# Patient Record
Sex: FEMALE | Race: WHITE | Employment: OTHER | URBAN - METROPOLITAN AREA
[De-identification: names, ages, dates, MRNs, and addresses within clinical notes are randomized per-mention and may not be internally consistent; named-entity substitution may affect disease eponyms.]

---

## 2017-01-03 RX ORDER — LISINOPRIL 40 MG/1
40 TABLET ORAL NIGHTLY
Qty: 90 TABLET | Refills: 0 | Status: SHIPPED | OUTPATIENT
Start: 2017-01-03 | End: 2017-04-24 | Stop reason: SDUPTHER

## 2017-01-03 RX ORDER — PROPRANOLOL HYDROCHLORIDE 40 MG/1
40 TABLET ORAL DAILY
Qty: 90 TABLET | Refills: 0 | Status: SHIPPED | OUTPATIENT
Start: 2017-01-03 | End: 2017-02-13 | Stop reason: ALTCHOICE

## 2017-01-03 RX ORDER — CLONIDINE HYDROCHLORIDE 0.1 MG/1
0.1 TABLET ORAL 2 TIMES DAILY
Qty: 180 TABLET | Refills: 0 | Status: SHIPPED | OUTPATIENT
Start: 2017-01-03 | End: 2017-02-13 | Stop reason: ALTCHOICE

## 2017-01-06 ENCOUNTER — TELEPHONE (OUTPATIENT)
Dept: ORTHOPEDIC SURGERY | Age: 67
End: 2017-01-06

## 2017-01-24 ENCOUNTER — TELEPHONE (OUTPATIENT)
Dept: ORTHOPEDIC SURGERY | Age: 67
End: 2017-01-24

## 2017-02-06 ENCOUNTER — TELEPHONE (OUTPATIENT)
Dept: ORTHOPEDIC SURGERY | Age: 67
End: 2017-02-06

## 2017-02-07 ENCOUNTER — OFFICE VISIT (OUTPATIENT)
Dept: ORTHOPEDIC SURGERY | Age: 67
End: 2017-02-07

## 2017-02-07 ENCOUNTER — TELEPHONE (OUTPATIENT)
Dept: ORTHOPEDIC SURGERY | Age: 67
End: 2017-02-07

## 2017-02-07 VITALS
BODY MASS INDEX: 29.43 KG/M2 | HEIGHT: 60 IN | SYSTOLIC BLOOD PRESSURE: 132 MMHG | DIASTOLIC BLOOD PRESSURE: 88 MMHG | HEART RATE: 82 BPM | WEIGHT: 149.91 LBS

## 2017-02-07 DIAGNOSIS — M46.44 THORACIC DISCITIS: Primary | ICD-10-CM

## 2017-02-07 PROCEDURE — 99213 OFFICE O/P EST LOW 20 MIN: CPT | Performed by: ORTHOPAEDIC SURGERY

## 2017-02-10 ENCOUNTER — TELEPHONE (OUTPATIENT)
Dept: ORTHOPEDIC SURGERY | Age: 67
End: 2017-02-10

## 2017-02-13 ENCOUNTER — OFFICE VISIT (OUTPATIENT)
Dept: CARDIOLOGY CLINIC | Age: 67
End: 2017-02-13

## 2017-02-13 VITALS
DIASTOLIC BLOOD PRESSURE: 54 MMHG | WEIGHT: 145 LBS | HEART RATE: 78 BPM | OXYGEN SATURATION: 96 % | BODY MASS INDEX: 28.47 KG/M2 | SYSTOLIC BLOOD PRESSURE: 100 MMHG | HEIGHT: 60 IN

## 2017-02-13 DIAGNOSIS — J44.1 ACUTE EXACERBATION OF CHRONIC OBSTRUCTIVE PULMONARY DISEASE (COPD) (HCC): ICD-10-CM

## 2017-02-13 DIAGNOSIS — Z72.0 TOBACCO ABUSE: Primary | Chronic | ICD-10-CM

## 2017-02-13 DIAGNOSIS — I10 ESSENTIAL HYPERTENSION: ICD-10-CM

## 2017-02-13 DIAGNOSIS — R07.2 PRECORDIAL PAIN: ICD-10-CM

## 2017-02-13 PROCEDURE — 99214 OFFICE O/P EST MOD 30 MIN: CPT | Performed by: INTERNAL MEDICINE

## 2017-04-10 ENCOUNTER — TELEPHONE (OUTPATIENT)
Dept: INFECTIOUS DISEASES | Age: 67
End: 2017-04-10

## 2017-04-13 ENCOUNTER — TELEPHONE (OUTPATIENT)
Dept: PULMONOLOGY | Age: 67
End: 2017-04-13

## 2017-04-24 ENCOUNTER — OFFICE VISIT (OUTPATIENT)
Dept: CARDIOLOGY CLINIC | Age: 67
End: 2017-04-24

## 2017-04-24 VITALS
HEART RATE: 104 BPM | BODY MASS INDEX: 28.27 KG/M2 | DIASTOLIC BLOOD PRESSURE: 80 MMHG | SYSTOLIC BLOOD PRESSURE: 134 MMHG | OXYGEN SATURATION: 92 % | HEIGHT: 60 IN | WEIGHT: 144 LBS

## 2017-04-24 DIAGNOSIS — I25.118 CORONARY ARTERY DISEASE OF NATIVE ARTERY OF NATIVE HEART WITH STABLE ANGINA PECTORIS (HCC): ICD-10-CM

## 2017-04-24 DIAGNOSIS — I20.8 ANGINA AT REST (HCC): Primary | ICD-10-CM

## 2017-04-24 DIAGNOSIS — D64.9 ANEMIA, UNSPECIFIED TYPE: ICD-10-CM

## 2017-04-24 DIAGNOSIS — I50.32 CHRONIC DIASTOLIC CONGESTIVE HEART FAILURE (HCC): ICD-10-CM

## 2017-04-24 DIAGNOSIS — I10 ESSENTIAL HYPERTENSION: Chronic | ICD-10-CM

## 2017-04-24 PROCEDURE — 99214 OFFICE O/P EST MOD 30 MIN: CPT | Performed by: INTERNAL MEDICINE

## 2017-04-24 RX ORDER — LISINOPRIL 40 MG/1
40 TABLET ORAL NIGHTLY
Qty: 90 TABLET | Refills: 1 | Status: SHIPPED | OUTPATIENT
Start: 2017-04-24 | End: 2017-11-30 | Stop reason: SDUPTHER

## 2017-04-24 RX ORDER — ATORVASTATIN CALCIUM 40 MG/1
40 TABLET, FILM COATED ORAL NIGHTLY
Qty: 90 TABLET | Refills: 1 | Status: SHIPPED | OUTPATIENT
Start: 2017-04-24 | End: 2017-06-29 | Stop reason: ALTCHOICE

## 2017-04-24 RX ORDER — AMLODIPINE BESYLATE 5 MG/1
5 TABLET ORAL DAILY
Qty: 90 TABLET | Refills: 1 | Status: SHIPPED | OUTPATIENT
Start: 2017-04-24 | End: 2017-09-25 | Stop reason: ALTCHOICE

## 2017-07-03 ENCOUNTER — TELEPHONE (OUTPATIENT)
Dept: INFECTIOUS DISEASES | Age: 67
End: 2017-07-03

## 2017-07-25 ENCOUNTER — TELEPHONE (OUTPATIENT)
Dept: INFECTIOUS DISEASES | Age: 67
End: 2017-07-25

## 2017-07-26 PROBLEM — M54.6 PAIN IN THORACIC SPINE: Status: ACTIVE | Noted: 2017-07-26

## 2017-07-26 PROBLEM — M47.814 SPONDYLOSIS OF THORACIC REGION WITHOUT MYELOPATHY OR RADICULOPATHY: Status: ACTIVE | Noted: 2017-07-26

## 2017-08-03 PROBLEM — D50.8 IRON DEFICIENCY ANEMIA SECONDARY TO INADEQUATE DIETARY IRON INTAKE: Status: ACTIVE | Noted: 2017-08-03

## 2017-08-03 PROBLEM — K90.9 MALABSORPTION OF IRON: Status: ACTIVE | Noted: 2017-08-03

## 2017-11-30 RX ORDER — LISINOPRIL 40 MG/1
40 TABLET ORAL NIGHTLY
Qty: 90 TABLET | Refills: 1 | Status: SHIPPED | OUTPATIENT
Start: 2017-11-30 | End: 2018-12-03

## 2017-11-30 NOTE — TELEPHONE ENCOUNTER
[facility] requesting a refill for pt's lisinopril (PRINIVIL;ZESTRIL) 40 MG tablet. Last ov 4/24/17.

## 2018-02-05 ENCOUNTER — TELEPHONE (OUTPATIENT)
Dept: CASE MANAGEMENT | Age: 68
End: 2018-02-05

## 2018-02-07 PROBLEM — G45.9 TIA (TRANSIENT ISCHEMIC ATTACK): Status: RESOLVED | Noted: 2018-02-07 | Resolved: 2018-02-07

## 2018-02-07 PROBLEM — G45.9 TIA (TRANSIENT ISCHEMIC ATTACK): Status: ACTIVE | Noted: 2018-02-07

## 2018-02-07 PROBLEM — H83.09 VIRAL LABYRINTHITIS: Status: ACTIVE | Noted: 2018-02-07

## 2018-02-12 ENCOUNTER — TELEPHONE (OUTPATIENT)
Dept: CARDIOLOGY | Age: 68
End: 2018-02-12

## 2018-02-12 DIAGNOSIS — R55 SYNCOPE AND COLLAPSE: Primary | ICD-10-CM

## 2018-02-12 NOTE — TELEPHONE ENCOUNTER
Patient needs a 30 day monitor sent to her for syncope. Also a follow-up visit with Dr. Ousmane De Oliveira in about 6weeks per Dr. Dover First. Please call the patient to arrange. Event monitor was ordered.

## 2018-02-13 NOTE — TELEPHONE ENCOUNTER
Spoke with RN Jeannie Timmons, please arrange monitor for pt and call her,  can schedule ov at same time when medical staff contacts pt about monitor. Thank you.

## 2018-03-14 ENCOUNTER — TELEPHONE (OUTPATIENT)
Dept: CARDIOLOGY CLINIC | Age: 68
End: 2018-03-14

## 2018-04-12 ENCOUNTER — TELEPHONE (OUTPATIENT)
Dept: CARDIOLOGY CLINIC | Age: 68
End: 2018-04-12

## 2018-04-24 ENCOUNTER — HOSPITAL ENCOUNTER (OUTPATIENT)
Dept: OTHER | Age: 68
Discharge: HOME | End: 2018-04-24
Attending: FAMILY MEDICINE | Admitting: FAMILY MEDICINE

## 2018-04-24 DIAGNOSIS — J44.9 CHRONIC OBSTRUCTIVE PULMONARY DISEASE, UNSPECIFIED COPD TYPE (HCC): ICD-10-CM

## 2018-08-12 ENCOUNTER — APPOINTMENT (OUTPATIENT)
Dept: GENERAL RADIOLOGY | Age: 68
End: 2018-08-12
Payer: MEDICARE

## 2018-08-12 ENCOUNTER — HOSPITAL ENCOUNTER (EMERGENCY)
Age: 68
Discharge: HOME | End: 2018-08-12
Attending: EMERGENCY MEDICINE
Payer: MEDICARE

## 2018-08-12 VITALS
TEMPERATURE: 97.5 F | HEART RATE: 87 BPM | SYSTOLIC BLOOD PRESSURE: 220 MMHG | DIASTOLIC BLOOD PRESSURE: 96 MMHG | WEIGHT: 165 LBS | BODY MASS INDEX: 31.15 KG/M2 | OXYGEN SATURATION: 100 % | RESPIRATION RATE: 20 BRPM | HEIGHT: 61 IN

## 2018-08-12 DIAGNOSIS — J18.9 PNEUMONITIS: Primary | ICD-10-CM

## 2018-08-12 LAB
A/G RATIO: 1.4 (ref 1.1–2.2)
ALBUMIN SERPL-MCNC: 4.2 G/DL (ref 3.4–5)
ALP BLD-CCNC: 79 U/L (ref 40–129)
ALT SERPL-CCNC: 7 U/L (ref 10–40)
ANION GAP SERPL CALCULATED.3IONS-SCNC: 12 MMOL/L (ref 3–16)
AST SERPL-CCNC: 11 U/L (ref 15–37)
BANDED NEUTROPHILS RELATIVE PERCENT: 1 % (ref 0–7)
BASOPHILS ABSOLUTE: 0 K/UL (ref 0–0.2)
BASOPHILS RELATIVE PERCENT: 0 %
BILIRUB SERPL-MCNC: <0.2 MG/DL (ref 0–1)
BUN BLDV-MCNC: 19 MG/DL (ref 7–20)
CALCIUM SERPL-MCNC: 9.4 MG/DL (ref 8.3–10.6)
CHLORIDE BLD-SCNC: 96 MMOL/L (ref 99–110)
CO2: 26 MMOL/L (ref 21–32)
CREAT SERPL-MCNC: 0.7 MG/DL (ref 0.6–1.2)
EOSINOPHILS ABSOLUTE: 0.3 K/UL (ref 0–0.6)
EOSINOPHILS RELATIVE PERCENT: 4 %
GFR AFRICAN AMERICAN: >60
GFR NON-AFRICAN AMERICAN: >60
GLOBULIN: 3.1 G/DL
GLUCOSE BLD-MCNC: 102 MG/DL (ref 70–99)
HCT VFR BLD CALC: 33.8 % (ref 36–48)
HEMOGLOBIN: 11.5 G/DL (ref 12–16)
LYMPHOCYTES ABSOLUTE: 1.3 K/UL (ref 1–5.1)
LYMPHOCYTES RELATIVE PERCENT: 19 %
MCH RBC QN AUTO: 30.4 PG (ref 26–34)
MCHC RBC AUTO-ENTMCNC: 34.1 G/DL (ref 31–36)
MCV RBC AUTO: 89.2 FL (ref 80–100)
MONOCYTES ABSOLUTE: 0.3 K/UL (ref 0–1.3)
MONOCYTES RELATIVE PERCENT: 5 %
NEUTROPHILS ABSOLUTE: 4.8 K/UL (ref 1.7–7.7)
NEUTROPHILS RELATIVE PERCENT: 71 %
PDW BLD-RTO: 14.4 % (ref 12.4–15.4)
PLATELET # BLD: 196 K/UL (ref 135–450)
PLATELET SLIDE REVIEW: ADEQUATE
PMV BLD AUTO: 6.8 FL (ref 5–10.5)
POTASSIUM SERPL-SCNC: 4.5 MMOL/L (ref 3.5–5.1)
RBC # BLD: 3.79 M/UL (ref 4–5.2)
SLIDE REVIEW: ABNORMAL
SODIUM BLD-SCNC: 134 MMOL/L (ref 136–145)
TOTAL PROTEIN: 7.3 G/DL (ref 6.4–8.2)
TROPONIN: <0.01 NG/ML
WBC # BLD: 6.6 K/UL (ref 4–11)

## 2018-08-12 PROCEDURE — 85025 COMPLETE CBC W/AUTO DIFF WBC: CPT

## 2018-08-12 PROCEDURE — 99284 EMERGENCY DEPT VISIT MOD MDM: CPT

## 2018-08-12 PROCEDURE — 6370000000 HC RX 637 (ALT 250 FOR IP): Performed by: PHYSICIAN ASSISTANT

## 2018-08-12 PROCEDURE — 93010 ELECTROCARDIOGRAM REPORT: CPT | Performed by: INTERNAL MEDICINE

## 2018-08-12 PROCEDURE — 93005 ELECTROCARDIOGRAM TRACING: CPT | Performed by: PHYSICIAN ASSISTANT

## 2018-08-12 PROCEDURE — 6360000002 HC RX W HCPCS: Performed by: PHYSICIAN ASSISTANT

## 2018-08-12 PROCEDURE — 96374 THER/PROPH/DIAG INJ IV PUSH: CPT

## 2018-08-12 PROCEDURE — 84484 ASSAY OF TROPONIN QUANT: CPT

## 2018-08-12 PROCEDURE — 80053 COMPREHEN METABOLIC PANEL: CPT

## 2018-08-12 PROCEDURE — 71046 X-RAY EXAM CHEST 2 VIEWS: CPT

## 2018-08-12 RX ORDER — IPRATROPIUM BROMIDE AND ALBUTEROL SULFATE 2.5; .5 MG/3ML; MG/3ML
1 SOLUTION RESPIRATORY (INHALATION) ONCE
Status: COMPLETED | OUTPATIENT
Start: 2018-08-12 | End: 2018-08-12

## 2018-08-12 RX ORDER — METHYLPREDNISOLONE SODIUM SUCCINATE 125 MG/2ML
125 INJECTION, POWDER, LYOPHILIZED, FOR SOLUTION INTRAMUSCULAR; INTRAVENOUS ONCE
Status: COMPLETED | OUTPATIENT
Start: 2018-08-12 | End: 2018-08-12

## 2018-08-12 RX ORDER — PREDNISONE 10 MG/1
60 TABLET ORAL DAILY
Qty: 30 TABLET | Refills: 0 | Status: SHIPPED | OUTPATIENT
Start: 2018-08-12 | End: 2018-08-17

## 2018-08-12 RX ORDER — DOXYCYCLINE 100 MG/1
100 TABLET ORAL 2 TIMES DAILY
Qty: 20 TABLET | Refills: 0 | Status: SHIPPED | OUTPATIENT
Start: 2018-08-12 | End: 2018-08-22

## 2018-08-12 RX ORDER — METHYLPREDNISOLONE SODIUM SUCCINATE 125 MG/2ML
125 INJECTION, POWDER, LYOPHILIZED, FOR SOLUTION INTRAMUSCULAR; INTRAVENOUS ONCE
Status: DISCONTINUED | OUTPATIENT
Start: 2018-08-12 | End: 2018-08-12

## 2018-08-12 RX ORDER — BENZONATATE 100 MG/1
100 CAPSULE ORAL ONCE
Status: COMPLETED | OUTPATIENT
Start: 2018-08-12 | End: 2018-08-12

## 2018-08-12 RX ORDER — HYDROCODONE BITARTRATE AND ACETAMINOPHEN 5; 325 MG/1; MG/1
1 TABLET ORAL EVERY 6 HOURS PRN
Qty: 10 TABLET | Refills: 0 | Status: SHIPPED | OUTPATIENT
Start: 2018-08-12 | End: 2018-08-15

## 2018-08-12 RX ADMIN — BENZONATATE 100 MG: 100 CAPSULE ORAL at 14:01

## 2018-08-12 RX ADMIN — IPRATROPIUM BROMIDE AND ALBUTEROL SULFATE 1 AMPULE: .5; 3 SOLUTION RESPIRATORY (INHALATION) at 12:50

## 2018-08-12 RX ADMIN — METHYLPREDNISOLONE SODIUM SUCCINATE 125 MG: 125 INJECTION, POWDER, FOR SOLUTION INTRAMUSCULAR; INTRAVENOUS at 12:56

## 2018-08-12 ASSESSMENT — PAIN DESCRIPTION - LOCATION: LOCATION: BACK

## 2018-08-12 ASSESSMENT — PAIN DESCRIPTION - PAIN TYPE: TYPE: ACUTE PAIN

## 2018-08-12 ASSESSMENT — ENCOUNTER SYMPTOMS
DIARRHEA: 1
BACK PAIN: 1
EYES NEGATIVE: 1
COUGH: 1

## 2018-08-12 ASSESSMENT — PAIN SCALES - GENERAL: PAINLEVEL_OUTOF10: 8

## 2018-08-12 NOTE — ED PROVIDER NOTES
Bipolar disorder ([facility]     Bronchitis chronic     CHF (congestive heart failure) ([facility]) 9/30/2016    Chronic back pain     Chronic neck pain     Clostridium difficile infection 3/29/12, 5/22/12    positive stool DNA probe    Coronary artery disease involving native coronary artery of native heart with angina pectoris ([facility] 3/8/2016    Depression     Emphysema of lung ([facility]     Fibromyalgia     hyperlipidemia 8/11/2011    Hypertension     Kidney stone     Liver disease     Lung disease     MDD (major depressive disorder) 4/4/2012    Mood disorder ([facility] 3/13/2013    Movement disorder     Neck pain, chronic 3/13/2013    Pneumonia     Polypharmacy 2/16/2013         SURGICAL HISTORY       Past Surgical History:   Procedure Laterality Date    COLONOSCOPY  1/19/12    DIAGNOSTIC CARDIAC CATH LAB PROCEDURE  Feb, 2007    Normal cor angio Feb, 2007    HYSTERECTOMY, TOTAL ABDOMINAL      KIDNEY STONE SURGERY           CURRENT MEDICATIONS       Previous Medications    ALBUTEROL SULFATE HFA (PROVENTIL HFA) 108 (90 BASE) MCG/ACT INHALER    Inhale 2 puffs into the lungs every 4 hours as needed for Wheezing or Shortness of Breath (Space out to every 6 hours as symptoms improve) Space out to every 6 hours as symptoms improve. BLOOD PRESSURE MONITORING (BLOOD PRESSURE MONITOR AUTOMAT) SOCORRO    1 each by Does not apply route daily. DIVALPROEX (DEPAKOTE) 250 MG DR TABLET    Take 250 mg by mouth every morning     FLUTICASONE (FLONASE) 50 MCG/ACT NASAL SPRAY    1 spray by Nasal route daily    GABAPENTIN (NEURONTIN) 800 MG TABLET    Take 1 tablet by mouth 3 times daily for 17 days.     LISINOPRIL (PRINIVIL;ZESTRIL) 40 MG TABLET    Take 1 tablet by mouth nightly    ONDANSETRON (ZOFRAN ODT) 4 MG DISINTEGRATING TABLET    Take 1 tablet by mouth every 8 hours as needed for Nausea or Vomiting    QUETIAPINE (SEROQUEL) 100 MG TABLET    Take 1 tablet by mouth nightly    THERAPEUTIC MULTIVITAMIN-MINERALS (THERAGRAN-M) heard.  Pulmonary/Chest: Effort normal. No respiratory distress. She has wheezes. She has no rales. She exhibits no tenderness. Musculoskeletal: Normal range of motion. She exhibits no deformity. Neurological: She is alert and oriented to person, place, and time. Skin: Skin is warm and dry. She is not diaphoretic. Psychiatric: She has a normal mood and affect. Her behavior is normal.   Nursing note and vitals reviewed. DIAGNOSTIC RESULTS   LABS:    Labs Reviewed   CBC WITH AUTO DIFFERENTIAL - Abnormal; Notable for the following:        Result Value    RBC 3.79 (*)     Hemoglobin 11.5 (*)     Hematocrit 33.8 (*)     All other components within normal limits    Narrative:     Performed at:  [facility],  [place], [facility]   Phone [phone]   COMPREHENSIVE METABOLIC PANEL - Abnormal; Notable for the following:     Sodium 134 (*)     Chloride 96 (*)     Glucose 102 (*)     ALT 7 (*)     AST 11 (*)     All other components within normal limits    Narrative:     Performed at:  [facility],  [place], [facility]   Phone [phone]   TROPONIN    Narrative:     Performed at:  [facility],  [place] [facility]   Phone [phone]       All other labs were within normal range or not returned as of this dictation. EKG: All EKG's are interpreted by the Emergency Department Physician who either signs or Co-signs this chart in the absence of a cardiologist.  Please see their note for interpretation of EKG. RADIOLOGY:   Non-plain film images such as CT, Ultrasound and MRI are read by the radiologist. Plain radiographic images are visualized and preliminarily interpreted by the  ED Provider with the below findings:        Interpretation per the Radiologist below, if available at the time of this note:    XR CHEST STANDARD (2 VW)   Final Result   1. immediately. The patient tolerated their visit well. They were seen and evaluated by the attending physician who agreed with the assessment and plan. The patient and / or the family were informed of the results of any tests, a time was given to answer questions, a plan was proposed and they agreed with plan. FINAL IMPRESSION      1. Pneumonitis          DISPOSITION/PLAN   DISPOSITION        PATIENT REFERRED TO:  Hesham Bills MD  [address] [place] [address] [address]. [address] [facility]  [phone]      As needed, If symptoms worsen      DISCHARGE MEDICATIONS:  New Prescriptions    DOXYCYCLINE MONOHYDRATE (ADOXA) 100 MG TABLET    Take 1 tablet by mouth 2 times daily for 10 days May substitute another form of Doxycycline if insurance requires. HYDROCODONE-ACETAMINOPHEN (NORCO) 5-325 MG PER TABLET    Take 1 tablet by mouth every 6 hours as needed for Pain for up to 3 days. Tremayne Fofana PREDNISONE (DELTASONE) 10 MG TABLET    Take 6 tablets by mouth daily for 5 doses       DISCONTINUED MEDICATIONS:  Discontinued Medications    DONEPEZIL (ARICEPT) 10 MG TABLET    Take 5 mg by mouth nightly     LACTOBACILLUS (CULTURELLE) CAPSULE    Take 1 capsule by mouth 2 times daily              (Please note that portions of this note were completed with a voice recognition program.  Efforts were made to edit the dictations but occasionally words are mis-transcribed.)    Jamil Cervantes PA-C (electronically signed)         Jamil Cervantes PA-C  08/12/18 1431      EKG-interpreted by me, Gonzalo Cochran D.O. shows sinus rhythm rate of 91. She does have a likely incomplete right bundle branch block. There is no ST elevation or depression. No ectopy. Morphology similar to February 24 of 2018.        Morelia Hoskins DO  08/12/18 5135

## 2018-08-14 LAB
EKG ATRIAL RATE: 91 BPM
EKG DIAGNOSIS: NORMAL
EKG P AXIS: -18 DEGREES
EKG P-R INTERVAL: 134 MS
EKG Q-T INTERVAL: 356 MS
EKG QRS DURATION: 88 MS
EKG QTC CALCULATION (BAZETT): 437 MS
EKG R AXIS: -3 DEGREES
EKG T AXIS: 19 DEGREES
EKG VENTRICULAR RATE: 91 BPM

## 2018-08-15 ENCOUNTER — APPOINTMENT (OUTPATIENT)
Dept: GENERAL RADIOLOGY | Age: 68
End: 2018-08-15
Payer: MEDICARE

## 2018-08-15 ENCOUNTER — HOSPITAL ENCOUNTER (EMERGENCY)
Age: 68
Discharge: HOME | End: 2018-08-15
Attending: EMERGENCY MEDICINE
Payer: MEDICARE

## 2018-08-15 ENCOUNTER — APPOINTMENT (OUTPATIENT)
Dept: CT IMAGING | Age: 68
End: 2018-08-15
Payer: MEDICARE

## 2018-08-15 VITALS
RESPIRATION RATE: 17 BRPM | SYSTOLIC BLOOD PRESSURE: 178 MMHG | HEART RATE: 82 BPM | OXYGEN SATURATION: 96 % | HEIGHT: 61 IN | TEMPERATURE: 97.8 F | BODY MASS INDEX: 28.32 KG/M2 | WEIGHT: 150 LBS | DIASTOLIC BLOOD PRESSURE: 94 MMHG

## 2018-08-15 DIAGNOSIS — Z76.0 ENCOUNTER FOR MEDICATION REFILL: ICD-10-CM

## 2018-08-15 DIAGNOSIS — R05.9 COUGH: ICD-10-CM

## 2018-08-15 DIAGNOSIS — R07.81 RIB PAIN: ICD-10-CM

## 2018-08-15 DIAGNOSIS — Z87.01 HISTORY OF PNEUMONIA: ICD-10-CM

## 2018-08-15 DIAGNOSIS — R91.1 PULMONARY NODULE: Primary | ICD-10-CM

## 2018-08-15 DIAGNOSIS — M47.9 OSTEOARTHRITIS OF SPINE, UNSPECIFIED SPINAL OSTEOARTHRITIS COMPLICATION STATUS, UNSPECIFIED SPINAL REGION: ICD-10-CM

## 2018-08-15 LAB
A/G RATIO: 1.5 (ref 1.1–2.2)
ALBUMIN SERPL-MCNC: 4.2 G/DL (ref 3.4–5)
ALP BLD-CCNC: 70 U/L (ref 40–129)
ALT SERPL-CCNC: 8 U/L (ref 10–40)
ANION GAP SERPL CALCULATED.3IONS-SCNC: 9 MMOL/L (ref 3–16)
AST SERPL-CCNC: 10 U/L (ref 15–37)
BANDED NEUTROPHILS RELATIVE PERCENT: 4 % (ref 0–7)
BASOPHILS ABSOLUTE: 0 K/UL (ref 0–0.2)
BASOPHILS RELATIVE PERCENT: 0 %
BILIRUB SERPL-MCNC: <0.2 MG/DL (ref 0–1)
BUN BLDV-MCNC: 24 MG/DL (ref 7–20)
CALCIUM SERPL-MCNC: 9.2 MG/DL (ref 8.3–10.6)
CHLORIDE BLD-SCNC: 94 MMOL/L (ref 99–110)
CO2: 27 MMOL/L (ref 21–32)
CREAT SERPL-MCNC: 0.7 MG/DL (ref 0.6–1.2)
EOSINOPHILS ABSOLUTE: 0 K/UL (ref 0–0.6)
EOSINOPHILS RELATIVE PERCENT: 0 %
GFR AFRICAN AMERICAN: >60
GFR NON-AFRICAN AMERICAN: >60
GLOBULIN: 2.8 G/DL
GLUCOSE BLD-MCNC: 117 MG/DL (ref 70–99)
HCT VFR BLD CALC: 36.6 % (ref 36–48)
HEMOGLOBIN: 12.2 G/DL (ref 12–16)
INR BLD: 0.96 (ref 0.86–1.14)
LYMPHOCYTES ABSOLUTE: 1.5 K/UL (ref 1–5.1)
LYMPHOCYTES RELATIVE PERCENT: 14 %
MCH RBC QN AUTO: 30.7 PG (ref 26–34)
MCHC RBC AUTO-ENTMCNC: 33.3 G/DL (ref 31–36)
MCV RBC AUTO: 92 FL (ref 80–100)
MONOCYTES ABSOLUTE: 0.1 K/UL (ref 0–1.3)
MONOCYTES RELATIVE PERCENT: 1 %
NEUTROPHILS ABSOLUTE: 9.1 K/UL (ref 1.7–7.7)
NEUTROPHILS RELATIVE PERCENT: 81 %
PDW BLD-RTO: 14.7 % (ref 12.4–15.4)
PLATELET # BLD: 189 K/UL (ref 135–450)
PMV BLD AUTO: 7 FL (ref 5–10.5)
POTASSIUM SERPL-SCNC: 4.6 MMOL/L (ref 3.5–5.1)
PROTHROMBIN TIME: 10.9 SEC (ref 9.8–13)
RBC # BLD: 3.98 M/UL (ref 4–5.2)
SODIUM BLD-SCNC: 130 MMOL/L (ref 136–145)
TOTAL PROTEIN: 7 G/DL (ref 6.4–8.2)
TROPONIN: <0.01 NG/ML
WBC # BLD: 10.7 K/UL (ref 4–11)

## 2018-08-15 PROCEDURE — 71046 X-RAY EXAM CHEST 2 VIEWS: CPT

## 2018-08-15 PROCEDURE — 2580000003 HC RX 258: Performed by: NURSE PRACTITIONER

## 2018-08-15 PROCEDURE — 99285 EMERGENCY DEPT VISIT HI MDM: CPT

## 2018-08-15 PROCEDURE — 71260 CT THORAX DX C+: CPT

## 2018-08-15 PROCEDURE — 85610 PROTHROMBIN TIME: CPT

## 2018-08-15 PROCEDURE — 96361 HYDRATE IV INFUSION ADD-ON: CPT

## 2018-08-15 PROCEDURE — 96360 HYDRATION IV INFUSION INIT: CPT

## 2018-08-15 PROCEDURE — 85025 COMPLETE CBC W/AUTO DIFF WBC: CPT

## 2018-08-15 PROCEDURE — 80053 COMPREHEN METABOLIC PANEL: CPT

## 2018-08-15 PROCEDURE — 36415 COLL VENOUS BLD VENIPUNCTURE: CPT

## 2018-08-15 PROCEDURE — 93010 ELECTROCARDIOGRAM REPORT: CPT | Performed by: INTERNAL MEDICINE

## 2018-08-15 PROCEDURE — 93005 ELECTROCARDIOGRAM TRACING: CPT | Performed by: EMERGENCY MEDICINE

## 2018-08-15 PROCEDURE — 84484 ASSAY OF TROPONIN QUANT: CPT

## 2018-08-15 PROCEDURE — 6370000000 HC RX 637 (ALT 250 FOR IP): Performed by: NURSE PRACTITIONER

## 2018-08-15 PROCEDURE — 6360000004 HC RX CONTRAST MEDICATION: Performed by: EMERGENCY MEDICINE

## 2018-08-15 RX ORDER — 0.9 % SODIUM CHLORIDE 0.9 %
1000 INTRAVENOUS SOLUTION INTRAVENOUS ONCE
Status: COMPLETED | OUTPATIENT
Start: 2018-08-15 | End: 2018-08-15

## 2018-08-15 RX ORDER — HYDROCODONE BITARTRATE AND ACETAMINOPHEN 5; 325 MG/1; MG/1
1 TABLET ORAL EVERY 6 HOURS PRN
Qty: 4 TABLET | Refills: 0 | Status: SHIPPED | OUTPATIENT
Start: 2018-08-15 | End: 2018-08-18

## 2018-08-15 RX ORDER — HYDROCODONE BITARTRATE AND ACETAMINOPHEN 5; 325 MG/1; MG/1
1 TABLET ORAL ONCE
Status: COMPLETED | OUTPATIENT
Start: 2018-08-15 | End: 2018-08-15

## 2018-08-15 RX ORDER — BENZONATATE 100 MG/1
100 CAPSULE ORAL 3 TIMES DAILY PRN
Qty: 30 CAPSULE | Refills: 0 | Status: SHIPPED | OUTPATIENT
Start: 2018-08-15 | End: 2018-08-22

## 2018-08-15 RX ADMIN — IOPAMIDOL 75 ML: 755 INJECTION, SOLUTION INTRAVENOUS at 18:17

## 2018-08-15 RX ADMIN — HYDROCODONE BITARTRATE AND ACETAMINOPHEN 1 TABLET: 5; 325 TABLET ORAL at 17:12

## 2018-08-15 RX ADMIN — SODIUM CHLORIDE 1000 ML: 9 INJECTION, SOLUTION INTRAVENOUS at 18:36

## 2018-08-15 ASSESSMENT — PAIN DESCRIPTION - LOCATION: LOCATION: RIB CAGE

## 2018-08-15 ASSESSMENT — PAIN SCALES - WONG BAKER: WONGBAKER_NUMERICALRESPONSE: 8

## 2018-08-15 ASSESSMENT — PAIN DESCRIPTION - ORIENTATION: ORIENTATION: RIGHT;LEFT

## 2018-08-15 ASSESSMENT — PAIN SCALES - GENERAL: PAINLEVEL_OUTOF10: 8

## 2018-08-15 ASSESSMENT — ENCOUNTER SYMPTOMS
ABDOMINAL PAIN: 0
SHORTNESS OF BREATH: 0

## 2018-08-15 ASSESSMENT — PAIN DESCRIPTION - PAIN TYPE: TYPE: ACUTE PAIN

## 2018-08-16 ENCOUNTER — TELEPHONE (OUTPATIENT)
Dept: CASE MANAGEMENT | Age: 68
End: 2018-08-16

## 2018-08-16 LAB
EKG ATRIAL RATE: 90 BPM
EKG DIAGNOSIS: NORMAL
EKG P AXIS: 65 DEGREES
EKG P-R INTERVAL: 164 MS
EKG Q-T INTERVAL: 342 MS
EKG QRS DURATION: 96 MS
EKG QTC CALCULATION (BAZETT): 418 MS
EKG R AXIS: 8 DEGREES
EKG T AXIS: 49 DEGREES
EKG VENTRICULAR RATE: 90 BPM

## 2018-08-16 NOTE — ED PROVIDER NOTES
Emergency Department Provider Note     Location: [place] [address] ED  8/15/2018    I independently performed a history and physical on Hayley Moore. All diagnostic, treatment, and disposition decisions were made by myself in conjunction with the mid-level provider. Briefly, this is a 78 y.o. female here for SOB, recently told she had PNA/pneumonitis, still w/ some cough, but on abx, here 3 days ago, on 8-12-18, c/o gen. Weakness, L ant rib/chest pain. Reported to me, her main concern today was that she was out of her norco. Has her other meds. ED Triage Vitals   BP Temp Temp Source Pulse Resp SpO2 Height Weight   08/15/18 1550 08/15/18 1546 08/15/18 1546 08/15/18 1546 08/15/18 1546 08/15/18 1546 08/15/18 1546 08/15/18 1546   (!) 167/78 97.8 °F (36.6 °C) Temporal 103 12 96 % 5' 1\" (1.549 m) 150 lb (68 kg)        Exam showed no acute distress, A&Ox4, heart RRR, borderline tachy, no M/G/R, lungs w/ coarse breath sounds, a few scattered rhonchi, mild bibasilar crackles, no wheezing, resp. Nonlabored, no abd tenderness, no peritoneal signs/no rebound/no guarding, a little TTP of L ant/lat chest wall. EKG interpreted by me shows:  normal sinus rhythm with a rate of 90  Axis is   Normal  QTc is  normal  Intervals and Durations are unremarkable. Likely L atrial enlargement, RV conduction delay      ST Segments: normal  No significant change from prior EKG dated 8-12-18  HEART SCORE:    History: +0 for low suspicion  EKG: +0 for normal EKG   Age: +4 for age >65 years  Risk factors (includes HLD, HTN, DM, tobacco use, obesity, and +FHx): +2 for known CAD or 3+ risk factors  Initial troponin: +0 for negative troponin    Heart score: 4.   This falls under the following category: Score of 4-6, which indicates low/moderate risk for major adverse cardiac event and supports observation with repeated troponins and/or non-invasive testing    For further details of [place] [address] emergency department encounter, please see Wei Mae NP's documentation. D/t SOB, tachy, age, and hx, could not r/o PE, obtained CT PE study, negative for PE, findings consistent w/ symptoms, told she needed to f/u w/ pulm regarding nodule and pneumonitis/PNA, and will continue current treatment regimen, has more abx to continue at home, given short course of norco and explained this would not likely be done at next visit, as she needed to f/u as outpt to get chronic meds, labs unremarkable, rib pain has been for days, neg trop, no ischemic change on EKG, pt admitted to me that she was fine with going home to continue her current treatment, but needed some norco for her chronic pain, had run out at home, Strict return precautions given, will return if any worsening symptoms or new concerns, patient verbalized understanding of plan, felt comfortable going home. Orders Placed This Encounter   Procedures    XR CHEST STANDARD (2 VW)    CT Chest Pulmonary Embolism W Contrast    CBC auto differential    Comprehensive Metabolic Panel    Troponin    Protime-INR    EKG 12 Lead     Orders Placed This Encounter   Medications    iopamidol (ISOVUE-370) 76 % injection 75 mL    HYDROcodone-acetaminophen (NORCO) 5-325 MG per tablet 1 tablet    0.9 % sodium chloride bolus    benzonatate (TESSALON PERLES) 100 MG capsule     Sig: Take 1 capsule by mouth 3 times daily as needed for Cough     Dispense:  30 capsule     Refill:  0    HYDROcodone-acetaminophen (NORCO) 5-325 MG per tablet     Sig: Take 1 tablet by mouth every 6 hours as needed for Pain for up to 3 days. Intended supply: 3 days. Take lowest dose possible to manage pain. Dispense:  4 tablet     Refill:  0       Clinical Impression:  1. Pulmonary nodule    2. History of pneumonia    3. Rib pain    4. Cough    5. Osteoarthritis of spine, unspecified spinal osteoarthritis complication status, unspecified spinal region    6.  Encounter for medication refill      Disposition:  Patient discharged home in stable condition. This chart was generated in part by using Dragon Dictation system and may contain errors related to that system including errors in grammar, punctuation, and spelling, as well as words and phrases that may be inappropriate. If there are any questions or concerns please feel free to contact the dictating provider for clarification.            Uvaldo Toscano, DO  08/28/18 7040

## 2018-08-16 NOTE — ED NOTES
Patient resting on stretcher, no needs at this time. Nad. VSS. Pt awaiting further dispo. Bed in low position, call bell in reach. Will continue to monitor.       Marce Laboy RN  08/15/18 2037

## 2018-08-16 NOTE — TELEPHONE ENCOUNTER
Referral from [facility] due to pulmonary nodule found on CTA when pt presented to ED for  Shortness of breath. Spoke with pt and she would like report sent to her PCP, Dr Marisela Stuart. She states she will be calling him for a hospital f/u visit . Report sent via EPIC to Dr Marisela Stuart. Pt informed about [facility] nodule program but prefers to see PCP first for his opinion. \"Dr Marisela Stuart, attached is the CTA imaging done on your pt when she presented to the [facility] ED for SOB. Pt is aware of pulmonary nodule finding and states she is planning on contacting your office for a hospital  f/u visit and to discuss the nodule. If you should need to utilize the services of [facility] Nod[facility] program please contact me at [phone]. Thanks Vangie Chavarria, MSN, BSN, RN, Lung Navigator.  \"

## 2018-08-24 ENCOUNTER — TELEPHONE (OUTPATIENT)
Dept: PULMONOLOGY | Age: 68
End: 2018-08-24

## 2018-08-24 NOTE — TELEPHONE ENCOUNTER
----- Message from Gayle Colin MD sent at 8/24/2018  3:47 PM EDT -----      ----- Message -----  From: Sumanth Dale DO  Sent: 8/15/2018   9:22 PM  To: Gayle Colin MD

## 2018-10-03 ENCOUNTER — HOSPITAL ENCOUNTER (OUTPATIENT)
Age: 68
Discharge: HOME | End: 2018-10-03
Payer: MEDICARE

## 2018-10-03 ENCOUNTER — HOSPITAL ENCOUNTER (OUTPATIENT)
Dept: GENERAL RADIOLOGY | Age: 68
Discharge: HOME | End: 2018-10-03
Payer: MEDICARE

## 2018-10-03 DIAGNOSIS — M54.9 DORSALGIA: ICD-10-CM

## 2018-10-03 LAB
BASOPHILS ABSOLUTE: 0 K/UL (ref 0–0.2)
BASOPHILS RELATIVE PERCENT: 0.8 %
EOSINOPHILS ABSOLUTE: 0.3 K/UL (ref 0–0.6)
EOSINOPHILS RELATIVE PERCENT: 5.4 %
HCT VFR BLD CALC: 35.1 % (ref 36–48)
HEMOGLOBIN: 11.9 G/DL (ref 12–16)
LYMPHOCYTES ABSOLUTE: 1.4 K/UL (ref 1–5.1)
LYMPHOCYTES RELATIVE PERCENT: 28.4 %
MCH RBC QN AUTO: 30 PG (ref 26–34)
MCHC RBC AUTO-ENTMCNC: 33.9 G/DL (ref 31–36)
MCV RBC AUTO: 88.5 FL (ref 80–100)
MONOCYTES ABSOLUTE: 0.5 K/UL (ref 0–1.3)
MONOCYTES RELATIVE PERCENT: 11.2 %
NEUTROPHILS ABSOLUTE: 2.6 K/UL (ref 1.7–7.7)
NEUTROPHILS RELATIVE PERCENT: 54.2 %
PDW BLD-RTO: 14.4 % (ref 12.4–15.4)
PLATELET # BLD: 156 K/UL (ref 135–450)
PMV BLD AUTO: 7.9 FL (ref 5–10.5)
RBC # BLD: 3.96 M/UL (ref 4–5.2)
SEDIMENTATION RATE, ERYTHROCYTE: 20 MM/HR (ref 0–30)
WBC # BLD: 4.9 K/UL (ref 4–11)

## 2018-10-03 PROCEDURE — 36415 COLL VENOUS BLD VENIPUNCTURE: CPT

## 2018-10-03 PROCEDURE — 72100 X-RAY EXAM L-S SPINE 2/3 VWS: CPT

## 2018-10-03 PROCEDURE — 85025 COMPLETE CBC W/AUTO DIFF WBC: CPT

## 2018-10-03 PROCEDURE — 85652 RBC SED RATE AUTOMATED: CPT

## 2018-10-18 ENCOUNTER — APPOINTMENT (OUTPATIENT)
Dept: GENERAL RADIOLOGY | Age: 68
End: 2018-10-18
Payer: MEDICARE

## 2018-10-18 ENCOUNTER — HOSPITAL ENCOUNTER (EMERGENCY)
Age: 68
Discharge: HOME | End: 2018-10-18
Payer: MEDICARE

## 2018-10-18 VITALS
OXYGEN SATURATION: 97 % | RESPIRATION RATE: 18 BRPM | HEIGHT: 62 IN | SYSTOLIC BLOOD PRESSURE: 139 MMHG | HEART RATE: 86 BPM | WEIGHT: 177 LBS | DIASTOLIC BLOOD PRESSURE: 58 MMHG | TEMPERATURE: 98 F | BODY MASS INDEX: 32.57 KG/M2

## 2018-10-18 DIAGNOSIS — S82.65XA CLOSED NONDISPLACED FRACTURE OF LATERAL MALLEOLUS OF LEFT FIBULA, INITIAL ENCOUNTER: Primary | ICD-10-CM

## 2018-10-18 PROCEDURE — 73610 X-RAY EXAM OF ANKLE: CPT

## 2018-10-18 PROCEDURE — G8978 MOBILITY CURRENT STATUS: HCPCS

## 2018-10-18 PROCEDURE — 97116 GAIT TRAINING THERAPY: CPT

## 2018-10-18 PROCEDURE — G8980 MOBILITY D/C STATUS: HCPCS

## 2018-10-18 PROCEDURE — G8979 MOBILITY GOAL STATUS: HCPCS

## 2018-10-18 PROCEDURE — 99283 EMERGENCY DEPT VISIT LOW MDM: CPT

## 2018-10-18 PROCEDURE — 4500000023 HC ED LEVEL 3 PROCEDURE

## 2018-10-18 PROCEDURE — 6360000002 HC RX W HCPCS: Performed by: PHYSICIAN ASSISTANT

## 2018-10-18 PROCEDURE — 97161 PT EVAL LOW COMPLEX 20 MIN: CPT

## 2018-10-18 PROCEDURE — 6370000000 HC RX 637 (ALT 250 FOR IP): Performed by: PHYSICIAN ASSISTANT

## 2018-10-18 RX ORDER — HYDROCODONE BITARTRATE AND ACETAMINOPHEN 5; 325 MG/1; MG/1
1 TABLET ORAL ONCE
Status: COMPLETED | OUTPATIENT
Start: 2018-10-18 | End: 2018-10-18

## 2018-10-18 RX ORDER — ONDANSETRON 4 MG/1
4-8 TABLET, ORALLY DISINTEGRATING ORAL EVERY 12 HOURS PRN
Qty: 12 TABLET | Refills: 0 | Status: SHIPPED | OUTPATIENT
Start: 2018-10-18 | End: 2019-01-08 | Stop reason: ALTCHOICE

## 2018-10-18 RX ORDER — ONDANSETRON 4 MG/1
8 TABLET, ORALLY DISINTEGRATING ORAL ONCE
Status: COMPLETED | OUTPATIENT
Start: 2018-10-18 | End: 2018-10-18

## 2018-10-18 RX ORDER — OXYCODONE HYDROCHLORIDE AND ACETAMINOPHEN 5; 325 MG/1; MG/1
1 TABLET ORAL EVERY 6 HOURS PRN
Qty: 12 TABLET | Refills: 0 | Status: SHIPPED | OUTPATIENT
Start: 2018-10-18 | End: 2018-10-21

## 2018-10-18 RX ORDER — OXYCODONE HYDROCHLORIDE 5 MG/1
5 TABLET ORAL ONCE
Status: COMPLETED | OUTPATIENT
Start: 2018-10-18 | End: 2018-10-18

## 2018-10-18 RX ORDER — IPRATROPIUM BROMIDE AND ALBUTEROL SULFATE 2.5; .5 MG/3ML; MG/3ML
1 SOLUTION RESPIRATORY (INHALATION) ONCE
Status: COMPLETED | OUTPATIENT
Start: 2018-10-18 | End: 2018-10-18

## 2018-10-18 RX ORDER — ALBUTEROL SULFATE 2.5 MG/3ML
5 SOLUTION RESPIRATORY (INHALATION) ONCE
Status: COMPLETED | OUTPATIENT
Start: 2018-10-18 | End: 2018-10-18

## 2018-10-18 RX ADMIN — HYDROCODONE BITARTRATE AND ACETAMINOPHEN 1 TABLET: 5; 325 TABLET ORAL at 11:43

## 2018-10-18 RX ADMIN — OXYCODONE HYDROCHLORIDE 5 MG: 5 TABLET ORAL at 12:51

## 2018-10-18 RX ADMIN — IPRATROPIUM BROMIDE AND ALBUTEROL SULFATE 1 AMPULE: .5; 3 SOLUTION RESPIRATORY (INHALATION) at 11:43

## 2018-10-18 RX ADMIN — ONDANSETRON 8 MG: 4 TABLET, ORALLY DISINTEGRATING ORAL at 14:03

## 2018-10-18 RX ADMIN — ALBUTEROL SULFATE 5 MG: 2.5 SOLUTION RESPIRATORY (INHALATION) at 11:45

## 2018-10-18 ASSESSMENT — PAIN DESCRIPTION - PAIN TYPE
TYPE: ACUTE PAIN
TYPE: ACUTE PAIN

## 2018-10-18 ASSESSMENT — PAIN DESCRIPTION - LOCATION
LOCATION: ANKLE

## 2018-10-18 ASSESSMENT — PAIN DESCRIPTION - ORIENTATION
ORIENTATION: LEFT

## 2018-10-18 ASSESSMENT — PAIN SCALES - GENERAL
PAINLEVEL_OUTOF10: 10
PAINLEVEL_OUTOF10: 9
PAINLEVEL_OUTOF10: 7

## 2018-10-18 NOTE — ED NOTES
Discharge instructions reviewed with Ms. Fitzpatrick. She verbalized understanding. Copy of discharge instructions and prescriptions given. She was advised not to drive, drink ETOH, operate machinery, or supervise small children after receiving pain medications here in the ED or while taking pain medications at home. She verbalized understanding. Ms. Jenny Mcfadden was discharged to home in good condition per personal vehicle, friend/family driving. She exited the ED without difficulty.       Yenni Mcneill RN  10/18/18 2752

## 2018-10-18 NOTE — ED PROVIDER NOTES
difficile infection 3/29/12, 5/22/12    positive stool DNA probe    Coronary artery disease involving native coronary artery of native heart with angina pectoris ([facility] 3/8/2016    Depression     Emphyse[facility] lung ([facility]     Fibromyalgia     hyperlipidemia 8/11/2011    Hypertension     Kidney stone     Liver disease     Lung disease     MDD (major depressive disorder) 4/4/2012    Mood disorder ([facility] 3/13/2013    Movement disorder     Neck pain, chronic 3/13/2013    Pneumonia     Polypharmacy 2/16/2013         SURGICAL HISTORY     Past Surgical History:   Procedure Laterality Date    COLONOSCOPY  1/19/12    DIAGNOSTIC CARDIAC CATH LAB PROCEDURE  Feb, 2007    Normal cor angio Feb, 2007    HYSTERECTOMY, TOTAL ABDOMINAL      KIDNEY STONE SURGERY           CURRENTMEDICATIONS       Previous Medications    ALBUTEROL SULFATE HFA (PROVENTIL HFA) 108 (90 BASE) MCG/ACT INHALER    Inhale 2 puffs into the lungs every 4 hours as needed for Wheezing or Shortness of Breath (Space out to every 6 hours as symptoms improve) Space out to every 6 hours as symptoms improve. BLOOD PRESSURE MONITORING (BLOOD PRESSURE MONITOR AUTOMAT) SOCORRO    1 each by Does not apply route daily. DIVALPROEX (DEPAKOTE) 250 MG DR TABLET    Take 250 mg by mouth every morning     FLUTICASONE (FLONASE) 50 MCG/ACT NASAL SPRAY    1 spray by Nasal route daily    GABAPENTIN (NEURONTIN) 800 MG TABLET    Take 1 tablet by mouth 3 times daily for 17 days. LISINOPRIL (PRINIVIL;ZESTRIL) 40 MG TABLET    Take 1 tablet by mouth nightly    QUETIAPINE (SEROQUEL) 100 MG TABLET    Take 1 tablet by mouth nightly    THERAPEUTIC MULTIVITAMIN-MINERALS (THERAGRAN-M) TABLET    Take 1 tablet by mouth daily. ALLERGIES     Dicyclomine; Imitrex [sumatriptan]; Tape [adhesive tape];  Tizanidine; and Motrin [ibuprofen micronized]    FAMILYHISTORY       Family History   Problem Relation Age of Onset    Emphysema Mother     Heart Disease Mother     Arthritis Mother     Depression Mother     High Blood Pressure Mother     Heart Failure Father     Heart Disease Father     Arthritis Father     Diabetes Father     High Blood Pressure Father     Stroke Father     Substance Abuse Father     High Blood Pressure Brother     Heart Disease Sister     Kidney Disease Daughter           SOCIAL HISTORY       Social History     Social History    Marital status:      Spouse name: N/A    Number of children: N/A    Years of education: N/A     Social History Main Topics    Smoking status: Current Every Day Smoker     Packs/day: 2.00     Years: 47.00     Types: Cigarettes    Smokeless tobacco: Never Used    Alcohol use No    Drug use: No    Sexual activity: Yes     Partners: Male     Other Topics Concern    None     Social History Narrative    None       SCREENINGS    Radha Coma Scale  Eye Opening: Spontaneous  Best Verbal Response: Oriented  Best Motor Response: Obeys commands  Cheney Coma Scale Score: 15        PHYSICAL EXAM    (up to 7 for level 4, 8 or more for level 5)     ED Triage Vitals [10/18/18 1111]   BP Temp Temp Source Pulse Resp SpO2 Height Weight   (!) 139/58 98 °F (36.7 °C) Oral 86 18 97 % 5' 2\" (1.575 m) 177 lb (80.3 kg)       Physical Exam   Constitutional: She is oriented to person, place, and time. She appears well-developed and well-nourished. HENT:   Head: Normocephalic and atraumatic. Right Ear: External ear normal.   Left Ear: External ear normal.   Nose: Nose normal.   Eyes: Conjunctivae are normal. Right eye exhibits no discharge. Left eye exhibits no discharge. No scleral icterus. Neck: Normal range of motion. Neck supple. Cardiovascular: Normal rate, regular rhythm, normal heart sounds and intact distal pulses. Exam reveals no gallop and no friction rub. No murmur heard. Pulmonary/Chest: Effort normal. No respiratory distress. She has no decreased breath sounds. She has wheezes (moderate diffuse). She has no rhonchi. She has no rales. Musculoskeletal:        Left ankle: She exhibits decreased range of motion, swelling (Lateral aspect) and ecchymosis (Over the lateral malleolus). She exhibits no deformity, no laceration and normal pulse. Tenderness. Lateral malleolus and AITFL tenderness found. No medial malleolus, no CF ligament, no posterior TFL, no head of 5th metatarsal and no proximal fibula tenderness found. Achilles tendon normal.        Left foot: Normal.   Neurological: She is alert and oriented to person, place, and time. Skin: Skin is warm and dry. She is not diaphoretic. No pallor. Psychiatric: She has a normal mood and affect. Her behavior is normal. Thought content normal.   Nursing note and vitals reviewed. DIAGNOSTIC RESULTS   LABS:    Labs Reviewed - No data to display    All other labs were within normal range or not returned as of this dictation. EKG: All EKG's are interpreted by the Emergency Department Physician who either signs orCo-signs this chart in the absence of a cardiologist.  Please see their note for interpretation of EKG. RADIOLOGY:   Non-plain film images such as CT, Ultrasound and MRI are read by the radiologist. Plain radiographic images are visualized andpreliminarily interpreted by the  ED Provider with the below findings:        Interpretation perthe Radiologist below, if available at the time of this note:    XR ANKLE LEFT (MIN 3 VIEWS)   Preliminary Result   Acute fracture of the lateral malleolus. Questionable widening of the joint space along the medial aspect of the ankle   mortise versus positioning. No results found.       PROCEDURES   Unless otherwise noted below, none     Procedures    CRITICAL CARE TIME   N/A    CONSULTS:  None      EMERGENCY DEPARTMENT COURSE and DIFFERENTIALDIAGNOSIS/MDM:   Vitals:    Vitals:    10/18/18 1111   BP: (!) 139/58   Pulse: 86   Resp: 18   Temp: 98 °F (36.7 °C)   TempSrc: Oral   SpO2: 97%   Weight: 177 lb (80.3 kg) proposed and they agreed with plan. FINAL IMPRESSION      1. Closed nondisplaced fracture of lateral malleolus of left fibula, initial encounter          DISPOSITION/PLAN   DISPOSITION Discharge - Pending Orders Complete 10/18/2018 01:24:27 PM      PATIENT [facility]EDTO:  [place] [facility]  [address] [facility]  [phone]  Schedule an appointment as soon as possible for a visit       Marilyn Torres  [address] [place] [address]  [phone]  Go to   If symptoms worsen      DISCHARGE MEDICATIONS:  New Prescriptions    ONDANSETRON (ZOFRAN ODT) 4 MG DISINTEGRATING TABLET    Take 1-2 tablets by mouth every 12 hours as needed for Nausea May Sub regular tablet (non-ODT) if insurance does not cover ODT. OXYCODONE-ACETAMINOPHEN (PERCOCET) 5-325 MG PER TABLET    Take 1 tablet by mouth every 6 hours as needed for Pain for up to 3 days. Intended supply: 3 days. Take lowest dose possible to manage pain. DISCONTINUED MEDICATIONS:  Discontinued Medications    No medications on file         Attestation: The Prescription Monitoring Report for this patient was reviewed today. (Sly Barajas PA-C)  Documentation: Possible medication side effects, risk of tolerance/dependence & alternative treatments discussed.  (Sly Barajas PA-C)    (Please note that portions ofthis note were completed with a voice recognition program.  Efforts were made to edit the dictations but occasionally words are mis-transcribed.)    Dinh Bansal PA-C (electronically signed)           Sly Barajas PA-C  10/18/18 0899

## 2018-10-25 ENCOUNTER — OFFICE VISIT (OUTPATIENT)
Dept: ORTHOPEDIC SURGERY | Age: 68
End: 2018-10-25
Payer: MEDICARE

## 2018-10-25 VITALS — WEIGHT: 177.03 LBS | BODY MASS INDEX: 32.58 KG/M2 | HEIGHT: 62 IN

## 2018-10-25 DIAGNOSIS — M25.572 ACUTE LEFT ANKLE PAIN: Primary | ICD-10-CM

## 2018-10-25 DIAGNOSIS — S82.832A CLOSED FRACTURE OF DISTAL END OF LEFT FIBULA, UNSPECIFIED FRACTURE MORPHOLOGY, INITIAL ENCOUNTER: ICD-10-CM

## 2018-10-25 PROCEDURE — G8484 FLU IMMUNIZE NO ADMIN: HCPCS | Performed by: ORTHOPAEDIC SURGERY

## 2018-10-25 PROCEDURE — 1090F PRES/ABSN URINE INCON ASSESS: CPT | Performed by: ORTHOPAEDIC SURGERY

## 2018-10-25 PROCEDURE — L4361 PNEUMA/VAC WALK BOOT PRE OTS: HCPCS | Performed by: ORTHOPAEDIC SURGERY

## 2018-10-25 PROCEDURE — G8427 DOCREV CUR MEDS BY ELIG CLIN: HCPCS | Performed by: ORTHOPAEDIC SURGERY

## 2018-10-25 PROCEDURE — 4004F PT TOBACCO SCREEN RCVD TLK: CPT | Performed by: ORTHOPAEDIC SURGERY

## 2018-10-25 PROCEDURE — 3017F COLORECTAL CA SCREEN DOC REV: CPT | Performed by: ORTHOPAEDIC SURGERY

## 2018-10-25 PROCEDURE — 4040F PNEUMOC VAC/ADMIN/RCVD: CPT | Performed by: ORTHOPAEDIC SURGERY

## 2018-10-25 PROCEDURE — 1123F ACP DISCUSS/DSCN MKR DOCD: CPT | Performed by: ORTHOPAEDIC SURGERY

## 2018-10-25 PROCEDURE — 99203 OFFICE O/P NEW LOW 30 MIN: CPT | Performed by: ORTHOPAEDIC SURGERY

## 2018-10-25 PROCEDURE — G8417 CALC BMI ABV UP PARAM F/U: HCPCS | Performed by: ORTHOPAEDIC SURGERY

## 2018-10-25 PROCEDURE — 1101F PT FALLS ASSESS-DOCD LE1/YR: CPT | Performed by: ORTHOPAEDIC SURGERY

## 2018-10-25 PROCEDURE — G8400 PT W/DXA NO RESULTS DOC: HCPCS | Performed by: ORTHOPAEDIC SURGERY

## 2018-10-25 PROCEDURE — G8598 ASA/ANTIPLAT THER USED: HCPCS | Performed by: ORTHOPAEDIC SURGERY

## 2018-10-25 RX ORDER — HYDROCODONE BITARTRATE AND ACETAMINOPHEN 5; 325 MG/1; MG/1
1 TABLET ORAL EVERY 8 HOURS PRN
Qty: 21 TABLET | Refills: 0 | Status: CANCELLED | OUTPATIENT
Start: 2018-10-25 | End: 2018-11-01

## 2018-10-25 RX ORDER — OXYCODONE HYDROCHLORIDE AND ACETAMINOPHEN 5; 325 MG/1; MG/1
1 TABLET ORAL EVERY 6 HOURS PRN
Qty: 28 TABLET | Refills: 0 | Status: SHIPPED | OUTPATIENT
Start: 2018-10-25 | End: 2018-11-05 | Stop reason: SDUPTHER

## 2018-10-25 NOTE — PROGRESS NOTES
Chief Complaint:  Ankle Pain (Left ankle inj fell DOI 10/17/2018)      History of Present Illness: Bettie Alston is a 78 y.o. female here regarding left ankle injury, slipped October 18 twisting the left ankle, had immediate pain with ambulation and was evaluated in the emergency room where x-rays confirmed a minimally displaced distal fibula fracture. She was placed in a posterior mold splint, patient admits to bearing weight in the splint since the injury. She does notice increased pain with ambulation, pain improves with elevation ice and pain medication. She lives with her daughter who helps care for her. Her daughter is with her at today's appointment. She currently has 9 out of 10 sharp lateral sided pain.      Pain Assessment:  Pain Assessment  Location of Pain: Ankle  Location Modifiers: Left  Severity of Pain: 9  Duration of Pain: Persistent  Frequency of Pain: Constant  Aggravating Factors: Stairs, Walking, Standing, Squatting  Limiting Behavior: Yes  Relieving Factors: Rest  Result of Injury: Yes  Work-Related Injury: No  Are there other pain locations you wish to document?: No    Medical History:  Past Medical History:   Diagnosis Date    Abnormal ECG 12/14/2012    Anemia     Arthritis     Back pain, chronic 3/13/2013    Bipolar 1 disorder??? 4/4/2012    Bipolar disorder (HCC)     Bronchitis chronic     CHF (congestive heart failure) ([facility]) 9/30/2016    Chronic back pain     Chronic neck pain     Clostridium difficile infection 3/29/12, 5/22/12    positive stool DNA probe    Coronary artery disease involving native coronary artery of native heart with angina pectoris ([facility] [address] 3/8/2016    Depression     Emphysema of lung (HCC)     Fibromyalgia     hyperlipidemia 8/11/2011    Hypertension     Kidney stone     Liver disease     Lung disease     MDD (major depressive disorder) 4/4/2012    Mood disorder ([address] 3/13/2013    Movement disorder     Neck pain, chronic 3/13/2013    Pneumonia     Polypharmacy 2/16/2013     Past Surgical History:   Procedure Laterality Date    COLONOSCOPY  1/19/12   [facility] DIAGNOSTIC CARDIAC CATH LAB PROCEDURE  Feb, 2007    Normal cor angio Feb, 2007    HYSTERECTOMY, TOTAL ABDOMINAL      KIDNEY STONE SURGERY       Social History     Social History    Marital status:      Spouse name: N/A    Number of children: N/A    Years of education: N/A     Social History Main Topics    Smoking status: Current Every Day Smoker     Packs/day: 2.00     Years: 47.00     Types: Cigarettes    Smokeless tobacco: Never Used    Alcohol use No    Drug use: No    Sexual activity: Yes     Partners: Male     Other Topics Concern    None     Social History Narrative    None     Allergies   Allergen Reactions    Dicyclomine      listed in pxysis    Imitrex [Sumatriptan]      In pxysis listing    Tape [Adhesive Tape]     Tizanidine      Listed in pxysis    Motrin [Ibuprofen Micronized] Nausea And Vomiting       Review of Systems:  Constitutional: negative  Respiratory: negative  Cardiovascular: negative  Musculoskeletal:negative except for Ankle Pain (Left ankle inj fell DOI 10/17/2018)    Relevant review of systems reviewed and available in the patient's chart in media tab    Vital Signs:  Vitals:    10/25/18 1055   Weight: 177 lb 0.5 oz (80.3 kg)   Height: 5' 2.01\" (1.575 m)           General Exam:   Constitutional: Patient is adequately groomed with no evidence of malnutrition  Mental Status: The patient is oriented to time, place and person. The patient's mood and affect are appropriate. Vascular: Examination reveals no swelling or calf tenderness. Peripheral pulses are palpable and 2+. Ankle Examination  Inspection:   No gross deformities noted. mild swelling noted. No erythema or ecchymosis. Skin is intact. Intact sensation to light touch throughout the foot and good pedal pulses.     Palpation: negative Tenderness to palpation along the medial malleolus and

## 2018-11-05 DIAGNOSIS — S82.832A CLOSED FRACTURE OF DISTAL END OF LEFT FIBULA, UNSPECIFIED FRACTURE MORPHOLOGY, INITIAL ENCOUNTER: ICD-10-CM

## 2018-11-06 DIAGNOSIS — S82.832D OTHER CLOSED FRACTURE OF DISTAL END OF LEFT FIBULA WITH ROUTINE HEALING, SUBSEQUENT ENCOUNTER: Primary | ICD-10-CM

## 2018-11-06 RX ORDER — OXYCODONE HYDROCHLORIDE AND ACETAMINOPHEN 5; 325 MG/1; MG/1
1 TABLET ORAL EVERY 6 HOURS PRN
Qty: 28 TABLET | Refills: 0 | OUTPATIENT
Start: 2018-11-06 | End: 2018-11-13

## 2018-11-06 RX ORDER — OXYCODONE HYDROCHLORIDE AND ACETAMINOPHEN 5; 325 MG/1; MG/1
1 TABLET ORAL EVERY 6 HOURS PRN
Qty: 28 TABLET | Refills: 0 | Status: SHIPPED | OUTPATIENT
Start: 2018-11-06 | End: 2018-11-13

## 2018-11-29 ENCOUNTER — OFFICE VISIT (OUTPATIENT)
Dept: ORTHOPEDIC SURGERY | Age: 68
End: 2018-11-29
Payer: MEDICARE

## 2018-11-29 VITALS — HEIGHT: 62 IN | BODY MASS INDEX: 32.58 KG/M2 | WEIGHT: 177.03 LBS

## 2018-11-29 DIAGNOSIS — M25.572 LEFT ANKLE PAIN, UNSPECIFIED CHRONICITY: Primary | ICD-10-CM

## 2018-11-29 DIAGNOSIS — S82.832D CLOSED FRACTURE OF DISTAL END OF LEFT FIBULA WITH ROUTINE HEALING, UNSPECIFIED FRACTURE MORPHOLOGY, SUBSEQUENT ENCOUNTER: ICD-10-CM

## 2018-11-29 PROCEDURE — 4040F PNEUMOC VAC/ADMIN/RCVD: CPT | Performed by: ORTHOPAEDIC SURGERY

## 2018-11-29 PROCEDURE — G8417 CALC BMI ABV UP PARAM F/U: HCPCS | Performed by: ORTHOPAEDIC SURGERY

## 2018-11-29 PROCEDURE — 4004F PT TOBACCO SCREEN RCVD TLK: CPT | Performed by: ORTHOPAEDIC SURGERY

## 2018-11-29 PROCEDURE — G8598 ASA/ANTIPLAT THER USED: HCPCS | Performed by: ORTHOPAEDIC SURGERY

## 2018-11-29 PROCEDURE — G8484 FLU IMMUNIZE NO ADMIN: HCPCS | Performed by: ORTHOPAEDIC SURGERY

## 2018-11-29 PROCEDURE — 1101F PT FALLS ASSESS-DOCD LE1/YR: CPT | Performed by: ORTHOPAEDIC SURGERY

## 2018-11-29 PROCEDURE — 3017F COLORECTAL CA SCREEN DOC REV: CPT | Performed by: ORTHOPAEDIC SURGERY

## 2018-11-29 PROCEDURE — 1123F ACP DISCUSS/DSCN MKR DOCD: CPT | Performed by: ORTHOPAEDIC SURGERY

## 2018-11-29 PROCEDURE — 99213 OFFICE O/P EST LOW 20 MIN: CPT | Performed by: ORTHOPAEDIC SURGERY

## 2018-11-29 PROCEDURE — 1090F PRES/ABSN URINE INCON ASSESS: CPT | Performed by: ORTHOPAEDIC SURGERY

## 2018-11-29 PROCEDURE — G8400 PT W/DXA NO RESULTS DOC: HCPCS | Performed by: ORTHOPAEDIC SURGERY

## 2018-11-29 PROCEDURE — G8427 DOCREV CUR MEDS BY ELIG CLIN: HCPCS | Performed by: ORTHOPAEDIC SURGERY

## 2018-11-29 NOTE — PROGRESS NOTES
dry and intact, continues to have mild effusions lateral aspect of the ankle but this is improving. She continues to have mild tenderness to palpation along the distal fibula, dorsiflexion to neutral, 20° of plantar flexion, 4 out of 5 strength with dorsiflexion, plantarflexion, inversion and eversion. Distal neurovascular exam is intact. X-ray: 3 views of the left ankle are obtained and reviewed today today show minimally displaced distal fibula fracture which is stable, callus formation is evident. Mortise is anatomic. Assessment :  Left distal fibula fracture with routine healing    Impression:  Encounter Diagnoses   Name Primary?  Left ankle pain, unspecified chronicity Yes    Closed fracture of distal end of left fibula with routine healing, unspecified fracture morphology, subsequent encounter        Office Procedures:  Orders Placed This Encounter   Procedures    XR ANKLE LEFT (MIN 3 VIEWS)     No orders of the defined types were placed in this encounter. Treatment Plan: We provided home exercises for range of motion and strength, she will also get started with physical therapy. She can begin to wean out of the boot as pain allows. Continue to be weightbearing as tolerated in the boot, she no longer needs to sleep in the boot. She is more than 6 weeks out from her injury, per our narcotic policy I will no longer prescribe further narcotics. I recommended ice, anti-inflammatories and Tylenol as needed for discomfort. Patient agrees with this plan, all of their questions were answered best of our ability and to their satisfaction.         Hoda Aguila

## 2018-12-03 ENCOUNTER — APPOINTMENT (OUTPATIENT)
Dept: CT IMAGING | Age: 68
End: 2018-12-03
Payer: MEDICARE

## 2018-12-03 ENCOUNTER — HOSPITAL ENCOUNTER (EMERGENCY)
Age: 68
Discharge: HOME | End: 2018-12-04
Attending: EMERGENCY MEDICINE
Payer: MEDICARE

## 2018-12-03 DIAGNOSIS — T50.901A ACCIDENTAL DRUG OVERDOSE, INITIAL ENCOUNTER: Primary | ICD-10-CM

## 2018-12-03 DIAGNOSIS — S09.90XA CLOSED HEAD INJURY, INITIAL ENCOUNTER: ICD-10-CM

## 2018-12-03 DIAGNOSIS — S00.83XA CONTUSION OF FOREHEAD, INITIAL ENCOUNTER: ICD-10-CM

## 2018-12-03 LAB
A/G RATIO: 1.3 (ref 1.1–2.2)
ACETAMINOPHEN LEVEL: <5 UG/ML (ref 10–30)
ALBUMIN SERPL-MCNC: 4 G/DL (ref 3.4–5)
ALP BLD-CCNC: 77 U/L (ref 40–129)
ALT SERPL-CCNC: 9 U/L (ref 10–40)
AMMONIA: 33 UMOL/L (ref 11–51)
ANION GAP SERPL CALCULATED.3IONS-SCNC: 13 MMOL/L (ref 3–16)
AST SERPL-CCNC: 16 U/L (ref 15–37)
BASOPHILS ABSOLUTE: 0.1 K/UL (ref 0–0.2)
BASOPHILS RELATIVE PERCENT: 1.3 %
BILIRUB SERPL-MCNC: <0.2 MG/DL (ref 0–1)
BILIRUBIN URINE: NEGATIVE
BLOOD, URINE: NEGATIVE
BUN BLDV-MCNC: 18 MG/DL (ref 7–20)
CALCIUM SERPL-MCNC: 9.1 MG/DL (ref 8.3–10.6)
CHLORIDE BLD-SCNC: 91 MMOL/L (ref 99–110)
CLARITY: CLEAR
CO2: 26 MMOL/L (ref 21–32)
COLOR: YELLOW
CREAT SERPL-MCNC: 0.7 MG/DL (ref 0.6–1.2)
EOSINOPHILS ABSOLUTE: 0.2 K/UL (ref 0–0.6)
EOSINOPHILS RELATIVE PERCENT: 2.3 %
ETHANOL: NORMAL MG/DL (ref 0–0.08)
GFR AFRICAN AMERICAN: >60
GFR NON-AFRICAN AMERICAN: >60
GLOBULIN: 3.1 G/DL
GLUCOSE BLD-MCNC: 97 MG/DL (ref 70–99)
GLUCOSE URINE: NEGATIVE MG/DL
HCT VFR BLD CALC: 35.6 % (ref 36–48)
HEMOGLOBIN: 12 G/DL (ref 12–16)
KETONES, URINE: ABNORMAL MG/DL
LEUKOCYTE ESTERASE, URINE: NEGATIVE
LYMPHOCYTES ABSOLUTE: 1.5 K/UL (ref 1–5.1)
LYMPHOCYTES RELATIVE PERCENT: 16.6 %
MCH RBC QN AUTO: 29.7 PG (ref 26–34)
MCHC RBC AUTO-ENTMCNC: 33.6 G/DL (ref 31–36)
MCV RBC AUTO: 88.4 FL (ref 80–100)
MICROSCOPIC EXAMINATION: ABNORMAL
MONOCYTES ABSOLUTE: 0.7 K/UL (ref 0–1.3)
MONOCYTES RELATIVE PERCENT: 8.2 %
NEUTROPHILS ABSOLUTE: 6.6 K/UL (ref 1.7–7.7)
NEUTROPHILS RELATIVE PERCENT: 71.6 %
NITRITE, URINE: NEGATIVE
PDW BLD-RTO: 15.9 % (ref 12.4–15.4)
PH UA: 6.5
PLATELET # BLD: 163 K/UL (ref 135–450)
PMV BLD AUTO: 7.1 FL (ref 5–10.5)
POTASSIUM SERPL-SCNC: 4 MMOL/L (ref 3.5–5.1)
PROTEIN UA: NEGATIVE MG/DL
RBC # BLD: 4.02 M/UL (ref 4–5.2)
SALICYLATE, SERUM: <0.3 MG/DL (ref 15–30)
SODIUM BLD-SCNC: 130 MMOL/L (ref 136–145)
SPECIFIC GRAVITY UA: 1.02
TOTAL PROTEIN: 7.1 G/DL (ref 6.4–8.2)
URINE TYPE: ABNORMAL
UROBILINOGEN, URINE: 0.2 E.U./DL
VALPROIC ACID LEVEL: 86.5 UG/ML (ref 50–100)
WBC # BLD: 9.2 K/UL (ref 4–11)

## 2018-12-03 PROCEDURE — 70450 CT HEAD/BRAIN W/O DYE: CPT

## 2018-12-03 PROCEDURE — 93005 ELECTROCARDIOGRAM TRACING: CPT | Performed by: EMERGENCY MEDICINE

## 2018-12-03 PROCEDURE — G0480 DRUG TEST DEF 1-7 CLASSES: HCPCS

## 2018-12-03 PROCEDURE — 81003 URINALYSIS AUTO W/O SCOPE: CPT

## 2018-12-03 PROCEDURE — 99284 EMERGENCY DEPT VISIT MOD MDM: CPT

## 2018-12-03 PROCEDURE — 85025 COMPLETE CBC W/AUTO DIFF WBC: CPT

## 2018-12-03 PROCEDURE — 80053 COMPREHEN METABOLIC PANEL: CPT

## 2018-12-03 PROCEDURE — 82140 ASSAY OF AMMONIA: CPT

## 2018-12-03 PROCEDURE — 80164 ASSAY DIPROPYLACETIC ACD TOT: CPT

## 2018-12-03 RX ORDER — LORAZEPAM 0.5 MG/1
0.5 TABLET ORAL EVERY 6 HOURS PRN
COMMUNITY
End: 2019-01-03

## 2018-12-03 ASSESSMENT — PAIN SCALES - GENERAL: PAINLEVEL_OUTOF10: 7

## 2018-12-03 ASSESSMENT — PAIN DESCRIPTION - ORIENTATION: ORIENTATION: LEFT

## 2018-12-03 ASSESSMENT — PAIN DESCRIPTION - LOCATION: LOCATION: LEG

## 2018-12-04 VITALS
WEIGHT: 176 LBS | HEIGHT: 64 IN | HEART RATE: 81 BPM | SYSTOLIC BLOOD PRESSURE: 93 MMHG | DIASTOLIC BLOOD PRESSURE: 46 MMHG | RESPIRATION RATE: 16 BRPM | OXYGEN SATURATION: 92 % | TEMPERATURE: 97.8 F | BODY MASS INDEX: 30.05 KG/M2

## 2018-12-04 PROCEDURE — 93010 ELECTROCARDIOGRAM REPORT: CPT | Performed by: INTERNAL MEDICINE

## 2018-12-04 NOTE — ED PROVIDER NOTES
mg/dL    Total Protein 7.1 6.4 - 8.2 g/dL    Alb 4.0 3.4 - 5.0 g/dL    Albumin/Globulin Ratio 1.3 1.1 - 2.2    Total Bilirubin <0.2 0.0 - 1.0 mg/dL    Alkaline Phosphatase 77 40 - 129 U/L    ALT 9 (L) 10 - 40 U/L    AST 16 15 - 37 U/L    Globulin 3.1 g/dL   Urinalysis   Result Value Ref Range    Color, UA Yellow Straw/Yellow    Clarity, UA Clear Clear    Glucose, Ur Negative Negative mg/dL    Bilirubin Urine Negative Negative    Ketones, Urine TRACE (A) Negative mg/dL    Specific Gravity, UA 1.020 1.005 - 1.030    Blood, Urine Negative Negative    pH, UA 6.5 5.0 - 8.0    Protein, UA Negative Negative mg/dL    Urobilinogen, Urine 0.2 <2.0 E.U./dL    Nitrite, Urine Negative Negative    Leukocyte Esterase, Urine Negative Negative    Microscopic Examination Not Indicated     Urine Type Catheter    Acetaminophen level   Result Value Ref Range    Acetaminophen Level <5 (L) 10 - 30 ug/mL   Salicylate   Result Value Ref Range    Salicylate, Serum <4.5 (L) 15.0 - 30.0 mg/dL   Valproic acid level, total   Result Value Ref Range    Valproic Acid Lvl 86.5 50.0 - 100.0 ug/mL   Ammonia   Result Value Ref Range    Ammonia 33 11 - 51 umol/L   Ethanol   Result Value Ref Range    Ethanol Lvl None Detected mg/dL   EKG 12 Lead   Result Value Ref Range    Ventricular Rate 89 BPM    Atrial Rate 89 BPM    P-R Interval 146 ms    QRS Duration 98 ms    Q-T Interval 366 ms    QTc Calculation (Bazett) 445 ms    P Axis -11 degrees    R Axis 3 degrees    T Axis 16 degrees    Diagnosis       Normal sinus rhythmRSR' or QR pattern in V1 suggests right ventricular conduction delayPossible Lateral infarct , age undeterminedCannot rule out Inferior infarct , age undeterminedAbnormal ECGNo previous ECGs available         PROCEDURES      MEDICAL DECISION MAKING  On arrival to the emergency department, patient afebrile with otherwise normal vital signs. No neurological deficits on exam.  Patient tolerating overweight. Overdose was accidental in nature. No concern for suicidal or homicidal ideation at this time. CBC, CMP obtained and showing no concerning acute abnormalities. Urinalysis negative for signs of infection. Salicylate and acetaminophen level negative. EtOH level negative. Valproic acid level 86.5 and ammonia level 33. Discussed case with poison control recommended 6 hour observation from initial ingestion. Initial ingestion was approximate 4 or 5 pm.  She was monitored here in the emergency department for several hours until passed a 6 hour observation. Patient has been sleepy but easily arousable. Patient maintain O2 saturations in the low 90s. Patient with deep occasionally into the upper 80s the lowest of which was 89% depending on positioning. Otherwise, patient well-appearing in nature. Toi Hodge and observation. Patient remained with O2 saturations in the low to mid 90s. Patient amenable for discharge at this time. Patient follow-up with PCP for reevaluation further management of current symptoms      Final diagnoses:   Accidental drug overdose, initial encounter   Closed head injury, initial encounter   Contusion of forehead, initial encounter         Patient was given scripts for the following medications. I counseled patient how to take these medications. Current Discharge Medication List          Disposition  Pt is in stable condition upon Discharge to home. This chart was generated using the P[employer] dictation system. I created this record but it may contain dictation errors.            Pasty Barthel, MD  12/04/18 4163

## 2018-12-05 LAB
EKG ATRIAL RATE: 89 BPM
EKG DIAGNOSIS: NORMAL
EKG P AXIS: -11 DEGREES
EKG P-R INTERVAL: 146 MS
EKG Q-T INTERVAL: 366 MS
EKG QRS DURATION: 98 MS
EKG QTC CALCULATION (BAZETT): 445 MS
EKG R AXIS: 3 DEGREES
EKG T AXIS: 16 DEGREES
EKG VENTRICULAR RATE: 89 BPM

## 2018-12-20 RX ORDER — OXYCODONE HYDROCHLORIDE AND ACETAMINOPHEN 5; 325 MG/1; MG/1
1 TABLET ORAL EVERY 6 HOURS PRN
Qty: 28 TABLET | Refills: 0 | Status: SHIPPED | OUTPATIENT
Start: 2018-12-20 | End: 2018-12-27

## 2019-01-03 ENCOUNTER — HOSPITAL ENCOUNTER (EMERGENCY)
Age: 69
Discharge: HOME | End: 2019-01-04
Attending: EMERGENCY MEDICINE
Payer: MEDICARE

## 2019-01-03 VITALS
HEART RATE: 89 BPM | TEMPERATURE: 98.2 F | DIASTOLIC BLOOD PRESSURE: 79 MMHG | WEIGHT: 170 LBS | RESPIRATION RATE: 15 BRPM | SYSTOLIC BLOOD PRESSURE: 146 MMHG | BODY MASS INDEX: 31.28 KG/M2 | OXYGEN SATURATION: 96 % | HEIGHT: 62 IN

## 2019-01-03 DIAGNOSIS — F32.A DEPRESSION, UNSPECIFIED DEPRESSION TYPE: Primary | ICD-10-CM

## 2019-01-03 LAB
A/G RATIO: 1.3 (ref 1.1–2.2)
ACETAMINOPHEN LEVEL: <5 UG/ML (ref 10–30)
ALBUMIN SERPL-MCNC: 4.1 G/DL (ref 3.4–5)
ALP BLD-CCNC: 70 U/L (ref 40–129)
ALT SERPL-CCNC: 9 U/L (ref 10–40)
AMPHETAMINE SCREEN, URINE: NORMAL
ANION GAP SERPL CALCULATED.3IONS-SCNC: 11 MMOL/L (ref 3–16)
AST SERPL-CCNC: 15 U/L (ref 15–37)
BACTERIA: ABNORMAL /HPF
BARBITURATE SCREEN URINE: NORMAL
BASOPHILS ABSOLUTE: 0 K/UL (ref 0–0.2)
BASOPHILS RELATIVE PERCENT: 0.9 %
BENZODIAZEPINE SCREEN, URINE: NORMAL
BILIRUB SERPL-MCNC: 0.3 MG/DL (ref 0–1)
BILIRUBIN URINE: NEGATIVE
BLOOD, URINE: NEGATIVE
BUN BLDV-MCNC: 10 MG/DL (ref 7–20)
CALCIUM SERPL-MCNC: 9.6 MG/DL (ref 8.3–10.6)
CANNABINOID SCREEN URINE: NORMAL
CHLORIDE BLD-SCNC: 95 MMOL/L (ref 99–110)
CLARITY: CLEAR
CO2: 27 MMOL/L (ref 21–32)
COCAINE METABOLITE SCREEN URINE: NORMAL
COLOR: ABNORMAL
CREAT SERPL-MCNC: 0.6 MG/DL (ref 0.6–1.2)
EOSINOPHILS ABSOLUTE: 0.1 K/UL (ref 0–0.6)
EOSINOPHILS RELATIVE PERCENT: 2.7 %
EPITHELIAL CELLS, UA: ABNORMAL /HPF
ETHANOL: NORMAL MG/DL (ref 0–0.08)
GFR AFRICAN AMERICAN: >60
GFR NON-AFRICAN AMERICAN: >60
GLOBULIN: 3.1 G/DL
GLUCOSE BLD-MCNC: 95 MG/DL (ref 70–99)
GLUCOSE URINE: NEGATIVE MG/DL
HCT VFR BLD CALC: 36.8 % (ref 36–48)
HEMOGLOBIN: 12.1 G/DL (ref 12–16)
KETONES, URINE: NEGATIVE MG/DL
LEUKOCYTE ESTERASE, URINE: ABNORMAL
LYMPHOCYTES ABSOLUTE: 1.3 K/UL (ref 1–5.1)
LYMPHOCYTES RELATIVE PERCENT: 25.6 %
Lab: NORMAL
MCH RBC QN AUTO: 29.9 PG (ref 26–34)
MCHC RBC AUTO-ENTMCNC: 33 G/DL (ref 31–36)
MCV RBC AUTO: 90.8 FL (ref 80–100)
METHADONE SCREEN, URINE: NORMAL
MICROSCOPIC EXAMINATION: YES
MONOCYTES ABSOLUTE: 0.5 K/UL (ref 0–1.3)
MONOCYTES RELATIVE PERCENT: 11.3 %
NEUTROPHILS ABSOLUTE: 2.9 K/UL (ref 1.7–7.7)
NEUTROPHILS RELATIVE PERCENT: 59.5 %
NITRITE, URINE: NEGATIVE
OPIATE SCREEN URINE: NORMAL
OXYCODONE URINE: NORMAL
PDW BLD-RTO: 15.8 % (ref 12.4–15.4)
PH UA: 7.5
PH UA: 7.5
PHENCYCLIDINE SCREEN URINE: NORMAL
PLATELET # BLD: 125 K/UL (ref 135–450)
PMV BLD AUTO: 7.4 FL (ref 5–10.5)
POTASSIUM SERPL-SCNC: 3.9 MMOL/L (ref 3.5–5.1)
PROPOXYPHENE SCREEN: NORMAL
PROTEIN UA: NEGATIVE MG/DL
RBC # BLD: 4.05 M/UL (ref 4–5.2)
RBC UA: ABNORMAL /HPF (ref 0–2)
SALICYLATE, SERUM: 0.5 MG/DL (ref 15–30)
SODIUM BLD-SCNC: 133 MMOL/L (ref 136–145)
SPECIFIC GRAVITY UA: 1.01
TOTAL PROTEIN: 7.2 G/DL (ref 6.4–8.2)
URINE REFLEX TO CULTURE: YES
URINE TYPE: ABNORMAL
UROBILINOGEN, URINE: 0.2 E.U./DL
WBC # BLD: 4.9 K/UL (ref 4–11)
WBC UA: ABNORMAL /HPF (ref 0–5)
YEAST: PRESENT /HPF

## 2019-01-03 PROCEDURE — 80307 DRUG TEST PRSMV CHEM ANLYZR: CPT

## 2019-01-03 PROCEDURE — 81001 URINALYSIS AUTO W/SCOPE: CPT

## 2019-01-03 PROCEDURE — G0480 DRUG TEST DEF 1-7 CLASSES: HCPCS

## 2019-01-03 PROCEDURE — 80053 COMPREHEN METABOLIC PANEL: CPT

## 2019-01-03 PROCEDURE — 87086 URINE CULTURE/COLONY COUNT: CPT

## 2019-01-03 PROCEDURE — 99285 EMERGENCY DEPT VISIT HI MDM: CPT

## 2019-01-03 PROCEDURE — 85025 COMPLETE CBC W/AUTO DIFF WBC: CPT

## 2019-01-03 ASSESSMENT — PAIN SCALES - GENERAL: PAINLEVEL_OUTOF10: 6

## 2019-01-03 ASSESSMENT — PAIN DESCRIPTION - LOCATION: LOCATION: BACK

## 2019-01-03 ASSESSMENT — PATIENT HEALTH QUESTIONNAIRE - PHQ9: SUM OF ALL RESPONSES TO PHQ QUESTIONS 1-9: 10

## 2019-01-03 ASSESSMENT — PAIN DESCRIPTION - PAIN TYPE: TYPE: CHRONIC PAIN

## 2019-01-04 ASSESSMENT — PAIN SCALES - WONG BAKER: WONGBAKER_NUMERICALRESPONSE: 0

## 2019-01-04 ASSESSMENT — PAIN SCALES - GENERAL: PAINLEVEL_OUTOF10: 0

## 2019-01-05 LAB — URINE CULTURE, ROUTINE: NORMAL

## 2019-01-08 ENCOUNTER — HOSPITAL ENCOUNTER (INPATIENT)
Age: 69
LOS: 2 days | Discharge: HOME | DRG: 885 | End: 2019-01-10
Attending: EMERGENCY MEDICINE | Admitting: PSYCHIATRY & NEUROLOGY
Payer: MEDICARE

## 2019-01-08 DIAGNOSIS — R44.3 HALLUCINATIONS: Primary | ICD-10-CM

## 2019-01-08 LAB
A/G RATIO: 1.3 (ref 1.1–2.2)
ACETAMINOPHEN LEVEL: <5 UG/ML (ref 10–30)
ALBUMIN SERPL-MCNC: 4.3 G/DL (ref 3.4–5)
ALP BLD-CCNC: 76 U/L (ref 40–129)
ALT SERPL-CCNC: 9 U/L (ref 10–40)
AMPHETAMINE SCREEN, URINE: ABNORMAL
ANION GAP SERPL CALCULATED.3IONS-SCNC: 10 MMOL/L (ref 3–16)
AST SERPL-CCNC: 13 U/L (ref 15–37)
BARBITURATE SCREEN URINE: ABNORMAL
BASOPHILS ABSOLUTE: 0 K/UL (ref 0–0.2)
BASOPHILS RELATIVE PERCENT: 0.8 %
BENZODIAZEPINE SCREEN, URINE: ABNORMAL
BILIRUB SERPL-MCNC: <0.2 MG/DL (ref 0–1)
BILIRUBIN URINE: NEGATIVE
BLOOD, URINE: NEGATIVE
BUN BLDV-MCNC: 14 MG/DL (ref 7–20)
CALCIUM SERPL-MCNC: 10 MG/DL (ref 8.3–10.6)
CANNABINOID SCREEN URINE: ABNORMAL
CHLORIDE BLD-SCNC: 94 MMOL/L (ref 99–110)
CLARITY: CLEAR
CO2: 29 MMOL/L (ref 21–32)
COCAINE METABOLITE SCREEN URINE: ABNORMAL
COLOR: NORMAL
CREAT SERPL-MCNC: 0.6 MG/DL (ref 0.6–1.2)
EOSINOPHILS ABSOLUTE: 0.2 K/UL (ref 0–0.6)
EOSINOPHILS RELATIVE PERCENT: 3.9 %
ETHANOL: NORMAL MG/DL (ref 0–0.08)
GFR AFRICAN AMERICAN: >60
GFR NON-AFRICAN AMERICAN: >60
GLOBULIN: 3.2 G/DL
GLUCOSE BLD-MCNC: 99 MG/DL (ref 70–99)
GLUCOSE URINE: NEGATIVE MG/DL
HCT VFR BLD CALC: 39.4 % (ref 36–48)
HEMOGLOBIN: 12.9 G/DL (ref 12–16)
KETONES, URINE: NEGATIVE MG/DL
LEUKOCYTE ESTERASE, URINE: NEGATIVE
LYMPHOCYTES ABSOLUTE: 1.2 K/UL (ref 1–5.1)
LYMPHOCYTES RELATIVE PERCENT: 22.5 %
Lab: ABNORMAL
MCH RBC QN AUTO: 29.6 PG (ref 26–34)
MCHC RBC AUTO-ENTMCNC: 32.7 G/DL (ref 31–36)
MCV RBC AUTO: 90.6 FL (ref 80–100)
METHADONE SCREEN, URINE: ABNORMAL
MICROSCOPIC EXAMINATION: NORMAL
MONOCYTES ABSOLUTE: 0.5 K/UL (ref 0–1.3)
MONOCYTES RELATIVE PERCENT: 9.5 %
NEUTROPHILS ABSOLUTE: 3.3 K/UL (ref 1.7–7.7)
NEUTROPHILS RELATIVE PERCENT: 63.3 %
NITRITE, URINE: NEGATIVE
OPIATE SCREEN URINE: ABNORMAL
OXYCODONE URINE: POSITIVE
PDW BLD-RTO: 15.6 % (ref 12.4–15.4)
PH UA: 7.5
PH UA: 7.5
PHENCYCLIDINE SCREEN URINE: ABNORMAL
PLATELET # BLD: 163 K/UL (ref 135–450)
PMV BLD AUTO: 7.8 FL (ref 5–10.5)
POTASSIUM SERPL-SCNC: 4.3 MMOL/L (ref 3.5–5.1)
PROPOXYPHENE SCREEN: ABNORMAL
PROTEIN UA: NEGATIVE MG/DL
RBC # BLD: 4.35 M/UL (ref 4–5.2)
SALICYLATE, SERUM: <0.3 MG/DL (ref 15–30)
SODIUM BLD-SCNC: 133 MMOL/L (ref 136–145)
SPECIFIC GRAVITY UA: 1.01
TOTAL PROTEIN: 7.5 G/DL (ref 6.4–8.2)
URINE REFLEX TO CULTURE: NORMAL
URINE TYPE: NORMAL
UROBILINOGEN, URINE: 0.2 E.U./DL
VALPROIC ACID LEVEL: 50.3 UG/ML (ref 50–100)
WBC # BLD: 5.2 K/UL (ref 4–11)

## 2019-01-08 PROCEDURE — 80053 COMPREHEN METABOLIC PANEL: CPT

## 2019-01-08 PROCEDURE — G0480 DRUG TEST DEF 1-7 CLASSES: HCPCS

## 2019-01-08 PROCEDURE — 80307 DRUG TEST PRSMV CHEM ANLYZR: CPT

## 2019-01-08 PROCEDURE — 1240000000 HC EMOTIONAL WELLNESS R&B

## 2019-01-08 PROCEDURE — 85025 COMPLETE CBC W/AUTO DIFF WBC: CPT

## 2019-01-08 PROCEDURE — 81003 URINALYSIS AUTO W/O SCOPE: CPT

## 2019-01-08 PROCEDURE — 6370000000 HC RX 637 (ALT 250 FOR IP): Performed by: PSYCHIATRY & NEUROLOGY

## 2019-01-08 PROCEDURE — 99285 EMERGENCY DEPT VISIT HI MDM: CPT

## 2019-01-08 PROCEDURE — 80164 ASSAY DIPROPYLACETIC ACD TOT: CPT

## 2019-01-08 RX ORDER — MAGNESIUM HYDROXIDE/ALUMINUM HYDROXICE/SIMETHICONE 120; 1200; 1200 MG/30ML; MG/30ML; MG/30ML
30 SUSPENSION ORAL EVERY 6 HOURS PRN
Status: DISCONTINUED | OUTPATIENT
Start: 2019-01-08 | End: 2019-01-10 | Stop reason: HOSPADM

## 2019-01-08 RX ORDER — FLUTICASONE PROPIONATE 50 MCG
1 SPRAY, SUSPENSION (ML) NASAL DAILY
Status: DISCONTINUED | OUTPATIENT
Start: 2019-01-08 | End: 2019-01-10 | Stop reason: HOSPADM

## 2019-01-08 RX ORDER — TRAZODONE HYDROCHLORIDE 50 MG/1
50 TABLET ORAL NIGHTLY PRN
Status: DISCONTINUED | OUTPATIENT
Start: 2019-01-08 | End: 2019-01-10 | Stop reason: HOSPADM

## 2019-01-08 RX ORDER — HALOPERIDOL 5 MG/ML
5 INJECTION INTRAMUSCULAR EVERY 6 HOURS PRN
Status: DISCONTINUED | OUTPATIENT
Start: 2019-01-08 | End: 2019-01-10 | Stop reason: HOSPADM

## 2019-01-08 RX ORDER — DULOXETIN HYDROCHLORIDE 60 MG/1
60 CAPSULE, DELAYED RELEASE ORAL DAILY
COMMUNITY

## 2019-01-08 RX ORDER — DIVALPROEX SODIUM 250 MG/1
250 TABLET, DELAYED RELEASE ORAL EVERY MORNING
Status: DISCONTINUED | OUTPATIENT
Start: 2019-01-09 | End: 2019-01-10 | Stop reason: HOSPADM

## 2019-01-08 RX ORDER — QUETIAPINE FUMARATE 300 MG/1
300 TABLET, FILM COATED ORAL 2 TIMES DAILY
Status: ON HOLD | COMMUNITY
End: 2019-01-10

## 2019-01-08 RX ORDER — BISACODYL 10 MG
10 SUPPOSITORY, RECTAL RECTAL DAILY PRN
Status: DISCONTINUED | OUTPATIENT
Start: 2019-01-08 | End: 2019-01-10 | Stop reason: HOSPADM

## 2019-01-08 RX ORDER — ACETAMINOPHEN 325 MG/1
650 TABLET ORAL EVERY 4 HOURS PRN
Status: DISCONTINUED | OUTPATIENT
Start: 2019-01-08 | End: 2019-01-10 | Stop reason: HOSPADM

## 2019-01-08 RX ORDER — DULOXETIN HYDROCHLORIDE 30 MG/1
60 CAPSULE, DELAYED RELEASE ORAL 2 TIMES DAILY
Status: DISCONTINUED | OUTPATIENT
Start: 2019-01-08 | End: 2019-01-10 | Stop reason: HOSPADM

## 2019-01-08 RX ORDER — HYDROXYZINE PAMOATE 50 MG/1
50 CAPSULE ORAL 3 TIMES DAILY PRN
Status: DISCONTINUED | OUTPATIENT
Start: 2019-01-08 | End: 2019-01-10 | Stop reason: HOSPADM

## 2019-01-08 RX ORDER — IBUPROFEN 400 MG/1
400 TABLET ORAL EVERY 6 HOURS PRN
Status: DISCONTINUED | OUTPATIENT
Start: 2019-01-08 | End: 2019-01-08

## 2019-01-08 RX ORDER — NICOTINE 21 MG/24HR
1 PATCH, TRANSDERMAL 24 HOURS TRANSDERMAL DAILY
Status: DISCONTINUED | OUTPATIENT
Start: 2019-01-08 | End: 2019-01-10 | Stop reason: HOSPADM

## 2019-01-08 RX ADMIN — DULOXETINE HYDROCHLORIDE 60 MG: 30 CAPSULE, DELAYED RELEASE ORAL at 20:47

## 2019-01-08 RX ADMIN — ALUMINUM HYDROXIDE, MAGNESIUM HYDROXIDE, AND SIMETHICONE 30 ML: 200; 200; 20 SUSPENSION ORAL at 20:47

## 2019-01-08 RX ADMIN — ACETAMINOPHEN 650 MG: 325 TABLET ORAL at 20:47

## 2019-01-08 RX ADMIN — QUETIAPINE FUMARATE 300 MG: 100 TABLET ORAL at 20:47

## 2019-01-08 RX ADMIN — TRAZODONE HYDROCHLORIDE 50 MG: 50 TABLET ORAL at 20:47

## 2019-01-08 ASSESSMENT — PAIN DESCRIPTION - LOCATION
LOCATION: COCCYX
LOCATION: ANKLE

## 2019-01-08 ASSESSMENT — SLEEP AND FATIGUE QUESTIONNAIRES
DIFFICULTY STAYING ASLEEP: YES
SLEEP PATTERN: DIFFICULTY FALLING ASLEEP;RESTLESSNESS
DO YOU HAVE DIFFICULTY SLEEPING: YES
DO YOU USE A SLEEP AID: YES
RESTFUL SLEEP: YES
DIFFICULTY ARISING: YES
DIFFICULTY FALLING ASLEEP: YES
AVERAGE NUMBER OF SLEEP HOURS: 4.5

## 2019-01-08 ASSESSMENT — ENCOUNTER SYMPTOMS
VOMITING: 0
CHEST TIGHTNESS: 0
DIARRHEA: 0
ABDOMINAL PAIN: 0
SHORTNESS OF BREATH: 0
NAUSEA: 0

## 2019-01-08 ASSESSMENT — PAIN DESCRIPTION - PAIN TYPE
TYPE: ACUTE PAIN
TYPE: CHRONIC PAIN

## 2019-01-08 ASSESSMENT — PAIN DESCRIPTION - ORIENTATION: ORIENTATION: LEFT

## 2019-01-08 ASSESSMENT — LIFESTYLE VARIABLES: HISTORY_ALCOHOL_USE: NO

## 2019-01-08 ASSESSMENT — PAIN SCALES - GENERAL
PAINLEVEL_OUTOF10: 9
PAINLEVEL_OUTOF10: 9

## 2019-01-08 ASSESSMENT — PATIENT HEALTH QUESTIONNAIRE - PHQ9: SUM OF ALL RESPONSES TO PHQ QUESTIONS 1-9: 16

## 2019-01-09 ENCOUNTER — APPOINTMENT (OUTPATIENT)
Dept: GENERAL RADIOLOGY | Age: 69
DRG: 885 | End: 2019-01-09
Payer: MEDICARE

## 2019-01-09 PROBLEM — F11.10 OPIATE ABUSE, EPISODIC (HCC): Status: ACTIVE | Noted: 2019-01-09

## 2019-01-09 PROBLEM — F17.200 TOBACCO DEPENDENCE: Status: ACTIVE | Noted: 2019-01-09

## 2019-01-09 PROBLEM — F12.10 CANNABIS ABUSE: Status: ACTIVE | Noted: 2019-01-09

## 2019-01-09 PROBLEM — F39 MOOD DISORDER (HCC): Status: ACTIVE | Noted: 2019-01-09

## 2019-01-09 PROCEDURE — 6370000000 HC RX 637 (ALT 250 FOR IP): Performed by: PSYCHIATRY & NEUROLOGY

## 2019-01-09 PROCEDURE — 97535 SELF CARE MNGMENT TRAINING: CPT

## 2019-01-09 PROCEDURE — 1240000000 HC EMOTIONAL WELLNESS R&B

## 2019-01-09 PROCEDURE — 97165 OT EVAL LOW COMPLEX 30 MIN: CPT

## 2019-01-09 PROCEDURE — 90792 PSYCH DIAG EVAL W/MED SRVCS: CPT | Performed by: NURSE PRACTITIONER

## 2019-01-09 PROCEDURE — 99222 1ST HOSP IP/OBS MODERATE 55: CPT | Performed by: PHYSICIAN ASSISTANT

## 2019-01-09 PROCEDURE — 72220 X-RAY EXAM SACRUM TAILBONE: CPT

## 2019-01-09 RX ORDER — ALBUTEROL SULFATE 2.5 MG/3ML
2.5 SOLUTION RESPIRATORY (INHALATION) EVERY 4 HOURS PRN
Status: DISCONTINUED | OUTPATIENT
Start: 2019-01-09 | End: 2019-01-10 | Stop reason: HOSPADM

## 2019-01-09 RX ADMIN — QUETIAPINE FUMARATE 300 MG: 100 TABLET ORAL at 08:46

## 2019-01-09 RX ADMIN — ACETAMINOPHEN 650 MG: 325 TABLET ORAL at 13:15

## 2019-01-09 RX ADMIN — DULOXETINE HYDROCHLORIDE 60 MG: 30 CAPSULE, DELAYED RELEASE ORAL at 08:46

## 2019-01-09 RX ADMIN — ACETAMINOPHEN 650 MG: 325 TABLET ORAL at 18:00

## 2019-01-09 RX ADMIN — FLUTICASONE PROPIONATE 1 SPRAY: 50 SPRAY, METERED NASAL at 08:47

## 2019-01-09 RX ADMIN — DIVALPROEX SODIUM 250 MG: 250 TABLET, DELAYED RELEASE ORAL at 08:46

## 2019-01-09 RX ADMIN — QUETIAPINE FUMARATE 300 MG: 100 TABLET ORAL at 20:59

## 2019-01-09 RX ADMIN — DULOXETINE HYDROCHLORIDE 60 MG: 30 CAPSULE, DELAYED RELEASE ORAL at 20:59

## 2019-01-09 ASSESSMENT — SLEEP AND FATIGUE QUESTIONNAIRES
RESTFUL SLEEP: NO
DO YOU USE A SLEEP AID: NO
AVERAGE NUMBER OF SLEEP HOURS: 7
DIFFICULTY FALLING ASLEEP: YES
SLEEP PATTERN: DIFFICULTY FALLING ASLEEP;DISTURBED/INTERRUPTED SLEEP;EARLY AWAKENING;RESTLESSNESS;NIGHTMARES/TERRORS
DO YOU HAVE DIFFICULTY SLEEPING: YES
DIFFICULTY STAYING ASLEEP: YES
DIFFICULTY ARISING: NO

## 2019-01-09 ASSESSMENT — PATIENT HEALTH QUESTIONNAIRE - PHQ9: SUM OF ALL RESPONSES TO PHQ QUESTIONS 1-9: 15

## 2019-01-09 ASSESSMENT — PAIN SCALES - GENERAL
PAINLEVEL_OUTOF10: 5
PAINLEVEL_OUTOF10: 8

## 2019-01-09 ASSESSMENT — LIFESTYLE VARIABLES: HISTORY_ALCOHOL_USE: NO

## 2019-01-10 VITALS
TEMPERATURE: 97.6 F | WEIGHT: 155 LBS | RESPIRATION RATE: 18 BRPM | HEART RATE: 95 BPM | BODY MASS INDEX: 27.46 KG/M2 | SYSTOLIC BLOOD PRESSURE: 178 MMHG | DIASTOLIC BLOOD PRESSURE: 82 MMHG | HEIGHT: 63 IN | OXYGEN SATURATION: 95 %

## 2019-01-10 PROCEDURE — 94664 DEMO&/EVAL PT USE INHALER: CPT

## 2019-01-10 PROCEDURE — 6370000000 HC RX 637 (ALT 250 FOR IP): Performed by: PSYCHIATRY & NEUROLOGY

## 2019-01-10 PROCEDURE — 94640 AIRWAY INHALATION TREATMENT: CPT

## 2019-01-10 PROCEDURE — 94150 VITAL CAPACITY TEST: CPT

## 2019-01-10 PROCEDURE — 94760 N-INVAS EAR/PLS OXIMETRY 1: CPT

## 2019-01-10 PROCEDURE — 5130000000 HC BRIDGE APPOINTMENT

## 2019-01-10 PROCEDURE — 99239 HOSP IP/OBS DSCHRG MGMT >30: CPT | Performed by: NURSE PRACTITIONER

## 2019-01-10 RX ORDER — ALBUTEROL SULFATE 90 UG/1
2 AEROSOL, METERED RESPIRATORY (INHALATION) EVERY 4 HOURS PRN
Status: DISCONTINUED | OUTPATIENT
Start: 2019-01-10 | End: 2019-01-10 | Stop reason: HOSPADM

## 2019-01-10 RX ORDER — ALBUTEROL SULFATE 90 UG/1
2 AEROSOL, METERED RESPIRATORY (INHALATION) 2 TIMES DAILY
Status: DISCONTINUED | OUTPATIENT
Start: 2019-01-10 | End: 2019-01-10 | Stop reason: HOSPADM

## 2019-01-10 RX ORDER — QUETIAPINE FUMARATE 300 MG/1
300 TABLET, FILM COATED ORAL NIGHTLY
COMMUNITY
Start: 2019-01-10 | End: 2019-03-04 | Stop reason: ALTCHOICE

## 2019-01-10 RX ORDER — QUETIAPINE FUMARATE 300 MG/1
300 TABLET, FILM COATED ORAL 2 TIMES DAILY
COMMUNITY
Start: 2019-01-10 | End: 2019-01-10

## 2019-01-10 RX ADMIN — ACETAMINOPHEN 650 MG: 325 TABLET ORAL at 16:34

## 2019-01-10 RX ADMIN — QUETIAPINE FUMARATE 300 MG: 100 TABLET ORAL at 08:34

## 2019-01-10 RX ADMIN — ACETAMINOPHEN 650 MG: 325 TABLET ORAL at 09:52

## 2019-01-10 RX ADMIN — DIVALPROEX SODIUM 250 MG: 250 TABLET, DELAYED RELEASE ORAL at 08:34

## 2019-01-10 RX ADMIN — Medication 2 PUFF: at 08:05

## 2019-01-10 RX ADMIN — DULOXETINE HYDROCHLORIDE 60 MG: 30 CAPSULE, DELAYED RELEASE ORAL at 08:33

## 2019-01-10 ASSESSMENT — PAIN SCALES - GENERAL
PAINLEVEL_OUTOF10: 3
PAINLEVEL_OUTOF10: 8

## 2019-03-04 ENCOUNTER — HOSPITAL ENCOUNTER (EMERGENCY)
Age: 69
Discharge: HOME | End: 2019-03-04
Attending: EMERGENCY MEDICINE
Payer: MEDICARE

## 2019-03-04 VITALS
RESPIRATION RATE: 24 BRPM | BODY MASS INDEX: 30.11 KG/M2 | WEIGHT: 170 LBS | TEMPERATURE: 98 F | DIASTOLIC BLOOD PRESSURE: 92 MMHG | OXYGEN SATURATION: 98 % | HEART RATE: 91 BPM | SYSTOLIC BLOOD PRESSURE: 193 MMHG

## 2019-03-04 DIAGNOSIS — R51.9 ACUTE NONINTRACTABLE HEADACHE, UNSPECIFIED HEADACHE TYPE: Primary | ICD-10-CM

## 2019-03-04 LAB
A/G RATIO: 1.3 (ref 1.1–2.2)
ALBUMIN SERPL-MCNC: 4 G/DL (ref 3.4–5)
ALP BLD-CCNC: 64 U/L (ref 40–129)
ALT SERPL-CCNC: 7 U/L (ref 10–40)
ANION GAP SERPL CALCULATED.3IONS-SCNC: 11 MMOL/L (ref 3–16)
ANISOCYTOSIS: ABNORMAL
AST SERPL-CCNC: 13 U/L (ref 15–37)
BASOPHILS ABSOLUTE: 0 K/UL (ref 0–0.2)
BASOPHILS RELATIVE PERCENT: 0 %
BILIRUB SERPL-MCNC: <0.2 MG/DL (ref 0–1)
BILIRUBIN URINE: NEGATIVE
BLOOD, URINE: NEGATIVE
BUN BLDV-MCNC: 14 MG/DL (ref 7–20)
CALCIUM SERPL-MCNC: 9.1 MG/DL (ref 8.3–10.6)
CHLORIDE BLD-SCNC: 94 MMOL/L (ref 99–110)
CLARITY: CLEAR
CO2: 26 MMOL/L (ref 21–32)
COLOR: YELLOW
CREAT SERPL-MCNC: 0.8 MG/DL (ref 0.6–1.2)
EOSINOPHILS ABSOLUTE: 0.1 K/UL (ref 0–0.6)
EOSINOPHILS RELATIVE PERCENT: 2 %
EPITHELIAL CELLS, UA: ABNORMAL /HPF
GFR AFRICAN AMERICAN: >60
GFR NON-AFRICAN AMERICAN: >60
GLOBULIN: 3 G/DL
GLUCOSE BLD-MCNC: 95 MG/DL (ref 70–99)
GLUCOSE URINE: NEGATIVE MG/DL
HCT VFR BLD CALC: 37.7 % (ref 36–48)
HEMOGLOBIN: 12.5 G/DL (ref 12–16)
KETONES, URINE: NEGATIVE MG/DL
LEUKOCYTE ESTERASE, URINE: ABNORMAL
LIPASE: 34 U/L (ref 13–60)
LYMPHOCYTES ABSOLUTE: 1.7 K/UL (ref 1–5.1)
LYMPHOCYTES RELATIVE PERCENT: 28 %
MCH RBC QN AUTO: 29.7 PG (ref 26–34)
MCHC RBC AUTO-ENTMCNC: 33.3 G/DL (ref 31–36)
MCV RBC AUTO: 89.5 FL (ref 80–100)
MICROCYTES: ABNORMAL
MICROSCOPIC EXAMINATION: YES
MONOCYTES ABSOLUTE: 0.4 K/UL (ref 0–1.3)
MONOCYTES RELATIVE PERCENT: 7 %
NEUTROPHILS ABSOLUTE: 3.8 K/UL (ref 1.7–7.7)
NEUTROPHILS RELATIVE PERCENT: 63 %
NITRITE, URINE: NEGATIVE
PDW BLD-RTO: 14.5 % (ref 12.4–15.4)
PH UA: 7 (ref 5–8)
PLATELET # BLD: 152 K/UL (ref 135–450)
PMV BLD AUTO: 8.1 FL (ref 5–10.5)
POTASSIUM SERPL-SCNC: 4.5 MMOL/L (ref 3.5–5.1)
PROTEIN UA: NEGATIVE MG/DL
RBC # BLD: 4.21 M/UL (ref 4–5.2)
RBC UA: ABNORMAL /HPF (ref 0–2)
REASON FOR REJECTION: NORMAL
REJECTED TEST: NORMAL
SLIDE REVIEW: ABNORMAL
SODIUM BLD-SCNC: 131 MMOL/L (ref 136–145)
SPECIFIC GRAVITY UA: <=1.005 (ref 1–1.03)
TOTAL PROTEIN: 7 G/DL (ref 6.4–8.2)
URINE TYPE: ABNORMAL
UROBILINOGEN, URINE: 0.2 E.U./DL
WBC # BLD: 6.1 K/UL (ref 4–11)
WBC UA: ABNORMAL /HPF (ref 0–5)

## 2019-03-04 PROCEDURE — 81001 URINALYSIS AUTO W/SCOPE: CPT

## 2019-03-04 PROCEDURE — 80053 COMPREHEN METABOLIC PANEL: CPT

## 2019-03-04 PROCEDURE — 83690 ASSAY OF LIPASE: CPT

## 2019-03-04 PROCEDURE — 99284 EMERGENCY DEPT VISIT MOD MDM: CPT

## 2019-03-04 PROCEDURE — 96372 THER/PROPH/DIAG INJ SC/IM: CPT

## 2019-03-04 PROCEDURE — 85025 COMPLETE CBC W/AUTO DIFF WBC: CPT

## 2019-03-04 PROCEDURE — 36415 COLL VENOUS BLD VENIPUNCTURE: CPT

## 2019-03-04 PROCEDURE — 6360000002 HC RX W HCPCS: Performed by: EMERGENCY MEDICINE

## 2019-03-04 RX ORDER — RISPERIDONE 2 MG/1
2 TABLET, FILM COATED ORAL 2 TIMES DAILY
COMMUNITY
End: 2019-07-09

## 2019-03-04 RX ORDER — KETOROLAC TROMETHAMINE 10 MG/1
10 TABLET, FILM COATED ORAL 3 TIMES DAILY
Qty: 12 TABLET | Refills: 0 | Status: ON HOLD | OUTPATIENT
Start: 2019-03-04 | End: 2021-08-13

## 2019-03-04 RX ORDER — AMANTADINE HYDROCHLORIDE 100 MG/1
100 TABLET ORAL 2 TIMES DAILY
COMMUNITY
End: 2019-07-09

## 2019-03-04 RX ORDER — KETOROLAC TROMETHAMINE 30 MG/ML
15 INJECTION, SOLUTION INTRAMUSCULAR; INTRAVENOUS ONCE
Status: DISCONTINUED | OUTPATIENT
Start: 2019-03-04 | End: 2019-03-04 | Stop reason: SDUPTHER

## 2019-03-04 RX ORDER — ARIPIPRAZOLE 10 MG/1
10 TABLET ORAL DAILY
Status: ON HOLD | COMMUNITY
End: 2019-07-11 | Stop reason: ALTCHOICE

## 2019-03-04 RX ORDER — TRAZODONE HYDROCHLORIDE 100 MG/1
200 TABLET ORAL NIGHTLY
COMMUNITY
End: 2019-07-09

## 2019-03-04 RX ORDER — DIVALPROEX SODIUM 500 MG/1
500 TABLET, DELAYED RELEASE ORAL NIGHTLY
COMMUNITY

## 2019-03-04 RX ORDER — KETOROLAC TROMETHAMINE 30 MG/ML
15 INJECTION, SOLUTION INTRAMUSCULAR; INTRAVENOUS ONCE
Status: COMPLETED | OUTPATIENT
Start: 2019-03-04 | End: 2019-03-04

## 2019-03-04 RX ORDER — OLMESARTAN MEDOXOMIL 40 MG/1
40 TABLET ORAL DAILY
Status: ON HOLD | COMMUNITY
End: 2021-08-13

## 2019-03-04 RX ADMIN — KETOROLAC TROMETHAMINE 15 MG: 60 INJECTION, SOLUTION INTRAMUSCULAR at 16:03

## 2019-03-04 ASSESSMENT — PAIN SCALES - GENERAL
PAINLEVEL_OUTOF10: 9
PAINLEVEL_OUTOF10: 9

## 2019-06-02 ENCOUNTER — APPOINTMENT (OUTPATIENT)
Dept: GENERAL RADIOLOGY | Age: 69
End: 2019-06-02
Payer: MEDICARE

## 2019-06-02 ENCOUNTER — HOSPITAL ENCOUNTER (EMERGENCY)
Age: 69
Discharge: HOME | End: 2019-06-02
Attending: EMERGENCY MEDICINE
Payer: MEDICARE

## 2019-06-02 ENCOUNTER — APPOINTMENT (OUTPATIENT)
Dept: CT IMAGING | Age: 69
End: 2019-06-02
Payer: MEDICARE

## 2019-06-02 VITALS
OXYGEN SATURATION: 100 % | DIASTOLIC BLOOD PRESSURE: 76 MMHG | HEART RATE: 90 BPM | HEIGHT: 60 IN | BODY MASS INDEX: 29.45 KG/M2 | TEMPERATURE: 97 F | SYSTOLIC BLOOD PRESSURE: 150 MMHG | RESPIRATION RATE: 16 BRPM | WEIGHT: 150 LBS

## 2019-06-02 DIAGNOSIS — E87.6 HYPOKALEMIA: ICD-10-CM

## 2019-06-02 DIAGNOSIS — R55 SYNCOPE AND COLLAPSE: Primary | ICD-10-CM

## 2019-06-02 DIAGNOSIS — S40.011A CONTUSION OF RIGHT SHOULDER, INITIAL ENCOUNTER: ICD-10-CM

## 2019-06-02 LAB
A/G RATIO: 1.4 (ref 1.1–2.2)
ALBUMIN SERPL-MCNC: 4.4 G/DL (ref 3.4–5)
ALP BLD-CCNC: 97 U/L (ref 40–129)
ALT SERPL-CCNC: 8 U/L (ref 10–40)
ANION GAP SERPL CALCULATED.3IONS-SCNC: 14 MMOL/L (ref 3–16)
AST SERPL-CCNC: 12 U/L (ref 15–37)
BACTERIA: ABNORMAL /HPF
BASOPHILS ABSOLUTE: 0 K/UL (ref 0–0.2)
BASOPHILS RELATIVE PERCENT: 0.7 %
BILIRUB SERPL-MCNC: 0.3 MG/DL (ref 0–1)
BILIRUBIN URINE: NEGATIVE
BLOOD, URINE: NEGATIVE
BUN BLDV-MCNC: 18 MG/DL (ref 7–20)
CALCIUM SERPL-MCNC: 9.4 MG/DL (ref 8.3–10.6)
CHLORIDE BLD-SCNC: 94 MMOL/L (ref 99–110)
CLARITY: CLEAR
CO2: 27 MMOL/L (ref 21–32)
COLOR: YELLOW
CREAT SERPL-MCNC: 0.8 MG/DL (ref 0.6–1.2)
EKG ATRIAL RATE: 91 BPM
EKG DIAGNOSIS: NORMAL
EKG P AXIS: -20 DEGREES
EKG P-R INTERVAL: 144 MS
EKG Q-T INTERVAL: 376 MS
EKG QRS DURATION: 96 MS
EKG QTC CALCULATION (BAZETT): 462 MS
EKG R AXIS: -9 DEGREES
EKG T AXIS: 4 DEGREES
EKG VENTRICULAR RATE: 91 BPM
EOSINOPHILS ABSOLUTE: 0.1 K/UL (ref 0–0.6)
EOSINOPHILS RELATIVE PERCENT: 2.3 %
EPITHELIAL CELLS, UA: ABNORMAL /HPF
GFR AFRICAN AMERICAN: >60
GFR NON-AFRICAN AMERICAN: >60
GLOBULIN: 3.1 G/DL
GLUCOSE BLD-MCNC: 103 MG/DL (ref 70–99)
GLUCOSE URINE: NEGATIVE MG/DL
HCT VFR BLD CALC: 37.8 % (ref 36–48)
HEMOGLOBIN: 12.7 G/DL (ref 12–16)
KETONES, URINE: NEGATIVE MG/DL
LEUKOCYTE ESTERASE, URINE: ABNORMAL
LYMPHOCYTES ABSOLUTE: 1.4 K/UL (ref 1–5.1)
LYMPHOCYTES RELATIVE PERCENT: 24.3 %
MCH RBC QN AUTO: 30.2 PG (ref 26–34)
MCHC RBC AUTO-ENTMCNC: 33.6 G/DL (ref 31–36)
MCV RBC AUTO: 89.9 FL (ref 80–100)
MICROSCOPIC EXAMINATION: YES
MONOCYTES ABSOLUTE: 0.5 K/UL (ref 0–1.3)
MONOCYTES RELATIVE PERCENT: 9 %
NEUTROPHILS ABSOLUTE: 3.7 K/UL (ref 1.7–7.7)
NEUTROPHILS RELATIVE PERCENT: 63.7 %
NITRITE, URINE: NEGATIVE
PDW BLD-RTO: 14.8 % (ref 12.4–15.4)
PH UA: 6.5 (ref 5–8)
PLATELET # BLD: 181 K/UL (ref 135–450)
PMV BLD AUTO: 7.7 FL (ref 5–10.5)
POTASSIUM SERPL-SCNC: 3.2 MMOL/L (ref 3.5–5.1)
PROTEIN UA: NEGATIVE MG/DL
RBC # BLD: 4.21 M/UL (ref 4–5.2)
RBC UA: ABNORMAL /HPF (ref 0–2)
SODIUM BLD-SCNC: 135 MMOL/L (ref 136–145)
SPECIFIC GRAVITY UA: 1.01 (ref 1–1.03)
TOTAL PROTEIN: 7.5 G/DL (ref 6.4–8.2)
TROPONIN: <0.01 NG/ML
URINE REFLEX TO CULTURE: YES
URINE TYPE: ABNORMAL
UROBILINOGEN, URINE: 0.2 E.U./DL
WBC # BLD: 5.8 K/UL (ref 4–11)
WBC UA: ABNORMAL /HPF (ref 0–5)

## 2019-06-02 PROCEDURE — 70450 CT HEAD/BRAIN W/O DYE: CPT

## 2019-06-02 PROCEDURE — 87086 URINE CULTURE/COLONY COUNT: CPT

## 2019-06-02 PROCEDURE — 85025 COMPLETE CBC W/AUTO DIFF WBC: CPT

## 2019-06-02 PROCEDURE — 81001 URINALYSIS AUTO W/SCOPE: CPT

## 2019-06-02 PROCEDURE — 84484 ASSAY OF TROPONIN QUANT: CPT

## 2019-06-02 PROCEDURE — 93010 ELECTROCARDIOGRAM REPORT: CPT | Performed by: INTERNAL MEDICINE

## 2019-06-02 PROCEDURE — 6370000000 HC RX 637 (ALT 250 FOR IP): Performed by: EMERGENCY MEDICINE

## 2019-06-02 PROCEDURE — 99284 EMERGENCY DEPT VISIT MOD MDM: CPT

## 2019-06-02 PROCEDURE — 93005 ELECTROCARDIOGRAM TRACING: CPT | Performed by: EMERGENCY MEDICINE

## 2019-06-02 PROCEDURE — 71045 X-RAY EXAM CHEST 1 VIEW: CPT

## 2019-06-02 PROCEDURE — 80053 COMPREHEN METABOLIC PANEL: CPT

## 2019-06-02 PROCEDURE — 73030 X-RAY EXAM OF SHOULDER: CPT

## 2019-06-02 RX ORDER — ASPIRIN 81 MG/1
324 TABLET, CHEWABLE ORAL ONCE
Status: COMPLETED | OUTPATIENT
Start: 2019-06-02 | End: 2019-06-02

## 2019-06-02 RX ORDER — LIDOCAINE 4 G/G
1 PATCH TOPICAL ONCE
Status: DISCONTINUED | OUTPATIENT
Start: 2019-06-02 | End: 2019-06-02 | Stop reason: HOSPADM

## 2019-06-02 RX ORDER — HYDROCODONE BITARTRATE AND ACETAMINOPHEN 5; 325 MG/1; MG/1
1 TABLET ORAL EVERY 6 HOURS PRN
Qty: 12 TABLET | Refills: 0 | Status: SHIPPED | OUTPATIENT
Start: 2019-06-02 | End: 2019-06-05

## 2019-06-02 RX ORDER — HYDROCODONE BITARTRATE AND ACETAMINOPHEN 5; 325 MG/1; MG/1
1 TABLET ORAL ONCE
Status: COMPLETED | OUTPATIENT
Start: 2019-06-02 | End: 2019-06-02

## 2019-06-02 RX ORDER — GABAPENTIN 600 MG/1
600 TABLET ORAL 3 TIMES DAILY
Status: ON HOLD | COMMUNITY
End: 2020-11-03 | Stop reason: SDUPTHER

## 2019-06-02 RX ORDER — LIDOCAINE 50 MG/G
1 PATCH TOPICAL DAILY
Qty: 30 PATCH | Refills: 0 | Status: SHIPPED | OUTPATIENT
Start: 2019-06-02 | End: 2019-07-02

## 2019-06-02 RX ORDER — POTASSIUM CHLORIDE 20 MEQ/1
40 TABLET, EXTENDED RELEASE ORAL ONCE
Status: COMPLETED | OUTPATIENT
Start: 2019-06-02 | End: 2019-06-02

## 2019-06-02 RX ADMIN — HYDROCODONE BITARTRATE AND ACETAMINOPHEN 1 TABLET: 5; 325 TABLET ORAL at 13:11

## 2019-06-02 RX ADMIN — ASPIRIN 81 MG 324 MG: 81 TABLET ORAL at 11:37

## 2019-06-02 RX ADMIN — POTASSIUM CHLORIDE 40 MEQ: 1500 TABLET, EXTENDED RELEASE ORAL at 13:11

## 2019-06-02 ASSESSMENT — PAIN DESCRIPTION - LOCATION
LOCATION: CHEST
LOCATION: SHOULDER
LOCATION: BACK

## 2019-06-02 ASSESSMENT — PAIN SCALES - GENERAL
PAINLEVEL_OUTOF10: 5
PAINLEVEL_OUTOF10: 5
PAINLEVEL_OUTOF10: 8
PAINLEVEL_OUTOF10: 5

## 2019-06-02 ASSESSMENT — PAIN DESCRIPTION - PAIN TYPE
TYPE: ACUTE PAIN

## 2019-06-02 ASSESSMENT — HEART SCORE: ECG: 0

## 2019-06-02 NOTE — ED NOTES
Followed up with Gaurang Boyd on 6/2/2019 at 3:07 PM. Patient left the ED with a disposition of AMA on . Sara January Patient cited AMA. as reason. Advised patient to follow up with a primary care physician or return to the Emergency Department if symptoms worsen.    Kai Wang RN  06/02/19 6308

## 2019-06-02 NOTE — ED NOTES
Discharge instructions reviewed with Ms. Fitzpatrick. She verbalized understanding. Copy of discharge instructions and prescriptions given. Ms. Tio Rodas was discharged to home in good condition per personal vehicle, w/  driving. She exited the ED without difficulty.         Jhoana Ladd RN  06/02/19 6456

## 2019-06-02 NOTE — ED NOTES
1530 - Perfect serve sent to [place] [id number]  06/02/19 1336    1531 - Dr. Payam George called back     [place]  06/02/19 7264

## 2019-06-02 NOTE — ED PROVIDER NOTES
[address] ED  EMERGENCY DEPARTMENT ENCOUNTER        Pt Name: Hector Garg  MRN: [id number]  [place] 1950  Date of evaluation: 6/2/2019  Provider: BALJIT Bailey CNP  PCP: No primary care provider on file. This patient was seen and evaluated by the attending physician Marla Cornejo, [address]       Chief Complaint   Patient presents with    Shoulder Injury     right shoulder pt passed out and fell hitting shoulder a couple days ago     Loss of Consciousness     pt states she got out of bed too quick and went down        HISTORY OF PRESENT ILLNESS   (Location/Symptom, Timing/Onset, Context/Setting, Quality, Duration, Modifying Factors, Severity)  Note limiting factors. Hector Garg is a 80 y.o. female who presents for evaluation of right shoulder pain. Patient states that she fell 2 days ago and injured her right shoulder. She states that she \"passed out\" from getting up too quickly. She describes her shoulder pain as constant, dull, and radiating to her right neck. States that she has passed out 4 times in the past month, has seen her PMD, and cardiology for syncope. She states that she is also been having chest pain ×2 days that is nonradiating. She describes her chest pain as constant and all and it comes and goes. Denying chest pain currently. Patient denies fever, chills, nausea, and vomiting. States that she has not taken anything for her shoulder or chest pain. Nursing Notes were all reviewed and agreed with or any disagreements were addressed  in the HPI. REVIEW OF SYSTEMS    (2-9 systems for level 4, 10 or more for level 5)     Review of Systems    Positives and Pertinent negatives as per HPI. Except as noted abovein the ROS, all other systems were reviewed and negative.        PAST MEDICAL HISTORY     Past Medical History:   Diagnosis Date    Abnormal ECG 12/14/2012    Anemia     Arthritis     Back pain, chronic mg by mouth 2 times daily    TRAZODONE (DESYREL) 100 MG TABLET    Take 200 mg by mouth nightly         ALLERGIES     Dicyclomine; Imitrex [sumatriptan]; Tape [adhesive tape];  Tizanidine; and Motrin [ibuprofen micronized]    FAMILYHISTORY       Family History   Problem Relation Age of Onset    Emphysema Mother     Heart Disease Mother     Arthritis Mother     Depression Mother     High Blood Pressure Mother     Heart Failure Father     Heart Disease Father     Arthritis Father     Diabetes Father     High Blood Pressure Father     Stroke Father     Substance Abuse Father     High Blood Pressure Brother     Heart Disease Sister     Kidney Disease Daughter           SOCIAL HISTORY       Social History     Socioeconomic History    Marital status:      Spouse name: None    Number of children: None    Years of education: None    Highest education level: None   Occupational History    None   Social Needs    Financial resource strain: None    Food insecurity:     Worry: None     Inability: None    Transportation needs:     Medical: None     Non-medical: None   Tobacco Use    Smoking status: Current Every Day Smoker     Packs/day: 2.00     Years: 47.00     Pack years: 94.00     Types: Cigarettes    Smokeless tobacco: Never Used   Substance and Sexual Activity    Alcohol use: No    Drug use: Not Currently    Sexual activity: Yes     Partners: Male   Lifestyle    Physical activity:     Days per week: None     Minutes per session: None    Stress: None   Relationships    Social connections:     Talks on phone: None     Gets together: None     Attends Confucianism service: None     Active member of club or organization: None     Attends meetings of clubs or organizations: None     Relationship status: None    Intimate partner violence:     Fear of current or ex partner: None     Emotionally abused: None     Physically abused: None     Forced sexual activity: None   Other Topics Concern    None Bacteria, UA 1+ (*)     All other components within normal limits    Narrative:     Performed at:  [facility],  [place], [facility]   Phone [phone]   URINE CULTURE   CBC WITH AUTO DIFFERENTIAL    Narrative:     Performed at:  [facility] [address],  [place], [facility]   Phone [phone]   TROPONIN    Narrative:     Performed at:  [facility]) [facility] [address],  [place], [facility]   Phone [phone]   VALPROIC ACID LEVEL, TOTAL       All other labs were within normal range or not returned as of this dictation. EKG: All EKG's are interpreted by the Emergency Department Physician who either signs orCo-signs this chart in the absence of a cardiologist.  Please see their note for interpretation of EKG. RADIOLOGY:   Non-plain film images such as CT, Ultrasound and MRI are read by the radiologist. Plain radiographic images are visualized andpreliminarily interpreted by the  ED Provider with the below findings:        Interpretation pert Radiologist below, if available at the time of this note:    XR CHEST 1 VW   Final Result   No acute disease. XR SHOULDER RIGHT (MIN 2 VIEWS)   Final Result   No significant finding in the right shoulder. CT Head WO Contrast   Final Result   No acute intracranial abnormality. Xr Shoulder Right (min 2 Views)    Result Date: 6/2/2019  EXAMINATION: 3 XRAY VIEWS OF THE RIGHT SHOULDER 6/2/2019 11:41 am COMPARISON: None. HISTORY: ORDERING SYSTEM PROVIDED HISTORY: fall TECHNOLOGIST PROVIDED HISTORY: Reason for exam:->fall Ordering Physician Provided Reason for Exam: right shoulder pain Acuity: Unknown Type of Exam: Initial FINDINGS: Skeletal structures are intact. No fracture or dislocation. No significant soft tissue abnormalities. No significant finding in the right shoulder.      Ct Head Wo Contrast    Result Date: 6/2/2019  EXAMINATION: CT OF THE HEAD WITHOUT CONTRAST  6/2/2019 11:40 am TECHNIQUE: CT of the head was performed without the administration of intravenous contrast. Dose modulation, iterative reconstruction, and/or weight based adjustment of the mA/kV was utilized to reduce the radiation dose to as low as reasonably achievable. COMPARISON: 12/03/2018 HISTORY: ORDERING SYSTEM PROVIDED HISTORY: fall TECHNOLOGIST PROVIDED HISTORY: Has a \"code stroke\" or \"stroke alert\" been called? ->No Ordering Physician Provided Reason for Exam: fall Acuity: Acute Type of Exam: Initial Headache. FINDINGS: BRAIN/VENTRICLES: There is no acute intracranial hemorrhage, mass effect or midline shift. No abnormal extra-axial fluid collection. The gray-white differentiation is maintained without evidence of an acute infarct. There is prominence of the ventricles and sulci due to global parenchymal volume loss. There are nonspecific areas of hypoattenuation within the periventricular and subcortical white matter, which likely represent chronic microvascular ischemic change. ORBITS: The visualized portion of the orbits demonstrate no acute abnormality. SINUSES: The visualized paranasal sinuses and mastoid air cells demonstrate no acute abnormality. SOFT TISSUES/SKULL: No acute abnormality of the visualized skull or soft tissues. No acute intracranial abnormality. Xr Chest 1 Vw    Result Date: 6/2/2019  EXAMINATION: ONE XRAY VIEW OF THE CHEST 6/2/2019 11:41 am COMPARISON: 08/15/2018 HISTORY: ORDERING SYSTEM PROVIDED HISTORY: cp TECHNOLOGIST PROVIDED HISTORY: Reason for exam:->cp Ordering Physician Provided Reason for Exam: loss of consciousness after getting out of bed too quickly Acuity: Acute Type of Exam: Initial FINDINGS: No acute bony abnormality. The heart size and mediastinal contours are within normal limits. The lungs are clear. No acute disease.           PROCEDURES   Unless otherwise noted below, none Procedures    CRITICAL CARE TIME   N/A    CONSULTS:  IP CONSULT TO HOSPITALIST      EMERGENCY DEPARTMENT COURSE and DIFFERENTIALDIAGNOSIS/MDM:   Vitals:    Vitals:    06/02/19 1200 06/02/19 1304 06/02/19 1309 06/02/19 1510   BP: 128/76  (!) 159/88 (!) 150/76   Pulse: 87 86 90    Resp: 16 21 23 16   Temp:       SpO2: 98% 96% 93% 100%   Weight:       Height:           Patient was given thefollowing medications:  Medications   lidocaine 4 % external patch 1 patch (1 patch Transdermal Patch Applied 6/2/19 1413)   aspirin chewable tablet 324 mg (324 mg Oral Given 6/2/19 1137)   HYDROcodone-acetaminophen (NORCO) 5-325 MG per tablet 1 tablet (1 tablet Oral Given 6/2/19 1311)   potassium chloride (KLOR-CON M) extended release tablet 40 mEq (40 mEq Oral Given 6/2/19 1311)       Patient is nontoxic and in no apparent distress. This provider offered patient pain medicine at this time and patient declined due to driving, states she does not want Tylenol or Motrin. Lab work, EKG, chest x-ray, right shoulder x-ray, and head CT ordered. EKG interpreted by Dr. Gypsy Salinas and reviewed by myself, normal sinus rhythm. Troponin negative, CBC unremarkable, CMP potassium of 3.2, patient received Klor-Con orally. Chest x-ray, right shoulder x-ray, and head CT unremarkable. Patient given aspirin for her chest pain, Norco for her shoulder pain, and a Lidoderm patch for her shoulder. Patient originally stated that she did not want to be admitted however, she feels that admission is warranted for her multiple syncopal episodes. 1333 - spoke to hospitalist, Dr. Eleuterio Garcia about patient. She states that he does not feel patient warrants admission at this time she has had multiple workups for her syncope and he feels there is nothing else to offer her. He recommends having patient follow up with cardiology and her PCP.    1342 - Dr. Alvin Alston at bedside to discuss plan of care with patient.   He states that he will call the hospitalists back
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
## 2019-06-04 LAB — URINE CULTURE, ROUTINE: NORMAL

## 2019-07-09 ENCOUNTER — INITIAL CONSULT (OUTPATIENT)
Dept: SURGERY | Age: 69
End: 2019-07-09
Payer: MEDICARE

## 2019-07-09 ENCOUNTER — PREP FOR PROCEDURE (OUTPATIENT)
Dept: SURGERY | Age: 69
End: 2019-07-09

## 2019-07-09 ENCOUNTER — HOSPITAL ENCOUNTER (OUTPATIENT)
Age: 69
Discharge: HOME | End: 2019-07-09
Payer: MEDICARE

## 2019-07-09 VITALS
HEIGHT: 60 IN | BODY MASS INDEX: 34.36 KG/M2 | SYSTOLIC BLOOD PRESSURE: 132 MMHG | DIASTOLIC BLOOD PRESSURE: 84 MMHG | WEIGHT: 175 LBS

## 2019-07-09 DIAGNOSIS — K43.9 VENTRAL HERNIA WITHOUT OBSTRUCTION OR GANGRENE: Primary | ICD-10-CM

## 2019-07-09 LAB
A/G RATIO: 1.5 (ref 1.1–2.2)
ALBUMIN SERPL-MCNC: 4.1 G/DL (ref 3.4–5)
ALP BLD-CCNC: 86 U/L (ref 40–129)
ALT SERPL-CCNC: 9 U/L (ref 10–40)
AMMONIA: 32 UMOL/L (ref 11–51)
ANION GAP SERPL CALCULATED.3IONS-SCNC: 12 MMOL/L (ref 3–16)
AST SERPL-CCNC: 12 U/L (ref 15–37)
BILIRUB SERPL-MCNC: <0.2 MG/DL (ref 0–1)
BUN BLDV-MCNC: 16 MG/DL (ref 7–20)
CALCIUM SERPL-MCNC: 9.5 MG/DL (ref 8.3–10.6)
CHLORIDE BLD-SCNC: 98 MMOL/L (ref 99–110)
CO2: 26 MMOL/L (ref 21–32)
CREAT SERPL-MCNC: 0.7 MG/DL (ref 0.6–1.2)
GFR AFRICAN AMERICAN: >60
GFR NON-AFRICAN AMERICAN: >60
GLOBULIN: 2.7 G/DL
GLUCOSE BLD-MCNC: 138 MG/DL (ref 70–99)
POTASSIUM SERPL-SCNC: 3.9 MMOL/L (ref 3.5–5.1)
SODIUM BLD-SCNC: 136 MMOL/L (ref 136–145)
TOTAL PROTEIN: 6.8 G/DL (ref 6.4–8.2)
VALPROIC ACID LEVEL: 72.3 UG/ML (ref 50–100)

## 2019-07-09 PROCEDURE — G8427 DOCREV CUR MEDS BY ELIG CLIN: HCPCS | Performed by: SURGERY

## 2019-07-09 PROCEDURE — 1090F PRES/ABSN URINE INCON ASSESS: CPT | Performed by: SURGERY

## 2019-07-09 PROCEDURE — 36415 COLL VENOUS BLD VENIPUNCTURE: CPT

## 2019-07-09 PROCEDURE — 99203 OFFICE O/P NEW LOW 30 MIN: CPT | Performed by: SURGERY

## 2019-07-09 PROCEDURE — 80053 COMPREHEN METABOLIC PANEL: CPT

## 2019-07-09 PROCEDURE — 80164 ASSAY DIPROPYLACETIC ACD TOT: CPT

## 2019-07-09 PROCEDURE — 3017F COLORECTAL CA SCREEN DOC REV: CPT | Performed by: SURGERY

## 2019-07-09 PROCEDURE — 1123F ACP DISCUSS/DSCN MKR DOCD: CPT | Performed by: SURGERY

## 2019-07-09 PROCEDURE — 4004F PT TOBACCO SCREEN RCVD TLK: CPT | Performed by: SURGERY

## 2019-07-09 PROCEDURE — G8598 ASA/ANTIPLAT THER USED: HCPCS | Performed by: SURGERY

## 2019-07-09 PROCEDURE — G8400 PT W/DXA NO RESULTS DOC: HCPCS | Performed by: SURGERY

## 2019-07-09 PROCEDURE — 4040F PNEUMOC VAC/ADMIN/RCVD: CPT | Performed by: SURGERY

## 2019-07-09 PROCEDURE — G8417 CALC BMI ABV UP PARAM F/U: HCPCS | Performed by: SURGERY

## 2019-07-09 PROCEDURE — 82140 ASSAY OF AMMONIA: CPT

## 2019-07-09 RX ORDER — SODIUM CHLORIDE 0.9 % (FLUSH) 0.9 %
10 SYRINGE (ML) INJECTION EVERY 12 HOURS SCHEDULED
Status: CANCELLED | OUTPATIENT
Start: 2019-07-09

## 2019-07-09 RX ORDER — HEPARIN SODIUM 5000 [USP'U]/ML
5000 INJECTION, SOLUTION INTRAVENOUS; SUBCUTANEOUS ONCE
Status: CANCELLED | OUTPATIENT
Start: 2019-07-09

## 2019-07-09 RX ORDER — QUETIAPINE FUMARATE 300 MG/1
400 TABLET, FILM COATED ORAL NIGHTLY
Status: ON HOLD | COMMUNITY
End: 2020-11-03 | Stop reason: HOSPADM

## 2019-07-09 RX ORDER — SODIUM CHLORIDE 0.9 % (FLUSH) 0.9 %
10 SYRINGE (ML) INJECTION PRN
Status: CANCELLED | OUTPATIENT
Start: 2019-07-09

## 2019-07-10 ENCOUNTER — TELEPHONE (OUTPATIENT)
Dept: SURGERY | Age: 69
End: 2019-07-10

## 2019-07-11 ENCOUNTER — HOSPITAL ENCOUNTER (OUTPATIENT)
Age: 69
Setting detail: OUTPATIENT SURGERY
Discharge: HOME | End: 2019-07-11
Attending: SURGERY | Admitting: SURGERY
Payer: MEDICARE

## 2019-07-11 ENCOUNTER — ANESTHESIA EVENT (OUTPATIENT)
Dept: OPERATING ROOM | Age: 69
End: 2019-07-11
Payer: MEDICARE

## 2019-07-11 ENCOUNTER — ANESTHESIA (OUTPATIENT)
Dept: OPERATING ROOM | Age: 69
End: 2019-07-11
Payer: MEDICARE

## 2019-07-11 VITALS
TEMPERATURE: 98.6 F | OXYGEN SATURATION: 90 % | SYSTOLIC BLOOD PRESSURE: 194 MMHG | DIASTOLIC BLOOD PRESSURE: 80 MMHG | RESPIRATION RATE: 6 BRPM

## 2019-07-11 VITALS
TEMPERATURE: 97.3 F | RESPIRATION RATE: 23 BRPM | WEIGHT: 175 LBS | HEART RATE: 87 BPM | SYSTOLIC BLOOD PRESSURE: 104 MMHG | OXYGEN SATURATION: 94 % | HEIGHT: 60 IN | BODY MASS INDEX: 34.36 KG/M2 | DIASTOLIC BLOOD PRESSURE: 48 MMHG

## 2019-07-11 DIAGNOSIS — K43.9 VENTRAL HERNIA WITHOUT OBSTRUCTION OR GANGRENE: Primary | ICD-10-CM

## 2019-07-11 PROCEDURE — 7100000000 HC PACU RECOVERY - FIRST 15 MIN: Performed by: SURGERY

## 2019-07-11 PROCEDURE — 7100000010 HC PHASE II RECOVERY - FIRST 15 MIN: Performed by: SURGERY

## 2019-07-11 PROCEDURE — 2500000003 HC RX 250 WO HCPCS: Performed by: SURGERY

## 2019-07-11 PROCEDURE — 2580000003 HC RX 258: Performed by: ANESTHESIOLOGY

## 2019-07-11 PROCEDURE — 3600000009 HC SURGERY ROBOT BASE: Performed by: SURGERY

## 2019-07-11 PROCEDURE — 7100000001 HC PACU RECOVERY - ADDTL 15 MIN: Performed by: SURGERY

## 2019-07-11 PROCEDURE — 6370000000 HC RX 637 (ALT 250 FOR IP): Performed by: NURSE ANESTHETIST, CERTIFIED REGISTERED

## 2019-07-11 PROCEDURE — 6360000002 HC RX W HCPCS: Performed by: SURGERY

## 2019-07-11 PROCEDURE — S2900 ROBOTIC SURGICAL SYSTEM: HCPCS | Performed by: SURGERY

## 2019-07-11 PROCEDURE — 6360000002 HC RX W HCPCS: Performed by: ANESTHESIOLOGY

## 2019-07-11 PROCEDURE — C1781 MESH (IMPLANTABLE): HCPCS | Performed by: SURGERY

## 2019-07-11 PROCEDURE — 2500000003 HC RX 250 WO HCPCS: Performed by: NURSE ANESTHETIST, CERTIFIED REGISTERED

## 2019-07-11 PROCEDURE — 7100000011 HC PHASE II RECOVERY - ADDTL 15 MIN: Performed by: SURGERY

## 2019-07-11 PROCEDURE — 3700000000 HC ANESTHESIA ATTENDED CARE: Performed by: SURGERY

## 2019-07-11 PROCEDURE — 3700000001 HC ADD 15 MINUTES (ANESTHESIA): Performed by: SURGERY

## 2019-07-11 PROCEDURE — 49652 PR LAP, VENTRAL HERNIA REPAIR,REDUCIBLE: CPT | Performed by: SURGERY

## 2019-07-11 PROCEDURE — 6370000000 HC RX 637 (ALT 250 FOR IP): Performed by: ANESTHESIOLOGY

## 2019-07-11 PROCEDURE — 2580000003 HC RX 258: Performed by: SURGERY

## 2019-07-11 PROCEDURE — 2500000003 HC RX 250 WO HCPCS: Performed by: ANESTHESIOLOGY

## 2019-07-11 PROCEDURE — 2709999900 HC NON-CHARGEABLE SUPPLY: Performed by: SURGERY

## 2019-07-11 PROCEDURE — 3600000019 HC SURGERY ROBOT ADDTL 15MIN: Performed by: SURGERY

## 2019-07-11 PROCEDURE — 6360000002 HC RX W HCPCS: Performed by: NURSE ANESTHETIST, CERTIFIED REGISTERED

## 2019-07-11 DEVICE — MESH HERN W3XL6IN INGUINAL POLYPR MFIL RECTANG: Type: IMPLANTABLE DEVICE | Site: ABDOMEN | Status: FUNCTIONAL

## 2019-07-11 RX ORDER — HEPARIN SODIUM 5000 [USP'U]/ML
5000 INJECTION, SOLUTION INTRAVENOUS; SUBCUTANEOUS ONCE
Status: COMPLETED | OUTPATIENT
Start: 2019-07-11 | End: 2019-07-11

## 2019-07-11 RX ORDER — BUPIVACAINE HYDROCHLORIDE 5 MG/ML
INJECTION, SOLUTION EPIDURAL; INTRACAUDAL PRN
Status: DISCONTINUED | OUTPATIENT
Start: 2019-07-11 | End: 2019-07-11 | Stop reason: ALTCHOICE

## 2019-07-11 RX ORDER — HYDROMORPHONE HCL 110MG/55ML
0.5 PATIENT CONTROLLED ANALGESIA SYRINGE INTRAVENOUS EVERY 5 MIN PRN
Status: DISCONTINUED | OUTPATIENT
Start: 2019-07-11 | End: 2019-07-11 | Stop reason: HOSPADM

## 2019-07-11 RX ORDER — DEXAMETHASONE SODIUM PHOSPHATE 4 MG/ML
INJECTION, SOLUTION INTRA-ARTICULAR; INTRALESIONAL; INTRAMUSCULAR; INTRAVENOUS; SOFT TISSUE PRN
Status: DISCONTINUED | OUTPATIENT
Start: 2019-07-11 | End: 2019-07-11 | Stop reason: SDUPTHER

## 2019-07-11 RX ORDER — LIDOCAINE HYDROCHLORIDE 10 MG/ML
2 INJECTION, SOLUTION EPIDURAL; INFILTRATION; INTRACAUDAL; PERINEURAL
Status: COMPLETED | OUTPATIENT
Start: 2019-07-11 | End: 2019-07-11

## 2019-07-11 RX ORDER — OXYCODONE HYDROCHLORIDE AND ACETAMINOPHEN 5; 325 MG/1; MG/1
2 TABLET ORAL PRN
Status: COMPLETED | OUTPATIENT
Start: 2019-07-11 | End: 2019-07-11

## 2019-07-11 RX ORDER — LIDOCAINE HYDROCHLORIDE 20 MG/ML
INJECTION, SOLUTION INFILTRATION; PERINEURAL PRN
Status: DISCONTINUED | OUTPATIENT
Start: 2019-07-11 | End: 2019-07-11 | Stop reason: SDUPTHER

## 2019-07-11 RX ORDER — PROPOFOL 10 MG/ML
INJECTION, EMULSION INTRAVENOUS PRN
Status: DISCONTINUED | OUTPATIENT
Start: 2019-07-11 | End: 2019-07-11 | Stop reason: SDUPTHER

## 2019-07-11 RX ORDER — HYDROMORPHONE HCL 110MG/55ML
0.25 PATIENT CONTROLLED ANALGESIA SYRINGE INTRAVENOUS EVERY 5 MIN PRN
Status: DISCONTINUED | OUTPATIENT
Start: 2019-07-11 | End: 2019-07-11 | Stop reason: HOSPADM

## 2019-07-11 RX ORDER — ONDANSETRON 2 MG/ML
INJECTION INTRAMUSCULAR; INTRAVENOUS PRN
Status: DISCONTINUED | OUTPATIENT
Start: 2019-07-11 | End: 2019-07-11 | Stop reason: SDUPTHER

## 2019-07-11 RX ORDER — SODIUM CHLORIDE 0.9 % (FLUSH) 0.9 %
10 SYRINGE (ML) INJECTION PRN
Status: DISCONTINUED | OUTPATIENT
Start: 2019-07-11 | End: 2019-07-11 | Stop reason: HOSPADM

## 2019-07-11 RX ORDER — MEPERIDINE HYDROCHLORIDE 50 MG/ML
12.5 INJECTION INTRAMUSCULAR; INTRAVENOUS; SUBCUTANEOUS EVERY 5 MIN PRN
Status: DISCONTINUED | OUTPATIENT
Start: 2019-07-11 | End: 2019-07-11 | Stop reason: HOSPADM

## 2019-07-11 RX ORDER — ONDANSETRON 2 MG/ML
4 INJECTION INTRAMUSCULAR; INTRAVENOUS EVERY 10 MIN PRN
Status: DISCONTINUED | OUTPATIENT
Start: 2019-07-11 | End: 2019-07-11 | Stop reason: HOSPADM

## 2019-07-11 RX ORDER — OXYCODONE HYDROCHLORIDE AND ACETAMINOPHEN 5; 325 MG/1; MG/1
1 TABLET ORAL EVERY 6 HOURS PRN
Qty: 28 TABLET | Refills: 0 | Status: SHIPPED | OUTPATIENT
Start: 2019-07-11 | End: 2019-07-18

## 2019-07-11 RX ORDER — FENTANYL CITRATE 50 UG/ML
INJECTION, SOLUTION INTRAMUSCULAR; INTRAVENOUS PRN
Status: DISCONTINUED | OUTPATIENT
Start: 2019-07-11 | End: 2019-07-11 | Stop reason: SDUPTHER

## 2019-07-11 RX ORDER — OXYCODONE HYDROCHLORIDE AND ACETAMINOPHEN 5; 325 MG/1; MG/1
1 TABLET ORAL PRN
Status: COMPLETED | OUTPATIENT
Start: 2019-07-11 | End: 2019-07-11

## 2019-07-11 RX ORDER — SODIUM CHLORIDE, SODIUM LACTATE, POTASSIUM CHLORIDE, CALCIUM CHLORIDE 600; 310; 30; 20 MG/100ML; MG/100ML; MG/100ML; MG/100ML
INJECTION, SOLUTION INTRAVENOUS CONTINUOUS
Status: DISCONTINUED | OUTPATIENT
Start: 2019-07-11 | End: 2019-07-11 | Stop reason: HOSPADM

## 2019-07-11 RX ORDER — HYDRALAZINE HYDROCHLORIDE 20 MG/ML
5 INJECTION INTRAMUSCULAR; INTRAVENOUS EVERY 10 MIN PRN
Status: DISCONTINUED | OUTPATIENT
Start: 2019-07-11 | End: 2019-07-11 | Stop reason: HOSPADM

## 2019-07-11 RX ORDER — LIDOCAINE HYDROCHLORIDE 40 MG/ML
SOLUTION TOPICAL PRN
Status: DISCONTINUED | OUTPATIENT
Start: 2019-07-11 | End: 2019-07-11 | Stop reason: SDUPTHER

## 2019-07-11 RX ORDER — ROCURONIUM BROMIDE 10 MG/ML
INJECTION, SOLUTION INTRAVENOUS PRN
Status: DISCONTINUED | OUTPATIENT
Start: 2019-07-11 | End: 2019-07-11 | Stop reason: SDUPTHER

## 2019-07-11 RX ORDER — LABETALOL HYDROCHLORIDE 5 MG/ML
5 INJECTION, SOLUTION INTRAVENOUS EVERY 10 MIN PRN
Status: DISCONTINUED | OUTPATIENT
Start: 2019-07-11 | End: 2019-07-11 | Stop reason: HOSPADM

## 2019-07-11 RX ORDER — MIDAZOLAM HYDROCHLORIDE 1 MG/ML
INJECTION INTRAMUSCULAR; INTRAVENOUS PRN
Status: DISCONTINUED | OUTPATIENT
Start: 2019-07-11 | End: 2019-07-11 | Stop reason: SDUPTHER

## 2019-07-11 RX ORDER — EPHEDRINE SULFATE 50 MG/ML
INJECTION INTRAVENOUS PRN
Status: DISCONTINUED | OUTPATIENT
Start: 2019-07-11 | End: 2019-07-11 | Stop reason: SDUPTHER

## 2019-07-11 RX ORDER — SODIUM CHLORIDE 0.9 % (FLUSH) 0.9 %
10 SYRINGE (ML) INJECTION EVERY 12 HOURS SCHEDULED
Status: DISCONTINUED | OUTPATIENT
Start: 2019-07-11 | End: 2019-07-11 | Stop reason: HOSPADM

## 2019-07-11 RX ADMIN — HYDROMORPHONE HYDROCHLORIDE 0.5 MG: 2 INJECTION, SOLUTION INTRAMUSCULAR; INTRAVENOUS; SUBCUTANEOUS at 13:50

## 2019-07-11 RX ADMIN — DEXAMETHASONE SODIUM PHOSPHATE 10 MG: 4 INJECTION, SOLUTION INTRAMUSCULAR; INTRAVENOUS at 12:38

## 2019-07-11 RX ADMIN — PROPOFOL 200 MG: 10 INJECTION, EMULSION INTRAVENOUS at 12:15

## 2019-07-11 RX ADMIN — LIDOCAINE HYDROCHLORIDE 2 ML: 10 INJECTION, SOLUTION EPIDURAL; INFILTRATION; INTRACAUDAL; PERINEURAL at 10:26

## 2019-07-11 RX ADMIN — OXYCODONE HYDROCHLORIDE AND ACETAMINOPHEN 2 TABLET: 5; 325 TABLET ORAL at 14:38

## 2019-07-11 RX ADMIN — FENTANYL CITRATE 50 MCG: 50 INJECTION, SOLUTION INTRAMUSCULAR; INTRAVENOUS at 12:15

## 2019-07-11 RX ADMIN — LIDOCAINE HYDROCHLORIDE 4 ML: 40 SPRAY LARYNGEAL; TRANSTRACHEAL at 12:16

## 2019-07-11 RX ADMIN — VANCOMYCIN HYDROCHLORIDE 1000 MG: 1 INJECTION, POWDER, LYOPHILIZED, FOR SOLUTION INTRAVENOUS at 12:02

## 2019-07-11 RX ADMIN — LIDOCAINE HYDROCHLORIDE 60 MG: 20 INJECTION, SOLUTION INFILTRATION; PERINEURAL at 12:15

## 2019-07-11 RX ADMIN — FENTANYL CITRATE 50 MCG: 50 INJECTION, SOLUTION INTRAMUSCULAR; INTRAVENOUS at 12:09

## 2019-07-11 RX ADMIN — HEPARIN SODIUM 5000 UNITS: 5000 INJECTION, SOLUTION INTRAVENOUS; SUBCUTANEOUS at 10:26

## 2019-07-11 RX ADMIN — MIDAZOLAM 1 MG: 1 INJECTION INTRAMUSCULAR; INTRAVENOUS at 12:09

## 2019-07-11 RX ADMIN — ROCURONIUM BROMIDE 50 MG: 10 INJECTION, SOLUTION INTRAVENOUS at 12:15

## 2019-07-11 RX ADMIN — SODIUM CHLORIDE, POTASSIUM CHLORIDE, SODIUM LACTATE AND CALCIUM CHLORIDE: 600; 310; 30; 20 INJECTION, SOLUTION INTRAVENOUS at 12:09

## 2019-07-11 RX ADMIN — EPHEDRINE SULFATE 5 MG: 50 INJECTION INTRAVENOUS at 12:32

## 2019-07-11 RX ADMIN — SUGAMMADEX 200 MG: 100 INJECTION, SOLUTION INTRAVENOUS at 13:09

## 2019-07-11 RX ADMIN — MIDAZOLAM 1 MG: 1 INJECTION INTRAMUSCULAR; INTRAVENOUS at 12:13

## 2019-07-11 RX ADMIN — ONDANSETRON 4 MG: 2 INJECTION, SOLUTION INTRAMUSCULAR; INTRAVENOUS at 12:38

## 2019-07-11 RX ADMIN — SODIUM CHLORIDE, POTASSIUM CHLORIDE, SODIUM LACTATE AND CALCIUM CHLORIDE: 600; 310; 30; 20 INJECTION, SOLUTION INTRAVENOUS at 10:25

## 2019-07-11 RX ADMIN — EPHEDRINE SULFATE 10 MG: 50 INJECTION INTRAVENOUS at 12:28

## 2019-07-11 RX ADMIN — EPHEDRINE SULFATE 10 MG: 50 INJECTION INTRAVENOUS at 12:24

## 2019-07-11 ASSESSMENT — PULMONARY FUNCTION TESTS
PIF_VALUE: 3
PIF_VALUE: 26
PIF_VALUE: 1
PIF_VALUE: 26
PIF_VALUE: 25
PIF_VALUE: 25
PIF_VALUE: 26
PIF_VALUE: 24
PIF_VALUE: 26
PIF_VALUE: 3
PIF_VALUE: 3
PIF_VALUE: 26
PIF_VALUE: 27
PIF_VALUE: 19
PIF_VALUE: 20
PIF_VALUE: 25
PIF_VALUE: 25
PIF_VALUE: 19
PIF_VALUE: 26
PIF_VALUE: 8
PIF_VALUE: 27
PIF_VALUE: 20
PIF_VALUE: 26
PIF_VALUE: 24
PIF_VALUE: 30
PIF_VALUE: 24
PIF_VALUE: 26
PIF_VALUE: 26
PIF_VALUE: 15
PIF_VALUE: 25
PIF_VALUE: 26
PIF_VALUE: 19
PIF_VALUE: 20
PIF_VALUE: 25
PIF_VALUE: 26
PIF_VALUE: 3
PIF_VALUE: 26
PIF_VALUE: 26
PIF_VALUE: 25
PIF_VALUE: 25
PIF_VALUE: 11
PIF_VALUE: 1
PIF_VALUE: 26
PIF_VALUE: 17
PIF_VALUE: 26
PIF_VALUE: 21
PIF_VALUE: 26
PIF_VALUE: 24
PIF_VALUE: 24
PIF_VALUE: 15
PIF_VALUE: 24
PIF_VALUE: 20
PIF_VALUE: 26
PIF_VALUE: 2
PIF_VALUE: 2
PIF_VALUE: 20
PIF_VALUE: 20
PIF_VALUE: 1
PIF_VALUE: 17
PIF_VALUE: 26
PIF_VALUE: 21
PIF_VALUE: 26
PIF_VALUE: 8

## 2019-07-11 ASSESSMENT — PAIN SCALES - GENERAL
PAINLEVEL_OUTOF10: 7
PAINLEVEL_OUTOF10: 7

## 2019-07-11 ASSESSMENT — PAIN DESCRIPTION - PAIN TYPE: TYPE: SURGICAL PAIN

## 2019-07-11 ASSESSMENT — ENCOUNTER SYMPTOMS: SHORTNESS OF BREATH: 1

## 2019-07-11 ASSESSMENT — PAIN - FUNCTIONAL ASSESSMENT: PAIN_FUNCTIONAL_ASSESSMENT: 0-10

## 2019-07-11 ASSESSMENT — PAIN DESCRIPTION - FREQUENCY: FREQUENCY: INTERMITTENT

## 2019-07-11 ASSESSMENT — PAIN DESCRIPTION - DESCRIPTORS: DESCRIPTORS: DISCOMFORT

## 2019-07-11 ASSESSMENT — PAIN DESCRIPTION - LOCATION: LOCATION: ABDOMEN

## 2019-07-11 NOTE — ANESTHESIA PRE PROCEDURE
[address]    Anesthesia Evaluation  Patient summary reviewed no history of anesthetic complications:   Airway: Mallampati: II  TM distance: >3 FB   Neck ROM: full  Mouth opening: > = 3 FB Dental:    (+) edentulous      Pulmonary:   (+) pneumonia:  COPD:  shortness of breath:                             Cardiovascular:    (+) hypertension:, angina:, CAD:, CHF:,                   Neuro/Psych:   (+) neuromuscular disease:, headaches:, psychiatric history (bipolar, dementia):            GI/Hepatic/Renal:   (+) liver disease:,      (-) GERD and no renal disease       Endo/Other: Negative Endo/Other ROS       (-) diabetes mellitus               Abdominal:           Vascular: negative vascular ROS. Anesthesia Plan      general     ASA 3       Induction: intravenous. MIPS: Postoperative opioids intended and Prophylactic antiemetics administered. Anesthetic plan and risks discussed with patient. Plan discussed with CRNA. All questions answered and agrees with plan.         Anabell Cornell MD   7/11/2019

## 2019-07-25 ENCOUNTER — OFFICE VISIT (OUTPATIENT)
Dept: SURGERY | Age: 69
End: 2019-07-25

## 2019-07-25 VITALS
SYSTOLIC BLOOD PRESSURE: 122 MMHG | WEIGHT: 171.2 LBS | HEIGHT: 60 IN | BODY MASS INDEX: 33.61 KG/M2 | DIASTOLIC BLOOD PRESSURE: 76 MMHG

## 2019-07-25 DIAGNOSIS — Z09 POSTOP CHECK: Primary | ICD-10-CM

## 2019-07-25 PROCEDURE — 99024 POSTOP FOLLOW-UP VISIT: CPT | Performed by: SURGERY

## 2019-07-25 NOTE — PROGRESS NOTES
[facility]      S:   Patient presents s/p robotic epigastric hernia repair with mesh. She reports doing well except for some mild pain in the left lower quadrant. O:   Comfortable         Incision sites healing well. A:   S/P robotic hernia repair    P:   Follow up as needed.

## 2019-10-17 ENCOUNTER — APPOINTMENT (OUTPATIENT)
Dept: CT IMAGING | Age: 69
DRG: 312 | End: 2019-10-17
Payer: MEDICARE

## 2019-10-17 ENCOUNTER — HOSPITAL ENCOUNTER (INPATIENT)
Age: 69
LOS: 2 days | Discharge: HOME | DRG: 312 | End: 2019-10-21
Attending: EMERGENCY MEDICINE | Admitting: INTERNAL MEDICINE
Payer: MEDICARE

## 2019-10-17 ENCOUNTER — APPOINTMENT (OUTPATIENT)
Dept: GENERAL RADIOLOGY | Age: 69
DRG: 312 | End: 2019-10-17
Payer: MEDICARE

## 2019-10-17 DIAGNOSIS — R51.9 ACUTE NONINTRACTABLE HEADACHE, UNSPECIFIED HEADACHE TYPE: ICD-10-CM

## 2019-10-17 DIAGNOSIS — K29.00 ACUTE GASTRITIS WITHOUT HEMORRHAGE, UNSPECIFIED GASTRITIS TYPE: ICD-10-CM

## 2019-10-17 DIAGNOSIS — R53.1 GENERALIZED WEAKNESS: ICD-10-CM

## 2019-10-17 DIAGNOSIS — R31.9 HEMATURIA, UNSPECIFIED TYPE: ICD-10-CM

## 2019-10-17 DIAGNOSIS — R29.6 FREQUENT FALLS: ICD-10-CM

## 2019-10-17 DIAGNOSIS — R55 SYNCOPE AND COLLAPSE: Primary | ICD-10-CM

## 2019-10-17 DIAGNOSIS — R06.02 SHORTNESS OF BREATH: ICD-10-CM

## 2019-10-17 PROBLEM — R40.20 LOSS OF CONSCIOUSNESS (HCC): Status: ACTIVE | Noted: 2019-10-17

## 2019-10-17 LAB
A/G RATIO: 1.2 (ref 1.1–2.2)
ACETAMINOPHEN LEVEL: 10 UG/ML (ref 10–30)
ALBUMIN SERPL-MCNC: 4 G/DL (ref 3.4–5)
ALP BLD-CCNC: 70 U/L (ref 40–129)
ALT SERPL-CCNC: 12 U/L (ref 10–40)
AMPHETAMINE SCREEN, URINE: ABNORMAL
ANION GAP SERPL CALCULATED.3IONS-SCNC: 12 MMOL/L (ref 3–16)
ANISOCYTOSIS: ABNORMAL
AST SERPL-CCNC: 33 U/L (ref 15–37)
BACTERIA: ABNORMAL /HPF
BANDED NEUTROPHILS RELATIVE PERCENT: 4 % (ref 0–7)
BARBITURATE SCREEN URINE: ABNORMAL
BASOPHILS ABSOLUTE: 0.1 K/UL (ref 0–0.2)
BASOPHILS RELATIVE PERCENT: 1 %
BENZODIAZEPINE SCREEN, URINE: ABNORMAL
BILIRUB SERPL-MCNC: <0.2 MG/DL (ref 0–1)
BILIRUBIN URINE: NEGATIVE
BLOOD, URINE: ABNORMAL
BUN BLDV-MCNC: 24 MG/DL (ref 7–20)
CALCIUM SERPL-MCNC: 9.5 MG/DL (ref 8.3–10.6)
CANNABINOID SCREEN URINE: ABNORMAL
CHLORIDE BLD-SCNC: 92 MMOL/L (ref 99–110)
CLARITY: CLEAR
CO2: 26 MMOL/L (ref 21–32)
COCAINE METABOLITE SCREEN URINE: ABNORMAL
COLOR: YELLOW
CREAT SERPL-MCNC: 0.9 MG/DL (ref 0.6–1.2)
EOSINOPHILS ABSOLUTE: 0.1 K/UL (ref 0–0.6)
EOSINOPHILS RELATIVE PERCENT: 2 %
EPITHELIAL CELLS, UA: ABNORMAL /HPF
ETHANOL: NORMAL MG/DL (ref 0–0.08)
GFR AFRICAN AMERICAN: >60
GFR NON-AFRICAN AMERICAN: >60
GLOBULIN: 3.3 G/DL
GLUCOSE BLD-MCNC: 107 MG/DL (ref 70–99)
GLUCOSE URINE: NEGATIVE MG/DL
HCT VFR BLD CALC: 34.4 % (ref 36–48)
HEMOGLOBIN: 11.8 G/DL (ref 12–16)
KETONES, URINE: NEGATIVE MG/DL
LACTIC ACID: 0.7 MMOL/L (ref 0.4–2)
LEUKOCYTE ESTERASE, URINE: ABNORMAL
LIPASE: 18 U/L (ref 13–60)
LYMPHOCYTES ABSOLUTE: 2 K/UL (ref 1–5.1)
LYMPHOCYTES RELATIVE PERCENT: 29 %
Lab: ABNORMAL
MCH RBC QN AUTO: 30.8 PG (ref 26–34)
MCHC RBC AUTO-ENTMCNC: 34.2 G/DL (ref 31–36)
MCV RBC AUTO: 90.1 FL (ref 80–100)
METHADONE SCREEN, URINE: ABNORMAL
MICROSCOPIC EXAMINATION: YES
MONOCYTES ABSOLUTE: 0.6 K/UL (ref 0–1.3)
MONOCYTES RELATIVE PERCENT: 9 %
NEUTROPHILS ABSOLUTE: 4.1 K/UL (ref 1.7–7.7)
NEUTROPHILS RELATIVE PERCENT: 55 %
NITRITE, URINE: NEGATIVE
OPIATE SCREEN URINE: ABNORMAL
OXYCODONE URINE: POSITIVE
PDW BLD-RTO: 15.2 % (ref 12.4–15.4)
PH UA: 6
PH UA: 6 (ref 5–8)
PHENCYCLIDINE SCREEN URINE: ABNORMAL
PLATELET # BLD: 154 K/UL (ref 135–450)
PLATELET SLIDE REVIEW: ADEQUATE
PMV BLD AUTO: 7 FL (ref 5–10.5)
POIKILOCYTES: ABNORMAL
POLYCHROMASIA: ABNORMAL
POTASSIUM SERPL-SCNC: 4.9 MMOL/L (ref 3.5–5.1)
PRO-BNP: 248 PG/ML (ref 0–124)
PROPOXYPHENE SCREEN: ABNORMAL
PROTEIN UA: NEGATIVE MG/DL
RBC # BLD: 3.81 M/UL (ref 4–5.2)
RBC UA: ABNORMAL /HPF (ref 0–2)
SALICYLATE, SERUM: 1.3 MG/DL (ref 15–30)
SLIDE REVIEW: ABNORMAL
SODIUM BLD-SCNC: 130 MMOL/L (ref 136–145)
SPECIFIC GRAVITY UA: <=1.005 (ref 1–1.03)
TOTAL PROTEIN: 7.3 G/DL (ref 6.4–8.2)
TROPONIN: <0.01 NG/ML
URINE TYPE: ABNORMAL
UROBILINOGEN, URINE: 0.2 E.U./DL
VALPROIC ACID LEVEL: 53.1 UG/ML (ref 50–100)
WBC # BLD: 7 K/UL (ref 4–11)
WBC UA: ABNORMAL /HPF (ref 0–5)

## 2019-10-17 PROCEDURE — 71046 X-RAY EXAM CHEST 2 VIEWS: CPT

## 2019-10-17 PROCEDURE — 6370000000 HC RX 637 (ALT 250 FOR IP): Performed by: NURSE PRACTITIONER

## 2019-10-17 PROCEDURE — 6370000000 HC RX 637 (ALT 250 FOR IP): Performed by: EMERGENCY MEDICINE

## 2019-10-17 PROCEDURE — G0480 DRUG TEST DEF 1-7 CLASSES: HCPCS

## 2019-10-17 PROCEDURE — 80053 COMPREHEN METABOLIC PANEL: CPT

## 2019-10-17 PROCEDURE — 2580000003 HC RX 258: Performed by: NURSE PRACTITIONER

## 2019-10-17 PROCEDURE — 84484 ASSAY OF TROPONIN QUANT: CPT

## 2019-10-17 PROCEDURE — 80307 DRUG TEST PRSMV CHEM ANLYZR: CPT

## 2019-10-17 PROCEDURE — 81001 URINALYSIS AUTO W/SCOPE: CPT

## 2019-10-17 PROCEDURE — 93005 ELECTROCARDIOGRAM TRACING: CPT | Performed by: EMERGENCY MEDICINE

## 2019-10-17 PROCEDURE — 83690 ASSAY OF LIPASE: CPT

## 2019-10-17 PROCEDURE — 71260 CT THORAX DX C+: CPT

## 2019-10-17 PROCEDURE — 83880 ASSAY OF NATRIURETIC PEPTIDE: CPT

## 2019-10-17 PROCEDURE — 94640 AIRWAY INHALATION TREATMENT: CPT

## 2019-10-17 PROCEDURE — 99285 EMERGENCY DEPT VISIT HI MDM: CPT

## 2019-10-17 PROCEDURE — 6360000002 HC RX W HCPCS: Performed by: EMERGENCY MEDICINE

## 2019-10-17 PROCEDURE — 72125 CT NECK SPINE W/O DYE: CPT

## 2019-10-17 PROCEDURE — 80164 ASSAY DIPROPYLACETIC ACD TOT: CPT

## 2019-10-17 PROCEDURE — 70450 CT HEAD/BRAIN W/O DYE: CPT

## 2019-10-17 PROCEDURE — 36415 COLL VENOUS BLD VENIPUNCTURE: CPT

## 2019-10-17 PROCEDURE — 83605 ASSAY OF LACTIC ACID: CPT

## 2019-10-17 PROCEDURE — G0378 HOSPITAL OBSERVATION PER HR: HCPCS

## 2019-10-17 PROCEDURE — 74177 CT ABD & PELVIS W/CONTRAST: CPT

## 2019-10-17 PROCEDURE — 6360000004 HC RX CONTRAST MEDICATION: Performed by: EMERGENCY MEDICINE

## 2019-10-17 PROCEDURE — 85025 COMPLETE CBC W/AUTO DIFF WBC: CPT

## 2019-10-17 PROCEDURE — 96374 THER/PROPH/DIAG INJ IV PUSH: CPT

## 2019-10-17 RX ORDER — SODIUM CHLORIDE 0.9 % (FLUSH) 0.9 %
10 SYRINGE (ML) INJECTION EVERY 12 HOURS SCHEDULED
Status: DISCONTINUED | OUTPATIENT
Start: 2019-10-17 | End: 2019-10-21 | Stop reason: HOSPADM

## 2019-10-17 RX ORDER — DIVALPROEX SODIUM 250 MG/1
500 TABLET, DELAYED RELEASE ORAL NIGHTLY
Status: DISCONTINUED | OUTPATIENT
Start: 2019-10-17 | End: 2019-10-21 | Stop reason: HOSPADM

## 2019-10-17 RX ORDER — DEXAMETHASONE SODIUM PHOSPHATE 4 MG/ML
6 INJECTION, SOLUTION INTRA-ARTICULAR; INTRALESIONAL; INTRAMUSCULAR; INTRAVENOUS; SOFT TISSUE ONCE
Status: COMPLETED | OUTPATIENT
Start: 2019-10-17 | End: 2019-10-17

## 2019-10-17 RX ORDER — SODIUM CHLORIDE 0.9 % (FLUSH) 0.9 %
10 SYRINGE (ML) INJECTION PRN
Status: DISCONTINUED | OUTPATIENT
Start: 2019-10-17 | End: 2019-10-21 | Stop reason: HOSPADM

## 2019-10-17 RX ORDER — ONDANSETRON 2 MG/ML
4 INJECTION INTRAMUSCULAR; INTRAVENOUS EVERY 6 HOURS PRN
Status: DISCONTINUED | OUTPATIENT
Start: 2019-10-17 | End: 2019-10-21 | Stop reason: HOSPADM

## 2019-10-17 RX ORDER — SODIUM CHLORIDE 9 MG/ML
INJECTION, SOLUTION INTRAVENOUS ONCE
Status: COMPLETED | OUTPATIENT
Start: 2019-10-17 | End: 2019-10-18

## 2019-10-17 RX ORDER — DIVALPROEX SODIUM 250 MG/1
250 TABLET, DELAYED RELEASE ORAL EVERY MORNING
Status: DISCONTINUED | OUTPATIENT
Start: 2019-10-18 | End: 2019-10-21 | Stop reason: HOSPADM

## 2019-10-17 RX ORDER — QUETIAPINE FUMARATE 100 MG/1
300 TABLET, FILM COATED ORAL NIGHTLY
Status: DISCONTINUED | OUTPATIENT
Start: 2019-10-17 | End: 2019-10-21 | Stop reason: HOSPADM

## 2019-10-17 RX ORDER — OXYCODONE HYDROCHLORIDE AND ACETAMINOPHEN 5; 325 MG/1; MG/1
1 TABLET ORAL ONCE
Status: COMPLETED | OUTPATIENT
Start: 2019-10-17 | End: 2019-10-17

## 2019-10-17 RX ORDER — IPRATROPIUM BROMIDE AND ALBUTEROL SULFATE 2.5; .5 MG/3ML; MG/3ML
3 SOLUTION RESPIRATORY (INHALATION) ONCE
Status: COMPLETED | OUTPATIENT
Start: 2019-10-17 | End: 2019-10-17

## 2019-10-17 RX ORDER — SODIUM CHLORIDE 9 MG/ML
INJECTION, SOLUTION INTRAVENOUS CONTINUOUS
Status: DISCONTINUED | OUTPATIENT
Start: 2019-10-17 | End: 2019-10-17

## 2019-10-17 RX ORDER — LOSARTAN POTASSIUM 100 MG/1
100 TABLET ORAL DAILY
Status: DISCONTINUED | OUTPATIENT
Start: 2019-10-18 | End: 2019-10-21 | Stop reason: HOSPADM

## 2019-10-17 RX ORDER — GABAPENTIN 300 MG/1
600 CAPSULE ORAL 3 TIMES DAILY
Status: DISCONTINUED | OUTPATIENT
Start: 2019-10-17 | End: 2019-10-21 | Stop reason: HOSPADM

## 2019-10-17 RX ORDER — DULOXETIN HYDROCHLORIDE 60 MG/1
60 CAPSULE, DELAYED RELEASE ORAL DAILY
Status: DISCONTINUED | OUTPATIENT
Start: 2019-10-18 | End: 2019-10-21 | Stop reason: HOSPADM

## 2019-10-17 RX ADMIN — IOPAMIDOL 75 ML: 755 INJECTION, SOLUTION INTRAVENOUS at 19:10

## 2019-10-17 RX ADMIN — IPRATROPIUM BROMIDE AND ALBUTEROL SULFATE 3 AMPULE: .5; 3 SOLUTION RESPIRATORY (INHALATION) at 18:06

## 2019-10-17 RX ADMIN — GABAPENTIN 600 MG: 300 CAPSULE ORAL at 23:25

## 2019-10-17 RX ADMIN — DIVALPROEX SODIUM 500 MG: 250 TABLET, DELAYED RELEASE ORAL at 23:25

## 2019-10-17 RX ADMIN — DEXAMETHASONE SODIUM PHOSPHATE 6 MG: 4 INJECTION, SOLUTION INTRAMUSCULAR; INTRAVENOUS at 18:24

## 2019-10-17 RX ADMIN — SODIUM CHLORIDE: 9 INJECTION, SOLUTION INTRAVENOUS at 23:26

## 2019-10-17 RX ADMIN — Medication 10 ML: at 23:37

## 2019-10-17 RX ADMIN — OXYCODONE HYDROCHLORIDE AND ACETAMINOPHEN 1 TABLET: 5; 325 TABLET ORAL at 18:40

## 2019-10-17 RX ADMIN — QUETIAPINE FUMARATE 300 MG: 100 TABLET ORAL at 23:25

## 2019-10-17 ASSESSMENT — ENCOUNTER SYMPTOMS
BACK PAIN: 1
CHEST TIGHTNESS: 1
STRIDOR: 0
COUGH: 1
VOMITING: 0
DIARRHEA: 0
COLOR CHANGE: 0
NAUSEA: 1
WHEEZING: 1
SHORTNESS OF BREATH: 1
ABDOMINAL PAIN: 1

## 2019-10-17 ASSESSMENT — PAIN SCALES - GENERAL
PAINLEVEL_OUTOF10: 9
PAINLEVEL_OUTOF10: 0
PAINLEVEL_OUTOF10: 3

## 2019-10-18 PROBLEM — E44.0 MODERATE MALNUTRITION (HCC): Chronic | Status: ACTIVE | Noted: 2019-10-18

## 2019-10-18 PROBLEM — E66.9 OBESITY: Status: ACTIVE | Noted: 2019-10-18

## 2019-10-18 LAB
ANION GAP SERPL CALCULATED.3IONS-SCNC: 14 MMOL/L (ref 3–16)
BASOPHILS ABSOLUTE: 0 K/UL (ref 0–0.2)
BASOPHILS RELATIVE PERCENT: 0.3 %
BUN BLDV-MCNC: 27 MG/DL (ref 7–20)
CALCIUM SERPL-MCNC: 9.5 MG/DL (ref 8.3–10.6)
CHLORIDE BLD-SCNC: 94 MMOL/L (ref 99–110)
CO2: 25 MMOL/L (ref 21–32)
CREAT SERPL-MCNC: 0.6 MG/DL (ref 0.6–1.2)
EKG ATRIAL RATE: 87 BPM
EKG DIAGNOSIS: NORMAL
EKG P AXIS: -19 DEGREES
EKG P-R INTERVAL: 146 MS
EKG Q-T INTERVAL: 368 MS
EKG QRS DURATION: 96 MS
EKG QTC CALCULATION (BAZETT): 442 MS
EKG R AXIS: -12 DEGREES
EKG T AXIS: 9 DEGREES
EKG VENTRICULAR RATE: 87 BPM
EOSINOPHILS ABSOLUTE: 0 K/UL (ref 0–0.6)
EOSINOPHILS RELATIVE PERCENT: 0.2 %
GFR AFRICAN AMERICAN: >60
GFR NON-AFRICAN AMERICAN: >60
GLUCOSE BLD-MCNC: 110 MG/DL (ref 70–99)
HCT VFR BLD CALC: 35.8 % (ref 36–48)
HEMOGLOBIN: 11.6 G/DL (ref 12–16)
LV EF: 58 %
LVEF MODALITY: NORMAL
LYMPHOCYTES ABSOLUTE: 0.9 K/UL (ref 1–5.1)
LYMPHOCYTES RELATIVE PERCENT: 12.2 %
MCH RBC QN AUTO: 30 PG (ref 26–34)
MCHC RBC AUTO-ENTMCNC: 32.5 G/DL (ref 31–36)
MCV RBC AUTO: 92.3 FL (ref 80–100)
MONOCYTES ABSOLUTE: 0.5 K/UL (ref 0–1.3)
MONOCYTES RELATIVE PERCENT: 6.5 %
NEUTROPHILS ABSOLUTE: 6.2 K/UL (ref 1.7–7.7)
NEUTROPHILS RELATIVE PERCENT: 80.8 %
PDW BLD-RTO: 15.2 % (ref 12.4–15.4)
PLATELET # BLD: 152 K/UL (ref 135–450)
PMV BLD AUTO: 6.8 FL (ref 5–10.5)
POTASSIUM REFLEX MAGNESIUM: 4.6 MMOL/L (ref 3.5–5.1)
RBC # BLD: 3.88 M/UL (ref 4–5.2)
SODIUM BLD-SCNC: 133 MMOL/L (ref 136–145)
WBC # BLD: 7.6 K/UL (ref 4–11)

## 2019-10-18 PROCEDURE — 96372 THER/PROPH/DIAG INJ SC/IM: CPT

## 2019-10-18 PROCEDURE — G0378 HOSPITAL OBSERVATION PER HR: HCPCS

## 2019-10-18 PROCEDURE — 6360000002 HC RX W HCPCS: Performed by: NURSE PRACTITIONER

## 2019-10-18 PROCEDURE — 85025 COMPLETE CBC W/AUTO DIFF WBC: CPT

## 2019-10-18 PROCEDURE — 6370000000 HC RX 637 (ALT 250 FOR IP): Performed by: NURSE PRACTITIONER

## 2019-10-18 PROCEDURE — 2580000003 HC RX 258: Performed by: NURSE PRACTITIONER

## 2019-10-18 PROCEDURE — 80048 BASIC METABOLIC PNL TOTAL CA: CPT

## 2019-10-18 PROCEDURE — 93010 ELECTROCARDIOGRAM REPORT: CPT | Performed by: INTERNAL MEDICINE

## 2019-10-18 PROCEDURE — 93306 TTE W/DOPPLER COMPLETE: CPT

## 2019-10-18 PROCEDURE — 36415 COLL VENOUS BLD VENIPUNCTURE: CPT

## 2019-10-18 RX ADMIN — GABAPENTIN 600 MG: 300 CAPSULE ORAL at 09:30

## 2019-10-18 RX ADMIN — DULOXETINE HYDROCHLORIDE 60 MG: 60 CAPSULE, DELAYED RELEASE ORAL at 09:30

## 2019-10-18 RX ADMIN — GABAPENTIN 600 MG: 300 CAPSULE ORAL at 15:49

## 2019-10-18 RX ADMIN — Medication 10 ML: at 09:30

## 2019-10-18 RX ADMIN — DIVALPROEX SODIUM 500 MG: 250 TABLET, DELAYED RELEASE ORAL at 21:31

## 2019-10-18 RX ADMIN — GABAPENTIN 600 MG: 300 CAPSULE ORAL at 21:31

## 2019-10-18 RX ADMIN — LOSARTAN POTASSIUM 100 MG: 100 TABLET, FILM COATED ORAL at 09:30

## 2019-10-18 RX ADMIN — DIVALPROEX SODIUM 250 MG: 250 TABLET, DELAYED RELEASE ORAL at 09:30

## 2019-10-18 RX ADMIN — ENOXAPARIN SODIUM 40 MG: 40 INJECTION SUBCUTANEOUS at 09:30

## 2019-10-18 RX ADMIN — QUETIAPINE FUMARATE 300 MG: 100 TABLET ORAL at 21:30

## 2019-10-18 ASSESSMENT — PAIN SCALES - GENERAL
PAINLEVEL_OUTOF10: 0

## 2019-10-19 LAB
ALBUMIN SERPL-MCNC: 3.8 G/DL (ref 3.4–5)
ANION GAP SERPL CALCULATED.3IONS-SCNC: 11 MMOL/L (ref 3–16)
BUN BLDV-MCNC: 19 MG/DL (ref 7–20)
CALCIUM SERPL-MCNC: 9.5 MG/DL (ref 8.3–10.6)
CHLORIDE BLD-SCNC: 97 MMOL/L (ref 99–110)
CO2: 27 MMOL/L (ref 21–32)
CREAT SERPL-MCNC: 0.6 MG/DL (ref 0.6–1.2)
GFR AFRICAN AMERICAN: >60
GFR NON-AFRICAN AMERICAN: >60
GLUCOSE BLD-MCNC: 102 MG/DL (ref 70–99)
HCT VFR BLD CALC: 32.6 % (ref 36–48)
HEMOGLOBIN: 11 G/DL (ref 12–16)
MCH RBC QN AUTO: 30.8 PG (ref 26–34)
MCHC RBC AUTO-ENTMCNC: 33.8 G/DL (ref 31–36)
MCV RBC AUTO: 91.1 FL (ref 80–100)
PDW BLD-RTO: 15.1 % (ref 12.4–15.4)
PHOSPHORUS: 2.2 MG/DL (ref 2.5–4.9)
PLATELET # BLD: 130 K/UL (ref 135–450)
PMV BLD AUTO: 7 FL (ref 5–10.5)
POTASSIUM SERPL-SCNC: 4.1 MMOL/L (ref 3.5–5.1)
RBC # BLD: 3.58 M/UL (ref 4–5.2)
SODIUM BLD-SCNC: 135 MMOL/L (ref 136–145)
WBC # BLD: 6 K/UL (ref 4–11)

## 2019-10-19 PROCEDURE — 87070 CULTURE OTHR SPECIMN AEROBIC: CPT

## 2019-10-19 PROCEDURE — 87186 SC STD MICRODIL/AGAR DIL: CPT

## 2019-10-19 PROCEDURE — 6370000000 HC RX 637 (ALT 250 FOR IP): Performed by: NURSE PRACTITIONER

## 2019-10-19 PROCEDURE — 80069 RENAL FUNCTION PANEL: CPT

## 2019-10-19 PROCEDURE — 87205 SMEAR GRAM STAIN: CPT

## 2019-10-19 PROCEDURE — 87077 CULTURE AEROBIC IDENTIFY: CPT

## 2019-10-19 PROCEDURE — 96372 THER/PROPH/DIAG INJ SC/IM: CPT

## 2019-10-19 PROCEDURE — 1200000000 HC SEMI PRIVATE

## 2019-10-19 PROCEDURE — 99222 1ST HOSP IP/OBS MODERATE 55: CPT | Performed by: INTERNAL MEDICINE

## 2019-10-19 PROCEDURE — 6360000002 HC RX W HCPCS: Performed by: NURSE PRACTITIONER

## 2019-10-19 PROCEDURE — 85027 COMPLETE CBC AUTOMATED: CPT

## 2019-10-19 PROCEDURE — 36415 COLL VENOUS BLD VENIPUNCTURE: CPT

## 2019-10-19 RX ADMIN — QUETIAPINE FUMARATE 300 MG: 100 TABLET ORAL at 21:15

## 2019-10-19 RX ADMIN — GABAPENTIN 600 MG: 300 CAPSULE ORAL at 09:52

## 2019-10-19 RX ADMIN — DIVALPROEX SODIUM 250 MG: 250 TABLET, DELAYED RELEASE ORAL at 09:53

## 2019-10-19 RX ADMIN — DULOXETINE HYDROCHLORIDE 60 MG: 60 CAPSULE, DELAYED RELEASE ORAL at 09:53

## 2019-10-19 RX ADMIN — GABAPENTIN 600 MG: 300 CAPSULE ORAL at 21:15

## 2019-10-19 RX ADMIN — ENOXAPARIN SODIUM 40 MG: 40 INJECTION SUBCUTANEOUS at 09:53

## 2019-10-19 RX ADMIN — DIVALPROEX SODIUM 500 MG: 250 TABLET, DELAYED RELEASE ORAL at 21:15

## 2019-10-19 RX ADMIN — GABAPENTIN 600 MG: 300 CAPSULE ORAL at 16:40

## 2019-10-19 RX ADMIN — LOSARTAN POTASSIUM 100 MG: 100 TABLET, FILM COATED ORAL at 09:53

## 2019-10-20 PROCEDURE — 6360000002 HC RX W HCPCS: Performed by: NURSE PRACTITIONER

## 2019-10-20 PROCEDURE — 99232 SBSQ HOSP IP/OBS MODERATE 35: CPT | Performed by: INTERNAL MEDICINE

## 2019-10-20 PROCEDURE — 1200000000 HC SEMI PRIVATE

## 2019-10-20 PROCEDURE — 2580000003 HC RX 258: Performed by: NURSE PRACTITIONER

## 2019-10-20 PROCEDURE — 6370000000 HC RX 637 (ALT 250 FOR IP): Performed by: NURSE PRACTITIONER

## 2019-10-20 RX ADMIN — DULOXETINE HYDROCHLORIDE 60 MG: 60 CAPSULE, DELAYED RELEASE ORAL at 09:02

## 2019-10-20 RX ADMIN — GABAPENTIN 600 MG: 300 CAPSULE ORAL at 22:38

## 2019-10-20 RX ADMIN — QUETIAPINE FUMARATE 300 MG: 100 TABLET ORAL at 22:38

## 2019-10-20 RX ADMIN — ENOXAPARIN SODIUM 40 MG: 40 INJECTION SUBCUTANEOUS at 09:02

## 2019-10-20 RX ADMIN — Medication 10 ML: at 22:39

## 2019-10-20 RX ADMIN — GABAPENTIN 600 MG: 300 CAPSULE ORAL at 15:06

## 2019-10-20 RX ADMIN — GABAPENTIN 600 MG: 300 CAPSULE ORAL at 09:02

## 2019-10-20 RX ADMIN — LOSARTAN POTASSIUM 100 MG: 100 TABLET, FILM COATED ORAL at 09:02

## 2019-10-20 RX ADMIN — DIVALPROEX SODIUM 500 MG: 250 TABLET, DELAYED RELEASE ORAL at 22:38

## 2019-10-20 RX ADMIN — DIVALPROEX SODIUM 250 MG: 250 TABLET, DELAYED RELEASE ORAL at 09:02

## 2019-10-20 ASSESSMENT — PAIN DESCRIPTION - PROGRESSION
CLINICAL_PROGRESSION: RESOLVED

## 2019-10-20 ASSESSMENT — PAIN SCALES - GENERAL
PAINLEVEL_OUTOF10: 0
PAINLEVEL_OUTOF10: 8
PAINLEVEL_OUTOF10: 0
PAINLEVEL_OUTOF10: 6
PAINLEVEL_OUTOF10: 0
PAINLEVEL_OUTOF10: 7
PAINLEVEL_OUTOF10: 0
PAINLEVEL_OUTOF10: 0

## 2019-10-20 ASSESSMENT — PAIN DESCRIPTION - LOCATION
LOCATION: HEAD
LOCATION: HEAD
LOCATION: CHEST
LOCATION: HEAD

## 2019-10-20 ASSESSMENT — PAIN DESCRIPTION - DESCRIPTORS: DESCRIPTORS: PRESSURE

## 2019-10-20 ASSESSMENT — PAIN DESCRIPTION - PAIN TYPE
TYPE: ACUTE PAIN

## 2019-10-21 VITALS
SYSTOLIC BLOOD PRESSURE: 167 MMHG | DIASTOLIC BLOOD PRESSURE: 83 MMHG | BODY MASS INDEX: 30.66 KG/M2 | HEART RATE: 89 BPM | RESPIRATION RATE: 19 BRPM | HEIGHT: 61 IN | OXYGEN SATURATION: 95 % | TEMPERATURE: 97.7 F | WEIGHT: 162.4 LBS

## 2019-10-21 PROBLEM — J13 STREPTOCOCCUS PNEUMONIAE PNEUMONIA (HCC): Status: ACTIVE | Noted: 2019-10-21

## 2019-10-21 LAB
BASOPHILS ABSOLUTE: 0 K/UL (ref 0–0.2)
BASOPHILS RELATIVE PERCENT: 0.6 %
EOSINOPHILS ABSOLUTE: 0.3 K/UL (ref 0–0.6)
EOSINOPHILS RELATIVE PERCENT: 5.3 %
HCT VFR BLD CALC: 33.4 % (ref 36–48)
HEMOGLOBIN: 11.5 G/DL (ref 12–16)
LYMPHOCYTES ABSOLUTE: 1.3 K/UL (ref 1–5.1)
LYMPHOCYTES RELATIVE PERCENT: 21.4 %
MCH RBC QN AUTO: 30.6 PG (ref 26–34)
MCHC RBC AUTO-ENTMCNC: 34.3 G/DL (ref 31–36)
MCV RBC AUTO: 89.2 FL (ref 80–100)
MONOCYTES ABSOLUTE: 0.6 K/UL (ref 0–1.3)
MONOCYTES RELATIVE PERCENT: 10.2 %
NEUTROPHILS ABSOLUTE: 3.8 K/UL (ref 1.7–7.7)
NEUTROPHILS RELATIVE PERCENT: 62.5 %
PDW BLD-RTO: 14.9 % (ref 12.4–15.4)
PLATELET # BLD: 133 K/UL (ref 135–450)
PMV BLD AUTO: 6.8 FL (ref 5–10.5)
PROCALCITONIN: 0.04 NG/ML (ref 0–0.15)
RBC # BLD: 3.75 M/UL (ref 4–5.2)
WBC # BLD: 6.1 K/UL (ref 4–11)

## 2019-10-21 PROCEDURE — 93308 TTE F-UP OR LMTD: CPT

## 2019-10-21 PROCEDURE — 99233 SBSQ HOSP IP/OBS HIGH 50: CPT | Performed by: INTERNAL MEDICINE

## 2019-10-21 PROCEDURE — 6370000000 HC RX 637 (ALT 250 FOR IP): Performed by: NURSE PRACTITIONER

## 2019-10-21 PROCEDURE — 84145 PROCALCITONIN (PCT): CPT

## 2019-10-21 PROCEDURE — 99221 1ST HOSP IP/OBS SF/LOW 40: CPT | Performed by: INTERNAL MEDICINE

## 2019-10-21 PROCEDURE — 85025 COMPLETE CBC W/AUTO DIFF WBC: CPT

## 2019-10-21 PROCEDURE — 6360000002 HC RX W HCPCS: Performed by: NURSE PRACTITIONER

## 2019-10-21 PROCEDURE — 2580000003 HC RX 258: Performed by: NURSE PRACTITIONER

## 2019-10-21 PROCEDURE — 36415 COLL VENOUS BLD VENIPUNCTURE: CPT

## 2019-10-21 PROCEDURE — 0296T PR EXT ECG > 48HR TO 21 DAY RCRD W/CONECT INTL RCRD: CPT | Performed by: INTERNAL MEDICINE

## 2019-10-21 RX ORDER — LEVOFLOXACIN 500 MG/1
500 TABLET, FILM COATED ORAL DAILY
Status: DISCONTINUED | OUTPATIENT
Start: 2019-10-21 | End: 2019-10-21 | Stop reason: HOSPADM

## 2019-10-21 RX ORDER — ACETAMINOPHEN 325 MG/1
650 TABLET ORAL EVERY 4 HOURS PRN
Status: DISCONTINUED | OUTPATIENT
Start: 2019-10-21 | End: 2019-10-21 | Stop reason: HOSPADM

## 2019-10-21 RX ORDER — LEVOFLOXACIN 500 MG/1
500 TABLET, FILM COATED ORAL DAILY
Qty: 6 TABLET | Refills: 0 | Status: SHIPPED | OUTPATIENT
Start: 2019-10-22 | End: 2019-10-28

## 2019-10-21 RX ADMIN — ACETAMINOPHEN 650 MG: 325 TABLET ORAL at 11:30

## 2019-10-21 RX ADMIN — ENOXAPARIN SODIUM 40 MG: 40 INJECTION SUBCUTANEOUS at 08:31

## 2019-10-21 RX ADMIN — Medication 10 ML: at 08:32

## 2019-10-21 RX ADMIN — LEVOFLOXACIN 500 MG: 500 TABLET, FILM COATED ORAL at 11:30

## 2019-10-21 RX ADMIN — GABAPENTIN 600 MG: 300 CAPSULE ORAL at 14:00

## 2019-10-21 RX ADMIN — GABAPENTIN 600 MG: 300 CAPSULE ORAL at 08:31

## 2019-10-21 RX ADMIN — LOSARTAN POTASSIUM 100 MG: 100 TABLET, FILM COATED ORAL at 08:31

## 2019-10-21 RX ADMIN — DULOXETINE HYDROCHLORIDE 60 MG: 60 CAPSULE, DELAYED RELEASE ORAL at 08:31

## 2019-10-21 RX ADMIN — DIVALPROEX SODIUM 250 MG: 250 TABLET, DELAYED RELEASE ORAL at 08:31

## 2019-10-21 ASSESSMENT — PAIN DESCRIPTION - PROGRESSION
CLINICAL_PROGRESSION: RESOLVED

## 2019-10-21 ASSESSMENT — PAIN SCALES - GENERAL
PAINLEVEL_OUTOF10: 0
PAINLEVEL_OUTOF10: 8

## 2019-10-23 ENCOUNTER — TELEPHONE (OUTPATIENT)
Dept: CARDIOLOGY CLINIC | Age: 69
End: 2019-10-23

## 2019-10-23 DIAGNOSIS — R07.9 CHEST PAIN, UNSPECIFIED TYPE: Primary | ICD-10-CM

## 2019-10-23 LAB
CULTURE, RESPIRATORY: ABNORMAL
CULTURE, RESPIRATORY: ABNORMAL
GRAM STAIN RESULT: ABNORMAL
ORGANISM: ABNORMAL

## 2019-11-11 PROCEDURE — 0298T PR EXT ECG > 48HR TO 21 DAY REVIEW AND INTERPRETATN: CPT | Performed by: INTERNAL MEDICINE

## 2019-11-12 DIAGNOSIS — R55 SYNCOPE AND COLLAPSE: ICD-10-CM

## 2019-11-14 ENCOUNTER — HOSPITAL ENCOUNTER (OUTPATIENT)
Dept: ULTRASOUND IMAGING | Age: 69
Discharge: HOME | End: 2019-11-14
Payer: MEDICARE

## 2019-11-14 ENCOUNTER — TELEPHONE (OUTPATIENT)
Dept: CARDIOLOGY CLINIC | Age: 69
End: 2019-11-14

## 2019-11-14 ENCOUNTER — HOSPITAL ENCOUNTER (OUTPATIENT)
Dept: CT IMAGING | Age: 69
Discharge: HOME | End: 2019-11-14
Payer: MEDICARE

## 2019-11-14 DIAGNOSIS — Z13.6 SCREENING FOR AAA (ABDOMINAL AORTIC ANEURYSM): ICD-10-CM

## 2019-11-14 DIAGNOSIS — F17.210 NICOTINE DEPENDENCE, CIGARETTES, UNCOMPLICATED: ICD-10-CM

## 2019-11-14 PROCEDURE — 76706 US ABDL AORTA SCREEN AAA: CPT

## 2019-11-14 PROCEDURE — G0297 LDCT FOR LUNG CA SCREEN: HCPCS

## 2020-01-11 ENCOUNTER — APPOINTMENT (OUTPATIENT)
Dept: GENERAL RADIOLOGY | Age: 70
End: 2020-01-11
Payer: COMMERCIAL

## 2020-01-11 ENCOUNTER — HOSPITAL ENCOUNTER (EMERGENCY)
Age: 70
Discharge: HOME | End: 2020-01-11
Payer: COMMERCIAL

## 2020-01-11 VITALS
BODY MASS INDEX: 29.07 KG/M2 | RESPIRATION RATE: 18 BRPM | DIASTOLIC BLOOD PRESSURE: 90 MMHG | SYSTOLIC BLOOD PRESSURE: 179 MMHG | TEMPERATURE: 97.7 F | HEIGHT: 61 IN | OXYGEN SATURATION: 98 % | WEIGHT: 154 LBS | HEART RATE: 81 BPM

## 2020-01-11 PROCEDURE — 73562 X-RAY EXAM OF KNEE 3: CPT

## 2020-01-11 PROCEDURE — 99283 EMERGENCY DEPT VISIT LOW MDM: CPT

## 2020-01-11 ASSESSMENT — PAIN DESCRIPTION - ORIENTATION: ORIENTATION: RIGHT

## 2020-01-11 ASSESSMENT — PAIN SCALES - GENERAL: PAINLEVEL_OUTOF10: 9

## 2020-01-11 ASSESSMENT — PAIN DESCRIPTION - PAIN TYPE: TYPE: ACUTE PAIN

## 2020-01-12 NOTE — ED PROVIDER NOTES
[address] ED  EMERGENCY DEPARTMENT ENCOUNTER        Pt Name: Ghulam Reaves  MRN: [id number]  [place] 1950  Date of evaluation: 1/11/2020  Provider: CEE Hector  PCP: Kathleen Choe MD    This patient was not seen and evaluated by the attending physician No att. providers found. CHIEF COMPLAINT       Chief Complaint   Patient presents with    Knee Pain     patient states that she is having right knee pain from a fall that happen back in [place]. patinet states that she was seen at [facility] for the fall. HISTORY OF PRESENT ILLNESS   (Location/Symptom, Timing/Onset, Context/Setting, Quality, Duration, Modifying Factors, Severity)  Note limiting factors. Ghulam Reaves is a 77 y.o. female who presents via private vehicle with her  for evaluation of right knee pain. Onset was several months ago. Duration has been since the onset. Context includes she notes she fell, tripped, several months ago, landing on the front of her knee. She notes since then she has had pain to the front and medial aspect of the knee. She denies feeling of instability or like the knee is going to give out on her. She also notes that sometimes the pain radiates to the back of the knee. She has not taken any medicine for symptoms. She denies feeling any swelling or redness or warmth. She denies past medical history of knee issues. Weightbearing seems to make it worse. Resting seems to make it better,. Nursing Notes were all reviewed and agreed with or any disagreements were addressed  in the HPI. REVIEW OF SYSTEMS    (2-9 systems for level 4, 10 or more for level 5)     Review of Systems    Positives and Pertinent negatives as per HPI. Except as noted abovein the ROS, all other systems were reviewed and negative.        PAST MEDICAL HISTORY     Past Medical History:   Diagnosis Date    Abnormal ECG 12/14/2012    Anemia     Arthritis     Back pain, chronic 3/13/2013    Bipolar disorder ([facility]     Bronchitis chronic     CHF (congestive heart failure) ([facility] 9/30/2016    Chronic back pain     Chronic neck pain     Clostridium difficile infection 3/29/12, 5/22/12    positive stool DNA probe    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[facility] 01/10/2019    Coronary artery disease involving native coronary artery of native heart with angina pectoris ([facility] 3/8/2016    Depression     Emphysema of lung (HCC)     Fibromyalgia     hyperlipidemia 8/11/2011    Hypertension     Kidney stone     Liver disease     Lung disease     MDD (major depressive disorder) 4/4/2012    Mood disorder ([facility] 3/13/2013    Movement disorder     Neck pain, chronic 3/13/2013    Opiate misuse     Personality disorder ([facility]     Pneumonia     Polypharmacy 2/16/2013         SURGICAL HISTORY     Past Surgical History:   Procedure Laterality Date    COLONOSCOPY  1/19/12    DIAGNOSTIC CARDIAC CATH LAB PROCEDURE  Feb, 2007    Normal cor angio Feb, 2007    HERNIA REPAIR  07/11/2019    robotic ventral hernia repair with mesh    HERNIA REPAIR N/A 7/11/2019    ROBOTIC VENTRAL HERNIA REPAIR WITH MESH performed by Taylor Chew MD at [address] [place] [address], [address] [place] [address]       Discharge Medication List as of 1/11/2020  7:07 PM      CONTINUE these medications which have NOT CHANGED    Details   QUEtiapine (SEROQUEL) 300 MG tablet Take 300 mg by mouth nightlyHistorical Med      gabapentin (NEURONTIN) 600 MG tablet Take 600 mg by mouth 3 times daily. Historical Med      olmesartan (BENICAR) 40 MG tablet Take 40 mg by mouth dailyHistorical Med      !! divalproex (DEPAKOTE) 500 MG DR tablet Take 500 mg by mouth nightlyHistorical Med      ketorolac (TORADOL) 10 MG tablet Take 1 tablet by mouth 3 times daily, Disp-12 tablet, R-0Print      DULoxetine (CYMBALTA) 60 MG extended release capsule Take 60 mg by mouth daily Historical Med      fluticasone (FLONASE) 50 organizations: None     Relationship status: None    Intimate partner violence:     Fear of current or ex partner: None     Emotionally abused: None     Physically abused: None     Forced sexual activity: None   Other Topics Concern    None   Social History Narrative    None       SCREENINGS             PHYSICAL EXAM    (up to 7 for level 4, 8 or more for level 5)     ED Triage Vitals [01/11/20 1744]   BP Temp Temp Source Pulse Resp SpO2 Height Weight   (!) 197/97 97.7 °F (36.5 °C) Oral 96 18 97 % 5' 1\" (1.549 m) 154 lb (69.9 kg)       Physical Exam  Vitals signs and nursing note reviewed. Constitutional:       General: She is not in acute distress. Appearance: Normal appearance. She is well-developed and normal weight. She is not ill-appearing, toxic-appearing or diaphoretic. HENT:      Head: Normocephalic and atraumatic. Right Ear: External ear normal.      Left Ear: External ear normal.      Nose: Nose normal.      Mouth/Throat:      Mouth: Mucous membranes are moist.      Pharynx: Oropharynx is clear. Eyes:      General:         Right eye: No discharge. Left eye: No discharge. Extraocular Movements: Extraocular movements intact. Pupils: Pupils are equal, round, and reactive to light. Neck:      Musculoskeletal: Normal range of motion and neck supple. Cardiovascular:      Rate and Rhythm: Normal rate and regular rhythm. Pulses: Normal pulses. Heart sounds: Normal heart sounds. No murmur. No friction rub. No gallop. Pulmonary:      Effort: Pulmonary effort is normal. No respiratory distress. Breath sounds: Normal breath sounds. No wheezing or rales. Musculoskeletal:      Right knee: She exhibits bony tenderness. She exhibits normal range of motion, no swelling, no effusion, no ecchymosis, no deformity, no laceration, no erythema, normal alignment, no LCL laxity, normal patellar mobility, normal meniscus and no MCL laxity. Tenderness found.  Medial joint

## 2020-02-20 ENCOUNTER — HOSPITAL ENCOUNTER (OUTPATIENT)
Age: 70
Discharge: HOME | End: 2020-02-20
Payer: COMMERCIAL

## 2020-02-20 ENCOUNTER — HOSPITAL ENCOUNTER (OUTPATIENT)
Dept: GENERAL RADIOLOGY | Age: 70
Discharge: HOME | End: 2020-02-20
Payer: COMMERCIAL

## 2020-02-20 PROCEDURE — 71046 X-RAY EXAM CHEST 2 VIEWS: CPT

## 2020-03-09 ENCOUNTER — APPOINTMENT (OUTPATIENT)
Dept: GENERAL RADIOLOGY | Age: 70
End: 2020-03-09
Payer: COMMERCIAL

## 2020-03-09 ENCOUNTER — HOSPITAL ENCOUNTER (OUTPATIENT)
Age: 70
Setting detail: OBSERVATION
Discharge: HOME | End: 2020-03-11
Attending: EMERGENCY MEDICINE | Admitting: INTERNAL MEDICINE
Payer: COMMERCIAL

## 2020-03-09 LAB
A/G RATIO: 1.5 (ref 1.1–2.2)
ACETAMINOPHEN LEVEL: <5 UG/ML (ref 10–30)
ALBUMIN SERPL-MCNC: 4.3 G/DL (ref 3.4–5)
ALP BLD-CCNC: 78 U/L (ref 40–129)
ALT SERPL-CCNC: 6 U/L (ref 10–40)
AMPHETAMINE SCREEN, URINE: ABNORMAL
ANION GAP SERPL CALCULATED.3IONS-SCNC: 15 MMOL/L (ref 3–16)
AST SERPL-CCNC: 11 U/L (ref 15–37)
BACTERIA: ABNORMAL /HPF
BARBITURATE SCREEN URINE: POSITIVE
BASOPHILS ABSOLUTE: 0.1 K/UL (ref 0–0.2)
BASOPHILS RELATIVE PERCENT: 1 %
BENZODIAZEPINE SCREEN, URINE: ABNORMAL
BILIRUB SERPL-MCNC: <0.2 MG/DL (ref 0–1)
BILIRUBIN URINE: NEGATIVE
BLOOD, URINE: NEGATIVE
BUN BLDV-MCNC: 15 MG/DL (ref 7–20)
CALCIUM SERPL-MCNC: 9 MG/DL (ref 8.3–10.6)
CANNABINOID SCREEN URINE: ABNORMAL
CHLORIDE BLD-SCNC: 95 MMOL/L (ref 99–110)
CLARITY: CLEAR
CO2: 25 MMOL/L (ref 21–32)
COCAINE METABOLITE SCREEN URINE: ABNORMAL
COLOR: YELLOW
CREAT SERPL-MCNC: 0.6 MG/DL (ref 0.6–1.2)
EOSINOPHILS ABSOLUTE: 0.2 K/UL (ref 0–0.6)
EOSINOPHILS RELATIVE PERCENT: 2.6 %
EPITHELIAL CELLS, UA: ABNORMAL /HPF (ref 0–5)
ETHANOL: NORMAL MG/DL (ref 0–0.08)
GFR AFRICAN AMERICAN: >60
GFR NON-AFRICAN AMERICAN: >60
GLOBULIN: 2.8 G/DL
GLUCOSE BLD-MCNC: 97 MG/DL (ref 70–99)
GLUCOSE URINE: NEGATIVE MG/DL
HCT VFR BLD CALC: 35.4 % (ref 36–48)
HEMOGLOBIN: 11.7 G/DL (ref 12–16)
KETONES, URINE: NEGATIVE MG/DL
LEUKOCYTE ESTERASE, URINE: ABNORMAL
LYMPHOCYTES ABSOLUTE: 1.6 K/UL (ref 1–5.1)
LYMPHOCYTES RELATIVE PERCENT: 23.2 %
Lab: ABNORMAL
MCH RBC QN AUTO: 29.6 PG (ref 26–34)
MCHC RBC AUTO-ENTMCNC: 33 G/DL (ref 31–36)
MCV RBC AUTO: 89.9 FL (ref 80–100)
METHADONE SCREEN, URINE: ABNORMAL
MICROSCOPIC EXAMINATION: YES
MONOCYTES ABSOLUTE: 0.6 K/UL (ref 0–1.3)
MONOCYTES RELATIVE PERCENT: 8.4 %
NEUTROPHILS ABSOLUTE: 4.5 K/UL (ref 1.7–7.7)
NEUTROPHILS RELATIVE PERCENT: 64.8 %
NITRITE, URINE: NEGATIVE
OPIATE SCREEN URINE: ABNORMAL
OXYCODONE URINE: ABNORMAL
PDW BLD-RTO: 16.1 % (ref 12.4–15.4)
PH UA: 6.5
PH UA: 6.5 (ref 5–8)
PHENCYCLIDINE SCREEN URINE: ABNORMAL
PLATELET # BLD: 154 K/UL (ref 135–450)
PMV BLD AUTO: 7 FL (ref 5–10.5)
POTASSIUM REFLEX MAGNESIUM: 4.4 MMOL/L (ref 3.5–5.1)
PROPOXYPHENE SCREEN: ABNORMAL
PROTEIN UA: NEGATIVE MG/DL
RBC # BLD: 3.94 M/UL (ref 4–5.2)
RBC UA: ABNORMAL /HPF (ref 0–4)
SALICYLATE, SERUM: <0.3 MG/DL (ref 15–30)
SODIUM BLD-SCNC: 135 MMOL/L (ref 136–145)
SPECIFIC GRAVITY UA: 1.02 (ref 1–1.03)
TOTAL PROTEIN: 7.1 G/DL (ref 6.4–8.2)
TROPONIN: <0.01 NG/ML
URINE REFLEX TO CULTURE: YES
URINE TYPE: ABNORMAL
UROBILINOGEN, URINE: 0.2 E.U./DL
WBC # BLD: 6.9 K/UL (ref 4–11)
WBC UA: ABNORMAL /HPF (ref 0–5)

## 2020-03-09 PROCEDURE — 80053 COMPREHEN METABOLIC PANEL: CPT

## 2020-03-09 PROCEDURE — 80307 DRUG TEST PRSMV CHEM ANLYZR: CPT

## 2020-03-09 PROCEDURE — 6370000000 HC RX 637 (ALT 250 FOR IP): Performed by: NURSE PRACTITIONER

## 2020-03-09 PROCEDURE — 99285 EMERGENCY DEPT VISIT HI MDM: CPT

## 2020-03-09 PROCEDURE — 81001 URINALYSIS AUTO W/SCOPE: CPT

## 2020-03-09 PROCEDURE — G0480 DRUG TEST DEF 1-7 CLASSES: HCPCS

## 2020-03-09 PROCEDURE — 87086 URINE CULTURE/COLONY COUNT: CPT

## 2020-03-09 PROCEDURE — 96365 THER/PROPH/DIAG IV INF INIT: CPT

## 2020-03-09 PROCEDURE — 6360000002 HC RX W HCPCS: Performed by: NURSE PRACTITIONER

## 2020-03-09 PROCEDURE — 6370000000 HC RX 637 (ALT 250 FOR IP): Performed by: EMERGENCY MEDICINE

## 2020-03-09 PROCEDURE — 84484 ASSAY OF TROPONIN QUANT: CPT

## 2020-03-09 PROCEDURE — 85025 COMPLETE CBC W/AUTO DIFF WBC: CPT

## 2020-03-09 PROCEDURE — 93005 ELECTROCARDIOGRAM TRACING: CPT | Performed by: NURSE PRACTITIONER

## 2020-03-09 PROCEDURE — 71046 X-RAY EXAM CHEST 2 VIEWS: CPT

## 2020-03-09 PROCEDURE — 2580000003 HC RX 258: Performed by: NURSE PRACTITIONER

## 2020-03-09 RX ORDER — PREDNISONE 20 MG/1
60 TABLET ORAL ONCE
Status: COMPLETED | OUTPATIENT
Start: 2020-03-09 | End: 2020-03-09

## 2020-03-09 RX ORDER — IPRATROPIUM BROMIDE AND ALBUTEROL SULFATE 2.5; .5 MG/3ML; MG/3ML
1 SOLUTION RESPIRATORY (INHALATION) ONCE
Status: COMPLETED | OUTPATIENT
Start: 2020-03-09 | End: 2020-03-09

## 2020-03-09 RX ORDER — ACETAMINOPHEN 500 MG
1000 TABLET ORAL ONCE
Status: COMPLETED | OUTPATIENT
Start: 2020-03-09 | End: 2020-03-09

## 2020-03-09 RX ORDER — LORAZEPAM 0.5 MG/1
0.5 TABLET ORAL DAILY PRN
Status: ON HOLD | COMMUNITY
End: 2020-09-29 | Stop reason: HOSPADM

## 2020-03-09 RX ADMIN — CEFTRIAXONE 1 G: 1 INJECTION, POWDER, FOR SOLUTION INTRAMUSCULAR; INTRAVENOUS at 20:01

## 2020-03-09 RX ADMIN — ACETAMINOPHEN 1000 MG: 500 TABLET ORAL at 19:34

## 2020-03-09 RX ADMIN — IPRATROPIUM BROMIDE AND ALBUTEROL SULFATE 1 AMPULE: .5; 3 SOLUTION RESPIRATORY (INHALATION) at 23:12

## 2020-03-09 RX ADMIN — PREDNISONE 60 MG: 20 TABLET ORAL at 23:12

## 2020-03-09 ASSESSMENT — PAIN DESCRIPTION - LOCATION: LOCATION: CHEST

## 2020-03-09 ASSESSMENT — PAIN SCALES - GENERAL: PAINLEVEL_OUTOF10: 8

## 2020-03-09 ASSESSMENT — PAIN SCALES - WONG BAKER: WONGBAKER_NUMERICALRESPONSE: 0

## 2020-03-09 ASSESSMENT — PAIN DESCRIPTION - DESCRIPTORS: DESCRIPTORS: HEAVINESS

## 2020-03-10 PROBLEM — R09.02 HYPOXIA: Status: ACTIVE | Noted: 2020-03-10

## 2020-03-10 LAB
BASE EXCESS ARTERIAL: 1.1 MMOL/L (ref -3–3)
CARBOXYHEMOGLOBIN ARTERIAL: 4.2 % (ref 0–1.5)
EKG ATRIAL RATE: 98 BPM
EKG DIAGNOSIS: NORMAL
EKG P AXIS: -1 DEGREES
EKG P-R INTERVAL: 146 MS
EKG Q-T INTERVAL: 348 MS
EKG QRS DURATION: 92 MS
EKG QTC CALCULATION (BAZETT): 444 MS
EKG R AXIS: 7 DEGREES
EKG T AXIS: 34 DEGREES
EKG VENTRICULAR RATE: 98 BPM
HCO3 ARTERIAL: 27.4 MMOL/L (ref 21–29)
HEMOGLOBIN, ART, EXTENDED: 11.8 G/DL (ref 12–16)
METHEMOGLOBIN ARTERIAL: 0.3 %
O2 CONTENT ARTERIAL: 14 ML/DL
O2 SAT, ARTERIAL: 89.4 %
O2 THERAPY: ABNORMAL
PCO2 ARTERIAL: 50.7 MMHG (ref 35–45)
PH ARTERIAL: 7.35 (ref 7.35–7.45)
PO2 ARTERIAL: 59.8 MMHG (ref 75–108)
TCO2 ARTERIAL: 28.9 MMOL/L
URINE CULTURE, ROUTINE: NORMAL

## 2020-03-10 PROCEDURE — 94761 N-INVAS EAR/PLS OXIMETRY MLT: CPT

## 2020-03-10 PROCEDURE — 94640 AIRWAY INHALATION TREATMENT: CPT

## 2020-03-10 PROCEDURE — G0378 HOSPITAL OBSERVATION PER HR: HCPCS

## 2020-03-10 PROCEDURE — 82803 BLOOD GASES ANY COMBINATION: CPT

## 2020-03-10 PROCEDURE — 6360000002 HC RX W HCPCS: Performed by: INTERNAL MEDICINE

## 2020-03-10 PROCEDURE — 2700000000 HC OXYGEN THERAPY PER DAY

## 2020-03-10 PROCEDURE — 6370000000 HC RX 637 (ALT 250 FOR IP): Performed by: INTERNAL MEDICINE

## 2020-03-10 PROCEDURE — 96372 THER/PROPH/DIAG INJ SC/IM: CPT

## 2020-03-10 PROCEDURE — 2580000003 HC RX 258: Performed by: INTERNAL MEDICINE

## 2020-03-10 PROCEDURE — 99223 1ST HOSP IP/OBS HIGH 75: CPT | Performed by: INTERNAL MEDICINE

## 2020-03-10 PROCEDURE — 93010 ELECTROCARDIOGRAM REPORT: CPT | Performed by: INTERNAL MEDICINE

## 2020-03-10 RX ORDER — ACETAMINOPHEN 325 MG/1
650 TABLET ORAL EVERY 6 HOURS PRN
Status: DISCONTINUED | OUTPATIENT
Start: 2020-03-10 | End: 2020-03-11 | Stop reason: HOSPADM

## 2020-03-10 RX ORDER — ONDANSETRON 2 MG/ML
4 INJECTION INTRAMUSCULAR; INTRAVENOUS EVERY 6 HOURS PRN
Status: DISCONTINUED | OUTPATIENT
Start: 2020-03-10 | End: 2020-03-11 | Stop reason: HOSPADM

## 2020-03-10 RX ORDER — DIVALPROEX SODIUM 250 MG/1
250 TABLET, DELAYED RELEASE ORAL EVERY MORNING
Status: DISCONTINUED | OUTPATIENT
Start: 2020-03-10 | End: 2020-03-11 | Stop reason: HOSPADM

## 2020-03-10 RX ORDER — IPRATROPIUM BROMIDE AND ALBUTEROL SULFATE 2.5; .5 MG/3ML; MG/3ML
1 SOLUTION RESPIRATORY (INHALATION)
Status: DISCONTINUED | OUTPATIENT
Start: 2020-03-10 | End: 2020-03-10

## 2020-03-10 RX ORDER — ACETAMINOPHEN 650 MG/1
650 SUPPOSITORY RECTAL EVERY 6 HOURS PRN
Status: DISCONTINUED | OUTPATIENT
Start: 2020-03-10 | End: 2020-03-11 | Stop reason: HOSPADM

## 2020-03-10 RX ORDER — LOSARTAN POTASSIUM 100 MG/1
100 TABLET ORAL DAILY
Status: DISCONTINUED | OUTPATIENT
Start: 2020-03-10 | End: 2020-03-11 | Stop reason: HOSPADM

## 2020-03-10 RX ORDER — IPRATROPIUM BROMIDE AND ALBUTEROL SULFATE 2.5; .5 MG/3ML; MG/3ML
1 SOLUTION RESPIRATORY (INHALATION) EVERY 4 HOURS PRN
Status: DISCONTINUED | OUTPATIENT
Start: 2020-03-10 | End: 2020-03-11 | Stop reason: HOSPADM

## 2020-03-10 RX ORDER — LORAZEPAM 0.5 MG/1
0.5 TABLET ORAL DAILY PRN
Status: DISCONTINUED | OUTPATIENT
Start: 2020-03-10 | End: 2020-03-11 | Stop reason: HOSPADM

## 2020-03-10 RX ORDER — GABAPENTIN 300 MG/1
600 CAPSULE ORAL 3 TIMES DAILY
Status: DISCONTINUED | OUTPATIENT
Start: 2020-03-10 | End: 2020-03-11 | Stop reason: HOSPADM

## 2020-03-10 RX ORDER — SODIUM CHLORIDE 0.9 % (FLUSH) 0.9 %
10 SYRINGE (ML) INJECTION EVERY 12 HOURS SCHEDULED
Status: DISCONTINUED | OUTPATIENT
Start: 2020-03-10 | End: 2020-03-11 | Stop reason: HOSPADM

## 2020-03-10 RX ORDER — SODIUM CHLORIDE 0.9 % (FLUSH) 0.9 %
10 SYRINGE (ML) INJECTION PRN
Status: DISCONTINUED | OUTPATIENT
Start: 2020-03-10 | End: 2020-03-11 | Stop reason: HOSPADM

## 2020-03-10 RX ORDER — IPRATROPIUM BROMIDE AND ALBUTEROL SULFATE 2.5; .5 MG/3ML; MG/3ML
1 SOLUTION RESPIRATORY (INHALATION) 2 TIMES DAILY
Status: DISCONTINUED | OUTPATIENT
Start: 2020-03-10 | End: 2020-03-11 | Stop reason: HOSPADM

## 2020-03-10 RX ORDER — DIVALPROEX SODIUM 500 MG/1
500 TABLET, DELAYED RELEASE ORAL NIGHTLY
Status: DISCONTINUED | OUTPATIENT
Start: 2020-03-10 | End: 2020-03-11 | Stop reason: HOSPADM

## 2020-03-10 RX ORDER — DULOXETIN HYDROCHLORIDE 60 MG/1
60 CAPSULE, DELAYED RELEASE ORAL DAILY
Status: DISCONTINUED | OUTPATIENT
Start: 2020-03-10 | End: 2020-03-11 | Stop reason: HOSPADM

## 2020-03-10 RX ADMIN — ENOXAPARIN SODIUM 40 MG: 40 INJECTION SUBCUTANEOUS at 09:37

## 2020-03-10 RX ADMIN — QUETIAPINE FUMARATE 300 MG: 200 TABLET ORAL at 20:25

## 2020-03-10 RX ADMIN — DIVALPROEX SODIUM 250 MG: 250 TABLET, DELAYED RELEASE ORAL at 09:36

## 2020-03-10 RX ADMIN — DULOXETINE HYDROCHLORIDE 60 MG: 60 CAPSULE, DELAYED RELEASE ORAL at 09:36

## 2020-03-10 RX ADMIN — GABAPENTIN 600 MG: 300 CAPSULE ORAL at 14:58

## 2020-03-10 RX ADMIN — GABAPENTIN 600 MG: 300 CAPSULE ORAL at 02:13

## 2020-03-10 RX ADMIN — IPRATROPIUM BROMIDE AND ALBUTEROL SULFATE 1 AMPULE: .5; 3 SOLUTION RESPIRATORY (INHALATION) at 07:08

## 2020-03-10 RX ADMIN — Medication 10 ML: at 09:37

## 2020-03-10 RX ADMIN — LOSARTAN POTASSIUM 100 MG: 100 TABLET, FILM COATED ORAL at 09:36

## 2020-03-10 RX ADMIN — IPRATROPIUM BROMIDE AND ALBUTEROL SULFATE 1 AMPULE: .5; 3 SOLUTION RESPIRATORY (INHALATION) at 19:33

## 2020-03-10 RX ADMIN — Medication 10 ML: at 20:25

## 2020-03-10 RX ADMIN — GABAPENTIN 600 MG: 300 CAPSULE ORAL at 20:25

## 2020-03-10 RX ADMIN — GABAPENTIN 600 MG: 300 CAPSULE ORAL at 09:36

## 2020-03-10 RX ADMIN — DIVALPROEX SODIUM 500 MG: 500 TABLET, DELAYED RELEASE ORAL at 20:25

## 2020-03-10 ASSESSMENT — PAIN SCALES - WONG BAKER: WONGBAKER_NUMERICALRESPONSE: 0

## 2020-03-10 ASSESSMENT — PAIN SCALES - GENERAL: PAINLEVEL_OUTOF10: 0

## 2020-03-10 NOTE — ED NOTES
Pt asleep at this time. Respirations easy, unlabored. [facility] staff at bedside. Will continue to monitor.       Jyoti Hartmann RN  03/09/20 2039

## 2020-03-10 NOTE — PROGRESS NOTES
Morning assessment complete and medications given. Patient is alert and oriented x 4. Patient states burning with urinating and urinary frequency. Patient denies any additional needs at this time. Call light within reach. Will continue to monitor.  Comfort Mei

## 2020-03-10 NOTE — PLAN OF CARE
Problem: Health Behavior:  Goal: Compliance with treatment plan for underlying cause of condition will improve  Description: Compliance with treatment plan for underlying cause of condition will improve  Outcome: Ongoing  Goal: Identification of resources available to assist in meeting health care needs will improve  Description: Identification of resources available to assist in meeting health care needs will improve  Outcome: Ongoing     Problem: Fluid Volume:  Goal: Maintenance of adequate hydration will improve  Description: Maintenance of adequate hydration will improve  Outcome: Ongoing     Problem: Urinary Elimination:  Goal: Skin integrity will improve  Description: Skin integrity will improve  Outcome: Ongoing  Goal: Ability to recognize the need to void and respond appropriately will improve  Description: Ability to recognize the need to void and respond appropriately will improve  Outcome: Ongoing  Goal: Will remain free from infection  Description: Will remain free from infection  Outcome: Ongoing  Goal: Incidence of incontinence will decrease  Description: Incidence of incontinence will decrease  Outcome: Ongoing     Problem: Falls - Risk of:  Goal: Will remain free from falls  Description: Will remain free from falls  Outcome: Ongoing  Goal: Absence of physical injury  Description: Absence of physical injury  Outcome: Ongoing

## 2020-03-10 NOTE — H&P
Admission H & P    Liliane Gutierrez;  MRN: [id number] ; Admit Date: 3/9/2020  5:42 PM   Current Date and Time: 3/10/2020 2:00 PM    PCP  --  Raissa Garrett MD         Chief Complaint   Patient presents with    Hallucinations     states the shadows are close to her. having multiple personalities    Chest Pain     heaviness in chest         HPI:  Patient is a 77 y.o.  female  With known h.o  HTN, chronic back pain, bipolar disorder presents to Er for ongoing visual hallucinations and chest pain since yesterday. Reports symptoms started with suprapubic pain and later had epigastric pain with associated sob and anxiety. No left sided chest pain . denies any hearing voices.  Workup in Er showed no acute EKG changes, normal troponin but had UTI and mild hypoxia and was admitted      Allergies   Allergen Reactions    Dicyclomine      listed in pxysis    Sumatriptan      In pxysis listing  Other reaction(s): throat swelling    Tape Kathy Linker Tape]      Tears off patient's skin very easily     Tizanidine      Listed in pxysis    Motrin [Ibuprofen Micronized] Nausea And Vomiting       Past Medical History:   Diagnosis Date    Abnormal ECG 12/14/2012    Anemia     Arthritis     Back pain, chronic 3/13/2013    Bipolar disorder (HCC)     Bronchitis chronic     CHF (congestive heart failure) ([address] 9/30/2016    Chronic back pain     Chronic neck pain     Clostridium difficile infection 3/29/12, 5/22/12    positive stool DNA probe    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    Coronary artery disease involving native coronary artery of native heart with angina pectoris ([address] 3/8/2016    Depression     Emphysema of lung (HCC)     Fibromyalgia     hyperlipidemia 8/11/2011    Hypertension     Kidney stone     Liver disease     Lung disease     MDD (major depressive disorder) 4/4/2012    Mood disorder ([address] 3/13/2013    Movement disorder     Neck pain, chronic 3/13/2013    Opiate misuse     03/09/2020    LYMPHSABS 1.6 03/09/2020    MONOSABS 0.6 03/09/2020    EOSABS 0.2 03/09/2020    BASOSABS 0.1 03/09/2020     BMP:   Lab Results   Component Value Date     03/09/2020    K 4.4 03/09/2020    CO2 25 03/09/2020    BUN 15 03/09/2020    CREATININE 0.6 03/09/2020    CALCIUM 9.0 03/09/2020     Coagulation:   Lab Results   Component Value Date    INR 0.96 08/15/2018    APTT 53.5 02/06/2018     Cardiac markers:   Lab Results   Component Value Date    CKTOTAL 140 03/17/2017    TROPONINI <0.01 03/09/2020     ABGs: No results found for: POCPH, POCPCO2, POCPO2, POCHCO3, POCTCO2, POCFIO2    Lab Results   Component Value Date    ALT 6 (L) 03/09/2020    AST 11 (L) 03/09/2020    ALKPHOS 78 03/09/2020    BILITOT <0.2 03/09/2020       Lab Results   Component Value Date    INR 0.96 08/15/2018    INR 0.93 02/09/2018    INR 0.94 02/06/2018    PROTIME 10.9 08/15/2018    PROTIME 10.5 02/09/2018    PROTIME 10.6 02/06/2018         EKG: NSR    Radiology:    Chest xray No acute cardiopulmonary process. ASSESMENT & PLAN:     Bipolar disorder/multipersonality disorder - reports she does see shadows sometimes  Doubt presentation was consistent with any confusion or hallucinations  Continue depakote, cymbalta, seroquel , neurontin       Chest pain and dyspnea - likely panic attack, none today. EKG neg.  Recent cardiac workup neg  No complaints today   Mild hypoxia since adm, wean off oxygen    Copd - no sings of exacerbation - no need for steroids at this time    UTI- await urine cx   continue rocephin    HTN - stable, resume home meds      Dc planning in am once urine reported       Diet:regular   GI/DVT PX: Milo Snowball  CODE STATUS: full    Stephanie Siddiqui MD 3/10/2020 2:00 PM

## 2020-03-10 NOTE — PROGRESS NOTES
Morning assessment complete and medications given. Patient denies any additional needs at this time. Call light within reach. Will continue to monitor.  Comfort Mei

## 2020-03-10 NOTE — ED NOTES
Spoke with Holli Casey (), pts casework on the phone. States if pt is discharged to call pts  Mason Bella, at .       Adwoa Chung RN  03/09/20 9670 The patient is a 14y Male complaining of abdominal pain.

## 2020-03-10 NOTE — ED PROVIDER NOTES
troponin is negative however she has a heart score 5 and feel she should be admitted for further evaluation and possibly rule out. She would benefit from psychiatric consult as well for her new worsening visual hallucinations. This time she does not appear to be an emergent threat to herself as she denies SI or HI for me. Negative salicylate and acetaminophen levels, no significant derangements noted on labs. Although initial history and physical exam information was obtained by AZAR/NPP (who also dictated a record of this visit), I independently examined and evaluated this patient and made all diagnostic, treatment, and disposition decisions. This chart was generated in part by using Dragon Dictation system and may contain errors related to that system including errors in grammar, punctuation, and spelling, as well as words and phrases that may be inappropriate. If there are any questions or concerns please feel free to contact the dictating provider for clarification.      MD Robin Mathew MD  03/09/20 3626       Indira Hamm MD  03/10/20 0630

## 2020-03-10 NOTE — PROGRESS NOTES
RESPIRATORY THERAPY ASSESSMENT    Name:  Laura Patricia Record Number:  [id number]  Age: 77 y.o. Gender: female  : 1950  Today's Date:  3/10/2020  Room:  0215/0215-02    Assessment     Is the patient being admitted for a COPD or Asthma exacerbation? No   (If yes the patient will be seen every 4 hours for the first 24 hours and then reassessed)    Patient Admission Diagnosis      Allergies  Allergies   Allergen Reactions    Dicyclomine      listed in pxysis    Sumatriptan      In pxysis listing  Other reaction(s): throat swelling    Tape Alan Malcolm Tape]      Tears off patient's skin very easily     Tizanidine      Listed in pxysis    Motrin [Ibuprofen Micronized] Nausea And Vomiting       Minimum Predicted Vital Capacity:               Actual Vital Capacity:                    Pulmonary History:CHF/Pulmonary Edema, PNA, Emphysema, Bronchitis. Home Oxygen Therapy:  room air  Home Respiratory Therapy:None   Current Respiratory Therapy:  Duoneb Q4W/A. Respiratory Severity Index(RSI)   Patients with orders for inhalation medications, oxygen, or any therapeutic treatment modality will be placed on Respiratory Protocol. They will be assessed with the first treatment and at least every 72 hours thereafter. The following severity scale will be used to determine frequency of treatment intervention.     Smoking History: Pulmonary Disease or Smoking History, Greater than 15 pack year = 2    Social History  Social History     Tobacco Use    Smoking status: Current Every Day Smoker     Packs/day: 2.00     Years: 47.00     Pack years: 94.00     Types: Cigarettes    Smokeless tobacco: Never Used   Substance Use Topics    Alcohol use: No    Drug use: Not Currently       Recent Surgical History: None = 0  Past Surgical History  Past Surgical History:   Procedure Laterality Date    COLONOSCOPY  12   [facility] C[facility]AC CATH LAB PROCEDURE  2007    Normal cor angio 2007    Therapy will be held for heart rate (HR) greater than 140 beats per minute, pending direction from physician. 3. Bronchodilators will be administered via Metered Dose Inhaler (MDI) with spacer when the following criteria are met:  a. Alert and cooperative     b. HR < 140 bpm  c. RR < 30 bpm                d. Can demonstrate a 2-3 second inspiratory hold  4. Bronchodilators will be administered via Hand Held Nebulizer JERARDO [facility]) to patients when ANY of the following criteria are met  a. Incognizant or uncooperative          b. Patients treated with HHN at Home        c. Unable to demonstrate proper use of MDI with spacer     d. RR > 30 bpm   5. Bronchodilators will be delivered via Metered Dose Inhaler (MDI), HHN, Aerogen to intubated patients on mechanical ventilation. 6. Inhalation medication orders will be delivered and/or substituted as outlined below. Aerosolized Medications Ordering and Administration Guidelines:    1. All Medications will be ordered by a physician, and their frequency and/or modality will be adjusted as defined by the patients Respiratory Severity Index (RSI) score. 2. If the patient does not have documented COPD, consider discontinuing anticholinergics when RSI is less than 9.  3. If the bronchospasm worsens (increased RSI), then the bronchodilator frequency can be increased to a maximum of every 4 hours. If greater than every 4 hours is required, the physician will be contacted. 4. If the bronchospasm improves, the frequency of the bronchodilator can be decreased, based on the patient's RSI, but not less than home treatment regimen frequency. 5. Bronchodilator(s) will be discontinued if patient has a RSI less than 9 and has received no scheduled or as needed treatment for 72  Hrs. Patients Ordered on a Mucolytic Agent:    1. Must always be administered with a bronchodilator.     2. Discontinue if patient experiences worsened bronchospasm, or secretions have lessened to the point that

## 2020-03-10 NOTE — ED PROVIDER NOTES
CHIEF COMPLAINT  Hallucinations (states the shadows are close to her. having multiple personalities) and Chest Pain (heaviness in chest)      HISTORY OF PRESENT ILLNESS  Liliane Gutierrez is a 77 y.o. female who presents to the ED complaining of hallucinations and chest heaviness. Patient is nontoxic in appearance and in no acute distress. Patient presents to the emergency department via private vehicle with outpatient behavioral health personnel at the bedside. Patient reports that today she began with a heavy discomfort in her chest.  Reports 8 out of 10. States that 1 month ago she completed course of antibiotic for pneumonia. States that she has not really felt well since then. Patient states that today she also began noticing shadows in her home room. Patient reports that she does live at home with her  as well as grandchildren. Patient states that she generally sees shadows; however, today they are much larger and much \"stronger\". Patient denies headache or blurry or double vision. No associated difficulty breathing. States she does feel short of breath. Denies abdominal pain. No nausea or vomiting or bowel complaints. Denies fever or chills. Patient denies suicidal or homicidal ideations to me; however, outpatient behavioral health member at the bedside states that earlier patient did report that she did have feelings of harming herself, she did not describe a plan. No other complaints, modifying factors or associated symptoms. Nursing notes reviewed.    Past Medical History:   Diagnosis Date    Abnormal ECG 12/14/2012    Anemia     Arthritis     Back pain, chronic 3/13/2013    Bipolar disorder (HCC)     Bronchitis chronic     CHF (congestive heart failure) (HCC) 9/30/2016    Chronic back pain     Chronic neck pain     Clostridium difficile infection 3/29/12, 5/22/12    positive stool DNA probe    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[facility] [address] 01/10/2019    Coronary artery Smokeless tobacco: Never Used   Substance and Sexual Activity    Alcohol use: No    Drug use: Not Currently    Sexual activity: Yes     Partners: Male   Lifestyle    Physical activity     Days per week: Not on file     Minutes per session: Not on file    Stress: Not on file   Relationships    Social connections     Talks on phone: Not on file     Gets together: Not on file     Attends Yazdanism service: Not on file     Active member of club or organization: Not on file     Attends meetings of clubs or organizations: Not on file     Relationship status: Not on file    Intimate partner violence     Fear of current or ex partner: Not on file     Emotionally abused: Not on file     Physically abused: Not on file     Forced sexual activity: Not on file   Other Topics Concern    Not on file   Social History Narrative    Not on file     Current Facility-Administered Medications   Medication Dose Route Frequency Provider Last Rate Last Dose    cefTRIAXone (ROCEPHIN) 1 g IVPB in 50 mL D5W minibag  1 g Intravenous Once BALJIT Newby  mL/hr at 03/09/20 2001 1 g at 03/09/20 2001     Current Outpatient Medications   Medication Sig Dispense Refill    LORazepam (ATIVAN) 0.5 MG tablet Take 0.5 mg by mouth daily as needed for Anxiety. x3 doses      diclofenac sodium 1 % GEL Apply 4 g topically 4 times daily 4 Tube 1    QUEtiapine (SEROQUEL) 300 MG tablet Take 300 mg by mouth nightly      gabapentin (NEURONTIN) 600 MG tablet Take 600 mg by mouth 3 times daily.       olmesartan (BENICAR) 40 MG tablet Take 40 mg by mouth daily      divalproex (DEPAKOTE) 500 MG DR tablet Take 500 mg by mouth nightly      DULoxetine (CYMBALTA) 60 MG extended release capsule Take 60 mg by mouth daily       fluticasone (FLONASE) 50 MCG/ACT nasal spray 1 spray by Nasal route daily      divalproex (DEPAKOTE) 250 MG DR tablet Take 250 mg by mouth every morning       Blood Pressure Monitoring (BLOOD PRESSURE MONITOR hypoxia. Patient verbalizes understanding, all questions were answered and she is agreeable with the plan of care. HEART SCORE:    History: +0 for low suspicion  EKG: +1 for nonspecific changes   Age: +4 for age >65 years  Risk factors (includes HLD, HTN, DM, tobacco use, obesity, and +FHx): +2 for known CAD or 3+ risk factors  Initial troponin: +0 for negative troponin    Heart score: 5. This falls under the following category: Score of 4-6, which indicates low/moderate risk for major adverse cardiac event and supports observation with repeated troponins and/or non-invasive testing    I spoke with Dr. Mary Quevedo. We thoroughly discussed the history, physical exam, laboratory and imaging studies, as well as, emergency department course. Based upon that discussion, we've decided to admit Michela Roa to the hospital for further evaulation/treatment.      MDM  Results for orders placed or performed during the hospital encounter of 03/09/20   CBC Auto Differential   Result Value Ref Range    WBC 6.9 4.0 - 11.0 K/uL    RBC 3.94 (L) 4.00 - 5.20 M/uL    Hemoglobin 11.7 (L) 12.0 - 16.0 g/dL    Hematocrit 35.4 (L) 36.0 - 48.0 %    MCV 89.9 80.0 - 100.0 fL    MCH 29.6 26.0 - 34.0 pg    MCHC 33.0 31.0 - 36.0 g/dL    RDW 16.1 (H) 12.4 - 15.4 %    Platelets 165 194 - 008 K/uL    MPV 7.0 5.0 - 10.5 fL    Neutrophils % 64.8 %    Lymphocytes % 23.2 %    Monocytes % 8.4 %    Eosinophils % 2.6 %    Basophils % 1.0 %    Neutrophils Absolute 4.5 1.7 - 7.7 K/uL    Lymphocytes Absolute 1.6 1.0 - 5.1 K/uL    Monocytes Absolute 0.6 0.0 - 1.3 K/uL    Eosinophils Absolute 0.2 0.0 - 0.6 K/uL    Basophils Absolute 0.1 0.0 - 0.2 K/uL   Comprehensive Metabolic Panel w/ Reflex to MG   Result Value Ref Range    Sodium 135 (L) 136 - 145 mmol/L    Potassium reflex Magnesium 4.4 3.5 - 5.1 mmol/L    Chloride 95 (L) 99 - 110 mmol/L    CO2 25 21 - 32 mmol/L    Anion Gap 15 3 - 16    Glucose 97 70 - 99 mg/dL    BUN 15 7 - 20 mg/dL    CREATININE 0.6

## 2020-03-10 NOTE — PLAN OF CARE
Problem: Health Behavior:  Goal: Compliance with treatment plan for underlying cause of condition will improve  Description: Compliance with treatment plan for underlying cause of condition will improve  3/10/2020 1216 by Alessandro Marsh RN  Outcome: Ongoing  3/10/2020 0158 by Griselda Salguero RN  Outcome: Ongoing     Problem: Fluid Volume:  Goal: Maintenance of adequate hydration will improve  Description: Maintenance of adequate hydration will improve  3/10/2020 1216 by Alessandro Marsh RN  Outcome: Ongoing  3/10/2020 0158 by Griselda Salguero RN  Outcome: Ongoing     Problem: Urinary Elimination:  Goal: Skin integrity will improve  Description: Skin integrity will improve  3/10/2020 1216 by Alessandro Marsh RN  Outcome: Ongoing  3/10/2020 0158 by Griselda Salguero RN  Outcome: Ongoing     Problem: Urinary Elimination:  Goal: Ability to recognize the need to void and respond appropriately will improve  Description: Ability to recognize the need to void and respond appropriately will improve  3/10/2020 0158 by Griselda Salguero RN  Outcome: Ongoing     Problem: Urinary Elimination:  Goal: Will remain free from infection  Description: Will remain free from infection  3/10/2020 0158 by Griselda Salguero RN  Outcome: Ongoing

## 2020-03-10 NOTE — PROGRESS NOTES
4 Eyes Skin Assessment     The patient is being assess for   Admission    I agree that 2 RN's have performed a thorough Head to Toe Skin Assessment on the patient. ALL assessment sites listed below have been assessed. Areas assessed by both nurses:   [x]   Head, Face, and Ears   [x]   Shoulders, Back, and Chest, Abdomen  [x]   Arms, Elbows, and Hands   [x]   Coccyx, Sacrum, and Ischium  [x]   Legs, Feet, and Heels  A couple bruises and abrasions noted upon admission. No other skin issues. **SHARE this note so that the co-signing nurse is able to place an eSignature**    Co-signer eSignature: Electronically signed by Rufina Robbins RN on 3/10/20 at 2:35 AM EDT    Does the Patient have Skin Breakdown?   No          Emmanuel Prevention initiated:  No   Wound Care Orders initiated:  No      [facility] nurse consulted for Pressure Injury (Stage 3,4, Unstageable, DTI, NWPT, Complex wounds)and New or Established Ostomies:  No      Primary Nurse eSignature: Electronically signed by Cuong Alvarado RN on 3/10/20 at 2:33 AM EDT

## 2020-03-10 NOTE — CARE COORDINATION
Case Management Assessment  Initial Evaluation    Date/Time of Evaluation: 3/10/2020 4:01 PM  Assessment Completed by: Christine Feldman    Patient Name: Bunny Adorno  YOB: 1950  Diagnosis: Hypoxia [R09.02]  Date / Time: 3/9/2020  5:42 PM  Admission status/Date:3/10/2020 obs  Chart Reviewed: Yes      Patient Interviewed: Yes   Family Interviewed:  No      Hospitalization in the last 30 days:  No    Contacts  :     Relationship to Patient:   Phone Number:    Alternate Contact:     Relationship to Patient:     Phone Number:    Met with:    Current PCP  Trupti Flor required for SNF : Y, N        3 night stay required - Y, N    ADLS  Support Systems: Children, Spouse/Significant Other  Transportation: family    Meal Preparation: spouse    Housing  Home Environment: lives home with spouse, dtr and grandkids  Steps: 4  Plans to Return to Present Housing: Yes  Other Identified Issues:     Jefferson Gilmore Cole  Currently active with [address] : No  Type of Home Care Services: None  Passport/Waiver : No  :                      Phone Number:    Passport/Waiver Services:           [phone] Provider: none  Equipment: Walker_x__Cane_x__RTS___ BSC___Shower Chair___  02__ at ____Liter(s)---Uses________HHN_x__ CPAP___ BiPap___ Hospital Bed___W/C____Other________      Has Home O2 in place on admit:  No  Informed of need to bring portable home O2 tank on day of discharge for nursing to connect prior to leaving:   Not Indicated  Verbalized agreement/Understanding:   Not Indicated    Community Service Affiliation  Dialysis:  No    · Name:  · Location  · Dialysis Schedule:  · Phone:   · Fax:     Outpatient PT/OT: No    Cancer Center: No     CHF Clinic: No     Pulmonary Rehab: No  Pain Clinic: No  Community Mental Health: Yes Mary Gillis [phone]    Wound Clinic: No     Other:       DISCHARGE PLAN: Chart reviewed and role of dcp

## 2020-03-11 VITALS
WEIGHT: 165.4 LBS | OXYGEN SATURATION: 97 % | RESPIRATION RATE: 19 BRPM | DIASTOLIC BLOOD PRESSURE: 99 MMHG | TEMPERATURE: 96.7 F | BODY MASS INDEX: 31.23 KG/M2 | HEIGHT: 61 IN | HEART RATE: 104 BPM | SYSTOLIC BLOOD PRESSURE: 180 MMHG

## 2020-03-11 PROBLEM — K90.9 MALABSORPTION OF IRON: Status: RESOLVED | Noted: 2017-08-03 | Resolved: 2020-03-11

## 2020-03-11 PROBLEM — E66.9 OBESITY: Status: RESOLVED | Noted: 2019-10-18 | Resolved: 2020-03-11

## 2020-03-11 PROBLEM — R40.20 LOSS OF CONSCIOUSNESS (HCC): Status: RESOLVED | Noted: 2019-10-17 | Resolved: 2020-03-11

## 2020-03-11 PROCEDURE — 96366 THER/PROPH/DIAG IV INF ADDON: CPT

## 2020-03-11 PROCEDURE — 6360000002 HC RX W HCPCS: Performed by: INTERNAL MEDICINE

## 2020-03-11 PROCEDURE — 2580000003 HC RX 258: Performed by: INTERNAL MEDICINE

## 2020-03-11 PROCEDURE — 6370000000 HC RX 637 (ALT 250 FOR IP): Performed by: INTERNAL MEDICINE

## 2020-03-11 PROCEDURE — 99217 PR OBSERVATION CARE DISCHARGE MANAGEMENT: CPT | Performed by: INTERNAL MEDICINE

## 2020-03-11 PROCEDURE — G0378 HOSPITAL OBSERVATION PER HR: HCPCS

## 2020-03-11 PROCEDURE — 94761 N-INVAS EAR/PLS OXIMETRY MLT: CPT

## 2020-03-11 PROCEDURE — 94640 AIRWAY INHALATION TREATMENT: CPT

## 2020-03-11 PROCEDURE — 96372 THER/PROPH/DIAG INJ SC/IM: CPT

## 2020-03-11 RX ORDER — PREDNISONE 20 MG/1
40 TABLET ORAL DAILY
Status: DISCONTINUED | OUTPATIENT
Start: 2020-03-11 | End: 2020-03-11 | Stop reason: HOSPADM

## 2020-03-11 RX ORDER — AMOXICILLIN 500 MG/1
500 CAPSULE ORAL 3 TIMES DAILY
Qty: 15 CAPSULE | Refills: 0 | Status: SHIPPED | OUTPATIENT
Start: 2020-03-11 | End: 2020-03-16

## 2020-03-11 RX ADMIN — Medication 10 ML: at 08:23

## 2020-03-11 RX ADMIN — LOSARTAN POTASSIUM 100 MG: 100 TABLET, FILM COATED ORAL at 08:22

## 2020-03-11 RX ADMIN — PREDNISONE 40 MG: 20 TABLET ORAL at 09:59

## 2020-03-11 RX ADMIN — DIVALPROEX SODIUM 250 MG: 250 TABLET, DELAYED RELEASE ORAL at 08:22

## 2020-03-11 RX ADMIN — DULOXETINE HYDROCHLORIDE 60 MG: 60 CAPSULE, DELAYED RELEASE ORAL at 08:22

## 2020-03-11 RX ADMIN — ENOXAPARIN SODIUM 40 MG: 40 INJECTION SUBCUTANEOUS at 08:23

## 2020-03-11 RX ADMIN — GABAPENTIN 600 MG: 300 CAPSULE ORAL at 13:46

## 2020-03-11 RX ADMIN — CEFTRIAXONE 1 G: 1 INJECTION, POWDER, FOR SOLUTION INTRAMUSCULAR; INTRAVENOUS at 11:35

## 2020-03-11 RX ADMIN — IPRATROPIUM BROMIDE AND ALBUTEROL SULFATE 1 AMPULE: .5; 3 SOLUTION RESPIRATORY (INHALATION) at 07:53

## 2020-03-11 RX ADMIN — GABAPENTIN 600 MG: 300 CAPSULE ORAL at 08:22

## 2020-03-11 ASSESSMENT — PAIN SCALES - GENERAL: PAINLEVEL_OUTOF10: 0

## 2020-03-11 NOTE — CARE COORDINATION
DISCHARGE ORDER  Date/Time 3/11/2020 3:15 PM  Completed by: Luis Eduardo Rodriguez, Case Management    Patient Name: Jared Greer    : 1950  Admitting Diagnosis: Hypoxia [R09.02]      Admit order Date and Status: 3/10/20 Observation  (verify MD's last order for status of admission)      Noted discharge order. Confirmed discharge plan : Yes  with whom patient  If pt confirmed DC plan does family need to be contacted by CM No   Discharge Plan: Order for dc noted. Spoke with pt who cont plan for home at dc. Discussed HHC and pt cont to decline. Chart reviewed and no other dc needs identified. Reviewed chart. Role of discharge planner explained and patient verbalized understanding. Discharge order is noted. Has Home O2 in place on admit:  No  Informed of need to bring portable home O2 tank on day of discharge for nursing to connect prior to leaving:   Not Indicated  Verbalized agreement/Understanding:   Not Indicated  Pt is being d/c'd to home today. Pt's O2 sats are 92% on RA. Discharge timeout done with  Nsg, CM and pt. All discharge needs and concerns addressed.

## 2020-03-11 NOTE — DISCHARGE SUMMARY
sounds  Cardiovascular:  RRR S1S2 heard, no murmurs or gallops  Abdomen:  Soft, undistended, non tender, no organomegaly, BS present  Extremities: No edema or cyanosis. Distal pulses well felt  Neurological : no focal deficits    CBC:   Recent Labs     03/09/20 1830   WBC 6.9   HGB 11.7*   HCT 35.4*   MCV 89.9        BMP:   Recent Labs     03/09/20 1830   *   K 4.4   CL 95*   CO2 25   BUN 15   CREATININE 0.6     LIVER PROFILE:   Recent Labs     03/09/20 1830   AST 11*   ALT 6*   BILITOT <0.2   ALKPHOS 78     UA:  Recent Labs     03/09/20  1800   COLORU Yellow   PHUR 6.5  6.5   WBCUA *   RBCUA None seen   BACTERIA Rare*   CLARITYU Clear   SPECGRAV 1.025   LEUKOCYTESUR TRACE*   UROBILINOGEN 0.2   BILIRUBINUR Negative   BLOODU Negative   GLUCOSEU Negative       CARDIAC ENZYMES  Recent Labs     03/09/20 1830   TROPONINI <0.01       ABG    Lab Results   Component Value Date    UXV1EIJ 27.4 03/10/2020    BEART 1.1 03/10/2020    N5UEXWIZ 89.4 03/10/2020    PHART 7.350 03/10/2020    OIF8HOO 50.7 03/10/2020    PO2ART 59.8 03/10/2020    JAE8ADN 28.9 03/10/2020         CULTURES  Urine- NGTD    RADIOLOGY  XR CHEST STANDARD (2 VW)   Final Result   No acute cardiopulmonary process. Discharge Medications     Medication List      START taking these medications    amoxicillin 500 MG capsule  Commonly known as:  AMOXIL  Take 1 capsule by mouth 3 times daily for 5 days        CONTINUE taking these medications    Blood Pressure Monitor Automat Sultana  1 each by Does not apply route daily.      diclofenac sodium 1 % Gel  Commonly known as:  VOLTAREN  Apply 4 g topically 4 times daily     * divalproex 250 MG DR tablet  Commonly known as:  DEPAKOTE     * divalproex 500 MG DR tablet  Commonly known as:  DEPAKOTE     DULoxetine 60 MG extended release capsule  Commonly known as:  CYMBALTA     fluticasone 50 MCG/ACT nasal spray  Commonly known as:  FLONASE     gabapentin 600 MG tablet  Commonly known as:

## 2020-03-11 NOTE — DISCHARGE INSTR - COC
Continuity of Care Form    Patient Name: Sherri Salcido   :  1950  MRN:  [id number]    Admit date:  3/9/2020  Discharge date:  ***    Code Status Order: Full Code   Advance Directives:   Advance Care Flowsheet Documentation     Date/Time Healthcare Directive Type of Healthcare Directive Copy in [address] Agent's Name Healthcare Agent's Phone Number    03/10/20 0157  No, patient does not have an advance directive for healthcare treatment -- -- -- -- --          Admitting Physician:  Reagan Simmonds, MD  PCP: Dennis Acuna MD    Discharging Nurse: [facility] Unit/Room#: 0215/0215-02  Discharging Unit Phone Number: ***    Emergency Contact:   Extended Emergency Contact Information  Primary Emergency Contact: Brent Fitzpatrick  Address: [address] [place] [address], [facility] [address] Phone: [phone]  Mobile Phone: [phone]  Relation: Spouse  Secondary Emergency Contact: Margarita Fitzpatrick  Address: [address] [place] [address], [facility] [address] Phone: [phone]  Relation: Child    Past Surgical History:  Past Surgical History:   Procedure Laterality Date    COLONOSCOPY  12   [facility] DIAGNOSTIC C[facility]AC CATH LAB PROCEDURE  2007    Normal cor angio 2007    HERNIA REPAIR  2019    robotic ventral hernia repair with mesh    HERNIA REPAIR N/A 2019    ROBOTIC VENTRAL HERNIA [facility] performed by Mirella Thornton MD at [facility]         Immunization History:   Immunization History   Administered Date(s) Administered    Influenza Vaccine, unspecified formulation 2018    Influenza Virus Vaccine 10/01/2012, 2013, 2015    Influenza Whole 10/28/2010    Influenza, MDCK Quadv, IM, PF (Flucelvax 4 yrs and older) 10/11/2017    Influenza, Quadv, IM, PF (6 mo and

## 2020-03-14 ENCOUNTER — HOSPITAL ENCOUNTER (EMERGENCY)
Age: 70
Discharge: HOME | End: 2020-03-14
Attending: EMERGENCY MEDICINE
Payer: COMMERCIAL

## 2020-03-14 ENCOUNTER — APPOINTMENT (OUTPATIENT)
Dept: CT IMAGING | Age: 70
End: 2020-03-14
Payer: COMMERCIAL

## 2020-03-14 ENCOUNTER — APPOINTMENT (OUTPATIENT)
Dept: GENERAL RADIOLOGY | Age: 70
End: 2020-03-14
Payer: COMMERCIAL

## 2020-03-14 VITALS
RESPIRATION RATE: 15 BRPM | TEMPERATURE: 97.7 F | OXYGEN SATURATION: 95 % | DIASTOLIC BLOOD PRESSURE: 54 MMHG | HEIGHT: 61 IN | HEART RATE: 87 BPM | WEIGHT: 165 LBS | SYSTOLIC BLOOD PRESSURE: 131 MMHG | BODY MASS INDEX: 31.15 KG/M2

## 2020-03-14 LAB
A/G RATIO: 1.4 (ref 1.1–2.2)
ALBUMIN SERPL-MCNC: 4.2 G/DL (ref 3.4–5)
ALP BLD-CCNC: 74 U/L (ref 40–129)
ALT SERPL-CCNC: 8 U/L (ref 10–40)
ANION GAP SERPL CALCULATED.3IONS-SCNC: 11 MMOL/L (ref 3–16)
AST SERPL-CCNC: 11 U/L (ref 15–37)
BASOPHILS ABSOLUTE: 0.1 K/UL (ref 0–0.2)
BASOPHILS RELATIVE PERCENT: 1.4 %
BILIRUB SERPL-MCNC: <0.2 MG/DL (ref 0–1)
BILIRUBIN URINE: NEGATIVE
BLOOD, URINE: NEGATIVE
BUN BLDV-MCNC: 23 MG/DL (ref 7–20)
CALCIUM SERPL-MCNC: 9.4 MG/DL (ref 8.3–10.6)
CHLORIDE BLD-SCNC: 90 MMOL/L (ref 99–110)
CLARITY: CLEAR
CO2: 28 MMOL/L (ref 21–32)
COLOR: YELLOW
CREAT SERPL-MCNC: 0.8 MG/DL (ref 0.6–1.2)
EOSINOPHILS ABSOLUTE: 0.3 K/UL (ref 0–0.6)
EOSINOPHILS RELATIVE PERCENT: 5.4 %
GFR AFRICAN AMERICAN: >60
GFR NON-AFRICAN AMERICAN: >60
GLOBULIN: 3 G/DL
GLUCOSE BLD-MCNC: 107 MG/DL (ref 70–99)
GLUCOSE URINE: NEGATIVE MG/DL
HCT VFR BLD CALC: 35.5 % (ref 36–48)
HEMOGLOBIN: 11.9 G/DL (ref 12–16)
KETONES, URINE: NEGATIVE MG/DL
LEUKOCYTE ESTERASE, URINE: NEGATIVE
LYMPHOCYTES ABSOLUTE: 1.5 K/UL (ref 1–5.1)
LYMPHOCYTES RELATIVE PERCENT: 27.9 %
MCH RBC QN AUTO: 29.8 PG (ref 26–34)
MCHC RBC AUTO-ENTMCNC: 33.4 G/DL (ref 31–36)
MCV RBC AUTO: 89.3 FL (ref 80–100)
MICROSCOPIC EXAMINATION: NORMAL
MONOCYTES ABSOLUTE: 0.4 K/UL (ref 0–1.3)
MONOCYTES RELATIVE PERCENT: 7.3 %
NEUTROPHILS ABSOLUTE: 3.1 K/UL (ref 1.7–7.7)
NEUTROPHILS RELATIVE PERCENT: 58 %
NITRITE, URINE: NEGATIVE
PDW BLD-RTO: 15.6 % (ref 12.4–15.4)
PH UA: 6 (ref 5–8)
PLATELET # BLD: 116 K/UL (ref 135–450)
PMV BLD AUTO: 7.6 FL (ref 5–10.5)
POTASSIUM SERPL-SCNC: 3.9 MMOL/L (ref 3.5–5.1)
PRO-BNP: 147 PG/ML (ref 0–124)
PROTEIN UA: NEGATIVE MG/DL
RAPID INFLUENZA  B AGN: NEGATIVE
RAPID INFLUENZA A AGN: NEGATIVE
RBC # BLD: 3.97 M/UL (ref 4–5.2)
SODIUM BLD-SCNC: 129 MMOL/L (ref 136–145)
SPECIFIC GRAVITY UA: 1.01 (ref 1–1.03)
TOTAL PROTEIN: 7.2 G/DL (ref 6.4–8.2)
TROPONIN: <0.01 NG/ML
URINE REFLEX TO CULTURE: NORMAL
URINE TYPE: NORMAL
UROBILINOGEN, URINE: 0.2 E.U./DL
WBC # BLD: 5.3 K/UL (ref 4–11)

## 2020-03-14 PROCEDURE — 72125 CT NECK SPINE W/O DYE: CPT

## 2020-03-14 PROCEDURE — 6360000002 HC RX W HCPCS: Performed by: NURSE PRACTITIONER

## 2020-03-14 PROCEDURE — 81003 URINALYSIS AUTO W/O SCOPE: CPT

## 2020-03-14 PROCEDURE — 80053 COMPREHEN METABOLIC PANEL: CPT

## 2020-03-14 PROCEDURE — 87804 INFLUENZA ASSAY W/OPTIC: CPT

## 2020-03-14 PROCEDURE — 94640 AIRWAY INHALATION TREATMENT: CPT

## 2020-03-14 PROCEDURE — 83880 ASSAY OF NATRIURETIC PEPTIDE: CPT

## 2020-03-14 PROCEDURE — 36415 COLL VENOUS BLD VENIPUNCTURE: CPT

## 2020-03-14 PROCEDURE — 96374 THER/PROPH/DIAG INJ IV PUSH: CPT

## 2020-03-14 PROCEDURE — 6370000000 HC RX 637 (ALT 250 FOR IP): Performed by: NURSE PRACTITIONER

## 2020-03-14 PROCEDURE — 84484 ASSAY OF TROPONIN QUANT: CPT

## 2020-03-14 PROCEDURE — 70450 CT HEAD/BRAIN W/O DYE: CPT

## 2020-03-14 PROCEDURE — 85025 COMPLETE CBC W/AUTO DIFF WBC: CPT

## 2020-03-14 PROCEDURE — 99285 EMERGENCY DEPT VISIT HI MDM: CPT

## 2020-03-14 PROCEDURE — 71045 X-RAY EXAM CHEST 1 VIEW: CPT

## 2020-03-14 RX ORDER — METHYLPREDNISOLONE SODIUM SUCCINATE 125 MG/2ML
125 INJECTION, POWDER, LYOPHILIZED, FOR SOLUTION INTRAMUSCULAR; INTRAVENOUS ONCE
Status: COMPLETED | OUTPATIENT
Start: 2020-03-14 | End: 2020-03-14

## 2020-03-14 RX ORDER — PREDNISONE 20 MG/1
TABLET ORAL
Qty: 18 TABLET | Refills: 0 | Status: SHIPPED | OUTPATIENT
Start: 2020-03-14 | End: 2020-03-24

## 2020-03-14 RX ORDER — ALBUTEROL SULFATE 90 UG/1
2 AEROSOL, METERED RESPIRATORY (INHALATION) 4 TIMES DAILY PRN
Qty: 1 INHALER | Refills: 0 | Status: ON HOLD | OUTPATIENT
Start: 2020-03-14 | End: 2021-08-16 | Stop reason: HOSPADM

## 2020-03-14 RX ORDER — NICOTINE 21 MG/24HR
1 PATCH, TRANSDERMAL 24 HOURS TRANSDERMAL DAILY
Qty: 42 PATCH | Refills: 0 | Status: SHIPPED | OUTPATIENT
Start: 2020-03-14 | End: 2022-09-19

## 2020-03-14 RX ORDER — IPRATROPIUM BROMIDE AND ALBUTEROL SULFATE 2.5; .5 MG/3ML; MG/3ML
1 SOLUTION RESPIRATORY (INHALATION) ONCE
Status: COMPLETED | OUTPATIENT
Start: 2020-03-14 | End: 2020-03-14

## 2020-03-14 RX ADMIN — IPRATROPIUM BROMIDE AND ALBUTEROL SULFATE 1 AMPULE: .5; 3 SOLUTION RESPIRATORY (INHALATION) at 17:49

## 2020-03-14 RX ADMIN — METHYLPREDNISOLONE SODIUM SUCCINATE 125 MG: 125 INJECTION, POWDER, FOR SOLUTION INTRAMUSCULAR; INTRAVENOUS at 17:46

## 2020-03-14 ASSESSMENT — PAIN DESCRIPTION - LOCATION: LOCATION: BACK;HEAD;CHEST

## 2020-03-14 ASSESSMENT — PAIN SCALES - GENERAL: PAINLEVEL_OUTOF10: 9

## 2020-03-14 NOTE — ED PROVIDER NOTES
EMERGENCY DEPARTMENT ENCOUNTER      This patient was seen and evaluated by the attending physician. Pt Name: Milady Sanches  MRN: [id number]  Giftytrongfmasha 1950  Date of evaluation: 3/14/2020  Provider: BALJIT StreetC  PCP: Hank Morales MD  ED Attending: Dr. Poli Gregory    History provided by the patient. CHIEF COMPLAINT:     Chief Complaint   Patient presents with   Singh Williamson     says she has a history of just falling; suppose to follow up with neurology for that; fell in the kitchen today; was admitted to [place] 3/9-3/11 for bladder infection and pneumonia; was too weak to get up when she fell; says she is coughing alot and hurts to breathe; also state she feels like she cannot urinate; EMS said inital sats were 85% on room air; EMS gave 1 duoneb and half albuterol treatment; pt also complainied of nausea and EMS gave 4mg of Zofran IVP       HISTORY OF PRESENT ILLNESS:      Milady Sanches is a 77 y.o. female who presents [facility]  ED with complaints of following. Patient states that she supposed to follow-up with neurology for that, today she states that she fell in the kitchen, similar to previous falls. Patient states that she has been coughing a lot and that it is sore when she breathes. Patient oxygen saturations were 85% on room air upon arrival, EMS gave 1 DuoNeb and half an albuterol treatment, patient also had some nausea so EMS gave her 4 Zofran. Patient is here for further evaluation    Location: Head  Quality: Ache   Severity:9 out of 10  Duration:happened just prior to arrival  Modifying factors:none noted    Nursing Notes were reviewed     REVIEW OF SYSTEMS:     Review of Systems  All systems, atotal of 10, are reviewed and negative except for those that were just noted in history present illness.         PAST MEDICAL HISTORY:     Past Medical History:   Diagnosis Date    Abnormal ECG 12/14/2012    Anemia     Arthritis     Back pain, chronic 3/13/2013    Bipolar disorder (HCC)     Bronchitis chronic     CHF [facility]congestive heart failure) ([facility]) 9/30/2016    Chronic back pain     Chronic neck pain     Clostridium difficile infection 3/29/12, 5/22/12    positive stool DNA probe    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[facility] [address] 01/10/2019    Coronary artery disease involving native coronary artery of native heart with angina pectoris ([facility] [address] 3/8/2016    Depression     Emphysema of lung (HCC)     Fibromyalgia     hyperlipidemia 8/11/2011    Hypertension     Kidney stone     Liver disease     Lung disease     MDD (major depressive disorder) 4/4/2012    Mood disorder ([address] 3/13/2013    Movement disorder     Neck pain, chronic 3/13/2013    Opiate misuse     Personality disorder ([facility] [address]     Pneumonia     Polypharmacy 2/16/2013         SURGICAL HISTORY:      Past Surgical History:   Procedure Laterality Date    COLONOSCOPY  1/19/12    DIAGNOSTIC CARDIAC CATH LAB PROCEDURE  Feb, 2007    Normal cor angio Feb, 2007    HERNIA REPAIR  07/11/2019    robotic ventral hernia repair with mesh    HERNIA REPAIR N/A 7/11/2019    ROBOTIC VENTRAL HERNIA REPAIR WITH MESH performed by Jose Arzola MD at [facility], TOTAL ABDOMINAL      KIDNEY STONE SURGERY           CURRENT MEDICATIONS:       Previous Medications    AMOXICILLIN (AMOXIL) 500 MG CAPSULE    Take 1 capsule by mouth 3 times daily for 5 days    BLOOD PRESSURE MONITORING (BLOOD PRESSURE MONITOR AUTOMAT) SOCORRO    1 each by Does not apply route daily.     DICLOFENAC SODIUM 1 % GEL    Apply 4 g topically 4 times daily    DIVALPROEX (DEPAKOTE) 250 MG DR TABLET    Take 250 mg by mouth every morning     DIVALPROEX (DEPAKOTE) 500 MG DR TABLET    Take 500 mg by mouth nightly    DULOXETINE (CYMBALTA) 60 MG EXTENDED RELEASE CAPSULE    Take 60 mg by mouth daily     FLUTICASONE (FLONASE) 50 MCG/ACT NASAL SPRAY    1 spray by Nasal route daily    GABAPENTIN (NEURONTIN) 600 MG TABLET    Take 600 mg by mouth 3 times daily. KETOROLAC (TORADOL) 10 MG TABLET    Take 1 tablet by mouth 3 times daily    LORAZEPAM (ATIVAN) 0.5 MG TABLET    Take 0.5 mg by mouth daily as needed for Anxiety. x3 doses    OLMESARTAN (BENICAR) 40 MG TABLET    Take 40 mg by mouth daily    QUETIAPINE (SEROQUEL) 300 MG TABLET    Take 300 mg by mouth nightly         ALLERGIES:    Dicyclomine; Sumatriptan; Tape [adhesive tape];  Tizanidine; and Motrin [ibuprofen micronized]    FAMILY HISTORY:       Family History   Problem Relation Age of Onset    Emphysema Mother     Heart Disease Mother     Arthritis Mother     Depression Mother     High Blood Pressure Mother     Heart Failure Father     Heart Disease Father     Arthritis Father     Diabetes Father     High Blood Pressure Father     Stroke Father     Substance Abuse Father     High Blood Pressure Brother     Heart Disease Sister     Kidney Disease Daughter           SOCIAL HISTORY:       Social History     Socioeconomic History    Marital status:      Spouse name: None    Number of children: None    Years of education: None    Highest education level: None   Occupational History    None   Social Needs    Financial resource strain: None    Food insecurity     Worry: None     Inability: None    Transportation needs     Medical: None     Non-medical: None   Tobacco Use    Smoking status: Current Every Day Smoker     Packs/day: 2.00     Years: 47.00     Pack years: 94.00     Types: Cigarettes    Smokeless tobacco: Never Used   Substance and Sexual Activity    Alcohol use: No    Drug use: Not Currently    Sexual activity: Yes     Partners: Male   Lifestyle    Physical activity     Days per week: None     Minutes per session: None    Stress: None   Relationships    Social connections     Talks on phone: None     Gets together: None     Attends [employer] service: None     Active member of club or organization: None     Attends meetings of clubs or organizations: None Relationship status: None    Intimate partner violence     Fear of current or ex partner: None     Emotionally abused: None     Physically abused: None     Forced sexual activity: None   Other Topics Concern    None   Social History Narrative    None       SCREENINGS:            PHYSICAL EXAM:       ED Triage Vitals [03/14/20 1432]   BP Temp Temp Source Pulse Resp SpO2 Height Weight   114/65 97.7 °F (36.5 °C) Oral 82 18 96 % 5' 1\" (1.549 m) 165 lb (74.8 kg)       Physical Exam    CONSTITUTIONAL: Awake and alert. Cooperative. Well-developed. Well-nourished. Non-toxic. Vitals:    03/14/20 1432 03/14/20 1517 03/14/20 1617   BP: 114/65 109/74 135/64   Pulse: 82 80 79   Resp: 18 16 15   Temp: 97.7 °F (36.5 °C)     TempSrc: Oral     SpO2: 96% 98% 99%   Weight: 165 lb (74.8 kg)     Height: 5' 1\" (1.549 m)       HENT: Normocephalic. Atraumatic. External ears normal, without discharge. TMs clear bilaterally. No nasal discharge. Oropharynx clear, no erythema. Mucous membranes moist.  EYES: Conjunctiva non-injected, no lid abnormalities noted. No scleral icterus. PERRL. EOM's grossly intact. Anterior chambers clear. NECK: Supple. Normal ROM. No meningismus. No thyroid tenderness or swelling noted. CARDIOVASCULAR: RRR. No Murmer. PULMONARY/CHEST WALL: Effort normal. No tachypnea.scattered wheezing noted bilaterally. ABDOMEN: Normal BS. Soft. Nondistended. No tenderness to palpate. No guarding. No hernias noted. No splenomegaly. Back: Spine is midline. No ecchymosis. No crepitus on palpation. No obvious subluxation of vertebral column. No saddle anesthesia or evidence of cauda equina. /ANORECTAL: Not assessed  MUSKULOSKELETAL: Normal ROM. No acute deformities. No edema. No tenderness to palpate. SKIN: Warm and dry. NEUROLOGICAL:  GCS 15. CN II-XII grossly intact. Strength is 5/5 in allextremities and sensation is intact. PSYCHIATRIC: Normal affect, normal insight and judgement.  Alert andoriented x 10.5 fL    Neutrophils % 58.0 %    Lymphocytes % 27.9 %    Monocytes % 7.3 %    Eosinophils % 5.4 %    Basophils % 1.4 %    Neutrophils Absolute 3.1 1.7 - 7.7 K/uL    Lymphocytes Absolute 1.5 1.0 - 5.1 K/uL    Monocytes Absolute 0.4 0.0 - 1.3 K/uL    Eosinophils Absolute 0.3 0.0 - 0.6 K/uL    Basophils Absolute 0.1 0.0 - 0.2 K/uL         RADIOLOGY:  All x-ray studies are viewed/reviewedby me. Formal interpretations per the radiologist are as follows:      CT HEAD WO CONTRAST   Final Result   No acute intracranial abnormality. CT Cervical Spine WO Contrast   Final Result   No acute abnormality of the cervical spine. XR CHEST PORTABLE   Final Result   No active cardiopulmonary disease                 EKG:  See EKG interpretation by an attending [id number]:   N/A    CRITICAL CARE TIME:   N/A    CONSULTS:  IP CONSULT TO HOSPITALIST      EMERGENCYDEPARTMENT COURSE and DIFFERENTIAL DIAGNOSIS/MDM:   Vitals:    Vitals:    03/14/20 1432 03/14/20 1517 03/14/20 1617   BP: 114/65 109/74 135/64   Pulse: 82 80 79   Resp: 18 16 15   Temp: 97.7 °F (36.5 °C)     TempSrc: Oral     SpO2: 96% 98% 99%   Weight: 165 lb (74.8 kg)     Height: 5' 1\" (1.549 m)         Patient was given the following medications:  Medications - No data to display      Patient was evaluated by both myself and Dr. Jose Antonio Montana. Patient presented to the emergency room today   With complaints of shortness of breath, a fall and hitting her head. Patient work-up today showed no significant electrolyte abnormality, mild hyponatremia. Patient had a negative troponin, urinalysis is unremarkable. No leukocytosis. Patient initially was ambulated with pulse oximetry, she did desaturate. Patient was given steroids, breathing treatment and ambulation with pulse oximetry was redone and patient did do well. She is requesting to be discharged home, she does not want to be admitted.   Patient was evaluated by the attending physician, he does

## 2020-03-14 NOTE — ED NOTES
Ambulated pt 120 ft around zone 2. Pt oxygen started out at 94, ended with 83. Pulse was 87 throughout the walk. NP notified.       Sharmaine Shape  03/14/20 [id number]

## 2020-03-25 PROBLEM — N17.9 ACUTE RENAL FAILURE (HCC): Status: RESOLVED | Noted: 2020-03-25 | Resolved: 2020-03-24

## 2020-05-20 ENCOUNTER — HOSPITAL ENCOUNTER (EMERGENCY)
Age: 70
Discharge: HOME | End: 2020-05-21
Attending: EMERGENCY MEDICINE
Payer: COMMERCIAL

## 2020-05-20 PROCEDURE — 2580000003 HC RX 258: Performed by: EMERGENCY MEDICINE

## 2020-05-20 PROCEDURE — 6360000002 HC RX W HCPCS: Performed by: EMERGENCY MEDICINE

## 2020-05-20 PROCEDURE — 99284 EMERGENCY DEPT VISIT MOD MDM: CPT

## 2020-05-20 PROCEDURE — 6360000002 HC RX W HCPCS

## 2020-05-20 PROCEDURE — 96374 THER/PROPH/DIAG INJ IV PUSH: CPT

## 2020-05-20 RX ORDER — NALOXONE HYDROCHLORIDE 1 MG/ML
2 INJECTION INTRAMUSCULAR; INTRAVENOUS; SUBCUTANEOUS ONCE
Status: COMPLETED | OUTPATIENT
Start: 2020-05-20 | End: 2020-05-20

## 2020-05-20 RX ORDER — NALOXONE HYDROCHLORIDE 1 MG/ML
1 INJECTION INTRAMUSCULAR; INTRAVENOUS; SUBCUTANEOUS ONCE
Status: COMPLETED | OUTPATIENT
Start: 2020-05-20 | End: 2020-05-20

## 2020-05-20 RX ORDER — 0.9 % SODIUM CHLORIDE 0.9 %
500 INTRAVENOUS SOLUTION INTRAVENOUS ONCE
Status: COMPLETED | OUTPATIENT
Start: 2020-05-20 | End: 2020-05-20

## 2020-05-20 RX ORDER — NALOXONE HYDROCHLORIDE 1 MG/ML
INJECTION INTRAMUSCULAR; INTRAVENOUS; SUBCUTANEOUS
Status: COMPLETED
Start: 2020-05-20 | End: 2020-05-20

## 2020-05-20 RX ADMIN — SODIUM CHLORIDE 500 ML: 9 INJECTION, SOLUTION INTRAVENOUS at 22:12

## 2020-05-20 RX ADMIN — NALOXONE HYDROCHLORIDE 2 MG: 1 INJECTION INTRAMUSCULAR; INTRAVENOUS; SUBCUTANEOUS at 21:55

## 2020-05-20 RX ADMIN — NALOXONE HYDROCHLORIDE 2 MG: 1 INJECTION PARENTERAL at 21:55

## 2020-05-20 RX ADMIN — NALOXONE HYDROCHLORIDE 1 MG: 1 INJECTION PARENTERAL at 22:12

## 2020-05-21 VITALS
OXYGEN SATURATION: 95 % | SYSTOLIC BLOOD PRESSURE: 107 MMHG | HEART RATE: 80 BPM | TEMPERATURE: 97.6 F | RESPIRATION RATE: 14 BRPM | DIASTOLIC BLOOD PRESSURE: 58 MMHG

## 2020-05-21 NOTE — ED PROVIDER NOTES
Social History Narrative    Not on file     No current facility-administered medications for this encounter. Current Outpatient Medications   Medication Sig Dispense Refill    nicotine (NICODERM CQ) 14 MG/24HR Place 1 patch onto the skin daily 42 patch 0    albuterol sulfate HFA (VENTOLIN HFA) 108 (90 Base) MCG/ACT inhaler Inhale 2 puffs into the lungs 4 times daily as needed for Wheezing 1 Inhaler 0    LORazepam (ATIVAN) 0.5 MG tablet Take 0.5 mg by mouth daily as needed for Anxiety. x3 doses      diclofenac sodium 1 % GEL Apply 4 g topically 4 times daily 4 Tube 1    QUEtiapine (SEROQUEL) 300 MG tablet Take 300 mg by mouth nightly      gabapentin (NEURONTIN) 600 MG tablet Take 600 mg by mouth 3 times daily.  olmesartan (BENICAR) 40 MG tablet Take 40 mg by mouth daily      divalproex (DEPAKOTE) 500 MG DR tablet Take 500 mg by mouth nightly      ketorolac (TORADOL) 10 MG tablet Take 1 tablet by mouth 3 times daily 12 tablet 0    DULoxetine (CYMBALTA) 60 MG extended release capsule Take 60 mg by mouth daily       fluticasone (FLONASE) 50 MCG/ACT nasal spray 1 spray by Nasal route daily      divalproex (DEPAKOTE) 250 MG DR tablet Take 250 mg by mouth every morning       Blood Pressure Monitoring (BLOOD PRESSURE MONITOR AUTOMAT) SOCORRO 1 each by Does not apply route daily. 1 Device 0     Allergies   Allergen Reactions    Dicyclomine      listed in pxysis    Sumatriptan      In pxysis listing  Other reaction(s): throat swelling    Tape Dima Ricks Tape]      Tears off patient's skin very easily     Tizanidine      Listed in pxysis    Motrin [Ibuprofen Micronized] Nausea And Vomiting       REVIEW OF SYSTEMS  10 systems reviewed, pertinent positives per HPI otherwise noted to be negative. PHYSICAL EXAM  BP (!) 91/49   Pulse 76   Temp 97.6 °F (36.4 °C) (Oral)   Resp 16   SpO2 100%   GENERAL APPEARANCE: Awake and alert. Cooperative.  Drowsy, responsive to pain and verbal but after repeated verbal prompts. HEAD: Normocephalic. Atraumatic. EYES: PERRL. EOM's grossly intact. ENT: Mucous membranes are moist.   NECK: Supple. HEART: RRR. CHEST/LUNGS: Chest atraumatic, nontender, respirations unlabored. CTAB. Good air exchange. BACK: No midline spinal tenderness or step-off. ABDOMEN: Soft. Non-distended. Non-tender. No guarding or rebound. Normal bowel sounds. EXTREMITIES: No peripheral edema. Pain with flexion and ext of R knee, has knee brace on. . All extremities neurovascularly intact. RECTAL/: Deferred  SKIN: Warm and dry. No acute rashes. NEUROLOGICAL: Alert and orientedx3. CN 2-12 intact, No gross facial drooping. Strength 5/5, sensation intact. Normal coordination. ED COURSE/MDM  Patient seen and evaluated. Pt deneis SI or HI, she is A+Ox3 here, much more alert after narcan and since she has been here sleeping. She has steady gait and can stand on her own.  contacted to come  pt I do think she can be dc home. Plan of care discussed with patient. Patient in agreement with plan. CLINICAL IMPRESSION  1. Opiate overdose, accidental or unintentional, initial encounter ([facility] [address]        Blood pressure (!) 91/49, pulse 76, temperature 97.6 °F (36.4 °C), temperature source Oral, resp. rate 16, SpO2 100 %, not currently breastfeeding. DISPOSITION  Darrin Fitzpatrick was discharged to home in stable condition. This chart was generated in part by using Dragon Dictation system and may contain errors related to that system including errors in grammar, punctuation, and spelling, as well as words and phrases that may be inappropriate. When dictating, effort is made to correct spelling/grammar errors. If there are any questions or concerns please feel free to contact the dictating provider for clarification.      Duarte Angeles DO  ATTENDING, [facility], DO  05/21/20 8210

## 2020-05-21 NOTE — ED NOTES
Fall risk screening completed.  Fall risk bracelet applied to patient.  Non-skid socks provided and placed on patient. Nancy Coffman fall risk is indicated using  dome light .  Based on score, a bed alarm was indicated and applied.  The call light is within the patient's reach, and instructions for use were provided.  The bed is in the lowest position with wheels locked.  The patient has been advised to notify staff, using the call light, if there is a need to get up or use restroom.  The patient verbalized understanding of safety precautions and how to contact staff for assistance.          Margarita Hahn, ROMAIN  05/20/20 3134

## 2020-06-01 ENCOUNTER — OFFICE VISIT (OUTPATIENT)
Dept: ORTHOPEDIC SURGERY | Age: 70
End: 2020-06-01
Payer: COMMERCIAL

## 2020-06-01 VITALS — BODY MASS INDEX: 28.32 KG/M2 | WEIGHT: 150 LBS | HEIGHT: 61 IN

## 2020-06-01 PROCEDURE — 99213 OFFICE O/P EST LOW 20 MIN: CPT | Performed by: PHYSICIAN ASSISTANT

## 2020-06-01 NOTE — PROGRESS NOTES
Altered mental state 09/20/2015     Priority: High    Wernicke encephalopathy syndrome 08/27/2015     Priority: High    Suicidal ideation 11/17/2013     Priority: High    Depressive disorder, not elsewhere classified      Priority: Medium    Bipolar disorder ([address] 08/27/2015     Priority: Medium    COPD (chronic obstructive pulmonary disease) ([address] 11/17/2013     Priority: Medium    Essential hypertension      Priority: Low    Hypoxia 03/10/2020    Acute cystitis with hematuria     Streptococcus pneumoniae pneumonia ([address] 10/21/2019    Moderate malnutrition ([address] 10/18/2019    Ventral hernia without obstruction or gangrene     Hallucinations     Mood disorder ([address] 01/09/2019    Tobacco dependence 01/09/2019    Cannabis abuse 01/09/2019    Opiate abuse, episodic ([address] 01/09/2019    Coccygeal pain, acute     H/O orthostatic hypotension     Viral labyrinthitis 02/07/2018    Peripheral vertigo, bilateral     Dizziness     Dementia due to Alzheimer's disease ([address]     Iron deficiency anemia secondary to inadequate dietary iron intake 08/03/2017    Pain in thoracic spine 07/26/2017    Spondylosis of thoracic region without myelopathy or radiculopathy 07/26/2017    SBO (small bowel obstruction) ([address]     Malignant hypertension     Headache 09/30/2016    Shortness of breath 09/30/2016    Generalized weakness 09/30/2016    CHF (congestive heart failure) ([address] 09/30/2016    Anemia 07/24/2016    ARF (acute renal failure) ([address] 07/24/2016    Angina at rest [facility]) 03/08/2016    CAD in native artery 03/08/2016    Dyslipidemia 03/08/2016    Discitis of thoracic region     Midline low back pain without sciatica     Tobacco abuse 01/30/2016    Renal artery stenosis ([address] 50/34/1472    Metabolic encephalopathy     Syncope and collapse     Prolonged QT interval 09/20/2015    Ataxia 08/27/2015    Acute exacerbation of chronic obstructive pulmonary disease (COPD) ([address] 08/27/2015    Discitis

## 2020-06-05 ENCOUNTER — HOSPITAL ENCOUNTER (OUTPATIENT)
Dept: MRI IMAGING | Age: 70
Discharge: HOME | End: 2020-06-05
Payer: COMMERCIAL

## 2020-06-05 PROCEDURE — 73721 MRI JNT OF LWR EXTRE W/O DYE: CPT

## 2020-06-16 ENCOUNTER — OFFICE VISIT (OUTPATIENT)
Dept: ORTHOPEDIC SURGERY | Age: 70
End: 2020-06-16
Payer: COMMERCIAL

## 2020-06-16 VITALS — WEIGHT: 150 LBS | BODY MASS INDEX: 28.32 KG/M2 | HEIGHT: 61 IN

## 2020-06-16 PROBLEM — M17.11 PRIMARY OSTEOARTHRITIS OF RIGHT KNEE: Status: ACTIVE | Noted: 2020-06-16

## 2020-06-16 PROBLEM — S83.271A COMPLEX TEAR OF LATERAL MENISCUS OF RIGHT KNEE AS CURRENT INJURY: Status: ACTIVE | Noted: 2020-06-16

## 2020-06-16 PROBLEM — S83.231A COMPLEX TEAR OF MEDIAL MENISCUS OF RIGHT KNEE AS CURRENT INJURY: Status: ACTIVE | Noted: 2020-06-16

## 2020-06-16 PROCEDURE — 99213 OFFICE O/P EST LOW 20 MIN: CPT | Performed by: PHYSICIAN ASSISTANT

## 2020-06-16 RX ORDER — BUPIVACAINE HYDROCHLORIDE 5 MG/ML
30 INJECTION, SOLUTION PERINEURAL ONCE
Status: DISCONTINUED | OUTPATIENT
Start: 2020-06-16 | End: 2020-06-16

## 2020-06-16 RX ORDER — BETAMETHASONE SODIUM PHOSPHATE AND BETAMETHASONE ACETATE 3; 3 MG/ML; MG/ML
12 INJECTION, SUSPENSION INTRA-ARTICULAR; INTRALESIONAL; INTRAMUSCULAR; SOFT TISSUE ONCE
Status: COMPLETED | OUTPATIENT
Start: 2020-06-16 | End: 2020-06-16

## 2020-06-16 RX ORDER — BUPIVACAINE HYDROCHLORIDE 5 MG/ML
3 INJECTION, SOLUTION PERINEURAL ONCE
Status: COMPLETED | OUTPATIENT
Start: 2020-06-16 | End: 2020-06-16

## 2020-06-16 RX ADMIN — BETAMETHASONE SODIUM PHOSPHATE AND BETAMETHASONE ACETATE 12 MG: 3; 3 INJECTION, SUSPENSION INTRA-ARTICULAR; INTRALESIONAL; INTRAMUSCULAR; SOFT TISSUE at 10:33

## 2020-06-16 RX ADMIN — BUPIVACAINE HYDROCHLORIDE 15 MG: 5 INJECTION, SOLUTION PERINEURAL at 10:35

## 2020-06-16 NOTE — PROGRESS NOTES
is oriented to time, place and person. The patient's mood and affect are appropriate. Lymphatic: The lymphatic examination bilaterally reveals all areas to be without enlargement or induration. Vascular: Examination reveals no swelling or calf tenderness. Peripheral pulses are palpable and 2+. Neurological: The patient has good coordination. There is no weakness or sensory deficit. Body mass index is 28.34 kg/m². Right knee Examination:    Inspection:  No swelling, ecchymosis or deformity    Palpation:  Tenderness to palpation directly over the medial joint line    Range of Motion:  Well-preserved range of motion, 0-120°. Strength:  4+/5 quad and hamstring strength    Special Tests:  Positive Deanne's examination. Stable to varus and valgus stress testing. Negative Lachman's exam    Skin: There are no rashes, ulcerations or lesions. Gait: Mild antalgic gait    Reflex normal deep tendon reflexes    Additional Comments:       Additional Examinations:         Contralateral Exam: Examination of the left knee reveals intact skin. There is no focal tenderness. The patient demonstrates full painless range of motion with regard to flexion and extension. Strength is 5/5 throughout all planes. Ligamentous stability is grossly intact. Examination of the right and left hip reveals intact skin. The patient demonstrates full painless range of motion with regards to flexion, abduction, internal and external rotation. There is no tenderness about the greater trochanter. There is a negative straight leg raise against resistance. Strength is 5/5 throughout all planes.     Radiology:       EXAMINATION:   MRI OF THE RIGHT KNEE WITHOUT CONTRAST, 6/5/2020 5:26 pm       TECHNIQUE:   Multiplanar multisequence MRI of the right knee was performed without the   administration of intravenous contrast.       COMPARISON:   R knee radiograph June 1, 2020       HISTORY:   ORDERING SYSTEM PROVIDED HISTORY: Right knee pain, unspecified chronicity   TECHNOLOGIST PROVIDED HISTORY:   Reason for Exam: right knee pain   Acuity: Acute   Type of Exam: Initial       FINDINGS:   MENISCI: Mildly complex tear of the posterior horn body junction medial   meniscus with extension to the inferior articular surface.  Equivocal   horizontal tear of the body of the lateral meniscus with extension of   abnormal signal to the free edge.       CRUCIATE LIGAMENTS: The anterior and posterior cruciate ligaments are intact.       EXTENSOR MECHANISM: The extensor mechanism is intact with tendinosis of the   intact patellar tendon and distal quadriceps tendon.  The medial and lateral   patellar retinaculum are intact.       LATERAL COLLATERAL LIGAMENT COMPLEX: The popliteus tendon, biceps femoris   tendon, fibular collateral ligament and iliotibial band are intact.       MEDIAL COLLATERAL LIGAMENT COMPLEX: The superficial and deep components of   the medial collateral ligament are intact.       KNEE JOINT: Large knee effusion and synovitis.  Multifocal fissuring of the   patellofemoral compartment cartilage without large full-thickness defect   identified.  Full-thickness cartilage loss along the weight-bearing surface   of the medial femoral condyle and medial tibial plateau cartilage is grossly   preserved within the lateral compartment.       BONE MARROW: No osseous contusion, fracture, or suspicious marrow occupying   lesion identified.  Degenerative marrow signal changes of the medial   compartment of the knee.           Impression   1. Complex tearing of the posterior horn body junction of the medial meniscus. 2. Equivocal horizontal tear of the body of the lateral meniscus. 3. Tendinosis of the intact patellar tendon. 4. Large knee effusion and synovitis. 5. Grade 3 and 4 chondromalacia of the medial compartment of the knee. Impression:  Encounter Diagnoses   Name Primary?     Primary osteoarthritis of right knee Yes    Complex

## 2020-09-28 ENCOUNTER — APPOINTMENT (OUTPATIENT)
Dept: GENERAL RADIOLOGY | Age: 70
End: 2020-09-28
Payer: COMMERCIAL

## 2020-09-28 ENCOUNTER — HOSPITAL ENCOUNTER (OUTPATIENT)
Age: 70
Setting detail: OBSERVATION
Discharge: HOME | End: 2020-09-29
Attending: FAMILY MEDICINE | Admitting: FAMILY MEDICINE
Payer: COMMERCIAL

## 2020-09-28 LAB
A/G RATIO: 1.7 (ref 1.1–2.2)
ALBUMIN SERPL-MCNC: 4.3 G/DL (ref 3.4–5)
ALP BLD-CCNC: 70 U/L (ref 40–129)
ALT SERPL-CCNC: 7 U/L (ref 10–40)
ANION GAP SERPL CALCULATED.3IONS-SCNC: 10 MMOL/L (ref 3–16)
APTT: 56.8 SEC (ref 24.2–36.2)
AST SERPL-CCNC: 10 U/L (ref 15–37)
BASOPHILS ABSOLUTE: 0 K/UL (ref 0–0.2)
BASOPHILS RELATIVE PERCENT: 1.3 %
BILIRUB SERPL-MCNC: <0.2 MG/DL (ref 0–1)
BUN BLDV-MCNC: 9 MG/DL (ref 7–20)
CALCIUM SERPL-MCNC: 9 MG/DL (ref 8.3–10.6)
CHLORIDE BLD-SCNC: 95 MMOL/L (ref 99–110)
CO2: 30 MMOL/L (ref 21–32)
CREAT SERPL-MCNC: <0.5 MG/DL (ref 0.6–1.2)
EKG ATRIAL RATE: 87 BPM
EKG DIAGNOSIS: NORMAL
EKG P AXIS: -16 DEGREES
EKG P-R INTERVAL: 142 MS
EKG Q-T INTERVAL: 372 MS
EKG QRS DURATION: 88 MS
EKG QTC CALCULATION (BAZETT): 447 MS
EKG R AXIS: -3 DEGREES
EKG T AXIS: 8 DEGREES
EKG VENTRICULAR RATE: 87 BPM
EOSINOPHILS ABSOLUTE: 0.1 K/UL (ref 0–0.6)
EOSINOPHILS RELATIVE PERCENT: 2.3 %
GFR AFRICAN AMERICAN: >60
GFR NON-AFRICAN AMERICAN: >60
GLOBULIN: 2.6 G/DL
GLUCOSE BLD-MCNC: 105 MG/DL (ref 70–99)
HCT VFR BLD CALC: 38 % (ref 36–48)
HEMOGLOBIN: 12.7 G/DL (ref 12–16)
INR BLD: 0.93 (ref 0.86–1.14)
LYMPHOCYTES ABSOLUTE: 1.2 K/UL (ref 1–5.1)
LYMPHOCYTES RELATIVE PERCENT: 30.7 %
MCH RBC QN AUTO: 30.4 PG (ref 26–34)
MCHC RBC AUTO-ENTMCNC: 33.4 G/DL (ref 31–36)
MCV RBC AUTO: 91.1 FL (ref 80–100)
MONOCYTES ABSOLUTE: 0.4 K/UL (ref 0–1.3)
MONOCYTES RELATIVE PERCENT: 10.6 %
NEUTROPHILS ABSOLUTE: 2.1 K/UL (ref 1.7–7.7)
NEUTROPHILS RELATIVE PERCENT: 55.1 %
PDW BLD-RTO: 14.8 % (ref 12.4–15.4)
PLATELET # BLD: 145 K/UL (ref 135–450)
PMV BLD AUTO: 6.9 FL (ref 5–10.5)
POTASSIUM SERPL-SCNC: 4.3 MMOL/L (ref 3.5–5.1)
PROTHROMBIN TIME: 10.8 SEC (ref 10–13.2)
RBC # BLD: 4.17 M/UL (ref 4–5.2)
SODIUM BLD-SCNC: 135 MMOL/L (ref 136–145)
TOTAL PROTEIN: 6.9 G/DL (ref 6.4–8.2)
TROPONIN: <0.01 NG/ML
WBC # BLD: 3.8 K/UL (ref 4–11)

## 2020-09-28 PROCEDURE — 2580000003 HC RX 258

## 2020-09-28 PROCEDURE — 85610 PROTHROMBIN TIME: CPT

## 2020-09-28 PROCEDURE — 6370000000 HC RX 637 (ALT 250 FOR IP): Performed by: EMERGENCY MEDICINE

## 2020-09-28 PROCEDURE — 85025 COMPLETE CBC W/AUTO DIFF WBC: CPT

## 2020-09-28 PROCEDURE — G0378 HOSPITAL OBSERVATION PER HR: HCPCS

## 2020-09-28 PROCEDURE — 85730 THROMBOPLASTIN TIME PARTIAL: CPT

## 2020-09-28 PROCEDURE — 80053 COMPREHEN METABOLIC PANEL: CPT

## 2020-09-28 PROCEDURE — 94761 N-INVAS EAR/PLS OXIMETRY MLT: CPT

## 2020-09-28 PROCEDURE — 84484 ASSAY OF TROPONIN QUANT: CPT

## 2020-09-28 PROCEDURE — 2700000000 HC OXYGEN THERAPY PER DAY

## 2020-09-28 PROCEDURE — 71045 X-RAY EXAM CHEST 1 VIEW: CPT

## 2020-09-28 PROCEDURE — 99285 EMERGENCY DEPT VISIT HI MDM: CPT

## 2020-09-28 PROCEDURE — 36415 COLL VENOUS BLD VENIPUNCTURE: CPT

## 2020-09-28 PROCEDURE — 6370000000 HC RX 637 (ALT 250 FOR IP)

## 2020-09-28 RX ORDER — ASPIRIN 81 MG/1
324 TABLET, CHEWABLE ORAL ONCE
Status: DISCONTINUED | OUTPATIENT
Start: 2020-09-28 | End: 2020-09-28

## 2020-09-28 RX ORDER — ACETAMINOPHEN 325 MG/1
650 TABLET ORAL EVERY 6 HOURS PRN
Status: CANCELLED | OUTPATIENT
Start: 2020-09-28

## 2020-09-28 RX ORDER — ACETAMINOPHEN 325 MG/1
650 TABLET ORAL ONCE
Status: COMPLETED | OUTPATIENT
Start: 2020-09-28 | End: 2020-09-28

## 2020-09-28 RX ORDER — ACETAMINOPHEN 325 MG/1
650 TABLET ORAL EVERY 6 HOURS PRN
Status: DISCONTINUED | OUTPATIENT
Start: 2020-09-28 | End: 2020-09-29 | Stop reason: HOSPADM

## 2020-09-28 RX ORDER — QUETIAPINE FUMARATE 100 MG/1
300 TABLET, FILM COATED ORAL NIGHTLY
Status: DISCONTINUED | OUTPATIENT
Start: 2020-09-28 | End: 2020-09-29 | Stop reason: HOSPADM

## 2020-09-28 RX ORDER — ACETAMINOPHEN 650 MG/1
650 SUPPOSITORY RECTAL EVERY 6 HOURS PRN
Status: DISCONTINUED | OUTPATIENT
Start: 2020-09-28 | End: 2020-09-29 | Stop reason: HOSPADM

## 2020-09-28 RX ORDER — ASPIRIN 81 MG/1
81 TABLET, CHEWABLE ORAL DAILY
Status: DISCONTINUED | OUTPATIENT
Start: 2020-09-29 | End: 2020-09-29 | Stop reason: HOSPADM

## 2020-09-28 RX ORDER — ACETAMINOPHEN 650 MG/1
650 SUPPOSITORY RECTAL EVERY 6 HOURS PRN
Status: CANCELLED | OUTPATIENT
Start: 2020-09-28

## 2020-09-28 RX ORDER — PROMETHAZINE HYDROCHLORIDE 25 MG/1
12.5 TABLET ORAL EVERY 6 HOURS PRN
Status: CANCELLED | OUTPATIENT
Start: 2020-09-28

## 2020-09-28 RX ORDER — PRIMIDONE 50 MG/1
50 TABLET ORAL EVERY MORNING
Status: DISCONTINUED | OUTPATIENT
Start: 2020-09-29 | End: 2020-09-29 | Stop reason: HOSPADM

## 2020-09-28 RX ORDER — SODIUM CHLORIDE 0.9 % (FLUSH) 0.9 %
10 SYRINGE (ML) INJECTION PRN
Status: CANCELLED | OUTPATIENT
Start: 2020-09-28

## 2020-09-28 RX ORDER — LOSARTAN POTASSIUM 25 MG/1
50 TABLET ORAL DAILY
Status: DISCONTINUED | OUTPATIENT
Start: 2020-09-28 | End: 2020-09-29 | Stop reason: HOSPADM

## 2020-09-28 RX ORDER — NITROGLYCERIN 0.4 MG/1
0.4 TABLET SUBLINGUAL EVERY 5 MIN PRN
Status: DISCONTINUED | OUTPATIENT
Start: 2020-09-28 | End: 2020-09-29 | Stop reason: HOSPADM

## 2020-09-28 RX ORDER — GABAPENTIN 100 MG/1
100 CAPSULE ORAL 3 TIMES DAILY
Status: DISCONTINUED | OUTPATIENT
Start: 2020-09-28 | End: 2020-09-29 | Stop reason: HOSPADM

## 2020-09-28 RX ORDER — ATORVASTATIN CALCIUM 40 MG/1
40 TABLET, FILM COATED ORAL NIGHTLY
Status: DISCONTINUED | OUTPATIENT
Start: 2020-09-28 | End: 2020-09-29 | Stop reason: HOSPADM

## 2020-09-28 RX ORDER — ONDANSETRON 2 MG/ML
4 INJECTION INTRAMUSCULAR; INTRAVENOUS EVERY 6 HOURS PRN
Status: CANCELLED | OUTPATIENT
Start: 2020-09-28

## 2020-09-28 RX ORDER — PROMETHAZINE HYDROCHLORIDE 25 MG/1
12.5 TABLET ORAL EVERY 6 HOURS PRN
Status: DISCONTINUED | OUTPATIENT
Start: 2020-09-28 | End: 2020-09-29 | Stop reason: HOSPADM

## 2020-09-28 RX ORDER — POLYETHYLENE GLYCOL 3350 17 G/17G
17 POWDER, FOR SOLUTION ORAL DAILY PRN
Status: DISCONTINUED | OUTPATIENT
Start: 2020-09-28 | End: 2020-09-29 | Stop reason: HOSPADM

## 2020-09-28 RX ORDER — SODIUM CHLORIDE 0.9 % (FLUSH) 0.9 %
10 SYRINGE (ML) INJECTION EVERY 12 HOURS SCHEDULED
Status: DISCONTINUED | OUTPATIENT
Start: 2020-09-28 | End: 2020-09-29 | Stop reason: HOSPADM

## 2020-09-28 RX ORDER — NITROGLYCERIN 0.4 MG/1
0.4 TABLET SUBLINGUAL EVERY 5 MIN PRN
Status: DISCONTINUED | OUTPATIENT
Start: 2020-09-28 | End: 2020-09-28

## 2020-09-28 RX ORDER — BUSPIRONE HYDROCHLORIDE 5 MG/1
10 TABLET ORAL 2 TIMES DAILY
Status: DISCONTINUED | OUTPATIENT
Start: 2020-09-28 | End: 2020-09-29 | Stop reason: HOSPADM

## 2020-09-28 RX ORDER — POLYETHYLENE GLYCOL 3350 17 G/17G
17 POWDER, FOR SOLUTION ORAL DAILY PRN
Status: CANCELLED | OUTPATIENT
Start: 2020-09-28

## 2020-09-28 RX ORDER — CARVEDILOL 3.12 MG/1
3.12 TABLET ORAL 2 TIMES DAILY WITH MEALS
Status: CANCELLED | OUTPATIENT
Start: 2020-09-28

## 2020-09-28 RX ORDER — SODIUM CHLORIDE 0.9 % (FLUSH) 0.9 %
10 SYRINGE (ML) INJECTION PRN
Status: DISCONTINUED | OUTPATIENT
Start: 2020-09-28 | End: 2020-09-29 | Stop reason: HOSPADM

## 2020-09-28 RX ORDER — ASPIRIN 81 MG/1
81 TABLET, CHEWABLE ORAL DAILY
Status: CANCELLED | OUTPATIENT
Start: 2020-09-28

## 2020-09-28 RX ORDER — ONDANSETRON 2 MG/ML
4 INJECTION INTRAMUSCULAR; INTRAVENOUS EVERY 6 HOURS PRN
Status: DISCONTINUED | OUTPATIENT
Start: 2020-09-28 | End: 2020-09-29 | Stop reason: HOSPADM

## 2020-09-28 RX ORDER — SODIUM CHLORIDE 0.9 % (FLUSH) 0.9 %
10 SYRINGE (ML) INJECTION EVERY 12 HOURS SCHEDULED
Status: CANCELLED | OUTPATIENT
Start: 2020-09-28

## 2020-09-28 RX ORDER — PANTOPRAZOLE SODIUM 40 MG/1
40 TABLET, DELAYED RELEASE ORAL
Status: DISCONTINUED | OUTPATIENT
Start: 2020-09-29 | End: 2020-09-29 | Stop reason: HOSPADM

## 2020-09-28 RX ORDER — DULOXETIN HYDROCHLORIDE 60 MG/1
60 CAPSULE, DELAYED RELEASE ORAL DAILY
Status: DISCONTINUED | OUTPATIENT
Start: 2020-09-28 | End: 2020-09-29 | Stop reason: HOSPADM

## 2020-09-28 RX ORDER — CARVEDILOL 3.12 MG/1
3.12 TABLET ORAL 2 TIMES DAILY WITH MEALS
Status: DISCONTINUED | OUTPATIENT
Start: 2020-09-28 | End: 2020-09-29 | Stop reason: HOSPADM

## 2020-09-28 RX ORDER — DIVALPROEX SODIUM 250 MG/1
250 TABLET, DELAYED RELEASE ORAL EVERY 12 HOURS SCHEDULED
Status: DISCONTINUED | OUTPATIENT
Start: 2020-09-28 | End: 2020-09-29 | Stop reason: HOSPADM

## 2020-09-28 RX ORDER — IPRATROPIUM BROMIDE AND ALBUTEROL SULFATE 2.5; .5 MG/3ML; MG/3ML
1 SOLUTION RESPIRATORY (INHALATION)
Status: DISCONTINUED | OUTPATIENT
Start: 2020-09-29 | End: 2020-09-29

## 2020-09-28 RX ADMIN — ACETAMINOPHEN 650 MG: 325 TABLET ORAL at 17:45

## 2020-09-28 RX ADMIN — Medication 10 ML: at 21:58

## 2020-09-28 RX ADMIN — ATORVASTATIN CALCIUM 40 MG: 40 TABLET, FILM COATED ORAL at 21:56

## 2020-09-28 RX ADMIN — DIVALPROEX SODIUM 250 MG: 250 TABLET, DELAYED RELEASE ORAL at 21:56

## 2020-09-28 RX ADMIN — CARVEDILOL 3.12 MG: 3.12 TABLET, FILM COATED ORAL at 21:57

## 2020-09-28 RX ADMIN — GABAPENTIN 100 MG: 100 CAPSULE ORAL at 21:57

## 2020-09-28 RX ADMIN — QUETIAPINE FUMARATE 300 MG: 100 TABLET ORAL at 21:56

## 2020-09-28 RX ADMIN — BUSPIRONE HYDROCHLORIDE 10 MG: 5 TABLET ORAL at 21:56

## 2020-09-28 ASSESSMENT — ENCOUNTER SYMPTOMS
SHORTNESS OF BREATH: 0
SORE THROAT: 0
BACK PAIN: 0
EYE PAIN: 0
VOMITING: 0
NAUSEA: 0
ABDOMINAL PAIN: 0

## 2020-09-28 ASSESSMENT — PAIN SCALES - GENERAL
PAINLEVEL_OUTOF10: 8
PAINLEVEL_OUTOF10: 7
PAINLEVEL_OUTOF10: 0

## 2020-09-28 ASSESSMENT — PAIN DESCRIPTION - LOCATION: LOCATION: HEAD

## 2020-09-28 ASSESSMENT — HEART SCORE: ECG: 0

## 2020-09-28 ASSESSMENT — PAIN DESCRIPTION - PAIN TYPE: TYPE: ACUTE PAIN

## 2020-09-28 NOTE — ED NOTES
Pt states pain has completely resolved after 1 SL nitro. Chest pain prior to nitro was 7/10.       Joyce Lombardi RN  09/28/20 0636

## 2020-09-28 NOTE — ED NOTES
Patient identified as a positive fall risk on the ED triage fall screening. Patient placed in fall precautions which includes:  yellow fall risk bracelet on wrist, non-skid footwear on feet, \"Be Safe\" sign placed on patient's door, and bed alarm placed under patient/alarm turned on. Patient instructed on importance of not getting out of bed or ambulating without assistance for safety.         Abilio Cervantes RN  09/28/20 5650

## 2020-09-28 NOTE — ED PROVIDER NOTES
noted above in the ROS, all other systems were reviewed and negative. 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    Coronary artery disease involving native coronary artery of native heart with angina pectoris ([address] 3/8/2016    Depression     Emphysema of lung ([address]     Fibromyalgia     hyperlipidemia 8/11/2011    Hypertension     Kidney stone     Liver disease     Lung disease     MDD (major depressive disorder) 4/4/2012    Mood disorder ([facility] 3/13/2013    Movement disorder     Neck pain, chronic 3/13/2013    Opiate misuse     Personality disorder ([facility]     Pneumonia     Polypharmacy 2/16/2013         SURGICAL HISTORY     Past Surgical History:   Procedure Laterality Date    COLONOSCOPY  1/19/12    DIAGNOSTIC CARDIAC CATH LAB PROCEDURE  Feb, 2007    Normal cor angio Feb, 2007    HERNIA REPAIR  07/11/2019    robotic ventral hernia repair with mesh    HERNIA REPAIR N/A 7/11/2019    ROBOTIC VENTRAL HERNIA REPAIR WITH MESH performed by Shashi Cortez MD at [address] [place] [address]       Previous Medications    ALBUTEROL SULFATE HFA (VENTOLIN HFA) 108 (90 BASE) MCG/ACT INHALER    Inhale 2 puffs into the lungs 4 times daily as needed for Wheezing    BLOOD PRESSURE MONITORING (BLOOD PRESSURE MONITOR AUTOMAT) SOCORRO    1 each by Does not apply route daily.     DICLOFENAC SODIUM 1 % GEL    Apply 4 g topically 4 times daily    DIVALPROEX (DEPAKOTE) 250 MG DR TABLET    Take 250 mg by mouth every morning     DIVALPROEX (DEPAKOTE) 500 MG DR TABLET Take 500 mg by mouth nightly    DULOXETINE (CYMBALTA) 60 MG EXTENDED RELEASE CAPSULE    Take 60 mg by mouth daily     FLUTICASONE (FLONASE) 50 MCG/ACT NASAL SPRAY    1 spray by Nasal route daily    GABAPENTIN (NEURONTIN) 600 MG TABLET    Take 600 mg by mouth 3 times daily. KETOROLAC (TORADOL) 10 MG TABLET    Take 1 tablet by mouth 3 times daily    LORAZEPAM (ATIVAN) 0.5 MG TABLET    Take 0.5 mg by mouth daily as needed for Anxiety. x3 doses    NICOTINE (NICODERM CQ) 14 MG/24HR    Place 1 patch onto the skin daily    OLMESARTAN (BENICAR) 40 MG TABLET    Take 40 mg by mouth daily    QUETIAPINE (SEROQUEL) 300 MG TABLET    Take 300 mg by mouth nightly         ALLERGIES     Dicyclomine; Sumatriptan; Tape [adhesive tape];  Tizanidine; and Motrin [ibuprofen micronized]    FAMILYHISTORY       Family History   Problem Relation Age of Onset    Emphysema Mother     Heart Disease Mother     Arthritis Mother     Depression Mother     High Blood Pressure Mother     Heart Failure Father     Heart Disease Father     Arthritis Father     Diabetes Father     High Blood Pressure Father     Stroke Father     Substance Abuse Father     High Blood Pressure Brother     Heart Disease Sister     Kidney Disease Daughter           SOCIAL HISTORY       Social History     Tobacco Use    Smoking status: Current Every Day Smoker     Packs/day: 2.00     Years: 47.00     Pack years: 94.00     Types: Cigarettes    Smokeless tobacco: Never Used   Substance Use Topics    Alcohol use: No    Drug use: Not Currently       SCREENINGS      Heart Score for chest pain patients  History: Highly Suspicious  ECG: Normal  Patient Age: > 65 years  *Risk factors for Atherosclerotic disease: Cigarette smoking, Hypertension, Obesity  Risk Factors: > 3 Risk factors or history of atherosclerotic disease*  Troponin: < 1X normal limit  Heart Score Total: 6      PHYSICAL EXAM    (up to 7 for level 4, 8 or more for level 5)     ED Triage Vitals [09/28/20 1516]   BP Temp Temp Source Pulse Resp SpO2 Height Weight   (!) 164/99 98.4 °F (36.9 °C) Oral 87 18 94 % 5' 1\" (1.549 m) 160 lb (72.6 kg)       Physical Exam  Vitals signs reviewed. Constitutional:       Appearance: She is not diaphoretic. HENT:      Nose: No congestion or rhinorrhea. Eyes:      General: No scleral icterus. Conjunctiva/sclera: Conjunctivae normal.   Neck:      Musculoskeletal: Normal range of motion and neck supple. Cardiovascular:      Rate and Rhythm: Normal rate and regular rhythm. Pulses: Normal pulses. Heart sounds: Normal heart sounds. No murmur. No friction rub. No gallop. Pulmonary:      Effort: Pulmonary effort is normal. No respiratory distress. Breath sounds: Normal breath sounds. No stridor. No wheezing, rhonchi or rales. Abdominal:      General: There is no distension. Palpations: Abdomen is soft. Tenderness: There is no abdominal tenderness. There is no right CVA tenderness, left CVA tenderness, guarding or rebound. Musculoskeletal: Normal range of motion. General: No swelling or tenderness. Skin:     General: Skin is warm and dry. Capillary Refill: Capillary refill takes less than 2 seconds. Neurological:      General: No focal deficit present. Mental Status: She is alert and oriented to person, place, and time. Cranial Nerves: No cranial nerve deficit. Sensory: No sensory deficit. Motor: No weakness.    Psychiatric:         Mood and Affect: Mood normal.         Behavior: Behavior normal.         DIAGNOSTIC RESULTS   LABS:    Labs Reviewed   CBC WITH AUTO DIFFERENTIAL - Abnormal; Notable for the following components:       Result Value    WBC 3.8 (*)     All other components within normal limits    Narrative:     Performed at:  [facility]) - [facility]  [address],  [address], [address] [place] [address]   Phone [phone]   COMPREHENSIVE METABOLIC PANEL - Abnormal; Notable for the PLEURA/LUNGS: There are no focal consolidations or pleural effusions. There is no appreciable pneumothorax. BONES/SOFT TISSUE: No acute abnormality. No radiographic evidence of acute pulmonary disease. PROCEDURES   Unless otherwise noted below, none     Procedures    CRITICAL CARE TIME   N/A    CONSULTS:  IP CONSULT TO HOSPITALIST      EMERGENCY DEPARTMENT COURSE and DIFFERENTIAL DIAGNOSIS/MDM:   Vitals:    Vitals:    09/28/20 1516 09/28/20 1638 09/28/20 1648 09/28/20 1746   BP: (!) 164/99 (!) 178/83 (!) 143/69 (!) 142/101   Pulse: 87 78 83 77   Resp: 18 18 22 20   Temp: 98.4 °F (36.9 °C)      TempSrc: Oral      SpO2: 94% 95% 94% 97%   Weight: 160 lb (72.6 kg)      Height: 5' 1\" (1.549 m)          Patient was given the following medications:  Medications   nitroGLYCERIN (NITROSTAT) SL tablet 0.4 mg (has no administration in time range)   acetaminophen (TYLENOL) tablet 650 mg (650 mg Oral Given 9/28/20 1745)           73-year old female presents for chest pain. ECG nonischemic, negative troponin. Low concern for PE, aortic dissection, pneumothorax. HEART score 6, appropriate for admission for further cardiac risk stratification. Discussed with Dr. Sheryle Roulette, graciously agreed to admit the patient. FINAL IMPRESSION      1. Chest pain, unspecified type    2. Chronic obstructive pulmonary disease, unspecified COPD type ([facility]    3. Essential hypertension          DISPOSITION/PLAN   DISPOSITION Admitted 09/28/2020 05:47:13 PM      PATIENT REFERREDTO:  No follow-up provider specified.     DISCHARGE MEDICATIONS:  New Prescriptions    No medications on file       DISCONTINUED MEDICATIONS:  Discontinued Medications    No medications on file              (Please note that portions of this note were completed with a voice recognition program.  Efforts were made to edit the dictations but occasionally words are mis-transcribed.)    CEE Augustine (electronically signed)         Mariam Matute

## 2020-09-28 NOTE — H&P
Inpatient Family Medicine Service History & Physical        PCP: Ketty Dumont MD    Date of Admission: 9/28/2020    Date of Service: Pt seen/examined on 09/28/2020 and Admitted to observation with expected LOS greater than two midnights due to medical therapy. Placed in Observation. Chief Complaint:  Chest Pain    History Of Present Illness:    77 y.o. female with a history of Wernicke-encephalopathy, seizures, CAD, HTN, CHF, COPD, who presented to [facility] with severe left sided chest pain radiating to her bilateral neck that started yesterday. Her  took her blood pressure and states it was off the chart and gave her some BP medicine which helped alleviate the pain somewhat. She woke up this morning with the same pain and decided to come to the hospital.  She states EMS gave her nitro which resolved the pain but gave her a headache. Patient states she is able to vacuum and do chores around the house without chest pain. No fevers, chills, nausea, vomiting, diarrhea, cough. ED Course:  EKG without acute ischemic changes but had nonspecific T wave abnormality in inferior leads, CXR negative and troponin negative. Chest pain was relieved by nitro.       Past Medical History:          Diagnosis Date    Abnormal ECG 12/14/2012    Anemia     Arthritis     Back pain, chronic 3/13/2013    Bipolar disorder (HCC)     Bronchitis chronic     CHF (congestive heart failure) (HCC) 9/30/2016    Chronic back pain     Chronic neck pain     Clostridium difficile infection 3/29/12, 5/22/12    positive stool DNA probe    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    Coronary artery disease involving native coronary artery of native heart with angina pectoris ([address] 3/8/2016    Depression     Emphysema of lung ([address]     Fibromyalgia     hyperlipidemia 8/11/2011    Hypertension     Kidney stone     Liver disease     Lung disease     MDD (major depressive disorder) 4/4/2012    Mood disorder ([facility] 3/13/2013    Movement disorder     Neck pain, chronic 3/13/2013    Opiate misuse     Personality disorder ([facility]     Pneumonia     Polypharmacy 2/16/2013       Past Surgical History:          Procedure Laterality Date    COLONOSCOPY  1/19/12   [address] [place] [address] DIAGNOSTIC CARDIAC CATH LAB PROCEDURE  Feb, 2007    Normal cor angio Feb, 2007    HERNIA REPAIR  07/11/2019    robotic ventral hernia repair with mesh    HERNIA REPAIR N/A 7/11/2019    ROBOTIC VENTRAL HERNIA REPAIR WITH MESH performed by Steve Alaniz MD at [address] SURGERY         Medications Prior to Admission:      Prior to Admission medications    Medication Sig Start Date End Date Taking? Authorizing Provider   nicotine (NICODERM CQ) 14 MG/24HR Place 1 patch onto the skin daily 3/14/20 4/25/20  BALJIT Madsen CNP   albuterol sulfate HFA (VENTOLIN HFA) 108 (90 Base) MCG/ACT inhaler Inhale 2 puffs into the lungs 4 times daily as needed for Wheezing 3/14/20   BALJIT Madsen CNP   LORazepam (ATIVAN) 0.5 MG tablet Take 0.5 mg by mouth daily as needed for Anxiety. x3 doses    Historical Provider, MD   diclofenac sodium 1 % GEL Apply 4 g topically 4 times daily 1/11/20   CEE York   QUEtiapine (SEROQUEL) 300 MG tablet Take 300 mg by mouth nightly    Historical Provider, MD   gabapentin (NEURONTIN) 600 MG tablet Take 600 mg by mouth 3 times daily.     Historical Provider, MD   olmesartan (BENICAR) 40 MG tablet Take 40 mg by mouth daily    Historical Provider, MD   divalproex (DEPAKOTE) 500 MG DR tablet Take 500 mg by mouth nightly    Historical Provider, MD   ketorolac (TORADOL) 10 MG tablet Take 1 tablet by mouth 3 times daily 3/4/19   Minesh Lubin MD   DULoxetine (CYMBALTA) 60 MG extended release capsule Take 60 mg by mouth daily     Historical Provider, MD   fluticasone (FLONASE) 50 MCG/ACT nasal spray 1 spray by Nasal route daily    Historical Provider, MD divalproex (DEPAKOTE) 250 MG DR tablet Take 250 mg by mouth every morning     Historical Provider, MD   Blood Pressure Monitoring (BLOOD PRESSURE MONITOR AUTOMAT) SOCORRO 1 each by Does not apply route daily. 1/14/15   Yaw Gil MD       Allergies:  Dicyclomine; Sumatriptan; Tape Baldo Naranjo tape]; Tizanidine; and Motrin [ibuprofen micronized]    Social History:      TOBACCO:   reports that she has been smoking cigarettes. She has a 94.00 pack-year smoking history. She has never used smokeless tobacco.  ETOH:   reports no history of alcohol use. Family History:      Reviewed in detail and negative for DM, CAD, Cancer, CVA. Positive as follows:        Problem Relation Age of Onset    Emphysema Mother     Heart Disease Mother     Arthritis Mother     Depression Mother     High Blood Pressure Mother     Heart Failure Father     Heart Disease Father     Arthritis Father     Diabetes Father     High Blood Pressure Father     Stroke Father     Substance Abuse Father     High Blood Pressure Brother     Heart Disease Sister     Kidney Disease Daughter        REVIEW OF SYSTEMS:   Pertinent positives as noted in the HPI. All other systems reviewed and negative. PHYSICAL EXAM PERFORMED:    BP (!) 142/101   Pulse 77   Temp 98.4 °F (36.9 °C) (Oral)   Resp 20   Ht 5' 1\" (1.549 m)   Wt 160 lb (72.6 kg)   SpO2 97%   BMI 30.23 kg/m²     General appearance:  No apparent distress, appears stated age and cooperative. HEENT:  Normal cephalic, atraumatic without obvious deformity. Pupils equal, round, and reactive to light. Extra ocular muscles intact. Conjunctivae/corneas clear. Neck: Supple, with full range of motion. No jugular venous distention. Trachea midline. Respiratory:  Normal respiratory effort. Mild expiratory wheezing. Cardiovascular:  Regular rate and rhythm with normal S1/S2 without murmurs, rubs or gallops.   Abdomen: Soft, non-tender, non-distended with normal bowel sounds. Musculoskeletal:  No clubbing, cyanosis or edema bilaterally. Full range of motion without deformity. Skin: Skin color, texture, turgor normal.  No rashes or lesions. Neurologic:  Neurovascularly intact without any focal sensory/motor deficits. Psychiatric:  Alert and oriented, thought content appropriate, normal insight  Capillary Refill: Brisk,< 3 seconds   Peripheral Pulses: +2 palpable, equal bilaterally       Labs:     Recent Labs     09/28/20  1538   WBC 3.8*   HGB 12.7   HCT 38.0        Recent Labs     09/28/20  1538   *   K 4.3   CL 95*   CO2 30   BUN 9   CREATININE <0.5*   CALCIUM 9.0     Recent Labs     09/28/20  1538   AST 10*   ALT 7*   BILITOT <0.2   ALKPHOS 70     Recent Labs     09/28/20  1538   INR 0.93     Recent Labs     09/28/20  1538   TROPONINI <0.01       Urinalysis:      Lab Results   Component Value Date    NITRU Negative 03/14/2020    WBCUA  03/09/2020    BACTERIA Rare 03/09/2020    RBCUA None seen 03/09/2020    BLOODU Negative 03/14/2020    SPECGRAV 1.015 03/14/2020    GLUCOSEU Negative 03/14/2020    [address] NEGATIVE 06/03/2012       Radiology:     CXR: I have reviewed the CXR with the following interpretation:  Impression    No radiographic evidence of acute pulmonary disease. EKG:  I have reviewed the EKG with the following interpretation:Normal sinus rhythmAbnormal P wavesRSR' or QR pattern in V1 suggests right ventricular conduction delayPossible Lateral infarct (cited on or before 09-FEB-2018)Abnormal ECGWhen compared with ECG of 09-MAR-2020 17:51,Nonspecific T wave abnormality now evident in Inferior leadsConfirmed by Kalina Cerrato MD, Oziel Morocho ([id number]) on 9/28/2020 5:18:56 PM     XR CHEST PORTABLE   Final Result   No radiographic evidence of acute pulmonary disease.              ASSESSMENT:    Active Hospital Problems    Diagnosis Date Noted    Chest pain [R07.9]          PLAN:  Chest Pain:  Due to typical and atypical cardiac features will place NPO at midnight and get NM Lexiscan Stress Test.  -Cardiology consulted appreciate recs  -Nitro PRN  -EKG PRN  -Protonix    COPD:  -Duonebs QID PRN    CHF:  CXR negative, no signs of peripheral edema, last echo normal 10/17/2019.  -Monitor    DVT Prophylaxis: Lovenox  Diet: No diet orders on file  Code Status: Prior    PT/OT Eval Status: Not consulted    Dispo - Pending workup    This patient was seen and evaluated with the resident team and with the attending, Dr. Mary Alice Lo.     (Please note that portions of this note were completed with a voice recognition program.  Efforts were made to edit the dictations but occasionally words are mis-transcribed.)       Benny Lindsey,

## 2020-09-28 NOTE — ED PROVIDER NOTES
I independently performed a history and physical on [employer]. All diagnostic, treatment, and disposition decisions were made by myself in conjunction with the advanced practice provider.     -ОЛЬГА LANDRUM is a 77 y.o. female presents to ED for chest pain. Patient reports she's been feeling fatigued x 2 weeks. Wwas watching TV several days ago when she started having substernal chest pain and since then has been intermittent. Reports ambulation makes it worsre and resting makes it better. Reports shortness of breath and nausea. Denies fever, cough. -nitroglycerin was given in ED and states that helped with cp. Similar episode x 1 year ago. -PE: well appearing, nontoxic, not in acute distress. RRR, CTAB/l, no w/r/r, abdomen soft, ND, NTTP, + BS x 4, no rigidity, rebound, guarding  -lab workup significant for: wnl  -HEART: 7  -The Ekg interpreted by me shows  normal sinus rhythm with a rate of 87  Axis is   Normal  QTc is  447  Intervals and Durations are unremarkable. ST Segments: nonspecific changes  No significant change from prior EKG dated 3/9/20   -Plan for admission for further workup and treatment to r/o acs discussed with patient and family. Patient and family in agreement with plan and have no further questions/concerns    For further details of [facility] encounter, please see CEE Salinas's documentation.         Essie Fernández MD  09/29/20 [id number]

## 2020-09-29 ENCOUNTER — APPOINTMENT (OUTPATIENT)
Dept: NUCLEAR MEDICINE | Age: 70
End: 2020-09-29
Payer: COMMERCIAL

## 2020-09-29 VITALS
HEART RATE: 68 BPM | TEMPERATURE: 97.6 F | DIASTOLIC BLOOD PRESSURE: 72 MMHG | WEIGHT: 169.3 LBS | BODY MASS INDEX: 31.96 KG/M2 | RESPIRATION RATE: 16 BRPM | HEIGHT: 61 IN | OXYGEN SATURATION: 95 % | SYSTOLIC BLOOD PRESSURE: 115 MMHG

## 2020-09-29 LAB
ANION GAP SERPL CALCULATED.3IONS-SCNC: 9 MMOL/L (ref 3–16)
BUN BLDV-MCNC: 13 MG/DL (ref 7–20)
CALCIUM SERPL-MCNC: 8.8 MG/DL (ref 8.3–10.6)
CHLORIDE BLD-SCNC: 97 MMOL/L (ref 99–110)
CO2: 31 MMOL/L (ref 21–32)
CREAT SERPL-MCNC: 0.6 MG/DL (ref 0.6–1.2)
EKG ATRIAL RATE: 73 BPM
EKG DIAGNOSIS: NORMAL
EKG P AXIS: 49 DEGREES
EKG P-R INTERVAL: 166 MS
EKG Q-T INTERVAL: 416 MS
EKG QRS DURATION: 100 MS
EKG QTC CALCULATION (BAZETT): 458 MS
EKG R AXIS: -3 DEGREES
EKG T AXIS: 23 DEGREES
EKG VENTRICULAR RATE: 73 BPM
GFR AFRICAN AMERICAN: >60
GFR NON-AFRICAN AMERICAN: >60
GLUCOSE BLD-MCNC: 95 MG/DL (ref 70–99)
HCT VFR BLD CALC: 36.4 % (ref 36–48)
HEMOGLOBIN: 12.2 G/DL (ref 12–16)
LV EF: 75 %
LVEF MODALITY: NORMAL
MCH RBC QN AUTO: 30.6 PG (ref 26–34)
MCHC RBC AUTO-ENTMCNC: 33.5 G/DL (ref 31–36)
MCV RBC AUTO: 91.3 FL (ref 80–100)
PDW BLD-RTO: 14.9 % (ref 12.4–15.4)
PLATELET # BLD: 145 K/UL (ref 135–450)
PMV BLD AUTO: 7 FL (ref 5–10.5)
POTASSIUM REFLEX MAGNESIUM: 4.2 MMOL/L (ref 3.5–5.1)
RBC # BLD: 3.99 M/UL (ref 4–5.2)
SODIUM BLD-SCNC: 137 MMOL/L (ref 136–145)
WBC # BLD: 4.2 K/UL (ref 4–11)

## 2020-09-29 PROCEDURE — 78452 HT MUSCLE IMAGE SPECT MULT: CPT

## 2020-09-29 PROCEDURE — 80048 BASIC METABOLIC PNL TOTAL CA: CPT

## 2020-09-29 PROCEDURE — G0378 HOSPITAL OBSERVATION PER HR: HCPCS

## 2020-09-29 PROCEDURE — 6370000000 HC RX 637 (ALT 250 FOR IP)

## 2020-09-29 PROCEDURE — 6370000000 HC RX 637 (ALT 250 FOR IP): Performed by: FAMILY MEDICINE

## 2020-09-29 PROCEDURE — 93017 CV STRESS TEST TRACING ONLY: CPT

## 2020-09-29 PROCEDURE — 94640 AIRWAY INHALATION TREATMENT: CPT

## 2020-09-29 PROCEDURE — 6360000002 HC RX W HCPCS

## 2020-09-29 PROCEDURE — 99214 OFFICE O/P EST MOD 30 MIN: CPT | Performed by: INTERNAL MEDICINE

## 2020-09-29 PROCEDURE — 93005 ELECTROCARDIOGRAM TRACING: CPT

## 2020-09-29 PROCEDURE — 85027 COMPLETE CBC AUTOMATED: CPT

## 2020-09-29 PROCEDURE — 36415 COLL VENOUS BLD VENIPUNCTURE: CPT

## 2020-09-29 PROCEDURE — 3430000000 HC RX DIAGNOSTIC RADIOPHARMACEUTICAL: Performed by: FAMILY MEDICINE

## 2020-09-29 PROCEDURE — A9502 TC99M TETROFOSMIN: HCPCS | Performed by: FAMILY MEDICINE

## 2020-09-29 PROCEDURE — 2580000003 HC RX 258

## 2020-09-29 RX ORDER — PANTOPRAZOLE SODIUM 40 MG/1
40 TABLET, DELAYED RELEASE ORAL
Qty: 30 TABLET | Refills: 0 | Status: SHIPPED | OUTPATIENT
Start: 2020-09-30

## 2020-09-29 RX ORDER — CARVEDILOL 3.12 MG/1
3.12 TABLET ORAL 2 TIMES DAILY WITH MEALS
Qty: 60 TABLET | Refills: 0 | Status: ON HOLD | OUTPATIENT
Start: 2020-09-30 | End: 2021-08-16 | Stop reason: SDUPTHER

## 2020-09-29 RX ORDER — ASPIRIN 81 MG/1
81 TABLET, CHEWABLE ORAL DAILY
Qty: 30 TABLET | Refills: 0 | Status: SHIPPED | OUTPATIENT
Start: 2020-09-30 | End: 2022-09-19

## 2020-09-29 RX ORDER — ATORVASTATIN CALCIUM 40 MG/1
40 TABLET, FILM COATED ORAL NIGHTLY
Qty: 30 TABLET | Refills: 0 | Status: ON HOLD | OUTPATIENT
Start: 2020-09-29 | End: 2021-08-13

## 2020-09-29 RX ORDER — IPRATROPIUM BROMIDE AND ALBUTEROL SULFATE 2.5; .5 MG/3ML; MG/3ML
1 SOLUTION RESPIRATORY (INHALATION)
Status: DISCONTINUED | OUTPATIENT
Start: 2020-09-29 | End: 2020-09-29 | Stop reason: HOSPADM

## 2020-09-29 RX ADMIN — GABAPENTIN 100 MG: 100 CAPSULE ORAL at 14:41

## 2020-09-29 RX ADMIN — LOSARTAN POTASSIUM 50 MG: 25 TABLET, FILM COATED ORAL at 07:57

## 2020-09-29 RX ADMIN — TETROFOSMIN 33 MILLICURIE: 1.38 INJECTION, POWDER, LYOPHILIZED, FOR SOLUTION INTRAVENOUS at 13:21

## 2020-09-29 RX ADMIN — ASPIRIN 81 MG: 81 TABLET, CHEWABLE ORAL at 07:56

## 2020-09-29 RX ADMIN — CARVEDILOL 3.12 MG: 3.12 TABLET, FILM COATED ORAL at 16:29

## 2020-09-29 RX ADMIN — ACETAMINOPHEN 650 MG: 325 TABLET ORAL at 10:44

## 2020-09-29 RX ADMIN — Medication 10 ML: at 09:11

## 2020-09-29 RX ADMIN — TETROFOSMIN 10.7 MILLICURIE: 1.38 INJECTION, POWDER, LYOPHILIZED, FOR SOLUTION INTRAVENOUS at 12:04

## 2020-09-29 RX ADMIN — BUSPIRONE HYDROCHLORIDE 10 MG: 5 TABLET ORAL at 07:56

## 2020-09-29 RX ADMIN — IPRATROPIUM BROMIDE AND ALBUTEROL SULFATE 1 AMPULE: .5; 3 SOLUTION RESPIRATORY (INHALATION) at 15:20

## 2020-09-29 RX ADMIN — GABAPENTIN 100 MG: 100 CAPSULE ORAL at 07:57

## 2020-09-29 RX ADMIN — PANTOPRAZOLE SODIUM 40 MG: 40 TABLET, DELAYED RELEASE ORAL at 05:46

## 2020-09-29 RX ADMIN — DIVALPROEX SODIUM 250 MG: 250 TABLET, DELAYED RELEASE ORAL at 07:56

## 2020-09-29 RX ADMIN — IPRATROPIUM BROMIDE AND ALBUTEROL SULFATE 1 AMPULE: .5; 3 SOLUTION RESPIRATORY (INHALATION) at 08:34

## 2020-09-29 RX ADMIN — REGADENOSON 0.4 MG: 0.08 INJECTION, SOLUTION INTRAVENOUS at 13:21

## 2020-09-29 RX ADMIN — DULOXETINE HYDROCHLORIDE 60 MG: 60 CAPSULE, DELAYED RELEASE ORAL at 07:57

## 2020-09-29 RX ADMIN — PRIMIDONE 50 MG: 50 TABLET ORAL at 07:56

## 2020-09-29 ASSESSMENT — PAIN SCALES - GENERAL
PAINLEVEL_OUTOF10: 6
PAINLEVEL_OUTOF10: 8
PAINLEVEL_OUTOF10: 0

## 2020-09-29 ASSESSMENT — PAIN DESCRIPTION - LOCATION: LOCATION: HEAD

## 2020-09-29 ASSESSMENT — ENCOUNTER SYMPTOMS
VOMITING: 0
BLOOD IN STOOL: 0
ABDOMINAL PAIN: 0
NAUSEA: 0
COUGH: 1
SORE THROAT: 0
RHINORRHEA: 0
EYE PAIN: 0
CONSTIPATION: 0
PHOTOPHOBIA: 0
SHORTNESS OF BREATH: 1
DIARRHEA: 0

## 2020-09-29 ASSESSMENT — HEART SCORE: ECG: 1

## 2020-09-29 ASSESSMENT — PAIN DESCRIPTION - PAIN TYPE: TYPE: ACUTE PAIN

## 2020-09-29 NOTE — PLAN OF CARE
Problem: Falls - Risk of:  Goal: Will remain free from falls  Description: Will remain free from falls  Outcome: Ongoing   Fall precautions in place, bed alarm on, nonskid foot wear applied, bed in lowest position, and call light within reach. Will continue to monitor. Problem: PAIN  Goal: Patient's pain/discomfort is manageable  Outcome: Ongoing   Pt has no CP now, pt aware of letting RN aware if CP comes back. Call light in reach.

## 2020-09-29 NOTE — CONSULTS
[address] [facility]  [phone]      Attending Physician: Geovanna Yoder DO  Reason for Consultation/Chief Complaint:     Subjective   History of Present Illness: Matthieu Govea is a 77 y.o. female with a history of diastolic dysfunction, hypertension, hyperlipidemia, COPD, and mood disorder who presented with chest pain. The patient reports that three days ago she developed dull left-sided chest pain while doing crafts in her home. The pain later became sharp in character and would radiate to her jaw and bilateral arms. She is chronically short of breath due to her COPD and says that her shortness of breath became worse when the chest pain started. She also describes tingling in her bilateral hands and says that she has been feeling very tired. She is rather sedentary, but her chest pain does not seem to get worse or be triggered by exertion. She further notes that her blood pressure has recently been running high, although she cannot provide specific numbers, and says that her pain would last for approximately 15-30 minutes and then resolve after taking an extra half pill of her blood pressure medication and two baby aspirin. She continued to have multiple episodes of chest pain over the last few days and says that her pain was becoming more severe, so she decided to go to the ED. On arrival to the ED, the patient's blood pressure was in the 160-170s/90s. ECG showed normal sinus rhythm with RSR' pattern in V1 and non-specific T wave changes in the inferior leads. Troponins were negative x3. She is currently without chest pain and blood pressure is now controlled. Of further note, she had a coronary angiogram in 2014 that demonstrated normal coronary arteries. Her last TTE from 10/2019 showed an LVEF of 55-60% with normal wall motion, grade I diastolic dysfunction, normal RV size and systolic function.   Chordal AMITA was present, but there was no septal hypertrophy or inducible LVOT gradient. She has a long history of tobacco use and continues to smoke 1.5 ppd. Past Medical History:   has a past medical history of Abnormal ECG, Anemia, Arthritis, Back pain, chronic, Bipolar disorder (HCC), Bronchitis chronic, CHF (congestive heart failure) (HCC), Chronic back pain, Chronic neck pain, Clostridium difficile infection, 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[address] Coronary artery disease involving native coronary artery of native heart with angina pectoris ([address] Depression, Emphysema of lung ([address] Fibromyalgia, hyperlipidemia, Hypertension, Kidney stone, Liver disease, Lung disease, MDD (major depressive disorder), Mood disorder ([address] Movement disorder, Neck pain, chronic, Opiate misuse, Personality disorder ([address] Pneumonia, and Polypharmacy. Surgical History:   has a past surgical history that includes Hysterectomy, total abdominal; Kidney stone surgery; Diagnostic Cardiac Cath Lab Procedure (Feb, 2007); Colonoscopy (1/19/12); hernia repair (07/11/2019); and hernia repair (N/A, 7/11/2019). Social History:   reports that she has been smoking cigarettes. She has a 94.00 pack-year smoking history. She has never used smokeless tobacco. She reports previous drug use. She reports that she does not drink alcohol. Family History:  family history includes Arthritis in her father and mother; Depression in her mother; Diabetes in her father; Emphysema in her mother; Heart Disease in her father, mother, and sister; Heart Failure in her father; High Blood Pressure in her brother, father, and mother; Kidney Disease in her daughter; Stroke in her father; Substance Abuse in her father. Home Medications:  Were reviewed and are listed in nursing record and/or below  Prior to Admission medications    Medication Sig Start Date End Date Taking?  Authorizing Provider   albuterol sulfate HFA (VENTOLIN HFA) 108 (90 Base) MCG/ACT inhaler Inhale 2 puffs into the lungs 4 times daily as needed for Wheezing 3/14/20  Yes BALJIT Bowling CNP   LORazepam (ATIVAN) 0.5 MG tablet Take 0.5 mg by mouth daily as needed for Anxiety. x3 doses   Yes Historical Provider, MD   diclofenac sodium 1 % GEL Apply 4 g topically 4 times daily 1/11/20  Yes CEE Howell   QUEtiapine (SEROQUEL) 300 MG tablet Take 300 mg by mouth nightly   Yes Historical Provider, MD   gabapentin (NEURONTIN) 600 MG tablet Take 600 mg by mouth 3 times daily. Yes Historical Provider, MD   olmesartan (BENICAR) 40 MG tablet Take 40 mg by mouth daily   Yes Historical Provider, MD   divalproex (DEPAKOTE) 500 MG DR tablet Take 500 mg by mouth nightly   Yes Historical Provider, MD   ketorolac (TORADOL) 10 MG tablet Take 1 tablet by mouth 3 times daily  Patient taking differently: Take 10 mg by mouth 3 times daily as needed  3/4/19  Yes Sherryle Bollard, MD   DULoxetine (CYMBALTA) 60 MG extended release capsule Take 60 mg by mouth daily    Yes Historical Provider, MD   fluticasone (FLONASE) 50 MCG/ACT nasal spray 1 spray by Nasal route daily as needed    Yes Historical Provider, MD   divalproex (DEPAKOTE) 250 MG DR tablet Take 250 mg by mouth every morning    Yes Historical Provider, MD   nicotine (NICODERM CQ) 14 MG/24HR Place 1 patch onto the skin daily 3/14/20 4/25/20  BALJIT Bowling CNP   Blood Pressure Monitoring (BLOOD PRESSURE MONITOR AUTOMAT) SOCORRO 1 each by Does not apply route daily.  1/14/15   Nancy Daugherty MD        CURRENT Medications:  ipratropium-albuterol (DUONEB) nebulizer solution 1 ampule, Q4H WA  sodium chloride flush 0.9 % injection 10 mL, 2 times per day  sodium chloride flush 0.9 % injection 10 mL, PRN  acetaminophen (TYLENOL) tablet 650 mg, Q6H PRN    Or  acetaminophen (TYLENOL) suppository 650 mg, Q6H PRN  polyethylene glycol (GLYCOLAX) packet 17 g, Daily PRN  promethazine (PHENERGAN) tablet 12.5 mg, Q6H PRN    Or  ondansetron (ZOFRAN) injection 4 mg, Q6H PRN  atorvastatin (LIPITOR) tablet 40 mg, Nightly  aspirin chewable tablet 81 mg, Daily  enoxaparin (LOVENOX) injection 40 mg, Daily  carvedilol (COREG) tablet 3.125 mg, BID WC  nitroGLYCERIN (NITROSTAT) SL tablet 0.4 mg, Q5 Min PRN  divalproex (DEPAKOTE) DR tablet 250 mg, 2 times per day  DULoxetine (CYMBALTA) extended release capsule 60 mg, Daily  gabapentin (NEURONTIN) capsule 100 mg, TID  busPIRone (BUSPAR) tablet 10 mg, BID  QUEtiapine (SEROQUEL) tablet 300 mg, Nightly  losartan (COZAAR) tablet 50 mg, Daily  primidone (MYSOLINE) tablet 50 mg, QAM  pantoprazole (PROTONIX) tablet 40 mg, QAM AC  regadenoson (LEXISCAN) injection 0.4 mg, ONCE PRN        Allergies:  Dicyclomine; Sumatriptan; Tape [adhesive tape]; Tizanidine; and Motrin [ibuprofen micronized]     Review of Systems:   A 14 point review of symptoms completed. Pertinent positives identified in the HPI, all other review of symptoms negative as below. Review of Systems   Constitutional: Positive for fatigue. Negative for chills and fever. HENT: Negative for congestion, rhinorrhea and sore throat. Eyes: Negative for photophobia, pain and visual disturbance. Respiratory: Positive for cough and shortness of breath. Cardiovascular: Positive for chest pain and leg swelling. Negative for palpitations. Gastrointestinal: Negative for abdominal pain, blood in stool, constipation, diarrhea, nausea and vomiting. Endocrine: Negative for cold intolerance and heat intolerance. Genitourinary: Negative for difficulty urinating, dysuria and hematuria. Musculoskeletal: Positive for arthralgias and myalgias. Negative for joint swelling. Skin: Negative for rash and wound. Allergic/Immunologic: Negative for environmental allergies and food allergies. Neurological: Positive for syncope, weakness, light-headedness, numbness and headaches. Hematological: Negative for adenopathy. Does not bruise/bleed easily. Psychiatric/Behavioral: Negative for dysphoric mood. The patient is not nervous/anxious. Objective   PHYSICAL EXAM:    Vitals:    09/29/20 0501   BP: 114/71   Pulse: 67   Resp: 18   Temp: 98 °F (36.7 °C)   SpO2: 95%    Weight: 169 lb 4.8 oz (76.8 kg)       General: Adult female lying in bed in no acute distress. HEENT: Normocephalic, atraumatic, non-icteric, hearing intact, nares normal, mucous membranes moist.  Neck: Supple, trachea midline. No adenopathy. No thyromegaly. No JVD. Heart: Regular rate and rhythm. Normal S1 and S2. No murmurs, gallops or rubs. Lungs: Normal respiratory effort. Breath sounds are diminished and bilateral wheezing is present. No rales or rhonchi. Abdomen: Soft, non-tender. Normoactive bowel sounds. No masses or organomegaly. Skin: No rashes, wounds, or lesions. Pulses: 2+ and symmetric. Extremities: No clubbing, cyanosis, or edema. Musculoskeletal: 5/5 strength in upper and lower extremities. Psych: Normal mood and affect. Neuro: Alert and oriented to person, place, and time. No focal deficits noted.       Labs   CBC:   Lab Results   Component Value Date    WBC 4.2 09/29/2020    RBC 3.99 09/29/2020    RBC 3.96 08/09/2017    HGB 12.2 09/29/2020    HCT 36.4 09/29/2020    MCV 91.3 09/29/2020    RDW 14.9 09/29/2020     09/29/2020     CMP:  Lab Results   Component Value Date     09/28/2020    K 4.3 09/28/2020    K 4.4 03/09/2020    CL 95 09/28/2020    CO2 30 09/28/2020    BUN 9 09/28/2020    CREATININE <0.5 09/28/2020    GFRAA >60 09/28/2020    GFRAA >60 05/14/2013    AGRATIO 1.7 09/28/2020    LABGLOM >60 09/28/2020    GLUCOSE 105 09/28/2020    GLUCOSE 92 08/02/2017    PROT 6.9 09/28/2020    PROT 7.6 08/02/2017    CALCIUM 9.0 09/28/2020    BILITOT <0.2 09/28/2020    ALKPHOS 70 09/28/2020    AST 10 09/28/2020    ALT 7 09/28/2020     PT/INR:  No results found for: PTINR  HgBA1c:  Lab Results   Component Value Date    LABA1C 5.5 10/20/2013     Lab Results   Component Value Date    CKTOTAL 140 03/17/2017    TROPONINI <0.01 09/28/2020         Cardiac Data Last EKG: Normal sinus rhythm with RSR' pattern in V1, non-specific T wave changes in the inferior leads    Echo:  TTE 10/17/2019:  Conclusions   Summary   Left ventricular systolic function is normal with ejection fraction   estimated at 55-60 %. No regional wall motion abnormalities are noted. Normal left ventricular wall thickness. Chordal AMITA with LVOT color flow turbulence is noted. Consider valsalva   study. Grade I diastolic dysfunction with normal filling pressure. IVC is normal in size (< 2.1 cm) and collapses > 50% with respiration   consistent with normal RA pressure (3 mmHg). Previous echo done 2/2018 - EF 55%    Limited TTE 10/21/2019:   Summary   Limited exam with Valsalva maneuver. Left ventricular systolic function is normal with ejection fraction   estimated at 55%. No regional wall motion abnormalities. No evidence for a dynamic outflow tract obstruction. Stress Test:  Pharmacologic Nuclear SPECT Stress 11/5/14:  FINDINGS: In the inferior wall there is a small zone of decreased    perfusion on stress images that normalizes at rest. A is not    appreciably changed from prior exams. The remainder of the left    ventricle demonstrates normal perfusion. Wall motion is normal.    The left ventricle is relatively small with an end diastolic    volume of 46 mL and an end systolic volume 13 mL. Because of the    small volume of the left ventricle the ejection fraction is    artifactually elevated at 71%. Cath:  [facility] 11/6/14:  Normal coronary arteries  Normal LVEDP  LVEF 60%      Assessment and Plan      1. Atypical chest pain - Although symptoms are atypical, patient does have multiple risk factors for CAD and continues to smoke 1.5 ppd. Troponins have been negative x3 and ECG shows normal sinus rhythm with RSR' pattern in V1 and non-specific T wave changes in the inferior leads.   Will plan for further cardiac risk stratification with non-invasive imaging.  -Please keep patient NPO and will arrange pharmacologic nuclear SPECT stress test later today  -Continue aspirin 81 mg daily and atorvastatin 40 mg daily  -Monitor on telemetry  -Repeat ECG for any new episodes of chest pain    2. Hypertension - BP elevated on admission, but is currently controlled after primary team started low-dose coreg.  -Continue coreg 3.125 mg BID and losartan 50 mg daily  -Will continue to monitor BP closely and titrate antihypertensive regimen as needed    3. Diastolic dysfunction - Most recent TTE from 10/2019 demonstrated findings consistent with grade I diastolic dysfunction. Chordal AMITA was present, but there was no septal hypertrophy or inducible LVOT gradient. Currently, she is clinically euvolemic.  -No indication for diuretic therapy at this time  -BP control as above    3. Hyperlipidemia  -Continue atorvastatin 40 mg daily    4. COPD  -Duonebs prn    5. Tobacco use  -Reviewed risks of continued use and strongly encouraged cessation    6. Chronic pain syndrome  -Continue home regimen      Thank you for allowing us to participate in the care of Tone Howe. Please call me with any questions [phone]. Jose Maria Seaman,   [address]  [phone] [facility]  [phone] [address] [place] [address]  9/29/2020 7:50 AM      I will address the patient's cardiac risk factors and adjusted pharmacologic treatment as needed. In addition, I have reinforced the need for patient directed risk factor modification. All questions and concerns were addressed to the patient/family. Alternatives to my treatment were discussed. The note was completed using EMR. Every effort was made to ensure accuracy; however, inadvertent computerized transcription errors may be present.

## 2020-09-29 NOTE — PROGRESS NOTES
Aspirin, atorvastatin, carvedilol, and pantoprazole prescriptions called in to patient's preferred pharmacy at request of Dr. Carlos Sam.

## 2020-09-29 NOTE — CARE COORDINATION
Chart reviewed, pt in Observation. Pt from home with spouse, has family support, has PCP, has Anat Garcia. No needs noted prior to admission and no discharge needs noted at this time. Please notify DCP if needs arise.   JN Gaspar-ROMAIN

## 2020-09-29 NOTE — PROGRESS NOTES
4 Eyes Skin Assessment     The patient is being assess for   Admission    I agree that 2 RN's have performed a thorough Head to Toe Skin Assessment on the patient. ALL assessment sites listed below have been assessed. Areas assessed by both nurses:   [x]   Head, Face, and Ears   [x]   Shoulders, Back, and Chest, Abdomen  [x]   Arms, Elbows, and Hands   [x]   Coccyx, Sacrum, and Ischium  [x]   Legs, Feet, and Heels        Dry scab on lower back due to scratching. **SHARE this note so that the co-signing nurse is able to place an eSignature**    Co-signer eSignature: Orvis Ser      Does the Patient have Skin Breakdown?   No          Emmanuel Prevention initiated:  Yes   Wound Care Orders initiated:  NA      WOC nurse consulted for Pressure Injury (Stage 3,4, Unstageable, DTI, NWPT, Complex wounds)and New or Established Ostomies:  Yes      Primary Nurse eSignature: Electronically signed by Jena Jha RN on 9/28/20 at 10:11 PM EDT

## 2020-09-29 NOTE — PROGRESS NOTES
Inpatient Family Medicine Progress Note      PCP: Melissa Baker MD    Date of Admission: 9/28/2020    Chief Complaint: Chest Pain    Hospital Course: 77 y.o. female with a history of Wernicke-encephalopathy, seizures, CAD, HTN, CHF, COPD, who presented to [facility] with severe left sided chest pain radiating to her bilateral neck that started yesterday. EKG, chest x-ray, and troponin were negative in the ED. Troponin was trended and continue to be negative. Cardiology was consulted and nuclear stress test ordered. Subjective: Patient's chest pain has resolved. She states she is feeling quite a bit better. She has no complaints at this time. Medications:  Reviewed    Infusion Medications   Scheduled Medications    ipratropium-albuterol  1 ampule Inhalation Q4H WA    sodium chloride flush  10 mL Intravenous 2 times per day    atorvastatin  40 mg Oral Nightly    aspirin  81 mg Oral Daily    enoxaparin  40 mg Subcutaneous Daily    carvedilol  3.125 mg Oral BID WC    divalproex  250 mg Oral 2 times per day    DULoxetine  60 mg Oral Daily    gabapentin  100 mg Oral TID    busPIRone  10 mg Oral BID    QUEtiapine  300 mg Oral Nightly    losartan  50 mg Oral Daily    primidone  50 mg Oral QAM    pantoprazole  40 mg Oral QAM AC     PRN Meds: sodium chloride flush, acetaminophen **OR** acetaminophen, polyethylene glycol, promethazine **OR** ondansetron, nitroGLYCERIN, regadenoson      Intake/Output Summary (Last 24 hours) at 9/29/2020 0847  Last data filed at 9/29/2020 0547  Gross per 24 hour   Intake 480 ml   Output --   Net 480 ml       Physical Exam Performed:    /66   Pulse 74   Temp 98 °F (36.7 °C) (Oral)   Resp 16   Ht 5' 1\" (1.549 m)   Wt 169 lb 4.8 oz (76.8 kg)   SpO2 94%   BMI 31.99 kg/m²     General appearance:  No apparent distress, appears stated age and cooperative. HEENT:  Normal cephalic, atraumatic without obvious deformity.  Pupils equal, round, and reactive to light. Extra ocular muscles intact. Conjunctivae/corneas clear. Neck: Supple, with full range of motion. No jugular venous distention. Trachea midline. Respiratory:  Normal respiratory effort. Mild expiratory wheezing. Cardiovascular:  Regular rate and rhythm with normal S1/S2 without murmurs, rubs or gallops. Abdomen: Soft, non-tender, non-distended with normal bowel sounds. Musculoskeletal:  No clubbing, cyanosis or edema bilaterally. Full range of motion without deformity. Skin: Skin color, texture, turgor normal.  No rashes or lesions. Neurologic:  Neurovascularly intact without any focal sensory/motor deficits. Psychiatric:  Alert and oriented, thought content appropriate, normal insight  Capillary Refill: Brisk,< 3 seconds   Peripheral Pulses: +2 palpable, equal bilaterally     Labs:   Recent Labs     09/28/20  1538 09/29/20  0656   WBC 3.8* 4.2   HGB 12.7 12.2   HCT 38.0 36.4    145     Recent Labs     09/28/20  1538 09/29/20  0656   * 137   K 4.3 4.2   CL 95* 97*   CO2 30 31   BUN 9 13   CREATININE <0.5* 0.6   CALCIUM 9.0 8.8     Recent Labs     09/28/20  1538   AST 10*   ALT 7*   BILITOT <0.2   ALKPHOS 70     Recent Labs     09/28/20  1538   INR 0.93     Recent Labs     09/28/20  1538 09/28/20  2055 09/28/20  2333   TROPONINI <0.01 <0.01 <0.01       Urinalysis:      Lab Results   Component Value Date    NITRU Negative 03/14/2020    WBCUA  03/09/2020    BACTERIA Rare 03/09/2020    RBCUA None seen 03/09/2020    BLOODU Negative 03/14/2020    SPECGRAV 1.015 03/14/2020    GLUCOSEU Negative 03/14/2020    GLUCOSEU NEGATIVE 06/03/2012       Radiology:  XR CHEST PORTABLE   Final Result   No radiographic evidence of acute pulmonary disease.          NM Cardiac Stress Test Nuclear Imaging    (Results Pending)           Assessment/Plan:    Active Hospital Problems    Diagnosis Date Noted    Chest pain [R07.9]      Chest Pain:  Due to typical and atypical cardiac features, negative EKG, CXR and troponin negative x3 this does not appear to be from an acute ischemic event. -NPO at midnight and get NM Lexiscan Stress Test if Cardiology agrees.  -Cardiology consulted appreciate recs  -Nitro PRN  -EKG PRN  -Protonix    COPD:  -Duonebs QID PRN     Diastolic CHF:  CXR negative, no signs of peripheral edema, last echo normal 10/17/2019.  -Monitor    DVT Prophylaxis: Lovenox  Diet: Diet NPO, After Midnight  Code Status: DNR-CCA  PT/OT Eval Status: Not consulted    Dispo -pending cardiac work-up. This patient was seen and evaluated with the resident team and with the attending, Dr. Virginia Grewal.     (Please note that portions of this note were completed with a voice recognition program.  Efforts were made to edit the dictations but occasionally words are mis-transcribed.)    Anika Avila,   Family Medicine Resident PGY2

## 2020-09-29 NOTE — PROGRESS NOTES
Assessment complete. VSS. Patient NPO awaiting stress test. Bed in the lowest position, call light within reach. No needs expressed at this time. Will continue to monitor.

## 2020-09-29 NOTE — PROGRESS NOTES
Pharmacologic nuclear SPECT stress test demonstrated normal myocardial perfusion with hyperdynamic LV systolic function and EF >73%. -OK for discharge home from cardiology standpoint.                 -Follow-up with Dr. Cranston Najjar in one month.     [address] [place] [address]

## 2020-09-29 NOTE — PROGRESS NOTES
RESPIRATORY THERAPY ASSESSMENT    Name:  Laura Patricia Record Number:  [id number]  Age: 77 y.o. Gender: female  : 1950  Today's Date:  2020  Room:  2100/9023-05    Assessment     Is the patient being admitted for a COPD or Asthma exacerbation? No   (If yes the patient will be seen every 4 hours for the first 24 hours and then reassessed)    Patient Admission Diagnosis      Allergies  Allergies   Allergen Reactions    Dicyclomine      listed in pxysis    Sumatriptan      In pxysis listing  Other reaction(s): throat swelling    Tape Adrianne Just Tape]      Tears off patient's skin very easily     Tizanidine      Listed in pxysis    Motrin [Ibuprofen Micronized] Nausea And Vomiting       Minimum Predicted Vital Capacity:     720          Actual Vital Capacity:      DANNIE              Pulmonary History:COPD and CHF/Pulmonary Edema  Home Oxygen Therapy:  room air  Home Respiratory Therapy:Albuterol PRN  Current Respiratory Therapy:  HHN Duoneb Q4WA          Respiratory Severity Index(RSI)   Patients with orders for inhalation medications, oxygen, or any therapeutic treatment modality will be placed on Respiratory Protocol. They will be assessed with the first treatment and at least every 72 hours thereafter. The following severity scale will be used to determine frequency of treatment intervention.     Smoking History: Pulmonary Disease or Smoking History, Greater than 15 pack year = 2    Social History  Social History     Tobacco Use    Smoking status: Current Every Day Smoker     Packs/day: 2.00     Years: 47.00     Pack years: 94.00     Types: Cigarettes    Smokeless tobacco: Never Used   Substance Use Topics    Alcohol use: No    Drug use: Not Currently       Recent Surgical History: None = 0  Past Surgical History  Past Surgical History:   Procedure Laterality Date    COLONOSCOPY  12   Daiana Estrada DIAGNOSTIC CARDIAC CATH LAB PROCEDURE  2007    Normal cor angio 2007    HERNIA REPAIR  07/11/2019    robotic ventral hernia repair with mesh    HERNIA REPAIR N/A 7/11/2019    ROBOTIC VENTRAL HERNIA REPAIR WITH MESH performed by Carin Paiz MD at [place], TOTAL ABDOMINAL      KIDNEY STONE SURGERY         Level of Consciousness: Alert, Oriented, and Cooperative = 0    Level of Activity: Walking with assistance = 1    Respiratory Pattern: Dyspnea with exertion;Irregular pattern;or RR less than 6 = 2    Breath Sounds: Absent bilaterally and/or with wheezes = 3    Sputum   ,  ,    Cough: Strong, spontaneous, non-productive = 0    Vital Signs   /60   Pulse 81   Temp 97.7 °F (36.5 °C) (Oral)   Resp 18   Ht 5' 1\" (1.549 m)   Wt 169 lb 4.8 oz (76.8 kg)   SpO2 93%   BMI 31.99 kg/m²   SPO2 (COPD values may differ): 90-91% on room air or greater than 92% on FiO2 24- 28% = 1    Peak Flow (asthma only): not applicable = 0    RSI: 7-60 = TID (three times daily) and Q4hr PRN for dyspnea        Plan       Goals: medication delivery, mobilize retained secretions, volume expansion and improve oxygenation    Patient/caregiver was educated on the proper method of use for Respiratory Care Devices:  Yes      Level of patient/caregiver understanding able to:   ? Verbalize understanding   ? Demonstrate understanding       ? Teach back        ? Needs reinforcement       ? No available caregiver               ? Other:     Response to education:  Good     Is patient being placed on Home Treatment Regimen? No     Does the patient have everything they need prior to discharge? NA     Comments: Patient admits with shortness of breath and chest pain. Plan of Care: [facility] Duoneb Q4WA    Electronically signed by Rhea Simms RCP on 9/29/2020 at 1:21 AM    Respiratory Protocol Guidelines     1. Assessment and treatment by Respiratory Therapy will be initiated for medication and therapeutic interventions upon initiation of aerosolized medication.   2. Physician will be contacted for respiratory rate (RR) greater than 35 breaths per minute. Therapy will be held for heart rate (HR) greater than 140 beats per minute, pending direction from physician. 3. Bronchodilators will be administered via Metered Dose Inhaler (MDI) with spacer when the following criteria are met:  a. Alert and cooperative     b. HR < 140 bpm  c. RR < 30 bpm                d. Can demonstrate a 2-3 second inspiratory hold  4. Bronchodilators will be administered via Hand Held Nebulizer JERARDO [facility]) to patients when ANY of the following criteria are met  a. Incognizant or uncooperative          b. Patients treated with HHN at Home        c. Unable to demonstrate proper use of MDI with spacer     d. RR > 30 bpm   5. Bronchodilators will be delivered via Metered Dose Inhaler (MDI), HHN, Aerogen to intubated patients on mechanical ventilation. 6. Inhalation medication orders will be delivered and/or substituted as outlined below. Aerosolized Medications Ordering and Administration Guidelines:    1. All Medications will be ordered by a physician, and their frequency and/or modality will be adjusted as defined by the patients Respiratory Severity Index (RSI) score. 2. If the patient does not have documented COPD, consider discontinuing anticholinergics when RSI is less than 9.  3. If the bronchospasm worsens (increased RSI), then the bronchodilator frequency can be increased to a maximum of every 4 hours. If greater than every 4 hours is required, the physician will be contacted. 4. If the bronchospasm improves, the frequency of the bronchodilator can be decreased, based on the patient's RSI, but not less than home treatment regimen frequency. 5. Bronchodilator(s) will be discontinued if patient has a RSI less than 9 and has received no scheduled or as needed treatment for 72  Hrs. Patients Ordered on a Mucolytic Agent:    1. Must always be administered with a bronchodilator.     2. Discontinue if patient experiences worsened bronchospasm, or secretions have lessened to the point that the patient is able to clear them with a cough. Anti-inflammatory and Combination Medications:    1. If the patient lacks prior history of lung disease, is not using inhaled anti-inflammatory medication at home, and lacks wheezing by examination or by history for at least 24 hours, contact physician for possible discontinuation.

## 2020-09-29 NOTE — DISCHARGE SUMMARY
Inpatient Family Medicine Service Discharge Summary      Patient ID: Sebastian Ramos    Patient's PCP: Ayan Pina MD    Admit Date: 9/28/2020   Discharge Date: 9/29/2020 09/29/2020     Hospital Course: 77 y. o. female with a history of Wernicke-encephalopathy, seizures, CAD, HTN, CHF, COPD, who presented to [facility] with severe left sided chest pain radiating to her bilateral neck that started yesterday. EKG, chest x-ray, and troponin were negative in the ED. Troponin was trended and continue to be negative. Cardiology was consulted and nuclear stress test ordered which was negative. Her blood pressure was better controlled after adding Coreg. Cardiology recommended continuing Carvedilol, atorvastatin, and ASA 81mg d/t CAD and following up with Dr. Manan Torres in one month. Patients abdominal pain resolved with pantoprazole and GI cocktail. Patient will be give given pantoprazole. Hospital Problems:  1. Atypical chest pain - Although symptoms are atypical, patient does have multiple risk factors for CAD and continues to smoke 1.5 ppd. Troponins have been negative x3 and ECG shows normal sinus rhythm with RSR' pattern in V1 and non-specific T wave changes in the inferior leads. Will plan for further cardiac risk stratification with non-invasive imaging.  -Please keep patient NPO and will arrange pharmacologic nuclear SPECT stress test later today  -Continue aspirin 81 mg daily and atorvastatin 40 mg daily  -Monitor on telemetry  -Repeat ECG for any new episodes of chest pain     2. Hypertension - BP elevated on admission, but is currently controlled after primary team started low-dose coreg.  -Continue coreg 3.125 mg BID and losartan 50 mg daily  -Will continue to monitor BP closely and titrate antihypertensive regimen as needed     3. Diastolic dysfunction - Most recent TTE from 10/2019 demonstrated findings consistent with grade I diastolic dysfunction.   Chordal AMITA was present, but there was no septal hypertrophy or inducible LVOT gradient. Currently, she is clinically euvolemic.  -No indication for diuretic therapy at this time  -BP control as above     3. Hyperlipidemia  -Continue atorvastatin 40 mg daily     4. COPD  -Duonebs prn     5. Tobacco use  -Reviewed risks of continued use and strongly encouraged cessation     6. Chronic pain syndrome  -Continue home regimen    Admitting Physician: Madelaine Alston DO  Discharge Physician: Urbano Gould DO     Discharge Diagnoses: Active Hospital Problems    Diagnosis Date Noted    Chest pain [R07.9]      Physical Exam Performed:     /72   Pulse 68   Temp 97.6 °F (36.4 °C) (Oral)   Resp 16   Ht 5' 1\" (1.549 m)   Wt 169 lb 4.8 oz (76.8 kg)   SpO2 95%   BMI 31.99 kg/m²       General appearance:  No apparent distress, appears stated age and cooperative. HEENT:  Normal cephalic, atraumatic without obvious deformity. Pupils equal, round, and reactive to light.  Extra ocular muscles intact. Conjunctivae/corneas clear. Neck: Supple, with full range of motion. No jugular venous distention. Trachea midline. Respiratory:  Normal respiratory effort. Mild expiratory wheezing. Cardiovascular:  Regular rate and rhythm with normal S1/S2 without murmurs, rubs or gallops. Abdomen: Soft, non-tender, non-distended with normal bowel sounds. Musculoskeletal:  No clubbing, cyanosis or edema bilaterally.  Full range of motion without deformity. Skin: Skin color, texture, turgor normal.  No rashes or lesions. Neurologic:  Neurovascularly intact without any focal sensory/motor deficits. Psychiatric:  Alert and oriented, thought content appropriate, normal insight  Capillary Refill: Brisk,< 3 seconds   Peripheral Pulses: +2 palpable, equal bilaterally     Labs:  For convenience and continuity at follow-up the following most recent labs are provided:      CBC:    Lab Results   Component Value Date    WBC 4.2 09/29/2020    HGB 12.2 09/29/2020    HCT 36.4 tablet Take 500 mg by mouth nightlyHistorical Med      ketorolac (TORADOL) 10 MG tablet Take 1 tablet by mouth 3 times daily, Disp-12 tablet, R-0Print      DULoxetine (CYMBALTA) 60 MG extended release capsule Take 60 mg by mouth daily Historical Med      fluticasone (FLONASE) 50 MCG/ACT nasal spray 1 spray by Nasal route daily as needed Historical Med      !! divalproex (DEPAKOTE) 250 MG DR tablet Take 250 mg by mouth every morning       nicotine (NICODERM CQ) 14 MG/24HR Place 1 patch onto the skin daily, Disp-42 patch, R-0Print      Blood Pressure Monitoring (BLOOD PRESSURE MONITOR AUTOMAT) SOCORRO 1 each by Does not apply route daily. , Disp-1 Device, R-0       !! - Potential duplicate medications found. Please discuss with provider. Time Spent on discharge is more than 45 minutes in the examination, evaluation, counseling and review of medications and discharge plan. This patient was seen and evaluated with the resident team and with the attending, Dr. Dorene Fisher. (Please note that portions of this note were completed with a voice recognition program.  Efforts were made to edit the dictations but occasionally words are mis-transcribed.)      Signed:    Aminah Silverio DO   9/29/2020      Thank you Marilynn Mukherjee MD for the opportunity to be involved in this patient's care. If you have any questions or concerns please feel free to contact me at [phone].

## 2020-09-29 NOTE — PROGRESS NOTES
Pt admitted to Room 354. Pt a/o. VSS. [place] to room. Denies c/o pain. Assessment complete as charted. Call light in reach. Denies other needs. Will continue to monitor.

## 2020-09-29 NOTE — PROGRESS NOTES
A maxi myoview stress test was completed on this patient as ordered. The patient tolerated the procedure well. Awaiting stress imaging at this time.

## 2020-10-31 ENCOUNTER — APPOINTMENT (OUTPATIENT)
Dept: GENERAL RADIOLOGY | Age: 70
DRG: 917 | End: 2020-10-31
Payer: COMMERCIAL

## 2020-10-31 ENCOUNTER — HOSPITAL ENCOUNTER (INPATIENT)
Age: 70
LOS: 3 days | Discharge: HOME HEALTH | DRG: 917 | End: 2020-11-03
Attending: STUDENT IN AN ORGANIZED HEALTH CARE EDUCATION/TRAINING PROGRAM | Admitting: INTERNAL MEDICINE
Payer: COMMERCIAL

## 2020-10-31 PROBLEM — J96.22 ACUTE ON CHRONIC RESPIRATORY FAILURE WITH HYPOXIA AND HYPERCAPNIA (HCC): Status: ACTIVE | Noted: 2020-10-31

## 2020-10-31 PROBLEM — J96.21 ACUTE ON CHRONIC RESPIRATORY FAILURE WITH HYPOXIA AND HYPERCAPNIA (HCC): Status: ACTIVE | Noted: 2020-10-31

## 2020-10-31 LAB
A/G RATIO: 1.5 (ref 1.1–2.2)
ALBUMIN SERPL-MCNC: 4.5 G/DL (ref 3.4–5)
ALP BLD-CCNC: 82 U/L (ref 40–129)
ALT SERPL-CCNC: 15 U/L (ref 10–40)
ANION GAP SERPL CALCULATED.3IONS-SCNC: 11 MMOL/L (ref 3–16)
AST SERPL-CCNC: 35 U/L (ref 15–37)
BASE EXCESS ARTERIAL: -2 MMOL/L (ref -3–3)
BASE EXCESS VENOUS: -1.7 MMOL/L (ref -3–3)
BASOPHILS ABSOLUTE: 0 K/UL (ref 0–0.2)
BASOPHILS RELATIVE PERCENT: 0.6 %
BILIRUB SERPL-MCNC: 0.3 MG/DL (ref 0–1)
BUN BLDV-MCNC: 13 MG/DL (ref 7–20)
CALCIUM SERPL-MCNC: 9.3 MG/DL (ref 8.3–10.6)
CARBOXYHEMOGLOBIN ARTERIAL: 2.6 % (ref 0–1.5)
CARBOXYHEMOGLOBIN: 7.8 % (ref 0–1.5)
CHLORIDE BLD-SCNC: 94 MMOL/L (ref 99–110)
CO2: 29 MMOL/L (ref 21–32)
CREAT SERPL-MCNC: 1.7 MG/DL (ref 0.6–1.2)
EOSINOPHILS ABSOLUTE: 0.2 K/UL (ref 0–0.6)
EOSINOPHILS RELATIVE PERCENT: 2.3 %
GFR AFRICAN AMERICAN: 36
GFR NON-AFRICAN AMERICAN: 30
GLOBULIN: 3 G/DL
GLUCOSE BLD-MCNC: 129 MG/DL (ref 70–99)
HCO3 ARTERIAL: 26.5 MMOL/L (ref 21–29)
HCO3 VENOUS: 27 MMOL/L (ref 23–29)
HCT VFR BLD CALC: 40.5 % (ref 36–48)
HEMOGLOBIN, ART, EXTENDED: 13.4 G/DL (ref 12–16)
HEMOGLOBIN: 13.5 G/DL (ref 12–16)
LACTIC ACID: 0.8 MMOL/L (ref 0.4–2)
LYMPHOCYTES ABSOLUTE: 1.5 K/UL (ref 1–5.1)
LYMPHOCYTES RELATIVE PERCENT: 21.3 %
MAGNESIUM: 1.9 MG/DL (ref 1.8–2.4)
MCH RBC QN AUTO: 30.8 PG (ref 26–34)
MCHC RBC AUTO-ENTMCNC: 33.4 G/DL (ref 31–36)
MCV RBC AUTO: 92.2 FL (ref 80–100)
METHEMOGLOBIN ARTERIAL: 0.3 %
METHEMOGLOBIN VENOUS: 0.3 %
MONOCYTES ABSOLUTE: 0.8 K/UL (ref 0–1.3)
MONOCYTES RELATIVE PERCENT: 11.2 %
NEUTROPHILS ABSOLUTE: 4.4 K/UL (ref 1.7–7.7)
NEUTROPHILS RELATIVE PERCENT: 64.6 %
O2 CONTENT ARTERIAL: 18 ML/DL
O2 CONTENT, VEN: 13 VOL %
O2 SAT, ARTERIAL: 96.5 %
O2 SAT, VEN: 65 %
O2 THERAPY: ABNORMAL
O2 THERAPY: ABNORMAL
PCO2 ARTERIAL: 62.5 MMHG (ref 35–45)
PCO2, VEN: 63.5 MMHG (ref 40–50)
PDW BLD-RTO: 14.6 % (ref 12.4–15.4)
PH ARTERIAL: 7.25 (ref 7.35–7.45)
PH VENOUS: 7.25 (ref 7.35–7.45)
PLATELET # BLD: 168 K/UL (ref 135–450)
PMV BLD AUTO: 7.2 FL (ref 5–10.5)
PO2 ARTERIAL: 101.5 MMHG (ref 75–108)
PO2, VEN: 39.6 MMHG (ref 25–40)
POTASSIUM REFLEX MAGNESIUM: 3.5 MMOL/L (ref 3.5–5.1)
PRO-BNP: 372 PG/ML (ref 0–124)
PROCALCITONIN: 0.11 NG/ML (ref 0–0.15)
RBC # BLD: 4.39 M/UL (ref 4–5.2)
SODIUM BLD-SCNC: 134 MMOL/L (ref 136–145)
TCO2 ARTERIAL: 28.4 MMOL/L
TCO2 CALC VENOUS: 29 MMOL/L
TOTAL PROTEIN: 7.5 G/DL (ref 6.4–8.2)
WBC # BLD: 6.9 K/UL (ref 4–11)

## 2020-10-31 PROCEDURE — 84145 PROCALCITONIN (PCT): CPT

## 2020-10-31 PROCEDURE — 80053 COMPREHEN METABOLIC PANEL: CPT

## 2020-10-31 PROCEDURE — 2000000000 HC ICU R&B

## 2020-10-31 PROCEDURE — U0002 COVID-19 LAB TEST NON-CDC: HCPCS

## 2020-10-31 PROCEDURE — 94660 CPAP INITIATION&MGMT: CPT

## 2020-10-31 PROCEDURE — 83735 ASSAY OF MAGNESIUM: CPT

## 2020-10-31 PROCEDURE — 6370000000 HC RX 637 (ALT 250 FOR IP): Performed by: INTERNAL MEDICINE

## 2020-10-31 PROCEDURE — 36415 COLL VENOUS BLD VENIPUNCTURE: CPT

## 2020-10-31 PROCEDURE — 83605 ASSAY OF LACTIC ACID: CPT

## 2020-10-31 PROCEDURE — 94761 N-INVAS EAR/PLS OXIMETRY MLT: CPT

## 2020-10-31 PROCEDURE — 71045 X-RAY EXAM CHEST 1 VIEW: CPT

## 2020-10-31 PROCEDURE — 85025 COMPLETE CBC W/AUTO DIFF WBC: CPT

## 2020-10-31 PROCEDURE — 99285 EMERGENCY DEPT VISIT HI MDM: CPT

## 2020-10-31 PROCEDURE — 93005 ELECTROCARDIOGRAM TRACING: CPT | Performed by: STUDENT IN AN ORGANIZED HEALTH CARE EDUCATION/TRAINING PROGRAM

## 2020-10-31 PROCEDURE — U0003 INFECTIOUS AGENT DETECTION BY NUCLEIC ACID (DNA OR RNA); SEVERE ACUTE RESPIRATORY SYNDROME CORONAVIRUS 2 (SARS-COV-2) (CORONAVIRUS DISEASE [COVID-19]), AMPLIFIED PROBE TECHNIQUE, MAKING USE OF HIGH THROUGHPUT TECHNOLOGIES AS DESCRIBED BY CMS-2020-01-R: HCPCS

## 2020-10-31 PROCEDURE — 83880 ASSAY OF NATRIURETIC PEPTIDE: CPT

## 2020-10-31 PROCEDURE — 82803 BLOOD GASES ANY COMBINATION: CPT

## 2020-10-31 PROCEDURE — 2580000003 HC RX 258: Performed by: INTERNAL MEDICINE

## 2020-10-31 PROCEDURE — 6360000002 HC RX W HCPCS: Performed by: INTERNAL MEDICINE

## 2020-10-31 RX ORDER — DULOXETIN HYDROCHLORIDE 60 MG/1
60 CAPSULE, DELAYED RELEASE ORAL DAILY
Status: DISCONTINUED | OUTPATIENT
Start: 2020-11-01 | End: 2020-11-03 | Stop reason: HOSPADM

## 2020-10-31 RX ORDER — PROCHLORPERAZINE EDISYLATE 5 MG/ML
10 INJECTION INTRAMUSCULAR; INTRAVENOUS EVERY 6 HOURS PRN
Status: DISCONTINUED | OUTPATIENT
Start: 2020-10-31 | End: 2020-11-03 | Stop reason: HOSPADM

## 2020-10-31 RX ORDER — SODIUM CHLORIDE 0.9 % (FLUSH) 0.9 %
10 SYRINGE (ML) INJECTION PRN
Status: DISCONTINUED | OUTPATIENT
Start: 2020-10-31 | End: 2020-11-03 | Stop reason: HOSPADM

## 2020-10-31 RX ORDER — PREDNISONE 20 MG/1
40 TABLET ORAL
Status: DISCONTINUED | OUTPATIENT
Start: 2020-11-03 | End: 2020-11-03 | Stop reason: HOSPADM

## 2020-10-31 RX ORDER — GABAPENTIN 300 MG/1
300 CAPSULE ORAL 3 TIMES DAILY
Status: DISCONTINUED | OUTPATIENT
Start: 2020-10-31 | End: 2020-11-01

## 2020-10-31 RX ORDER — ALBUTEROL SULFATE 90 UG/1
2 AEROSOL, METERED RESPIRATORY (INHALATION) 4 TIMES DAILY PRN
Status: DISCONTINUED | OUTPATIENT
Start: 2020-10-31 | End: 2020-11-03 | Stop reason: HOSPADM

## 2020-10-31 RX ORDER — METHYLPREDNISOLONE SODIUM SUCCINATE 40 MG/ML
40 INJECTION, POWDER, LYOPHILIZED, FOR SOLUTION INTRAMUSCULAR; INTRAVENOUS EVERY 12 HOURS
Status: COMPLETED | OUTPATIENT
Start: 2020-11-01 | End: 2020-11-02

## 2020-10-31 RX ORDER — ATORVASTATIN CALCIUM 40 MG/1
40 TABLET, FILM COATED ORAL NIGHTLY
Status: DISCONTINUED | OUTPATIENT
Start: 2020-10-31 | End: 2020-11-03 | Stop reason: HOSPADM

## 2020-10-31 RX ORDER — DIVALPROEX SODIUM 250 MG/1
250 TABLET, DELAYED RELEASE ORAL EVERY MORNING
Status: DISCONTINUED | OUTPATIENT
Start: 2020-11-01 | End: 2020-11-03 | Stop reason: HOSPADM

## 2020-10-31 RX ORDER — SODIUM CHLORIDE 9 MG/ML
INJECTION, SOLUTION INTRAVENOUS CONTINUOUS
Status: DISCONTINUED | OUTPATIENT
Start: 2020-10-31 | End: 2020-11-03 | Stop reason: HOSPADM

## 2020-10-31 RX ORDER — ACETAMINOPHEN 650 MG/1
650 SUPPOSITORY RECTAL EVERY 6 HOURS PRN
Status: DISCONTINUED | OUTPATIENT
Start: 2020-10-31 | End: 2020-11-03 | Stop reason: HOSPADM

## 2020-10-31 RX ORDER — DIVALPROEX SODIUM 500 MG/1
500 TABLET, DELAYED RELEASE ORAL NIGHTLY
Status: DISCONTINUED | OUTPATIENT
Start: 2020-10-31 | End: 2020-11-03 | Stop reason: HOSPADM

## 2020-10-31 RX ORDER — ACETAMINOPHEN 325 MG/1
650 TABLET ORAL EVERY 6 HOURS PRN
Status: DISCONTINUED | OUTPATIENT
Start: 2020-10-31 | End: 2020-11-03 | Stop reason: HOSPADM

## 2020-10-31 RX ORDER — SODIUM CHLORIDE 0.9 % (FLUSH) 0.9 %
10 SYRINGE (ML) INJECTION EVERY 12 HOURS SCHEDULED
Status: DISCONTINUED | OUTPATIENT
Start: 2020-10-31 | End: 2020-11-03 | Stop reason: HOSPADM

## 2020-10-31 RX ORDER — SODIUM CHLORIDE 9 MG/ML
INJECTION, SOLUTION INTRAVENOUS
Status: DISPENSED
Start: 2020-10-31 | End: 2020-11-01

## 2020-10-31 RX ORDER — ALBUTEROL SULFATE 90 UG/1
2 AEROSOL, METERED RESPIRATORY (INHALATION)
Status: DISCONTINUED | OUTPATIENT
Start: 2020-10-31 | End: 2020-11-01

## 2020-10-31 RX ORDER — POLYETHYLENE GLYCOL 3350 17 G/17G
17 POWDER, FOR SOLUTION ORAL DAILY PRN
Status: DISCONTINUED | OUTPATIENT
Start: 2020-10-31 | End: 2020-11-03 | Stop reason: HOSPADM

## 2020-10-31 RX ORDER — CARVEDILOL 3.12 MG/1
3.12 TABLET ORAL 2 TIMES DAILY WITH MEALS
Status: DISCONTINUED | OUTPATIENT
Start: 2020-11-01 | End: 2020-11-03 | Stop reason: HOSPADM

## 2020-10-31 RX ORDER — ASPIRIN 81 MG/1
81 TABLET, CHEWABLE ORAL DAILY
Status: DISCONTINUED | OUTPATIENT
Start: 2020-11-01 | End: 2020-11-03 | Stop reason: HOSPADM

## 2020-10-31 RX ADMIN — AZITHROMYCIN MONOHYDRATE 500 MG: 500 INJECTION, POWDER, LYOPHILIZED, FOR SOLUTION INTRAVENOUS at 22:23

## 2020-10-31 RX ADMIN — SODIUM CHLORIDE: 9 INJECTION, SOLUTION INTRAVENOUS at 22:16

## 2020-10-31 ASSESSMENT — PAIN SCALES - GENERAL: PAINLEVEL_OUTOF10: 0

## 2020-10-31 NOTE — ED PROVIDER NOTES
hernia repair with mesh    HERNIA REPAIR N/A 7/11/2019    ROBOTIC VENTRAL HERNIA REPAIR WITH MESH performed by Troy Joseph MD at [facility], TOTAL ABDOMINAL      KIDNEY STONE SURGERY          Family History   Problem Relation Age of Onset    Emphysema Mother     Heart Disease Mother     Arthritis Mother     Depression Mother     High Blood Pressure Mother     Heart Failure Father     Heart Disease Father     Arthritis Father     Diabetes Father     High Blood Pressure Father     Stroke Father     Substance Abuse Father     High Blood Pressure Brother     Heart Disease Sister     Kidney Disease Daughter         Social History     Socioeconomic History    Marital status:      Spouse name: None    Number of children: None    Years of education: None    Highest education level: None   Occupational History    None   Social Needs    Financial resource strain: None    Food insecurity     Worry: None     Inability: None    Transportation needs     Medical: None     Non-medical: None   Tobacco Use    Smoking status: Current Every Day Smoker     Packs/day: 2.00     Years: 47.00     Pack years: 94.00     Types: Cigarettes    Smokeless tobacco: Never Used   Substance and Sexual Activity    Alcohol use: No    Drug use: Not Currently    Sexual activity: Yes     Partners: Male   Lifestyle    Physical activity     Days per week: None     Minutes per session: None    Stress: None   Relationships    Social connections     Talks on phone: None     Gets together: None     Attends Buddhist service: None     Active member of club or organization: None     Attends meetings of clubs or organizations: None     Relationship status: None    Intimate partner violence     Fear of current or ex partner: None     Emotionally abused: None     Physically abused: None     Forced sexual activity: None   Other Topics Concern    None   Social History Narrative    None        Review of Systems   10 total systems reviewed and found to be negative unless otherwise noted in HPI     Physical Exam   BP (!) 105/45   Pulse 80   Temp 97 °F (36.1 °C) (Tympanic)   Resp 22   Ht 5' 1\" (1.549 m)   Wt 151 lb (68.5 kg)   SpO2 95%   BMI 28.53 kg/m²      CONSTITUTIONAL: Ill looking who appears very pale  HEAD: atraumatic, normocephalic   EYES: PERRL, No injection, discharge or scleral icterus. ENT: Moist mucous membranes. NECK: Normal ROM, NO LAD   CARDIOVASCULAR: Regular rate and rhythm. No murmurs or gallop. PULMONARY/CHEST: Airway patent. Mild wheezing bilaterally. ABDOMEN: Soft, Non-distended and non-tender, without guarding or rebound. SKIN: Acyanotic, warm, dry   MUSCULOSKELETAL: No swelling, tenderness or deformity   NEUROLOGICAL: Awake and oriented x 3. Pulses intact. Grossly nonfocal   Nursing note and vitals reviewed.      ED Course & Medical Decision Making   Medications - No data to display   Labs Reviewed   COMPREHENSIVE METABOLIC PANEL W/ REFLEX TO MG FOR LOW K - Abnormal; Notable for the following components:       Result Value    Sodium 134 (*)     Chloride 94 (*)     Glucose 129 (*)     CREATININE 1.7 (*)     GFR Non- 30 (*)     GFR  36 (*)     All other components within normal limits    Narrative:     Performed at:  [facility] [address],  [place]   Phone [phone]   BLOOD GAS, VENOUS - Abnormal; Notable for the following components:    pH, Storm 7.247 (*)     pCO2, Storm 63.5 (*)     Carboxyhemoglobin 7.8 (*)     All other components within normal limits    Narrative:     Performed at:  [facility] [address],  [place] [facility]   Phone [phone]   BLOOD GAS, ARTERIAL - Abnormal; Notable for the following components:    pH, Arterial 7.245 (*)     pCO2, Arterial 62.5 (*)     Carboxyhgb, Arterial 2.6 (*)     All other components within normal limits    Narrative:     Performed at:  [facility] [address],  [place], [facility]   Phone [phone]   CBC WITH AUTO DIFFERENTIAL    Narrative:     Performed at:  [facility] [address],  [place], [facility]   Phone [phone]   PROCALCITONIN    Narrative:     Performed at:  [facility] [address],  [place], [facility]   Phone [phone]   MAGNESIUM    Narrative:     Performed at:  [facility] [address],  [place], [facility]   Phone [phone]      XR CHEST PORTABLE   Final Result   Mild increased interstitial changes within the lungs, possibly infiltrate or   edema. No focal consolidation or pleural fluid. Xr Chest Portable    Result Date: 10/31/2020  EXAMINATION: ONE XRAY VIEW OF THE CHEST 10/31/2020 5:59 pm COMPARISON: 09/28/2020. HISTORY: ORDERING SYSTEM PROVIDED HISTORY: short of breath TECHNOLOGIST PROVIDED HISTORY: Reason for exam:->short of breath Reason for Exam: short of breath Acuity: Unknown Type of Exam: Unknown FINDINGS: Lung volumes are mildly decreased. The cardiomediastinal silhouette is stable. Aortic vascular calcification. Remote calcified granulomatous disease. Mild increase in the interstitial markings throughout the lungs. No focal consolidation or significant pleural fluid. Mild increased interstitial changes within the lungs, possibly infiltrate or edema. No focal consolidation or pleural fluid. EKG INTERPRETATION:  EKG by my preliminary interpretation shows sinus rhythm with rate of 81, normal axis, normal intervals, with no ST changes indicative of ischemia at this time.     PROCEDURES:   Procedures    ASSESSMENT AND PLAN:  Mitzy Garcia is a 77 y.o. female history of COPD with supposed to be on oxygen presenting this evening brought by EMS with complaint of shortness of breath and hypoxic in the low 70s per EMS. On exam patient appears ill but nontoxic. She is currently on 2 L of oxygen, looks pale and intermittent mildly confused. Work-up with ABG that showed a pH of 7.2 and PCO2 62.5 which is a significant change from her last PCO2 in March 2020. But the remaining labs unremarkable. At this time, I believe that her symptoms of shortness of breath is probably secondary to COPD exacerbation with hypercapnia. Patient placed on a BiPAP and given some breathing treatments. Covid 19 test is pending patient. She will need admission for further evaluation and treatment. Hospitalist has been consulted pending admission. ClINICAL IMPRESSION:  1. Dyspnea and respiratory abnormalities    2. Hypoxia    3. COPD exacerbation ([address]          DISPOSITION    Hospitalist admit   -Findings and recommendations explained to patient. She expressed understanding and agreed with the plan.   Mart Jensen MD (electronically signed)  10/31/2020  _________________________________________________________________________________________  Amount and/or Complexity of Data Reviewed:  Clinical lab tests: ordered and reviewed   Tests in the radiology section of CPT®: ordered and reviewed   Tests in the medicine section of CPT®: ordered and reviewed   Decide to obtain previous medical records or to obtain history from someone other than the patient: no  Obtain history from someone other than the patient: no  Review and summarize past medical records:yes  I looked up the patient in our electronic medical record:yes  Discuss the patient with other providers:yes  Independent visualization of images, tracings, or specimens:yes  Risk of Complications, Morbidity, and/or Mortality:Moderate  Presenting problems: moderate Diagnostic procedures: moderate yes Management options: moderate    Critical Care Attestation   Total critical care time: 35 minutes minimum

## 2020-10-31 NOTE — ED NOTES
Bed: 02  Expected date:   Expected time:   Means of arrival:   Comments:  kaylynn Flood [facility]  10/31/20 3023

## 2020-11-01 LAB
A/G RATIO: 1.6 (ref 1.1–2.2)
ALBUMIN SERPL-MCNC: 3.9 G/DL (ref 3.4–5)
ALP BLD-CCNC: 65 U/L (ref 40–129)
ALT SERPL-CCNC: 14 U/L (ref 10–40)
ANION GAP SERPL CALCULATED.3IONS-SCNC: 11 MMOL/L (ref 3–16)
AST SERPL-CCNC: 36 U/L (ref 15–37)
BACTERIA: ABNORMAL /HPF
BASE EXCESS ARTERIAL: -0.5 MMOL/L (ref -3–3)
BASOPHILS ABSOLUTE: 0 K/UL (ref 0–0.2)
BASOPHILS RELATIVE PERCENT: 0.6 %
BILIRUB SERPL-MCNC: 0.3 MG/DL (ref 0–1)
BILIRUBIN URINE: NEGATIVE
BLOOD, URINE: ABNORMAL
BUN BLDV-MCNC: 15 MG/DL (ref 7–20)
CALCIUM SERPL-MCNC: 8.9 MG/DL (ref 8.3–10.6)
CARBOXYHEMOGLOBIN ARTERIAL: 0.3 % (ref 0–1.5)
CHLORIDE BLD-SCNC: 95 MMOL/L (ref 99–110)
CLARITY: CLEAR
CO2: 27 MMOL/L (ref 21–32)
COLOR: YELLOW
CREAT SERPL-MCNC: 1.1 MG/DL (ref 0.6–1.2)
EKG ATRIAL RATE: 81 BPM
EKG DIAGNOSIS: NORMAL
EKG P AXIS: 62 DEGREES
EKG P-R INTERVAL: 160 MS
EKG Q-T INTERVAL: 396 MS
EKG QRS DURATION: 94 MS
EKG QTC CALCULATION (BAZETT): 460 MS
EKG R AXIS: 1 DEGREES
EKG T AXIS: 31 DEGREES
EKG VENTRICULAR RATE: 81 BPM
EOSINOPHILS ABSOLUTE: 0.1 K/UL (ref 0–0.6)
EOSINOPHILS RELATIVE PERCENT: 1.7 %
EPITHELIAL CELLS, UA: ABNORMAL /HPF (ref 0–5)
GFR AFRICAN AMERICAN: 60
GFR NON-AFRICAN AMERICAN: 49
GLOBULIN: 2.5 G/DL
GLUCOSE BLD-MCNC: 103 MG/DL (ref 70–99)
GLUCOSE URINE: NEGATIVE MG/DL
HCO3 ARTERIAL: 25.8 MMOL/L (ref 21–29)
HCT VFR BLD CALC: 34.6 % (ref 36–48)
HEMOGLOBIN, ART, EXTENDED: 12.1 G/DL (ref 12–16)
HEMOGLOBIN: 11.5 G/DL (ref 12–16)
HYALINE CASTS: ABNORMAL /LPF (ref 0–2)
KETONES, URINE: NEGATIVE MG/DL
LEUKOCYTE ESTERASE, URINE: NEGATIVE
LYMPHOCYTES ABSOLUTE: 1.1 K/UL (ref 1–5.1)
LYMPHOCYTES RELATIVE PERCENT: 19.6 %
MAGNESIUM: 1.9 MG/DL (ref 1.8–2.4)
MCH RBC QN AUTO: 30.6 PG (ref 26–34)
MCHC RBC AUTO-ENTMCNC: 33.2 G/DL (ref 31–36)
MCV RBC AUTO: 92.1 FL (ref 80–100)
METHEMOGLOBIN ARTERIAL: 0.3 %
MICROSCOPIC EXAMINATION: YES
MONOCYTES ABSOLUTE: 0.7 K/UL (ref 0–1.3)
MONOCYTES RELATIVE PERCENT: 12 %
NEUTROPHILS ABSOLUTE: 3.8 K/UL (ref 1.7–7.7)
NEUTROPHILS RELATIVE PERCENT: 66.1 %
NITRITE, URINE: NEGATIVE
O2 CONTENT ARTERIAL: 16 ML/DL
O2 SAT, ARTERIAL: 93.5 %
O2 THERAPY: ABNORMAL
PCO2 ARTERIAL: 49 MMHG (ref 35–45)
PDW BLD-RTO: 14.4 % (ref 12.4–15.4)
PH ARTERIAL: 7.34 (ref 7.35–7.45)
PH UA: 6 (ref 5–8)
PLATELET # BLD: 129 K/UL (ref 135–450)
PMV BLD AUTO: 6.9 FL (ref 5–10.5)
PO2 ARTERIAL: 72.3 MMHG (ref 75–108)
POTASSIUM REFLEX MAGNESIUM: 3.5 MMOL/L (ref 3.5–5.1)
PROTEIN UA: 30 MG/DL
RAPID INFLUENZA  B AGN: NEGATIVE
RAPID INFLUENZA A AGN: NEGATIVE
RBC # BLD: 3.76 M/UL (ref 4–5.2)
RBC UA: ABNORMAL /HPF (ref 0–4)
RENAL EPITHELIAL, UA: ABNORMAL /HPF (ref 0–1)
SODIUM BLD-SCNC: 133 MMOL/L (ref 136–145)
SPECIFIC GRAVITY UA: >=1.03 (ref 1–1.03)
TCO2 ARTERIAL: 27.3 MMOL/L
TOTAL PROTEIN: 6.4 G/DL (ref 6.4–8.2)
URINE REFLEX TO CULTURE: ABNORMAL
URINE TYPE: ABNORMAL
UROBILINOGEN, URINE: 0.2 E.U./DL
WBC # BLD: 5.8 K/UL (ref 4–11)
WBC UA: ABNORMAL /HPF (ref 0–5)

## 2020-11-01 PROCEDURE — 1200000000 HC SEMI PRIVATE

## 2020-11-01 PROCEDURE — 6360000002 HC RX W HCPCS: Performed by: INTERNAL MEDICINE

## 2020-11-01 PROCEDURE — 6370000000 HC RX 637 (ALT 250 FOR IP): Performed by: INTERNAL MEDICINE

## 2020-11-01 PROCEDURE — 81001 URINALYSIS AUTO W/SCOPE: CPT

## 2020-11-01 PROCEDURE — 36415 COLL VENOUS BLD VENIPUNCTURE: CPT

## 2020-11-01 PROCEDURE — 83735 ASSAY OF MAGNESIUM: CPT

## 2020-11-01 PROCEDURE — 87804 INFLUENZA ASSAY W/OPTIC: CPT

## 2020-11-01 PROCEDURE — 85025 COMPLETE CBC W/AUTO DIFF WBC: CPT

## 2020-11-01 PROCEDURE — 99223 1ST HOSP IP/OBS HIGH 75: CPT | Performed by: INTERNAL MEDICINE

## 2020-11-01 PROCEDURE — 80053 COMPREHEN METABOLIC PANEL: CPT

## 2020-11-01 PROCEDURE — 2500000003 HC RX 250 WO HCPCS: Performed by: INTERNAL MEDICINE

## 2020-11-01 PROCEDURE — 2580000003 HC RX 258: Performed by: INTERNAL MEDICINE

## 2020-11-01 PROCEDURE — 94640 AIRWAY INHALATION TREATMENT: CPT

## 2020-11-01 PROCEDURE — 82803 BLOOD GASES ANY COMBINATION: CPT

## 2020-11-01 PROCEDURE — 2700000000 HC OXYGEN THERAPY PER DAY

## 2020-11-01 PROCEDURE — 94761 N-INVAS EAR/PLS OXIMETRY MLT: CPT

## 2020-11-01 RX ORDER — OMEPRAZOLE 20 MG/1
20 CAPSULE, DELAYED RELEASE ORAL 2 TIMES DAILY
Status: ON HOLD | COMMUNITY
End: 2020-11-03 | Stop reason: HOSPADM

## 2020-11-01 RX ORDER — BUSPIRONE HYDROCHLORIDE 15 MG/1
15 TABLET ORAL 2 TIMES DAILY
COMMUNITY

## 2020-11-01 RX ORDER — GABAPENTIN 300 MG/1
300 CAPSULE ORAL NIGHTLY
Status: DISCONTINUED | OUTPATIENT
Start: 2020-11-01 | End: 2020-11-03 | Stop reason: HOSPADM

## 2020-11-01 RX ORDER — PRIMIDONE 50 MG/1
100 TABLET ORAL DAILY
COMMUNITY

## 2020-11-01 RX ORDER — NICOTINE 21 MG/24HR
1 PATCH, TRANSDERMAL 24 HOURS TRANSDERMAL DAILY
Status: DISCONTINUED | OUTPATIENT
Start: 2020-11-01 | End: 2020-11-03 | Stop reason: HOSPADM

## 2020-11-01 RX ORDER — ALBUTEROL SULFATE 90 UG/1
2 AEROSOL, METERED RESPIRATORY (INHALATION)
Status: DISCONTINUED | OUTPATIENT
Start: 2020-11-01 | End: 2020-11-03 | Stop reason: HOSPADM

## 2020-11-01 RX ADMIN — ASPIRIN 81 MG CHEWABLE TABLET 81 MG: 81 TABLET CHEWABLE at 08:09

## 2020-11-01 RX ADMIN — METHYLPREDNISOLONE SODIUM SUCCINATE 40 MG: 40 INJECTION, POWDER, FOR SOLUTION INTRAMUSCULAR; INTRAVENOUS at 20:35

## 2020-11-01 RX ADMIN — Medication 2 PUFF: at 11:47

## 2020-11-01 RX ADMIN — ATORVASTATIN CALCIUM 40 MG: 40 TABLET, FILM COATED ORAL at 20:35

## 2020-11-01 RX ADMIN — SODIUM CHLORIDE 0.2 MCG/KG/HR: 9 INJECTION, SOLUTION INTRAVENOUS at 09:19

## 2020-11-01 RX ADMIN — DIVALPROEX SODIUM 500 MG: 500 TABLET, DELAYED RELEASE ORAL at 20:35

## 2020-11-01 RX ADMIN — CARVEDILOL 3.12 MG: 3.12 TABLET, FILM COATED ORAL at 08:08

## 2020-11-01 RX ADMIN — ENOXAPARIN SODIUM 30 MG: 30 INJECTION SUBCUTANEOUS at 08:08

## 2020-11-01 RX ADMIN — Medication 2 PUFF: at 18:49

## 2020-11-01 RX ADMIN — Medication 2 PUFF: at 14:47

## 2020-11-01 RX ADMIN — Medication 2 PUFF: at 07:50

## 2020-11-01 RX ADMIN — SODIUM CHLORIDE: 9 INJECTION, SOLUTION INTRAVENOUS at 22:04

## 2020-11-01 RX ADMIN — GABAPENTIN 300 MG: 300 CAPSULE ORAL at 08:09

## 2020-11-01 RX ADMIN — GABAPENTIN 300 MG: 300 CAPSULE ORAL at 20:35

## 2020-11-01 RX ADMIN — Medication 2 PUFF: at 22:25

## 2020-11-01 RX ADMIN — DIVALPROEX SODIUM 250 MG: 250 TABLET, DELAYED RELEASE ORAL at 08:08

## 2020-11-01 RX ADMIN — DULOXETINE HYDROCHLORIDE 60 MG: 60 CAPSULE, DELAYED RELEASE ORAL at 08:09

## 2020-11-01 RX ADMIN — Medication 10 ML: at 20:35

## 2020-11-01 RX ADMIN — AZITHROMYCIN MONOHYDRATE 500 MG: 500 INJECTION, POWDER, LYOPHILIZED, FOR SOLUTION INTRAVENOUS at 22:04

## 2020-11-01 RX ADMIN — METHYLPREDNISOLONE SODIUM SUCCINATE 40 MG: 40 INJECTION, POWDER, FOR SOLUTION INTRAMUSCULAR; INTRAVENOUS at 08:08

## 2020-11-01 ASSESSMENT — PAIN SCALES - GENERAL: PAINLEVEL_OUTOF10: 0

## 2020-11-01 NOTE — PROGRESS NOTES
Dr. Jasmyn Jaquez at bedside, bi-pap removed, precedex stopped and 5 liters nasal canula applied. Pt seems to be tolerating. She continues slow to respond, but oriented. Will continue to monitor for tolerance. VSS, telemetry SR. Bed alarm is activated for falls risk. Call light is within patient's reach.

## 2020-11-01 NOTE — PLAN OF CARE
Problem: Falls - Risk of:  Goal: Will remain free from falls  Description: Will remain free from falls  Outcome: Ongoing     Problem: Airway Clearance - Ineffective:  Goal: Ability to maintain a clear airway will improve  Description: Ability to maintain a clear airway will improve  Outcome: Ongoing     Problem: Breathing Pattern - Ineffective:  Goal: Ability to achieve and maintain a regular respiratory rate will improve  Description: Ability to achieve and maintain a regular respiratory rate will improve  Outcome: Ongoing     Problem: Altered Mood, Deterioration in Function:  Goal: Able to verbalize reality based thinking  Description: Able to verbalize reality based thinking  Outcome: Ongoing     Problem: Altered Mood, Psychotic Behavior:  Goal: Able to verbalize decrease in frequency and intensity of hallucinations  Description: Able to verbalize decrease in frequency and intensity of hallucinations  Outcome: Ongoing

## 2020-11-01 NOTE — PROGRESS NOTES
Hospitalist Progress Note      PCP: Nina Shaffer MD    Date of Admission: 10/31/2020    Chief Complaint: severe dyspnea, AMS. Hospital Course:    Pt apparently doubling up on her seroquel - ends up taking 400mg at bedtime. She presented with AMS, increased drowsiness and severe dyspnea. She is being treated for COPD with AE. She is on 4l/min - apparently on RA at home. COVID pending. Subjective:     More awake and alert and oriented. Feels her dyspnea has much improved - is off BiPAP now - on 4l/min o2 via NC. Reports very vivid dreams when she sleeps - wakes up shaken and SOB from those per patient report.             Medications:  Reviewed    Infusion Medications    sodium chloride 75 mL/hr at 10/31/20 0678     Scheduled Medications    albuterol sulfate HFA  2 puff Inhalation Q4H While awake    And    ipratropium  2 puff Inhalation Q4H While awake    nicotine  1 patch Transdermal Daily    gabapentin  300 mg Oral Nightly    atorvastatin  40 mg Oral Nightly    aspirin  81 mg Oral Daily    carvedilol  3.125 mg Oral BID WC    divalproex  250 mg Oral QAM    divalproex  500 mg Oral Nightly    DULoxetine  60 mg Oral Daily    sodium chloride flush  10 mL Intravenous 2 times per day    enoxaparin  30 mg Subcutaneous Daily    methylPREDNISolone  40 mg Intravenous Q12H    Followed by   Breanna Rabago ON 11/3/2020] predniSONE  40 mg Oral Daily with breakfast    azithromycin  500 mg Intravenous Q24H     PRN Meds: albuterol sulfate HFA, sodium chloride flush, acetaminophen **OR** acetaminophen, polyethylene glycol, prochlorperazine      Intake/Output Summary (Last 24 hours) at 11/1/2020 1508  Last data filed at 11/1/2020 0332  Gross per 24 hour   Intake 469 ml   Output --   Net 469 ml       Physical Exam Performed:    BP 93/60   Pulse 76   Temp 98.2 °F (36.8 °C) (Oral)   Resp 15   Ht 5' 1\" (1.549 m)   Wt 168 lb 3.2 oz (76.3 kg)   SpO2 93%   BMI 31.78 kg/m²     General Assessment/Plan:    Active Hospital Problems    Diagnosis    COPD exacerbation ([facility] [address] [J44.1]    Acute kidney injury ([facility] [address] [N17.9]    Acute on chronic respiratory failure with hypoxia and hypercapnia (HCC) [J96.21, J96.22]         COPD w AE  Steroid. Inhalers - pending COVID  S/p BiPAP. Zithromax empiric. Acute hypox RF - apparently on RA at baseline. Cont to wean O2 as tolerates. Hx of Bipolar d/o. Cont depakote, cymbalta. Neurontin dose decreased to nightly - due to drowsiness. Seroquel on hold - suspected accidental overdose - she was taking 2 200mg pills o help her \"vivid dreams\". Will plan to resume this at lower dose. Counseled. SAMREEN - appears prerenal and much improved with IVF. DVT Prophylaxis: lovenox  Diet: Diet NPO Effective Now Exceptions are: Ice Chips, Sips with Meds  Code Status: Full Code      Dispo - advance diet. [id number]uelos for [employer] with telemetry monitoring - cont droplet + pending COVID test results.        Brandin Shen MD

## 2020-11-01 NOTE — PROGRESS NOTES
Re-assessment is complete. Pt continues 4 liters nasal canula. She continues confused with dis-organized speech. A tele-sitter is now present in the room for safety. Bed alarm continues activated for falls risk.

## 2020-11-01 NOTE — PROGRESS NOTES
4 Eyes Skin Assessment     The patient is being assess for   Transfer to New Unit    I agree that 2 RN's have performed a thorough Head to Toe Skin Assessment on the patient. ALL assessment sites listed below have been assessed. Areas assessed by both nurses:   [x]   Head, Face, and Ears   [x]   Shoulders, Back, and Chest, Abdomen  [x]   Arms, Elbows, and Hands   [x]   Coccyx, Sacrum, and Ischium  [x]   Legs, Feet, and Heels        No skin issues  **SHARE this note so that the co-signing nurse is able to place an eSignature**    Co-signer eSignature: Electronically signed by Petra Souza RN on 11/1/20 at 5:09 PM EST    Does the Patient have Skin Breakdown?   No          Emmanuel Prevention initiated:  No   Wound Care Orders initiated:  No      [facility] nurse consulted for Pressure Injury (Stage 3,4, Unstageable, DTI, NWPT, Complex wounds)and New or Established Ostomies:  No      Primary Nurse eSignature: Electronically signed by Kendell Mead RN on 11/1/20 at 4:18 PM EST

## 2020-11-01 NOTE — PROGRESS NOTES
Patient will not keep bi-pap on and continues to attempt to climb from the bed, and confused. New arterial blood gas drawn.

## 2020-11-01 NOTE — PROGRESS NOTES
--Patient arrived from ER per when pulse ox is pic stretcher,repoport received. Patient on bipap / rate of 12 with SpO2  % when pulse is reading. Patient is alert and dowsy,falls back to sleep quickly. shift. Bed in lowest position. Wheels locked. Call light within reach. Bedside table within reach. Bed exit alarm on.   4 Eyes Skin Assessment     The patient is being assess for   Admission    I agree that 2 RN's have performed a thorough Head to Toe Skin Assessment on the patient. ALL assessment sites listed below have been assessed. Areas assessed by both nurses:   [x]   Head, Face, and Ears   [x]   Shoulders, Back, and Chest, Abdomen  [x]   Arms, Elbows, and Hands   [x]   Coccyx, Sacrum, and Ischium  [x]   Legs, Feet, and Heels        ****no skin breakdown noted    **SHARE this note so that the co-signing nurse is able to place an eSignature**    Co-signer eSignature: {Esignature:[id number]}    Does the Patient have Skin Breakdown? No          Emmanuel Prevention initiated:  yes  Wound Care Orders initiated: no    [facility] nurse consulted for Pressure Injury (Stage 3,4, Unstageable, DTI, NWPT, Complex wounds)and New or Established Ostomies:  No      Primary Nurse eSignature: Electronically signed by Laureano Jackson RN on 10/31/20 at 9:58 PM EDT    Patient is not able to demonstrate the ability to move from a reclining position to an upright position within the recliner due to patien currently on bipap .

## 2020-11-01 NOTE — PROGRESS NOTES
Pt is more awake and more lucid. She continues on 4 liters with no issues. VSS. She continues in droplet plus precautions for rule/out.

## 2020-11-01 NOTE — PROGRESS NOTES
Received patient from ICU. Patient in stable condition at time of transfer. Vitals stable. BP elevated but lower than pre-transport. Patient received Coreg prior to transport. Noted effective. Tele sitter in place. Bed alarm on. Patient oriented to room and educated on need for use of call system. Patient educated to call staff for all assistance. Patient verbal during assessment, but calm. Patient has no complaints at this time. Will continue to monitor.

## 2020-11-01 NOTE — H&P
Hospital Medicine History & Physical      PCP: Gina Carr MD    Date of Service: Pt seen/examined on 10/31/20 and admitted on 10/31/20 to Inpatient. Chief Complaint   Patient presents with    Shortness of Breath       History Of Present Illness: The patient is a 77 y.o. female with PMH below, presents with SOB, productive cough. Pt reports she is supposed to be on O2 at home but does not have it, unclear why. She reports over the last several days she has become increasingly SOB. Today she felt as if she could not catch her breath. She reports a productive cough. She still smokes. She denies CP/abd pain. No fever/chills.       Past Medical History:        Diagnosis Date    Abnormal ECG 12/14/2012    Anemia     Arthritis     Back pain, chronic 3/13/2013    Bipolar disorder (HCC)     Bronchitis chronic     [facility]est[facility] 9/30/2016    Chronic back pain     Chronic neck pain     Clostridium difficile infection 3/29/12, 5/22/12    positive stool DNA probe    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[facility] [address] 01/10/2019    Coronary artery disease involving native coronary artery of native heart with angina pectoris ([address] 3/8/2016    Depression     Emphysema of lung (HCC)     Fibromyalgia     hyperlipidemia 8/11/2011    Hypertension     Kidney stone     Liver disease     Lung disease     MDD (major depressive disorder) 4/4/2012    Mood disorder ([address] 3/13/2013    Movement disorder     Neck pain, chronic 3/13/2013    Opiate misuse     Personality disorder ([address]     Pneumonia     Polypharmacy 2/16/2013       Past Surgical History:        Procedure Laterality Date    COLONOSCOPY  1/19/12    DIAGNOSTIC CARDIAC CATH LAB PROCEDURE  Feb, 2007    Normal cor angio Feb, 2007    HERNIA REPAIR  07/11/2019    robotic ventral hernia repair with mesh    HERNIA REPAIR N/A 7/11/2019    ROBOTIC VENTRAL HERNIA REPAIR WITH MESH performed [facility]adou Eden [facility] HYSTERECTOMY, TOTAL ABDOMINAL      KIDNEY STONE SURGERY         Medications Prior to Admission:    Prior to Admission medications    Medication Sig Start Date End Date Taking? Authorizing Provider   aspirin 81 MG chewable tablet Take 1 tablet by mouth daily 9/30/20  Yes Aleksandar Smith DO   atorvastatin (LIPITOR) 40 MG tablet Take 1 tablet by mouth nightly 9/29/20  Yes Aleksandar Smith DO   carvedilol (COREG) 3.125 MG tablet Take 1 tablet by mouth 2 times daily (with meals) 9/30/20  Yes Aleksandar Smith DO   pantoprazole (PROTONIX) 40 MG tablet Take 1 tablet by mouth every morning (before breakfast) 9/30/20  Yes Aleksandar Smith DO   albuterol sulfate HFA (VENTOLIN HFA) 108 (90 Base) MCG/ACT inhaler Inhale 2 puffs into the lungs 4 times daily as needed for Wheezing 3/14/20  Yes BALJIT Chacon - CNP   diclofenac sodium 1 % GEL Apply 4 g topically 4 times daily 1/11/20  Yes CEE Ruvalcaba   QUEtiapine (SEROQUEL) 300 MG tablet Take 300 mg by mouth nightly   Yes Historical Provider, MD   gabapentin (NEURONTIN) 600 MG tablet Take 600 mg by mouth 3 times daily. Yes Historical Provider, MD   olmesartan (BENICAR) 40 MG tablet Take 40 mg by mouth daily   Yes Historical Provider, MD   divalproex (DEPAKOTE) 500 MG DR tablet Take 500 mg by mouth nightly   Yes Historical Provider, MD   ketorolac (TORADOL) 10 MG tablet Take 1 tablet by mouth 3 times daily  Patient taking differently: Take 10 mg by mouth 3 times daily as needed  3/4/19  Yes Bay Solis MD   DULoxetine (CYMBALTA) 60 MG extended release capsule Take 60 mg by mouth daily    Yes Historical Provider, MD   fluticasone (FLONASE) 50 MCG/ACT nasal spray 1 spray by Nasal route daily as needed    Yes Historical Provider, MD   divalproex (DEPAKOTE) 250 MG DR tablet Take 250 mg by mouth every morning    Yes Historical Provider, MD   Blood Pressure Monitoring (BLOOD PRESSURE MONITOR AUTOMAT) SOCORRO 1 each by Does not apply route daily.  1/14/15  Yes Nicholas Schneider MD   nicotine (NICODERM CQ) 14 MG/24HR Place 1 patch onto the skin daily 3/14/20 4/25/20  Bill Bravo, APRN - CNP       Allergies:  Dicyclomine; Sumatriptan; Tape Truddie Donning tape]; Tizanidine; and Motrin [ibuprofen micronized]    Social History:    TOBACCO:   reports that she has been smoking cigarettes. She has a 94.00 pack-year smoking history. She has never used smokeless tobacco.  ETOH:   reports no history of alcohol use. Family History:  Reviewed in detail and negative for DM, Early CAD, Cancer (except as below). Positive as follows:        Problem Relation Age of Onset    Emphysema Mother     Heart Disease Mother     Arthritis Mother     Depression Mother     High Blood Pressure Mother     Heart Failure Father     Heart Disease Father     Arthritis Father     Diabetes Father     High Blood Pressure Father     Stroke Father     Substance Abuse Father     High Blood Pressure Brother     Heart Disease Sister     Kidney Disease Daughter        REVIEW OF SYSTEMS:   Pertinent positives/negatives as follows: SOB, productive cough, and as discussed in HPI, otherwise a complete ROS performed and all other systems are negative  PHYSICAL EXAM PERFORMED:    BP (!) 105/45   Pulse 80   Temp 97 °F (36.1 °C) (Tympanic)   Resp 22   Ht 5' 1\" (1.549 m)   Wt 151 lb (68.5 kg)   SpO2 95%   BMI 28.53 kg/m²     GEN:  Somnolent/slowed but oriented with  awakening, increased WOB  HEENT:  NC/AT,EOMI, dry MM, no erythema/exudates or visible masses. CVS:  Normal S1,S2. RRR. Without M/G/R.   LUNG:   Bilat exp wheezes w/o rales or rhonchi. Tachypnea and increased WOB. ABD:  Soft, ND/NT, BS+ x4. Without G/R.  EXT: 2+ pulses, no c/c/e. Brisk cap refill. PSY:  Thought process slowed but grossly intact, affect somewhat flat. BARB:  CN III-XII intact, moves all 4 spontaneously, sensory grossly intact. SKIN: No rash or lesions on visible skin.     Chart review shows recent radiographs:  Xr Chest Portable    Result Date: 10/31/2020  EXAMINATION: ONE XRAY VIEW OF THE CHEST 10/31/2020 5:59 pm COMPARISON: 09/28/2020. HISTORY: ORDERING SYSTEM PROVIDED HISTORY: short of breath TECHNOLOGIST PROVIDED HISTORY: Reason for exam:->short of breath Reason for Exam: short of breath Acuity: Unknown Type of Exam: Unknown FINDINGS: Lung volumes are mildly decreased. The cardiomediastinal silhouette is stable. Aortic vascular calcification. Remote calcified granulomatous disease. Mild increase in the interstitial markings throughout the lungs. No focal consolidation or significant pleural fluid. Mild increased interstitial changes within the lungs, possibly infiltrate or edema. No focal consolidation or pleural fluid.      EKG 12 Lead [[id number]]      Collected: 10/31/20 1850     Updated: 10/31/20 1916      Ventricular Rate  81  BPM     Atrial Rate  81  BPM     P-R Interval  160  ms     QRS Duration  94  ms     Q-T Interval  396  ms     QTc Calculation (Bazett)  460  ms     P Axis  62  degrees     R Axis  1  degrees     T Axis  31  degrees     Diagnosis  Normal sinus rhythmRSR' or QR pattern in V1 suggests right ventricular conduction delayMinimal voltage criteria for LVH, may be normal variantPossible Lateral infarct (cited on or before 09-FEB-2018)Cannot rule out Inferior infarct (cited on or before 09-FEB-2018)Abnormal ECGWhen compared with ECG of 29-SEP-2020 09:14,Nonspecific T wave abnormality now evident in Anterior leads      CBC:  Recent Labs     10/31/20  1755   WBC 6.9   HGB 13.5   HCT 40.5           RENAL  Recent Labs     10/31/20  1755   *   K 3.5   CL 94*   CO2 29   BUN 13   CREATININE 1.7*   GLUCOSE 129*       Hemoglobin a1c:  Lab Results   Component Value Date    LABA1C 5.5 10/20/2013       LFT'S:  Recent Labs     10/31/20  1755   AST 35   ALT 15   BILITOT 0.3   ALKPHOS 82     CARDIAC ENZYMES:   Lab Results   Component Value Date    PROBNP 147 (H) 03/14/2020    PROBNP 248 (H) 10/17/2019 via the [employer] at [phone]. This chart was generated using the [address] [place] [address] dictation system. I created this record but it may contain dictation errors given the limitations of this technology.

## 2020-11-01 NOTE — PROGRESS NOTES
RESPIRATORY THERAPY ASSESSMENT    Name:  Laura Patricia Record Number:  [id number]  Age: 77 y.o. Gender: female  : 1950  Today's Date:  2020  Room:  3013/3013-01    Assessment     Is the patient being admitted for a COPD or Asthma exacerbation? No   (If yes the patient will be seen every 4 hours for the first 24 hours and then reassessed)    Patient Admission Diagnosis      Allergies  Allergies   Allergen Reactions    Dicyclomine      listed in pxysis    Sumatriptan      In pxysis listing  Other reaction(s): throat swelling    Tape Ruth Sicilian Tape]      Tears off patient's skin very easily     Tizanidine      Listed in pxysis    Motrin [Ibuprofen Micronized] Nausea And Vomiting       Minimum Predicted Vital Capacity:               Actual Vital Capacity:                    Pulmonary History:COPD and CHF/Pulmonary Edema  Home Oxygen Therapy:  room air  Home Respiratory Therapy:Albuterol   Current Respiratory Therapy:  Albuterol/atrovent qid prn          Respiratory Severity Index(RSI)   Patients with orders for inhalation medications, oxygen, or any therapeutic treatment modality will be placed on Respiratory Protocol. They will be assessed with the first treatment and at least every 72 hours thereafter. The following severity scale will be used to determine frequency of treatment intervention.     Smoking History: Pulmonary Disease or Smoking History, Greater than 15 pack year = 2    Social History  Social History     Tobacco Use    Smoking status: Current Every Day Smoker     Packs/day: 2.00     Years: 47.00     Pack years: 94.00     Types: Cigarettes    Smokeless tobacco: Never Used   Substance Use Topics    Alcohol use: No    Drug use: Not Currently       Recent Surgical History: None = 0  Past Surgical History  Past Surgical History:   Procedure Laterality Date    COLONOSCOPY  12   [place] DIAGNOSTIC CARDIAC CATH LAB PROCEDURE  2007    Normal cor angio 2007    HERNIA REPAIR  07/11/2019    robotic ventral hernia repair with mesh    HERNIA REPAIR N/A 7/11/2019    ROBOTIC VENTRAL HERNIA REPAIR WITH MESH performed by [facility]ia Ortiz MD at [facility], TOTAL ABDOMINAL      KIDNEY STONE SURGERY         Level of Consciousness: Alert, Oriented, and Cooperative = 0    Level of Activity: Walking with assistance = 1    Respiratory Pattern: Dyspnea with exertion;Irregular pattern;or RR less than 6 = 2    Breath Sounds: Absent bilaterally and/or with wheezes = 3    Sputum   ,  ,    Cough: Strong, spontaneous, non-productive = 0    Vital Signs   /65   Pulse 88   Temp 97.9 °F (36.6 °C) (Axillary)   Resp 14   Ht 5' 1\" (1.549 m)   Wt 168 lb 8 oz (76.4 kg)   SpO2 97%   BMI 31.84 kg/m²   SPO2 (COPD values may differ): 86-87% on room air or greater than 92% on FiO2 35- 50% = 3    Peak Flow (asthma only): not applicable = 0    RSI: 75-79 = Q6H or QID and Q4HPRN for dyspnea        Plan       Goals: mobilize retained secretions, volume expansion and improve oxygenation    Patient/caregiver was educated on the proper method of use for Respiratory Care Devices:  No:       Level of patient/caregiver understanding able to:   ? Verbalize understanding   ? Demonstrate understanding       ? Teach back        ? Needs reinforcement       ? No available caregiver               ? Other:     Response to education:  Pt on bipap    Is patient being placed on Home Treatment Regimen? No     Does the patient have everything they need prior to discharge? NA     Comments: pt assessed and chart reviewed    Plan of Care: albuterol/atrovent q4wa    Electronically signed by Alysia Leon RCP on 11/1/2020 at 12:54 AM    Respiratory Protocol Guidelines     1. Assessment and treatment by Respiratory Therapy will be initiated for medication and therapeutic interventions upon initiation of aerosolized medication.   2. Physician will be contacted for respiratory rate (RR) greater than 35 breaths per minute. Therapy will be held for heart rate (HR) greater than 140 beats per minute, pending direction from physician. 3. Bronchodilators will be administered via Metered Dose Inhaler (MDI) with spacer when the following criteria are met:  a. Alert and cooperative     b. HR < 140 bpm  c. RR < 30 bpm                d. Can demonstrate a 2-3 second inspiratory hold  4. Bronchodilators will be administered via Hand Held Nebulizer [facility]) to patients when ANY of the following criteria are met  a. Incognizant or uncooperative          b. Patients treated with HHN at Home        c. Unable to demonstrate proper use of MDI with spacer     d. RR > 30 bpm   5. Bronchodilators will be delivered via Metered Dose Inhaler (MDI), HHN, Aerogen to intubated patients on mechanical ventilation. 6. Inhalation medication orders will be delivered and/or substituted as outlined below. Aerosolized Medications Ordering and Administration Guidelines:    1. All Medications will be ordered by a physician, and their frequency and/or modality will be adjusted as defined by the patients Respiratory Severity Index (RSI) score. 2. If the patient does not have documented COPD, consider discontinuing anticholinergics when RSI is less than 9.  3. If the bronchospasm worsens (increased RSI), then the bronchodilator frequency can be increased to a maximum of every 4 hours. If greater than every 4 hours is required, the physician will be contacted. 4. If the bronchospasm improves, the frequency of the bronchodilator can be decreased, based on the patient's RSI, but not less than home treatment regimen frequency. 5. Bronchodilator(s) will be discontinued if patient has a RSI less than 9 and has received no scheduled or as needed treatment for 72  Hrs. Patients Ordered on a Mucolytic Agent:    1. Must always be administered with a bronchodilator.     2. Discontinue if patient experiences worsened bronchospasm, or secretions have lessened to the point that the patient is able to clear them with a cough. Anti-inflammatory and Combination Medications:    1. If the patient lacks prior history of lung disease, is not using inhaled anti-inflammatory medication at home, and lacks wheezing by examination or by history for at least 24 hours, contact physician for possible discontinuation.

## 2020-11-01 NOTE — CONSULTS
Patient is being seen at the request of Juan Carlos Olea MD  for a consultation for COPD exacerbation    HISTORY OF PRESENT ILLNESS:   77years old presented with progressive shortness of breath for 5 days. Severe. Dyspnea worse with exertion and better with resting. Associated with cough and no sputum. No hemoptysis. Duration for several days. Sat 75% on RA and no home O2. Placed on BiPAP for respiratory distress admitted to the ICU. Continues to smoke 1 ppd. Lethargic limited to obtain further HPI.      PAST MEDICAL HISTORY:  Past Medical History:   Diagnosis Date    Abnormal ECG 12/14/2012    Anemia     Arthritis     Back pain, chronic 3/13/2013    Bipolar disorder ([address]     Bronchitis chronic     CHF (congestive heart failure) ([address] 9/30/2016    Chronic back pain     Chronic neck pain     Clostridium difficile infection 3/29/12, 5/22/12    positive stool DNA probe    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    Coronary artery disease involving native coronary artery of native heart with angina pectoris ([address] 3/8/2016    Depression     Emphysema of lung (HCC)     Fibromyalgia     hyperlipidemia 8/11/2011    Hypertension     Kidney stone     Liver disease     Lung disease     MDD (major depressive disorder) 4/4/2012    Mood disorder ([address] 3/13/2013    Movement disorder     Neck pain, chronic 3/13/2013    Opiate misuse     Personality disorder ([address]     Pneumonia     Polypharmacy 2/16/2013     PAST SURGICAL HISTORY:  Past Surgical History:   Procedure Laterality Date    COLONOSCOPY  1/19/12   [facility] DIAGNOSTIC [facility] CATH LAB PROCEDURE  Feb, 2007    Normal cor angio Feb, 2007    HERNIA REPAIR  07/11/2019    robotic ventral hernia repair with mesh    HERNIA REPAIR N/A 7/11/2019    ROBOTIC VENTRAL HERNIA REPAIR WITH MESH performed by Hernan Mcgregor MD at [address] [facility], TOTAL ABDOMINAL      KIDNEY STONE SURGERY         FAMILY HISTORY:  family history includes Arthritis in her father and mother; Depression in her mother; Diabetes in her father; Emphysema in her mother; Heart Disease in her father, mother, and sister; Heart Failure in her father; High Blood Pressure in her brother, father, and mother; Kidney Disease in her daughter; Stroke in her father; Substance Abuse in her father. SOCIAL HISTORY:   reports that she has been smoking cigarettes. She has a 94.00 pack-year smoking history. She has never used smokeless tobacco.    Scheduled Meds:   albuterol sulfate HFA  2 puff Inhalation Q4H While awake    And    ipratropium  2 puff Inhalation Q4H While awake    nicotine  1 patch Transdermal Daily    atorvastatin  40 mg Oral Nightly    aspirin  81 mg Oral Daily    carvedilol  3.125 mg Oral BID WC    divalproex  250 mg Oral QAM    divalproex  500 mg Oral Nightly    DULoxetine  60 mg Oral Daily    gabapentin  300 mg Oral TID    sodium chloride flush  10 mL Intravenous 2 times per day    enoxaparin  30 mg Subcutaneous Daily    methylPREDNISolone  40 mg Intravenous Q12H    Followed by   Cydne Rosin ON 11/3/2020] predniSONE  40 mg Oral Daily with breakfast    azithromycin  500 mg Intravenous Q24H     Continuous Infusions:   dexmedetomidine (PRECEDEX) IV infusion      sodium chloride 75 mL/hr at 10/31/20 2216    sodium chloride       PRN Meds:  albuterol sulfate HFA, sodium chloride flush, acetaminophen **OR** acetaminophen, polyethylene glycol, prochlorperazine    ALLERGIES:  Patient is allergic to dicyclomine; sumatriptan; tape [adhesive tape]; tizanidine; and motrin [ibuprofen micronized].     REVIEW OF SYSTEMS: poor historian limited to obtan  Constitutional: Negative for fever  HENT: Negative for sore throat  Eyes: Negative for redness   Respiratory: + dyspnea, cough  Cardiovascular: Negative for chest pain  Gastrointestinal: Negative for vomiting, diarrhea   Genitourinary: Negative for hematuria   Musculoskeletal: Negative for arthralgias   Skin: Negative for rash  Neurological: Negative for syncope  Hematological: Negative for adenopathy  Psychiatric/Behavorial: Negative for anxiety    PHYSICAL EXAM:  Blood pressure 129/71, pulse 94, temperature 98.2 °F (36.8 °C), temperature source Oral, resp. rate 19, height 5' 1\" (1.549 m), weight 168 lb 3.2 oz (76.3 kg), SpO2 94 %, not currently breastfeeding.' on BiPAP 20/5 FiO2 40%   Gen: + distress. Eyes: PERRL. No sclera icterus. No conjunctival injection. ENT: No discharge. Pharynx clear. Neck: Trachea midline. No obvious mass. Resp: + Accessory muscle use. No crackles. Bilateral wheezes. + rhonchi. No dullness on percussion. CV: Regular rate. Regular rhythm. No murmur or rub. No edema. GI: Non-tender. Non-distended. No hernia. Skin: Warm and dry. No nodule on exposed extremities. Lymph: No cervical LAD. No supraclavicular LAD. M/S: No cyanosis. No joint deformity. No clubbing. Neuro: Lethargic. Moves all four extremities. Psych: Oriented x 3. No anxiety. LABS:  CBC:   Recent Labs     10/31/20  1755 11/01/20  0521   WBC 6.9 5.8   HGB 13.5 11.5*   HCT 40.5 34.6*   MCV 92.2 92.1    129*     BMP:   Recent Labs     10/31/20  1755 11/01/20  0521   * 133*   K 3.5 3.5   CL 94* 95*   CO2 29 27   BUN 13 15   CREATININE 1.7* 1.1     LIVER PROFILE:   Recent Labs     10/31/20  1755 11/01/20  0521   AST 35 36   ALT 15 14   BILITOT 0.3 0.3   ALKPHOS 82 65     PT/INR: No results for input(s): PROTIME, INR in the last 72 hours. APTT: No results for input(s): APTT in the last 72 hours. UA:No results for input(s): NITRITE, COLORU, PHUR, LABCAST, WBCUA, RBCUA, MUCUS, TRICHOMONAS, YEAST, BACTERIA, CLARITYU, SPECGRAV, LEUKOCYTESUR, UROBILINOGEN, BILIRUBINUR, BLOODU, GLUCOSEU, AMORPHOUS in the last 72 hours.     Invalid input(s): KETONESU  Recent Labs     10/31/20  1804 11/01/20  0600   PHART 7.245* 7.339*   ZGZ2QCI 62.5* 49.0*   PO2ART 101.5 72.3*       Chest x-ray 10/31 imaging was reviewed by me and showed Prominent interstitial changes    ASSESSMENT:  · Acute hypoxemic hypercapnic respiratory failure  · Acute encephalopathy/metabolic encephalopathy  · COPD with acute exacerbation  · Acute kidney injury  · Ongoing tobacco abuse  · CAD  · Bipolar disorder    PLAN:  Supplemental oxygen to maintain SaO2 >92%; wean as tolerated  DC BiPAP  Droplet plus isolation (surgical mask, eye protection, gown, glove)  Covid 19 PCR testing  IV antibiotics to include Zithromax  Sputum culture  Nasopharyngeal swab for influenza A&B  Moved to negative pressure room in case needs aerosolized procedure  Avoid NEBs as able-albuterol MDI strongly preferred   IV Solu-Medrol  Normal saline follow creatinine  GI prophylaxis: Pepcid  DVT prophylaxis: Lovenox  MRSA prophylaxis: Bactroban

## 2020-11-02 LAB — SARS-COV-2: NOT DETECTED

## 2020-11-02 PROCEDURE — 99233 SBSQ HOSP IP/OBS HIGH 50: CPT | Performed by: INTERNAL MEDICINE

## 2020-11-02 PROCEDURE — 6360000002 HC RX W HCPCS: Performed by: INTERNAL MEDICINE

## 2020-11-02 PROCEDURE — 2580000003 HC RX 258: Performed by: INTERNAL MEDICINE

## 2020-11-02 PROCEDURE — 94640 AIRWAY INHALATION TREATMENT: CPT

## 2020-11-02 PROCEDURE — 6370000000 HC RX 637 (ALT 250 FOR IP): Performed by: INTERNAL MEDICINE

## 2020-11-02 PROCEDURE — 99232 SBSQ HOSP IP/OBS MODERATE 35: CPT | Performed by: INTERNAL MEDICINE

## 2020-11-02 PROCEDURE — 94761 N-INVAS EAR/PLS OXIMETRY MLT: CPT

## 2020-11-02 PROCEDURE — 1200000000 HC SEMI PRIVATE

## 2020-11-02 RX ADMIN — DIVALPROEX SODIUM 250 MG: 250 TABLET, DELAYED RELEASE ORAL at 08:20

## 2020-11-02 RX ADMIN — Medication 2 PUFF: at 11:16

## 2020-11-02 RX ADMIN — AZITHROMYCIN MONOHYDRATE 500 MG: 500 INJECTION, POWDER, LYOPHILIZED, FOR SOLUTION INTRAVENOUS at 22:15

## 2020-11-02 RX ADMIN — ATORVASTATIN CALCIUM 40 MG: 40 TABLET, FILM COATED ORAL at 20:11

## 2020-11-02 RX ADMIN — Medication 2 PUFF: at 23:05

## 2020-11-02 RX ADMIN — Medication 2 PUFF: at 15:05

## 2020-11-02 RX ADMIN — Medication 2 PUFF: at 06:44

## 2020-11-02 RX ADMIN — Medication 2 PUFF: at 19:35

## 2020-11-02 RX ADMIN — DIVALPROEX SODIUM 500 MG: 500 TABLET, DELAYED RELEASE ORAL at 20:11

## 2020-11-02 RX ADMIN — Medication 10 ML: at 20:11

## 2020-11-02 RX ADMIN — CARVEDILOL 3.12 MG: 3.12 TABLET, FILM COATED ORAL at 08:20

## 2020-11-02 RX ADMIN — SODIUM CHLORIDE: 9 INJECTION, SOLUTION INTRAVENOUS at 22:11

## 2020-11-02 RX ADMIN — CARVEDILOL 3.12 MG: 3.12 TABLET, FILM COATED ORAL at 17:12

## 2020-11-02 RX ADMIN — METHYLPREDNISOLONE SODIUM SUCCINATE 40 MG: 40 INJECTION, POWDER, FOR SOLUTION INTRAMUSCULAR; INTRAVENOUS at 08:21

## 2020-11-02 RX ADMIN — ASPIRIN 81 MG CHEWABLE TABLET 81 MG: 81 TABLET CHEWABLE at 08:20

## 2020-11-02 RX ADMIN — METHYLPREDNISOLONE SODIUM SUCCINATE 40 MG: 40 INJECTION, POWDER, FOR SOLUTION INTRAMUSCULAR; INTRAVENOUS at 20:11

## 2020-11-02 RX ADMIN — DULOXETINE HYDROCHLORIDE 60 MG: 60 CAPSULE, DELAYED RELEASE ORAL at 08:20

## 2020-11-02 RX ADMIN — ENOXAPARIN SODIUM 30 MG: 30 INJECTION SUBCUTANEOUS at 08:20

## 2020-11-02 RX ADMIN — ACETAMINOPHEN 650 MG: 325 TABLET ORAL at 14:19

## 2020-11-02 RX ADMIN — GABAPENTIN 300 MG: 300 CAPSULE ORAL at 20:10

## 2020-11-02 ASSESSMENT — PAIN SCALES - GENERAL
PAINLEVEL_OUTOF10: 0
PAINLEVEL_OUTOF10: 0
PAINLEVEL_OUTOF10: 4
PAINLEVEL_OUTOF10: 3

## 2020-11-02 NOTE — PROGRESS NOTES
Assessment complete. Blood pressure 133/77, pulse 80, temperature 97.3 °F (36.3 °C), temperature source Oral, resp. rate 16, height 5' 1\" (1.549 m), weight 168 lb 3.2 oz (76.3 kg), SpO2 96 %, not currently breastfeeding. Patient continues on 4 L o2 via NC at this time with no s.s of respiratory distress noted. No complaints of coughing. Lung sounds clear in upper lobes and diminished throughout. Bowel sounds positive x 4 quads. No complaints of N-V-D. Khan catheter draining dark clear yellow urine. No complaints of pain or discomfort voiced. All care con't per order.      Electronically signed by Rajat Mcginnis RN on 11/1/2020 at 10:28 PM

## 2020-11-02 NOTE — CARE COORDINATION
Case Management Assessment  Initial Evaluation      Patient Name: Juliana Sams  YOB: 1950  Diagnosis: Acute on chronic respiratory failure with hypoxia and hypercapnia ([facility] [J96.21, J96.22]  Date / Time: 10/31/2020  5:40 PM    Admission status/Date:  Inpatient 10/31/20  Chart Reviewed: Yes      Patient Interviewed: Yes   Family Interviewed:  No      Hospitalization in the last 30 days:  No      Health Care Decision Maker :     Spouse, Mauricio Varghese    Met with:  Patient by phone    Current PCP:  Angeles Askew required for SNF : Y          3 night stay required -  [employer]  Support Systems/Care Needs:    Transportation: spouse  Meal Preparation: self    Housing  Living Arrangements: 1 fl home w/spouse  Steps:   Intent for return to present living arrangements: Yes  Identified Issues:     Home Care Information  Active with [address] : No Agency:(Services)     Passport/Waiver : No  :                      Phone Number:    Passport/Waiver Services: none          Durable Medical Equiptment   DME Provider:   Equipment:   Walker_X__Cane_X__RTS___ BSC___Shower Chair___Hospital Bed___W/C____Other________  02 at ____Liter(s)---wears(frequency)_______ HHN ___ CPAP___ BiPap___   N/A____      Home O2 Use :  No    If No for home O2---if presently on O2 during hospitalization:  Yes  if yes CM to follow for potential DC O2 need  Informed of need for care provider to bring portable home O2 tank on day of discharge for nursing to connect prior to leaving:   No  Verbalized agreement/Understanding:   No    Community Service Affiliation  Dialysis:  No    · Agency:  · Location:  · Dialysis Schedule:  · Phone:   · Fax: Other Community Services:  none    DISCHARGE PLAN: Explained Case Management role/services. Patient is in isolation. CM spoke with patient by phone. She lives with her  in one floor home.   She states she is independent with all ADLs and her  does the driving. She states she had oxygen a long time ago but doesn't remember when and does not have O2 now. If she needs home oxygen at discharge she is agreeable to any DME company in her insurance network. Referral made to [facility] [place].

## 2020-11-02 NOTE — PROGRESS NOTES
Patient resting in bed with call light in reach. No complaints of pain or discomfort voiced at this time. Patient remains on 4L oxygen. Saturations 92%. No complaints of SOB, or coughing.

## 2020-11-02 NOTE — PROGRESS NOTES
Message attending cross cover and took patient out of Droplet precautions. Patient is stating that she is getting more irritable and is stating, \"I do not like myself right now. \" Attending notified. Patient states that, \"I usually take those stupid meds but they took those away from me. \" Will continue to monitor.

## 2020-11-02 NOTE — PROGRESS NOTES
Upon assessment patient has nasal cannula out of nostrils and is 97%. Patient continues to take off oxygen tubing, and VS are stable.

## 2020-11-02 NOTE — PROGRESS NOTES
Hospitalist Progress Note      PCP: Haylee Raymundo MD    Date of Admission: 10/31/2020    Chief Complaint: severe dyspnea, AMS. Hospital Course:    Pt apparently doubling up on her seroquel - ends up taking 400mg at bedtime. She presented with AMS, increased drowsiness and severe dyspnea. She is being treated for COPD with AE. She is on 4l/min - apparently on RA at home. COVID pending. Feels better and wants to go home      Subjective:     Completely awake and alert. No new complains. Medications:  Reviewed    Infusion Medications    sodium chloride 75 mL/hr at 11/01/20 2204     Scheduled Medications    albuterol sulfate HFA  2 puff Inhalation Q4H While awake    And    ipratropium  2 puff Inhalation Q4H While awake    nicotine  1 patch Transdermal Daily    gabapentin  300 mg Oral Nightly    atorvastatin  40 mg Oral Nightly    aspirin  81 mg Oral Daily    carvedilol  3.125 mg Oral BID WC    divalproex  250 mg Oral QAM    divalproex  500 mg Oral Nightly    DULoxetine  60 mg Oral Daily    sodium chloride flush  10 mL Intravenous 2 times per day    enoxaparin  30 mg Subcutaneous Daily    methylPREDNISolone  40 mg Intravenous Q12H    Followed by   Margareth Rowell ON 11/3/2020] predniSONE  40 mg Oral Daily with breakfast    azithromycin  500 mg Intravenous Q24H     PRN Meds: albuterol sulfate HFA, sodium chloride flush, acetaminophen **OR** acetaminophen, polyethylene glycol, prochlorperazine      Intake/Output Summary (Last 24 hours) at 11/2/2020 1104  Last data filed at 11/2/2020 0940  Gross per 24 hour   Intake 1140 ml   Output 1400 ml   Net -260 ml       Physical Exam Performed:    BP (!) 156/84   Pulse 80   Temp 97.7 °F (36.5 °C) (Temporal)   Resp 19   Ht 5' 1\" (1.549 m)   Wt 168 lb 6.4 oz (76.4 kg)   SpO2 97%   BMI 31.82 kg/m²     General appearance: No apparent distress, appears stated age and cooperative. HEENT: Pupils equal, round, and reactive to light. Conjunctivae/corneas clear. Neck: Supple, with full range of motion. No jugular venous distention. Trachea midline. Respiratory:  No respiratory distress. Mild wheezing  Cardiovascular: Regular rate and rhythm with normal S1/S2 without murmurs, rubs or gallops. Abdomen: Soft, non-tender, non-distended with normal bowel sounds. Musculoskeletal: No clubbing, cyanosis or edema bilaterally. Full range of motion without deformity. Skin: Skin color, texture, turgor normal.  No rashes or lesions. Neurologic:  Neurovascularly intact without any focal sensory/motor deficits. Cranial nerves: II-XII intact, grossly non-focal.  Psychiatric: Alert and oriented, thought content appropriate, normal insight  Capillary Refill: Brisk,< 3 seconds   Peripheral Pulses: +2 palpable, equal bilaterally       Labs:   Recent Labs     10/31/20  1755 11/01/20  0521   WBC 6.9 5.8   HGB 13.5 11.5*   HCT 40.5 34.6*    129*     Recent Labs     10/31/20  1755 11/01/20  0521   * 133*   K 3.5 3.5   CL 94* 95*   CO2 29 27   BUN 13 15   CREATININE 1.7* 1.1   CALCIUM 9.3 8.9     Recent Labs     10/31/20  1755 11/01/20  0521   AST 35 36   ALT 15 14   BILITOT 0.3 0.3   ALKPHOS 82 65     No results for input(s): INR in the last 72 hours. No results for input(s): Jadene Moh in the last 72 hours. Urinalysis:      Lab Results   Component Value Date    NITRU Negative 11/01/2020    WBCUA 3-5 11/01/2020    BACTERIA 1+ 11/01/2020    RBCUA 11-20 11/01/2020    BLOODU SMALL 11/01/2020    SPECGRAV >=1.030 11/01/2020    GLUCOSEU Negative 11/01/2020    GLUCOSEU NEGATIVE 06/03/2012       Radiology:  XR CHEST PORTABLE   Final Result   Mild increased interstitial changes within the lungs, possibly infiltrate or   edema. No focal consolidation or pleural fluid.                  Assessment/Plan:    Active Hospital Problems    Diagnosis    COPD exacerbation ([facility] [J44.1]    Acute kidney injury ([facility] [N17.9]    Acute on chronic respiratory failure with hypoxia and hypercapnia (HCC) [J96.21, J96.22]         COPD w AE- doing better. Wants to go home. Steroid. Inhalers - pending COVID  S/p BiPAP. Zithromax empiric. Acute hypox RF - apparently on RA at baseline. Cont to wean O2 as tolerates. COVID 19 pending. Hx of Bipolar d/o. Cont depakote, cymbalta. Neurontin dose decreased to nightly - due to drowsiness. Seroquel on hold - suspected accidental overdose - she was taking 2 200mg pills o help her \"vivid dreams\". Will plan to resume this at lower dose. Counseled. SAMREEN - appears prerenal and much improved with IVF.          DVT Prophylaxis: lovenox  Diet: DIET GENERAL;  Code Status: Full Code      Discharge planning     Becky Schmidt 11/2/2020 11:09 AM

## 2020-11-02 NOTE — PROGRESS NOTES
sodium chloride flush, acetaminophen **OR** acetaminophen, polyethylene glycol, prochlorperazine    Labs:  CBC:   Recent Labs     10/31/20  1755 11/01/20  0521   WBC 6.9 5.8   HGB 13.5 11.5*   HCT 40.5 34.6*   MCV 92.2 92.1    129*     BMP:   Recent Labs     10/31/20  1755 11/01/20  0521   * 133*   K 3.5 3.5   CL 94* 95*   CO2 29 27   BUN 13 15   CREATININE 1.7* 1.1     LIVER PROFILE:   Recent Labs     10/31/20  1755 11/01/20  0521   AST 35 36   ALT 15 14   BILITOT 0.3 0.3   ALKPHOS 82 65     PT/INR: No results for input(s): PROTIME, INR in the last 72 hours. APTT: No results for input(s): APTT in the last 72 hours.   UA:  Recent Labs     11/01/20  0800   COLORU Yellow   PHUR 6.0   WBCUA 3-5   RBCUA 11-20*   BACTERIA 1+*   CLARITYU Clear   SPECGRAV >=1.030   LEUKOCYTESUR Negative   UROBILINOGEN 0.2   BILIRUBINUR Negative   BLOODU SMALL*   GLUCOSEU Negative     Recent Labs     10/31/20  1804 11/01/20  0600   PHART 7.245* 7.339*   [id number] 62.5* 49.0*   PO2ART 101.5 72.3*     Cultures:   Rapid flu negative  SARS-CoV-2 pending      Films:  CXR 10/31 reviewed by me and showed: Mild hazy interstitial markings in both lungs        ASSESSMENT:  · Acute hypoxemic hypercapnic respiratory failure  · Acute encephalopathy/metabolic encephalopathy  · COPD with acute exacerbation  · Acute kidney injury  · Ongoing tobacco abuse  · CAD  · Bipolar disorder     PLAN:  · Supplemental oxygen to maintain SaO2 >92%; wean as tolerated  · Droplet plus isolation   · Covid 19 PCR testing pending  · IV antibiotics to include day 2 Zithromax  · Avoid NEBs as able-albuterol MDI strongly preferred   · IV Solu-Medrol  · Normal saline follow creatinine

## 2020-11-02 NOTE — PLAN OF CARE
Problem: Falls - Risk of:  Goal: Will remain free from falls  Description: Will remain free from falls  11/1/2020 2254 by Janak Rahman RN  Outcome: Ongoing     Problem: Skin Integrity:  Goal: Will show no infection signs and symptoms  Description: Will show no infection signs and symptoms  Outcome: Ongoing     Problem:  Activity Intolerance:  Goal: Ability to tolerate increased activity will improve  Description: Ability to tolerate increased activity will improve  Outcome: Ongoing

## 2020-11-03 VITALS
SYSTOLIC BLOOD PRESSURE: 167 MMHG | DIASTOLIC BLOOD PRESSURE: 85 MMHG | HEART RATE: 84 BPM | WEIGHT: 168.4 LBS | RESPIRATION RATE: 18 BRPM | OXYGEN SATURATION: 94 % | BODY MASS INDEX: 31.79 KG/M2 | TEMPERATURE: 96.8 F | HEIGHT: 61 IN

## 2020-11-03 PROBLEM — R09.02 HYPOXIA: Status: RESOLVED | Noted: 2020-03-10 | Resolved: 2020-11-03

## 2020-11-03 PROBLEM — I10 ESSENTIAL HYPERTENSION: Status: RESOLVED | Noted: 2020-11-03 | Resolved: 2020-11-03

## 2020-11-03 PROBLEM — I10 HTN (HYPERTENSION), BENIGN: Status: RESOLVED | Noted: 2020-11-03 | Resolved: 2020-11-03

## 2020-11-03 PROCEDURE — 97166 OT EVAL MOD COMPLEX 45 MIN: CPT

## 2020-11-03 PROCEDURE — 6360000002 HC RX W HCPCS: Performed by: INTERNAL MEDICINE

## 2020-11-03 PROCEDURE — 99232 SBSQ HOSP IP/OBS MODERATE 35: CPT | Performed by: INTERNAL MEDICINE

## 2020-11-03 PROCEDURE — 97535 SELF CARE MNGMENT TRAINING: CPT

## 2020-11-03 PROCEDURE — 6370000000 HC RX 637 (ALT 250 FOR IP): Performed by: INTERNAL MEDICINE

## 2020-11-03 PROCEDURE — 94761 N-INVAS EAR/PLS OXIMETRY MLT: CPT

## 2020-11-03 PROCEDURE — 94640 AIRWAY INHALATION TREATMENT: CPT

## 2020-11-03 PROCEDURE — 97530 THERAPEUTIC ACTIVITIES: CPT

## 2020-11-03 PROCEDURE — 99239 HOSP IP/OBS DSCHRG MGMT >30: CPT | Performed by: INTERNAL MEDICINE

## 2020-11-03 RX ORDER — GABAPENTIN 600 MG/1
300 TABLET ORAL NIGHTLY
Qty: 15 TABLET | Refills: 0 | Status: ON HOLD
Start: 2020-11-03 | End: 2021-08-13

## 2020-11-03 RX ORDER — AZITHROMYCIN 500 MG/1
500 TABLET, FILM COATED ORAL DAILY
Qty: 4 TABLET | Refills: 0 | Status: SHIPPED | OUTPATIENT
Start: 2020-11-03 | End: 2020-11-07

## 2020-11-03 RX ORDER — QUETIAPINE FUMARATE 100 MG/1
100 TABLET, FILM COATED ORAL 2 TIMES DAILY
Qty: 60 TABLET | Refills: 0 | Status: ON HOLD | OUTPATIENT
Start: 2020-11-03 | End: 2021-08-16 | Stop reason: SDUPTHER

## 2020-11-03 RX ORDER — PREDNISONE 10 MG/1
TABLET ORAL
Qty: 30 TABLET | Refills: 0 | Status: SHIPPED | OUTPATIENT
Start: 2020-11-03 | End: 2021-05-17 | Stop reason: ALTCHOICE

## 2020-11-03 RX ADMIN — ENOXAPARIN SODIUM 30 MG: 30 INJECTION SUBCUTANEOUS at 08:24

## 2020-11-03 RX ADMIN — DULOXETINE HYDROCHLORIDE 60 MG: 60 CAPSULE, DELAYED RELEASE ORAL at 08:24

## 2020-11-03 RX ADMIN — Medication 2 PUFF: at 11:08

## 2020-11-03 RX ADMIN — ASPIRIN 81 MG CHEWABLE TABLET 81 MG: 81 TABLET CHEWABLE at 08:24

## 2020-11-03 RX ADMIN — PREDNISONE 40 MG: 20 TABLET ORAL at 08:24

## 2020-11-03 RX ADMIN — DIVALPROEX SODIUM 250 MG: 250 TABLET, DELAYED RELEASE ORAL at 08:24

## 2020-11-03 RX ADMIN — ACETAMINOPHEN 650 MG: 325 TABLET ORAL at 02:15

## 2020-11-03 RX ADMIN — Medication 2 PUFF: at 06:44

## 2020-11-03 RX ADMIN — Medication 2 PUFF: at 15:18

## 2020-11-03 RX ADMIN — Medication 2 PUFF: at 06:46

## 2020-11-03 RX ADMIN — CARVEDILOL 3.12 MG: 3.12 TABLET, FILM COATED ORAL at 08:24

## 2020-11-03 ASSESSMENT — PAIN SCALES - GENERAL
PAINLEVEL_OUTOF10: 3
PAINLEVEL_OUTOF10: 0
PAINLEVEL_OUTOF10: 0

## 2020-11-03 ASSESSMENT — PAIN DESCRIPTION - PAIN TYPE: TYPE: ACUTE PAIN

## 2020-11-03 ASSESSMENT — PAIN DESCRIPTION - LOCATION: LOCATION: HEAD

## 2020-11-03 NOTE — PROGRESS NOTES
Resting on room air patient is 94%  Walking approx. 50 feet patient was able to maintain 96% on room air. Patient will benefit from a walker due to unsteady gait and deviation of feet.

## 2020-11-03 NOTE — PLAN OF CARE
Problem: Falls - Risk of:  Goal: Will remain free from falls  Description: Will remain free from falls  Outcome: Ongoing     Problem: Skin Integrity:  Goal: Will show no infection signs and symptoms  Description: Will show no infection signs and symptoms  Outcome: Ongoing  Goal: Absence of new skin breakdown  Description: Absence of new skin breakdown  Outcome: Ongoing     Problem: ACTIVITY INTOLERANCE/IMPAIRED MOBILITY  Goal: Mobility/activity is maintained at optimum level for patient  Outcome: Ongoing

## 2020-11-03 NOTE — DISCHARGE SUMMARY
Name:  Nicci Amaya  Room:  0208/0208-01  MRN:    [id number]    Discharge Summary      This discharge summary is in conjunction with a complete physical exam done on the day of discharge. Discharging Physician: Klaudia Rivera MD       Admit: 10/31/2020  Discharge:  11/3/2020    HPI taken from admission H&P:      The patient is a 77 y.o. female with PMH below, presents with SOB, productive cough. Pt reports she is supposed to be on O2 at home but does not have it, unclear why. She reports over the last several days she has become increasingly SOB. Today she felt as if she could not catch her breath. She reports a productive cough. She still smokes. She denies CP/abd pain. No fever/chills. Diagnoses this Admission and Hospital Course     COPD w AE - much improved  - Steroid. - Inhalers - COVID NEG  - S/p BiPAP. - Zithromax empiric.   - discharged with Azithro and Pred taper     Acute hypox RF - Resolved   - apparently on RA at baseline.   - Cont'd to wean O2 as tolerates. COVID 19 NEGATIVE. - weaned to RA     Hx of Bipolar d/o. - Cont'd depakote, cymbalta. - Neurontin dose decreased to nightly - due to drowsiness. - Seroquel held - suspected accidental overdose - she was taking two 200mg pills to help her \"vivid dreams\". - Gabapentin changed to nightly at d/c, decreased Seroquel dose to 200 mg at d/c     SAMREEN - Resolved  - prerenal and much improved with IVF. Procedures (Please Review Full Report for Details)  N/A    Consults    Pulmonology    Physical Exam at Discharge:    BP (!) 167/85   Pulse 84   Temp 96.8 °F (36 °C) (Temporal)   Resp 18   Ht 5' 1\" (1.549 m)   Wt 168 lb 6.4 oz (76.4 kg)   SpO2 93%   BMI 31.82 kg/m²     General appearance: No apparent distress, appears stated age and cooperative. HEENT:  Conjunctivae/corneas clear. Neck: Supple, with full range of motion. Trachea midline. Respiratory:  No respiratory distress.  no wheezespr rhonchi, on RA  Cardiovascular: Regular rate and rhythm with normal S1/S2 without murmurs, rubs or gallops. Abdomen: Soft, non-tender, non-distended with normal bowel sounds. Musculoskeletal: No clubbing, cyanosis or edema bilaterally. Full range of motion without deformity. Skin: Skin color, texture, turgor normal.  No rashes or lesions. Neurologic:  Neurovascularly intact without any focal sensory/motor deficits. Cranial nerves: II-XII intact, grossly non-focal.  Psychiatric: Alert and oriented, thought content appropriate, normal insight    CBC:   Recent Labs     10/31/20  1755 11/01/20  0521   WBC 6.9 5.8   HGB 13.5 11.5*   HCT 40.5 34.6*   MCV 92.2 92.1    129*     BMP:   Recent Labs     10/31/20  1755 11/01/20  0521   * 133*   K 3.5 3.5   CL 94* 95*   CO2 29 27   BUN 13 15   CREATININE 1.7* 1.1     LIVER PROFILE:   Recent Labs     10/31/20  1755 11/01/20  0521   AST 35 36   ALT 15 14   BILITOT 0.3 0.3   ALKPHOS 82 65     UA:  Recent Labs     11/01/20  0800   COLORU Yellow   PHUR 6.0   WBCUA 3-5   RBCUA 11-20*   BACTERIA 1+*   CLARITYU Clear   SPECGRAV >=1.030   LEUKOCYTESUR Negative   UROBILINOGEN 0.2   BILIRUBINUR Negative   BLOODU SMALL*   GLUCOSEU Negative     CULTURES    Rapid Influenza A/B: negative      RADIOLOGY    XR CHEST PORTABLE 10/31/2020   Final Result   Mild increased interstitial changes within the lungs, possibly infiltrate or   edema. No focal consolidation or pleural fluid. Discharge Medications     Medication List      START taking these medications    azithromycin 500 MG tablet  Commonly known as:  ZITHROMAX  Take 1 tablet by mouth daily for 4 days     predniSONE 10 MG tablet  Commonly known as:  DELTASONE  40 mg x 3 days, 30 mg x 3 days, 20 mg x 3 days, 10 mg x 3 days then stop        CHANGE how you take these medications    gabapentin 600 MG tablet  Commonly known as:  NEURONTIN  Take 0.5 tablets by mouth nightly for 30 days.   What changed:    · how much to take  · when These medications were sent to [facility],[address], [address] [facility]  [address], [address] [address]    Phone:  [phone]   · azithromycin 500 MG tablet  · predniSONE 10 MG tablet  · QUEtiapine 100 MG tablet     Information about where to get these medications is not yet available    Ask your nurse or doctor about these medications  · gabapentin 600 MG tablet           Discharged in stable condition to home. Follow Up: Follow up with PCP in 1 week.      Nayana King PA-C 12:19 PM 11/3/2020       ANIKET Batista 11/4/2020 6:22 PM

## 2020-11-03 NOTE — CARE COORDINATION
DISCHARGE ORDER  Date/Time 11/3/2020 3:24 PM  Completed by: Katt Kearney, Case Management    Patient Name: Alexandra Mosley      : 1950  Admitting Diagnosis: Acute on chronic respiratory failure with hypoxia and hypercapnia ([facility] [J96.21, J96.22]      Admit order Date and Status:10/31/2020 inpt  (verify MD's last order for status of admission)      Noted discharge order. If applicable PT/OT recommendation at Discharge: Home PRN assist home OT/PT  DME recommendation by PT/OT:Shower Chair and use RW   Confirmed discharge plan: Yes  with whom__pt and dtr_____________  If pt confirmed DC plan does family need to be contacted by CM Yes if yes who_dtr at bedside_____  Discharge Plan: Chart reviewed, met with pt and dtr at bedside. Pt is returning home with family and is agreeable to [facility] and has no preference in [facility] [place] [facility]. TC to [facility] and spoke to [facility], she is aware pt will discharge home today. Pt is aware OT is recommending a SC and she is aware where she can purchase this. Pt states she has a RW. No other DC needs voiced or identified. Reviewed chart. Role of discharge planner explained and patient verbalized understanding. Discharge order is noted. Has Home O2 in place on admit:  No    Pt is being d/c'd to home today. Pt's O2 sats are 93% on RA. Discharge timeout done with Beverly. All discharge needs and concerns addressed.

## 2020-11-03 NOTE — DISCHARGE INSTR - COC
Coccygeal pain, acute M53.3    Hallucinations R44.3    Ventral hernia without obstruction or gangrene K43.9    Moderate malnutrition ([facility]) E44.0    Streptococcus pneumoniae pneumonia ([facility]) J13    Acute cystitis with hematuria N30.01    Primary osteoarthritis of right knee M17.11    Complex tear of medial meniscus of right knee as current injury S83.231A    Complex tear of lateral meniscus of right knee as current injury S83.271A    Acute on chronic respiratory failure with hypoxia and hypercapnia ([facility]) J96.21, J96.22    COPD exacerbation ([facility]) J44.1    Acute kidney injury ([facility]) N17.9       Isolation/Infection:   Isolation          No Isolation        Patient Infection Status     Infection Onset Added Last Indicated Last Indicated By Review Planned Expiration Resolved Resolved By    None active    Resolved    COVID-19 Rule Out 10/31/20 10/31/20 10/31/20 COVID-19 (Ordered)   20 Rule-Out Test Resulted          Nurse Assessment:  Last Vital Signs: BP (!) 167/85   Pulse 84   Temp 96.8 °F (36 °C) (Temporal)   Resp 18   Ht 5' 1\" (1.549 m)   Wt 168 lb 6.4 oz (76.4 kg)   SpO2 94%   BMI 31.82 kg/m²     Last documented pain score (0-10 scale): Pain Level: 0  Last Weight:   Wt Readings from Last 1 Encounters:   20 168 lb 6.4 oz (76.4 kg)     Mental Status:  {IP PT MENTAL STATUS:}    IV Access:  { ADENIKE IV ACCESS:603450778}    Nursing Mobility/ADLs:  Walking   {[facility] DME ZQXE:122427291}  Transfer  {[facility] DME BDV}  Bathing  {[facility] DME LIIU:400454044}  Dressing  {[facility] DME XNY}  Toileting  {[facility] DME ZPWC:099856069}  Feeding  {[facility] DME APYH:251674779}  Med Admin  {[facility] DME HFBY:454001105}  Med Delivery   { ADENIKE MED Delivery:899366458}    Wound Care Documentation and Therapy:        Elimination:  Continence:   · Bowel: {YES / AH:88460}  · Bladder: {YES / SY:72719}  Urinary Catheter: {Urinary Catheter:780176451}   Colostomy/Ileostomy/Ileal Conduit: {YES / YT:53784}       Date of Last BM: ***    Intake/Output Summary (Last 24 hours) at 11/3/2020 1526  Last data filed at 11/3/2020 0820  Gross per 24 hour   Intake 2887.64 ml   Output 3050 ml   Net -162.36 ml     I/O last 3 completed shifts: In: 2887.6 [P.O.:240;  I.V.:2647.6]  Out: 3050 [Urine:3050]    Safety Concerns:     [facility] [employer] Safety Concerns:052900084}    Impairments/Disabilities:      {[facility] Impairments/Disabilities:729271631}    Nutrition Therapy:  Current Nutrition Therapy:   [facility] ADENIKE Diet List:145751443}    Routes of Feeding: {[facility] DME Other Feedings:787109846}  Liquids: {Slp liquid thickness:47525}  Daily Fluid Restriction: {[facility] DME Yes amt example:331855529}  Last Modified Barium Swallow with Video (Video Swallowing Test): {Done Not Done OXGH:858614484}    Treatments at the Time of Hospital Discharge:   Respiratory Treatments: ***  Oxygen Therapy:  {Therapy; copd oxygen:16700}  Ventilator:    {[facility] Vent CIEJ:109519671}    Rehab Therapies: {THERAPEUTIC INTERVENTION:6134235428}  Weight Bearing Status/Restrictions: [facility] Weight Bearin}  Other Medical Equipment (for information only, NOT a DME order):  {EQUIPMENT:481992615}  Other Treatments: ***    Patient's personal belongings (please select all that are sent with patient):  {[facility] DME Belongings:980342634}    RN SIGNATURE:  {Esignature:869902488}    CASE MANAGEMENT/SOCIAL WORK SECTION    Inpatient Status Date: 10/31/2020    Readmission Risk Assessment Score:  Readmission Risk              Risk of Unplanned Readmission:        22           Discharging to Facility/ Agency   · Name: [facility]  · Address:  · Phone:[phone]  · Fax:    HOME HEALTH CARE: LEVEL 1 STANDARD    Home health agency to establish plan of care for patient over 60 day period   Nursing  Initial home SN evaluation visit to occur within 24-48 hours for:  medication management  VS and clinical assessment  S&S chronic disease exacerbation education + when to contact MD / NP  care coordination  Medication Reconciliation during 1st SN visit       PT/OT   Evaluate with goal of regaining prior level of functioning   OT to evaluate if patient has [address] needs for personal care      PCP Visit scheduled within 7 days of hospital discharge       Dialysis Facility (if applicable)   · Name:  · Address:  · Dialysis Schedule:  · Phone:  · Fax:    / signature: Electronically signed by Darby Ariza RN on 11/3/20 at 3:27 PM EST    PHYSICIAN SECTION    Prognosis: Good    Condition at Discharge: Stable    Rehab Potential (if transferring to Rehab):     Recommended Labs or Other Treatments After Discharge:     Physician Certification: I certify the above information and transfer of Kinza Ferguson  is necessary for the continuing treatment of the diagnosis listed and that she requires [address] for less 30 days.      Update Admission H&P: No change in H&P    PHYSICIAN SIGNATURE:  BESS Martinez MD/Electronically signed by Darby Ariza RN on 11/3/20 at 3:27 PM EST

## 2020-11-03 NOTE — PROGRESS NOTES
Shift assessment complete see flow sheets. Scheduled meds given as ordered. Pt resting quietly in bed. Call light in reach. Will continue to monitor.      Electronically signed by Jose Miguel Hope RN on 11/2/2020 at 9:14 PM

## 2020-11-03 NOTE — PROGRESS NOTES
Inpatient Occupational Therapy  Evaluation and Treatment    Unit: 2 [place]  Date:  11/3/2020  Patient Name:    Kev Caceres  Admitting diagnosis:  Acute on chronic respiratory failure with hypoxia and hypercapnia ([facility] [J96.21, J96.22]  Admit Date:  10/31/2020  Precautions/Restrictions/WB Status/ Lines/ Wounds/ Oxygen: Fall risk, Bed/chair alarm and Telemetry, pulse ox , tele sitter     Treatment Time:  1410- 1500  Treatment Number: 1   Timed code treatment minutes 40 minutes   Total Treatment minutes:  50   minutes    Patient Goals for Therapy:  \" go home \"      Discharge Recommendations: Home PRN assist home OT/PT  DME needs for discharge: Shower Chair and use RW        Therapy recommendations for staff:   Stand by assist with use of rolling walker (RW) for all transfers and ambulation to/from bathroom    History of Present Illness: per H&P on 10-31-20  77 y.o. female history of COPD who is supposed to be on oxygen but currently does not have access to oxygen, congestive heart failure, bipolar disorder, hypertension, hyperlipidemia, chronic kidney disease presenting this evening brought by EMS with complaint of shortness of breath been going on for a few days and got worse. Per EMS oxygen sats were in the 70s. Currently smokes and does have a cough with sputum. Denies any URI symptoms or exposures to persons infected with coronavirus. Denies any chest pain, dysuria, abdominal pain or other symptoms associated with systemic infection    Pt apparently doubling up on her seroquel - ends up taking 400mg at bedtime. She presented with AMS, increased drowsiness and severe dyspnea. Home Health S4 Level Recommendation:  Level 1 Standard  AM-PAC Score: 23  Preadmission Environment    Pt.  Lives with spouse, dtr and two grandsons 13yrs , 8 yrs   Home environment:  one story home  Steps to enter first floor: 3 with two railings  steps to enter  Steps to second floor: N/A  Bathroom: tub/shower unit and walk in shower, grab bars, comfort height commode  Equipment owned: RW, SPC and pulse ox    Preadmission Status:  Pt. Able to drive: No  Pt Fully independent with ADLs: Yes occasional assist with shower  Pt. Required assistance from family for: Cleaning, Cooking and Laundry   Pt. independent for transfers and gait and walked with No Device  History of falls Yes    Pain  No    Cognition    A&O x4   Able to follow 2 step commands    Subjective  Patient lying supine in bed with no family present. Pt agreeable to this OT eval & tx. Upper Extremity ROM:    WFL    Upper Extremity Strength:    WFL    Upper Extremity Sensation    WFL    Upper Extremity Proprioception:  WFL    Coordination and Tone  WFL    Balance  Functional Sitting Balance:  WFL  Functional Standing Balance:Diminished    Bed mobility:    Supine to sit: Independent  Sit to supine:   Independent  Rolling:    Independent  Scooting in sitting:  Independent  Scooting to head of bed: Independent    Bridging:   Not Tested    Transfers:    Sit to stand:  Supervision  Stand to sit:  Supervision  Bed to chair:   SBA with RW  Standard toilet: SBA  Bed to [facility]:  Not Tested    Dressing:      UE:   Not Tested  LE:    Independent    Bathing:    UE:  Not Tested  LE:  Not Tested    Eating:   Independent    Toileting:  SBA    Activity Tolerance   Pt completed therapy session with decreased balance   BP 1195/100 -nurse aware , Spo2 on room air 96%  Positioning Needs:   Pt in bed, alarm set, positioned in proper neutral alignment and pressure relief provided. Exercise / Activities Initiated:   N/A    Patient/Family Education:   Role of OT  Recommendations for DC    Assessment of Deficits: Pt seen for Occupational therapy evaluation in acute care setting. Pt demonstrated decreased Activity tolerance and Balance . Pt functioning below baseline and will likely benefit from skilled occupational therapy services to maximize safety and independence.      Goal(s) :   No goals to be

## 2020-11-03 NOTE — PROGRESS NOTES
Pulmonary Progress Note    CC: Acute respiratory failure with hypoxemia    Subjective:   Patient feels better. Less shortness of breath. Intake/Output Summary (Last 24 hours) at 11/3/2020 0805  Last data filed at 11/3/2020 0854  Gross per 24 hour   Intake 3367.64 ml   Output 1350 ml   Net 2017.64 ml       Exam:   BP (!) 153/79   Pulse 80   Temp 98.1 °F (36.7 °C) (Oral)   Resp 16   Ht 5' 1\" (1.549 m)   Wt 168 lb 6.4 oz (76.4 kg)   SpO2 97%   BMI 31.82 kg/m²  on RA  Gen: No distress. Alert. Eyes: PERRL. No sclera icterus. No conjunctival injection. ENT: No discharge. Pharynx clear. Neck: Trachea midline. Normal thyroid. Resp: No accessory muscle use. Few bilateral crackles. No wheezes. No rhonchi. No dullness on percussion. CV: Regular rate. Regular rhythm. No murmur or rub. No edema. GI: Non-tender. Non-distended. No masses. No organomegaly. Skin: Warm and dry. No nodule on exposed extremities. No rash on exposed extremities. Lymph: No cervical LAD. No supraclavicular LAD. M/S: No cyanosis. No joint deformity. No clubbing. Neuro: Awake. Moves all extremities. CN grossly intact. Psych: Oriented x 3. No anxiety or agitation.      Scheduled Meds:   albuterol sulfate HFA  2 puff Inhalation Q4H While awake    And    ipratropium  2 puff Inhalation Q4H While awake    nicotine  1 patch Transdermal Daily    gabapentin  300 mg Oral Nightly    atorvastatin  40 mg Oral Nightly    aspirin  81 mg Oral Daily    carvedilol  3.125 mg Oral BID WC    divalproex  250 mg Oral QAM    divalproex  500 mg Oral Nightly    DULoxetine  60 mg Oral Daily    sodium chloride flush  10 mL Intravenous 2 times per day    enoxaparin  30 mg Subcutaneous Daily    predniSONE  40 mg Oral Daily with breakfast    azithromycin  500 mg Intravenous Q24H     Continuous Infusions:   sodium chloride 75 mL/hr at 11/02/20 2211     PRN Meds:  albuterol sulfate HFA, sodium chloride flush, acetaminophen **OR** acetaminophen, polyethylene glycol, prochlorperazine    Labs:  CBC:   Recent Labs     10/31/20  1755 11/01/20  0521   WBC 6.9 5.8   HGB 13.5 11.5*   HCT 40.5 34.6*   MCV 92.2 92.1    129*     BMP:   Recent Labs     10/31/20  1755 11/01/20  0521   * 133*   K 3.5 3.5   CL 94* 95*   CO2 29 27   BUN 13 15   CREATININE 1.7* 1.1     LIVER PROFILE:   Recent Labs     10/31/20  1755 11/01/20  0521   AST 35 36   ALT 15 14   BILITOT 0.3 0.3   ALKPHOS 82 65     PT/INR: No results for input(s): PROTIME, INR in the last 72 hours. APTT: No results for input(s): APTT in the last 72 hours.   UA:  Recent Labs     11/01/20  0800   COLORU Yellow   PHUR 6.0   WBCUA 3-5   RBCUA 11-20*   BACTERIA 1+*   CLARITYU Clear   SPECGRAV >=1.030   LEUKOCYTESUR Negative   UROBILINOGEN 0.2   BILIRUBINUR Negative   BLOODU SMALL*   GLUCOSEU Negative     Recent Labs     10/31/20  1804 11/01/20  0600   PHART 7.245* 7.339*   KKU6WXR 62.5* 49.0*   PO2ART 101.5 72.3*     Cultures:   Rapid flu negative  SARS-CoV-2 negative      Films:  CXR 10/31 reviewed by me and showed: Mild hazy interstitial markings in both lungs        ASSESSMENT:  · Acute hypoxemic hypercapnic respiratory failure  · Acute encephalopathy/metabolic encephalopathy  · COPD with acute exacerbation  · Acute kidney injury  · Ongoing tobacco abuse  · CAD  · Bipolar disorder     PLAN:  · Supplemental oxygen to maintain SaO2 >92%; wean as tolerated  · IV antibiotics to include day 3 Zithromax  · Avoid NEBs as able-albuterol MDI strongly preferred   · IV Solu-Medrol-> pred taper  · D/c ok from my perspective

## 2020-11-03 NOTE — CARE COORDINATION
[facility]  Received referral regarding HC services from [facility]. Sent request to [facility] for [facility] hospitals [facility] [place] coverage. [facility] is able to service for [facility]. services. Notified of dc home today. Faxed referral with orders to [facility].     Electronically signed by Ezequiel Medina RN on 11/3/2020 at 4:17 PM

## 2020-11-04 ENCOUNTER — CARE COORDINATION (OUTPATIENT)
Dept: CASE MANAGEMENT | Age: 70
End: 2020-11-04

## 2020-11-04 NOTE — CARE COORDINATION
[facility] Initial Outreach    Call within 2 business days of discharge: Yes    Patient: Katherine Fitzpatrick Patient : 1950   MRN: [id number]  Discharge Date: 11/3/20   RARS: Readmission Risk Score: 22      Last Discharge [facility]       Complaint Diagnosis Description Type Department Provider    10/31/20 Shortness of Breath Dyspnea and respiratory abnormalities . .. ED to Hosp-Admission (Discharged) (ADMITTED) [address] Quincy Walton MD; Ns. .. Spoke with: Alexandra Mosley (patient)    Non-face-to-face services provided:  Obtained and reviewed discharge summary and/or continuity of care documents  Communication with home health agencies or other community services the patient is currently [facility]  Education of patient/family/caregiver/guardian to support self-management-s/s to report  Assessment and support for treatment adherence and medication management-med review    Challenges to be reviewed by the provider   Additional needs identified to be addressed with provider No    COVID-19 Not Detected on 2020. Patient informed of results, if available? Yes       Method of communication with provider : none    Advance Care Planning:   Does patient have an Advance Directive:  reviewed and current. Was this a readmission? No  Patient stated reason for admission: SOB  Patients top risk factors for readmission: medical condition, polypharmacy    Care Transition Nurse (CTN) contacted the patient by telephone to perform post hospital discharge assessment. Provided introduction to self, and explanation of the CTN role. CTN reviewed discharge instructions, medical action plan and red flags with patient who verbalized understanding. Patient given an opportunity to ask questions and does not have any further questions or concerns at this time. Were discharge instructions available to patient? Yes.  Reviewed appropriate site of care based on symptoms and resources available to patient including: PCP, Specialist, Home health and CTN. The patient agrees to contact the PCP office for questions related to their healthcare. Medication reconciliation was performed with patient, who verbalizes understanding of administration of home medications. Advised obtaining a 90-day supply of all daily and as-needed medications. Covid Risk Education    Patient has following COVID-19 risk factors: heart failure, COPD, acute respiratory failure and Smoker. Education provided regarding infection prevention, and signs and symptoms of COVID-19 and when to seek medical attention with patient who verbalized understanding. Discussed exposure protocols and quarantine From [facility]: Are you at higher risk for severe illness?   and given an opportunity for questions and concerns. The patient agrees to contact the COVID-19 hotline [phone] or PCP office for questions related to COVID-19. Symptoms reviewed with patient who verbalized the following symptoms: fatigue, no new symptoms and no worsening symptoms. Due to no new or worsening symptoms encounter was not routed to provider for escalation. Discussed follow-up appointments. If no appointment was previously scheduled, appointment scheduling offered: Yes. Is follow up appointment scheduled within 7 days of discharge? Yes  Non-[facility] follow up appointment(s): Zheng Carrera MD - PCP - TBD    Plan for follow-up call in 5-7 days based on severity of symptoms and risk factors. Plan for next call: self management-COPD CHF    Plans to use nicotine patches. Provided with 1-800-QUIT-NOW as resources. Feels improved, just reports being tired from admission. Unsure if she will attend the new patient appt today as scheduled with Dr Willi Middleton but she will call the office and see if it is vv or face to face and decide. States she has had difficulty managing her medications. Has not heard from [facility] yet.  CTN provided contact information for future

## 2020-11-09 ENCOUNTER — CARE COORDINATION (OUTPATIENT)
Dept: CASE MANAGEMENT | Age: 70
End: 2020-11-09

## 2020-11-10 NOTE — CARE COORDINATION
[facility]  Pt was a non admit to [facility] due to pt declining need for [facility].. PCP was notified.     Electronically signed by Pili Doan RN on 11/10/2020 at 3:17 PM

## 2020-11-11 ENCOUNTER — CARE COORDINATION (OUTPATIENT)
Dept: CASE MANAGEMENT | Age: 70
End: 2020-11-11

## 2020-11-11 NOTE — CARE COORDINATION
Patient contacted regarding COVID-19 risk and screening.     COVID-19 Not Detected on 11/4/2020.     Patient has following COVID-19 risk factors: heart failure, COPD, acute respiratory failure and Smoker. 2nd attempt - unable to reach or leave message at this time. Patient has CTN contact for future needs. No further CTN outreach scheduled. Episode resolved at this time.      Esau Wilks, RN  Care Transition Nurse  [phone] mobile    Future Appointments   Date Time Provider Joaquín Lewis   11/16/2020  4:00 PM [facility]

## 2020-11-16 ENCOUNTER — HOSPITAL ENCOUNTER (OUTPATIENT)
Dept: MAMMOGRAPHY | Age: 70
Discharge: HOME | End: 2020-11-16
Payer: COMMERCIAL

## 2020-11-16 PROCEDURE — 77067 SCR MAMMO BI INCL CAD: CPT

## 2020-11-17 ENCOUNTER — TELEPHONE (OUTPATIENT)
Dept: MAMMOGRAPHY | Age: 70
End: 2020-11-17

## 2020-11-23 ENCOUNTER — HOSPITAL ENCOUNTER (OUTPATIENT)
Dept: ULTRASOUND IMAGING | Age: 70
Discharge: HOME | End: 2020-11-23
Payer: COMMERCIAL

## 2020-11-23 ENCOUNTER — HOSPITAL ENCOUNTER (OUTPATIENT)
Dept: MAMMOGRAPHY | Age: 70
Discharge: HOME | End: 2020-11-23
Payer: COMMERCIAL

## 2020-11-23 PROCEDURE — G0279 TOMOSYNTHESIS, MAMMO: HCPCS

## 2020-11-23 PROCEDURE — 76642 ULTRASOUND BREAST LIMITED: CPT

## 2020-11-24 ENCOUNTER — TELEPHONE (OUTPATIENT)
Dept: CASE MANAGEMENT | Age: 70
End: 2020-11-24

## 2020-11-24 NOTE — TELEPHONE ENCOUNTER
Patient due for annual CT Lung Screening. Reminder letter mailed.     Ankita Camacho 178 Lung Navigator  [phone]

## 2020-12-19 ENCOUNTER — TELEPHONE (OUTPATIENT)
Dept: CASE MANAGEMENT | Age: 70
End: 2020-12-19

## 2020-12-19 NOTE — TELEPHONE ENCOUNTER
Patient due for annual CT Lung Screening. Reminder letter mailed.     Ankita Camacho 178 Lung Navigator  [phone]

## 2021-01-17 ENCOUNTER — TELEPHONE (OUTPATIENT)
Dept: CASE MANAGEMENT | Age: 71
End: 2021-01-17

## 2021-01-17 NOTE — TELEPHONE ENCOUNTER
Patient due for annual CT Lung Screening. Third and final reminder letter mailed.     Ankita Camacho 178 Lung Navigator  [phone]

## 2021-03-03 ENCOUNTER — HOSPITAL ENCOUNTER (OUTPATIENT)
Dept: CT IMAGING | Age: 71
Discharge: HOME | End: 2021-03-03
Payer: COMMERCIAL

## 2021-03-03 DIAGNOSIS — F17.210 CIGARETTE SMOKER: ICD-10-CM

## 2021-03-03 PROCEDURE — 71271 CT THORAX LUNG CANCER SCR C-: CPT

## 2021-03-07 ENCOUNTER — TELEPHONE (OUTPATIENT)
Dept: CASE MANAGEMENT | Age: 71
End: 2021-03-07

## 2021-03-07 NOTE — TELEPHONE ENCOUNTER
Annual Lung Cancer Screening CT on 3/3/2021. LRAD1. Recommended screen in one year. Results letter mailed.

## 2021-05-17 ENCOUNTER — APPOINTMENT (OUTPATIENT)
Dept: GENERAL RADIOLOGY | Age: 71
End: 2021-05-17
Payer: COMMERCIAL

## 2021-05-17 ENCOUNTER — HOSPITAL ENCOUNTER (EMERGENCY)
Age: 71
Discharge: HOME | End: 2021-05-17
Attending: STUDENT IN AN ORGANIZED HEALTH CARE EDUCATION/TRAINING PROGRAM
Payer: COMMERCIAL

## 2021-05-17 VITALS
WEIGHT: 160 LBS | DIASTOLIC BLOOD PRESSURE: 95 MMHG | RESPIRATION RATE: 18 BRPM | OXYGEN SATURATION: 98 % | HEART RATE: 75 BPM | SYSTOLIC BLOOD PRESSURE: 184 MMHG | TEMPERATURE: 97.9 F | BODY MASS INDEX: 30.21 KG/M2 | HEIGHT: 61 IN

## 2021-05-17 DIAGNOSIS — R45.851 SUICIDAL IDEATION: Primary | ICD-10-CM

## 2021-05-17 DIAGNOSIS — J44.1 COPD EXACERBATION (HCC): ICD-10-CM

## 2021-05-17 DIAGNOSIS — R06.02 SHORTNESS OF BREATH: ICD-10-CM

## 2021-05-17 LAB
A/G RATIO: 2 (ref 1.1–2.2)
ACETAMINOPHEN LEVEL: <5 UG/ML (ref 10–30)
ALBUMIN SERPL-MCNC: 4.2 G/DL (ref 3.4–5)
ALP BLD-CCNC: 61 U/L (ref 40–129)
ALT SERPL-CCNC: 8 U/L (ref 10–40)
AMPHETAMINE SCREEN, URINE: ABNORMAL
ANION GAP SERPL CALCULATED.3IONS-SCNC: 13 MMOL/L (ref 3–16)
AST SERPL-CCNC: 12 U/L (ref 15–37)
BARBITURATE SCREEN URINE: POSITIVE
BASE EXCESS VENOUS: 5.6 MMOL/L (ref -3–3)
BASOPHILS ABSOLUTE: 0.1 K/UL (ref 0–0.2)
BASOPHILS RELATIVE PERCENT: 0.9 %
BENZODIAZEPINE SCREEN, URINE: ABNORMAL
BILIRUB SERPL-MCNC: <0.2 MG/DL (ref 0–1)
BILIRUBIN URINE: NEGATIVE
BLOOD, URINE: NEGATIVE
BUN BLDV-MCNC: 8 MG/DL (ref 7–20)
CALCIUM SERPL-MCNC: 9.2 MG/DL (ref 8.3–10.6)
CANNABINOID SCREEN URINE: ABNORMAL
CARBOXYHEMOGLOBIN: 5.8 % (ref 0–1.5)
CHLORIDE BLD-SCNC: 92 MMOL/L (ref 99–110)
CLARITY: CLEAR
CO2: 26 MMOL/L (ref 21–32)
COCAINE METABOLITE SCREEN URINE: ABNORMAL
COLOR: ABNORMAL
CREAT SERPL-MCNC: 0.7 MG/DL (ref 0.6–1.2)
EKG ATRIAL RATE: 71 BPM
EKG DIAGNOSIS: NORMAL
EKG P AXIS: 70 DEGREES
EKG P-R INTERVAL: 162 MS
EKG Q-T INTERVAL: 402 MS
EKG QRS DURATION: 98 MS
EKG QTC CALCULATION (BAZETT): 436 MS
EKG R AXIS: 15 DEGREES
EKG T AXIS: 46 DEGREES
EKG VENTRICULAR RATE: 71 BPM
EOSINOPHILS ABSOLUTE: 0.3 K/UL (ref 0–0.6)
EOSINOPHILS RELATIVE PERCENT: 4.9 %
EPITHELIAL CELLS, UA: NORMAL /HPF (ref 0–5)
ETHANOL: NORMAL MG/DL (ref 0–0.08)
GFR AFRICAN AMERICAN: >60
GFR NON-AFRICAN AMERICAN: >60
GLOBULIN: 2.1 G/DL
GLUCOSE BLD-MCNC: 97 MG/DL (ref 70–99)
GLUCOSE URINE: NEGATIVE MG/DL
HCO3 VENOUS: 33 MMOL/L (ref 23–29)
HCT VFR BLD CALC: 34.7 % (ref 36–48)
HEMOGLOBIN: 11.6 G/DL (ref 12–16)
KETONES, URINE: NEGATIVE MG/DL
LEUKOCYTE ESTERASE, URINE: ABNORMAL
LYMPHOCYTES ABSOLUTE: 2.2 K/UL (ref 1–5.1)
LYMPHOCYTES RELATIVE PERCENT: 35.8 %
Lab: ABNORMAL
MCH RBC QN AUTO: 30 PG (ref 26–34)
MCHC RBC AUTO-ENTMCNC: 33.4 G/DL (ref 31–36)
MCV RBC AUTO: 89.6 FL (ref 80–100)
METHADONE SCREEN, URINE: ABNORMAL
METHEMOGLOBIN VENOUS: 0.3 %
MICROSCOPIC EXAMINATION: YES
MONOCYTES ABSOLUTE: 0.5 K/UL (ref 0–1.3)
MONOCYTES RELATIVE PERCENT: 7.8 %
NEUTROPHILS ABSOLUTE: 3.1 K/UL (ref 1.7–7.7)
NEUTROPHILS RELATIVE PERCENT: 50.6 %
NITRITE, URINE: NEGATIVE
O2 CONTENT, VEN: 13 VOL %
O2 SAT, VEN: 77 %
O2 THERAPY: ABNORMAL
OPIATE SCREEN URINE: ABNORMAL
OXYCODONE URINE: ABNORMAL
PCO2, VEN: 61.4 MMHG (ref 40–50)
PDW BLD-RTO: 14.6 % (ref 12.4–15.4)
PH UA: 7
PH UA: 7 (ref 5–8)
PH VENOUS: 7.35 (ref 7.35–7.45)
PHENCYCLIDINE SCREEN URINE: ABNORMAL
PLATELET # BLD: 176 K/UL (ref 135–450)
PMV BLD AUTO: 7.1 FL (ref 5–10.5)
PO2, VEN: 44.8 MMHG (ref 25–40)
POTASSIUM REFLEX MAGNESIUM: 4.7 MMOL/L (ref 3.5–5.1)
PRO-BNP: 310 PG/ML (ref 0–124)
PROPOXYPHENE SCREEN: ABNORMAL
PROTEIN UA: NEGATIVE MG/DL
RBC # BLD: 3.87 M/UL (ref 4–5.2)
RBC UA: NORMAL /HPF (ref 0–4)
SALICYLATE, SERUM: <0.3 MG/DL (ref 15–30)
SARS-COV-2, NAAT: NOT DETECTED
SODIUM BLD-SCNC: 131 MMOL/L (ref 136–145)
SPECIFIC GRAVITY UA: 1.01 (ref 1–1.03)
TCO2 CALC VENOUS: 35 MMOL/L
TOTAL PROTEIN: 6.3 G/DL (ref 6.4–8.2)
TROPONIN: <0.01 NG/ML
TROPONIN: <0.01 NG/ML
URINE REFLEX TO CULTURE: ABNORMAL
URINE TYPE: ABNORMAL
UROBILINOGEN, URINE: 0.2 E.U./DL
VALPROIC ACID LEVEL: 42.4 UG/ML (ref 50–100)
WBC # BLD: 6.2 K/UL (ref 4–11)
WBC UA: NORMAL /HPF (ref 0–5)

## 2021-05-17 PROCEDURE — 83880 ASSAY OF NATRIURETIC PEPTIDE: CPT

## 2021-05-17 PROCEDURE — 93010 ELECTROCARDIOGRAM REPORT: CPT | Performed by: INTERNAL MEDICINE

## 2021-05-17 PROCEDURE — 85025 COMPLETE CBC W/AUTO DIFF WBC: CPT

## 2021-05-17 PROCEDURE — 82077 ASSAY SPEC XCP UR&BREATH IA: CPT

## 2021-05-17 PROCEDURE — 80307 DRUG TEST PRSMV CHEM ANLYZR: CPT

## 2021-05-17 PROCEDURE — 6370000000 HC RX 637 (ALT 250 FOR IP): Performed by: STUDENT IN AN ORGANIZED HEALTH CARE EDUCATION/TRAINING PROGRAM

## 2021-05-17 PROCEDURE — 87635 SARS-COV-2 COVID-19 AMP PRB: CPT

## 2021-05-17 PROCEDURE — 71045 X-RAY EXAM CHEST 1 VIEW: CPT

## 2021-05-17 PROCEDURE — 81001 URINALYSIS AUTO W/SCOPE: CPT

## 2021-05-17 PROCEDURE — 6370000000 HC RX 637 (ALT 250 FOR IP): Performed by: PHYSICIAN ASSISTANT

## 2021-05-17 PROCEDURE — 6360000002 HC RX W HCPCS: Performed by: PHYSICIAN ASSISTANT

## 2021-05-17 PROCEDURE — 84484 ASSAY OF TROPONIN QUANT: CPT

## 2021-05-17 PROCEDURE — 80143 DRUG ASSAY ACETAMINOPHEN: CPT

## 2021-05-17 PROCEDURE — 80164 ASSAY DIPROPYLACETIC ACD TOT: CPT

## 2021-05-17 PROCEDURE — 82803 BLOOD GASES ANY COMBINATION: CPT

## 2021-05-17 PROCEDURE — 80179 DRUG ASSAY SALICYLATE: CPT

## 2021-05-17 PROCEDURE — 93005 ELECTROCARDIOGRAM TRACING: CPT | Performed by: STUDENT IN AN ORGANIZED HEALTH CARE EDUCATION/TRAINING PROGRAM

## 2021-05-17 PROCEDURE — 80053 COMPREHEN METABOLIC PANEL: CPT

## 2021-05-17 PROCEDURE — 99285 EMERGENCY DEPT VISIT HI MDM: CPT

## 2021-05-17 PROCEDURE — 96372 THER/PROPH/DIAG INJ SC/IM: CPT

## 2021-05-17 RX ORDER — PREDNISONE 20 MG/1
60 TABLET ORAL ONCE
Status: COMPLETED | OUTPATIENT
Start: 2021-05-17 | End: 2021-05-17

## 2021-05-17 RX ORDER — KETOROLAC TROMETHAMINE 30 MG/ML
15 INJECTION, SOLUTION INTRAMUSCULAR; INTRAVENOUS EVERY 6 HOURS PRN
Status: DISCONTINUED | OUTPATIENT
Start: 2021-05-17 | End: 2021-05-18 | Stop reason: HOSPADM

## 2021-05-17 RX ORDER — KETOROLAC TROMETHAMINE 30 MG/ML
15 INJECTION, SOLUTION INTRAMUSCULAR; INTRAVENOUS ONCE
Status: DISCONTINUED | OUTPATIENT
Start: 2021-05-17 | End: 2021-05-17

## 2021-05-17 RX ORDER — ALBUTEROL SULFATE 90 UG/1
2 AEROSOL, METERED RESPIRATORY (INHALATION) EVERY 4 HOURS PRN
Qty: 1 INHALER | Refills: 0 | Status: ON HOLD
Start: 2021-05-17 | End: 2021-08-16 | Stop reason: HOSPADM

## 2021-05-17 RX ORDER — IPRATROPIUM BROMIDE AND ALBUTEROL SULFATE 2.5; .5 MG/3ML; MG/3ML
1 SOLUTION RESPIRATORY (INHALATION)
Status: DISCONTINUED | OUTPATIENT
Start: 2021-05-17 | End: 2021-05-18 | Stop reason: HOSPADM

## 2021-05-17 RX ORDER — METHYLPREDNISOLONE SODIUM SUCCINATE 125 MG/2ML
125 INJECTION, POWDER, LYOPHILIZED, FOR SOLUTION INTRAMUSCULAR; INTRAVENOUS DAILY
Status: DISCONTINUED | OUTPATIENT
Start: 2021-05-17 | End: 2021-05-17

## 2021-05-17 RX ORDER — FUROSEMIDE 20 MG/1
20 TABLET ORAL DAILY
Qty: 3 TABLET | Refills: 0 | Status: ON HOLD | OUTPATIENT
Start: 2021-05-17 | End: 2021-08-13

## 2021-05-17 RX ORDER — AZITHROMYCIN 250 MG/1
TABLET, FILM COATED ORAL
Qty: 6 TABLET | Refills: 0 | Status: SHIPPED | OUTPATIENT
Start: 2021-05-17 | End: 2021-05-22

## 2021-05-17 RX ORDER — GUAIFENESIN 600 MG/1
600 TABLET, EXTENDED RELEASE ORAL 2 TIMES DAILY
Status: DISCONTINUED | OUTPATIENT
Start: 2021-05-17 | End: 2021-05-17

## 2021-05-17 RX ORDER — FUROSEMIDE 10 MG/ML
40 INJECTION INTRAMUSCULAR; INTRAVENOUS ONCE
Status: DISCONTINUED | OUTPATIENT
Start: 2021-05-17 | End: 2021-05-17

## 2021-05-17 RX ORDER — PREDNISONE 20 MG/1
40 TABLET ORAL DAILY
Qty: 10 TABLET | Refills: 0 | Status: SHIPPED | OUTPATIENT
Start: 2021-05-17 | End: 2021-05-22

## 2021-05-17 RX ORDER — BENZONATATE 100 MG/1
100 CAPSULE ORAL ONCE
Status: DISCONTINUED | OUTPATIENT
Start: 2021-05-17 | End: 2021-05-17

## 2021-05-17 RX ADMIN — PREDNISONE 60 MG: 20 TABLET ORAL at 22:21

## 2021-05-17 RX ADMIN — IPRATROPIUM BROMIDE AND ALBUTEROL SULFATE 1 AMPULE: .5; 3 SOLUTION RESPIRATORY (INHALATION) at 20:52

## 2021-05-17 RX ADMIN — GUAIFENESIN 600 MG: 600 TABLET, EXTENDED RELEASE ORAL at 19:45

## 2021-05-17 RX ADMIN — KETOROLAC TROMETHAMINE 15 MG: 30 INJECTION, SOLUTION INTRAMUSCULAR at 19:05

## 2021-05-17 ASSESSMENT — ENCOUNTER SYMPTOMS
COUGH: 1
SHORTNESS OF BREATH: 1
GASTROINTESTINAL NEGATIVE: 1

## 2021-05-17 NOTE — ED PROVIDER NOTES
symptomatic relief. States that she finished her antibiotics about 1 week ago. Nursing Notes were all reviewed and agreed with or any disagreements were addressed in the HPI. REVIEW OF SYSTEMS    (2-9 systems for level 4, 10 or more for level 5)     Review of Systems   Constitutional: Negative. HENT: Negative. Respiratory: Positive for cough and shortness of breath. Cardiovascular: Negative. Gastrointestinal: Negative. Genitourinary: Negative. Musculoskeletal: Negative. Skin: Negative. Psychiatric/Behavioral: Positive for self-injury and suicidal ideas. Positives and Pertinent negatives as per HPI. Except as noted above in the ROS, all other systems were reviewed and negative.        PAST MEDICAL HISTORY     Past Medical History:   Diagnosis Date    Abnormal ECG 12/14/2012    Anemia     Arthritis     Back pain, chronic 3/13/2013    Bipolar disorder ([address]     Bronchitis chronic     CHF (congestive heart failure) ([address] 9/30/2016    Chronic back pain     Chronic neck pain     Clostridium difficile infection 3/29/12, 5/22/12    positive stool DNA probe    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    Coronary artery disease involving native coronary artery of native heart with angina pectoris ([address] 3/8/2016    Depression     Emphysema of lung ([address]     Fibromyalgia     hyperlipidemia 8/11/2011    Hypertension     Kidney stone     Liver disease     Lung disease     MDD (major depressive disorder) 4/4/2012    Mood disorder ([address] 3/13/2013    Movement disorder     Neck pain, chronic 3/13/2013    Opiate misuse     Personality disorder ([address]     Pneumonia     Polypharmacy 2/16/2013         SURGICAL HISTORY     Past Surgical History:   Procedure Laterality Date    COLONOSCOPY  1/19/12   [facility] DI[facility]NOSTIC [facility]AC CATH LAB PROCEDURE  Feb, 2007    Normal cor angio Feb, 2007    HERNIA REPAIR  07/11/2019    robotic ventral hernia repair with mesh    HERNIA REPAIR N/A 7/11/2019    ROBOTIC VENTRAL HERNIA REPAIR WITH MESH performed by Savanah Taylor MD at [address] [place] [address]       Previous Medications    ALBUTEROL SULFATE HFA (VENTOLIN HFA) 108 (90 BASE) MCG/ACT INHALER    Inhale 2 puffs into the lungs 4 times daily as needed for Wheezing    ASPIRIN 81 MG CHEWABLE TABLET    Take 1 tablet by mouth daily    ATORVASTATIN (LIPITOR) 40 MG TABLET    Take 1 tablet by mouth nightly    BUSPIRONE (BUSPAR) 15 MG TABLET    Take 15 mg by mouth 3 times daily    CARVEDILOL (COREG) 3.125 MG TABLET    Take 1 tablet by mouth 2 times daily (with meals)    DICLOFENAC SODIUM 1 % GEL    Apply 4 g topically 4 times daily    DIVALPROEX (DEPAKOTE) 250 MG DR TABLET    Take 250 mg by mouth every morning     DIVALPROEX (DEPAKOTE) 500 MG DR TABLET    Take 500 mg by mouth nightly    DULOXETINE (CYMBALTA) 60 MG EXTENDED RELEASE CAPSULE    Take 60 mg by mouth daily     FLUTICASONE (FLONASE) 50 MCG/ACT NASAL SPRAY    1 spray by Nasal route daily as needed     GABAPENTIN (NEURONTIN) 600 MG TABLET    Take 0.5 tablets by mouth nightly for 30 days.     KETOROLAC (TORADOL) 10 MG TABLET    Take 1 tablet by mouth 3 times daily    NICOTINE (NICODERM CQ) 14 MG/24HR    Place 1 patch onto the skin daily    OLMESARTAN (BENICAR) 40 MG TABLET    Take 40 mg by mouth daily    PANTOPRAZOLE (PROTONIX) 40 MG TABLET    Take 1 tablet by mouth every morning (before breakfast)    PREDNISONE (DELTASONE) 10 MG TABLET    40 mg x 3 days, 30 mg x 3 days, 20 mg x 3 days, 10 mg x 3 days then stop    PRIMIDONE (MYSOLINE) 50 MG TABLET    Take 50 mg by mouth 2 times daily    QUETIAPINE (SEROQUEL) 100 MG TABLET    Take 1 tablet by mouth 2 times daily         ALLERGIES     Dicyclomine, Sumatriptan, Tape [adhesive tape], Tizanidine, and Motrin [ibuprofen micronized]    FAMILYHISTORY       Family History   Problem Relation Age of Onset    Emphysema Mother     Heart Disease Mother     Arthritis Mother     Depression Mother     High Blood Pressure Mother     Heart Failure Father     Heart Disease Father     Arthritis Father     Diabetes Father     High Blood Pressure Father     Stroke Father     Substance Abuse Father     High Blood Pressure Brother     Heart Disease Sister     Kidney Disease Daughter           SOCIAL HISTORY       Social History     Tobacco Use    Smoking status: Current Every Day Smoker     Packs/day: 2.00     Years: 47.00     Pack years: 94.00     Types: Cigarettes    Smokeless tobacco: Never Used   Vaping Use    Vaping Use: Never used   Substance Use Topics    Alcohol use: No    Drug use: Not Currently       SCREENINGS             PHYSICAL EXAM    (up to 7 for level 4, 8 or more for level 5)     ED Triage Vitals [05/17/21 1450]   BP Temp Temp Source Pulse Resp SpO2 Height Weight   (!) 197/89 97.9 °F (36.6 °C) Oral 75 18 99 % 5' 1\" (1.549 m) 160 lb (72.6 kg)       Physical Exam  Vitals and nursing note reviewed. Constitutional:       General: She is awake. She is not in acute distress. Appearance: Normal appearance. She is well-developed. She is not ill-appearing, toxic-appearing or diaphoretic. HENT:      Head: Normocephalic and atraumatic. Nose: Nose normal.   Eyes:      General:         Right eye: No discharge. Left eye: No discharge. Cardiovascular:      Rate and Rhythm: Normal rate and regular rhythm. Pulses:           Radial pulses are 2+ on the right side and 2+ on the left side. Heart sounds: Normal heart sounds. No murmur heard. No gallop. Pulmonary:      Effort: Pulmonary effort is normal. No respiratory distress. Breath sounds: Normal breath sounds. No wheezing or rales. Chest:      Chest wall: No tenderness. Musculoskeletal:         General: No deformity. Normal range of motion. Cervical back: Normal range of motion and neck supple. Right lower leg: No edema.       Left lower leg: No edema. Skin:     General: Skin is warm and dry. Neurological:      General: No focal deficit present. Mental Status: She is alert and oriented to person, place, and time. GCS: GCS eye subscore is 4. GCS verbal subscore is 5. GCS motor subscore is 6. Psychiatric:         Attention and Perception: Attention normal.         Mood and Affect: Mood normal.         Speech: Speech normal.         Behavior: Behavior normal. Behavior is cooperative. Thought Content: Thought content includes suicidal ideation. Thought content includes suicidal plan.          Cognition and Memory: Cognition normal.         Judgment: Judgment normal.         DIAGNOSTIC RESULTS   LABS:    Labs Reviewed   CBC WITH AUTO DIFFERENTIAL - Abnormal; Notable for the following components:       Result Value    RBC 3.87 (*)     Hemoglobin 11.6 (*)     Hematocrit 34.7 (*)     All other components within normal limits    Narrative:     Performed at:  [facility],  [employer] [facility]   Phone [phone]   COMPREHENSIVE METABOLIC PANEL W/ REFLEX TO MG FOR LOW K - Abnormal; Notable for the following components:    Sodium 131 (*)     Chloride 92 (*)     Total Protein 6.3 (*)     ALT 8 (*)     AST 12 (*)     All other components within normal limits    Narrative:     Performed at:  [facility],  [employer]   Phone [phone]   URINE RT REFLEX TO CULTURE - Abnormal; Notable for the following components:    Leukocyte Esterase, Urine TRACE (*)     All other components within normal limits    Narrative:     Performed at:  [facility]) - [facility],  [place], [place]   Phone [phone]   [address] - Abnormal; Notable for the following components:    Barbiturate Screen, Ur POSITIVE (*)     All other components within normal limits    Narrative:     Performed at:  [place] [address],  [place], [facility]   Phone [phone]   SALICYLATE LEVEL - Abnormal; Notable for the following components:    Salicylate, Serum <4.4 (*)     All other components within normal limits    Narrative:     Performed at:  [place] [address],  [place], [facility]   Phone [phone]   ACETAMINOPHEN LEVEL - Abnormal; Notable for the following components:    Acetaminophen Level <5 (*)     All other components within normal limits    Narrative:     Performed at:  [place] [address],  [place], [facility]   Phone [phone]   BRAIN NATRIURETIC PEPTIDE - Abnormal; Notable for the following components:    Pro- (*)     All other components within normal limits    Narrative:     Performed at:  [place] [address],  [place], [facility]   Phone [phone], RAPID    Narrative:     Performed at:  [place] [address],  [place], [facility]   Phone [phone]   MICROSCOPIC URINALYSIS    Narrative:     Performed at:  [place] [address],  [place], [facility]   Phone [phone]   ETHANOL    Narrative:     Performed at:  [place] [address],  [place], [facility]   Phone [phone]   TROPONIN    Narrative:     Performed at:  [facility],  [place], [facility]   Phone [phone]   BLOOD GAS, VENOUS   TROPONIN       All other labs were within normal range or not returned as of this dictation. EKG: All EKG's are interpreted by the Emergency Department Physician in the absence of a cardiologist.  Please see their note for interpretation of EKG.       RADIOLOGY:   Non-plain film images such as CT, Ultrasound and MRI are brought in today by private vehicle with complaints of shortness of breath as well as suicidal ideation. On exam patient is alert oriented afebrile breathing on room air satting at 99%. Nontoxic. No acute respiratory distress. Old labs and records reviewed at this time. Patient seen by myself as well as my attending Dr. Leighann Baig. Rapid Covid is negative. CBC shows no acute leukocytosis. Hemoglobin of 11.6. No electrolyte abnormalities. Kidney function unremarkable. Her enzymes unremarkable. Ethanol is negative. Salicylate level and acetaminophen level are negative. Troponin is less than 0.01. EKG was reviewed by my attending see note for dictation. Sitter in place here in the ED. BNP of 310. Urine shows trace leukocytes with 3-5 WBCs. Urine drug screen is positive for barbiturates. Chest x-ray reveals findings consistent with congestive heart failure. Patient received Tessalon Perles, Mucinex as well as Lasix for possible CHF. Will repeat a troponin. If delta troponin is negative patient will be medically cleared. At this time patient will be evaluated by our behavioral team here in the ED. Please see their note for further evaluation and final disposition. FINAL IMPRESSION      1. Suicidal ideation    2. Shortness of breath          DISPOSITION/PLAN   DISPOSITION        PATIENT REFERREDTO:  No follow-up provider specified.     DISCHARGE MEDICATIONS:  New Prescriptions    No medications on file       DISCONTINUED MEDICATIONS:  Discontinued Medications    No medications on file              (Please note that portions of this note were completed with a voice recognition program.  Efforts were made to edit the dictations but occasionally words are mis-transcribed.)    Edward Robert PA-C (electronically signed)           Edward Robert PA-C  05/17/21 7986

## 2021-05-17 NOTE — BH NOTE
Patient CM at [facility] [phone]. Called and notified VAHID that patient had called the office today and said she had multiple personalities and that her angry person \" The Cutter\" is present. Reports she had said she has been taking her medications. Reports she has a lot of health issues and she believes when she gets sick she gets worse.

## 2021-05-17 NOTE — ED PROVIDER NOTES
I independently examined and evaluated Antonette Barrow. In brief, Antonette Barrow is a 72 y.o. female with a past medical history of bipolar disorder, renal artery stenosis, COPD, hyperlipidemia, coronary artery disease, dementia, and CHF who presents to the ED complaining of shortness of breath and suicidal ideation. Patient reports increased shortness of breath over the last few days. She reports nonproductive cough. She reports she has been on 2 rounds of antibiotics from her PCP without relief of symptoms, last finished antibiotics 1 week ago. She denies palliative or provocative factors. .  She denies any fever, vomiting, abdominal pain, or chest pain. She denies known Covid exposures. She does report a generalized headache that occurs daily, no change in her headache. No trauma or blood thinners. She denies any numbness, weakness, tingling, slurred speech, or vision changes. Patient is also reporting that she had suicidal thoughts earlier today with plan to cut herself. Denies current suicidal ideation. Denies HI, hallucinations. Focused exam revealed   PHYSICAL EXAM  BP (!) 197/89   Pulse 75   Temp 97.9 °F (36.6 °C) (Oral)   Resp 18   Ht 5' 1\" (1.549 m)   Wt 160 lb (72.6 kg)   SpO2 99%   BMI 30.23 kg/m²    GENERAL APPEARANCE: Awake and alert. Cooperative. no distress. HENT: Normocephalic. Atraumatic. Mucous membranes are moist  NECK: Supple. Full range of motion of the neck without stiffness or pain. No meningismus  EYES: PERRL. EOM's grossly intact. HEART/CHEST: RRR. No murmurs. Chest wall is not tender to palpation. LUNGS: Respirations unlabored. Coarse breath sounds bilaterally. Speaking comfortably in full sentences. ABDOMEN: No tenderness. Soft. Non-distended. No masses. No organomegaly. No guarding or rebound. MUSCULOSKELETAL: No extremity edema. Compartments soft. No deformity. No tenderness in the extremities. All extremities neurovascularly intact.   SKIN: Warm and dry. No acute rashes. NEUROLOGICAL: Alert and oriented. No gross facial drooping. Strength 5/5, sensation intact. NIH stroke scale of 0. GCS 15. PSYCHIATRIC: She reports suicidal ideation, does not appear to be responding to internal stimuli. ED course / MDM:   Overall well appearing patient, in no acute distress, presenting for shortness of breath and suicidal ideation. Physical exam remarkable for unremarkable. Differential diagnosis includes but is not limited to:  Pneumonia, pneumothorax, pleural effusion, ACS, CHF exacerbation, COPD exacerbation, asthma, pulmonary embolism, arrhythmia, anemia    Labs, EKG, and imaging obtained. Work-up showed:    XR CHEST PORTABLE   Final Result   Findings suggest congestive heart failure           The Ekg interpreted by me shows  normal sinus rhythm with a rate of 71  Axis is   Normal  QTc is  within an acceptable range  Intervals and Durations are unremarkable. ST Segments: no acute change  No significant change from prior EKG dated 10/31/20    At this time I have low concern for pulmonary embolism. Patient has not had a previous blood clot. Patient denies other risk factors for pulmonary embolism. Patient does not have any evidence of DVT on exam.  Patient is low risk on PERC and Wells criteria. At this time, considering that risks associated with further work-up for pulmonary embolism outweigh the likelihood of this diagnosis. Low suspicion for aortic pathology. Patient is not hypertensive. Patient has strong pulses in the bilateral radial and femoral arteries. Pain was not maximal at onset. There is no evidence of mediastinal widening on chest x-ray. Patient does not have any neurologic deficits. The evidence indicates that the patient is very low risk for Aortic Dissection and this is consistent with my clinical intuition.  The risk of further workup or hospitalization for aortic dissection is likely higher than the risk of the patient having an aortic dissection. Low suspicion for esophageal perforation. History not consistent with this etiology. There is no crepitus on exam.  No evidence of pneumomediastinum on chest x-ray. EKG showed no evidence of acute ischemia. Troponin was within normal limits. At this time, I have lower suspicion for ACS. Heart score is 4, placing patient at moderate risk for major cardiac event within the next 6 weeks. Delta troponin negative. Covid and flu swab negative. BNP was minimally elevated, chest x-ray with some evidence of vascular congestion. Patient refused Lasix. Chest x-ray showed no evidence of pneumonia, pleural effusion, or pneumothorax. Symptoms consistent with COPD exacerbation. Blood gas showed mild acidemia and hypercarbia. Patient received steroids for treatment, she declined breathing treatments. Mild hyponatremia and hypochloremia, no other significant electrolyte abnormalities or evidence of kidney dysfunction. Liver function testing unremarkable. Tylenol, salicylate, and ethanol levels negative. Urine drug screen positive for barbiturates. Mild anemia, patient denies bleeding. No leukocytosis or thrombocytopenia. Patient is nontoxic appearing, satting 99% on room air. Patient was medically clear for psychiatry evaluation, patient was cleared for discharge by psychiatry. Patient was offered admission for COPD exacerbation but declined. Patient provided with prescriptions for albuterol, azithromycin, Lasix, and prednisone. Consider this an informed discharge. Strict return precautions given. Encouraged PCP follow-up in 1 day. Patient discharged in stable condition. I estimate there is LOW risk for ACUTE CORONARY SYNDROME, PULMONARY EMBOLISM, PNEUMOTHORAX, RUPTURED ESOPHAGUS OR THORACIC AORTIC DISSECTION, thus I consider the discharge disposition reasonable.  Mando Fitzpatrick and I have discussed the diagnosis and risks, and we agree with discharging home with close follow-up. We also discussed returning to the Emergency Department immediately if new or worsening symptoms occur. We have discussed the symptoms which are most concerning that necessitate immediate return. CLINICAL IMPRESSION  1. Suicidal ideation    2. Shortness of breath    3. COPD exacerbation (HCC)        Blood pressure (!) 184/95, pulse 75, temperature 97.9 °F (36.6 °C), temperature source Oral, resp. rate 18, height 5' 1\" (1.549 m), weight 160 lb (72.6 kg), SpO2 98 %, not currently breastfeeding. DISPOSITION  Dashawn Fitzpatrick was discharged to home in stable condition. All diagnostic, treatment, and disposition decisions were made by myself in conjunction with the advanced practice provider. For all further details of the patient's emergency department visit, please see the advanced practice provider's documentation. Comment: Please note this report has been produced using speech recognition software and may contain errors related to that system including errors in grammar, punctuation, and spelling, as well as words and phrases that may be inappropriate. If there are any questions or concerns please feel free to contact the dictating provider for clarification.        Saran Parks MD  05/20/21 4583

## 2021-05-18 NOTE — ED NOTES
Yoli from psych in speaking with pt. Told her daughter has called to check on pt and daughter reports that pt has \"different personalities and if she is refusing meds, it is probably the \"mean\" personality that is doing that\".       Coreen Eubanks RN  05/17/21 2013

## 2021-05-18 NOTE — ED NOTES
Pt sts she will take lasix if we give it to her by pill. She has refused an PIV and IV lasix per day nurse.      Myriam Benjamin RN  05/17/21 2014
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
## 2021-05-18 NOTE — ED NOTES
Level of Care Disposition: Discharge  Patient was seen by ED provider and [facility] AFFILIATE OF [facility] staff. The case was presented to psychiatric provider on-call Dr. Seth Villasenor. Based on the ED evaluation and information presented to the provider by DEACON Irvin RN , it is the recommendation of the on call psychiatric provider that the patient be discharged from a psychiatric standpoint with the following referrals: crisis, text, [facility]      RATIONALE FOR NON-ADMISSION:  The patient does not meet criteria for an involuntary psychiatric admission because patient does not present as an imminent risk to self or others. Patient has demonstrated a reasonable safety plan following up with [facility] and going home to .                Daniela Baron RN  05/17/21 1897

## 2021-06-28 ENCOUNTER — APPOINTMENT (OUTPATIENT)
Dept: ULTRASOUND IMAGING | Age: 71
End: 2021-06-28
Payer: COMMERCIAL

## 2021-06-28 ENCOUNTER — HOSPITAL ENCOUNTER (OUTPATIENT)
Dept: MAMMOGRAPHY | Age: 71
Discharge: HOME | End: 2021-06-28
Payer: COMMERCIAL

## 2021-06-28 DIAGNOSIS — R92.8 ABNORMALITY OF LEFT BREAST ON SCREENING MAMMOGRAM: ICD-10-CM

## 2021-06-28 PROCEDURE — G0279 TOMOSYNTHESIS, MAMMO: HCPCS

## 2021-07-09 ENCOUNTER — HOSPITAL ENCOUNTER (EMERGENCY)
Age: 71
Discharge: HOME | End: 2021-07-09
Payer: COMMERCIAL

## 2021-07-09 ENCOUNTER — APPOINTMENT (OUTPATIENT)
Dept: GENERAL RADIOLOGY | Age: 71
End: 2021-07-09
Payer: COMMERCIAL

## 2021-07-09 VITALS
DIASTOLIC BLOOD PRESSURE: 92 MMHG | OXYGEN SATURATION: 94 % | TEMPERATURE: 98.3 F | RESPIRATION RATE: 19 BRPM | SYSTOLIC BLOOD PRESSURE: 172 MMHG | HEART RATE: 74 BPM

## 2021-07-09 DIAGNOSIS — J44.1 COPD WITH ACUTE EXACERBATION (HCC): ICD-10-CM

## 2021-07-09 DIAGNOSIS — R55 COUGH SYNCOPE: ICD-10-CM

## 2021-07-09 DIAGNOSIS — R05.4 COUGH SYNCOPE: ICD-10-CM

## 2021-07-09 DIAGNOSIS — R05.9 COUGH: Primary | ICD-10-CM

## 2021-07-09 LAB
A/G RATIO: 1.7 (ref 1.1–2.2)
ALBUMIN SERPL-MCNC: 4.3 G/DL (ref 3.4–5)
ALP BLD-CCNC: 57 U/L (ref 40–129)
ALT SERPL-CCNC: 7 U/L (ref 10–40)
ANION GAP SERPL CALCULATED.3IONS-SCNC: 12 MMOL/L (ref 3–16)
AST SERPL-CCNC: 15 U/L (ref 15–37)
BASOPHILS ABSOLUTE: 0 K/UL (ref 0–0.2)
BASOPHILS RELATIVE PERCENT: 0.9 %
BILIRUB SERPL-MCNC: 0.3 MG/DL (ref 0–1)
BUN BLDV-MCNC: 7 MG/DL (ref 7–20)
CALCIUM SERPL-MCNC: 9.1 MG/DL (ref 8.3–10.6)
CHLORIDE BLD-SCNC: 97 MMOL/L (ref 99–110)
CO2: 25 MMOL/L (ref 21–32)
CREAT SERPL-MCNC: 0.7 MG/DL (ref 0.6–1.2)
EOSINOPHILS ABSOLUTE: 0.2 K/UL (ref 0–0.6)
EOSINOPHILS RELATIVE PERCENT: 5 %
GFR AFRICAN AMERICAN: >60
GFR NON-AFRICAN AMERICAN: >60
GLOBULIN: 2.6 G/DL
GLUCOSE BLD-MCNC: 110 MG/DL (ref 70–99)
HCT VFR BLD CALC: 33.6 % (ref 36–48)
HEMOGLOBIN: 11.6 G/DL (ref 12–16)
LYMPHOCYTES ABSOLUTE: 1.6 K/UL (ref 1–5.1)
LYMPHOCYTES RELATIVE PERCENT: 34.4 %
MCH RBC QN AUTO: 29.4 PG (ref 26–34)
MCHC RBC AUTO-ENTMCNC: 34.6 G/DL (ref 31–36)
MCV RBC AUTO: 85.1 FL (ref 80–100)
MONOCYTES ABSOLUTE: 0.4 K/UL (ref 0–1.3)
MONOCYTES RELATIVE PERCENT: 8.2 %
NEUTROPHILS ABSOLUTE: 2.3 K/UL (ref 1.7–7.7)
NEUTROPHILS RELATIVE PERCENT: 51.5 %
PDW BLD-RTO: 14.9 % (ref 12.4–15.4)
PLATELET # BLD: 161 K/UL (ref 135–450)
PMV BLD AUTO: 7.7 FL (ref 5–10.5)
POTASSIUM REFLEX MAGNESIUM: 4.2 MMOL/L (ref 3.5–5.1)
PRO-BNP: 499 PG/ML (ref 0–124)
RBC # BLD: 3.94 M/UL (ref 4–5.2)
SODIUM BLD-SCNC: 134 MMOL/L (ref 136–145)
TOTAL PROTEIN: 6.9 G/DL (ref 6.4–8.2)
TROPONIN: <0.01 NG/ML
WBC # BLD: 4.5 K/UL (ref 4–11)

## 2021-07-09 PROCEDURE — 6370000000 HC RX 637 (ALT 250 FOR IP): Performed by: NURSE PRACTITIONER

## 2021-07-09 PROCEDURE — 36415 COLL VENOUS BLD VENIPUNCTURE: CPT

## 2021-07-09 PROCEDURE — 85025 COMPLETE CBC W/AUTO DIFF WBC: CPT

## 2021-07-09 PROCEDURE — 84484 ASSAY OF TROPONIN QUANT: CPT

## 2021-07-09 PROCEDURE — 71045 X-RAY EXAM CHEST 1 VIEW: CPT

## 2021-07-09 PROCEDURE — 93005 ELECTROCARDIOGRAM TRACING: CPT | Performed by: NURSE PRACTITIONER

## 2021-07-09 PROCEDURE — 99285 EMERGENCY DEPT VISIT HI MDM: CPT

## 2021-07-09 PROCEDURE — 83880 ASSAY OF NATRIURETIC PEPTIDE: CPT

## 2021-07-09 PROCEDURE — 94644 CONT INHLJ TX 1ST HOUR: CPT

## 2021-07-09 PROCEDURE — 80053 COMPREHEN METABOLIC PANEL: CPT

## 2021-07-09 PROCEDURE — 6360000002 HC RX W HCPCS: Performed by: NURSE PRACTITIONER

## 2021-07-09 RX ORDER — GUAIFENESIN AND CODEINE PHOSPHATE 100; 10 MG/5ML; MG/5ML
5 SOLUTION ORAL 4 TIMES DAILY PRN
Qty: 118 ML | Refills: 0 | Status: SHIPPED | OUTPATIENT
Start: 2021-07-09 | End: 2021-07-15

## 2021-07-09 RX ORDER — PREDNISONE 20 MG/1
60 TABLET ORAL ONCE
Status: COMPLETED | OUTPATIENT
Start: 2021-07-09 | End: 2021-07-09

## 2021-07-09 RX ORDER — IPRATROPIUM BROMIDE AND ALBUTEROL SULFATE 2.5; .5 MG/3ML; MG/3ML
1 SOLUTION RESPIRATORY (INHALATION) ONCE
Status: COMPLETED | OUTPATIENT
Start: 2021-07-09 | End: 2021-07-09

## 2021-07-09 RX ORDER — ALBUTEROL SULFATE 2.5 MG/3ML
5 SOLUTION RESPIRATORY (INHALATION) ONCE
Status: COMPLETED | OUTPATIENT
Start: 2021-07-09 | End: 2021-07-09

## 2021-07-09 RX ORDER — PREDNISONE 10 MG/1
40 TABLET ORAL DAILY
Qty: 16 TABLET | Refills: 0 | Status: SHIPPED | OUTPATIENT
Start: 2021-07-09 | End: 2021-07-13

## 2021-07-09 RX ADMIN — PREDNISONE 60 MG: 20 TABLET ORAL at 13:08

## 2021-07-09 RX ADMIN — IPRATROPIUM BROMIDE AND ALBUTEROL SULFATE 1 AMPULE: .5; 3 SOLUTION RESPIRATORY (INHALATION) at 13:11

## 2021-07-09 RX ADMIN — ALBUTEROL SULFATE 5 MG: 2.5 SOLUTION RESPIRATORY (INHALATION) at 13:11

## 2021-07-09 NOTE — ED TRIAGE NOTES
Patient arrived by EMS after a witnessed syncopal episode at home. EMS reported patient had a coughing causing the syncopal episode. Patient fell forward and grandson helped her to chair. Patient denies hitting head, states it was just her face. Complaints of cough x 2 months, has an appointment with a Pulmonologist, Sept 2nd. Patient arrives a&ox3 with a continuous nonproductive cough.

## 2021-07-09 NOTE — ED PROVIDER NOTES
Liver disease     Lung disease     MDD (major depressive disorder) 4/4/2012    Mood disorder ([facility] [address] 3/13/2013    Movement disorder     Neck pain, chronic 3/13/2013    Opiate misuse     Personality disorder ([facility] [address]     Pneumonia     Polypharmacy 2/16/2013       SURGICAL HISTORY    Past Surgical History:   Procedure Laterality Date    COLONOSCOPY  1/19/12   [facility] CATH LAB PROCEDURE  Feb, 2007    Normal cor angio Feb, 2007    HERNIA REPAIR  07/11/2019    robotic ventral hernia repair with mesh    HERNIA REPAIR N/A 7/11/2019    ROBOTIC VENTRAL HERNIA REPAIR WITH MESH performed by Nesha Rowley MD at [address]         CURRENT MEDICATIONS    Current Outpatient Rx   Medication Sig Dispense Refill    predniSONE (DELTASONE) 10 MG tablet Take 4 tablets by mouth daily for 4 days 16 tablet 0    albuterol-ipratropium (COMBIVENT RESPIMAT)  MCG/ACT AERS inhaler Inhale 1 puff into the lungs every 6 hours as needed for Wheezing or Shortness of Breath 1 Inhaler 0    guaiFENesin-codeine (TUSSI-ORGANIDIN NR) 100-10 MG/5ML syrup Take 5 mLs by mouth 4 times daily as needed for Cough for up to 6 days. 118 mL 0    albuterol sulfate  (90 Base) MCG/ACT inhaler Inhale 2 puffs into the lungs every 4 hours as needed for Wheezing or Shortness of Breath 1 Inhaler 0    furosemide (LASIX) 20 MG tablet Take 1 tablet by mouth daily for 3 days 3 tablet 0    gabapentin (NEURONTIN) 600 MG tablet Take 0.5 tablets by mouth nightly for 30 days.  15 tablet 0    QUEtiapine (SEROQUEL) 100 MG tablet Take 1 tablet by mouth 2 times daily 60 tablet 0    primidone (MYSOLINE) 50 MG tablet Take 50 mg by mouth 2 times daily      busPIRone (BUSPAR) 15 MG tablet Take 15 mg by mouth 3 times daily      aspirin 81 MG chewable tablet Take 1 tablet by mouth daily 30 tablet 0    atorvastatin (LIPITOR) 40 MG tablet Take 1 tablet by mouth nightly 30 tablet 0    carvedilol (COREG) 3.125 MG tablet Take 1 tablet by mouth 2 times daily (with meals) 60 tablet 0    pantoprazole (PROTONIX) 40 MG tablet Take 1 tablet by mouth every morning (before breakfast) 30 tablet 0    nicotine (NICODERM CQ) 14 MG/24HR Place 1 patch onto the skin daily 42 patch 0    albuterol sulfate HFA (VENTOLIN HFA) 108 (90 Base) MCG/ACT inhaler Inhale 2 puffs into the lungs 4 times daily as needed for Wheezing 1 Inhaler 0    diclofenac sodium 1 % GEL Apply 4 g topically 4 times daily 4 Tube 1    olmesartan (BENICAR) 40 MG tablet Take 40 mg by mouth daily      divalproex (DEPAKOTE) 500 MG DR tablet Take 500 mg by mouth nightly      ketorolac (TORADOL) 10 MG tablet Take 1 tablet by mouth 3 times daily (Patient taking differently: Take 10 mg by mouth 3 times daily as needed ) 12 tablet 0    DULoxetine (CYMBALTA) 60 MG extended release capsule Take 60 mg by mouth daily       fluticasone (FLONASE) 50 MCG/ACT nasal spray 1 spray by Nasal route daily as needed       divalproex (DEPAKOTE) 250 MG DR tablet Take 250 mg by mouth every morning          ALLERGIES    Allergies   Allergen Reactions    Dicyclomine      listed in pxysis    Sumatriptan      In pxysis listing  Other reaction(s): throat swelling    Tape Zheng [employer] Tape]      Tears off patient's skin very easily     Tizanidine      Listed in pxysis    Motrin [Ibuprofen Micronized] Nausea And Vomiting       FAMILY HISTORY    Family History   Problem Relation Age of Onset    Emphysema Mother     Heart Disease Mother     Arthritis Mother     Depression Mother     High Blood Pressure Mother     Heart Failure Father     Heart Disease Father     Arthritis Father     Diabetes Father     High Blood Pressure Father     Stroke Father     Substance Abuse Father     High Blood Pressure Brother     Heart Disease Sister     Kidney Disease Daughter     Breast Cancer Maternal Aunt        SOCIAL HISTORY    Social History     Socioeconomic History  Marital status:      Spouse name: None    Number of children: None    Years of education: None    Highest education level: None   Occupational History    None   Tobacco Use    Smoking status: Current Every Day Smoker     Packs/day: 2.00     Years: 47.00     Pack years: 94.00     Types: Cigarettes    Smokeless tobacco: Never Used   Vaping Use    Vaping Use: Never used   Substance and Sexual Activity    Alcohol use: No    Drug use: Not Currently    Sexual activity: Yes     Partners: Male   Other Topics Concern    None   Social History Narrative    None     Social Determinants of Health     Financial Resource Strain:     Difficulty of Paying Living Expenses:    Food Insecurity:     Worried About Running Out of Food in the Last Year:     Ran Out of Food in the Last Year:    Transportation Needs:     Lack of Transportation (Medical):  Lack of Transportation (Non-Medical):    Physical Activity:     Days of Exercise per Week:     Minutes of Exercise per Session:    Stress:     Feeling of Stress :    Social Connections:     Frequency of Communication with Friends and Family:     Frequency of Social Gatherings with Friends and Family:     Attends Shinto Services:     Active Member of Clubs or Organizations:     Attends Club or Organization Meetings:     Marital Status:    Intimate Partner Violence:     Fear of Current or Ex-Partner:     Emotionally Abused:     Physically Abused:     Sexually Abused:        REVIEW OF SYSTEMS    10 systems reviewed, pertinent positives per HPI otherwise noted to be negative    PHYSICAL EXAM  Physical Exam  Vitals:    07/09/21 1508   BP: (!) 172/92   Pulse: 74   Resp: 19   Temp:    SpO2:      GENERAL: Patient is elderly, chronically ill-appearing. Awake and alert. Cooperative. Resting in bed. No apparent distress. HEENT:  Normocephalic, atraumatic. Conjunctiva appear normal. Sclera is non-icteric.   External ears are normal.    NECK: Supple with normal ROM. Trachea midline  LUNGS: Equal and symmetric chest rise. Breathing is unlabored. Speaking comfortably in full sentences. Lungs are with rhonchi and coarse wheezing throughout all lung fields. CADIOVASCULAR:  Regular rate and rhythm. Normal S1-S2 sounds. No murmurs, rubs, or gallops. Capillary refill is brisk in all 4extremities. Bilateral lower extremities are equal in size, there is no swelling observed. There is no tenderness to palpation. There is no erythema observed or warmth palpated. GI: Soft, nontender, nondistended with positive bowelsounds. No rebound tenderness, guarding or any peritoneal signs. No masses or hepatosplenomegaly   MUSCULOSKELETAL:  No gross deformities or trauma noted. Moving allextremities equally and appropriately. Normal ROM. SKIN: Warm/dry. Skin is intact. Norashes/lesions noted. PSYCHIATRIC: Mood and affect appropriate. Speech is clear andarticulate. NEUROLOGIC: Alert and oriented. No focal motor or sensory deficits. LABS  I havereviewed all labs for this visit.    Results for orders placed or performed during the hospital encounter of 07/09/21   CBC Auto Differential   Result Value Ref Range    WBC 4.5 4.0 - 11.0 K/uL    RBC 3.94 (L) 4.00 - 5.20 M/uL    Hemoglobin 11.6 (L) 12.0 - 16.0 g/dL    Hematocrit 33.6 (L) 36.0 - 48.0 %    MCV 85.1 80.0 - 100.0 fL    MCH 29.4 26.0 - 34.0 pg    MCHC 34.6 31.0 - 36.0 g/dL    RDW 14.9 12.4 - 15.4 %    Platelets 352 089 - 938 K/uL    MPV 7.7 5.0 - 10.5 fL    Neutrophils % 51.5 %    Lymphocytes % 34.4 %    Monocytes % 8.2 %    Eosinophils % 5.0 %    Basophils % 0.9 %    Neutrophils Absolute 2.3 1.7 - 7.7 K/uL    Lymphocytes Absolute 1.6 1.0 - 5.1 K/uL    Monocytes Absolute 0.4 0.0 - 1.3 K/uL    Eosinophils Absolute 0.2 0.0 - 0.6 K/uL    Basophils Absolute 0.0 0.0 - 0.2 K/uL   Comprehensive Metabolic Panel w/ Reflex to MG   Result Value Ref Range    Sodium 134 (L) 136 - 145 mmol/L    Potassium reflex Magnesium 4.2 3.5 - 5.1 mmol/L    Chloride 97 (L) 99 - 110 mmol/L    CO2 25 21 - 32 mmol/L    Anion Gap 12 3 - 16    Glucose 110 (H) 70 - 99 mg/dL    BUN 7 7 - 20 mg/dL    CREATININE 0.7 0.6 - 1.2 mg/dL    GFR Non-African American >60 >60    GFR African American >60 >60    Calcium 9.1 8.3 - 10.6 mg/dL    Total Protein 6.9 6.4 - 8.2 g/dL    Albumin 4.3 3.4 - 5.0 g/dL    Albumin/Globulin Ratio 1.7 1.1 - 2.2    Total Bilirubin 0.3 0.0 - 1.0 mg/dL    Alkaline Phosphatase 57 40 - 129 U/L    ALT 7 (L) 10 - 40 U/L    AST 15 15 - 37 U/L    Globulin 2.6 g/dL   Troponin   Result Value Ref Range    Troponin <0.01 <0.01 ng/mL   Brain Natriuretic Peptide   Result Value Ref Range    Pro- (H) 0 - 124 pg/mL   EKG 12 Lead   Result Value Ref Range    Ventricular Rate 67 BPM    Atrial Rate 67 BPM    P-R Interval 170 ms    QRS Duration 100 ms    Q-T Interval 426 ms    QTc Calculation (Bazett) 450 ms    P Axis 19 degrees    R Axis 1 degrees    T Axis 17 degrees    Diagnosis       Normal sinus rhythmRSR' or QR pattern in V1 suggests right ventricular conduction delayBorderline ECGWhen compared with ECG of 17-MAY-2021 15:51,No significant change was foundConfirmed by Avril Bernard ([id number]) on 7/10/2021 12:15:25 PM       RADIOLOGY    XR CHEST PORTABLE    Result Date: 7/9/2021  EXAMINATION: ONE X-RAY VIEW OF THE CHEST 7/9/2021 12:48 pm COMPARISON: May 17, 2021 HISTORY: ORDERING SYSTEM PROVIDED HISTORY:  SOB, cough TECHNOLOGIST PROVIDED HISTORY: Reason for exam:  SOB, cough Reason for Exam:  Loss of consciousness, cough Acuity: Acute Type of Exam: Initial FINDINGS: Cardiomediastinal silhouette is normal in size. The lungs are clear. No pleural effusion or pneumothorax is present. No acute cardiopulmonary process.      PETER ANAY DIGITAL DIAGNOSTIC UNILATERAL LEFT    Result Date: 6/28/2021  EXAMINATION: DIAGNOSTIC DIGITAL LEFT BREAST MAMMOGRAM WITH TOMOSYNTHESIS, 6/28/2021 9:39 am TECHNIQUE: Diagnostic mammography of the left breast was performed with tomosynthesis. 2D standard and 3D tomosynthesis combination imaging performed through the left breast.  Computer aided detection was utilized in the interpretation of this exam. Views: Routine CC and MLO views of the left breast with tomosynthesis COMPARISON: Prior mammograms from November 2020 and left breast ultrasound from November 2020 HISTORY: [address]: Abnormality of left breast on screening mammogram FINDINGS: Unchanged appearance of the left breast.  Inversion the left nipple, a chronic finding since childhood for this patient. Scattered fibroglandular densities. No suspicious findings of the left breast     Stable negative mammogram of the left breast.  Unchanged chronic nipple inversion, a lifetime finding for this patient as discussed on the comparison from November 23, 2020. Recommend the patient return to yearly screening mammography in November 2021. This was discussed with the patient after the examination BIRADS: BIRADS - CATEGORY 1 Negative. Normal interval follow-up is recommended in November 2021 with bilateral screening mammogram evaluation. OVERALL ASSESSMENT - NEGATIVE A letter of notification will be sent to the patient regarding the results. The [employer] [facility] [employer] recommends annual mammograms for women 40 years and older. ED COURSE/MDM  Patient seen and evaluated. Old records reviewed. Diagnostic testing reviewed and results discussed. I have evaluated this patient. My supervising physician was available for consultation. Nas Antunez presented to the ED with the above noted complaints. Patient is afebrile and hemodynamically stable while in the ED except for being somewhat hypertensive. O2 saturations were in the mid 90s on room air. She does have coarse rhonchi and wheezing throughout all lung fields. I did give her stacked breathing treatments and a dose of steroids here in the ED.     Blood work is without evidence of systemic PULMONARY EMBOLISM, ACUTE CORONARY SYNDROME, OR THORACIC AORTIC DISSECTION, thus I consider the discharge disposition reasonable. Olayinka Fitzpatrick and I have discussed the diagnosisand risks, and we agree with discharging home to follow-up with their primary doctor. We also discussed returning to the Emergency Department immediately if new or worsening symptoms occur. We have discussed the symptomswhich are most concerning (e.g., bloody sputum, fever, worsening pain or shortness of breath, vomiting) that necessitate immediate return. was sent home with a prescription for below medication/s. I did [place] patient on appropriate use of these medication. Discharge Medication List as of 7/9/2021  4:21 PM      START taking these medications    Details   predniSONE (DELTASONE) 10 MG tablet Take 4 tablets by mouth daily for 4 days, Disp-16 tablet, R-0Normal      albuterol-ipratropium (COMBIVENT RESPIMAT)  MCG/ACT AERS inhaler Inhale 1 puff into the lungs every 6 hours as needed for Wheezing or Shortness of Breath, Disp-1 Inhaler, R-0Normal      guaiFENesin-codeine (TUSSI-ORGANIDIN NR) 100-10 MG/5ML syrup Take 5 mLs by mouth 4 times daily as needed for Cough for up to 6 days. , Disp-118 mL, R-0Print               CLINICAL IMPRESSION    1. Cough    2. COPD with acute exacerbation ([address]    3. Cough syncope           Discharge Vitals:  Blood pressure (!) 172/92, pulse 74, temperature 98.3 °F (36.8 °C), temperature source Oral, resp. rate 19, SpO2 94 %, not currently breastfeeding. FOLLOW UP  Ladarius Ya MD  [address] [facility]   [address] [facility] [address] [facility] [address] [phone]    Call in 3 days  For further evaluation    [facility]  ED  [address] [facility] [address] [phone]  Go to   If symptoms worsen      DISPOSITION  Patient was discharged to home in good condition.     Comment: Please note this report has been produced using speech recognition software and may contain errors related to that

## 2021-07-09 NOTE — ED PROVIDER NOTES
EKG done July 9, 2021 at 1309 shows normal sinus rhythm, rate 67, normal intervals, no STEMI, similar to previous EKG dated May 17, 2021     Kp Bingham MD  07/09/21 0862

## 2021-07-09 NOTE — ED NOTES
Ambulated pt up to restroom with stand-by asst. Initial p.ox 92% on RA, HR 78. Upon return to room pt [email or web address], HR 84. Pt stopped to cough multiple times during ambulation and stated \"water comes up\".       Juda Neri Naegele  07/09/21 144

## 2021-07-10 LAB
EKG ATRIAL RATE: 67 BPM
EKG DIAGNOSIS: NORMAL
EKG P AXIS: 19 DEGREES
EKG P-R INTERVAL: 170 MS
EKG Q-T INTERVAL: 426 MS
EKG QRS DURATION: 100 MS
EKG QTC CALCULATION (BAZETT): 450 MS
EKG R AXIS: 1 DEGREES
EKG T AXIS: 17 DEGREES
EKG VENTRICULAR RATE: 67 BPM

## 2021-07-10 PROCEDURE — 93010 ELECTROCARDIOGRAM REPORT: CPT | Performed by: INTERNAL MEDICINE

## 2021-08-12 ENCOUNTER — HOSPITAL ENCOUNTER (INPATIENT)
Age: 71
LOS: 4 days | Discharge: HOME | DRG: 885 | End: 2021-08-16
Attending: EMERGENCY MEDICINE | Admitting: PSYCHIATRY & NEUROLOGY
Payer: COMMERCIAL

## 2021-08-12 DIAGNOSIS — F39 MOOD DISORDER (HCC): Primary | ICD-10-CM

## 2021-08-12 PROBLEM — F31.9 BIPOLAR DISORDER WITH DEPRESSION (HCC): Status: ACTIVE | Noted: 2021-08-12

## 2021-08-12 LAB
A/G RATIO: 1.6 (ref 1.1–2.2)
ACETAMINOPHEN LEVEL: <5 UG/ML (ref 10–30)
ALBUMIN SERPL-MCNC: 4 G/DL (ref 3.4–5)
ALP BLD-CCNC: 65 U/L (ref 40–129)
ALT SERPL-CCNC: 8 U/L (ref 10–40)
AMPHETAMINE SCREEN, URINE: ABNORMAL
ANION GAP SERPL CALCULATED.3IONS-SCNC: 12 MMOL/L (ref 3–16)
AST SERPL-CCNC: 13 U/L (ref 15–37)
BACTERIA: ABNORMAL /HPF
BARBITURATE SCREEN URINE: POSITIVE
BASOPHILS ABSOLUTE: 0 K/UL (ref 0–0.2)
BASOPHILS RELATIVE PERCENT: 0.5 %
BENZODIAZEPINE SCREEN, URINE: ABNORMAL
BILIRUB SERPL-MCNC: <0.2 MG/DL (ref 0–1)
BILIRUBIN URINE: NEGATIVE
BLOOD, URINE: NEGATIVE
BUN BLDV-MCNC: 11 MG/DL (ref 7–20)
CALCIUM SERPL-MCNC: 9.3 MG/DL (ref 8.3–10.6)
CANNABINOID SCREEN URINE: ABNORMAL
CHLORIDE BLD-SCNC: 95 MMOL/L (ref 99–110)
CLARITY: CLEAR
CO2: 25 MMOL/L (ref 21–32)
COCAINE METABOLITE SCREEN URINE: ABNORMAL
COLOR: YELLOW
CREAT SERPL-MCNC: 0.8 MG/DL (ref 0.6–1.2)
EOSINOPHILS ABSOLUTE: 0.3 K/UL (ref 0–0.6)
EOSINOPHILS RELATIVE PERCENT: 4.4 %
EPITHELIAL CELLS, UA: ABNORMAL /HPF (ref 0–5)
ETHANOL: NORMAL MG/DL (ref 0–0.08)
GFR AFRICAN AMERICAN: >60
GFR NON-AFRICAN AMERICAN: >60
GLOBULIN: 2.5 G/DL
GLUCOSE BLD-MCNC: 100 MG/DL (ref 70–99)
GLUCOSE URINE: NEGATIVE MG/DL
HCT VFR BLD CALC: 33.2 % (ref 36–48)
HEMOGLOBIN: 11.3 G/DL (ref 12–16)
KETONES, URINE: NEGATIVE MG/DL
LEUKOCYTE ESTERASE, URINE: ABNORMAL
LYMPHOCYTES ABSOLUTE: 1.7 K/UL (ref 1–5.1)
LYMPHOCYTES RELATIVE PERCENT: 25.1 %
Lab: ABNORMAL
MAGNESIUM: 1.9 MG/DL (ref 1.8–2.4)
MCH RBC QN AUTO: 30.1 PG (ref 26–34)
MCHC RBC AUTO-ENTMCNC: 34.1 G/DL (ref 31–36)
MCV RBC AUTO: 88.3 FL (ref 80–100)
METHADONE SCREEN, URINE: ABNORMAL
MICROSCOPIC EXAMINATION: YES
MONOCYTES ABSOLUTE: 0.6 K/UL (ref 0–1.3)
MONOCYTES RELATIVE PERCENT: 9.1 %
NEUTROPHILS ABSOLUTE: 4 K/UL (ref 1.7–7.7)
NEUTROPHILS RELATIVE PERCENT: 60.9 %
NITRITE, URINE: NEGATIVE
OPIATE SCREEN URINE: ABNORMAL
OXYCODONE URINE: ABNORMAL
PDW BLD-RTO: 15.6 % (ref 12.4–15.4)
PH UA: 6.5
PH UA: 6.5 (ref 5–8)
PHENCYCLIDINE SCREEN URINE: ABNORMAL
PLATELET # BLD: 126 K/UL (ref 135–450)
PMV BLD AUTO: 7.6 FL (ref 5–10.5)
POTASSIUM REFLEX MAGNESIUM: 3.4 MMOL/L (ref 3.5–5.1)
PROPOXYPHENE SCREEN: ABNORMAL
PROTEIN UA: NEGATIVE MG/DL
RBC # BLD: 3.76 M/UL (ref 4–5.2)
RBC UA: ABNORMAL /HPF (ref 0–4)
SALICYLATE, SERUM: <0.3 MG/DL (ref 15–30)
SARS-COV-2, NAAT: NOT DETECTED
SODIUM BLD-SCNC: 132 MMOL/L (ref 136–145)
SPECIFIC GRAVITY UA: <=1.005 (ref 1–1.03)
TOTAL PROTEIN: 6.5 G/DL (ref 6.4–8.2)
URINE REFLEX TO CULTURE: ABNORMAL
URINE TYPE: ABNORMAL
UROBILINOGEN, URINE: 0.2 E.U./DL
WBC # BLD: 6.6 K/UL (ref 4–11)
WBC UA: ABNORMAL /HPF (ref 0–5)

## 2021-08-12 PROCEDURE — 80053 COMPREHEN METABOLIC PANEL: CPT

## 2021-08-12 PROCEDURE — 85025 COMPLETE CBC W/AUTO DIFF WBC: CPT

## 2021-08-12 PROCEDURE — 80143 DRUG ASSAY ACETAMINOPHEN: CPT

## 2021-08-12 PROCEDURE — 6370000000 HC RX 637 (ALT 250 FOR IP): Performed by: EMERGENCY MEDICINE

## 2021-08-12 PROCEDURE — 81001 URINALYSIS AUTO W/SCOPE: CPT

## 2021-08-12 PROCEDURE — 80307 DRUG TEST PRSMV CHEM ANLYZR: CPT

## 2021-08-12 PROCEDURE — 1240000000 HC EMOTIONAL WELLNESS R&B

## 2021-08-12 PROCEDURE — 87635 SARS-COV-2 COVID-19 AMP PRB: CPT

## 2021-08-12 PROCEDURE — 6370000000 HC RX 637 (ALT 250 FOR IP): Performed by: PSYCHIATRY & NEUROLOGY

## 2021-08-12 PROCEDURE — 82077 ASSAY SPEC XCP UR&BREATH IA: CPT

## 2021-08-12 PROCEDURE — 99285 EMERGENCY DEPT VISIT HI MDM: CPT

## 2021-08-12 PROCEDURE — 83735 ASSAY OF MAGNESIUM: CPT

## 2021-08-12 PROCEDURE — 80179 DRUG ASSAY SALICYLATE: CPT

## 2021-08-12 RX ORDER — HYDROXYZINE PAMOATE 50 MG/1
50 CAPSULE ORAL EVERY 6 HOURS PRN
Status: DISCONTINUED | OUTPATIENT
Start: 2021-08-12 | End: 2021-08-16 | Stop reason: HOSPADM

## 2021-08-12 RX ORDER — BUTALBITAL, ACETAMINOPHEN AND CAFFEINE 50; 325; 40 MG/1; MG/1; MG/1
1 TABLET ORAL ONCE
Status: COMPLETED | OUTPATIENT
Start: 2021-08-12 | End: 2021-08-12

## 2021-08-12 RX ADMIN — HYDROXYZINE PAMOATE 50 MG: 50 CAPSULE ORAL at 22:38

## 2021-08-12 RX ADMIN — BUTALBITAL, ACETAMINOPHEN, AND CAFFEINE 1 TABLET: 50; 325; 40 TABLET ORAL at 19:32

## 2021-08-12 ASSESSMENT — SLEEP AND FATIGUE QUESTIONNAIRES
SLEEP PATTERN: DIFFICULTY FALLING ASLEEP;DISTURBED/INTERRUPTED SLEEP;INSOMNIA
DO YOU USE A SLEEP AID: YES
DO YOU HAVE DIFFICULTY SLEEPING: YES

## 2021-08-12 ASSESSMENT — PAIN SCALES - GENERAL
PAINLEVEL_OUTOF10: 2
PAINLEVEL_OUTOF10: 9

## 2021-08-12 ASSESSMENT — PAIN DESCRIPTION - LOCATION
LOCATION: BACK
LOCATION: BACK;HEAD

## 2021-08-12 ASSESSMENT — PATIENT HEALTH QUESTIONNAIRE - PHQ9: SUM OF ALL RESPONSES TO PHQ QUESTIONS 1-9: 8

## 2021-08-12 ASSESSMENT — PAIN DESCRIPTION - PAIN TYPE
TYPE: ACUTE PAIN
TYPE: ACUTE PAIN

## 2021-08-12 ASSESSMENT — LIFESTYLE VARIABLES: HISTORY_ALCOHOL_USE: YES

## 2021-08-12 NOTE — ED TRIAGE NOTES
Chief Complaint   Patient presents with    Psychiatric Evaluation     Pt brought in by EMS  for eval. Pt reports that she needs help, because \"something is not right. \" Pt states that she has multiple personalties and she cannot take her  criticizing her for it any longer. Pt denies SI/HI at this time, but states that she will be SI at some point.

## 2021-08-12 NOTE — ED PROVIDER NOTES
[address]      CHIEF COMPLAINT  Psychiatric Evaluation (Pt brought in by EMS  for eval. Pt reports that she needs help, because \"something is not right. \" Pt states that she has multiple personalties and she cannot take her  criticizing her for it any longer. Pt denies SI/HI at this time, but states that she will be SI at some point. )       HISTORY OF PRESENT ILLNESS  Bhavani Palumbo is a 72 y.o. female who was brought in for psychiatric evaluation. Patient reports having history of multiple personality disorder and has been having more spells recently. Says once in a while her anger gets worse and gets in arguments with her family members. Reports she hates when she gets angry like that. Reports she has been feeling suicidal recently. Has been thinking about taking all of her medications to kill her self. She has not acted on it. Denies having HI. Denies having any recent illnesses. Says she is having a headache. Reports having history of similar headaches and is prescribed Fioricet. Says she has not been able to take her Fioricet because she has been here today. Denies any injury to head. Reports a living with , daughter, and her two kids. Denies having any other complaints. No other complaints, modifying factors or associated symptoms. I have reviewed the following from the nursing documentation.     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    Coronary artery disease involving native coronary artery of native heart with angina pectoris ([facility] 3/8/2016    Depression     Emphysema of lung ([facility]     Fibromyalgia     hyperlipidemia 8/11/2011    Hypertension     Kidney stone     Liver disease     Lung disease     MDD (major depressive disorder) 4/4/2012    Mood disorder ([facility] 3/13/2013    Movement disorder     Neck pain, chronic 3/13/2013    Opiate misuse     Personality disorder ([facility]     Pneumonia     Polypharmacy 2/16/2013     Past Surgical History:   Procedure Laterality Date    COLONOSCOPY  1/19/12    DIAGNOSTIC CARDIAC CATH LAB PROCEDURE  Feb, 2007    Normal cor angio Feb, 2007    HERNIA REPAIR  07/11/2019    robotic ventral hernia repair with mesh    HERNIA REPAIR N/A 7/11/2019    ROBOTIC [address] performed by Gwyn Ribeiro MD at [address] [place] [address]       Family History   Problem Relation Age of Onset    Emphysema Mother     Heart Disease Mother     Arthritis Mother     Depression Mother     High Blood Pressure Mother     Heart Failure Father     Heart Disease Father     Arthritis Father     Diabetes Father     High Blood Pressure Father     Stroke Father     Substance Abuse Father     High Blood Pressure Brother     Heart Disease Sister     Kidney Disease Daughter     Breast Cancer Maternal Aunt      Social History     Socioeconomic History    Marital status:      Spouse name: Not on file    Number of children: Not on file    Years of education: Not on file    Highest education level: Not on file   Occupational History    Not on file   Tobacco Use    Smoking status: Current Every Day Smoker     Packs/day: 2.00     Years: 47.00     Pack years: 94.00     Types: Cigarettes    Smokeless tobacco: Never Used   Vaping Use    Vaping Use: Never used   Substance and Sexual Activity    Alcohol use: No    Drug use: Not Currently    Sexual activity: Yes     Partners: Male   Other Topics Concern    Not on file   Social History Narrative    Not on file     Social Determinants of Health Financial Resource Strain:     Difficulty of Paying Living Expenses:    Food Insecurity:     Worried About Running Out of Food in the Last Year:     [address] in the Last Year:    Transportation Needs:     Lack of Transportation (Medical):      Lack of Transportation (Non-Medical):    Physical Activity:     Days of Exercise per Week:     Minutes of Exercise per Session:    Stress:     Feeling of Stress :    Social Connections:     Frequency of Communication with Friends and Family:     Frequency of Social Gatherings with Friends and Family:     Attends Mu-ism Services:     Active Member of Clubs or Organizations:     Attends Club or Organization Meetings:     Marital Status:    Intimate Partner Violence:     Fear of Current or Ex-Partner:     Emotionally Abused:     Physically Abused:     Sexually Abused:      Current Facility-Administered Medications   Medication Dose Route Frequency Provider Last Rate Last Admin    aspirin chewable tablet 81 mg  81 mg Oral Daily Kathleen Varghese MD   81 mg at 08/13/21 1356    busPIRone (BUSPAR) tablet 15 mg  15 mg Oral BID Kathleen Varghese MD   15 mg at 08/13/21 2045    divalproex (DEPAKOTE) DR tablet 250 mg  250 mg Oral QAM Kathleen Varghese MD   250 mg at 08/13/21 1356    divalproex (DEPAKOTE) DR tablet 500 mg  500 mg Oral Nightly Kathleen Varghese MD   500 mg at 08/13/21 2045    DULoxetine (CYMBALTA) extended release capsule 60 mg  60 mg Oral Daily Kathleen Varghese MD   60 mg at 08/13/21 1356    gabapentin (NEURONTIN) capsule 100 mg  100 mg Oral TID Kathleen Varghese MD   100 mg at 08/13/21 2046    nicotine (NICODERM CQ) 14 MG/24HR 1 patch  1 patch Transdermal Daily Kathleen Varghese MD   1 patch at 08/13/21 1354    oxybutynin (DITROPAN-XL) extended release tablet 10 mg  10 mg Oral Daily Kathleen Varghese MD   10 mg at 08/13/21 1354    primidone (MYSOLINE) tablet 100 mg  100 mg Oral Daily Kathleen Varghese MD   100 mg at 08/13/21 1355    QUEtiapine (SEROQUEL) tablet 300 mg  300 mg Oral Nightly Jerica Young MD   300 mg at 08/13/21 2045    butalbital-acetaminophen-caffeine (FIORICET, ESGIC) per tablet 1 tablet  1 tablet Oral Q6H PRN Jerica Young MD   1 tablet at 08/13/21 1351    budesonide-formoterol (SYMBICORT) 160-4.5 MCG/ACT inhaler 2 puff  2 puff Inhalation BID Jerica Young MD   2 puff at 08/13/21 2019    And    [START ON 8/14/2021] tiotropium (SPIRIVA RESPIMAT) 2.5 MCG/ACT inhaler 2 puff  2 puff Inhalation Daily Jerica Young MD        loperamide (IMODIUM) capsule 2 mg  2 mg Oral 4x Daily PRN Wanda Genao PA-C   2 mg at 08/13/21 1755    [START ON 8/14/2021] carvedilol (COREG) tablet 12.5 mg  12.5 mg Oral BID  Marcin Magaña MD        cloNIDine (CATAPRES) tablet 0.2 mg  0.2 mg Oral Q4H PRN Marcin Magaña MD   0.2 mg at 08/13/21 2048    guaiFENesin-dextromethorphan (ROBITUSSIN DM) 100-10 MG/5ML syrup 5 mL  5 mL Oral Q4H PRN Trina Perry MD        Benzocaine-Menthol (CEPACOL SORE THROAT) 15-2.6 MG lozenge 1 lozenge  1 lozenge Oral Q2H PRN Trina Perry MD        hydrOXYzine (VISTARIL) capsule 50 mg  50 mg Oral Q6H PRN Jerica Young MD   50 mg at 08/12/21 2238     Allergies   Allergen Reactions    Dicyclomine      listed in pxysis    Sumatriptan      In pxysis listing  Other reaction(s): throat swelling    Tape [employer] Tape]      Tears off patient's skin very easily     Tizanidine      Listed in pxysis    Motrin [Ibuprofen Micronized] Nausea And Vomiting       REVIEW OF SYSTEMS  10 systems reviewed, pertinent positives per HPI otherwise noted to be negative. PHYSICAL EXAM  /70   Pulse 72   Temp 97.8 °F (36.6 °C) (Temporal)   Resp 16   Ht 5' 1\" (1.549 m)   Wt 141 lb (64 kg)   SpO2 92%   BMI 26.64 kg/m²    GENERAL APPEARANCE: Awake and alert. HENT: Normocephalic. Atraumatic. PERRL. EOMI. No facial droop. HEART/CHEST: RRR. LUNGS: Respirations unlabored.  Speaking comfortably in full sentences. ABDOMEN: Soft, non-distended abdomen. Non tender to palpation. No guarding. No rebound. EXTREMITIES: No gross deformities. Moving all extremities. SKIN: Warm and dry. No acute rashes. NEUROLOGICAL: Alert and oriented. No gross facial drooping. Answering questions appropriately. Moving all extremities.   PSYCHIATRIC: depressed mood     LABS  Results for orders placed or performed during the hospital encounter of 08/12/21   COVID-19, Rapid    Specimen: Nasopharyngeal Swab   Result Value Ref Range    SARS-CoV-2, NAAT Not Detected Not Detected   CBC auto differential   Result Value Ref Range    WBC 6.6 4.0 - 11.0 K/uL    RBC 3.76 (L) 4.00 - 5.20 M/uL    Hemoglobin 11.3 (L) 12.0 - 16.0 g/dL    Hematocrit 33.2 (L) 36.0 - 48.0 %    MCV 88.3 80.0 - 100.0 fL    MCH 30.1 26.0 - 34.0 pg    MCHC 34.1 31.0 - 36.0 g/dL    RDW 15.6 (H) 12.4 - 15.4 %    Platelets 016 (L) 960 - 450 K/uL    MPV 7.6 5.0 - 10.5 fL    Neutrophils % 60.9 %    Lymphocytes % 25.1 %    Monocytes % 9.1 %    Eosinophils % 4.4 %    Basophils % 0.5 %    Neutrophils Absolute 4.0 1.7 - 7.7 K/uL    Lymphocytes Absolute 1.7 1.0 - 5.1 K/uL    Monocytes Absolute 0.6 0.0 - 1.3 K/uL    Eosinophils Absolute 0.3 0.0 - 0.6 K/uL    Basophils Absolute 0.0 0.0 - 0.2 K/uL   Comprehensive Metabolic Panel w/ Reflex to MG   Result Value Ref Range    Sodium 132 (L) 136 - 145 mmol/L    Potassium reflex Magnesium 3.4 (L) 3.5 - 5.1 mmol/L    Chloride 95 (L) 99 - 110 mmol/L    CO2 25 21 - 32 mmol/L    Anion Gap 12 3 - 16    Glucose 100 (H) 70 - 99 mg/dL    BUN 11 7 - 20 mg/dL    CREATININE 0.8 0.6 - 1.2 mg/dL    GFR Non-African American >60 >60    GFR African American >60 >60    Calcium 9.3 8.3 - 10.6 mg/dL    Total Protein 6.5 6.4 - 8.2 g/dL    Albumin 4.0 3.4 - 5.0 g/dL    Albumin/Globulin Ratio 1.6 1.1 - 2.2    Total Bilirubin <0.2 0.0 - 1.0 mg/dL    Alkaline Phosphatase 65 40 - 129 U/L    ALT 8 (L) 10 - 40 U/L    AST 13 (L) 15 - 37 U/L    Globulin 2.5 g/dL well on room air. Patient placed on a 72-hour hold for SI. Basic labs obtained. Hemoglobin normal.  No leukocytosis present. Creatinine normal.  Electrolytes within normal limits. Alcohol is negative. Salicylate and acetaminophen were negative. Urinalysis negative for infection. Patient was given Fioricet for her headache which patient reports is her home medication. Patient medically cleared. Patient admitted to psychiatry for further evaluation and treatment. Admit. Pt was seen during the [place] 19 pandemic. Appropriate PPE worn by ME during patient encounters. Pt seen during a time with constrained hospital bed capacity and other potential inpatient and outpatient resources were constrained due to the viral pandemic.      During the patient's ED course, the patient was given:  Medications   hydrOXYzine (VISTARIL) capsule 50 mg (50 mg Oral Given 8/12/21 2238)   aspirin chewable tablet 81 mg (81 mg Oral Given 8/13/21 1356)   busPIRone (BUSPAR) tablet 15 mg (15 mg Oral Given 8/13/21 2045)   divalproex (DEPAKOTE) DR tablet 250 mg (250 mg Oral Given 8/13/21 1356)   divalproex (DEPAKOTE) DR tablet 500 mg (500 mg Oral Given 8/13/21 2045)   DULoxetine (CYMBALTA) extended release capsule 60 mg (60 mg Oral Given 8/13/21 1356)   gabapentin (NEURONTIN) capsule 100 mg (100 mg Oral Given 8/13/21 2046)   nicotine (NICODERM CQ) 14 MG/24HR 1 patch (1 patch Transdermal Patch Applied 8/13/21 1354)   oxybutynin (DITROPAN-XL) extended release tablet 10 mg (10 mg Oral Given 8/13/21 1354)   primidone (MYSOLINE) tablet 100 mg (100 mg Oral Given 8/13/21 1355)   QUEtiapine (SEROQUEL) tablet 300 mg (300 mg Oral Given 8/13/21 2045)   butalbital-acetaminophen-caffeine (FIORICET, ESGIC) per tablet 1 tablet (1 tablet Oral Given 8/13/21 1351)   budesonide-formoterol (SYMBICORT) 160-4.5 MCG/ACT inhaler 2 puff (2 puffs Inhalation Given 8/13/21 2019)     And   tiotropium (SPIRIVA RESPIMAT) 2.5 MCG/ACT inhaler 2 puff (has no

## 2021-08-13 PROBLEM — J96.21 ACUTE ON CHRONIC RESPIRATORY FAILURE WITH HYPOXIA AND HYPERCAPNIA (HCC): Status: RESOLVED | Noted: 2020-10-31 | Resolved: 2021-08-13

## 2021-08-13 PROBLEM — J13 STREPTOCOCCUS PNEUMONIAE PNEUMONIA (HCC): Status: RESOLVED | Noted: 2019-10-21 | Resolved: 2021-08-13

## 2021-08-13 PROBLEM — J96.22 ACUTE ON CHRONIC RESPIRATORY FAILURE WITH HYPOXIA AND HYPERCAPNIA (HCC): Status: RESOLVED | Noted: 2020-10-31 | Resolved: 2021-08-13

## 2021-08-13 PROCEDURE — 99223 1ST HOSP IP/OBS HIGH 75: CPT | Performed by: PSYCHIATRY & NEUROLOGY

## 2021-08-13 PROCEDURE — 6370000000 HC RX 637 (ALT 250 FOR IP): Performed by: PSYCHIATRY & NEUROLOGY

## 2021-08-13 PROCEDURE — 94761 N-INVAS EAR/PLS OXIMETRY MLT: CPT

## 2021-08-13 PROCEDURE — 6370000000 HC RX 637 (ALT 250 FOR IP): Performed by: PHYSICIAN ASSISTANT

## 2021-08-13 PROCEDURE — 6370000000 HC RX 637 (ALT 250 FOR IP): Performed by: INTERNAL MEDICINE

## 2021-08-13 PROCEDURE — 1240000000 HC EMOTIONAL WELLNESS R&B

## 2021-08-13 PROCEDURE — 99221 1ST HOSP IP/OBS SF/LOW 40: CPT | Performed by: PHYSICIAN ASSISTANT

## 2021-08-13 PROCEDURE — 94640 AIRWAY INHALATION TREATMENT: CPT

## 2021-08-13 RX ORDER — CARVEDILOL 6.25 MG/1
12.5 TABLET ORAL 2 TIMES DAILY WITH MEALS
Status: DISCONTINUED | OUTPATIENT
Start: 2021-08-14 | End: 2021-08-16 | Stop reason: HOSPADM

## 2021-08-13 RX ORDER — LOPERAMIDE HYDROCHLORIDE 2 MG/1
2 CAPSULE ORAL 4 TIMES DAILY PRN
Status: DISCONTINUED | OUTPATIENT
Start: 2021-08-13 | End: 2021-08-16 | Stop reason: HOSPADM

## 2021-08-13 RX ORDER — ASPIRIN 81 MG/1
81 TABLET, CHEWABLE ORAL DAILY
Status: DISCONTINUED | OUTPATIENT
Start: 2021-08-13 | End: 2021-08-16 | Stop reason: HOSPADM

## 2021-08-13 RX ORDER — BUTALBITAL, ACETAMINOPHEN AND CAFFEINE 50; 325; 40 MG/1; MG/1; MG/1
1 TABLET ORAL EVERY 6 HOURS PRN
Status: DISCONTINUED | OUTPATIENT
Start: 2021-08-13 | End: 2021-08-16 | Stop reason: HOSPADM

## 2021-08-13 RX ORDER — CLONIDINE HYDROCHLORIDE 0.2 MG/1
0.2 TABLET ORAL EVERY 4 HOURS PRN
Status: DISCONTINUED | OUTPATIENT
Start: 2021-08-13 | End: 2021-08-16 | Stop reason: HOSPADM

## 2021-08-13 RX ORDER — PRIMIDONE 50 MG/1
100 TABLET ORAL DAILY
Status: DISCONTINUED | OUTPATIENT
Start: 2021-08-13 | End: 2021-08-16 | Stop reason: HOSPADM

## 2021-08-13 RX ORDER — BUDESONIDE AND FORMOTEROL FUMARATE DIHYDRATE 160; 4.5 UG/1; UG/1
2 AEROSOL RESPIRATORY (INHALATION) 2 TIMES DAILY
Status: DISCONTINUED | OUTPATIENT
Start: 2021-08-13 | End: 2021-08-16 | Stop reason: HOSPADM

## 2021-08-13 RX ORDER — FLUTICASONE FUROATE, UMECLIDINIUM BROMIDE AND VILANTEROL TRIFENATATE 100; 62.5; 25 UG/1; UG/1; UG/1
1 POWDER RESPIRATORY (INHALATION) DAILY
COMMUNITY

## 2021-08-13 RX ORDER — GABAPENTIN 100 MG/1
100 CAPSULE ORAL 3 TIMES DAILY
COMMUNITY

## 2021-08-13 RX ORDER — NICOTINE 21 MG/24HR
1 PATCH, TRANSDERMAL 24 HOURS TRANSDERMAL DAILY
Status: DISCONTINUED | OUTPATIENT
Start: 2021-08-13 | End: 2021-08-16 | Stop reason: HOSPADM

## 2021-08-13 RX ORDER — GUAIFENESIN/DEXTROMETHORPHAN 100-10MG/5
5 SYRUP ORAL EVERY 4 HOURS PRN
Status: DISCONTINUED | OUTPATIENT
Start: 2021-08-13 | End: 2021-08-16 | Stop reason: HOSPADM

## 2021-08-13 RX ORDER — DULOXETIN HYDROCHLORIDE 60 MG/1
60 CAPSULE, DELAYED RELEASE ORAL DAILY
Status: DISCONTINUED | OUTPATIENT
Start: 2021-08-13 | End: 2021-08-16 | Stop reason: HOSPADM

## 2021-08-13 RX ORDER — OXYBUTYNIN CHLORIDE 10 MG/1
10 TABLET, EXTENDED RELEASE ORAL DAILY
COMMUNITY

## 2021-08-13 RX ORDER — BUSPIRONE HYDROCHLORIDE 7.5 MG/1
15 TABLET ORAL 2 TIMES DAILY
Status: DISCONTINUED | OUTPATIENT
Start: 2021-08-13 | End: 2021-08-16 | Stop reason: HOSPADM

## 2021-08-13 RX ORDER — OXYBUTYNIN CHLORIDE 5 MG/1
10 TABLET, EXTENDED RELEASE ORAL DAILY
Status: DISCONTINUED | OUTPATIENT
Start: 2021-08-13 | End: 2021-08-16 | Stop reason: HOSPADM

## 2021-08-13 RX ORDER — DIVALPROEX SODIUM 250 MG/1
250 TABLET, DELAYED RELEASE ORAL EVERY MORNING
Status: DISCONTINUED | OUTPATIENT
Start: 2021-08-13 | End: 2021-08-16 | Stop reason: HOSPADM

## 2021-08-13 RX ORDER — GABAPENTIN 100 MG/1
100 CAPSULE ORAL 3 TIMES DAILY
Status: DISCONTINUED | OUTPATIENT
Start: 2021-08-13 | End: 2021-08-16 | Stop reason: HOSPADM

## 2021-08-13 RX ORDER — CARVEDILOL 3.12 MG/1
3.12 TABLET ORAL 2 TIMES DAILY WITH MEALS
Status: DISCONTINUED | OUTPATIENT
Start: 2021-08-13 | End: 2021-08-13

## 2021-08-13 RX ORDER — DIVALPROEX SODIUM 500 MG/1
500 TABLET, DELAYED RELEASE ORAL NIGHTLY
Status: DISCONTINUED | OUTPATIENT
Start: 2021-08-13 | End: 2021-08-16 | Stop reason: HOSPADM

## 2021-08-13 RX ADMIN — DULOXETINE HYDROCHLORIDE 60 MG: 60 CAPSULE, DELAYED RELEASE ORAL at 13:56

## 2021-08-13 RX ADMIN — DIVALPROEX SODIUM 500 MG: 500 TABLET, DELAYED RELEASE ORAL at 20:45

## 2021-08-13 RX ADMIN — OXYBUTYNIN CHLORIDE 10 MG: 5 TABLET, EXTENDED RELEASE ORAL at 13:54

## 2021-08-13 RX ADMIN — PRIMIDONE 100 MG: 50 TABLET ORAL at 13:55

## 2021-08-13 RX ADMIN — LOPERAMIDE HYDROCHLORIDE 2 MG: 2 CAPSULE ORAL at 17:55

## 2021-08-13 RX ADMIN — Medication 2 PUFF: at 20:19

## 2021-08-13 RX ADMIN — CARVEDILOL 3.12 MG: 3.12 TABLET, FILM COATED ORAL at 18:43

## 2021-08-13 RX ADMIN — GABAPENTIN 100 MG: 100 CAPSULE ORAL at 20:46

## 2021-08-13 RX ADMIN — BUSPIRONE HYDROCHLORIDE 15 MG: 7.5 TABLET ORAL at 20:45

## 2021-08-13 RX ADMIN — BUSPIRONE HYDROCHLORIDE 15 MG: 7.5 TABLET ORAL at 13:54

## 2021-08-13 RX ADMIN — BUTALBITAL, ACETAMINOPHEN, AND CAFFEINE 1 TABLET: 50; 325; 40 TABLET ORAL at 13:51

## 2021-08-13 RX ADMIN — QUETIAPINE FUMARATE 300 MG: 200 TABLET ORAL at 20:45

## 2021-08-13 RX ADMIN — GABAPENTIN 100 MG: 100 CAPSULE ORAL at 13:56

## 2021-08-13 RX ADMIN — DIVALPROEX SODIUM 250 MG: 250 TABLET, DELAYED RELEASE ORAL at 13:56

## 2021-08-13 RX ADMIN — ASPIRIN 81 MG: 81 TABLET, CHEWABLE ORAL at 13:56

## 2021-08-13 RX ADMIN — CLONIDINE HYDROCHLORIDE 0.2 MG: 0.2 TABLET ORAL at 20:48

## 2021-08-13 RX ADMIN — LOPERAMIDE HYDROCHLORIDE 2 MG: 2 CAPSULE ORAL at 15:11

## 2021-08-13 ASSESSMENT — LIFESTYLE VARIABLES: HISTORY_ALCOHOL_USE: NO

## 2021-08-13 ASSESSMENT — SLEEP AND FATIGUE QUESTIONNAIRES
DIFFICULTY STAYING ASLEEP: YES
DIFFICULTY FALLING ASLEEP: YES
DO YOU HAVE DIFFICULTY SLEEPING: YES
SLEEP PATTERN: DIFFICULTY FALLING ASLEEP;DISTURBED/INTERRUPTED SLEEP;INSOMNIA;RESTLESSNESS
DO YOU USE A SLEEP AID: YES
AVERAGE NUMBER OF SLEEP HOURS: 4
RESTFUL SLEEP: NO
DIFFICULTY ARISING: NO

## 2021-08-13 ASSESSMENT — PAIN SCALES - GENERAL
PAINLEVEL_OUTOF10: 0
PAINLEVEL_OUTOF10: 0
PAINLEVEL_OUTOF10: 9
PAINLEVEL_OUTOF10: 0

## 2021-08-13 NOTE — ED NOTES
called per patient request to ask him to bring her bag of personal items to hospital due to admission.      Litzy Venegas, ROMAIN  08/12/21 9650

## 2021-08-13 NOTE — PROGRESS NOTES
Pt c/o 7/10 headache requesting fioricet. Given fioricet @ [phone]. Pt reports that this was effective. Pt also c/o having diarrhea x3 from start of shift through the afternoon. Medical notified, new order for imodium PRN. Given imodium @ [phone] for diarrhea. Pt reported that this was effective and reported 2 more episodes of diarrhea after first dose of imodium. Given second dose of imodium @ 3734. This was effective. Pt has been resting quietly in a recliner out in the dayroom at this time. Will continue to monitor.

## 2021-08-13 NOTE — ED NOTES
Collateral Contact:  Name:Brent Fitzpatrick [phone]  Relation to Patient: Spouse   Last Contact with Patient: Patient lives with spouse. Concerns: State that patient and their daughter (who does not live with them) went out yesterday, took the dog to the vet, the car broke down and the patient had a really long day. States that patient usually will get out of bed during the night to smoke, but she did not do this last night and so today, she was having trouble with her legs. States that when patient gets overly stressed or fatigued, \"it really effects her\" and she experiences changes in her mental health. States that he wonders, also, if their daughter had given patient any drugs that are not prescribed to patient yesterday and he knows that she was drinking some wine coolers. States she has just not been herself today. States patient has a history of cutting, but has not done this for approximately 2 years. She is taking her medications correctly as they get the meds pre-packaged from [facility] and spouse lays them out in the morning and at [facility] and patient does not have access to other meds in any quantity. Denies that patient has been talking about being depressed and states she has not made suicidal statements to him. He knows patient is feeling, \"out of sorts\" and he believes patient would be safe, but he is unsure.      Kings Ruiz RN  08/12/21 2037       [place]  08/12/21 2040

## 2021-08-13 NOTE — ED NOTES
Presenting Problem:Patient presents to the ED on a medical doctor's SOB, which states, \"Reports having multiple personalty disorder and having more spells. Have been angry with family members. Having SI. Thought about taking all her medications to die. Did not act on those feelings. \"  Patient was clinically sober at the time of the evaluation. Appearance/Hygiene:  well-appearing, street clothes, lying in bed, good grooming and good hygiene   Motor Behavior: WNL   Attitude: cooperative  Affect: depressed affect   Speech: normal pitch and normal volume, monotone  Mood: depressed and sad   Thought Processes: Logical  Perceptions: Present - States that she has, \"alters\" and that she has been seeing more shadows. States that she has always been good about keeping them in check, but, \"something doesn't seem right and I'm really depressed. I'm seeing more shadows and I don't seem to be able to control things like I used to\". Thought content: Wants help for her current feelings of depression and suicidal thoughts. She presents as flat, depressed, very matter of fact. Orientation: A&Ox4 Able to state needs, make own decisions, request assistance. Memory: intact  Concentration: Good    Insight/ judgement: normal insight and judgment      Psychosocial and contextual factors: Lives with spouse, 1 daughter and 2 grandchildren. States in general, they all get along well. States lately her spouse has been less patient with her and has been, \"cruel\" in his talking to her. She is tearful when she speaks of this. 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 She has 2 daughter and a son. Son live near her and the other daughter lives in [place]. C-SSRS flowsheet is  Complete.     Psychiatric History (including current outpatient provider and past inpatient admissions): I 2019  Patient a current patient with [address] [place] [address] and sees Marlee Sports and she does have a  she likes, however, she cannot recall her name. Dayanna Donnie believes the patient should see a therapist and they are working on setting this up for her. Last saw Migue Rush approximately 2 weeks ago. Access to Firearms: Denies    ASSESSMENT FOR IMMINENT FUTURE DANGER:    RISK FACTORS:    [x]  Age <25 or >49   []  Male gender   [x]  Depressed mood   [x]  Active suicidal ideation:  States she has considered that she could take, \"all my medication so I could be sure to do it right, but I don't want to do that\". Spouse maintains control of medications and lays them out for her in the morning and at bedtime. Meds are prepackaged at [facility]. [x]  Suicide plan:  \"I could probably take all of my medications\". However, patient states she has no intent to follow through with this plan and she does not want to die. []  Suicide attempt   []  Access to lethal means   []  Prior suicide attempt   []  Active substance abuse:  Patient positive for barbiturates. Denies mis-use of her prescriptions meds, however, spouse states yesterday patient was with the daughter that does not live with them and, \"I sure hope she didn't give her a percocet or something. I know she had some alcohol yesterday even though her medications say she should not drink\". [x]  Highly impulsive behaviors:  +history of impulsive behaviors.    []  Not attending to self-care/ADLs    []  Recent significant loss   [x]  Chronic pain or medical illness   []  Social isolation   []  History of violence    []  Active psychosis   []  Cognitive impairment    []  No outpatient services in place   []  Medication noncompliance   []  No collateral information to support safety  [] Self- injurious/ Self-harm behavior    PROTECTIVE FACTORS:  [] Age >25 and <55  [x] Female gender   [] Denies depression  [] Denies suicidal ideation  [x] Does not have lethal plan   [x] Does not have access to guns or weapons  [x] Patient is verbally max for safety  [x] No prior suicide attempts  [x] No active substance abuse  [x] Patient has social or family support  [x] No active psychosis or cognitive dysfunction  [] Physically healthy  [x] Has outpatient services in place  [x] Compliant with recommended medications  [x] Collateral information from spouse, Austyn Reardon, supports patient safety                Xiomy Valentine RN  08/12/21 2030       Cristina Fam RN  08/12/21 2040

## 2021-08-13 NOTE — PLAN OF CARE
[address] [facility]  Initial Interdisciplinary Treatment Plan NOTE    Review Date & Time: 8/13/21 1030    Patient was not in treatment team    Admission Type:   Admission Type:  Involuntary    Reason for admission:  Reason for Admission: suicidal thoughts about taking all her medications      Estimated Length of Stay Update:  3-5 days  Estimated Discharge Date Update: 8/16/21-8/18/21    EDUCATION:   Learner Progress Toward Treatment Goals: Reviewed results and recommendations of this team    Method: Individual    Outcome: Verbalized understanding    PATIENT GOALS: none expressed    PLAN/TREATMENT RECOMMENDATIONS UPDATE:evaluate and treat    GOALS UPDATE:   Time frame for Short-Term Goals: 2 days    Orly Felix RN

## 2021-08-13 NOTE — PROGRESS NOTES
[id number] [facility]  Admission Note     Admission Type:   Admission Type:  Involuntary    Reason for admission:  Reason for Admission: suicidal thoughts about taking all her medications    PATIENT STRENGTHS:  Strengths: Positive Support, Connection to output provider (positive support from youngest daughter)    Patient Strengths and Limitations:  Limitations: Difficult relationships / poor social skills    Addictive Behavior:   Addictive Behavior  In the past 3 months, have you felt or has someone told you that you have a problem with:  : None  Do you have a history of Chemical Use?: No (patient denied but per chart has hx of THC abuse and etoh)  Do you have a history of Alcohol Use?: Yes  Do you have a history of Street Drug Abuse?: No  Histroy of Prescripton Drug Abuse?: No (patient denied but per chart review- yes)    Medical Problems:   Past Medical History:   Diagnosis Date    Abnormal ECG 12/14/2012    Anemia     Arthritis     Back pain, chronic 3/13/2013    Bipolar disorder (HCC)     Bronchitis chronic     CHF (congestive [facility] failure) ([facility] 9/30/2016    Chronic back pain     Chronic neck pain     Clostridium difficile infection 3/29/12, 5/22/12    positive stool DNA probe    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    Coronary artery disease involving native coronary artery of native heart with angina pectoris ([address] 3/8/2016    Depression     Emphysema of lung ([address]     Fibromyalgia     hyperlipidemia 8/11/2011    Hypertension     Kidney stone     Liver disease     Lung disease     MDD (major depressive disorder) 4/4/2012    Mood disorder ([address] 3/13/2013    Movement disorder     Neck pain, chronic 3/13/2013    Opiate misuse     Personality disorder ([address]     Pneumonia     Polypharmacy 2/16/2013       Status EXAM:  Status and Exam  Normal: No  Facial Expression: Worried  Affect: Appropriate  Level of Consciousness: Alert  Mood:Normal: No  Mood: Depressed, Anxious  Motor Activity:Normal: No  Motor Activity: Decreased  Interview Behavior: Cooperative  Preception: [place] to Person, [place]by [place] to Time, [place] to Place, [place] to Situation  Attention:Normal: Yes  Thought Content:Normal: Yes  Delusions: No  Memory:Normal: Yes  Insight and Judgment: No  Insight and Judgment: Poor Judgment, Poor Insight  Present Suicidal Ideation: No  Present Homicidal Ideation: No    Tobacco Screening:  Practical Counseling, on admission, sarah X, if applicable and completed (first 3 are required if patient doesn't refuse):             (x )  Recognizing danger situations (included triggers and roadblocks)                    (x )  Coping skills (new ways to manage stress, exercise, relaxation techniques, changing routine, distraction)                                                           (x )  Basic information about quitting (benefits of quitting, techniques in how to quit, available resources  ( ) Referral for counseling faxed to [facility]                                           ( ) Patient refused counseling  ( ) Patient has not smoked in the last 30 days    Metabolic Screening:    Lab Results   Component Value Date    LABA1C 5.5 10/20/2013       Lab Results   Component Value Date    CHOL 193 02/07/2018    CHOL 245 (H) 03/08/2016    CHOL 225 (H) 08/28/2015    CHOL 235 (H) 11/05/2014    CHOL 171 03/14/2013    CHOL 164 12/15/2012    CHOL 187 11/05/2012    CHOL 154 09/22/2011    CHOL 153 09/26/2010    CHOL 126 05/24/2010    CHOL 158 02/18/2010     Lab Results   Component Value Date    TRIG 132 02/07/2018    TRIG 255 (H) 03/08/2016    TRIG 68 08/28/2015    TRIG 176 (H) 11/05/2014    TRIG 82 03/14/2013    TRIG 114 12/15/2012    TRIG 225 (H) 11/05/2012    TRIG 211 (H) 09/22/2011    TRIG 182 (H) 09/26/2010    TRIG 111 05/24/2010    TRIG 166 (H) 02/18/2010     Lab Results   Component Value Date    HDL 70 (H) 02/07/2018    HDL 54 03/08/2016     (H) 08/28/2015    HDL 78 (H) 11/05/2014    HDL times. She denied AVH at this time. She also denied SI and HI at this time. She stated that she feels better now knowing that she is here to get help as we helped her before in the past. She was calm and cooperative for admission. She did report back pain of 2/10 but declined needing any medication for it at this time stating that she needed to lay down and rest. She did report some anxiety and was given vistaril 50 mg oral at 2238 which has appeared to be effective. Patient is currently resting in bed quietly. Patient's medications could not be verified due to [address] ( [place]) pharmacies are currently closed. Patient with steady gait using walker. Fall precautions started.    Jose Alejandro John RN

## 2021-08-13 NOTE — GROUP NOTE
Group Therapy Note    Date: 8/13/2021    Group Start Time: 1000  Group End Time: 1170  Group Topic: [id number]        Group Therapy Note    Attendees: 3         Patient's Goal:  Patient was invited to participate in an Art Therapy activity promoting socialization, mood elevation, and grounding through tactile art making. Patient was provided with a variety of art materials and velvet coloring sheets and was encouraged to select group appropriate songs to listen to with peers while engaging in art making. Notes: [facility] engaged in the art making, selected \"relaxing music,\" to listen to, and was supportive of her peers, engaging them in conversation and offering them positive feedback. [facility] shared her frustrations with her family \"not understanding my mental illness\" and received empathetic support from the group. Status After Intervention:  Improved    Participation Level:  Active Listener and Interactive    Participation Quality: Appropriate, Attentive, Sharing and Supportive      Speech:  normal      Thought Process/Content: Logical      Affective Functioning: Congruent      Mood: depressed      Level of consciousness:  Alert, Oriented x4 and Attentive      Response to Learning: Able to verbalize current knowledge/experience, Able to verbalize/acknowledge new learning, Able to retain information and Capable of insight      Endings: None Reported    Modes of Intervention: Education, Support, Socialization, Exploration, Clarifying, Problem-solving, Activity and Media      Discipline Responsible: Psychoeducational Specialist      Signature:  Jo Escalante

## 2021-08-13 NOTE — H&P
Hospital Medicine History & Physical      PCP: Lulú Brandon MD    Date of Admission: 8/12/2021    Date of Service: Pt seen/examined on 8/13/2021   Chief Complaint:    Chief Complaint   Patient presents with    Psychiatric Evaluation     Pt brought in by EMS  for eval. Pt reports that she needs help, because \"something is not right. \" Pt states that she has multiple personalties and she cannot take her  criticizing her for it any longer. Pt denies SI/HI at this time, but states that she will be SI at some point. History Of Present Illness: The patient is a 72 y.o. female with chronic pain, HTN, emphysema, bipolar DO who presented to [facility] ER for SI. Patient was seen and evaluated in the ED by the ED medical provider, patient was medically cleared for admission to [facility] at [facility]. This note serves as an admission medical H&P.     Patient denies any medical complaints     Past Medical History:        Diagnosis Date    Abnormal ECG 12/14/2012    Anemia     Arthritis     Back pain, chronic 3/13/2013    Bipolar disorder ([address]     Bronchitis chronic     CHF (congestive heart failure) ([address] 9/30/2016    Chronic back pain     Chronic neck pain     Clostridium difficile infection 3/29/12, 5/22/12    positive stool DNA probe    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    Coronary artery disease involving native coronary artery of native heart with angina pectoris ([address] 3/8/2016    Depression     Emphysema of lung ([address]     Fibromyalgia     hyperlipidemia 8/11/2011    Hypertension     Kidney stone     Liver disease     Lung disease     MDD (major depressive disorder) 4/4/2012    Mood disorder ([address] 3/13/2013    Movement disorder     Neck pain, chronic 3/13/2013    Opiate misuse     Personality disorder ([address]     Pneumonia     Polypharmacy 2/16/2013       Past Surgical History:        Procedure Laterality Date    COLONOSCOPY  1/19/12    DIAGNOSTIC CARDIAC CATH LAB PROCEDURE  Feb, 2007    Normal cor angio Feb, 2007    HERNIA REPAIR  07/11/2019    robotic ventral hernia repair with mesh    HERNIA REPAIR N/A 7/11/2019    ROBOTIC VENTRAL HERNIA REPAIR WITH MESH performed by Shashi Cortez MD at [address] SURGERY         Medications Prior to Admission:    Prior to Admission medications    Medication Sig Start Date End Date Taking? Authorizing Provider   gabapentin (NEURONTIN) 100 MG capsule Take 100 mg by mouth 3 times daily.    Yes Historical Provider, MD   oxybutynin (DITROPAN-XL) 10 MG extended release tablet Take 10 mg by mouth daily   Yes Historical Provider, MD   fluticasone-umeclidin-vilant (TRELEGY ELLIPTA) 100-62.5-25 MCG/INH AEPB Inhale 1 puff into the lungs daily   Yes Historical Provider, MD   QUEtiapine (SEROQUEL) 100 MG tablet Take 1 tablet by mouth 2 times daily  Patient taking differently: Take 300 mg by mouth nightly  11/3/20  Yes CEE Tucker   primidone (MYSOLINE) 50 MG tablet Take 100 mg by mouth daily    Yes Historical Provider, MD   busPIRone (BUSPAR) 15 MG tablet Take 15 mg by mouth 2 times daily    Yes Historical Provider, MD   carvedilol (COREG) 3.125 MG tablet Take 1 tablet by mouth 2 times daily (with meals) 9/30/20  Yes Zack Marshall DO   divalproex (DEPAKOTE) 500 MG DR tablet Take 500 mg by mouth nightly   Yes Historical Provider, MD   DULoxetine (CYMBALTA) 60 MG extended release capsule Take 60 mg by mouth daily    Yes Historical Provider, MD   divalproex (DEPAKOTE) 250 MG DR tablet Take 250 mg by mouth every morning    Yes Historical Provider, MD   albuterol-ipratropium (COMBIVENT RESPIMAT)  MCG/ACT AERS inhaler Inhale 1 puff into the lungs every 6 hours as needed for Wheezing or Shortness of Breath 7/9/21   BALJIT Cabrera - CNP   albuterol sulfate  (90 Base) MCG/ACT inhaler Inhale 2 puffs into the lungs every 4 hours as needed for Wheezing or Shortness of Breath 5/17/21 5/24/21  Dada Cooper Negative for rash   Neurological: Negative for syncope    Hematological: Negative for easy bruising/bleeding   Psychiatric/Behavorial: Per psychiatry team evaluation     PHYSICAL EXAM:    BP (!) 141/70   Pulse 74   Temp 97.2 °F (36.2 °C) (Oral)   Resp 18   Ht 5' 1\" (1.549 m)   Wt 141 lb (64 kg)   SpO2 96%   BMI 26.64 kg/m²     Gen: No distress. Alert. Eyes: PERRL. No sclera icterus. No conjunctival injection. ENT: No discharge. Pharynx clear. Neck: No JVD. No Carotid Bruit. Trachea midline. Resp: No accessory muscle use. No crackles. + wheezes. No rhonchi. CV: Regular rate. Regular rhythm. No murmur. No rub. No edema. GI: Non-tender. Non-distended. Normal bowel sounds. Skin: Warm and dry. No nodule on exposed extremities. No rash on exposed extremities. M/S: No cyanosis. No joint deformity. No clubbing. Neuro: Awake. No focal neurologic deficit on exam.  Cranial nerves II through XII intact. Patient is able to ambulate without difficulty. Psych: Per psychiatry team evaluation     CBC:   Recent Labs     08/12/21  1808   WBC 6.6   HGB 11.3*   HCT 33.2*   MCV 88.3   *     BMP:   Recent Labs     08/12/21  1808   *   K 3.4*   CL 95*   CO2 25   BUN 11   CREATININE 0.8     LIVER PROFILE:   Recent Labs     08/12/21  1808   AST 13*   ALT 8*   BILITOT <0.2   ALKPHOS 65     PT/INR: No results for input(s): PROTIME, INR in the last 72 hours. APTT: No results for input(s): APTT in the last 72 hours.   UA:  Recent Labs     08/12/21  1754   COLORU Yellow   PHUR 6.5  6.5   WBCUA 3-5   RBCUA 0-2   BACTERIA Rare*   CLARITYU Clear   SPECGRAV <=1.005   LEUKOCYTESUR TRACE*   UROBILINOGEN 0.2   BILIRUBINUR Negative   BLOODU Negative   GLUCOSEU Negative          U/A:    Lab Results   Component Value Date    NITRITE neg 03/02/2013    COLORU Yellow 08/12/2021    WBCUA 3-5 08/12/2021    RBCUA 0-2 08/12/2021    MUCUS 2+ 08/21/2017    BACTERIA Rare 08/12/2021    CLARITYU Clear 08/12/2021    SPECGRAV <=1.005 08/12/2021    LEUKOCYTESUR TRACE 08/12/2021    BLOODU Negative 08/12/2021    GLUCOSEU Negative 08/12/2021    GLUCOSEU NEGATIVE 06/03/2012    AMORPHOUS 2+ 02/24/2018       CULTURES  COVID 19, NAAT: not detected     EKG:  I have reviewed the EKG with the following interpretation:   None     RADIOLOGY  No orders to display       ASSESSMENT/PLAN:  #Bipolar DO  - per psychiatry team    #COPD  - stable, no AE. Cont inhalers    #HTN  - stable cont coreg    #Chronic pain  - cont gabapentin     #OAB  - cont oxybutynin    #Tremors  - cont primidone     #Normocytic anemia  - h/h stable compared to prior labs.  pcp f/u      #Thrombocytopenia  - mild, has been low in the past.  PCP f/u     Inocente Leary PA-C  8/13/2021 1:00 PM

## 2021-08-14 PROCEDURE — 6370000000 HC RX 637 (ALT 250 FOR IP): Performed by: INTERNAL MEDICINE

## 2021-08-14 PROCEDURE — 1240000000 HC EMOTIONAL WELLNESS R&B

## 2021-08-14 PROCEDURE — 6370000000 HC RX 637 (ALT 250 FOR IP): Performed by: PSYCHIATRY & NEUROLOGY

## 2021-08-14 PROCEDURE — 94761 N-INVAS EAR/PLS OXIMETRY MLT: CPT

## 2021-08-14 PROCEDURE — 94640 AIRWAY INHALATION TREATMENT: CPT

## 2021-08-14 PROCEDURE — 6370000000 HC RX 637 (ALT 250 FOR IP): Performed by: NURSE PRACTITIONER

## 2021-08-14 PROCEDURE — 99233 SBSQ HOSP IP/OBS HIGH 50: CPT | Performed by: PSYCHIATRY & NEUROLOGY

## 2021-08-14 RX ORDER — AMLODIPINE BESYLATE 5 MG/1
5 TABLET ORAL DAILY
Status: DISCONTINUED | OUTPATIENT
Start: 2021-08-14 | End: 2021-08-16 | Stop reason: HOSPADM

## 2021-08-14 RX ADMIN — QUETIAPINE FUMARATE 300 MG: 200 TABLET ORAL at 20:57

## 2021-08-14 RX ADMIN — ASPIRIN 81 MG: 81 TABLET, CHEWABLE ORAL at 09:40

## 2021-08-14 RX ADMIN — GABAPENTIN 100 MG: 100 CAPSULE ORAL at 09:41

## 2021-08-14 RX ADMIN — OXYBUTYNIN CHLORIDE 10 MG: 5 TABLET, EXTENDED RELEASE ORAL at 09:41

## 2021-08-14 RX ADMIN — CARVEDILOL 12.5 MG: 6.25 TABLET, FILM COATED ORAL at 20:29

## 2021-08-14 RX ADMIN — GABAPENTIN 100 MG: 100 CAPSULE ORAL at 14:39

## 2021-08-14 RX ADMIN — BUTALBITAL, ACETAMINOPHEN, AND CAFFEINE 1 TABLET: 50; 325; 40 TABLET ORAL at 20:29

## 2021-08-14 RX ADMIN — Medication 2 PUFF: at 20:34

## 2021-08-14 RX ADMIN — DIVALPROEX SODIUM 250 MG: 250 TABLET, DELAYED RELEASE ORAL at 09:40

## 2021-08-14 RX ADMIN — PRIMIDONE 100 MG: 50 TABLET ORAL at 09:40

## 2021-08-14 RX ADMIN — AMLODIPINE BESYLATE 5 MG: 5 TABLET ORAL at 14:39

## 2021-08-14 RX ADMIN — BUSPIRONE HYDROCHLORIDE 15 MG: 7.5 TABLET ORAL at 20:57

## 2021-08-14 RX ADMIN — CARVEDILOL 12.5 MG: 6.25 TABLET, FILM COATED ORAL at 09:42

## 2021-08-14 RX ADMIN — GABAPENTIN 100 MG: 100 CAPSULE ORAL at 20:57

## 2021-08-14 RX ADMIN — GUAIFENESIN AND DEXTROMETHORPHAN 5 ML: 100; 10 SYRUP ORAL at 16:39

## 2021-08-14 RX ADMIN — BUSPIRONE HYDROCHLORIDE 15 MG: 7.5 TABLET ORAL at 09:40

## 2021-08-14 RX ADMIN — DIVALPROEX SODIUM 500 MG: 500 TABLET, DELAYED RELEASE ORAL at 20:57

## 2021-08-14 RX ADMIN — DULOXETINE HYDROCHLORIDE 60 MG: 60 CAPSULE, DELAYED RELEASE ORAL at 09:41

## 2021-08-14 RX ADMIN — CLONIDINE HYDROCHLORIDE 0.2 MG: 0.2 TABLET ORAL at 09:45

## 2021-08-14 ASSESSMENT — PAIN DESCRIPTION - PAIN TYPE
TYPE: ACUTE PAIN

## 2021-08-14 ASSESSMENT — PAIN DESCRIPTION - LOCATION
LOCATION: HEAD

## 2021-08-14 ASSESSMENT — PAIN DESCRIPTION - PROGRESSION: CLINICAL_PROGRESSION: GRADUALLY WORSENING

## 2021-08-14 ASSESSMENT — PAIN - FUNCTIONAL ASSESSMENT: PAIN_FUNCTIONAL_ASSESSMENT: ACTIVITIES ARE NOT PREVENTED

## 2021-08-14 ASSESSMENT — PAIN DESCRIPTION - DESCRIPTORS
DESCRIPTORS: ACHING
DESCRIPTORS: ACHING

## 2021-08-14 ASSESSMENT — PAIN SCALES - GENERAL
PAINLEVEL_OUTOF10: 7
PAINLEVEL_OUTOF10: 7
PAINLEVEL_OUTOF10: 8

## 2021-08-14 NOTE — FLOWSHEET NOTE
Senior Purposeful Rounding    Position:Repositions Self    Physical Environment:Room free from clutter, Clear path to bathroom , Adequate lighting and No safety hazards noted    Pain Rating/ Nonverbal Pain Behaviors:0; denies    Pain interventions Attempted: n/a    Patient Toileted:Continent

## 2021-08-14 NOTE — PLAN OF CARE
Problem: Falls - Risk of:  Goal: Will remain free from falls  Description: Will remain free from falls  Outcome: Ongoing     Problem: Altered Mood, Depressive Behavior:  Goal: Able to verbalize and/or display a decrease in depressive symptoms  Description: Able to verbalize and/or display a decrease in depressive symptoms  Outcome: Ongoing    Pt has been pleasant and cooperative with staff. She denies SI/HI/AVH and no RTIS noted. Automatic BP was 219/82. Rechecked manually and it was 198/100. Perfect serve sent to Dr. Flavia Hillman. He ordered prn catapres 0.2mg Q4prn for SBP greater than 828 or diastolic greater than 914. Prn was given at 2048. He also ordered scheduled coreg to be increased to 12.5mg BID. BP was rechecked at 2200 and it was 129/70. She was medication compliant. She denies needs at this time.  Electronically signed by Nayeli Guevara RN on 8/13/2021 at 10:35 PM
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
## 2021-08-14 NOTE — PLAN OF CARE
Problem: Altered Mood, Depressive Behavior:  Goal: Able to verbalize acceptance of life and situations over which he or she has no control  Description: Able to verbalize acceptance of life and situations over which he or she has no control  Outcome: Ongoing     Problem: Altered Mood, Depressive Behavior:  Goal: Able to verbalize and/or display a decrease in depressive symptoms  Description: Able to verbalize and/or display a decrease in depressive symptoms  Outcome: Ongoing   Adaline Needles is Aox4, pleasant and compliant with care. Pt verbalized feeling short of breath, when discussing her feeling/symptoms as anxiety, and identified skills to utilize including removing herself from the anxious stimuli and deep breathing. Completed breathing and mindfullness skills with writer, and did no request medication.

## 2021-08-14 NOTE — FLOWSHEET NOTE
Senior Purposeful Rounding     Position:Repositions Self     Physical Environment:Room free from clutter, Clear path to bathroom , Adequate lighting and No safety hazards noted     Pain Rating/ Nonverbal Pain Behaviors:0; none observed     Pain interventions Attempted: n/a     Patient Toileted:Continent

## 2021-08-14 NOTE — PROGRESS NOTES
Cooperative, good eye contact  Speech:  fleunt and spontaneous  Mood:  \"I was just hurt by my  attitude\"  Affect:  euthymic  Thought processes:  Linear  Thought Content: denies si, denies HI, no delusions voiced  Perceptions: Denies AVH, not RTIS  Attention: wnl  Abstraction: wnl  Cognition: Average MARI, Alert and oriented to person, place, time, and situation, recall intact  Insight: Limited insight   Judgment: Fair judgment      LAB:   Admission on 08/12/2021   Component Date Value Ref Range Status    WBC 08/12/2021 6.6  4.0 - 11.0 K/uL Final    RBC 08/12/2021 3.76* 4.00 - 5.20 M/uL Final    Hemoglobin 08/12/2021 11.3* 12.0 - 16.0 g/dL Final    Hematocrit 08/12/2021 33.2* 36.0 - 48.0 % Final    MCV 08/12/2021 88.3  80.0 - 100.0 fL Final    MCH 08/12/2021 30.1  26.0 - 34.0 pg Final    MCHC 08/12/2021 34.1  31.0 - 36.0 g/dL Final    RDW 08/12/2021 15.6* 12.4 - 15.4 % Final    Platelets 17/37/3887 126* 135 - 450 K/uL Final    MPV 08/12/2021 7.6  5.0 - 10.5 fL Final    Neutrophils % 08/12/2021 60.9  % Final    Lymphocytes % 08/12/2021 25.1  % Final    Monocytes % 08/12/2021 9.1  % Final    Eosinophils % 08/12/2021 4.4  % Final    Basophils % 08/12/2021 0.5  % Final    Neutrophils Absolute 08/12/2021 4.0  1.7 - 7.7 K/uL Final    Lymphocytes Absolute 08/12/2021 1.7  1.0 - 5.1 K/uL Final    Monocytes Absolute 08/12/2021 0.6  0.0 - 1.3 K/uL Final    Eosinophils Absolute 08/12/2021 0.3  0.0 - 0.6 K/uL Final    Basophils Absolute 08/12/2021 0.0  0.0 - 0.2 K/uL Final    Sodium 08/12/2021 132* 136 - 145 mmol/L Final    Potassium reflex Magnesium 08/12/2021 3.4* 3.5 - 5.1 mmol/L Final    Chloride 08/12/2021 95* 99 - 110 mmol/L Final    CO2 08/12/2021 25  21 - 32 mmol/L Final    Anion Gap 08/12/2021 12  3 - 16 Final    Glucose 08/12/2021 100* 70 - 99 mg/dL Final    BUN 08/12/2021 11  7 - 20 mg/dL Final    CREATININE 08/12/2021 0.8  0.6 - 1.2 mg/dL Final    GFR Non- 08/12/2021 >60  >60 Final    Comment: >60 mL/min/1.73m2 EGFR, calc. for ages 25 and older using the  MDRD formula (not corrected for weight), is valid for stable  renal function.  GFR  08/12/2021 >60  >60 Final    Comment: Chronic Kidney Disease: less than 60 ml/min/1.73 sq.m. Kidney Failure: less than 15 ml/min/1.73 sq.m. Results valid for patients 18 years and older.       Calcium 08/12/2021 9.3  8.3 - 10.6 mg/dL Final    Total Protein 08/12/2021 6.5  6.4 - 8.2 g/dL Final    Albumin 08/12/2021 4.0  3.4 - 5.0 g/dL Final    Albumin/Globulin Ratio 08/12/2021 1.6  1.1 - 2.2 Final    Total Bilirubin 08/12/2021 <0.2  0.0 - 1.0 mg/dL Final    Alkaline Phosphatase 08/12/2021 65  40 - 129 U/L Final    ALT 08/12/2021 8* 10 - 40 U/L Final    AST 08/12/2021 13* 15 - 37 U/L Final    Globulin 08/12/2021 2.5  g/dL Final    Ethanol Lvl 08/12/2021 None Detected  mg/dL Final    Comment:    None Detected  Conversion factor:  100 mg/dl = .100 g/dl  For Medical Purposes Only      Salicylate, Serum 77/68/8752 <0.3* 15.0 - 30.0 mg/dL Final    Comment: Therapeutic Range: 15.0-30.0 mg/dL  Toxic: >30.0 mg/dL      Acetaminophen Level 08/12/2021 <5* 10 - 30 ug/mL Final    Comment: Therapeutic Range: 10.0-30.0 ug/mL  Toxic: >=150 ug/mL      Color, UA 08/12/2021 Yellow  Straw/Yellow Final    Clarity, UA 08/12/2021 Clear  Clear Final    Glucose, Ur 08/12/2021 Negative  Negative mg/dL Final    Bilirubin Urine 08/12/2021 Negative  Negative Final    Ketones, Urine 08/12/2021 Negative  Negative mg/dL Final    Specific Gravity, UA 08/12/2021 <=1.005  1.005 - 1.030 Final    Blood, Urine 08/12/2021 Negative  Negative Final    pH, UA 08/12/2021 6.5  5.0 - 8.0 Final    Protein, UA 08/12/2021 Negative  Negative mg/dL Final    Urobilinogen, Urine 08/12/2021 0.2  <2.0 E.U./dL Final    Nitrite, Urine 08/12/2021 Negative  Negative Final    Leukocyte Esterase, Urine 08/12/2021 TRACE* Negative Final    Microscopic Examination 08/12/2021 YES   Final    Urine Type 08/12/2021 NotGiven   Final    Urine received in a container without preservatives.  Urine Reflex to Culture 08/12/2021 Not Indicated   Final    Amphetamine Screen, Urine 08/12/2021 Neg  Negative <1000ng/mL Final    Barbiturate Screen, Ur 08/12/2021 POSITIVE* Negative <200 ng/mL Final    Benzodiazepine Screen, Urine 08/12/2021 Neg  Negative <200 ng/mL Final    Cannabinoid Scrn, Ur 08/12/2021 Neg  Negative <50 ng/mL Final    Cocaine Metabolite Screen, Urine 08/12/2021 Neg  Negative <300 ng/mL Final    Opiate Scrn, Ur 08/12/2021 Neg  Negative <300 ng/mL Final    Comment: \"Therapeutic levels of pain medication, especially oxycontin and synthetic  opioids, may not be detected by this Methodology. Pain management screen  panel  Drug panel-PM-Hi Res Ur, Interp (PAIN) should be considered for drug  monitoring \".  PCP Screen, Urine 08/12/2021 Neg  Negative <25 ng/mL Final    Methadone Screen, Urine 08/12/2021 Neg  Negative <300 ng/mL Final    Propoxyphene Scrn, Ur 08/12/2021 Neg  Negative <300 ng/mL Final    Oxycodone Urine 08/12/2021 Neg  Negative <100 ng/ml Final    pH, UA 08/12/2021 6.5   Final    Comment: Urine pH less than 5.0 or greater than 8.0 may indicate sample adulteration. Another sample should be collected if clinically  indicated.  Drug Screen Comment: 08/12/2021 see below   Final    Comment: This method is a screening test to detect only these drug  classes as part of a medical workup. Confirmatory testing  by another method should be ordered if clinically indicated.  SARS-CoV-2, NAAT 08/12/2021 Not Detected  Not Detected Final    Comment: Rapid NAAT:   Negative results should be treated as presumptive and,  if inconsistent with clinical signs and symptoms or necessary for  patient management, should be tested with an alternative molecular  assay.  Negative results do not preclude SARS-CoV-2 infection and  should not be used as the sole basis for patient management decisions. This test has been authorized by the FDA under an Emergency Use  Authorization (EUA) for use by authorized laboratories. Fact sheet for Healthcare Providers:  Marga.may  Fact sheet for Patients: Marga.may    METHODOLOGY: Isothermal Nucleic Acid Amplification      WBC, UA 08/12/2021 3-5  0 - 5 /HPF Final    RBC, UA 08/12/2021 0-2  0 - 4 /HPF Final    Epithelial Cells, UA 08/12/2021 2-5  0 - 5 /HPF Final    Bacteria, UA 08/12/2021 Rare* None Seen /HPF Final    Magnesium 08/12/2021 1.90  1.80 - 2.40 mg/dL Final           Assessment and Plan:    Diagnoses:   Primary Psychiatric (DSM V) Diagnosis: Unspecified mood disorder  Secondary Psychiatric (DSM V) Diagnoses: r/o PTSD  Chemical Dependency Diagnoses: Tobacco use disorder, severe, sedative hypnotic use disorder, in remission    All conditions detailed above are being treated while patient is hospitalized. Tx plan: Generally: prevent self injury/aggression, stabilize mood/anxiety/psychotic/behavioral disturbance, establish/maintain aftercare, increase coping mechanisms, improve medication compliance. All conditions present on admission are being treated while pt is hospitalized. Primary Psychiatric Issues:  1. Mood disorder:  Will cont meds as prescribed    Chemical Dependency Issues:  NRT    Medical Problems:  Internal medicine has been consulted. Appreciate recs. Code Status: Full    Disposition:    -Housing: Own home  -Current outpatient follow-up: [facility]. Criteria for Discharge:  Not psychotic, not homicidal, not suicidal, behavioral disturbance under control, sleeping well, mood improved/stable, eating well, aftercare arranged.      I spent 35 minutes face to face and more than 50 % was spent coordinating care, exploring sx's and discussing pharmacotherapy

## 2021-08-14 NOTE — BH NOTE
[facility]  Treatment Team Note  Review Date & Time: 8/14/2021  1355    Patient was not in treatment team      Status EXAM:   Status and Exam  Normal: Yes  Facial Expression: Brightened  Affect: Appropriate  Level of Consciousness: Alert  Mood:Normal: Yes  Mood: Depressed, Anxious  Motor Activity:Normal: Yes  Motor Activity: Decreased  Interview Behavior: Cooperative  Preception: Orange to Person, Isidor Babinski to Time, Orange to Place, Orange to Situation  Attention:Normal: Yes  Thought Content:Normal: Yes  Hallucinations: None  Delusions: No  Memory:Normal: Yes  Insight and Judgment: Yes  Insight and Judgment: Poor Insight, Poor Judgment  Present Suicidal Ideation: No  Present Homicidal Ideation: No      Suicide Risk CSSR-S:  1) Within the past month, have you wished you were dead or wished you could go to sleep and not wake up? : Yes  2) Have you actually had any thoughts of killing yourself? : No (none since last time asked, not currently)  3) Have you been thinking about how you might kill yourself? : No (none since last time asked, not currently)  5) Have you started to work out or worked out the details of how to kill yourself?  Do you intend to carry out this plan? : No  6) Have you ever done anything, started to do anything, or prepared to do anything to end your life?: No      PLAN/TREATMENT RECOMMENDATIONS UPDATE: Evaluate and treat          Sarah Parnell RN

## 2021-08-15 PROCEDURE — 6370000000 HC RX 637 (ALT 250 FOR IP): Performed by: PSYCHIATRY & NEUROLOGY

## 2021-08-15 PROCEDURE — 94640 AIRWAY INHALATION TREATMENT: CPT

## 2021-08-15 PROCEDURE — 6370000000 HC RX 637 (ALT 250 FOR IP): Performed by: INTERNAL MEDICINE

## 2021-08-15 PROCEDURE — 6370000000 HC RX 637 (ALT 250 FOR IP): Performed by: NURSE PRACTITIONER

## 2021-08-15 PROCEDURE — 1240000000 HC EMOTIONAL WELLNESS R&B

## 2021-08-15 PROCEDURE — 94761 N-INVAS EAR/PLS OXIMETRY MLT: CPT

## 2021-08-15 RX ADMIN — AMLODIPINE BESYLATE 5 MG: 5 TABLET ORAL at 08:56

## 2021-08-15 RX ADMIN — BUTALBITAL, ACETAMINOPHEN, AND CAFFEINE 1 TABLET: 50; 325; 40 TABLET ORAL at 12:02

## 2021-08-15 RX ADMIN — Medication 2 PUFF: at 08:57

## 2021-08-15 RX ADMIN — CARVEDILOL 12.5 MG: 6.25 TABLET, FILM COATED ORAL at 08:56

## 2021-08-15 RX ADMIN — BUSPIRONE HYDROCHLORIDE 15 MG: 7.5 TABLET ORAL at 20:28

## 2021-08-15 RX ADMIN — HYDROXYZINE PAMOATE 50 MG: 50 CAPSULE ORAL at 17:58

## 2021-08-15 RX ADMIN — DIVALPROEX SODIUM 250 MG: 250 TABLET, DELAYED RELEASE ORAL at 08:55

## 2021-08-15 RX ADMIN — CARVEDILOL 12.5 MG: 6.25 TABLET, FILM COATED ORAL at 16:47

## 2021-08-15 RX ADMIN — CLONIDINE HYDROCHLORIDE 0.2 MG: 0.2 TABLET ORAL at 20:28

## 2021-08-15 RX ADMIN — PRIMIDONE 100 MG: 50 TABLET ORAL at 08:56

## 2021-08-15 RX ADMIN — GABAPENTIN 100 MG: 100 CAPSULE ORAL at 13:36

## 2021-08-15 RX ADMIN — DULOXETINE HYDROCHLORIDE 60 MG: 60 CAPSULE, DELAYED RELEASE ORAL at 08:55

## 2021-08-15 RX ADMIN — OXYBUTYNIN CHLORIDE 10 MG: 5 TABLET, EXTENDED RELEASE ORAL at 08:55

## 2021-08-15 RX ADMIN — QUETIAPINE FUMARATE 300 MG: 200 TABLET ORAL at 20:28

## 2021-08-15 RX ADMIN — TIOTROPIUM BROMIDE INHALATION SPRAY 2 PUFF: 3.12 SPRAY, METERED RESPIRATORY (INHALATION) at 08:55

## 2021-08-15 RX ADMIN — DIVALPROEX SODIUM 500 MG: 500 TABLET, DELAYED RELEASE ORAL at 20:27

## 2021-08-15 RX ADMIN — Medication 2 PUFF: at 20:22

## 2021-08-15 RX ADMIN — GABAPENTIN 100 MG: 100 CAPSULE ORAL at 08:56

## 2021-08-15 RX ADMIN — ASPIRIN 81 MG: 81 TABLET, CHEWABLE ORAL at 08:55

## 2021-08-15 RX ADMIN — BUSPIRONE HYDROCHLORIDE 15 MG: 7.5 TABLET ORAL at 08:55

## 2021-08-15 RX ADMIN — GABAPENTIN 100 MG: 100 CAPSULE ORAL at 20:27

## 2021-08-15 ASSESSMENT — PAIN SCALES - GENERAL
PAINLEVEL_OUTOF10: 0
PAINLEVEL_OUTOF10: 8

## 2021-08-15 NOTE — PROGRESS NOTES
...Senior Purposeful Rounding     Position:Sleeping     Physical Environment:Room free from clutter     Pain Rating/ Nonverbal Pain Behaviors:denies;      Pain interventions Attempted: n/a     Patient Toileted:Continent

## 2021-08-15 NOTE — PROGRESS NOTES
...Senior Purposeful Rounding    Position:Sitting    Physical Environment:Room free from clutter    Pain Rating/ Nonverbal Pain Behaviors:denies;     Pain interventions Attempted: n/a    Patient Toileted:Continent

## 2021-08-15 NOTE — PLAN OF CARE
Problem: Altered Mood, Depressive Behavior:  Goal: Able to verbalize and/or display a decrease in depressive symptoms  Description: Able to verbalize and/or display a decrease in depressive symptoms  Outcome: Ongoing   Pt verbalizes feeling good and looking forward to discharge. She denies SIHI and AVH. Problem: Pain:  Goal: Pain level will decrease  Description: Pain level will decrease  Outcome: Ongoing  Pt with less frequent verbalization and requests for PRN medications, including Fioricet for headache pain.

## 2021-08-15 NOTE — FLOWSHEET NOTE
Senior Purposeful Rounding     Position:Repositions Self     Physical Environment:Room free from clutter, Clear path to bathroom , Adequate lighting and No safety hazards noted     Pain Rating/ Nonverbal Pain Behaviors:denies;      Pain interventions Attempted: n/a     Patient Toileted:Continent

## 2021-08-16 VITALS
OXYGEN SATURATION: 98 % | HEIGHT: 61 IN | WEIGHT: 141 LBS | DIASTOLIC BLOOD PRESSURE: 57 MMHG | SYSTOLIC BLOOD PRESSURE: 123 MMHG | TEMPERATURE: 98 F | BODY MASS INDEX: 26.62 KG/M2 | HEART RATE: 57 BPM | RESPIRATION RATE: 18 BRPM

## 2021-08-16 PROBLEM — F31.9 BIPOLAR DISORDER WITH DEPRESSION (HCC): Status: RESOLVED | Noted: 2021-08-12 | Resolved: 2021-08-16

## 2021-08-16 PROCEDURE — 94640 AIRWAY INHALATION TREATMENT: CPT

## 2021-08-16 PROCEDURE — 6370000000 HC RX 637 (ALT 250 FOR IP): Performed by: NURSE PRACTITIONER

## 2021-08-16 PROCEDURE — 6370000000 HC RX 637 (ALT 250 FOR IP): Performed by: PSYCHIATRY & NEUROLOGY

## 2021-08-16 PROCEDURE — 5130000000 HC BRIDGE APPOINTMENT

## 2021-08-16 PROCEDURE — 94761 N-INVAS EAR/PLS OXIMETRY MLT: CPT

## 2021-08-16 PROCEDURE — 99239 HOSP IP/OBS DSCHRG MGMT >30: CPT | Performed by: PSYCHIATRY & NEUROLOGY

## 2021-08-16 PROCEDURE — 6370000000 HC RX 637 (ALT 250 FOR IP): Performed by: INTERNAL MEDICINE

## 2021-08-16 RX ORDER — CARVEDILOL 12.5 MG/1
12.5 TABLET ORAL 2 TIMES DAILY WITH MEALS
Qty: 60 TABLET | Refills: 0 | Status: SHIPPED | OUTPATIENT
Start: 2021-08-16

## 2021-08-16 RX ORDER — AMLODIPINE BESYLATE 5 MG/1
5 TABLET ORAL DAILY
Qty: 30 TABLET | Refills: 0 | Status: SHIPPED | OUTPATIENT
Start: 2021-08-17

## 2021-08-16 RX ORDER — HYDROXYZINE PAMOATE 50 MG/1
50 CAPSULE ORAL 3 TIMES DAILY PRN
Qty: 42 CAPSULE | Refills: 0 | Status: SHIPPED | OUTPATIENT
Start: 2021-08-16 | End: 2021-08-30

## 2021-08-16 RX ORDER — QUETIAPINE FUMARATE 100 MG/1
300 TABLET, FILM COATED ORAL NIGHTLY
Qty: 1 TABLET | Refills: 0 | Status: SHIPPED
Start: 2021-08-16

## 2021-08-16 RX ADMIN — TIOTROPIUM BROMIDE INHALATION SPRAY 2 PUFF: 3.12 SPRAY, METERED RESPIRATORY (INHALATION) at 09:00

## 2021-08-16 RX ADMIN — OXYBUTYNIN CHLORIDE 10 MG: 5 TABLET, EXTENDED RELEASE ORAL at 08:37

## 2021-08-16 RX ADMIN — Medication 2 PUFF: at 09:00

## 2021-08-16 RX ADMIN — GABAPENTIN 100 MG: 100 CAPSULE ORAL at 08:37

## 2021-08-16 RX ADMIN — ASPIRIN 81 MG: 81 TABLET, CHEWABLE ORAL at 08:36

## 2021-08-16 RX ADMIN — GABAPENTIN 100 MG: 100 CAPSULE ORAL at 13:15

## 2021-08-16 RX ADMIN — CLONIDINE HYDROCHLORIDE 0.2 MG: 0.2 TABLET ORAL at 08:36

## 2021-08-16 RX ADMIN — CARVEDILOL 12.5 MG: 6.25 TABLET, FILM COATED ORAL at 08:37

## 2021-08-16 RX ADMIN — HYDROXYZINE PAMOATE 50 MG: 50 CAPSULE ORAL at 13:15

## 2021-08-16 RX ADMIN — BUSPIRONE HYDROCHLORIDE 15 MG: 7.5 TABLET ORAL at 08:37

## 2021-08-16 RX ADMIN — PRIMIDONE 100 MG: 50 TABLET ORAL at 08:36

## 2021-08-16 RX ADMIN — DULOXETINE HYDROCHLORIDE 60 MG: 60 CAPSULE, DELAYED RELEASE ORAL at 08:38

## 2021-08-16 RX ADMIN — AMLODIPINE BESYLATE 5 MG: 5 TABLET ORAL at 08:37

## 2021-08-16 RX ADMIN — HYDROXYZINE PAMOATE 50 MG: 50 CAPSULE ORAL at 08:44

## 2021-08-16 RX ADMIN — DIVALPROEX SODIUM 250 MG: 250 TABLET, DELAYED RELEASE ORAL at 08:36

## 2021-08-16 NOTE — PROGRESS NOTES
Senior Purposeful Rounding    Position:Right Side    Physical Environment:Room free from clutter, Clear path to bathroom  and No safety hazards noted    Pain Rating/ Nonverbal Pain Behaviors:0    Pain interventions Attempted: N/A    Patient Toileted:Self care

## 2021-08-16 NOTE — BH NOTE
[facility]  Discharge Note    Pt discharged with followings belongings:   Dentures: Lowers, Uppers  Vision - Corrective Lenses: None  Hearing Aid: None  Jewelry: Ring, Necklace (4 rings inclusing one wedding set, one necklace with cross , one weaved cloth bracelet)  Body Piercings Removed: N/A  Clothing: Footwear, Shirt, Pants  Were All Patient Medications Collected?: Not Applicable  Other Valuables: Purse, Wallet, Money (Comment), Rupal Ok ($20 navarro, 1 credit card)   Valuables returned to patient. Patient education on aftercare instructions: Yes. Information faxed to [facility]; Dr. Christiana Long by Madeline Osullivan RN  at 3:02 PM .Patient verbalize understanding of AVS:  Yes.     Status EXAM upon discharge:  Status and Exam  Normal: Yes  Facial Expression: Brightened  Affect: Appropriate  Level of Consciousness: Alert  Mood:Normal: No  Mood: Anxious  Motor Activity:Normal: Yes  Motor Activity: Decreased  Interview Behavior: Cooperative  Preception: Mora to Person, Nisha Mention to Time, Mora to Place, Mora to Situation  Attention:Normal: Yes  Thought Content:Normal: Yes  Hallucinations: None  Delusions: No  Memory:Normal: Yes  Insight and Judgment: Yes  Insight and Judgment: Poor Judgment, Poor Insight  Present Suicidal Ideation: No  Present Homicidal Ideation: No      Metabolic Screening:    Lab Results   Component Value Date    LABA1C 5.5 10/20/2013       Lab Results   Component Value Date    CHOL 193 02/07/2018    CHOL 245 (H) 03/08/2016    CHOL 225 (H) 08/28/2015    CHOL 235 (H) 11/05/2014    CHOL 171 03/14/2013    CHOL 164 12/15/2012    CHOL 187 11/05/2012    CHOL 154 09/22/2011    CHOL 153 09/26/2010    CHOL 126 05/24/2010    CHOL 158 02/18/2010     Lab Results   Component Value Date    TRIG 132 02/07/2018    TRIG 255 (H) 03/08/2016    TRIG 68 08/28/2015    TRIG 176 (H) 11/05/2014    TRIG 82 03/14/2013    TRIG 114 12/15/2012    TRIG 225 (H) 11/05/2012    TRIG 211 (H) 09/22/2011    TRIG 182 (H) 09/26/2010    TRIG 111 05/24/2010    TRIG 166 (H) 02/18/2010     Lab Results   Component Value Date    HDL 70 (H) 02/07/2018    HDL 54 03/08/2016     (H) 08/28/2015    HDL 78 (H) 11/05/2014    HDL 67 (H) 03/14/2013    HDL 69 (H) 12/15/2012    HDL 48 11/05/2012    HDL 44 09/22/2011    HDL 44 09/26/2010    HDL 46 05/24/2010    HDL 45 02/18/2010     No components found for: LDLCAL  Lab Results   Component Value Date    LABVLDL 26 02/07/2018    LABVLDL 51 03/08/2016    LABVLDL 14 08/28/2015    LABVLDL 35 11/05/2014    LABVLDL 16 03/14/2013    LABVLDL 23 12/15/2012    LABVLDL 45 11/05/2012    LABVLDL 42 09/22/2011    LABVLDL 36 09/26/2010    LABVLDL 22 05/24/2010    LABVLDL 33 02/18/2010     Bridge Appointment completed: Reviewed Discharge Instructions with patient. Patient verbalizes understanding and agreement with the discharge plan using the teachback method.      Referral for Outpatient Tobacco Cessation Counseling, upon discharge (sarah X if applicable and completed):    ( )  Hospital staff assisted patient to call Quit Line or faxed referral                                   during hospitalization                  (X)  Recognizing danger situations (included triggers and roadblocks), if not completed on admission                    (X)  Coping skills (new ways to manage stress, exercise, relaxation techniques, changing routine, distraction), if not completed on admission                                                           (X)  Basic information about quitting (benefits of quitting, techniques in how to quit, available resources, if not completed on admission  ( ) Referral for counseling faxed to [facility]   ( ) Patient refused referral  ( ) Patient refused counseling  ( ) Patient refused smoking cessation medication upon discharge    Vaccinations (sarah X if applicable and completed):  (X) Patient states already received influenza vaccine elsewhere  ( ) Patient received influenza vaccine during this hospitalization  ( ) Patient refused influenza vaccine at this time    Rosalva Gu RN

## 2021-08-16 NOTE — PLAN OF CARE
Patient was out with patient group, early in the shift & was social, with another female patient. Patient was pleasant & verbal, during 1:1. Patient reported feeling ready for discharge & felt 100% improved, since admission. Patient stated that she will be glad to be around her family again. \"I miss them. \" Patient denied feelings of self harm. Patient's gait was steady. Candida Calvo R.N.

## 2021-08-16 NOTE — PROGRESS NOTES
Senior Purposeful Rounding    Position:Sitting    Physical Environment:Room free from clutter, Clear path to bathroom  and No safety hazards noted    Pain Rating/ Nonverbal Pain Behaviors:denies    Pain interventions Attempted: N/A    Patient Toileted: Self care

## 2021-08-16 NOTE — GROUP NOTE
Group Therapy Note    Date: 8/16/2021    Group Start Time: 1000  Group End Time: 5732  Group Topic: Recreational    [facility]    Chuyita Reed, [facility]        Group Therapy Note    Attendees: 3           Patient's Goal:  Pt's goal was to participate in 1960's trivia and listen to 1960's music.      Notes:  Pt was engaged in group. Pt participated in group activity and discussion. Pt met goal.     Status After Intervention:  Improved    Participation Level:  Active Listener and Interactive    Participation Quality: Appropriate, Attentive and Sharing      Speech:  normal      Thought Process/Content: Logical  Linear      Affective Functioning: Congruent      Mood: anxious and depressed      Level of consciousness:  Alert, Oriented x4 and Attentive      Response to Learning: Able to verbalize current knowledge/experience and Progressing to goal      Endings: None Reported    Modes of Intervention: Education, Support, Socialization, Exploration, Clarifying, Problem-solving, Activity, Movement, Media, Confrontation, Limit-setting and Reality-testing      Discipline Responsible: /Counselor      Signature:  Chuyita Reed, [facility]

## 2021-08-16 NOTE — PROGRESS NOTES
Chart reviewed. BP running high. Norvasc 5 mg daily added on 8/14. Home coreg 3.125 mg BID continued. Will monitor and continue to adjust meds as need for BP control.     Alona Rankin PA-C  8/16/2021 7:51 AM hank,   your PCP  Phone: (   )    -  Fax: (   )    -    Alexandro Kerr (MD), Cardiovascular Disease; Internal Medicine; Nuclear Cardiology  [address] [place] [address]  Phone: [phone]  Fax: [phone]

## 2021-08-16 NOTE — GROUP NOTE
Group Therapy Note    Date: 8/16/2021    Group Start Time: 4336  Group End Time: 8239  Group Topic: Healthy Living/Wellness    [facility]        Group Therapy Note    Attendees: 6         Patient's Goal:  Patient was invited to participate in a Healthy Living / Wellness group working with peers to guess the title of songs based on the provided song lyrics. Patient was asked to pull from a cloth bag a foam tile with a portion of a song bg written on it. Patient was then asked to work with peers to guess the song title and then the song was played for the group. While listening to the music, patient was encouraged to reminisce with peers on memories inspired by the different songs and at the end of group, to process patients experience with the musical activity. Notes: Julio Crain engaged in selecting the [employer], worked with peers to [employer] based on the lyrics, sang along to the music, and participated in group conversation. Status After Intervention:  Improved    Participation Level:  Active Listener and Interactive    Participation Quality: Appropriate, Attentive, Sharing and Supportive      Speech:  normal      Thought Process/Content: Logical      Affective Functioning: Congruent      Mood: euthymic      Level of consciousness:  Alert, Oriented x4 and Attentive      Response to Learning: Able to verbalize current knowledge/experience, Able to verbalize/acknowledge new learning, Able to retain information and Capable of insight      Endings: None Reported    Modes of Intervention: Education, Support, Socialization, Exploration, Clarifying, Problem-solving, Activity and Media      Discipline Responsible: Psychoeducational Specialist      Signature:  Tom Waters [facility]

## 2021-08-16 NOTE — SUICIDE SAFETY PLAN
SAFETY PLAN    A suicide Safety Plan is a document that supports someone when they are having thoughts of suicide. Warning Signs that indicate a suicidal crisis may be developing: What (situations, thoughts, feelings, body sensations, behaviors, etc.) do you experience that lets you know you are beginning to think about suicide? 1. Being sad  2. Not being understood  3. Not understanding your mental illness    Internal Coping Strategies:  What things can I do (relaxation techniques, hobbies, physical activities, etc.) to take my mind off my problems without contacting another person? 1. Drawing  2. Talking to friends  3. Reading    People and social settings that provide distraction: Who can I call or where can I go to distract me? 1. Name: Audra James  Phone: [phone]  2. Name:   Phone: [phone]   3. Name: Sherri Dickinson or [address] [place] [address] I can contact during a crisis: Who can I call for help - for example, my doctor, my psychiatrist, my psychologist, a mental health provider, a suicide hotline? 1. Clinician Name: [facility]   Phone: [phone]    2. Suicide Prevention Lifeline: 2-158-767-TALK (5828)    Making the environment safe: How can I make my environment (house/apartment/living space) safer? For example, can I remove guns, medications, and other items? 1.  Call a loved one

## 2021-08-16 NOTE — DISCHARGE SUMMARY
Geriatric Psychiatry Discharge Summary     Admit Date: 8/12/2021     Discharge Date: 8/16/2021       Discharge Diagnoses:  Primary Psychiatric (DSM V) Diagnosis: Unspecified mood disorder  Secondary Psychiatric (DSM V) Diagnoses: r/o PTSD  Chemical Dependency Diagnoses: Tobacco use disorder, severe, sedative hypnotic use disorder, in remission  Active Medical Diagnoses: essential hypertension, poorly controlled, COPD, chronic pain    All psychiatric conditions and active medical problems above on were treated while patient was hospitalized.      Disposition -  home     Discharge Meds:       Medication List      START taking these medications    amLODIPine 5 MG tablet  Commonly known as: NORVASC  Take 1 tablet by mouth daily  Start taking on: August 17, 2021     hydrOXYzine 50 MG capsule  Commonly known as: VISTARIL  Take 1 capsule by mouth 3 times daily as needed for Anxiety        CHANGE how you take these medications    carvedilol 12.5 MG tablet  Commonly known as: COREG  Take 1 tablet by mouth 2 times daily (with meals)  What changed:   · medication strength  · how much to take        CONTINUE taking these medications    albuterol-ipratropium  MCG/ACT Aers inhaler  Commonly known as: COMBIVENT RESPIMAT  Inhale 1 puff into the lungs every 6 hours as needed for Wheezing or Shortness of Breath     aspirin 81 MG chewable tablet  Take 1 tablet by mouth daily     busPIRone 15 MG tablet  Commonly known as: BUSPAR     diclofenac sodium 1 % Gel  Commonly known as: VOLTAREN  Apply 4 g topically 4 times daily     * divalproex 250 MG DR tablet  Commonly known as: DEPAKOTE     * divalproex 500 MG DR tablet  Commonly known as: DEPAKOTE     DULoxetine 60 MG extended release capsule  Commonly known as: CYMBALTA     fluticasone 50 MCG/ACT nasal spray  Commonly known as: FLONASE     gabapentin 100 MG capsule  Commonly known as: NEURONTIN     nicotine 14 MG/24HR  Commonly known as: NICODERM CQ  Place 1 patch onto the skin daily     oxybutynin 10 MG extended release tablet  Commonly known as: DITROPAN-XL     pantoprazole 40 MG tablet  Commonly known as: PROTONIX  Take 1 tablet by mouth every morning (before breakfast)     primidone 50 MG tablet  Commonly known as: MYSOLINE     QUEtiapine 100 MG tablet  Commonly known as: SEROquel  Take 3 tablets by mouth nightly     Trelegy Ellipta 100-62.5-25 MCG/INH Aepb  Generic drug: fluticasone-umeclidin-vilant         * This list has 2 medication(s) that are the same as other medications prescribed for you. Read the directions carefully, and ask your doctor or other care provider to review them with you. STOP taking these medications    albuterol sulfate  (90 Base) MCG/ACT inhaler           Where to Get Your Medications      These medications were sent to [address] [place] [address] [phone] Michelle Medhat [phone]  [address] [place] [address]    Phone: [phone]   · amLODIPine 5 MG tablet  · carvedilol 12.5 MG tablet  · hydrOXYzine 50 MG capsule     Information about where to get these medications is not yet available    Ask your nurse or doctor about these medications  · QUEtiapine 100 MG tablet         Multiple Neuroleptics? No    Reason for admission:   Per initial H&P:  Patient is a 72 y.o. female with history unspecified mood disorder, personality pathology, and personal report of dissociative identify disorder who presented to ED as a self referral for suicidal ideation.     Per assessment in the [facility]:  \"Reports having multiple personalty disorder and having more spells. Kassidy Fifi been angry with family members. Melisa Love. Thought about taking all her medications to die.  Did not act on those feelings. \"  States that she has, \"alters\" and that she has been seeing more shadows.  States that she has always been good about keeping them in check, but, \"something doesn't seem right and I'm really depressed.  I'm seeing more shadows and I don't seem to be able to control things like I used to\". States lately her spouse has been less patient with her and has been, \"cruel\" in his talking to her.      Per collateral obtained in [facility]:  Name:Brent Fitzpatrick [phone]  Relation to Patient: Spouse      Last Contact with Patient: Patient lives with spouse. Concerns: State that patient and their daughter (who does not live with them) went out yesterday, took the dog to the vet, the car broke down and the patient had a really long day.  States that patient usually will get out of bed during the night to smoke, but she did not do this last night and so today, she was having trouble with her legs.  States that when patient gets overly stressed or fatigued, \"it really effects her\" and she experiences changes in her mental health.  States that he wonders, also, if their daughter had given patient any drugs that are not prescribed to patient yesterday and he knows that she was drinking some wine coolers.  States she has just not been herself today. States patient has a history of cutting, but has not done this for approximately 2 years. Elizabeth Kent is taking her medications correctly as they get the meds pre-packaged from [facility] and spouse lays them out in the morning and at [facility] and patient does not have access to other meds in any quantity. Denies that patient has been talking about being depressed and states she has not made suicidal statements to him. [facility] knows patient is feeling, \"out of sorts\" and he believes patient would be safe, but he is unsure.       On interview today patient did not present as depressed with respect to affect. She was actually rather bright. Pt reported that she has become increasingly upset with her . Claims that he is mocking of her when her \"alters emerge\" and this is becoming increasingly distressing to her.   She could not articulate what specifically occurred recently to make her call 911, but stated that, as she becomes increasingly upset with him she has had some thoughts of SI. She stated that she wants to [facility] a couple of days here to rest and get my head straight. \"    Hospital Course:  Patient was admitted related to the above. As detailed above her presentation reflected chronic interpersonal discord between she and her family with no particularly acute antecedent prior to her presentation. As such, we felt that the focus of her admission would be more psychological than biological and no changes were made to her regularly scheduled psychotropic medications. Instead patient was engaged in group therapy and individual therapy focused on coping skills and communication strategies with her family particularly about her perception that they were dismissive of her underlying psychiatric conditions. The only new medication patient was provided with was PRN hydroxyzine for anxiety. She was monitored over the weekend and engaged in the above therapies and by Monday she reported that she was feeling much better and indicated that she had never intended to act on suicidal thoughts but was rather concerned that those ideas had entered her mind. She was interested in discharge home and indicated that she had had a positive conversation with her family. Patient has long-standing outpatient care through [facility] [place] [facility] and a follow-up appointment was made prior to discharge. Patient was discharged home in good condition. During her admission was noted that her blood pressure was quite elevated. Internal medicine was consulted and monitored blood pressure over the course of admission. Her home dose of carvedilol was increased from 3.125 mg twice daily up to 12.5 mg twice daily and amlodipine 5 mg daily was added. Patient will need to follow up with her primary care physician regarding this condition.     PE: (reviewed) and labs (see medical H&PE)  VITALS:  BP (!) 123/57   Pulse 57   Temp 98 °F (36.7 °C) (Temporal)

## 2021-08-16 NOTE — PROGRESS NOTES
Senior Purposeful Rounding    Position:Left Side    Physical Environment:Room free from clutter, Clear path to bathroom  and No safety hazards noted    Pain Rating/ Nonverbal Pain Behaviors:0    Pain interventions Attempted: N/A    Patient Toileted: Toilets self.

## 2021-08-16 NOTE — PROGRESS NOTES
Pt's BP was running high this morning, 191/81. Given clonidine @ 0836 per parameters for SBP greater than 175. Pt also c/o anxiety and feeling overwhelmed this morning and feels as though her anxiety may be contributing to her high BP. Pt given vistaril @ [id number] for anxiety. Both PRN medications appear effective. Pt's BP when rechecked is WNL, 123/57, and pt reports that her anxiety has improved since this morning. She reported that she feels better since being admitted and her mind feels clear. Will continue to monitor.

## 2021-08-16 NOTE — PROGRESS NOTES
Patient was given clonidine 0.2 mg, po blood pressure of 176/82.  Nayana Calvo R.N. Statement Selected

## 2021-10-05 ENCOUNTER — TELEPHONE (OUTPATIENT)
Dept: PULMONOLOGY | Age: 71
End: 2021-10-05

## 2021-10-05 NOTE — TELEPHONE ENCOUNTER
Patient cancelled appointment on 10/29/21 with Dr. Janel Silverio for NPT visit (former Major pt). Reason: Death in family    Patient did reschedule appointment. Appointment rescheduled for 1/5/22. Jose Alberto Goodwin

## 2021-11-02 ENCOUNTER — APPOINTMENT (OUTPATIENT)
Dept: GENERAL RADIOLOGY | Age: 71
End: 2021-11-02
Payer: COMMERCIAL

## 2021-11-02 ENCOUNTER — HOSPITAL ENCOUNTER (EMERGENCY)
Age: 71
Discharge: HOME | End: 2021-11-02
Attending: EMERGENCY MEDICINE
Payer: COMMERCIAL

## 2021-11-02 VITALS
DIASTOLIC BLOOD PRESSURE: 89 MMHG | HEART RATE: 68 BPM | WEIGHT: 131 LBS | SYSTOLIC BLOOD PRESSURE: 114 MMHG | HEIGHT: 61 IN | RESPIRATION RATE: 12 BRPM | OXYGEN SATURATION: 96 % | BODY MASS INDEX: 24.73 KG/M2

## 2021-11-02 DIAGNOSIS — J44.1 COPD EXACERBATION (HCC): Primary | ICD-10-CM

## 2021-11-02 DIAGNOSIS — M54.6 PAIN IN THORACIC SPINE: ICD-10-CM

## 2021-11-02 LAB
A/G RATIO: 1.4 (ref 1.1–2.2)
ALBUMIN SERPL-MCNC: 3.9 G/DL (ref 3.4–5)
ALP BLD-CCNC: 86 U/L (ref 40–129)
ALT SERPL-CCNC: 8 U/L (ref 10–40)
ANION GAP SERPL CALCULATED.3IONS-SCNC: 12 MMOL/L (ref 3–16)
AST SERPL-CCNC: 13 U/L (ref 15–37)
BASE EXCESS VENOUS: 2 MMOL/L (ref -3–3)
BASOPHILS ABSOLUTE: 0.1 K/UL (ref 0–0.2)
BASOPHILS RELATIVE PERCENT: 1 %
BILIRUB SERPL-MCNC: 0.3 MG/DL (ref 0–1)
BUN BLDV-MCNC: 8 MG/DL (ref 7–20)
CALCIUM SERPL-MCNC: 9.1 MG/DL (ref 8.3–10.6)
CARBOXYHEMOGLOBIN: 7 % (ref 0–1.5)
CHLORIDE BLD-SCNC: 98 MMOL/L (ref 99–110)
CO2: 26 MMOL/L (ref 21–32)
CREAT SERPL-MCNC: 0.8 MG/DL (ref 0.6–1.2)
EOSINOPHILS ABSOLUTE: 0.3 K/UL (ref 0–0.6)
EOSINOPHILS RELATIVE PERCENT: 4.4 %
GFR AFRICAN AMERICAN: >60
GFR NON-AFRICAN AMERICAN: >60
GLUCOSE BLD-MCNC: 100 MG/DL (ref 70–99)
HCO3 VENOUS: 26.6 MMOL/L (ref 23–29)
HCT VFR BLD CALC: 32.6 % (ref 36–48)
HEMOGLOBIN: 11.2 G/DL (ref 12–16)
LYMPHOCYTES ABSOLUTE: 1.6 K/UL (ref 1–5.1)
LYMPHOCYTES RELATIVE PERCENT: 20.9 %
MCH RBC QN AUTO: 30.1 PG (ref 26–34)
MCHC RBC AUTO-ENTMCNC: 34.2 G/DL (ref 31–36)
MCV RBC AUTO: 88 FL (ref 80–100)
METHEMOGLOBIN VENOUS: 0.2 %
MONOCYTES ABSOLUTE: 0.6 K/UL (ref 0–1.3)
MONOCYTES RELATIVE PERCENT: 7.9 %
NEUTROPHILS ABSOLUTE: 5.1 K/UL (ref 1.7–7.7)
NEUTROPHILS RELATIVE PERCENT: 65.8 %
O2 SAT, VEN: 96 %
O2 THERAPY: ABNORMAL
PCO2, VEN: 41.3 MMHG (ref 40–50)
PDW BLD-RTO: 15 % (ref 12.4–15.4)
PH VENOUS: 7.43 (ref 7.35–7.45)
PLATELET # BLD: 132 K/UL (ref 135–450)
PMV BLD AUTO: 8 FL (ref 5–10.5)
PO2, VEN: 81.5 MMHG (ref 25–40)
POTASSIUM REFLEX MAGNESIUM: 3.9 MMOL/L (ref 3.5–5.1)
PRO-BNP: 445 PG/ML (ref 0–124)
RBC # BLD: 3.7 M/UL (ref 4–5.2)
SODIUM BLD-SCNC: 136 MMOL/L (ref 136–145)
TCO2 CALC VENOUS: 28 MMOL/L
TOTAL PROTEIN: 6.7 G/DL (ref 6.4–8.2)
TROPONIN: <0.01 NG/ML
WBC # BLD: 7.7 K/UL (ref 4–11)

## 2021-11-02 PROCEDURE — 6370000000 HC RX 637 (ALT 250 FOR IP): Performed by: EMERGENCY MEDICINE

## 2021-11-02 PROCEDURE — 94640 AIRWAY INHALATION TREATMENT: CPT

## 2021-11-02 PROCEDURE — 82803 BLOOD GASES ANY COMBINATION: CPT

## 2021-11-02 PROCEDURE — 72072 X-RAY EXAM THORAC SPINE 3VWS: CPT

## 2021-11-02 PROCEDURE — 80053 COMPREHEN METABOLIC PANEL: CPT

## 2021-11-02 PROCEDURE — 83880 ASSAY OF NATRIURETIC PEPTIDE: CPT

## 2021-11-02 PROCEDURE — 99285 EMERGENCY DEPT VISIT HI MDM: CPT

## 2021-11-02 PROCEDURE — 93005 ELECTROCARDIOGRAM TRACING: CPT | Performed by: EMERGENCY MEDICINE

## 2021-11-02 PROCEDURE — 85025 COMPLETE CBC W/AUTO DIFF WBC: CPT

## 2021-11-02 PROCEDURE — 71045 X-RAY EXAM CHEST 1 VIEW: CPT

## 2021-11-02 PROCEDURE — 84484 ASSAY OF TROPONIN QUANT: CPT

## 2021-11-02 RX ORDER — PREDNISONE 20 MG/1
40 TABLET ORAL DAILY
Qty: 8 TABLET | Refills: 0 | Status: ON HOLD | OUTPATIENT
Start: 2021-11-02 | End: 2021-11-07 | Stop reason: HOSPADM

## 2021-11-02 RX ORDER — IPRATROPIUM BROMIDE AND ALBUTEROL SULFATE 2.5; .5 MG/3ML; MG/3ML
1 SOLUTION RESPIRATORY (INHALATION) ONCE
Status: COMPLETED | OUTPATIENT
Start: 2021-11-02 | End: 2021-11-02

## 2021-11-02 RX ORDER — HYDROCODONE BITARTRATE AND ACETAMINOPHEN 5; 325 MG/1; MG/1
1 TABLET ORAL ONCE
Status: COMPLETED | OUTPATIENT
Start: 2021-11-02 | End: 2021-11-02

## 2021-11-02 RX ADMIN — IPRATROPIUM BROMIDE AND ALBUTEROL SULFATE 1 AMPULE: .5; 2.5 SOLUTION RESPIRATORY (INHALATION) at 16:10

## 2021-11-02 RX ADMIN — HYDROCODONE BITARTRATE AND ACETAMINOPHEN 1 TABLET: 5; 325 TABLET ORAL at 15:46

## 2021-11-02 ASSESSMENT — PAIN SCALES - GENERAL
PAINLEVEL_OUTOF10: 9
PAINLEVEL_OUTOF10: 9
PAINLEVEL_OUTOF10: 0

## 2021-11-02 NOTE — ED NOTES
Bed: 30  Expected date:   Expected time:   Means of arrival:   Comments:  Marva Soriano RN  11/02/21 6539

## 2021-11-02 NOTE — ED PROVIDER NOTES
[facility]  ED  EMERGENCY DEPARTMENT ENCOUNTER      Pt Name: Gen Reynolds  MRN: [id number]  [place] 1950  Date of evaluation: 11/2/2021  Provider: Emeka Pierce MD    CHIEF COMPLAINT       Chief Complaint   Patient presents with    Chest Pain     x 3 days. told to come to ED by psychiatry. radiating pain into back, given 1 nitro and 324 ASA with no relief. HISTORY OF PRESENT ILLNESS   (Location/Symptom, Timing/Onset, Context/Setting, Quality, Duration, Modifying Factors, Severity)  Note limiting factors. Gen Reynolds is a 72 y.o. female with past medical history of bipolar disorder, COPD, chronic pain with history of degenerative spinal disease, hypertension, diabetes, coronary artery disease, CHF here today with chest back pain and cough    Patient states for last 3 to 5 days she has had some mild shortness of breath with increased cough productive of a dark sputum and increased wheezing. No fevers or chills. Some aching anterior chest pain and mid thoracic back pain worse with coughing, twisting, turning or moving. Nonexertional.  No fevers. No lower extremity swelling. No hemoptysis. Was speaking with her psychiatrist today for follow-up of her outpatient psychiatric needs when she mention this and was referred to the hospital    HPI    Nursing Notes were reviewed. REVIEW OF SYSTEMS    (2-9 systems for level 4, 10 or more for level 5)     Review of Systems    Please see HPI for pertinent positive and negative review of system findings. A full 10 system ROS was performed and otherwise negative.         PAST MEDICAL HISTORY     Past Medical History:   Diagnosis Date    Abnormal ECG 12/14/2012    Anemia     Arthritis     Back pain, chronic 3/13/2013    Bipolar disorder [facility]     Bronchitis chronic     CHF (congestive heart failure) ([facility] 9/30/2016    Chronic back pain     Chronic neck pain     Clostridium difficile infection 3/29/12, 5/22/12    positive stool DNA probe    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[facility] 01/10/2019    Coronary artery disease involving native coronary artery of native heart with angina pectoris ([facility] 3/8/2016    Depression     Emphysema of lung ([facility]     Fibromyalgia     hyperlipidemia 8/11/2011    Hypertension     Kidney stone     Liver disease     Lung disease     MDD (major depressive disorder) 4/4/2012    Mood disorder ([facility] 3/13/2013    Movement disorder     Neck pain, chronic 3/13/2013    Opiate misuse     Personality disorder ([facility]     Pneumonia     Polypharmacy 2/16/2013         SURGICAL HISTORY       Past Surgical History:   Procedure Laterality Date    COLONOSCOPY  1/19/12   [facility] DIAGNOSTIC [facility] CATH LAB PROCEDURE  Feb, 2007    Normal cor angio Feb, 2007    HERNIA REPAIR  07/11/2019    robotic ventral hernia repair with mesh    HERNIA REPAIR N/A 7/11/2019    ROBOTIC VENTRAL HERNIA REPAIR WITH MESH performed by Vernon Deleon MD at [address] [place] [address], TOTAL ABDOMINAL      KIDNEY STONE SURGERY           CURRENT MEDICATIONS       Previous Medications    ALBUTEROL-IPRATROPIUM (COMBIVENT RESPIMAT)  MCG/ACT AERS INHALER    Inhale 1 puff into the lungs every 6 hours as needed for Wheezing or Shortness of Breath    AMLODIPINE (NORVASC) 5 MG TABLET    Take 1 tablet by mouth daily    ASPIRIN 81 MG CHEWABLE TABLET    Take 1 tablet by mouth daily    BUSPIRONE (BUSPAR) 15 MG TABLET    Take 15 mg by mouth 2 times daily     CARVEDILOL (COREG) 12.5 MG TABLET    Take 1 tablet by mouth 2 times daily (with meals)    DICLOFENAC SODIUM 1 % GEL    Apply 4 g topically 4 times daily    DIVALPROEX (DEPAKOTE) 250 MG DR TABLET    Take 250 mg by mouth every morning     DIVALPROEX (DEPAKOTE) 500 MG DR TABLET    Take 500 mg by mouth nightly    DULOXETINE (CYMBALTA) 60 MG EXTENDED RELEASE CAPSULE    Take 60 mg by mouth daily     FLUTICASONE (FLONASE) 50 MCG/ACT NASAL SPRAY    1 spray by Nasal route daily as needed FLUTICASONE-UMECLIDIN-VILANT (TRELEGY ELLIPTA) 100-62.5-25 MCG/INH AEPB    Inhale 1 puff into the lungs daily    GABAPENTIN (NEURONTIN) 100 MG CAPSULE    Take 100 mg by mouth 3 times daily.     NICOTINE (NICODERM CQ) 14 MG/24HR    Place 1 patch onto the skin daily    OXYBUTYNIN (DITROPAN-XL) 10 MG EXTENDED RELEASE TABLET    Take 10 mg by mouth daily    PANTOPRAZOLE (PROTONIX) 40 MG TABLET    Take 1 tablet by mouth every morning (before breakfast)    PRIMIDONE (MYSOLINE) 50 MG TABLET    Take 100 mg by mouth daily     QUETIAPINE (SEROQUEL) 100 MG TABLET    Take 3 tablets by mouth nightly       ALLERGIES     Dicyclomine, Sumatriptan, Tape [adhesive tape], Tizanidine, and Motrin [ibuprofen micronized]    FAMILY HISTORY       Family History   Problem Relation Age of Onset    Emphysema Mother     Heart Disease Mother     Arthritis Mother     Depression Mother     High Blood Pressure Mother     Heart Failure Father     Heart Disease Father     Arthritis Father     Diabetes Father     High Blood Pressure Father     Stroke Father     Substance Abuse Father     High Blood Pressure Brother     Heart Disease Sister     Kidney Disease Daughter     Breast Cancer Maternal Aunt           SOCIAL HISTORY       Social History     Socioeconomic History    Marital status:      Spouse name: None    Number of children: None    Years of education: None    Highest education level: None   Occupational History    None   Tobacco Use    Smoking status: Current Every Day Smoker     Packs/day: 2.00     Years: 47.00     Pack years: 94.00     Types: Cigarettes    Smokeless tobacco: Never Used   Vaping Use    Vaping Use: Never used   Substance and Sexual Activity    Alcohol use: No    Drug use: Not Currently    Sexual activity: Yes     Partners: Male   Other Topics Concern    None   Social History Narrative    None     Social Determinants of Health     Financial Resource Strain:     Difficulty of Paying Living Expenses:    Food Insecurity:     Worried About Running Out of Food in the Last Year:     [address] in the Last Year:    Transportation Needs:     Lack of Transportation (Medical):  Lack of Transportation (Non-Medical):    Physical Activity:     Days of Exercise per Week:     Minutes of Exercise per Session:    Stress:     Feeling of Stress :    Social Connections:     Frequency of Communication with Friends and Family:     Frequency of Social Gatherings with Friends and Family:     Attends [employer] Services:     Active Member of Clubs or Organizations:     Attends Club or Organization Meetings:     Marital Status:    Intimate Partner Violence:     Fear of Current or Ex-Partner:     Emotionally Abused:     Physically Abused:     Sexually Abused:        SCREENINGS    [place] Coma Scale  Eye Opening: Spontaneous  Best Verbal Response: Oriented  Best Motor Response: Obeys commands  Radha Coma Scale Score: 15          PHYSICAL EXAM    (up to 7 for level 4, 8 or more for level 5)     ED Triage Vitals [11/02/21 1456]   BP Temp Temp src Pulse Resp SpO2 Height Weight   -- -- -- 67 18 97 % 5' 1\" (1.549 m) 131 lb (59.4 kg)     Vitals:    11/02/21 1611   BP:    Pulse:    Resp: 17   SpO2: 96%       Physical Exam    General appearance:  Cooperative. No acute distress. Skin:  Warm. Dry. Eye:  Extraocular movements intact. Ears, nose, mouth and throat:  Oral mucosa moist,  Neck:  Trachea midline. Heart:  Regular rate and rhythm  Perfusion:  intact  Respiratory: No increased work of breathing but there is slightly prolongation of the expiratory phase with faint expiratory wheezing in the apices. Abdominal:   Non distended. Nontender  Neurological:  Alert and oriented x 3. Moves all extremities spontaneously  Musculoskeletal:   Normal ROM, no deformities. Tenderness to palpation of the mid thoracic spine and paraspinal musculature with kyphosis present.   No gross step-off Psychiatric:  Normal mood      DIAGNOSTIC RESULTS       Labs Reviewed   CBC WITH AUTO DIFFERENTIAL - Abnormal; Notable for the following components:       Result Value    RBC 3.70 (*)     Hemoglobin 11.2 (*)     Hematocrit 32.6 (*)     Platelets 004 (*)     All other components within normal limits    Narrative:     Performed at:  [place] [address] [address]   Phone [phone]   COMPREHENSIVE METABOLIC PANEL W/ REFLEX TO MG FOR LOW K - Abnormal; Notable for the following components:    Chloride 98 (*)     Glucose 100 (*)     ALT 8 (*)     AST 13 (*)     All other components within normal limits    Narrative:     Performed at:  [place] [address] [address]   Phone [phone]   BRAIN NATRIURETIC PEPTIDE - Abnormal; Notable for the following components:    Pro- (*)     All other components within normal limits    Narrative:     Performed at:  [place] [address] [address]   Phone [phone]   BLOOD GAS, VENOUS - Abnormal; Notable for the following components:    pO2, Storm 81.5 (*)     Carboxyhemoglobin 7.0 (*)     All other components within normal limits    Narrative:     Performed at:  [address], [facility] [address]   Phone [phone]   TROPONIN    Narrative:     Performed at:  [place] [address] [address]   Phone [phone]       Interpretation per the Radiologist below, if obtained/available at the time of this note:    XR THORACIC SPINE (3 VIEWS)   Final Result   Multiple mild compression deformities scattered along the midthoracic region   which are grossly unchanged with no obvious displaced or retropulsed   fragment. If the patient remains symptomatic at this level, suggest MRI or   bone scan correlation. None    REASSESSMENT          PROCEDURE     Unless otherwise noted below, none     Procedures      FINAL IMPRESSION      1. COPD exacerbation (HCC)    2. Pain in thoracic spine            DISPOSITION/PLAN   DISPOSITION Decision To Discharge 11/02/2021 06:12:06 PM        PATIENT REFERRED TO:  Jolly Jackson MD  [address] [facility] [address] Sammy Mcfarlane [address] [facility]  [phone]    Schedule an appointment as soon as possible for a visit         DISCHARGE MEDICATIONS:  New Prescriptions    PREDNISONE (DELTASONE) 20 MG TABLET    Take 2 tablets by mouth daily for 4 days     Controlled Substances Monitoring:     RX Monitoring 10/18/2018   Attestation The Prescription Monitoring Report for this patient was reviewed today. Periodic Controlled Substance Monitoring Possible medication side effects, risk of tolerance/dependence & alternative treatments discussed.        (Please note that portions of this note were completed with a voice recognition program.  Efforts were made to edit the dictations but occasionally words are mis-transcribed.)    Devyn Kennedy MD (electronically signed)  Attending Emergency Physician            Polo Jaimes MD  11/02/21 2867

## 2021-11-03 LAB
EKG ATRIAL RATE: 69 BPM
EKG DIAGNOSIS: NORMAL
EKG P AXIS: -29 DEGREES
EKG P-R INTERVAL: 144 MS
EKG Q-T INTERVAL: 406 MS
EKG QRS DURATION: 98 MS
EKG QTC CALCULATION (BAZETT): 435 MS
EKG R AXIS: 6 DEGREES
EKG T AXIS: -2 DEGREES
EKG VENTRICULAR RATE: 69 BPM

## 2021-11-03 PROCEDURE — 93010 ELECTROCARDIOGRAM REPORT: CPT | Performed by: INTERNAL MEDICINE

## 2021-11-05 ENCOUNTER — HOSPITAL ENCOUNTER (INPATIENT)
Age: 71
LOS: 2 days | Discharge: HOME | DRG: 885 | End: 2021-11-07
Attending: EMERGENCY MEDICINE | Admitting: PSYCHIATRY & NEUROLOGY
Payer: COMMERCIAL

## 2021-11-05 DIAGNOSIS — R45.851 SUICIDAL IDEATION: Primary | ICD-10-CM

## 2021-11-05 PROBLEM — F32.A DEPRESSION: Status: ACTIVE | Noted: 2021-11-05

## 2021-11-05 LAB
A/G RATIO: 1.4 (ref 1.1–2.2)
ACETAMINOPHEN LEVEL: 11 UG/ML (ref 10–30)
ALBUMIN SERPL-MCNC: 3.8 G/DL (ref 3.4–5)
ALP BLD-CCNC: 82 U/L (ref 40–129)
ALT SERPL-CCNC: 7 U/L (ref 10–40)
AMPHETAMINE SCREEN, URINE: ABNORMAL
ANION GAP SERPL CALCULATED.3IONS-SCNC: 12 MMOL/L (ref 3–16)
AST SERPL-CCNC: 13 U/L (ref 15–37)
BARBITURATE SCREEN URINE: POSITIVE
BASOPHILS ABSOLUTE: 0 K/UL (ref 0–0.2)
BASOPHILS RELATIVE PERCENT: 0.3 %
BENZODIAZEPINE SCREEN, URINE: ABNORMAL
BILIRUB SERPL-MCNC: <0.2 MG/DL (ref 0–1)
BILIRUBIN URINE: NEGATIVE
BLOOD, URINE: NEGATIVE
BUN BLDV-MCNC: 8 MG/DL (ref 7–20)
CALCIUM SERPL-MCNC: 8.9 MG/DL (ref 8.3–10.6)
CANNABINOID SCREEN URINE: ABNORMAL
CHLORIDE BLD-SCNC: 98 MMOL/L (ref 99–110)
CLARITY: CLEAR
CO2: 25 MMOL/L (ref 21–32)
COCAINE METABOLITE SCREEN URINE: ABNORMAL
COLOR: YELLOW
CREAT SERPL-MCNC: <0.5 MG/DL (ref 0.6–1.2)
EOSINOPHILS ABSOLUTE: 0 K/UL (ref 0–0.6)
EOSINOPHILS RELATIVE PERCENT: 0.2 %
ETHANOL: NORMAL MG/DL (ref 0–0.08)
GFR AFRICAN AMERICAN: >60
GFR NON-AFRICAN AMERICAN: >60
GLUCOSE BLD-MCNC: 101 MG/DL (ref 70–99)
GLUCOSE URINE: NEGATIVE MG/DL
HCT VFR BLD CALC: 32.3 % (ref 36–48)
HEMOGLOBIN: 11 G/DL (ref 12–16)
INFLUENZA A: NOT DETECTED
INFLUENZA B: NOT DETECTED
KETONES, URINE: ABNORMAL MG/DL
LEUKOCYTE ESTERASE, URINE: NEGATIVE
LYMPHOCYTES ABSOLUTE: 1.8 K/UL (ref 1–5.1)
LYMPHOCYTES RELATIVE PERCENT: 21.9 %
Lab: ABNORMAL
MAGNESIUM: 1.6 MG/DL (ref 1.8–2.4)
MCH RBC QN AUTO: 30.4 PG (ref 26–34)
MCHC RBC AUTO-ENTMCNC: 33.9 G/DL (ref 31–36)
MCV RBC AUTO: 89.7 FL (ref 80–100)
METHADONE SCREEN, URINE: ABNORMAL
MICROSCOPIC EXAMINATION: ABNORMAL
MONOCYTES ABSOLUTE: 0.5 K/UL (ref 0–1.3)
MONOCYTES RELATIVE PERCENT: 6.3 %
NEUTROPHILS ABSOLUTE: 6 K/UL (ref 1.7–7.7)
NEUTROPHILS RELATIVE PERCENT: 71.3 %
NITRITE, URINE: NEGATIVE
OPIATE SCREEN URINE: ABNORMAL
OXYCODONE URINE: ABNORMAL
PDW BLD-RTO: 15 % (ref 12.4–15.4)
PH UA: 6.5
PH UA: 6.5 (ref 5–8)
PHENCYCLIDINE SCREEN URINE: ABNORMAL
PLATELET # BLD: 156 K/UL (ref 135–450)
PMV BLD AUTO: 7.6 FL (ref 5–10.5)
POTASSIUM REFLEX MAGNESIUM: 3.5 MMOL/L (ref 3.5–5.1)
PROPOXYPHENE SCREEN: ABNORMAL
PROTEIN UA: NEGATIVE MG/DL
RBC # BLD: 3.61 M/UL (ref 4–5.2)
SALICYLATE, SERUM: <0.3 MG/DL (ref 15–30)
SARS-COV-2 RNA, RT PCR: NOT DETECTED
SODIUM BLD-SCNC: 135 MMOL/L (ref 136–145)
SPECIFIC GRAVITY UA: 1.02 (ref 1–1.03)
TOTAL PROTEIN: 6.6 G/DL (ref 6.4–8.2)
URINE TYPE: ABNORMAL
UROBILINOGEN, URINE: 0.2 E.U./DL
WBC # BLD: 8.3 K/UL (ref 4–11)

## 2021-11-05 PROCEDURE — 1240000000 HC EMOTIONAL WELLNESS R&B

## 2021-11-05 PROCEDURE — 81003 URINALYSIS AUTO W/O SCOPE: CPT

## 2021-11-05 PROCEDURE — 6370000000 HC RX 637 (ALT 250 FOR IP): Performed by: PSYCHIATRY & NEUROLOGY

## 2021-11-05 PROCEDURE — 80143 DRUG ASSAY ACETAMINOPHEN: CPT

## 2021-11-05 PROCEDURE — 99285 EMERGENCY DEPT VISIT HI MDM: CPT

## 2021-11-05 PROCEDURE — 80053 COMPREHEN METABOLIC PANEL: CPT

## 2021-11-05 PROCEDURE — 83735 ASSAY OF MAGNESIUM: CPT

## 2021-11-05 PROCEDURE — 36415 COLL VENOUS BLD VENIPUNCTURE: CPT

## 2021-11-05 PROCEDURE — 80179 DRUG ASSAY SALICYLATE: CPT

## 2021-11-05 PROCEDURE — 87636 SARSCOV2 & INF A&B AMP PRB: CPT

## 2021-11-05 PROCEDURE — 84443 ASSAY THYROID STIM HORMONE: CPT

## 2021-11-05 PROCEDURE — 94761 N-INVAS EAR/PLS OXIMETRY MLT: CPT

## 2021-11-05 PROCEDURE — 94640 AIRWAY INHALATION TREATMENT: CPT

## 2021-11-05 PROCEDURE — 85025 COMPLETE CBC W/AUTO DIFF WBC: CPT

## 2021-11-05 PROCEDURE — 82077 ASSAY SPEC XCP UR&BREATH IA: CPT

## 2021-11-05 PROCEDURE — 6370000000 HC RX 637 (ALT 250 FOR IP): Performed by: EMERGENCY MEDICINE

## 2021-11-05 PROCEDURE — 80307 DRUG TEST PRSMV CHEM ANLYZR: CPT

## 2021-11-05 RX ORDER — PRIMIDONE 50 MG/1
100 TABLET ORAL DAILY
Status: DISCONTINUED | OUTPATIENT
Start: 2021-11-05 | End: 2021-11-07 | Stop reason: HOSPADM

## 2021-11-05 RX ORDER — ASPIRIN 81 MG/1
81 TABLET, CHEWABLE ORAL DAILY
Status: DISCONTINUED | OUTPATIENT
Start: 2021-11-05 | End: 2021-11-07 | Stop reason: HOSPADM

## 2021-11-05 RX ORDER — DIVALPROEX SODIUM 500 MG/1
500 TABLET, DELAYED RELEASE ORAL NIGHTLY
Status: DISCONTINUED | OUTPATIENT
Start: 2021-11-05 | End: 2021-11-07 | Stop reason: HOSPADM

## 2021-11-05 RX ORDER — GABAPENTIN 100 MG/1
100 CAPSULE ORAL 3 TIMES DAILY
Status: DISCONTINUED | OUTPATIENT
Start: 2021-11-05 | End: 2021-11-07 | Stop reason: HOSPADM

## 2021-11-05 RX ORDER — CARVEDILOL 6.25 MG/1
12.5 TABLET ORAL 2 TIMES DAILY WITH MEALS
Status: DISCONTINUED | OUTPATIENT
Start: 2021-11-05 | End: 2021-11-07 | Stop reason: HOSPADM

## 2021-11-05 RX ORDER — BENZONATATE 100 MG/1
100 CAPSULE ORAL ONCE
Status: COMPLETED | OUTPATIENT
Start: 2021-11-05 | End: 2021-11-05

## 2021-11-05 RX ORDER — FLUTICASONE PROPIONATE 50 MCG
1 SPRAY, SUSPENSION (ML) NASAL DAILY PRN
Status: DISCONTINUED | OUTPATIENT
Start: 2021-11-05 | End: 2021-11-07 | Stop reason: HOSPADM

## 2021-11-05 RX ORDER — BUDESONIDE AND FORMOTEROL FUMARATE DIHYDRATE 160; 4.5 UG/1; UG/1
2 AEROSOL RESPIRATORY (INHALATION) 2 TIMES DAILY
Status: DISCONTINUED | OUTPATIENT
Start: 2021-11-05 | End: 2021-11-05

## 2021-11-05 RX ORDER — DULOXETIN HYDROCHLORIDE 60 MG/1
60 CAPSULE, DELAYED RELEASE ORAL DAILY
Status: DISCONTINUED | OUTPATIENT
Start: 2021-11-05 | End: 2021-11-07 | Stop reason: HOSPADM

## 2021-11-05 RX ORDER — DIVALPROEX SODIUM 250 MG/1
250 TABLET, DELAYED RELEASE ORAL EVERY MORNING
Status: DISCONTINUED | OUTPATIENT
Start: 2021-11-06 | End: 2021-11-07 | Stop reason: HOSPADM

## 2021-11-05 RX ORDER — BUDESONIDE AND FORMOTEROL FUMARATE DIHYDRATE 160; 4.5 UG/1; UG/1
2 AEROSOL RESPIRATORY (INHALATION) 2 TIMES DAILY
Status: DISCONTINUED | OUTPATIENT
Start: 2021-11-06 | End: 2021-11-07 | Stop reason: HOSPADM

## 2021-11-05 RX ORDER — QUETIAPINE FUMARATE 100 MG/1
300 TABLET, FILM COATED ORAL NIGHTLY
Status: DISCONTINUED | OUTPATIENT
Start: 2021-11-05 | End: 2021-11-07 | Stop reason: HOSPADM

## 2021-11-05 RX ORDER — PANTOPRAZOLE SODIUM 40 MG/1
40 TABLET, DELAYED RELEASE ORAL
Status: DISCONTINUED | OUTPATIENT
Start: 2021-11-06 | End: 2021-11-07 | Stop reason: HOSPADM

## 2021-11-05 RX ORDER — LORAZEPAM 1 MG/1
1 TABLET ORAL ONCE
Status: COMPLETED | OUTPATIENT
Start: 2021-11-05 | End: 2021-11-05

## 2021-11-05 RX ADMIN — QUETIAPINE FUMARATE 300 MG: 100 TABLET ORAL at 22:12

## 2021-11-05 RX ADMIN — GABAPENTIN 100 MG: 100 CAPSULE ORAL at 22:11

## 2021-11-05 RX ADMIN — DIVALPROEX SODIUM 500 MG: 500 TABLET, DELAYED RELEASE ORAL at 22:11

## 2021-11-05 RX ADMIN — CARVEDILOL 12.5 MG: 6.25 TABLET, FILM COATED ORAL at 22:11

## 2021-11-05 RX ADMIN — BUSPIRONE HYDROCHLORIDE 15 MG: 5 TABLET ORAL at 22:10

## 2021-11-05 SDOH — HEALTH STABILITY: PHYSICAL HEALTH: ON AVERAGE, HOW MANY MINUTES DO YOU ENGAGE IN EXERCISE AT THIS LEVEL?: 0 MIN

## 2021-11-05 SDOH — ECONOMIC STABILITY: FOOD INSECURITY: WITHIN THE PAST 12 MONTHS, THE FOOD YOU BOUGHT JUST DIDN'T LAST AND YOU DIDN'T HAVE MONEY TO GET MORE.: NEVER TRUE

## 2021-11-05 SDOH — ECONOMIC STABILITY: TRANSPORTATION INSECURITY
IN THE PAST 12 MONTHS, HAS THE LACK OF TRANSPORTATION KEPT YOU FROM MEDICAL APPOINTMENTS OR FROM GETTING MEDICATIONS?: NO

## 2021-11-05 SDOH — ECONOMIC STABILITY: HOUSING INSECURITY
IN THE LAST 12 MONTHS, WAS THERE A TIME WHEN YOU DID NOT HAVE A STEADY PLACE TO SLEEP OR SLEPT IN A SHELTER (INCLUDING NOW)?: NO

## 2021-11-05 SDOH — ECONOMIC STABILITY: TRANSPORTATION INSECURITY
IN THE PAST 12 MONTHS, HAS LACK OF TRANSPORTATION KEPT YOU FROM MEETINGS, WORK, OR FROM GETTING THINGS NEEDED FOR DAILY LIVING?: NO

## 2021-11-05 SDOH — ECONOMIC STABILITY: HOUSING INSECURITY: IN THE LAST 12 MONTHS, HOW MANY PLACES HAVE YOU LIVED?: 1

## 2021-11-05 SDOH — ECONOMIC STABILITY: FOOD INSECURITY: WITHIN THE PAST 12 MONTHS, YOU WORRIED THAT YOUR FOOD WOULD RUN OUT BEFORE YOU GOT MONEY TO BUY MORE.: NEVER TRUE

## 2021-11-05 SDOH — HEALTH STABILITY: PHYSICAL HEALTH: ON AVERAGE, HOW MANY DAYS PER WEEK DO YOU ENGAGE IN MODERATE TO STRENUOUS EXERCISE (LIKE A BRISK WALK)?: 0 DAYS

## 2021-11-05 SDOH — ECONOMIC STABILITY: INCOME INSECURITY: IN THE LAST 12 MONTHS, WAS THERE A TIME WHEN YOU WERE NOT ABLE TO PAY THE MORTGAGE OR RENT ON TIME?: NO

## 2021-11-05 ASSESSMENT — SOCIAL DETERMINANTS OF HEALTH (SDOH)
WITHIN THE LAST YEAR, HAVE TO BEEN RAPED OR FORCED TO HAVE ANY KIND OF SEXUAL ACTIVITY BY YOUR PARTNER OR EX-PARTNER?: NO
HOW OFTEN DO YOU ATTEND CHURCH OR RELIGIOUS SERVICES?: MORE THAN 4 TIMES PER YEAR
WITHIN THE LAST YEAR, HAVE YOU BEEN HUMILIATED OR EMOTIONALLY ABUSED IN OTHER WAYS BY YOUR PARTNER OR EX-PARTNER?: NO
HOW OFTEN DO YOU ATTENT MEETINGS OF THE CLUB OR ORGANIZATION YOU BELONG TO?: NEVER
HOW OFTEN DO YOU GET TOGETHER WITH FRIENDS OR RELATIVES?: ONCE A WEEK
DO YOU BELONG TO ANY CLUBS OR ORGANIZATIONS SUCH AS CHURCH GROUPS UNIONS, FRATERNAL OR ATHLETIC GROUPS, OR SCHOOL GROUPS?: NO
WITHIN THE LAST YEAR, HAVE YOU BEEN AFRAID OF YOUR PARTNER OR EX-PARTNER?: NO
HOW HARD IS IT FOR YOU TO PAY FOR THE VERY BASICS LIKE FOOD, HOUSING, MEDICAL CARE, AND HEATING?: NOT HARD AT ALL
WITHIN THE LAST YEAR, HAVE YOU BEEN KICKED, HIT, SLAPPED, OR OTHERWISE PHYSICALLY HURT BY YOUR PARTNER OR EX-PARTNER?: NO
IN A TYPICAL WEEK, HOW MANY TIMES DO YOU TALK ON THE PHONE WITH FAMILY, FRIENDS, OR NEIGHBORS?: TWICE A WEEK

## 2021-11-05 ASSESSMENT — SLEEP AND FATIGUE QUESTIONNAIRES
SLEEP PATTERN: DIFFICULTY FALLING ASLEEP;DISTURBED/INTERRUPTED SLEEP;RESTLESSNESS
RESTFUL SLEEP: NO
DO YOU HAVE DIFFICULTY SLEEPING: YES
DIFFICULTY ARISING: NO
DIFFICULTY STAYING ASLEEP: YES
DIFFICULTY FALLING ASLEEP: YES
AVERAGE NUMBER OF SLEEP HOURS: 5
DO YOU USE A SLEEP AID: YES

## 2021-11-05 ASSESSMENT — PATIENT HEALTH QUESTIONNAIRE - PHQ9
8. MOVING OR SPEAKING SO SLOWLY THAT OTHER PEOPLE COULD HAVE NOTICED. OR THE OPPOSITE, BEING SO FIGETY OR RESTLESS THAT YOU HAVE BEEN MOVING AROUND A LOT MORE THAN USUAL: NEARLY EVERY DAY
1. LITTLE INTEREST OR PLEASURE IN DOING THINGS: NEARLY EVERY DAY
9. THOUGHTS THAT YOU WOULD BE BETTER OFF DEAD, OR OF HURTING YOURSELF: SEVERAL DAYS
4. FEELING TIRED OR HAVING LITTLE ENERGY: NEARLY EVERY DAY
DEPRESSION UNABLE TO ASSESS: YES
2. FEELING DOWN, DEPRESSED OR HOPELESS: NEARLY EVERY DAY
6. FEELING BAD ABOUT YOURSELF - OR THAT YOU ARE A FAILURE OR HAVE LET YOURSELF OR YOUR FAMILY DOWN: MORE THAN HALF THE DAYS
3. TROUBLE FALLING OR STAYING ASLEEP: NEARLY EVERY DAY
SUM OF ALL RESPONSES TO PHQ9 QUESTIONS 1 & 2: 6
7. TROUBLE CONCENTRATING ON THINGS, SUCH AS READING THE NEWSPAPER OR WATCHING TELEVISION: NEARLY EVERY DAY
5. POOR APPETITE OR OVEREATING: SEVERAL DAYS

## 2021-11-05 ASSESSMENT — LIFESTYLE VARIABLES
HISTORY_ALCOHOL_USE: NO
HOW OFTEN DO YOU HAVE A DRINK CONTAINING ALCOHOL: NEVER

## 2021-11-05 ASSESSMENT — PAIN SCALES - GENERAL: PAINLEVEL_OUTOF10: 0

## 2021-11-05 NOTE — ED NOTES
Presenting Problem:Patient presents to [facility] ED on a SOB after police were dispatched due to patient informing [facility] that she had a knife. When police arrived patient was found in a chair with a kitchen knife next to her. Patient states that she is terribly stressed and depressed. She does not like her self very much right now. Patient says family is living with her but they don't help her and won't take her out of the house anymore. She states that she fully planned to take her knife today and cut herself to die. She also states that she has been thinking for quite some time about how she truly wants to \"go out\" and has the plan to take every pill she owns. Patient is unable to contract for safety if discharged. Appearance/Hygiene:  hospital attire, lying in bed, fair grooming and fair hygiene   Motor Behavior: WNL   Attitude: cooperative  Affect: depressed affect   Speech: normal pitch and normal volume  Mood: anxious and depressed   Thought Processes: [place]  Perceptions: Absent   Thought content: Focused on wanting to die   Orientation: A&Ox4   Memory: intact  Concentration: Good    Insight/ judgement: impaired insight      Psychosocial and contextual factors: Lives at home with family who she states is depressing    C-SSRS flowsheet is not Complete.     Psychiatric History (including current outpatient provider and past inpatient admissions): multiple admissions to [facility]    Access to Firearms: Denies    ASSESSMENT FOR IMMINENT FUTURE DANGER:    RISK FACTORS:    [x]  Age <25 or >49   []  Male gender   [x]  Depressed mood   [x]  Active suicidal ideation   [x]  Suicide plan   []  Suicide attempt   [x]  Access to lethal means   [x]  Prior suicide attempt   []  Active substance abuse (if yes pleases add details )   [x]  Highly impulsive behaviors   [x]  Not attending to self-care/ADLs    []  Recent significant loss   [x]  Chronic pain or medical illness   []  Social isolation   []  History of violence (if yes please elaborate )   []  Active psychosis   []  Cognitive impairment    []  No outpatient services in place   []  Medication noncompliance   [x]  No collateral information to support safety  [] Self- injurious/ Self-harm behavior    PROTECTIVE FACTORS:  [] Age >25 and <55  [x] Female gender   [] Denies depression  [] Denies suicidal ideation  [] Does not have lethal plan   [] Does not have access to guns or weapons  [] Patient is verbally max for safety  [] No prior suicide attempts  [x] No active substance abuse  [] Patient has social or family support  [x] No active psychosis or cognitive dysfunction  [] Physically healthy  [] Has outpatient services in place  [] Compliant with recommended medications  [] Collateral information from  supports patient safety   [] Patient is future oriented with plans to            [facility], RN  11/05/21 21

## 2021-11-05 NOTE — ED PROVIDER NOTES
CHIEF COMPLAINT  Psychiatric Evaluation (Pt states she has been severly depressed and cannot stand to be around anyone at home )      Gomez Celis is a 72 y.o. female with a history of bipolar disorder, CAD, personality disorder, depression who presents emergency department for evaluation of psychiatric evaluation. Patient was reportedly found by police at home with a  knife in her possession. Patient states that she was going to cut herself in a suicide attempt. She states that she thought about her grandchildren potentially finding her and this is what prevented her from doing so. She states that she has had increased depression, thoughts of harming herself as she is fairly isolated at home. She also states that she cannot stand being around her family members. She denies ingesting any medications or overdosing on any medications. Denies illicit drug use. She states that she has a chronic cough related to COPD and her use of smoking. No other complaints, modifying factors or associated symptoms.        Past Medical History:   Diagnosis Date    Abnormal ECG 12/14/2012    Anemia     Arthritis     Back pain, chronic 3/13/2013    Bipolar disorder (HCC)     Bronchitis chronic     [facility]congestive [facility] failure[facility] 9/30/2016    Chronic back pain     Chronic neck pain     Clostridium difficile infection 3/29/12, 5/22/12    positive stool DNA probe    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    Coronary artery disease involving native coronary artery of native heart with angina pectoris ([address] 3/8/2016    Depression     Emphysema of lung (HCC)     Fibromyalgia     hyperlipidemia 8/11/2011    Hypertension     Kidney stone     Liver disease     Lung disease     MDD (major depressive disorder) 4/4/2012    Mood disorder ([address] 3/13/2013    Movement disorder     Neck pain, chronic 3/13/2013    Opiate misuse     Personality disorder ([address]     Pneumonia  Polypharmacy 2/16/2013     Past Surgical History:   Procedure Laterality Date    COLONOSCOPY  1/19/12   [facility] PROCEDURE  Feb, 2007    Normal cor angio Feb, 2007    HERNIA REPAIR  07/11/2019    robotic ventral hernia repair with mesh    HERNIA REPAIR N/A 7/11/2019    ROBOTIC VENTRAL HERNIA REPAIR WITH MESH performed by Timmy Howard MD at [address] [place] [address]       Family History   Problem Relation Age of Onset    Emphysema Mother     Heart Disease Mother     Arthritis Mother     Depression Mother     High Blood Pressure Mother     Heart Failure Father     Heart Disease Father     Arthritis Father     Diabetes Father     High Blood Pressure Father     Stroke Father     Substance Abuse Father     High Blood Pressure Brother     Heart Disease Sister     Kidney Disease Daughter     Breast Cancer Maternal Aunt      Social History     Socioeconomic History    Marital status:      Spouse name: Not on file    Number of children: Not on file    Years of education: Not on file    Highest education level: Not on file   Occupational History    Not on file   Tobacco Use    Smoking status: Current Every Day Smoker     Packs/day: 2.00     Years: 47.00     Pack years: 94.00     Types: Cigarettes    Smokeless tobacco: Never Used   Vaping Use    Vaping Use: Never used   Substance and Sexual Activity    Alcohol use: No    Drug use: Not Currently    Sexual activity: Yes     Partners: Male   Other Topics Concern    Not on file   Social History Narrative    Not on file     Social Determinants of Health     Financial Resource Strain:     Difficulty of Paying Living Expenses:    Food Insecurity:     Worried About Running Out of Food in the Last Year:     Ran Out of Food in the Last Year:    Transportation Needs:     Lack of Transportation (Medical):      Lack of Transportation (Non-Medical):    Physical Activity:  Days of Exercise per Week:     Minutes of Exercise per Session:    Stress:     Feeling of Stress :    Social Connections:     Frequency of Communication with Friends and Family:     Frequency of Social Gatherings with Friends and Family:     Attends Baptism Services:     Active Member of Clubs or Organizations:     Attends Club or Organization Meetings:     Marital Status:    Intimate Partner Violence:     Fear of Current or Ex-Partner:     Emotionally Abused:     Physically Abused:     Sexually Abused:      Current Facility-Administered Medications   Medication Dose Route Frequency Provider Last Rate Last Admin    albuterol-ipratropium (COMBIVENT RESPIMAT)  MCG/ACT inhaler 1 puff  1 puff Inhalation Q6H PRN Ilan Arguelles MD        QUEtiapine (SEROQUEL) tablet 300 mg  300 mg Oral Nightly Ilan Arguelles MD        [START ON 11/6/2021] pantoprazole (PROTONIX) tablet 40 mg  40 mg Oral QAM AC Ilan Arguelles MD        gabapentin (NEURONTIN) capsule 100 mg  100 mg Oral TID Ilan Arguelles MD        fluticasone-umeclidin-vilant (TRELEGY ELLIPTA) 305-39.3-64 MCG/INH inhaler 1 puff  1 puff Inhalation Daily Ilan Arguelles MD        fluticasone (FLONASE) 50 MCG/ACT nasal spray 1 spray  1 spray Nasal Daily PRN Ilan Arguelles MD        DULoxetine (CYMBALTA) extended release capsule 60 mg  60 mg Oral Daily Ilan Arguelles MD        divalproex (DEPAKOTE) DR tablet 500 mg  500 mg Oral Nightly Ilan Arguelles MD        [facility] divalproex (DEPAKOTE) DR tablet 250 mg  250 mg Oral QAM Ilan Arguelles MD        carvedilol (COREG) tablet 12.5 mg  12.5 mg Oral BID WC Ilan Arguelles MD        aspirin chewable tablet 81 mg  81 mg Oral Daily Ilan Arguelles MD        primidone (MYSOLINE) tablet 100 mg  100 mg Oral Daily Ilan Arguelles MD        busPIRone (BUSPAR) tablet 15 mg  15 mg Oral BID Pako Valdovinos MD        diclofenac sodium (VOLTAREN) 1 % gel 4 g  4 g Topical 4x Daily Pako Valdovinos MD         Current Outpatient Medications   Medication Sig Dispense Refill    predniSONE (DELTASONE) 20 MG tablet Take 2 tablets by mouth daily for 4 days 8 tablet 0    QUEtiapine (SEROQUEL) 100 MG tablet Take 3 tablets by mouth nightly 1 tablet 0    carvedilol (COREG) 12.5 MG tablet Take 1 tablet by mouth 2 times daily (with meals) 60 tablet 0    amLODIPine (NORVASC) 5 MG tablet Take 1 tablet by mouth daily 30 tablet 0    gabapentin (NEURONTIN) 100 MG capsule Take 100 mg by mouth 3 times daily.       oxybutynin (DITROPAN-XL) 10 MG extended release tablet Take 10 mg by mouth daily      fluticasone-umeclidin-vilant (TRELEGY ELLIPTA) 100-62.5-25 MCG/INH AEPB Inhale 1 puff into the lungs daily      albuterol-ipratropium (COMBIVENT RESPIMAT)  MCG/ACT AERS inhaler Inhale 1 puff into the lungs every 6 hours as needed for Wheezing or Shortness of Breath 1 Inhaler 0    primidone (MYSOLINE) 50 MG tablet Take 100 mg by mouth daily       busPIRone (BUSPAR) 15 MG tablet Take 15 mg by mouth 2 times daily       aspirin 81 MG chewable tablet Take 1 tablet by mouth daily 30 tablet 0    pantoprazole (PROTONIX) 40 MG tablet Take 1 tablet by mouth every morning (before breakfast) 30 tablet 0    nicotine (NICODERM CQ) 14 MG/24HR Place 1 patch onto the skin daily 42 patch 0    diclofenac sodium 1 % GEL Apply 4 g topically 4 times daily 4 Tube 1    divalproex (DEPAKOTE) 500 MG DR tablet Take 500 mg by mouth nightly      DULoxetine (CYMBALTA) 60 MG extended release capsule Take 60 mg by mouth daily       fluticasone (FLONASE) 50 MCG/ACT nasal spray 1 spray by Nasal route daily as needed       divalproex (DEPAKOTE) 250 MG DR tablet Take 250 mg by mouth every morning        Allergies   Allergen Reactions    Dicyclomine      listed in pxysis    Sumatriptan      In pxysis listing  Other reaction(s): throat swelling    Tape Margret Payer Tape]      Tears off patient's skin very easily     Tizanidine      Listed in pxysis    Motrin [Ibuprofen Micronized] Nausea And Vomiting       REVIEW OF SYSTEMS  Positive and pertinent negatives as per HPI. All other systems were reviewed and are negative. PHYSICAL EXAM  BP (!) 167/74   Pulse 74   Temp 97 °F (36.1 °C) (Oral)   Resp 18   SpO2 95%   GENERAL APPEARANCE: Awake and alert. Cooperative. HEAD: Normocephalic. Atraumatic. NECK: Supple, trachea midline. No significant lymphadenopathy  HEART: RRR. No harsh murmurs. Intact radial pulses 2+ bilaterally. LUNGS: Respirations unlabored without accessory muscle use. Speaking comfortably in full sentences. ABDOMEN: Soft. Non-distended. Non-tender. No guarding or rebound. EXTREMITIES: No peripheral edema. No acute deformities. SKIN: Warm and dry. No acute rashes. NEUROLOGICAL: Alert and oriented X 3. No focal deficits  PSYCHIATRIC: Normal mood and affect. LABS  I have reviewed all labs for this visit.    Results for orders placed or performed during the hospital encounter of 11/05/21   COVID-19 & Influenza Combo    Specimen: Nasopharyngeal Swab; Nasal   Result Value Ref Range    SARS-CoV-2 RNA, RT PCR NOT DETECTED NOT DETECTED    INFLUENZA A NOT DETECTED NOT DETECTED    INFLUENZA B NOT DETECTED NOT DETECTED   CBC Auto Differential   Result Value Ref Range    WBC 8.3 4.0 - 11.0 K/uL    RBC 3.61 (L) 4.00 - 5.20 M/uL    Hemoglobin 11.0 (L) 12.0 - 16.0 g/dL    Hematocrit 32.3 (L) 36.0 - 48.0 %    MCV 89.7 80.0 - 100.0 fL    MCH 30.4 26.0 - 34.0 pg    MCHC 33.9 31.0 - 36.0 g/dL    RDW 15.0 12.4 - 15.4 %    Platelets 172 512 - 819 K/uL    MPV 7.6 5.0 - 10.5 fL    Neutrophils % 71.3 %    Lymphocytes % 21.9 %    Monocytes % 6.3 %    Eosinophils % 0.2 %    Basophils % 0.3 %    Neutrophils Absolute 6.0 1.7 - 7.7 K/uL    Lymphocytes Absolute 1.8 1.0 - 5.1 K/uL    Monocytes Absolute 0.5 0.0 - 1.3 K/uL    Eosinophils Absolute 0.0 0.0 - 0.6 K/uL    Basophils Absolute 0.0 0.0 - 0.2 K/uL   Comprehensive Metabolic Panel w/ Reflex to MG   Result Value Ref Range    Sodium 135 (L) 136 - 145 mmol/L    Potassium reflex Magnesium 3.5 3.5 - 5.1 mmol/L    Chloride 98 (L) 99 - 110 mmol/L    CO2 25 21 - 32 mmol/L    Anion Gap 12 3 - 16    Glucose 101 (H) 70 - 99 mg/dL    BUN 8 7 - 20 mg/dL    CREATININE <0.5 (L) 0.6 - 1.2 mg/dL    GFR Non-African American >60 >60    GFR African American >60 >60    Calcium 8.9 8.3 - 10.6 mg/dL    Total Protein 6.6 6.4 - 8.2 g/dL    Albumin 3.8 3.4 - 5.0 g/dL    Albumin/Globulin Ratio 1.4 1.1 - 2.2    Total Bilirubin <0.2 0.0 - 1.0 mg/dL    Alkaline Phosphatase 82 40 - 129 U/L    ALT 7 (L) 10 - 40 U/L    AST 13 (L) 15 - 37 U/L   Urinalysis, reflex to microscopic   Result Value Ref Range    Color, UA Yellow Straw/Yellow    Clarity, UA Clear Clear    Glucose, Ur Negative Negative mg/dL    Bilirubin Urine Negative Negative    Ketones, Urine TRACE (A) Negative mg/dL    Specific Gravity, UA 1.020 1.005 - 1.030    Blood, Urine Negative Negative    pH, UA 6.5 5.0 - 8.0    Protein, UA Negative Negative mg/dL    Urobilinogen, Urine 0.2 <2.0 E.U./dL    Nitrite, Urine Negative Negative    Leukocyte Esterase, Urine Negative Negative    Microscopic Examination Not Indicated     Urine Type NotGiven    Acetaminophen level   Result Value Ref Range    Acetaminophen Level 11 10 - 30 ug/mL   Salicylate   Result Value Ref Range    Salicylate, Serum <9.6 (L) 15.0 - 30.0 mg/dL   Ethanol   Result Value Ref Range    Ethanol Lvl None Detected mg/dL   Drug screen multi urine   Result Value Ref Range    Amphetamine Screen, Urine Neg Negative <1000ng/mL    Barbiturate Screen, Ur POSITIVE (A) Negative <200 ng/mL    Benzodiazepine Screen, Urine Neg Negative <200 ng/mL    Cannabinoid Scrn, Ur Neg Negative <50 ng/mL    Cocaine Metabolite Screen, Urine Neg Negative <300 ng/mL    Opiate Scrn, Ur Neg Negative <300 ng/mL    PCP Screen, Urine Neg Negative <25 ng/mL    Methadone Screen, Urine Neg Negative <300 ng/mL    Propoxyphene Scrn, Ur Neg Negative <300 ng/mL    Oxycodone Urine Neg Negative <100 ng/ml    pH, UA 6.5     Drug Screen Comment: see below    Magnesium   Result Value Ref Range    Magnesium 1.60 (L) 1.80 - 2.40 mg/dL           RADIOLOGY  X-RAYS:  I have reviewed radiologic plain film image(s). ALL OTHER NON-PLAIN FILM IMAGES SUCH AS CT, ULTRASOUND AND MRI HAVE BEEN READ BY THE RADIOLOGIST. No orders to display            ED COURSE/MDM  This is a 71-year-old female who presents emergency department for evaluation of psychiatric evaluation, suicidality. Patient continues to endorse suicidal ideation. Patient arrives with stable vital signs, no acute distress, calm, cooperative. There is no signs of self-harm on exam.  Patient is reporting that she is anxious. ED evaluation includes psychiatric screening labs. We will obtain psychiatric consultation and patient will require psychiatric admission for concerning Rafael ideation in the setting of previous attempts, significant psychiatric history. Patient is agreeable with this plan. She will be given Tessalon Perles for cough as well as 1 mg Ativan for anxiety. Lab evaluation was unremarkable, barbiturates positive urine drug screen. Patient had mildly low magnesium, this was supplemented orally. Patient is medically clear at this time. She is pending psychiatry evaluation and recommendations. Will be admitted to psychiatry. Admitted in stable condition. CLINICAL IMPRESSION  1. Suicidal ideation        Blood pressure (!) 167/74, pulse 74, temperature 97 °F (36.1 °C), temperature source Oral, resp. rate 18, SpO2 95 %, not currently breastfeeding. DISPOSITION  Amanda Fitzpatrick was admitted in stable condition.      This chart was generated in part by using Dragon Dictation system and may contain errors related to that system including errors in grammar, punctuation, and spelling, as well as words and phrases that may be inappropriate. If there are any questions or concerns please feel free to contact the dictating provider for clarification.      Leora Pino MD  11/05/21 2909

## 2021-11-05 NOTE — ED NOTES
attempted to precert pt for [employer] and called [phone] and was on hold for 15 minutes.  was then transferred to [phone] for precert and then got voicemail recording stating that office was open 8-4:30 Monday-Friday. Unable to precert pt at this time.      CACHORRO Walker  11/05/21 [id number]

## 2021-11-05 NOTE — ED NOTES
Level of Care Disposition: Admit  Patient was seen by ED provider and [facility] staff. The case presented to psychiatric provider on-call Dr. Annie Pastor. Based on the ED evaluation and information presented to the provider by Janis Copeland RN it is the recommendation of the on call psychiatric provider that inpatient hospitalization is the least restrictive environment for the patient at this time. The patient will be admitted to the inpatient unit.  Admitting provider did not order suicide precautions based on patient is max for safety while in hospital       Preethi Street RN  11/05/21 1800

## 2021-11-06 LAB — TSH SERPL DL<=0.05 MIU/L-ACNC: 0.56 UIU/ML (ref 0.27–4.2)

## 2021-11-06 PROCEDURE — 83036 HEMOGLOBIN GLYCOSYLATED A1C: CPT

## 2021-11-06 PROCEDURE — 6370000000 HC RX 637 (ALT 250 FOR IP): Performed by: PSYCHIATRY & NEUROLOGY

## 2021-11-06 PROCEDURE — 80061 LIPID PANEL: CPT

## 2021-11-06 PROCEDURE — 99221 1ST HOSP IP/OBS SF/LOW 40: CPT | Performed by: NURSE PRACTITIONER

## 2021-11-06 PROCEDURE — 99223 1ST HOSP IP/OBS HIGH 75: CPT | Performed by: PSYCHIATRY & NEUROLOGY

## 2021-11-06 PROCEDURE — 36415 COLL VENOUS BLD VENIPUNCTURE: CPT

## 2021-11-06 PROCEDURE — 94640 AIRWAY INHALATION TREATMENT: CPT

## 2021-11-06 PROCEDURE — 6370000000 HC RX 637 (ALT 250 FOR IP): Performed by: NURSE PRACTITIONER

## 2021-11-06 PROCEDURE — 1240000000 HC EMOTIONAL WELLNESS R&B

## 2021-11-06 RX ORDER — OXYBUTYNIN CHLORIDE 5 MG/1
10 TABLET, EXTENDED RELEASE ORAL DAILY
Status: DISCONTINUED | OUTPATIENT
Start: 2021-11-06 | End: 2021-11-07 | Stop reason: HOSPADM

## 2021-11-06 RX ORDER — AMLODIPINE BESYLATE 5 MG/1
5 TABLET ORAL DAILY
Status: DISCONTINUED | OUTPATIENT
Start: 2021-11-06 | End: 2021-11-07 | Stop reason: HOSPADM

## 2021-11-06 RX ORDER — OLANZAPINE 10 MG/1
5 INJECTION, POWDER, LYOPHILIZED, FOR SOLUTION INTRAMUSCULAR EVERY 4 HOURS PRN
Status: DISCONTINUED | OUTPATIENT
Start: 2021-11-06 | End: 2021-11-07 | Stop reason: HOSPADM

## 2021-11-06 RX ORDER — IBUPROFEN 400 MG/1
400 TABLET ORAL EVERY 6 HOURS PRN
Status: DISCONTINUED | OUTPATIENT
Start: 2021-11-06 | End: 2021-11-06

## 2021-11-06 RX ORDER — NICOTINE 21 MG/24HR
1 PATCH, TRANSDERMAL 24 HOURS TRANSDERMAL DAILY
Status: DISCONTINUED | OUTPATIENT
Start: 2021-11-06 | End: 2021-11-07 | Stop reason: HOSPADM

## 2021-11-06 RX ORDER — POLYETHYLENE GLYCOL 3350 17 G
2 POWDER IN PACKET (EA) ORAL
Status: DISCONTINUED | OUTPATIENT
Start: 2021-11-06 | End: 2021-11-07 | Stop reason: HOSPADM

## 2021-11-06 RX ORDER — BUTALBITAL, ACETAMINOPHEN AND CAFFEINE 50; 325; 40 MG/1; MG/1; MG/1
1 TABLET ORAL EVERY 4 HOURS PRN
Status: DISCONTINUED | OUTPATIENT
Start: 2021-11-06 | End: 2021-11-07 | Stop reason: HOSPADM

## 2021-11-06 RX ORDER — ACETAMINOPHEN 325 MG/1
650 TABLET ORAL EVERY 4 HOURS PRN
Status: DISCONTINUED | OUTPATIENT
Start: 2021-11-06 | End: 2021-11-07 | Stop reason: HOSPADM

## 2021-11-06 RX ORDER — OLANZAPINE 5 MG/1
5 TABLET ORAL EVERY 4 HOURS PRN
Status: DISCONTINUED | OUTPATIENT
Start: 2021-11-06 | End: 2021-11-07 | Stop reason: HOSPADM

## 2021-11-06 RX ADMIN — DIVALPROEX SODIUM 250 MG: 250 TABLET, DELAYED RELEASE ORAL at 09:27

## 2021-11-06 RX ADMIN — DICLOFENAC SODIUM 4 G: 10 GEL TOPICAL at 12:19

## 2021-11-06 RX ADMIN — BUSPIRONE HYDROCHLORIDE 15 MG: 5 TABLET ORAL at 09:26

## 2021-11-06 RX ADMIN — QUETIAPINE FUMARATE 300 MG: 100 TABLET ORAL at 20:29

## 2021-11-06 RX ADMIN — Medication 2 PUFF: at 09:47

## 2021-11-06 RX ADMIN — DIVALPROEX SODIUM 500 MG: 500 TABLET, DELAYED RELEASE ORAL at 20:28

## 2021-11-06 RX ADMIN — Medication 2 PUFF: at 20:20

## 2021-11-06 RX ADMIN — TIOTROPIUM BROMIDE INHALATION SPRAY 2 PUFF: 3.12 SPRAY, METERED RESPIRATORY (INHALATION) at 09:48

## 2021-11-06 RX ADMIN — PRIMIDONE 100 MG: 50 TABLET ORAL at 09:26

## 2021-11-06 RX ADMIN — IPRATROPIUM BROMIDE AND ALBUTEROL 1 PUFF: 20; 100 SPRAY, METERED RESPIRATORY (INHALATION) at 09:46

## 2021-11-06 RX ADMIN — AMLODIPINE BESYLATE 5 MG: 5 TABLET ORAL at 13:33

## 2021-11-06 RX ADMIN — BUTALBITAL, ACETAMINOPHEN, AND CAFFEINE 1 TABLET: 50; 325; 40 TABLET ORAL at 13:33

## 2021-11-06 RX ADMIN — GABAPENTIN 100 MG: 100 CAPSULE ORAL at 13:33

## 2021-11-06 RX ADMIN — BUSPIRONE HYDROCHLORIDE 15 MG: 5 TABLET ORAL at 20:28

## 2021-11-06 RX ADMIN — PANTOPRAZOLE SODIUM 40 MG: 40 TABLET, DELAYED RELEASE ORAL at 06:24

## 2021-11-06 RX ADMIN — CARVEDILOL 12.5 MG: 6.25 TABLET, FILM COATED ORAL at 09:26

## 2021-11-06 RX ADMIN — DULOXETINE HYDROCHLORIDE 60 MG: 60 CAPSULE, DELAYED RELEASE ORAL at 09:26

## 2021-11-06 RX ADMIN — ASPIRIN 81 MG: 81 TABLET, CHEWABLE ORAL at 09:26

## 2021-11-06 RX ADMIN — DICLOFENAC SODIUM 4 G: 10 GEL TOPICAL at 20:30

## 2021-11-06 RX ADMIN — GABAPENTIN 100 MG: 100 CAPSULE ORAL at 09:27

## 2021-11-06 RX ADMIN — DICLOFENAC SODIUM 4 G: 10 GEL TOPICAL at 13:33

## 2021-11-06 RX ADMIN — OXYBUTYNIN CHLORIDE 10 MG: 5 TABLET, EXTENDED RELEASE ORAL at 13:33

## 2021-11-06 RX ADMIN — GABAPENTIN 100 MG: 100 CAPSULE ORAL at 20:28

## 2021-11-06 RX ADMIN — CARVEDILOL 12.5 MG: 6.25 TABLET, FILM COATED ORAL at 20:30

## 2021-11-06 ASSESSMENT — PAIN SCALES - GENERAL
PAINLEVEL_OUTOF10: 9
PAINLEVEL_OUTOF10: 0
PAINLEVEL_OUTOF10: 6

## 2021-11-06 ASSESSMENT — LIFESTYLE VARIABLES: HISTORY_ALCOHOL_USE: NO

## 2021-11-06 ASSESSMENT — SLEEP AND FATIGUE QUESTIONNAIRES
DIFFICULTY FALLING ASLEEP: YES
RESTFUL SLEEP: NO
AVERAGE NUMBER OF SLEEP HOURS: 7
DO YOU HAVE DIFFICULTY SLEEPING: YES
DO YOU USE A SLEEP AID: YES
DIFFICULTY STAYING ASLEEP: YES
DIFFICULTY ARISING: NO
SLEEP PATTERN: NIGHTMARES/TERRORS;RESTLESSNESS;DISTURBED/INTERRUPTED SLEEP

## 2021-11-06 NOTE — BH NOTE
Purposeful Rounding     Patient concerns reported: Patient stated she is tired this morning and requested her breakfast tray be saved for later     Nurse made aware: yes     Patient Turned and repositioned: Patient independent     Patient Toileted: Patient independent and continent     Fall Precautions in Place: Yellow non-skid socks on.  Room free from clutter, Clear path to bathroom , Adequate lighting and No safety hazards noted

## 2021-11-06 NOTE — FLOWSHEET NOTE
Senior Purposeful Rounding     Position:Sitting     Physical Environment:Room free from clutter, Clear path to bathroom , Adequate lighting and No safety hazards noted     Pain Rating/ Nonverbal Pain Behaviors:denies;      Pain interventions Attempted: n/a     Patient Toileted:Continent and Independent

## 2021-11-06 NOTE — FLOWSHEET NOTE
Senior Purposeful Rounding     Position:Sitting     Physical Environment:Room free from clutter, Clear path to bathroom , Adequate lighting and No safety hazards noted     Pain Rating/ Nonverbal Pain Behaviors:pt complaining of headache migraine type. She refused Tylenol stated that this does not help her migraines.   Informed patient that we would notify the physician.       Pain interventions Attempted: n/a     Patient Toileted:Continent and Independent

## 2021-11-06 NOTE — FLOWSHEET NOTE
Senior Purposeful Rounding    Position:Sitting    Physical Environment:Room free from clutter, Clear path to bathroom  and No safety hazards noted    Pain Rating/ Nonverbal Pain Behaviors:denies;   Pain interventions Attempted: n/a    Patient Toileted:Continent and Independent

## 2021-11-06 NOTE — BH NOTE
Purposeful Rounding     Patient concerns reported: None reported. Patient sitting resting quietly with eyes closed, turned on left side. Nurse made aware: No     Patient Turned and repositioned: Patient independent     Patient Toileted: Patient independent and continent     Fall Precautions in Place: Yellow non-skid socks on, lighting appropriate and room free of clutter with clear path to bathroom.

## 2021-11-06 NOTE — H&P
Hospital Medicine History & Physical      PCP: Mia Alanis MD    Date of Admission: 11/5/2021    Date of Service: Pt seen/examined on 11/6/2021   Chief Complaint:    Chief Complaint   Patient presents with    Psychiatric Evaluation     Pt states she has been severly depressed and cannot stand to be around anyone at home          History Of Present Illness: The patient is a 72 y.o. female with chronic pain, HTN, emphysema, bipolar DO who presented to [facility] ER for depression, SI. Patient was seen and evaluated in the ED by the ED medical provider, patient was medically cleared for admission to [facility] at [facility]. This note serves as an admission medical H&P.    PCP: Mia Alanis MD  Tobacco use: Current  Alcohol use: denies  Drug use: denies    Patient c/o migraine.       Past Medical History:        Diagnosis Date    Abnormal ECG 12/14/2012    Anemia     Arthritis     Back pain, chronic 3/13/2013    Bipolar disorder (HCC)     Bronchitis chronic     CHF (congestive heart failure) (HCC) 9/30/2016    Chronic back pain     Chronic neck pain     Clostridium difficile infection 3/29/12, 5/22/12    positive stool DNA probe    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    Coronary artery disease involving native coronary artery of native heart with angina pectoris ([address] 3/8/2016    Depression     Emphysema of lung (HCC)     Fibromyalgia     hyperlipidemia 8/11/2011    Hypertension     Kidney stone     Liver disease     Lung disease     MDD (major depressive disorder) 4/4/2012    Mood disorder ([address] 3/13/2013    Movement disorder     Neck pain, chronic 3/13/2013    Opiate misuse     Personality disorder ([address]     Pneumonia     Polypharmacy 2/16/2013       Past Surgical History:        Procedure Laterality Date    COLONOSCOPY  1/19/12    DIAGNOSTIC CARDIAC CATH LAB PROCEDURE  Feb, 2007    Normal cor angio Feb, 2007    HERNIA REPAIR  07/11/2019    robotic ventral hernia repair with mesh    HERNIA REPAIR N/A 7/11/2019    ROBOTIC VENTRAL HERNIA REPAIR WITH MESH performed by Michael Horner MD at [id number] [facility], TOTAL ABDOMINAL      KIDNEY STONE SURGERY         Medications Prior to Admission:    Prior to Admission medications    Medication Sig Start Date End Date Taking? Authorizing Provider   predniSONE (DELTASONE) 20 MG tablet Take 2 tablets by mouth daily for 4 days 11/2/21 11/6/21  Andrei Rubi MD   QUEtiapine (SEROQUEL) 100 MG tablet Take 3 tablets by mouth nightly 8/16/21   Susie Steinberg MD   carvedilol (COREG) 12.5 MG tablet Take 1 tablet by mouth 2 times daily (with meals) 8/16/21   Susie Steinberg MD   amLODIPine (NORVASC) 5 MG tablet Take 1 tablet by mouth daily 8/17/21   Susie Steinberg MD   gabapentin (NEURONTIN) 100 MG capsule Take 100 mg by mouth 3 times daily.     Historical Provider, MD   oxybutynin (DITROPAN-XL) 10 MG extended release tablet Take 10 mg by mouth daily    Historical Provider, MD   fluticasone-umeclidin-vilant (TRELEGY ELLIPTA) 100-62.5-25 MCG/INH AEPB Inhale 1 puff into the lungs daily    Historical Provider, MD   albuterol-ipratropium (COMBIVENT RESPIMAT)  MCG/ACT AERS inhaler Inhale 1 puff into the lungs every 6 hours as needed for Wheezing or Shortness of Breath 7/9/21   BALJIT Padilla - CNP   primidone (MYSOLINE) 50 MG tablet Take 100 mg by mouth daily     Historical Provider, MD   busPIRone (BUSPAR) 15 MG tablet Take 15 mg by mouth 2 times daily     Historical Provider, MD   aspirin 81 MG chewable tablet Take 1 tablet by mouth daily 9/30/20   Bertha Ba DO   pantoprazole (PROTONIX) 40 MG tablet Take 1 tablet by mouth every morning (before breakfast) 9/30/20   Bertha Ba DO   nicotine (NICODERM CQ) 14 MG/24HR Place 1 patch onto the skin daily 3/14/20 4/25/20  BALJIT Palmer - CNP   diclofenac sodium 1 % GEL Apply 4 g topically 4 times daily 1/11/20   CEE Kumar   divalproex (DEPAKOTE) 500 MG DR tablet Take 500 mg by mouth nightly    Historical Provider, MD   DULoxetine (CYMBALTA) 60 MG extended release capsule Take 60 mg by mouth daily     Historical Provider, MD   fluticasone (FLONASE) 50 MCG/ACT nasal spray 1 spray by Nasal route daily as needed     Historical Provider, MD   divalproex (DEPAKOTE) 250 MG DR tablet Take 250 mg by mouth every morning     Historical Provider, MD       Allergies:  Dicyclomine, Ibuprofen, Sumatriptan, Tape [adhesive tape], Tizanidine, and Motrin [ibuprofen micronized]    Social History:  The patient currently lives at home with her      TOBACCO:   reports that she has been smoking cigarettes. She has a 94.00 pack-year smoking history. She has never used smokeless tobacco.  ETOH:   reports no history of alcohol use. Family History:   Positive as follows:        Problem Relation Age of Onset    Emphysema Mother     Heart Disease Mother     Arthritis Mother     Depression Mother     High Blood Pressure Mother     Heart Failure Father     Heart Disease Father     Arthritis Father     Diabetes Father     High Blood Pressure Father     Stroke Father     Substance Abuse Father     High Blood Pressure Brother     Heart Disease Sister     Kidney Disease Daughter     Breast Cancer Maternal Aunt        REVIEW OF SYSTEMS:       Constitutional: Negative for fever + migraine  Respiratory: Negative  for dyspnea, cough   Cardiovascular: Negative for chest pain   Gastrointestinal: Negative for vomiting, diarrhea   Genitourinary: Negative for dysuria  Musculoskeletal: Negative for arthralgias   Skin: Negative for rash   Neurological: Negative for syncope    Psychiatric/Behavorial: Per psychiatry team evaluation     PHYSICAL EXAM:    BP (!) 173/75   Pulse 69   Temp 97.3 °F (36.3 °C) (Temporal)   Resp 18   Ht 5' 1\" (1.549 m)   Wt 135 lb (61.2 kg)   SpO2 95%   BMI 25.51 kg/m²     Gen: No distress. Alert. Eyes: PERRL. No sclera icterus. No conjunctival injection.    ENT: No discharge. Pharynx clear. Neck: Trachea midline. Resp: No accessory muscle use. No crackles. No wheezes. No rhonchi. CV: Regular rate. Regular rhythm. No murmur. No rub. No edema. GI: Non-tender. Non-distended. Normal bowel sounds. Skin: Warm and dry. No nodule on exposed extremities. No rash on exposed extremities. M/S: No cyanosis. No joint deformity. No clubbing. Neuro: Awake. No focal neurologic deficit on exam.  Cranial nerves II through XII intact. Patient is able to ambulate without difficulty. Psych: Per psychiatry team evaluation     CBC:   Recent Labs     11/05/21  1600   WBC 8.3   HGB 11.0*   HCT 32.3*   MCV 89.7        BMP:   Recent Labs     11/05/21  1600   *   K 3.5   CL 98*   CO2 25   BUN 8   CREATININE <0.5*     LIVER PROFILE:   Recent Labs     11/05/21  1600   AST 13*   ALT 7*   BILITOT <0.2   ALKPHOS 82     UA:  Recent Labs     11/05/21  1643   COLORU Yellow   PHUR 6.5  6.5   CLARITYU Clear   SPECGRAV 1.020   LEUKOCYTESUR Negative   UROBILINOGEN 0.2   BILIRUBINUR Negative   BLOODU Negative   GLUCOSEU Negative        CULTURES  COVID 19, NAAT: not detected     EKG:  I have reviewed the EKG with the following interpretation:   None     RADIOLOGY  No orders to display       ASSESSMENT/PLAN:  #Bipolar DO  - per psychiatry team    #COPD  - stable, no AE. Cont inhalers    #HTN  - stable cont coreg, Amlodipine    #Chronic pain  - cont gabapentin     #OAB  - cont oxybutynin    #Tremors  - cont primidone     #Normocytic anemia  - h/h stable compared to prior labs.  pcp f/u    Chavez Horn FNP-C  11/6/2021 11:58 AM

## 2021-11-06 NOTE — PROGRESS NOTES
`[facility]  Admission Note     Admission Type:   Admission Type:  Involuntary    Reason for admission:       PATIENT STRENGTHS:  Strengths: Communication    Patient Strengths and Limitations:  Limitations: Tendency to isolate self, Difficult relationships / poor social skills    Addictive Behavior:   Addictive Behavior  In the past 3 months, have you felt or has someone told you that you have a problem with:  : None  Do you have a history of Chemical Use?: No  Do you have a history of Alcohol Use?: No  Do you have a history of Street Drug Abuse?: No  Histroy of Prescripton Drug Abuse?: No    Medical Problems:   Past Medical History:   Diagnosis Date    Abnormal ECG 12/14/2012    Anemia     Arthritis     Back pain, chronic 3/13/2013    Bipolar disorder (HCC)     Bronchitis chronic     CHF (congestive heart failure) (HCC) 9/30/2016    Chronic back pain     Chronic neck pain     Clostridium difficile infection 3/29/12, 5/22/12    positive stool DNA probe    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 01/10/2019    Coronary artery disease involving native coronary artery of native heart with angina pectoris ([facility] 3/8/2016    Depression     Emphysema of lung (HCC)     Fibromyalgia     hyperlipidemia 8/11/2011    Hypertension     Kidney stone     Liver disease     Lung disease     MDD (major depressive disorder) 4/4/2012    Mood disorder ([address] 3/13/2013    Movement disorder     Neck pain, chronic 3/13/2013    Opiate misuse     Personality disorder ([facility]     Pneumonia     Polypharmacy 2/16/2013       Status EXAM:  Status and Exam  Normal: No  Facial Expression: Elevated, Worried  Affect: Congruent, Stable  Level of Consciousness: Confused  Mood:Normal: No  Mood: Anxious  Motor Activity:Normal: Yes  Interview Behavior: Cooperative, Evasive  Preception: [place] to Person, [place] to Place, [place] to Situation, [place] to Time  Attention:Normal: Yes  Thought Processes: Flt.of Ideas  Thought Content:Normal: No  Thought Content: Preoccupations  Hallucinations: None  Delusions: No  Memory:Normal: Yes  Insight and Judgment: No  Insight and Judgment: Poor Judgment, Poor Insight  Present Suicidal Ideation: No  Present Homicidal Ideation: No    Tobacco Screening:  Practical Counseling, on admission, sarah X, if applicable and completed (first 3 are required if patient doesn't refuse): ( x)  Recognizing danger situations (included triggers and roadblocks)                    ( )  Coping skills (new ways to manage stress, exercise, relaxation techniques, changing routine, distraction)                                                           ( )  Basic information about quitting (benefits of quitting, techniques in how to quit, available resources  ( ) Referral for counseling faxed to [facility]                                             (x ) Patient refused counseling  ( ) Patient has not smoked in the last 30 days    Metabolic Screening:    Lab Results   Component Value Date    LABA1C 5.5 10/20/2013       No results for input(s): CHOL, TRIG, HDL, LDLCALC, LABVLDL in the last 72 hours. There is no height or weight on file to calculate BMI. BP Readings from Last 2 Encounters:   11/05/21 (!) 172/80   11/02/21 114/89           Pt admitted with followings belongings:  Dentures: Lowers, Uppers  Vision - Corrective Lenses: Glasses  Hearing Aid: None  Jewelry: Ring, Necklace  Body Piercings Removed: No  Clothing: Shirt, Pants, Undergarments (Comment), Socks, Footwear, Jacket / coat  Were All Patient Medications Collected?: Not Applicable  Other Valuables: Purse, Money (Comment)     . Valuables placed in safe in envelope x2. Patient's home medications were not brought with her. Patient oriented to surroundings and program expectations and copy of patient rights given. Received admission packet:  yes. Consents reviewed, signed yes. Patient verbalize understanding:  yes.     Patient education on precautions: yes    Arrived From [facility]FFIL[facility] OF [facility] via staff x2                   Kay Vaughan RN

## 2021-11-06 NOTE — FLOWSHEET NOTE
Senior Purposeful Rounding    Position:Left Side    Physical Environment:Room free from clutter, Clear path to bathroom  and No safety hazards noted    Pain Rating/ Nonverbal Pain Behaviors:denies; pt noted to be resting, eyes closed respirations even and easy    Pain interventions Attempted: n/a    Patient Toileted:Continent and Independent

## 2021-11-06 NOTE — PROGRESS NOTES
4 Eyes Skin Assessment     The patient is being assessed for  Admission    I agree that 2 RN's have performed a thorough Head to Toe Skin Assessment on the patient. ALL assessment sites listed below have been assessed. Areas assessed for pressure by both nurses:  [x]   Head, Face, and Ears   [x]   Shoulders, Back, and Chest  [x]   Arms, Elbows, and Hands   [x]   Coccyx, Sacrum, and Ischum  [x]   Legs, Feet, and Heels                                Skin Assessed Under all Medical Devices by both nurses:  NA              All Mepilex Borders were peeled back and area peeked at by both nurses:  No: NA  Please list where Mepilex Borders are located:  NA                 Does the Patient have Skin Breakdown related to pressure? NA              Emmanuel Prevention initiated:  NA   Wound Care Orders initiated:  NA      [facility] nurse consulted for Pressure Injury (Stage 3,4, Unstageable, DTI, NWPT, Complex wounds)and New or Established Ostomies:  NA        Nurse 1 eSignature: Electronically signed by Richard Enciso RN on 11/5/21 at 9:07 PM EDT    **SHARE this note so that the co-signing nurse is able to place an eSignature**    Nurse 2 eSignature: ALVIN Mitchell RN

## 2021-11-06 NOTE — FLOWSHEET NOTE
Senior Purposeful Rounding    Position:Sitting    Physical Environment:Room free from clutter, Clear path to bathroom  and No safety hazards noted    Pain Rating/ Nonverbal Pain Behaviors:9    Pain interventions Attempted: Fioricet    Patient Toileted:Continent and Independent

## 2021-11-06 NOTE — FLOWSHEET NOTE
Senior Purposeful Rounding    Position:Sitting    Physical Environment:Room free from clutter, Clear path to bathroom  and No safety hazards noted    Pain Rating/ Nonverbal Pain Behaviors:denies;     Pain interventions Attempted: n/a    Patient Toileted:Continent

## 2021-11-06 NOTE — FLOWSHEET NOTE
Patient requesting medication for headache. Tylenol was pulled and given to patient. Patient then pushed the medication away and stated she did not want to take it that Tylenol does not work for her migraines.

## 2021-11-06 NOTE — PROGRESS NOTES
RT Inhaler-Nebulizer Bronchodilator Protocol Note    There is a bronchodilator order in the chart from a provider indicating to follow the RT Bronchodilator Protocol and there is an Initiate RT Inhaler-Nebulizer Bronchodilator Protocol order as well (see protocol at bottom of note). CXR Findings:  No results found. The findings from the last RT Protocol Assessment were as follows:   History Pulmonary Disease: Chronic pulmonary disease  Respiratory Pattern: Regular pattern and RR 12-20 bpm  Breath Sounds: Slightly diminished and/or crackles  Cough: Strong, spontaneous, non-productive  Indication for Bronchodilator Therapy: Decreased or absent breath sounds, On home bronchodilators  Bronchodilator Assessment Score: 4    Aerosolized bronchodilator medication orders have been revised according to the RT Inhaler-Nebulizer Bronchodilator Protocol below. Respiratory Therapist to perform RT Therapy Protocol Assessment initially then follow the protocol. Repeat RT Therapy Protocol Assessment PRN for score 0-3 or on second treatment, BID, and PRN for scores above 3. No Indications - adjust the frequency to every 6 hours PRN wheezing or bronchospasm, if no treatments needed after 48 hours then discontinue using Per Protocol order mode. If indication present, adjust the RT bronchodilator orders based on the Bronchodilator Assessment Score as indicated below. Use Inhaler orders unless patient has one or more of the following: on home nebulizer, not able to hold breath for 10 seconds, is not alert and oriented, cannot activate and use MDI correctly, or respiratory rate 25 breaths per minute or more, then use the equivalent nebulizer order(s) with same Frequency and PRN reasons based on the score. If a patient is on this medication at home then do not decrease Frequency below that used at home.     0-3 - enter or revise RT bronchodilator order(s) to equivalent RT Bronchodilator order with Frequency of every 4 hours PRN for wheezing or increased work of breathing using Per Protocol order mode. 4-6 - enter or revise RT Bronchodilator order(s) to two equivalent RT bronchodilator orders with one order with BID Frequency and one order with Frequency of every 4 hours PRN wheezing or increased work of breathing using Per Protocol order mode. 7-10 - enter or revise RT Bronchodilator order(s) to two equivalent RT bronchodilator orders with one order with TID Frequency and one order with Frequency of every 4 hours PRN wheezing or increased work of breathing using Per Protocol order mode. 11-13 - enter or revise RT Bronchodilator order(s) to one equivalent RT bronchodilator order with QID Frequency and an Albuterol order with Frequency of every 4 hours PRN wheezing or increased work of breathing using Per Protocol order mode. Greater than 13 - enter or revise RT Bronchodilator order(s) to one equivalent RT bronchodilator order with every 4 hours Frequency and an Albuterol order with Frequency of every 2 hours PRN wheezing or increased work of breathing using Per Protocol order mode.        Electronically signed by Lady Morejon RCP on 11/5/2021 at 9:06 PM

## 2021-11-06 NOTE — BH NOTE
Purposeful Rounding     Patient concerns reported: None reported     Nurse made aware: No     Patient Turned and repositioned: Patient independent     Patient Toileted: Patient independent and continent     Fall Precautions in Place: Yellow non-skid socks on.  Room free from clutter, Clear path to bathroom , Adequate lighting and No safety hazards noted

## 2021-11-06 NOTE — PLAN OF CARE
Problem: Falls - Risk of:  Goal: Will remain free from falls  Description: Will remain free from falls  Outcome: Ongoing     Problem: Falls - Risk of:  Goal: Absence of physical injury  Description: Absence of physical injury  Outcome: Ongoing     Problem: Confusion - Acute:  Goal: Absence of continued neurological deterioration signs and symptoms  Description: Absence of continued neurological deterioration signs and symptoms  Outcome: Ongoing     Problem: Confusion - Acute:  Goal: Mental status will be restored to baseline  Description: Mental status will be restored to baseline  Outcome: Ongoing     Problem: Discharge Planning:  Goal: Ability to perform activities of daily living will improve  Description: Ability to perform activities of daily living will improve  Outcome: Ongoing     Problem: Discharge Planning:  Goal: Participates in care planning  Description: Participates in care planning  Outcome: Ongoing     Problem: Injury - Risk of, Physical Injury:  Goal: Will remain free from falls  Description: Will remain free from falls  Outcome: Ongoing     Problem: Injury - Risk of, Physical Injury:  Goal: Absence of physical injury  Description: Absence of physical injury  Outcome: Ongoing     Problem: Mood - Altered:  Goal: Mood stable  Description: Mood stable  Outcome: Ongoing     Problem: Mood - Altered:  Goal: Verbalizations of feeling emotionally comfortable while being cared for will increase  Description: Verbalizations of feeling emotionally comfortable while being cared for will increase  Outcome: Ongoing    Patient visible on unit. Social with peers. She attended group. She has been medication compliant. Calm and cooperative with interview. She is alert and oriented x 4. No angry outburst or abusive behavior. She remains free from falls or any physical injury. She denies SI/HI/AVH. She states she is feeling depressed and worrying about going home.   She feels that her family doesn't listen to her. She has been depressed since she has not been able to drive and do things on her own, and that her family does not want to take her to places, so staying at home everyday makes her more depressed. She did utilize PRN Fioricet for migraine pain which was effective.
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
## 2021-11-07 VITALS
HEIGHT: 61 IN | HEART RATE: 79 BPM | DIASTOLIC BLOOD PRESSURE: 85 MMHG | RESPIRATION RATE: 18 BRPM | TEMPERATURE: 97 F | OXYGEN SATURATION: 93 % | WEIGHT: 135 LBS | BODY MASS INDEX: 25.49 KG/M2 | SYSTOLIC BLOOD PRESSURE: 169 MMHG

## 2021-11-07 LAB
CHOLESTEROL, TOTAL: 152 MG/DL (ref 0–199)
HDLC SERPL-MCNC: 64 MG/DL (ref 40–60)
LDL CHOLESTEROL CALCULATED: 68 MG/DL
TRIGL SERPL-MCNC: 100 MG/DL (ref 0–150)
VLDLC SERPL CALC-MCNC: 20 MG/DL

## 2021-11-07 PROCEDURE — 6370000000 HC RX 637 (ALT 250 FOR IP): Performed by: NURSE PRACTITIONER

## 2021-11-07 PROCEDURE — 6370000000 HC RX 637 (ALT 250 FOR IP): Performed by: PSYCHIATRY & NEUROLOGY

## 2021-11-07 PROCEDURE — 94761 N-INVAS EAR/PLS OXIMETRY MLT: CPT

## 2021-11-07 PROCEDURE — 94640 AIRWAY INHALATION TREATMENT: CPT

## 2021-11-07 PROCEDURE — 5130000000 HC BRIDGE APPOINTMENT

## 2021-11-07 PROCEDURE — 99239 HOSP IP/OBS DSCHRG MGMT >30: CPT | Performed by: PSYCHIATRY & NEUROLOGY

## 2021-11-07 RX ADMIN — CARVEDILOL 12.5 MG: 6.25 TABLET, FILM COATED ORAL at 09:23

## 2021-11-07 RX ADMIN — PANTOPRAZOLE SODIUM 40 MG: 40 TABLET, DELAYED RELEASE ORAL at 06:38

## 2021-11-07 RX ADMIN — GABAPENTIN 100 MG: 100 CAPSULE ORAL at 15:56

## 2021-11-07 RX ADMIN — Medication 2 PUFF: at 07:11

## 2021-11-07 RX ADMIN — BUSPIRONE HYDROCHLORIDE 15 MG: 5 TABLET ORAL at 09:18

## 2021-11-07 RX ADMIN — TIOTROPIUM BROMIDE INHALATION SPRAY 2 PUFF: 3.12 SPRAY, METERED RESPIRATORY (INHALATION) at 07:11

## 2021-11-07 RX ADMIN — PRIMIDONE 100 MG: 50 TABLET ORAL at 09:18

## 2021-11-07 RX ADMIN — AMLODIPINE BESYLATE 5 MG: 5 TABLET ORAL at 09:23

## 2021-11-07 RX ADMIN — OXYBUTYNIN CHLORIDE 10 MG: 5 TABLET, EXTENDED RELEASE ORAL at 09:19

## 2021-11-07 RX ADMIN — ASPIRIN 81 MG: 81 TABLET, CHEWABLE ORAL at 09:19

## 2021-11-07 RX ADMIN — GABAPENTIN 100 MG: 100 CAPSULE ORAL at 09:22

## 2021-11-07 RX ADMIN — DICLOFENAC SODIUM 4 G: 10 GEL TOPICAL at 09:22

## 2021-11-07 RX ADMIN — DIVALPROEX SODIUM 250 MG: 250 TABLET, DELAYED RELEASE ORAL at 09:22

## 2021-11-07 RX ADMIN — DULOXETINE HYDROCHLORIDE 60 MG: 60 CAPSULE, DELAYED RELEASE ORAL at 09:19

## 2021-11-07 ASSESSMENT — PAIN SCALES - GENERAL
PAINLEVEL_OUTOF10: 0

## 2021-11-07 NOTE — FLOWSHEET NOTE
Senior Purposeful Rounding     Position:Repositions Self     Physical Environment:Room free from clutter, Clear path to bathroom , Adequate lighting and No safety hazards noted     Pain Rating/ Nonverbal Pain Behaviors:denies;      Pain interventions Attempted: none     Patient Toileted:Independent
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
## 2021-11-07 NOTE — PLAN OF CARE
Pt is A+Ox4, calm, pleasant, cooperative and medication compliant. Pt denies SI/HI/AVH and no RTIS observed. Pt denies any other needs at this time.

## 2021-11-07 NOTE — BH NOTE
Purposeful Rounding    Patient concerns reported:PT LAYING IN BED    Nurse made aware:NO    Patient Turned and repositioned: Independent    Patient Toileted: Independent    Fall Precautions in Place: Yellow non-skid socks on, Bed locked in low position, 1/2 bed rails up, Lighting appropriate, Room free of clutter and Clear path to bathroom      Electronically signed by Lyudmila Quan on 11/6/21 at 9:25 PM EDT

## 2021-11-07 NOTE — BH NOTE
Purposeful Rounding    Patient concerns reported:none    Nurse made aware:no    Patient Turned and repositioned: Independent    Patient Toileted: Independent    Fall Precautions in Place: Yellow non-skid socks on, Lighting appropriate, Room free of clutter and Clear path to bathroom      Electronically signed by Elisa Lopez on 11/7/21 at 11:23 AM EST

## 2021-11-07 NOTE — DISCHARGE SUMMARY
.  [facility]  Discharge Note    Pt discharged with followings belongings:   Dentures: Lowers, Uppers  Vision - Corrective Lenses: Glasses  Hearing Aid: None  Jewelry: Ring, Necklace  Body Piercings Removed: N/A  Clothing: Shirt, Pants, Jacket / coat, Undergarments (Comment), Socks  Were All Patient Medications Collected?: Not Applicable  Other Valuables: Purse, Wallet, Money (Comment) (55 in bills, 1.81 in change)   Valuables sent home with Lauren Officer or returned to patient. Patient education on aftercare instructions: yes  Information faxed to n/a  by n/a  at 2:32 PM .Patient verbalize understanding of AVS:  yes.     Status EXAM upon discharge:  Status and Exam  Normal: Yes  Facial Expression: Brightened  Affect: Appropriate  Level of Consciousness: Alert  Mood:Normal: Yes  Mood: Anxious  Motor Activity:Normal: Yes  Interview Behavior: Cooperative  Preception: [place] to Person, [place] to Time, [place] to Place, [place] to Situation  Attention:Normal: Yes  Thought Processes: Flt.of Ideas  Thought Content:Normal: Yes  Thought Content: Preoccupations  Hallucinations: None  Delusions: No  Memory:Normal: No  Insight and Judgment: No  Insight and Judgment: Poor Judgment, Poor Insight  Present Suicidal Ideation: No  Present Homicidal Ideation: No      Metabolic Screening:    Lab Results   Component Value Date    LABA1C 5.5 10/20/2013       Lab Results   Component Value Date    CHOL 193 02/07/2018    CHOL 245 (H) 03/08/2016    CHOL 225 (H) 08/28/2015    CHOL 235 (H) 11/05/2014    CHOL 171 03/14/2013    CHOL 164 12/15/2012    CHOL 187 11/05/2012    CHOL 154 09/22/2011    CHOL 153 09/26/2010    CHOL 126 05/24/2010    CHOL 158 02/18/2010     Lab Results   Component Value Date    TRIG 132 02/07/2018    TRIG 255 (H) 03/08/2016    TRIG 68 08/28/2015    TRIG 176 (H) 11/05/2014    TRIG 82 03/14/2013    TRIG 114 12/15/2012    TRIG 225 (H) 11/05/2012    TRIG 211 (H) 09/22/2011    TRIG 182 (H) 09/26/2010    TRIG 111 05/24/2010    TRIG 166 (H) 02/18/2010     Lab Results   Component Value Date    HDL 70 (H) 02/07/2018    HDL 54 03/08/2016     (H) 08/28/2015    HDL 78 (H) 11/05/2014    HDL 67 (H) 03/14/2013    HDL 69 (H) 12/15/2012    HDL 48 11/05/2012    HDL 44 09/22/2011    HDL 44 09/26/2010    HDL 46 05/24/2010    HDL 45 02/18/2010     No components found for: LDLCAL  Lab Results   Component Value Date    LABVLDL 26 02/07/2018    LABVLDL 51 03/08/2016    LABVLDL 14 08/28/2015    LABVLDL 35 11/05/2014    LABVLDL 16 03/14/2013    LABVLDL 23 12/15/2012    LABVLDL 45 11/05/2012    LABVLDL 42 09/22/2011    LABVLDL 36 09/26/2010    LABVLDL 22 05/24/2010    LABVLDL 33 02/18/2010     Bridge Appointment completed: Reviewed Discharge Instructions with patient. Patient verbalizes understanding and agreement with the discharge plan using the teachback method.      Referral for Outpatient Tobacco Cessation Counseling, upon discharge (sarah X if applicable and completed):    ( )  Hospital staff assisted patient to call Quit Line or faxed referral                                   during hospitalization                  ( )  Recognizing danger situations (included triggers and roadblocks), if not completed on admission                    ( )  Coping skills (new ways to manage stress, exercise, relaxation techniques, changing routine, distraction), if not completed on admission                                                           ( )  Basic information about quitting (benefits of quitting, techniques in how to quit, available resources, if not completed on admission  ( ) Referral for counseling faxed to [facility]   (x) Patient refused referral  (x) Patient refused counseling  (x) Patient refused smoking cessation medication upon discharge    Vaccinations (sarah X if applicable and completed):  ( ) Patient states already received influenza vaccine elsewhere  ( ) Patient received influenza vaccine during this

## 2021-11-07 NOTE — BH NOTE
Purposeful Rounding    Patient concerns reported: None    Nurse made aware:No    Patient Turned and repositioned: Independent    Patient Toileted: Independent    Fall Precautions in Place: Yellow non-skid socks on, Lighting appropriate, Room free of clutter and Clear path to bathroom      Electronically signed by Elisa Lloyd on 11/7/21 at 10:54 AM EST

## 2021-11-07 NOTE — FLOWSHEET NOTE
Signed                  Senior Purposeful Rounding     Position:Repositions Self     Physical Environment:Room free from clutter, Clear path to bathroom , Adequate lighting and No safety hazards noted     Pain Rating/ Nonverbal Pain Behaviors:denies;      Pain interventions Attempted: none     Patient Toileted:Independent

## 2021-11-07 NOTE — PROGRESS NOTES
RT Inhaler-Nebulizer Bronchodilator Protocol Note    There is a bronchodilator order in the chart from a provider indicating to follow the RT Bronchodilator Protocol and there is an Initiate RT Inhaler-Nebulizer Bronchodilator Protocol order as well (see protocol at bottom of note). CXR Findings:  No results found. The findings from the last RT Protocol Assessment were as follows:   History Pulmonary Disease: Chronic pulmonary disease  Respiratory Pattern: Regular pattern and RR 12-20 bpm  Breath Sounds: Slightly diminished and/or crackles  Cough: Strong, spontaneous, non-productive  Indication for Bronchodilator Therapy: Decreased or absent breath sounds  Bronchodilator Assessment Score: 4    Aerosolized bronchodilator medication orders have been revised according to the RT Inhaler-Nebulizer Bronchodilator Protocol below. Respiratory Therapist to perform RT Therapy Protocol Assessment initially then follow the protocol. Repeat RT Therapy Protocol Assessment PRN for score 0-3 or on second treatment, BID, and PRN for scores above 3. No Indications - adjust the frequency to every 6 hours PRN wheezing or bronchospasm, if no treatments needed after 48 hours then discontinue using Per Protocol order mode. If indication present, adjust the RT bronchodilator orders based on the Bronchodilator Assessment Score as indicated below. Use Inhaler orders unless patient has one or more of the following: on home nebulizer, not able to hold breath for 10 seconds, is not alert and oriented, cannot activate and use MDI correctly, or respiratory rate 25 breaths per minute or more, then use the equivalent nebulizer order(s) with same Frequency and PRN reasons based on the score. If a patient is on this medication at home then do not decrease Frequency below that used at home.     0-3 - enter or revise RT bronchodilator order(s) to equivalent RT Bronchodilator order with Frequency of every 4 hours PRN for wheezing or increased work of breathing using Per Protocol order mode. 4-6 - enter or revise RT Bronchodilator order(s) to two equivalent RT bronchodilator orders with one order with BID Frequency and one order with Frequency of every 4 hours PRN wheezing or increased work of breathing using Per Protocol order mode. 7-10 - enter or revise RT Bronchodilator order(s) to two equivalent RT bronchodilator orders with one order with TID Frequency and one order with Frequency of every 4 hours PRN wheezing or increased work of breathing using Per Protocol order mode. 11-13 - enter or revise RT Bronchodilator order(s) to one equivalent RT bronchodilator order with QID Frequency and an Albuterol order with Frequency of every 4 hours PRN wheezing or increased work of breathing using Per Protocol order mode. Greater than 13 - enter or revise RT Bronchodilator order(s) to one equivalent RT bronchodilator order with every 4 hours Frequency and an Albuterol order with Frequency of every 2 hours PRN wheezing or increased work of breathing using Per Protocol order mode.        Electronically signed by Serena Ricks RCP on 11/6/2021 at 8:27 PM

## 2021-11-07 NOTE — BH NOTE
Purposeful Rounding    Patient concerns reported:PT IN BED SLEEPING    Nurse made aware:NO    Patient Turned and repositioned: Independent    Patient Toileted: Independent    Fall Precautions in Place: Yellow non-skid socks on, Bed locked in low position, 1/2 bed rails up, Lighting appropriate, Room free of clutter and Clear path to bathroom      Electronically signed by Odessa Harmon on 11/7/21 at 3:08 AM EST

## 2021-11-07 NOTE — BH NOTE
Purposeful Rounding    Patient concerns reported:PT IN BED SLEEPING    Nurse made aware:NO    Patient Turned and repositioned: Independent    Patient Toileted: Independent    Fall Precautions in Place: Yellow non-skid socks on, Bed locked in low position, 1/2 bed rails up, Lighting appropriate, Room free of clutter and Clear path to bathroom      Electronically signed by Tonya Hernandez on 11/7/21 at 5:21 AM EST

## 2021-11-07 NOTE — BH NOTE
Purposeful Rounding    Patient concerns reported: None    Nurse made aware:NO    Patient Turned and repositioned: Independent    Patient Toileted: Independent    Fall Precautions in Place: Lighting appropriate, Room free of clutter and Clear path to bathroom      Electronically signed by Elisa Alejandro on 11/7/21 at 12:59 PM EST

## 2021-11-07 NOTE — BH NOTE
Purposeful Rounding    Patient concerns reported:PT IN BED SLEEPING    Nurse made aware:NO    Patient Turned and repositioned: Independent    Patient Toileted: Independent    Fall Precautions in Place: Yellow non-skid socks on, Bed locked in low position, 1/2 bed rails up, Lighting appropriate, Room free of clutter and Clear path to bathroom      Electronically signed by Aaron Regan on 11/6/21 at 11:36 PM EDT

## 2021-11-07 NOTE — BH NOTE
Purposeful Rounding    Patient concerns reported:PT IN BED SLEEPING    Nurse made aware:NO    Patient Turned and repositioned: Independent    Patient Toileted: Independent    Fall Precautions in Place: Yellow non-skid socks on, Bed locked in low position, 1/2 bed rails up, Lighting appropriate, Room free of clutter and Clear path to bathroom      Electronically signed by Gisell Taylor on 11/7/21 at 1:09 AM EDT

## 2021-11-08 LAB
ESTIMATED AVERAGE GLUCOSE: 105.4 MG/DL
HBA1C MFR BLD: 5.3 %

## 2021-12-16 NOTE — DISCHARGE SUMMARY
Discharge Summary   Admit Date: 11/5/2021   Discharge Date:  11/7/2021  Spent over 40 minutes with patient and staff on [address] [facility]   Final Dx:  Axis I: mood d/o unsp, PTSD, opiate and sedative abuse  Axis II: deferred  Axis III: see medical hx    Condition on DC  Mood and affect are stable and pt is not suicidal   VITALS:  BP (!) 169/85   Pulse 79   Temp 97 °F (36.1 °C) (Temporal)   Resp 18   Ht 5' 1\" (1.549 m)   Wt 135 lb (61.2 kg)   SpO2 93%   BMI 25.51 kg/m²   Brief Summary Present Illness   73 y/o wf veru well known to me form previous admissions that presented to ed for worsening depression and si with plan to od on her pills. Pt has been admitted previously similar presentation. Pt stated that she is done with her  and the way he talks to her and she reports that she does not feel like they validate her feelings and he does not want to get help for them. Pt stated that she came over here because she needs a respite of what is going on at home. Pt states she has recently been stressed at home and feels like her  does not understand her. Pt states she has had some hopelessness and states she has recently had thoughts to OD on her pills. Pt states she does not want to do that because she does not want to go to hell. Hospital Course Pt was admitted for depression and si with plan to od. Pt is very well known to me from previous admissions as well as outpt treatment. Pt has always have problems with communication with . Pt once here in unit she was bright and social with peers. Pt was restarted on her meds and no changes were made. Pt did speak with family and felt that it was a misunderstanding and she had quick improvements of her sx's. Pt had unevetful hospital course. Patient appears to be in stable condition and close to their baseline functioning. The patient denies suicidal or homicidal ideations and is showing future orientation.   Patient no longer presented an imminent risk of danger to themselves and/or others. At the time of discharge it appears that the patient has received the maximum medical benefit from this hospitalization and can be appropriately managed with community treatment.        PE: (reviewed) and labs (see medical H&PE)  Labs:    Admission on 11/05/2021, Discharged on 11/07/2021   Component Date Value Ref Range Status    WBC 11/05/2021 8.3  4.0 - 11.0 K/uL Final    RBC 11/05/2021 3.61* 4.00 - 5.20 M/uL Final    Hemoglobin 11/05/2021 11.0* 12.0 - 16.0 g/dL Final    Hematocrit 11/05/2021 32.3* 36.0 - 48.0 % Final    MCV 11/05/2021 89.7  80.0 - 100.0 fL Final    MCH 11/05/2021 30.4  26.0 - 34.0 pg Final    MCHC 11/05/2021 33.9  31.0 - 36.0 g/dL Final    RDW 11/05/2021 15.0  12.4 - 15.4 % Final    Platelets 74/46/6021 156  135 - 450 K/uL Final    MPV 11/05/2021 7.6  5.0 - 10.5 fL Final    Neutrophils % 11/05/2021 71.3  % Final    Lymphocytes % 11/05/2021 21.9  % Final    Monocytes % 11/05/2021 6.3  % Final    Eosinophils % 11/05/2021 0.2  % Final    Basophils % 11/05/2021 0.3  % Final    Neutrophils Absolute 11/05/2021 6.0  1.7 - 7.7 K/uL Final    Lymphocytes Absolute 11/05/2021 1.8  1.0 - 5.1 K/uL Final    Monocytes Absolute 11/05/2021 0.5  0.0 - 1.3 K/uL Final    Eosinophils Absolute 11/05/2021 0.0  0.0 - 0.6 K/uL Final    Basophils Absolute 11/05/2021 0.0  0.0 - 0.2 K/uL Final    Sodium 11/05/2021 135* 136 - 145 mmol/L Final    Potassium reflex Magnesium 11/05/2021 3.5  3.5 - 5.1 mmol/L Final    Chloride 11/05/2021 98* 99 - 110 mmol/L Final    CO2 11/05/2021 25  21 - 32 mmol/L Final    Anion Gap 11/05/2021 12  3 - 16 Final    Glucose 11/05/2021 101* 70 - 99 mg/dL Final    BUN 11/05/2021 8  7 - 20 mg/dL Final    CREATININE 11/05/2021 <0.5* 0.6 - 1.2 mg/dL Final    GFR Non- 11/05/2021 >60  >60 Final    Comment: >60 mL/min/1.73m2 EGFR, calc. for ages 25 and older using the  MDRD formula (not corrected for weight), is valid for stable  renal function.  GFR  11/05/2021 >60  >60 Final    Comment: Chronic Kidney Disease: less than 60 ml/min/1.73 sq.m. Kidney Failure: less than 15 ml/min/1.73 sq.m. Results valid for patients 18 years and older.  Calcium 11/05/2021 8.9  8.3 - 10.6 mg/dL Final    Total Protein 11/05/2021 6.6  6.4 - 8.2 g/dL Final    Albumin 11/05/2021 3.8  3.4 - 5.0 g/dL Final    Albumin/Globulin Ratio 11/05/2021 1.4  1.1 - 2.2 Final    Total Bilirubin 11/05/2021 <0.2  0.0 - 1.0 mg/dL Final    Alkaline Phosphatase 11/05/2021 82  40 - 129 U/L Final    ALT 11/05/2021 7* 10 - 40 U/L Final    AST 11/05/2021 13* 15 - 37 U/L Final    Color, UA 11/05/2021 Yellow  Straw/Yellow Final    Clarity, UA 11/05/2021 Clear  Clear Final    Glucose, Ur 11/05/2021 Negative  Negative mg/dL Final    Bilirubin Urine 11/05/2021 Negative  Negative Final    Ketones, Urine 11/05/2021 TRACE* Negative mg/dL Final    Specific [place], UA 11/05/2021 1.020  1.005 - 1.030 Final    Blood, Urine 11/05/2021 Negative  Negative Final    pH, UA 11/05/2021 6.5  5.0 - 8.0 Final    Protein, UA 11/05/2021 Negative  Negative mg/dL Final    Urobilinogen, Urine 11/05/2021 0.2  <2.0 E.U./dL Final    Nitrite, Urine 11/05/2021 Negative  Negative Final    Leukocyte Esterase, Urine 11/05/2021 Negative  Negative Final    Microscopic Examination 11/05/2021 Not Indicated   Final    Urine Type 11/05/2021 NotGiven   Final    Urine received in a container without preservatives.     Acetaminophen Level 11/05/2021 11  10 - 30 ug/mL Final    Comment: Therapeutic Range: 10.0-30.0 ug/mL  Toxic: >=010 ug/mL      Salicylate, Serum 84/27/5501 <0.3* 15.0 - 30.0 mg/dL Final    Comment: Therapeutic Range: 15.0-30.0 mg/dL  Toxic: >30.0 mg/dL      Ethanol Lvl 11/05/2021 None Detected  mg/dL Final    Comment:    None Detected  Conversion factor:  100 mg/dl = .100 g/dl  For Medical Purposes Only      Amphetamine Screen, Urine 11/05/2021 Neg  Negative <1000ng/mL Final    Barbiturate Screen, Ur 11/05/2021 POSITIVE* Negative <200 ng/mL Final    Benzodiazepine Screen, Urine 11/05/2021 Neg  Negative <200 ng/mL Final    Cannabinoid Scrn, Ur 11/05/2021 Neg  Negative <50 ng/mL Final    Cocaine Metabolite Screen, Urine 11/05/2021 Neg  Negative <300 ng/mL Final    Opiate Scrn, Ur 11/05/2021 Neg  Negative <300 ng/mL Final    Comment: \"Therapeutic levels of pain medication, especially oxycontin and synthetic  opioids, may not be detected by this Methodology. Pain management screen  panel  Drug panel-PM-Hi Res Ur, Interp (PAIN) should be considered for drug  monitoring \".  PCP Screen, Urine 11/05/2021 Neg  Negative <25 ng/mL Final    Methadone Screen, Urine 11/05/2021 Neg  Negative <300 ng/mL Final    Propoxyphene Scrn, Ur 11/05/2021 Neg  Negative <300 ng/mL Final    Oxycodone Urine 11/05/2021 Neg  Negative <100 ng/ml Final    pH, UA 11/05/2021 6.5   Final    Comment: Urine pH less than 5.0 or greater than 8.0 may indicate sample adulteration. Another sample should be collected if clinically  indicated.  Drug Screen Comment: 11/05/2021 see below   Final    Comment: This method is a screening test to detect only these drug  classes as part of a medical workup. Confirmatory testing  by another method should be ordered if clinically indicated.  SARS-CoV-2 RNA, RT PCR 11/05/2021 NOT DETECTED  NOT DETECTED Final    Comment: Not Detected results do not preclude SARS-CoV-2 infection and  should not be used as the sole basis for patient management  decisions. Results must be combined with clinical observations,  patient history, and epidemiological information. Testing was performed using DEEPAK JOAN SARS-CoV-2 and Influenza A/B  nucleic acid assay.  This test is a multiplex Real-Time Reverse  Transcriptase Polymerase Chain Reaction (RT-PCR)-based in vitro  diagnostic test intended for the qualitative detection of nucleic  acids from SARS-CoV-2, influenza A, and influenza B in nasopharyngeal  and nasal swab specimens for use under the FDAs Emergency Use  Authorization (EUA) only.     Patient Fact Sheet:  FindDrives.pl  Provider Fact Sheet: FindDrives.pl  EUA: FindDrives.pl  IFU: FindDrives.pl    Methodology:  RT-PCR      INFLUENZA A 11/05/2021 NOT DETECTED  NOT DETECTED Final    INFLUENZA B 11/05/2021 NOT DETECTED  NOT DETECTED Final    Magnesium 11/05/2021 1.60* 1.80 - 2.40 mg/dL Final    TSH 11/05/2021 0.56  0.27 - 4.20 uIU/mL Final    Cholesterol, Total 11/06/2021 152  0 - 199 mg/dL Final    Triglycerides 11/06/2021 100  0 - 150 mg/dL Final    HDL 11/06/2021 64* 40 - 60 mg/dL Final    LDL Calculated 11/06/2021 68  <100 mg/dL Final    VLDL Cholesterol Calculated 11/06/2021 20  Not Established mg/dL Final    Hemoglobin A1C 11/06/2021 5.3  See comment % Final    Comment: Comment:  Diagnosis of Diabetes: > or = 6.5%  Increased risk of diabetes (Prediabetes): 5.7-6.4%  Glycemic Control: Nonpregnant Adults: <7.0%                    Pregnant: <6.0%        eAG 11/06/2021 105.4  mg/dL Final        Mental Status Exam at Discharge:  Level of consciousness:  awake  Appearance:  well-appearing, in chair, good grooming and good hygiene well-developed, well-nourished  Behavior/Motor:  no abnormalities noted normal gait and station AIMS: 0  Attitude toward examiner:  cooperative, attentive and good eye contact  Speech:  spontaneous, normal rate, normal volume and well articulated  Mood:  dysthymic  Affect:  mood congruent Anxiety: mild  Hallucinations: Absent  Thought processes:  coherent Attention span, Concentration & Attention:  attention span and concentration were age appropriate  Thought content:  Reality based no evidence of delusions OCD: none    Insight: normal insight and judgment Cognition:  oriented to person, place, and time Fund of Knowledge: average  IQ:average Memory: intact  Suicide:  No specific plan to harm self  Sleep: sleeps through the night  Appetite: ok   Reassess [place] Risk:  no specific plan to harm self Pt has phone numbers to contact if suicidal thoughts recur and states pt will return to the hospital if suicidal feelings return. Hospital Routine Meds:     Hospital PRN Meds:    Discharge Meds:    Discharge Medication List as of 12/9/2021 11:56 AM           Details   QUEtiapine (SEROQUEL) 100 MG tablet Take 3 tablets by mouth nightly, Disp-1 tablet, R-0Adjust Sig      carvedilol (COREG) 12.5 MG tablet Take 1 tablet by mouth 2 times daily (with meals), Disp-60 tablet, R-0Normal      amLODIPine (NORVASC) 5 MG tablet Take 1 tablet by mouth daily, Disp-30 tablet, R-0Normal      gabapentin (NEURONTIN) 100 MG capsule Take 100 mg by mouth 3 times daily. Historical Med      oxybutynin (DITROPAN-XL) 10 MG extended release tablet Take 10 mg by mouth dailyHistorical Med      fluticasone-umeclidin-vilant (TRELEGY ELLIPTA) 100-62.5-25 MCG/INH AEPB Inhale 1 puff into the lungs dailyHistorical Med      albuterol-ipratropium (COMBIVENT RESPIMAT)  MCG/ACT AERS inhaler Inhale 1 puff into the lungs every 6 hours as needed for Wheezing or Shortness of Breath, Disp-1 Inhaler, R-0Normal      primidone (MYSOLINE) 50 MG tablet Take 100 mg by mouth daily Historical Med      busPIRone (BUSPAR) 15 MG tablet Take 15 mg by mouth 2 times daily Historical Med      aspirin 81 MG chewable tablet Take 1 tablet by mouth daily, Disp-30 tablet,R-0Print      pantoprazole (PROTONIX) 40 MG tablet Take 1 tablet by mouth every morning (before breakfast), Disp-30 tablet,R-0Print      nicotine (NICODERM CQ) 14 MG/24HR Place 1 patch onto the skin daily, Disp-42 patch, R-0Print      diclofenac sodium 1 % GEL Apply 4 g topically 4 times daily, Topical, 4 TIMES DAILY Starting Sat 1/11/2020, Disp-4 Tube, R-1, Print      !! divalproex (DEPAKOTE) 500 MG DR tablet Take 500 mg by mouth nightlyHistorical Med      DULoxetine (CYMBALTA) 60 MG extended release capsule Take 60 mg by mouth daily Historical Med      fluticasone (FLONASE) 50 MCG/ACT nasal spray 1 spray by Nasal route daily as needed Historical Med      !! divalproex (DEPAKOTE) 250 MG DR tablet Take 250 mg by mouth every morning        !! - Potential duplicate medications found. Please discuss with provider.                 Disposition - Residence      Follow Up:  See Discharge Instructions

## 2022-01-07 ENCOUNTER — HOSPITAL ENCOUNTER (OUTPATIENT)
Dept: GENERAL RADIOLOGY | Age: 72
Discharge: HOME | End: 2022-01-07
Payer: COMMERCIAL

## 2022-01-07 ENCOUNTER — HOSPITAL ENCOUNTER (OUTPATIENT)
Age: 72
Discharge: HOME | End: 2022-01-07
Payer: COMMERCIAL

## 2022-01-07 DIAGNOSIS — M25.551 RIGHT HIP PAIN: ICD-10-CM

## 2022-01-07 PROCEDURE — 73501 X-RAY EXAM HIP UNI 1 VIEW: CPT

## 2022-02-11 ENCOUNTER — TELEPHONE (OUTPATIENT)
Dept: CASE MANAGEMENT | Age: 72
End: 2022-02-11

## 2022-02-11 NOTE — TELEPHONE ENCOUNTER
Patient due for annual CT Lung Screening. Reminder letter mailed.     Ankita Camacho [address] Lung Navigator  [phone]

## 2022-02-18 ENCOUNTER — TELEPHONE (OUTPATIENT)
Dept: CASE MANAGEMENT | Age: 72
End: 2022-02-18

## 2022-02-18 NOTE — TELEPHONE ENCOUNTER
Patient due for annual CT Lung Screening. Reminder letter mailed.     Ankita Camacho [address] Lung Navigator  [phone]

## 2022-03-13 ENCOUNTER — HOSPITAL ENCOUNTER (OUTPATIENT)
Dept: CT IMAGING | Age: 72
Discharge: HOME | End: 2022-03-13
Payer: COMMERCIAL

## 2022-03-13 DIAGNOSIS — F17.210 NICOTINE DEPENDENCE, CIGARETTES, UNCOMPLICATED: ICD-10-CM

## 2022-03-13 PROCEDURE — 71271 CT THORAX LUNG CANCER SCR C-: CPT

## 2022-03-20 ENCOUNTER — TELEPHONE (OUTPATIENT)
Dept: CASE MANAGEMENT | Age: 72
End: 2022-03-20

## 2022-03-20 NOTE — TELEPHONE ENCOUNTER
Annual Lung Cancer Screening CT on 3/13/2022. LRAD1. Recommended screen in one year. Results letter mailed.     Thank you,  Simpson Kanner RN  [facility] Lung Navigator  [phone]

## 2022-05-31 ENCOUNTER — TELEPHONE (OUTPATIENT)
Dept: CARDIOLOGY CLINIC | Age: 72
End: 2022-05-31

## 2022-05-31 NOTE — TELEPHONE ENCOUNTER
Pt sts this AM she could hardly walk felt like she was going to fall over. Someone had to help her to the couch. Pt sat for 1 hour and felt little better but not quiet herself. Last few Ov with other MD they noted her B/P was elev like 200 over something(pt couldn't remember). Please advise.

## 2022-05-31 NOTE — TELEPHONE ENCOUNTER
Has not seen [address] since 2019 (as consult in hospital)- has never seen [address] in office (or any of the docs in our office). Called patient and informed her to go to ED.  V/U.

## 2022-08-11 ENCOUNTER — HOSPITAL ENCOUNTER (OUTPATIENT)
Dept: GENERAL RADIOLOGY | Age: 72
Discharge: HOME | End: 2022-08-11
Payer: COMMERCIAL

## 2022-08-11 ENCOUNTER — HOSPITAL ENCOUNTER (OUTPATIENT)
Age: 72
Discharge: HOME | End: 2022-08-11
Payer: COMMERCIAL

## 2022-08-11 DIAGNOSIS — I50.9 ACUTE HEART FAILURE, UNSPECIFIED HEART FAILURE TYPE (HCC): ICD-10-CM

## 2022-08-11 PROCEDURE — 71046 X-RAY EXAM CHEST 2 VIEWS: CPT

## 2022-08-26 ENCOUNTER — HOSPITAL ENCOUNTER (OUTPATIENT)
Age: 72
Discharge: HOME | End: 2022-08-26
Payer: COMMERCIAL

## 2022-08-26 ENCOUNTER — HOSPITAL ENCOUNTER (OUTPATIENT)
Dept: GENERAL RADIOLOGY | Age: 72
Discharge: HOME | End: 2022-08-26
Payer: COMMERCIAL

## 2022-08-26 DIAGNOSIS — M79.645 FINGER PAIN, LEFT: ICD-10-CM

## 2022-08-26 PROCEDURE — 73140 X-RAY EXAM OF FINGER(S): CPT

## 2022-09-13 LAB
ALBUMIN SERPL-MCNC: 4.5 G/DL
ALP BLD-CCNC: 94 U/L
ALT SERPL-CCNC: 10 U/L
ANION GAP SERPL CALCULATED.3IONS-SCNC: NORMAL MMOL/L
AST SERPL-CCNC: 17 U/L
B-TYPE NATRIURETIC PEPTIDE: 67.9 PG/ML
BILIRUB SERPL-MCNC: 0.2 MG/DL (ref 0.1–1.4)
BUN BLDV-MCNC: 6 MG/DL
CALCIUM SERPL-MCNC: 9.6 MG/DL
CHLORIDE BLD-SCNC: 97 MMOL/L
CO2: 26 MMOL/L
CREAT SERPL-MCNC: 0.73 MG/DL
GFR CALCULATED: 88
GLUCOSE BLD-MCNC: 105 MG/DL
POTASSIUM SERPL-SCNC: 4.2 MMOL/L
SODIUM BLD-SCNC: 138 MMOL/L
TOTAL PROTEIN: 6.9

## 2022-09-16 NOTE — PROGRESS NOTES
[address] Office Note  9/19/2022     Subjective:  Ms. Kristina Villeda is here to reestablish cardiac care for HTN, d-CHF, recurrent chest pain  PMH malignant HTN, chronic DD, gr I, renal artery stenosis, mixed HLD, tobacco abuse, COPD, non-compliance. C/o chest cramping, sob with activity, neck pain    HPI:    Today 9.19.22 , she  reports she has been doing ok except  for what she has done to herself. She has cut down to 10 cigarettes a day from 2 packs a day. She has a cramping feeling in her chest.  Cramping does not spread and is in the middle of her chest.  Cramping started a couple of weeks ago. Cramping comes and goes and lasts for 2-3 minutes. She gets short of breath with cramping. Both sides of her neck hurt also. Her neck can hurt for up to 30 minutes and may hurt while she is sitting and talking to someone. She also has shortness of breath earlier in the day when she is active. Denies any shortness of breath at rest.  She does not go out and do shopping anymore. She does not go out and walk except in her house. She does house work but that does not bother her. Chest pain is non exertional.     Patient is vaccinated against Covid. Moderna 4/4    PMH:  She was admitted to the hospital for uncontrolled HTN, SOB, LE swelling, generalized weakness on 9/30/16. HTN is likely contributory to decompensated diastolic HF. She was diuresed with improvement in symptoms. She had a cardiac catheterization on 11/6/16 which was normal with normal LVEF. She also is bipolar and has chronic back pain with recent diagnosis of diskitis. .    12 point ROS negative in all areas as listed below except as in [address], EENT, Cardiovascular, pulmonary, GI, , Musculoskeletal, skin, neurological, hematological, endocrine, Psychiatric    Reviewed past medical history, social, and family history. one pack per day. Denies any alcohol.   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    Coronary artery disease involving native coronary artery of native heart with angina pectoris ([address] 3/8/2016    Depression     Emphysema of lung (HCC)     Fibromyalgia     hyperlipidemia 8/11/2011    Hypertension     Kidney stone     Liver disease     Lung disease     MDD (major depressive disorder) 4/4/2012    Mood disorder ([address] 3/13/2013    Movement disorder     Neck pain, chronic 3/13/2013    Opiate misuse     Personality disorder ([address]     Pneumonia     Polypharmacy 2/16/2013     Past Surgical History:   Procedure Laterality Date    COLONOSCOPY  1/19/12    DIAGNOSTIC CARDIAC CATH LAB PROCEDURE  Feb, 2007    Normal cor angio Feb, 2007    HERNIA REPAIR  07/11/2019    robotic ventral hernia repair with mesh    HERNIA REPAIR N/A 7/11/2019    ROBOTIC VENTRAL HERNIA REPAIR WITH MESH performed by Ernie Lutz MD at [facility], TOTAL ABDOMINAL (CERVIX REMOVED)      KIDNEY STONE SURGERY         Objective:   /60   Pulse 88   Ht 5' 1\" (1.549 m)   Wt 171 lb (77.6 kg)   SpO2 98%   BMI 32.31 kg/m²     Wt Readings from Last 3 Encounters:   09/19/22 171 lb (77.6 kg)   11/02/21 131 lb (59.4 kg)   05/17/21 160 lb (72.6 kg)       Physical Exam:  General: No Respiratory distress, appears well developed and well nourished.    Eyes:  Sclera nonicteric Ears : wax impaction rt ear small amount in left ear  Nose/Sinuses:  negative findings: nose shows no deformity, asymmetry, or inflammation, nasal mucosa normal, septum midline with no perforation or bleeding  Back:  no pain to palpation  Joint:  no active joint inflammation  Musculoskeletal:  negative  Skin:  Warm 250 MG DR tablet Take 250 mg by mouth every morning       fluticasone-umeclidin-vilant (TRELEGY ELLIPTA) 100-62.5-25 MCG/INH AEPB Inhale 1 puff into the lungs daily (Patient not taking: Reported on 9/19/2022)      [DISCONTINUED] albuterol-ipratropium (COMBIVENT RESPIMAT)  MCG/ACT AERS inhaler Inhale 1 puff into the lungs every 6 hours as needed for Wheezing or Shortness of Breath (Patient not taking: Reported on 9/19/2022) 1 Inhaler 0    [DISCONTINUED] aspirin 81 MG chewable tablet Take 1 tablet by mouth daily (Patient not taking: Reported on 9/19/2022) 30 tablet 0     No facility-administered encounter medications on file as of 9/19/2022. Lab Data:  CBC: No results for input(s): WBC, HGB, HCT, MCV, PLT in the last 72 hours. BMP: No results for input(s): NA, K, CL, CO2, PHOS, BUN, CREATININE, CA in the last 72 hours. LIVER PROFILE: No results for input(s): AST, ALT, LIPASE, BILIDIR, BILITOT, ALKPHOS in the last 72 hours. Invalid input(s): AMYLASE,  ALB  LIPID:   No components found for: CHLPL  Lab Results   Component Value Date    TRIG 100 11/06/2021    TRIG 132 02/07/2018    TRIG 255 (H) 03/08/2016     Lab Results   Component Value Date    HDL 64 (H) 11/06/2021    HDL 70 (H) 02/07/2018    HDL 54 03/08/2016     Lab Results   Component Value Date    LDLCALC 68 11/06/2021    LDLCALC 97 02/07/2018    LDLCALC 140 (H) 03/08/2016     Lab Results   Component Value Date    LABVLDL 20 11/06/2021    LABVLDL 26 02/07/2018    LABVLDL 51 03/08/2016     PT/INR: No results for input(s): PROTIME, INR in the last 72 hours. A1C:   Lab Results   Component Value Date    LABA1C 5.3 11/06/2021     BNP:  No results for input(s): BNP in the last 72 hours. IMAGING:   Cardiac Testing: I have reviewed the findings below. CXR 8/11/22  No acute abnormality identified.      EKG 11/2/21  Sinus rhythm with occasional Premature ventricular complexesRSR' or QR pattern in V1 suggests right ventricular conduction delayNonspecific ST abnormality Abnormal ECG When compared with ECG of 2021 13:09, Premature ventricular complexes are now Present    Lexiscan 20  There is uniform isotope uptake at stress and rest. There is no evidence of  myocardial ischemia or scar. Hyperdynamic post-stress LVEF of >75%. 7 day Monitor 10/21/19      Limited ECHO 10/17/19  Limited exam with Valsalva maneuver. Left ventricular systolic function is normal with ejection fraction   estimated at 55%. No regional wall motion abnormalities. No evidence for a dynamic outflow tract obstruction. TTE ECHO 10/18/19   Left ventricular systolic function is normal with ejection fraction   estimated at 55-60 %. No regional wall motion abnormalities are noted. Normal left ventricular wall thickness. Chordal AMITA with LVOT color flow turbulence is noted. Consider valsalva   study. Grade I diastolic dysfunction with normal filling pressure. IVC is normal in size (< 2.1 cm) and collapses > 50% with respiration   consistent with normal RA pressure (3 mmHg). Previous echo done 2018 - EF 55%    EK16  Normal sinus rhythm   RSR' or QR pattern in V1 suggests right ventricular conduction delay   Minimal voltage criteria for LVH, may be normal variant   Borderline ECG   When compared with ECG of 2016 17:53,   No significant change was found    CATH: 16  Normal coronary arteriogram  Normal LV gram    ECHO: 10/1/16   Summary   Left ventricular systolic function is normal with an ejection fraction of   55%. No wall motion abnormalities noted. Mild concentric left ventricular hypertrophy. Diastolic filling parameters suggests grade I diastolic dysfunction. Mild mitral regurgitation. STRESS TEST:14  IMPRESSION: Persistent small zone of inferior wall reversible   ischemia.         Assessment:advised to stop smoking  Keyanna Robison was seen today for cardiology follow-up,  Diagnoses and all orders for this visit:  Encounter Diagnoses   Name Primary? CAD in native artery     Chronic diastolic congestive heart failure (HCC)     Hypertension, essential     Tobacco abuse     SOB (shortness of breath) on exertion     Unstable angina pectoris ([facility] Yes    Mixed hyperlipidemia        Chest pain is concerning for new onset angina, it is non exertional and she had normal cor angio 2007. She is at high risk due to smoking HBP high cholesterol will do lexiscan myoview. Continue Coreg asa statin  She is trying to stop smoking encouraged her to stop completely  Essential hypertension- BP controlled  on Amlodipine   No recent labs in [place] system will call PCP continue atorvastatin 40 mg daily   chronic obstructive pulmonary disease (COPD) ([facility]- stable patient trying to stop smoking. She is not fluid overloads continue lasix 20 mg daily         Plan:  Current labs were done at [facility] and patient states she was told the labs were all good. -I will try to get a copy from your pcp  Current medications reviewed. Refills given as warranted. Call to schedule a Myoview Stress Test to look for blockages and the blood flow through the heart  -A small IV will be placed into your arm to administer a radioisotope (myoview) to visualize your heart. .   -You will then have a waiting period to allow the tracer to be absorbed by the heart muscle. After the waiting period, we will take pictures of the blood flow to your heart muscle with the nuclear camera. -Following your pictures, we will perform your stress test. We can do your stress test by having you walk on the treadmill or by giving you a medication (Maryfrances Pollen) which makes your body think it is exercising.   -We will monitor you before, during, and after your stress test to make sure you are safe and comfortable.     Follow up with me depending on the results of your testing    This note is scribed in the presence of Dr. Amina Jo MD by Elier Valverde RN.  I,  Dae Whitt, personally performed the services described in this documentation, as scribed by the above signed scribe in my presence. It is both accurate and complete to my knowledge. I agree with the details independently gathered by the clinical support staff, while the remaining scribed note accurately describes my personal service to the patient. QUALITY MEASURES  1. Tobacco Cessation Counseling: Yes  2. Retake of BP if >140/90:   NA  3. Documentation to PCP/referring for new patient:  Sent to PCP at close of office visit  4. CAD patient on anti-platelet: NA  5. CAD patient on STATIN therapy:  Yes  6.  Patient with CHF and aFib on anticoagulation:  NA       Marvin Gallegos MD, MD 9/19/2022 3:06 PM

## 2022-09-19 ENCOUNTER — OFFICE VISIT (OUTPATIENT)
Dept: CARDIOLOGY CLINIC | Age: 72
End: 2022-09-19
Payer: COMMERCIAL

## 2022-09-19 VITALS
BODY MASS INDEX: 32.28 KG/M2 | SYSTOLIC BLOOD PRESSURE: 124 MMHG | DIASTOLIC BLOOD PRESSURE: 60 MMHG | HEART RATE: 88 BPM | HEIGHT: 61 IN | WEIGHT: 171 LBS | OXYGEN SATURATION: 98 %

## 2022-09-19 DIAGNOSIS — I10 HYPERTENSION, ESSENTIAL: ICD-10-CM

## 2022-09-19 DIAGNOSIS — Z72.0 TOBACCO ABUSE: Chronic | ICD-10-CM

## 2022-09-19 DIAGNOSIS — I25.10 CAD IN NATIVE ARTERY: ICD-10-CM

## 2022-09-19 DIAGNOSIS — R06.02 SOB (SHORTNESS OF BREATH) ON EXERTION: ICD-10-CM

## 2022-09-19 DIAGNOSIS — I50.32 CHRONIC DIASTOLIC CONGESTIVE HEART FAILURE (HCC): ICD-10-CM

## 2022-09-19 DIAGNOSIS — I20.0 UNSTABLE ANGINA PECTORIS (HCC): Primary | ICD-10-CM

## 2022-09-19 DIAGNOSIS — E78.2 MIXED HYPERLIPIDEMIA: ICD-10-CM

## 2022-09-19 PROCEDURE — 1123F ACP DISCUSS/DSCN MKR DOCD: CPT | Performed by: INTERNAL MEDICINE

## 2022-09-19 PROCEDURE — 99214 OFFICE O/P EST MOD 30 MIN: CPT | Performed by: INTERNAL MEDICINE

## 2022-09-19 RX ORDER — DIVALPROEX SODIUM 250 MG/1
TABLET, EXTENDED RELEASE ORAL
COMMUNITY
Start: 2022-09-07

## 2022-09-19 RX ORDER — FUROSEMIDE 20 MG/1
TABLET ORAL
COMMUNITY
Start: 2022-09-07

## 2022-09-19 RX ORDER — HYDROXYZINE HYDROCHLORIDE 10 MG/1
TABLET, FILM COATED ORAL
COMMUNITY
Start: 2022-09-14

## 2022-09-19 RX ORDER — LORAZEPAM 0.5 MG/1
0.5 TABLET ORAL 2 TIMES DAILY PRN
COMMUNITY

## 2022-09-19 RX ORDER — MONTELUKAST SODIUM 10 MG/1
TABLET ORAL
COMMUNITY
Start: 2022-09-09

## 2022-09-19 RX ORDER — ATORVASTATIN CALCIUM 40 MG/1
TABLET, FILM COATED ORAL
COMMUNITY
Start: 2022-09-07

## 2022-09-19 NOTE — PATIENT INSTRUCTIONS
Plan:  Current labs reviewed with patient  Current medications reviewed. Refills given as warranted. Call to schedule a Myoview Stress Test to look for blockages and the blood flow through the heart  -A small IV will be placed into your arm to administer a radioisotope (myoview) to visualize your heart. .   -You will then have a waiting period to allow the tracer to be absorbed by the heart muscle. After the waiting period, we will take pictures of the blood flow to your heart muscle with the nuclear camera. -Following your pictures, we will perform your stress test. We can do your stress test by having you walk on the treadmill or by giving you a medication (Oksana Grout) which makes your body think it is exercising.   -We will monitor you before, during, and after your stress test to make sure you are safe and comfortable. Follow up with me depending on the results of your testing    Your provider has ordered testing for further evaluation. An order/prescription has been included in your paper work. To schedule outpatient testing, contact Central Scheduling by calling [address] [place] [address] ([phone]).

## 2022-10-12 ENCOUNTER — HOSPITAL ENCOUNTER (OUTPATIENT)
Dept: NON INVASIVE DIAGNOSTICS | Age: 72
Discharge: HOME | End: 2022-10-12

## 2022-10-26 ENCOUNTER — HOSPITAL ENCOUNTER (OUTPATIENT)
Dept: NON INVASIVE DIAGNOSTICS | Age: 72
Discharge: HOME | End: 2022-10-26

## 2023-01-04 ENCOUNTER — HOSPITAL ENCOUNTER (INPATIENT)
Age: 73
LOS: 1 days | Discharge: HOME | DRG: 191 | End: 2023-01-06
Attending: EMERGENCY MEDICINE | Admitting: INTERNAL MEDICINE
Payer: COMMERCIAL

## 2023-01-04 ENCOUNTER — APPOINTMENT (OUTPATIENT)
Dept: CT IMAGING | Age: 73
DRG: 191 | End: 2023-01-04
Payer: COMMERCIAL

## 2023-01-04 ENCOUNTER — APPOINTMENT (OUTPATIENT)
Dept: GENERAL RADIOLOGY | Age: 73
DRG: 191 | End: 2023-01-04
Payer: COMMERCIAL

## 2023-01-04 DIAGNOSIS — J96.01 ACUTE RESPIRATORY FAILURE WITH HYPOXIA (HCC): ICD-10-CM

## 2023-01-04 DIAGNOSIS — I50.32 CHRONIC HEART FAILURE WITH PRESERVED EJECTION FRACTION (HCC): ICD-10-CM

## 2023-01-04 DIAGNOSIS — E83.42 HYPOMAGNESEMIA: ICD-10-CM

## 2023-01-04 DIAGNOSIS — R55 SYNCOPE AND COLLAPSE: Primary | ICD-10-CM

## 2023-01-04 DIAGNOSIS — M79.89 LEG SWELLING: ICD-10-CM

## 2023-01-04 PROBLEM — Z91.199 NONCOMPLIANCE: Status: ACTIVE | Noted: 2023-01-04

## 2023-01-04 LAB
A/G RATIO: 1.2 (ref 1.1–2.2)
ALBUMIN SERPL-MCNC: 4.2 G/DL (ref 3.4–5)
ALP BLD-CCNC: 104 U/L (ref 40–129)
ALT SERPL-CCNC: 8 U/L (ref 10–40)
ANION GAP SERPL CALCULATED.3IONS-SCNC: 9 MMOL/L (ref 3–16)
AST SERPL-CCNC: 13 U/L (ref 15–37)
BASOPHILS ABSOLUTE: 0.1 K/UL (ref 0–0.2)
BASOPHILS RELATIVE PERCENT: 0.7 %
BILIRUB SERPL-MCNC: <0.2 MG/DL (ref 0–1)
BILIRUBIN URINE: NEGATIVE
BLOOD, URINE: NEGATIVE
BUN BLDV-MCNC: 8 MG/DL (ref 7–20)
CALCIUM SERPL-MCNC: 9.3 MG/DL (ref 8.3–10.6)
CHLORIDE BLD-SCNC: 92 MMOL/L (ref 99–110)
CLARITY: CLEAR
CO2: 31 MMOL/L (ref 21–32)
COLOR: YELLOW
CREAT SERPL-MCNC: 0.7 MG/DL (ref 0.6–1.2)
D DIMER: 0.46 UG/ML FEU (ref 0–0.6)
EOSINOPHILS ABSOLUTE: 0.2 K/UL (ref 0–0.6)
EOSINOPHILS RELATIVE PERCENT: 2.2 %
EPITHELIAL CELLS, UA: ABNORMAL /HPF (ref 0–5)
GFR SERPL CREATININE-BSD FRML MDRD: >60 ML/MIN/{1.73_M2}
GLUCOSE BLD-MCNC: 103 MG/DL (ref 70–99)
GLUCOSE URINE: NEGATIVE MG/DL
HCT VFR BLD CALC: 38.4 % (ref 36–48)
HEMOGLOBIN: 13 G/DL (ref 12–16)
HYALINE CASTS: ABNORMAL /LPF (ref 0–2)
INFLUENZA A: NOT DETECTED
INFLUENZA B: NOT DETECTED
KETONES, URINE: NEGATIVE MG/DL
LEUKOCYTE ESTERASE, URINE: ABNORMAL
LYMPHOCYTES ABSOLUTE: 2.1 K/UL (ref 1–5.1)
LYMPHOCYTES RELATIVE PERCENT: 26.8 %
MAGNESIUM: 1.6 MG/DL (ref 1.8–2.4)
MCH RBC QN AUTO: 29.5 PG (ref 26–34)
MCHC RBC AUTO-ENTMCNC: 33.9 G/DL (ref 31–36)
MCV RBC AUTO: 87.1 FL (ref 80–100)
MICROSCOPIC EXAMINATION: YES
MONOCYTES ABSOLUTE: 0.8 K/UL (ref 0–1.3)
MONOCYTES RELATIVE PERCENT: 10.1 %
NEUTROPHILS ABSOLUTE: 4.6 K/UL (ref 1.7–7.7)
NEUTROPHILS RELATIVE PERCENT: 60.2 %
NITRITE, URINE: NEGATIVE
PDW BLD-RTO: 14.7 % (ref 12.4–15.4)
PH UA: 6.5 (ref 5–8)
PLATELET # BLD: 202 K/UL (ref 135–450)
PMV BLD AUTO: 7.1 FL (ref 5–10.5)
POTASSIUM REFLEX MAGNESIUM: 3.4 MMOL/L (ref 3.5–5.1)
PRO-BNP: 159 PG/ML (ref 0–124)
PROTEIN UA: NEGATIVE MG/DL
RBC # BLD: 4.41 M/UL (ref 4–5.2)
RBC UA: ABNORMAL /HPF (ref 0–4)
SARS-COV-2 RNA, RT PCR: NOT DETECTED
SODIUM BLD-SCNC: 132 MMOL/L (ref 136–145)
SPECIFIC GRAVITY UA: 1.01 (ref 1–1.03)
TOTAL PROTEIN: 7.6 G/DL (ref 6.4–8.2)
TROPONIN: <0.01 NG/ML
URINE REFLEX TO CULTURE: ABNORMAL
URINE TYPE: ABNORMAL
UROBILINOGEN, URINE: 0.2 E.U./DL
WBC # BLD: 7.7 K/UL (ref 4–11)
WBC UA: ABNORMAL /HPF (ref 0–5)

## 2023-01-04 PROCEDURE — 99285 EMERGENCY DEPT VISIT HI MDM: CPT

## 2023-01-04 PROCEDURE — 84484 ASSAY OF TROPONIN QUANT: CPT

## 2023-01-04 PROCEDURE — 87636 SARSCOV2 & INF A&B AMP PRB: CPT

## 2023-01-04 PROCEDURE — 96375 TX/PRO/DX INJ NEW DRUG ADDON: CPT

## 2023-01-04 PROCEDURE — 85379 FIBRIN DEGRADATION QUANT: CPT

## 2023-01-04 PROCEDURE — 6360000002 HC RX W HCPCS: Performed by: PHYSICIAN ASSISTANT

## 2023-01-04 PROCEDURE — 70450 CT HEAD/BRAIN W/O DYE: CPT

## 2023-01-04 PROCEDURE — 6360000004 HC RX CONTRAST MEDICATION: Performed by: EMERGENCY MEDICINE

## 2023-01-04 PROCEDURE — 83880 ASSAY OF NATRIURETIC PEPTIDE: CPT

## 2023-01-04 PROCEDURE — 96365 THER/PROPH/DIAG IV INF INIT: CPT

## 2023-01-04 PROCEDURE — 93005 ELECTROCARDIOGRAM TRACING: CPT | Performed by: EMERGENCY MEDICINE

## 2023-01-04 PROCEDURE — 72125 CT NECK SPINE W/O DYE: CPT

## 2023-01-04 PROCEDURE — 83735 ASSAY OF MAGNESIUM: CPT

## 2023-01-04 PROCEDURE — 80053 COMPREHEN METABOLIC PANEL: CPT

## 2023-01-04 PROCEDURE — 6370000000 HC RX 637 (ALT 250 FOR IP): Performed by: PHYSICIAN ASSISTANT

## 2023-01-04 PROCEDURE — 71260 CT THORAX DX C+: CPT | Performed by: PHYSICIAN ASSISTANT

## 2023-01-04 PROCEDURE — 85025 COMPLETE CBC W/AUTO DIFF WBC: CPT

## 2023-01-04 PROCEDURE — 96366 THER/PROPH/DIAG IV INF ADDON: CPT

## 2023-01-04 PROCEDURE — 81001 URINALYSIS AUTO W/SCOPE: CPT

## 2023-01-04 PROCEDURE — 71045 X-RAY EXAM CHEST 1 VIEW: CPT

## 2023-01-04 RX ORDER — METHYLPREDNISOLONE SODIUM SUCCINATE 125 MG/2ML
125 INJECTION, POWDER, LYOPHILIZED, FOR SOLUTION INTRAMUSCULAR; INTRAVENOUS ONCE
Status: COMPLETED | OUTPATIENT
Start: 2023-01-04 | End: 2023-01-04

## 2023-01-04 RX ORDER — MAGNESIUM SULFATE IN WATER 40 MG/ML
2000 INJECTION, SOLUTION INTRAVENOUS ONCE
Status: COMPLETED | OUTPATIENT
Start: 2023-01-04 | End: 2023-01-05

## 2023-01-04 RX ORDER — IPRATROPIUM BROMIDE AND ALBUTEROL SULFATE 2.5; .5 MG/3ML; MG/3ML
1 SOLUTION RESPIRATORY (INHALATION) ONCE
Status: COMPLETED | OUTPATIENT
Start: 2023-01-04 | End: 2023-01-04

## 2023-01-04 RX ADMIN — IPRATROPIUM BROMIDE AND ALBUTEROL SULFATE 1 AMPULE: .5; 2.5 SOLUTION RESPIRATORY (INHALATION) at 19:47

## 2023-01-04 RX ADMIN — IOPAMIDOL 75 ML: 755 INJECTION, SOLUTION INTRAVENOUS at 20:44

## 2023-01-04 RX ADMIN — MAGNESIUM SULFATE HEPTAHYDRATE 2000 MG: 40 INJECTION, SOLUTION INTRAVENOUS at 21:38

## 2023-01-04 RX ADMIN — METHYLPREDNISOLONE SODIUM SUCCINATE 125 MG: 125 INJECTION, POWDER, FOR SOLUTION INTRAMUSCULAR; INTRAVENOUS at 22:43

## 2023-01-04 ASSESSMENT — PAIN SCALES - GENERAL: PAINLEVEL_OUTOF10: 8

## 2023-01-04 ASSESSMENT — PAIN DESCRIPTION - LOCATION: LOCATION: CHEST

## 2023-01-04 ASSESSMENT — PAIN DESCRIPTION - ORIENTATION: ORIENTATION: MID

## 2023-01-04 ASSESSMENT — PAIN - FUNCTIONAL ASSESSMENT: PAIN_FUNCTIONAL_ASSESSMENT: 0-10

## 2023-01-05 PROBLEM — J44.1 COPD EXACERBATION (HCC): Status: ACTIVE | Noted: 2023-01-05

## 2023-01-05 LAB
ANION GAP SERPL CALCULATED.3IONS-SCNC: 9 MMOL/L (ref 3–16)
BASOPHILS ABSOLUTE: 0 K/UL (ref 0–0.2)
BASOPHILS RELATIVE PERCENT: 0.4 %
BUN BLDV-MCNC: 9 MG/DL (ref 7–20)
CALCIUM SERPL-MCNC: 9.2 MG/DL (ref 8.3–10.6)
CHLORIDE BLD-SCNC: 94 MMOL/L (ref 99–110)
CO2: 33 MMOL/L (ref 21–32)
CREAT SERPL-MCNC: 0.6 MG/DL (ref 0.6–1.2)
EKG ATRIAL RATE: 82 BPM
EKG DIAGNOSIS: NORMAL
EKG P AXIS: 33 DEGREES
EKG P-R INTERVAL: 164 MS
EKG Q-T INTERVAL: 394 MS
EKG QRS DURATION: 100 MS
EKG QTC CALCULATION (BAZETT): 460 MS
EKG R AXIS: 0 DEGREES
EKG T AXIS: 43 DEGREES
EKG VENTRICULAR RATE: 82 BPM
EOSINOPHILS ABSOLUTE: 0 K/UL (ref 0–0.6)
EOSINOPHILS RELATIVE PERCENT: 0.1 %
GFR SERPL CREATININE-BSD FRML MDRD: >60 ML/MIN/{1.73_M2}
GLUCOSE BLD-MCNC: 156 MG/DL (ref 70–99)
HCT VFR BLD CALC: 38 % (ref 36–48)
HEMOGLOBIN: 12.6 G/DL (ref 12–16)
LYMPHOCYTES ABSOLUTE: 0.7 K/UL (ref 1–5.1)
LYMPHOCYTES RELATIVE PERCENT: 10.4 %
MAGNESIUM: 2.1 MG/DL (ref 1.8–2.4)
MCH RBC QN AUTO: 28.9 PG (ref 26–34)
MCHC RBC AUTO-ENTMCNC: 33.2 G/DL (ref 31–36)
MCV RBC AUTO: 87 FL (ref 80–100)
MONOCYTES ABSOLUTE: 0.1 K/UL (ref 0–1.3)
MONOCYTES RELATIVE PERCENT: 1.4 %
NEUTROPHILS ABSOLUTE: 6 K/UL (ref 1.7–7.7)
NEUTROPHILS RELATIVE PERCENT: 87.7 %
PDW BLD-RTO: 14.7 % (ref 12.4–15.4)
PLATELET # BLD: 180 K/UL (ref 135–450)
PMV BLD AUTO: 7.6 FL (ref 5–10.5)
POTASSIUM SERPL-SCNC: 3.8 MMOL/L (ref 3.5–5.1)
RBC # BLD: 4.37 M/UL (ref 4–5.2)
SODIUM BLD-SCNC: 136 MMOL/L (ref 136–145)
WBC # BLD: 6.8 K/UL (ref 4–11)

## 2023-01-05 PROCEDURE — 97535 SELF CARE MNGMENT TRAINING: CPT

## 2023-01-05 PROCEDURE — 6370000000 HC RX 637 (ALT 250 FOR IP): Performed by: INTERNAL MEDICINE

## 2023-01-05 PROCEDURE — 1200000000 HC SEMI PRIVATE

## 2023-01-05 PROCEDURE — 2580000003 HC RX 258: Performed by: INTERNAL MEDICINE

## 2023-01-05 PROCEDURE — 85025 COMPLETE CBC W/AUTO DIFF WBC: CPT

## 2023-01-05 PROCEDURE — 97530 THERAPEUTIC ACTIVITIES: CPT

## 2023-01-05 PROCEDURE — 83735 ASSAY OF MAGNESIUM: CPT

## 2023-01-05 PROCEDURE — 80048 BASIC METABOLIC PNL TOTAL CA: CPT

## 2023-01-05 PROCEDURE — 97166 OT EVAL MOD COMPLEX 45 MIN: CPT

## 2023-01-05 PROCEDURE — 93010 ELECTROCARDIOGRAM REPORT: CPT | Performed by: INTERNAL MEDICINE

## 2023-01-05 PROCEDURE — 6370000000 HC RX 637 (ALT 250 FOR IP): Performed by: PHYSICIAN ASSISTANT

## 2023-01-05 PROCEDURE — 36415 COLL VENOUS BLD VENIPUNCTURE: CPT

## 2023-01-05 PROCEDURE — 94761 N-INVAS EAR/PLS OXIMETRY MLT: CPT

## 2023-01-05 PROCEDURE — 97110 THERAPEUTIC EXERCISES: CPT

## 2023-01-05 PROCEDURE — 97162 PT EVAL MOD COMPLEX 30 MIN: CPT

## 2023-01-05 PROCEDURE — 6360000002 HC RX W HCPCS: Performed by: INTERNAL MEDICINE

## 2023-01-05 PROCEDURE — 96366 THER/PROPH/DIAG IV INF ADDON: CPT

## 2023-01-05 PROCEDURE — 2700000000 HC OXYGEN THERAPY PER DAY

## 2023-01-05 PROCEDURE — 97116 GAIT TRAINING THERAPY: CPT

## 2023-01-05 RX ORDER — NICOTINE 21 MG/24HR
1 PATCH, TRANSDERMAL 24 HOURS TRANSDERMAL DAILY
Status: DISCONTINUED | OUTPATIENT
Start: 2023-01-05 | End: 2023-01-06 | Stop reason: HOSPADM

## 2023-01-05 RX ORDER — METHYLPREDNISOLONE SODIUM SUCCINATE 40 MG/ML
40 INJECTION, POWDER, LYOPHILIZED, FOR SOLUTION INTRAMUSCULAR; INTRAVENOUS EVERY 12 HOURS
Status: DISCONTINUED | OUTPATIENT
Start: 2023-01-05 | End: 2023-01-06

## 2023-01-05 RX ORDER — QUETIAPINE FUMARATE 100 MG/1
300 TABLET, FILM COATED ORAL NIGHTLY
Status: DISCONTINUED | OUTPATIENT
Start: 2023-01-05 | End: 2023-01-06 | Stop reason: HOSPADM

## 2023-01-05 RX ORDER — DIVALPROEX SODIUM 250 MG/1
250 TABLET, EXTENDED RELEASE ORAL 2 TIMES DAILY
Status: DISCONTINUED | OUTPATIENT
Start: 2023-01-05 | End: 2023-01-06 | Stop reason: HOSPADM

## 2023-01-05 RX ORDER — FUROSEMIDE 10 MG/ML
20 INJECTION INTRAMUSCULAR; INTRAVENOUS DAILY
Status: DISCONTINUED | OUTPATIENT
Start: 2023-01-05 | End: 2023-01-06

## 2023-01-05 RX ORDER — ONDANSETRON 2 MG/ML
4 INJECTION INTRAMUSCULAR; INTRAVENOUS EVERY 6 HOURS PRN
Status: DISCONTINUED | OUTPATIENT
Start: 2023-01-05 | End: 2023-01-06 | Stop reason: HOSPADM

## 2023-01-05 RX ORDER — ACETAMINOPHEN 325 MG/1
650 TABLET ORAL EVERY 6 HOURS PRN
Status: DISCONTINUED | OUTPATIENT
Start: 2023-01-05 | End: 2023-01-06 | Stop reason: HOSPADM

## 2023-01-05 RX ORDER — DOXYCYCLINE HYCLATE 100 MG
100 TABLET ORAL EVERY 12 HOURS SCHEDULED
Status: DISCONTINUED | OUTPATIENT
Start: 2023-01-05 | End: 2023-01-06 | Stop reason: HOSPADM

## 2023-01-05 RX ORDER — SODIUM CHLORIDE 0.9 % (FLUSH) 0.9 %
10 SYRINGE (ML) INJECTION PRN
Status: DISCONTINUED | OUTPATIENT
Start: 2023-01-05 | End: 2023-01-06 | Stop reason: HOSPADM

## 2023-01-05 RX ORDER — BUSPIRONE HYDROCHLORIDE 5 MG/1
15 TABLET ORAL 3 TIMES DAILY
Status: DISCONTINUED | OUTPATIENT
Start: 2023-01-05 | End: 2023-01-06 | Stop reason: HOSPADM

## 2023-01-05 RX ORDER — ACETAMINOPHEN 650 MG/1
650 SUPPOSITORY RECTAL EVERY 6 HOURS PRN
Status: DISCONTINUED | OUTPATIENT
Start: 2023-01-05 | End: 2023-01-06 | Stop reason: HOSPADM

## 2023-01-05 RX ORDER — BUTALBITAL, ACETAMINOPHEN AND CAFFEINE 50; 325; 40 MG/1; MG/1; MG/1
1 TABLET ORAL EVERY 4 HOURS PRN
Status: DISCONTINUED | OUTPATIENT
Start: 2023-01-05 | End: 2023-01-06 | Stop reason: HOSPADM

## 2023-01-05 RX ORDER — ONDANSETRON 4 MG/1
4 TABLET, ORALLY DISINTEGRATING ORAL EVERY 8 HOURS PRN
Status: DISCONTINUED | OUTPATIENT
Start: 2023-01-05 | End: 2023-01-06 | Stop reason: HOSPADM

## 2023-01-05 RX ORDER — SODIUM CHLORIDE 9 MG/ML
INJECTION, SOLUTION INTRAVENOUS PRN
Status: DISCONTINUED | OUTPATIENT
Start: 2023-01-05 | End: 2023-01-06 | Stop reason: HOSPADM

## 2023-01-05 RX ORDER — POTASSIUM CHLORIDE 20 MEQ/1
40 TABLET, EXTENDED RELEASE ORAL PRN
Status: DISCONTINUED | OUTPATIENT
Start: 2023-01-05 | End: 2023-01-05

## 2023-01-05 RX ORDER — CALCIUM CARBONATE 200(500)MG
1000 TABLET,CHEWABLE ORAL 3 TIMES DAILY PRN
Status: DISCONTINUED | OUTPATIENT
Start: 2023-01-05 | End: 2023-01-06 | Stop reason: HOSPADM

## 2023-01-05 RX ORDER — POTASSIUM CHLORIDE 7.45 MG/ML
10 INJECTION INTRAVENOUS PRN
Status: DISCONTINUED | OUTPATIENT
Start: 2023-01-05 | End: 2023-01-05

## 2023-01-05 RX ORDER — ALBUTEROL SULFATE 2.5 MG/3ML
2.5 SOLUTION RESPIRATORY (INHALATION) EVERY 4 HOURS PRN
Status: DISCONTINUED | OUTPATIENT
Start: 2023-01-05 | End: 2023-01-06 | Stop reason: HOSPADM

## 2023-01-05 RX ORDER — MAGNESIUM SULFATE 1 G/100ML
1000 INJECTION INTRAVENOUS PRN
Status: DISCONTINUED | OUTPATIENT
Start: 2023-01-05 | End: 2023-01-05

## 2023-01-05 RX ORDER — LANOLIN ALCOHOL/MO/W.PET/CERES
3 CREAM (GRAM) TOPICAL NIGHTLY PRN
Status: DISCONTINUED | OUTPATIENT
Start: 2023-01-05 | End: 2023-01-06 | Stop reason: HOSPADM

## 2023-01-05 RX ORDER — OXYBUTYNIN CHLORIDE 5 MG/1
10 TABLET, EXTENDED RELEASE ORAL DAILY
Status: DISCONTINUED | OUTPATIENT
Start: 2023-01-05 | End: 2023-01-06 | Stop reason: HOSPADM

## 2023-01-05 RX ORDER — ATORVASTATIN CALCIUM 40 MG/1
40 TABLET, FILM COATED ORAL NIGHTLY
Status: DISCONTINUED | OUTPATIENT
Start: 2023-01-05 | End: 2023-01-06 | Stop reason: HOSPADM

## 2023-01-05 RX ORDER — PANTOPRAZOLE SODIUM 40 MG/1
40 TABLET, DELAYED RELEASE ORAL
Status: DISCONTINUED | OUTPATIENT
Start: 2023-01-05 | End: 2023-01-06 | Stop reason: HOSPADM

## 2023-01-05 RX ORDER — ENOXAPARIN SODIUM 100 MG/ML
40 INJECTION SUBCUTANEOUS DAILY
Status: DISCONTINUED | OUTPATIENT
Start: 2023-01-05 | End: 2023-01-06 | Stop reason: HOSPADM

## 2023-01-05 RX ORDER — CARVEDILOL 6.25 MG/1
12.5 TABLET ORAL 2 TIMES DAILY WITH MEALS
Status: DISCONTINUED | OUTPATIENT
Start: 2023-01-05 | End: 2023-01-06 | Stop reason: HOSPADM

## 2023-01-05 RX ORDER — IPRATROPIUM BROMIDE AND ALBUTEROL SULFATE 2.5; .5 MG/3ML; MG/3ML
1 SOLUTION RESPIRATORY (INHALATION) EVERY 4 HOURS PRN
Status: DISCONTINUED | OUTPATIENT
Start: 2023-01-05 | End: 2023-01-06 | Stop reason: HOSPADM

## 2023-01-05 RX ORDER — DULOXETIN HYDROCHLORIDE 60 MG/1
60 CAPSULE, DELAYED RELEASE ORAL DAILY
Status: DISCONTINUED | OUTPATIENT
Start: 2023-01-05 | End: 2023-01-06 | Stop reason: HOSPADM

## 2023-01-05 RX ORDER — MONTELUKAST SODIUM 10 MG/1
10 TABLET ORAL NIGHTLY
Status: DISCONTINUED | OUTPATIENT
Start: 2023-01-05 | End: 2023-01-06 | Stop reason: HOSPADM

## 2023-01-05 RX ORDER — IPRATROPIUM BROMIDE AND ALBUTEROL SULFATE 2.5; .5 MG/3ML; MG/3ML
1 SOLUTION RESPIRATORY (INHALATION) 4 TIMES DAILY
Status: DISCONTINUED | OUTPATIENT
Start: 2023-01-05 | End: 2023-01-05

## 2023-01-05 RX ORDER — AMLODIPINE BESYLATE 5 MG/1
5 TABLET ORAL DAILY
Status: DISCONTINUED | OUTPATIENT
Start: 2023-01-05 | End: 2023-01-06 | Stop reason: HOSPADM

## 2023-01-05 RX ORDER — BENZONATATE 100 MG/1
200 CAPSULE ORAL 3 TIMES DAILY PRN
Status: DISCONTINUED | OUTPATIENT
Start: 2023-01-05 | End: 2023-01-06 | Stop reason: HOSPADM

## 2023-01-05 RX ADMIN — DOXYCYCLINE HYCLATE 100 MG: 100 TABLET, COATED ORAL at 08:38

## 2023-01-05 RX ADMIN — DIVALPROEX SODIUM 250 MG: 250 TABLET, EXTENDED RELEASE ORAL at 08:38

## 2023-01-05 RX ADMIN — OXYBUTYNIN CHLORIDE 10 MG: 5 TABLET, EXTENDED RELEASE ORAL at 08:38

## 2023-01-05 RX ADMIN — METHYLPREDNISOLONE SODIUM SUCCINATE 40 MG: 40 INJECTION, POWDER, FOR SOLUTION INTRAMUSCULAR; INTRAVENOUS at 08:38

## 2023-01-05 RX ADMIN — FUROSEMIDE 20 MG: 10 INJECTION, SOLUTION INTRAMUSCULAR; INTRAVENOUS at 08:38

## 2023-01-05 RX ADMIN — DIVALPROEX SODIUM 250 MG: 250 TABLET, EXTENDED RELEASE ORAL at 04:23

## 2023-01-05 RX ADMIN — ACETAMINOPHEN 325MG 650 MG: 325 TABLET ORAL at 12:25

## 2023-01-05 RX ADMIN — ENOXAPARIN SODIUM 40 MG: 100 INJECTION SUBCUTANEOUS at 08:38

## 2023-01-05 RX ADMIN — CARVEDILOL 12.5 MG: 6.25 TABLET, FILM COATED ORAL at 16:43

## 2023-01-05 RX ADMIN — BUTALBITAL, ACETAMINOPHEN, AND CAFFEINE 1 TABLET: 50; 325; 40 TABLET ORAL at 16:43

## 2023-01-05 RX ADMIN — METHYLPREDNISOLONE SODIUM SUCCINATE 40 MG: 40 INJECTION, POWDER, FOR SOLUTION INTRAMUSCULAR; INTRAVENOUS at 20:59

## 2023-01-05 RX ADMIN — CARVEDILOL 12.5 MG: 6.25 TABLET, FILM COATED ORAL at 08:38

## 2023-01-05 RX ADMIN — BUSPIRONE HYDROCHLORIDE 15 MG: 5 TABLET ORAL at 20:59

## 2023-01-05 RX ADMIN — DOXYCYCLINE HYCLATE 100 MG: 100 TABLET, COATED ORAL at 20:59

## 2023-01-05 RX ADMIN — BUSPIRONE HYDROCHLORIDE 15 MG: 5 TABLET ORAL at 08:38

## 2023-01-05 RX ADMIN — Medication 10 ML: at 08:39

## 2023-01-05 RX ADMIN — QUETIAPINE FUMARATE 300 MG: 100 TABLET ORAL at 21:00

## 2023-01-05 RX ADMIN — MONTELUKAST SODIUM 10 MG: 10 TABLET ORAL at 04:23

## 2023-01-05 RX ADMIN — AMLODIPINE BESYLATE 5 MG: 5 TABLET ORAL at 08:38

## 2023-01-05 RX ADMIN — LURASIDONE HYDROCHLORIDE 80 MG: 40 TABLET, FILM COATED ORAL at 08:37

## 2023-01-05 RX ADMIN — ATORVASTATIN CALCIUM 40 MG: 40 TABLET, FILM COATED ORAL at 02:13

## 2023-01-05 RX ADMIN — PANTOPRAZOLE SODIUM 40 MG: 40 TABLET, DELAYED RELEASE ORAL at 06:00

## 2023-01-05 RX ADMIN — Medication 10 ML: at 20:59

## 2023-01-05 RX ADMIN — MONTELUKAST SODIUM 10 MG: 10 TABLET ORAL at 21:00

## 2023-01-05 RX ADMIN — DIVALPROEX SODIUM 250 MG: 250 TABLET, EXTENDED RELEASE ORAL at 20:59

## 2023-01-05 RX ADMIN — ATORVASTATIN CALCIUM 40 MG: 40 TABLET, FILM COATED ORAL at 21:00

## 2023-01-05 RX ADMIN — BUSPIRONE HYDROCHLORIDE 15 MG: 5 TABLET ORAL at 14:52

## 2023-01-05 RX ADMIN — DULOXETINE HYDROCHLORIDE 60 MG: 60 CAPSULE, DELAYED RELEASE ORAL at 08:38

## 2023-01-05 RX ADMIN — BUSPIRONE HYDROCHLORIDE 15 MG: 5 TABLET ORAL at 04:23

## 2023-01-05 RX ADMIN — CARVEDILOL 12.5 MG: 6.25 TABLET, FILM COATED ORAL at 02:13

## 2023-01-05 ASSESSMENT — PAIN SCALES - GENERAL
PAINLEVEL_OUTOF10: 7
PAINLEVEL_OUTOF10: 0
PAINLEVEL_OUTOF10: 9
PAINLEVEL_OUTOF10: 9

## 2023-01-05 ASSESSMENT — PAIN DESCRIPTION - LOCATION
LOCATION: HEAD

## 2023-01-05 ASSESSMENT — PAIN DESCRIPTION - DESCRIPTORS
DESCRIPTORS: ACHING
DESCRIPTORS: ACHING

## 2023-01-05 ASSESSMENT — LIFESTYLE VARIABLES
HOW OFTEN DO YOU HAVE A DRINK CONTAINING ALCOHOL: NEVER
HOW MANY STANDARD DRINKS CONTAINING ALCOHOL DO YOU HAVE ON A TYPICAL DAY: PATIENT DOES NOT DRINK

## 2023-01-05 NOTE — ED NOTES
Ambulated pt with mobile SPO2 monitor, pt SPO2 89-91% on RA.       Boone Mcdermott, ROMAIN  01/04/23 [id number]

## 2023-01-05 NOTE — PROGRESS NOTES
4 Eyes Skin Assessment     The patient is being assess for  Admission    I agree that 2 RN's have performed a thorough Head to Toe Skin Assessment on the patient. ALL assessment sites listed below have been assessed. Areas assessed by both nurses:   [x]   Head, Face, and Ears   [x]   Shoulders, Back, and Chest  [x]   Arms, Elbows, and Hands   [x]   Coccyx, Sacrum, and Ischum  [x]   Legs, Feet, and Heels        Does the Patient have Skin Breakdown?   No         Emmanuel Prevention initiated:  Yes   Wound Care Orders initiated:  No      [facility] nurse consulted for Pressure Injury (Stage 3,4, Unstageable, DTI, NWPT, and Complex wounds):  NA      Nurse 1 eSignature: Electronically signed by Majel Lanes, RN on 1/5/23 at 4:16 AM EST    **SHARE this note so that the co-signing nurse is able to place an eSignature**    Nurse 2 eSignature: Electronically signed by Lety Govea RN on 1/5/23 at 6:29 AM EST

## 2023-01-05 NOTE — ED PROVIDER NOTES
[place] [id number] - MED SURG  EMERGENCY DEPARTMENT ENCOUNTER        Pt Name: Najma Fitzpatrick  MRN: [id number]  Birthdate 1950  Date of evaluation: 1/4/2023  Provider: CEE Meeks  PCP: BALJIT Chopra - CNP  Note Started: 9:31 PM EST 1/4/23      AZAR. I have evaluated this patient.  My supervising physician was available for consultation.      CHIEF COMPLAINT       Chief Complaint   Patient presents with    Chest Pain    Leg Swelling     PT REPORTS for past week, leg swelling.  Right is worse. Chest pain began 1 week ago + cough.        HISTORY OF PRESENT ILLNESS: 1 or more Elements             Najma Fitzpatrick is a 72 y.o. female who presents via private vehicle complaining of leg swelling, difficulty breathing, and chest pain.  The patient states chest pain began about a week ago.  She does have an associated cough.  She is feeling short of breath.  She has bilateral leg swelling but worse on the right.  No fever, chills, abdominal pain, nausea or vomiting.  She is on Lasix.  She does state that she has had multiple syncopal episodes after a coughing fit.  She states she last fell 2 days ago.  She is not on a blood thinner.  She did hit her head.    Nursing Notes were all reviewed and agreed with or any disagreements were addressed in the HPI.    REVIEW OF SYSTEMS :      Review of Systems    Positives and Pertinent negatives as per HPI.     SURGICAL HISTORY     Past Surgical History:   Procedure Laterality Date    COLONOSCOPY  1/19/12    DIAGNOSTIC CARDIAC CATH LAB PROCEDURE  Feb, 2007    Normal cor angio Feb, 2007    HERNIA REPAIR  07/11/2019    robotic ventral hernia repair with mesh    HERNIA REPAIR N/A 7/11/2019    ROBOTIC VENTRAL HERNIA REPAIR WITH MESH performed by [place]uriy Rider [place] [facility]    HYSTERECTOMY, TOTAL ABDOMINAL (CERVIX REMOVED)      KIDNEY STONE SURGERY         CURRENTMEDICATIONS       Current Discharge Medication List        CONTINUE these medications which have NOT  CHANGED    Details   atorvastatin (LIPITOR) 40 MG tablet       divalproex (DEPAKOTE ER) 250 MG extended release tablet       furosemide (LASIX) 20 MG tablet       hydrOXYzine HCl (ATARAX) 10 MG tablet       LORazepam (ATIVAN) 0.5 MG tablet Take 0.5 mg by mouth 2 times daily as needed. montelukast (SINGULAIR) 10 MG tablet       lurasidone (LATUDA) 80 MG TABS tablet Take 80 mg by mouth daily      QUEtiapine (SEROQUEL) 100 MG tablet Take 3 tablets by mouth nightly  Qty: 1 tablet, Refills: 0      carvedilol (COREG) 12.5 MG tablet Take 1 tablet by mouth 2 times daily (with meals)  Qty: 60 tablet, Refills: 0      amLODIPine (NORVASC) 5 MG tablet Take 1 tablet by mouth daily  Qty: 30 tablet, Refills: 0      gabapentin (NEURONTIN) 100 MG capsule Take 100 mg by mouth 3 times daily. oxybutynin (DITROPAN-XL) 10 MG extended release tablet Take 10 mg by mouth daily      fluticasone-umeclidin-vilant (TRELEGY ELLIPTA) 100-62.5-25 MCG/INH AEPB Inhale 1 puff into the lungs daily      primidone (MYSOLINE) 50 MG tablet Take 100 mg by mouth daily       busPIRone (BUSPAR) 15 MG tablet Take 15 mg by mouth 2 times daily       pantoprazole (PROTONIX) 40 MG tablet Take 1 tablet by mouth every morning (before breakfast)  Qty: 30 tablet, Refills: 0      nicotine (NICODERM CQ) 14 MG/24HR Place 1 patch onto the skin daily  Qty: 42 patch, Refills: 0      diclofenac sodium 1 % GEL Apply 4 g topically 4 times daily  Qty: 4 Tube, Refills: 1      !! divalproex (DEPAKOTE) 500 MG DR tablet Take 500 mg by mouth nightly      DULoxetine (CYMBALTA) 60 MG extended release capsule Take 60 mg by mouth daily       fluticasone (FLONASE) 50 MCG/ACT nasal spray 1 spray by Nasal route daily as needed       !! divalproex (DEPAKOTE) 250 MG DR tablet Take 250 mg by mouth every morning        !! - Potential duplicate medications found. Please discuss with provider.           ALLERGIES     Dicyclomine, Ibuprofen, Sumatriptan, Tape [adhesive tape], Tizanidine, and Motrin [ibuprofen micronized]    FAMILYHISTORY       Family History   Problem Relation Age of Onset    Emphysema Mother     Heart Disease Mother     Arthritis Mother     Depression Mother     High Blood Pressure Mother     Heart Failure Father     Heart Disease Father     Arthritis Father     Diabetes Father     High Blood Pressure Father     Stroke Father     Substance Abuse Father     High Blood Pressure Brother     Heart Disease Sister     Kidney Disease Daughter     Breast Cancer Maternal Aunt         SOCIAL HISTORY       Social History     Tobacco Use    Smoking status: Every Day     Packs/day: 2.00     Years: 47.00     Pack years: 94.00     Types: Cigarettes    Smokeless tobacco: Never   Vaping Use    Vaping Use: Never used   Substance Use Topics    Alcohol use: No    Drug use: Not Currently       SCREENINGS        Radha Coma Scale  Eye Opening: Spontaneous  Best Verbal Response: Oriented  Best Motor Response: Obeys commands  [place] Coma Scale Score: 15                CIWA Assessment  BP: (!) 140/74  Heart Rate: 75           PHYSICAL EXAM  1 or more Elements     ED Triage Vitals   BP Temp Temp src Heart Rate Resp SpO2 Height Weight   01/04/23 1900 01/04/23 1900 -- 01/04/23 1859 01/04/23 1859 01/04/23 1859 -- --   (!) 156/88 98.4 °F (36.9 °C)  84 18 94 %         Physical Exam  Vitals and nursing note reviewed. Constitutional:       Appearance: Normal appearance. She is not diaphoretic. HENT:      Head: Normocephalic and atraumatic. Nose: Nose normal.      Mouth/Throat:      Mouth: Mucous membranes are moist.   Eyes:      General:         Right eye: No discharge. Left eye: No discharge. Extraocular Movements: Extraocular movements intact. Pupils: Pupils are equal, round, and reactive to light. Cardiovascular:      Rate and Rhythm: Normal rate and regular rhythm. Pulses: Normal pulses. Heart sounds: Normal heart sounds. No murmur heard. No friction rub. No gallop. Pulmonary:      Effort: Pulmonary effort is normal. No respiratory distress. Breath sounds: Normal breath sounds. No stridor. No wheezing, rhonchi or rales. Abdominal:      General: Abdomen is flat. Palpations: Abdomen is soft. Tenderness: There is no abdominal tenderness. There is no guarding or rebound. Musculoskeletal:         General: Normal range of motion. Cervical back: Normal range of motion and neck supple. Right lower leg: Edema (R>L) present. Left lower leg: Edema present. Comments: 1+ pedal pulses bilaterally, more difficult to find on right than left, but palpable. Skin:     General: Skin is warm and dry. Coloration: Skin is not pale. Neurological:      Mental Status: She is alert and oriented to person, place, and time.    Psychiatric:         Mood and Affect: Mood normal.         Behavior: Behavior normal.           DIAGNOSTIC RESULTS   LABS:    Labs Reviewed   COMPREHENSIVE METABOLIC PANEL W/ REFLEX TO MG FOR LOW K - Abnormal; Notable for the following components:       Result Value    Sodium 132 (*)     Potassium reflex Magnesium 3.4 (*)     Chloride 92 (*)     Glucose 103 (*)     ALT 8 (*)     AST 13 (*)     All other components within normal limits   BRAIN NATRIURETIC PEPTIDE - Abnormal; Notable for the following components:    Pro- (*)     All other components within normal limits   MAGNESIUM - Abnormal; Notable for the following components:    Magnesium 1.60 (*)     All other components within normal limits   URINALYSIS WITH REFLEX TO CULTURE - Abnormal; Notable for the following components:    Leukocyte Esterase, Urine TRACE (*)     All other components within normal limits   MICROSCOPIC URINALYSIS - Abnormal; Notable for the following components:    Epithelial Cells, UA 6-10 (*)     All other components within normal limits   COVID-19 & INFLUENZA COMBO   CBC WITH AUTO DIFFERENTIAL   TROPONIN   D-DIMER, QUANTITATIVE   CBC WITH AUTO DIFFERENTIAL   BASIC METABOLIC PANEL   MAGNESIUM       When ordered only abnormal lab results are displayed. All other labs were within normal range or not returned as of this dictation. EKG: When ordered, EKG's are interpreted by the Emergency Department Physician in the absence of a cardiologist.  Please see their note for interpretation of EKG. RADIOLOGY:   Non-plain film images such as CT, Ultrasound and MRI are read by the radiologist. Plain radiographic images are visualized and preliminarily interpreted by the ED Provider with the below findings:    No acute findings    Interpretation per the Radiologist below, if available at the time of this note:    CT CERVICAL SPINE WO CONTRAST   Final Result   1. No acute abnormality of the cervical spine. 2. Multilevel cervical spondylosis and spondylolisthesis. No high-grade bony   spinal canal stenosis   3. Emphysematous changes of the visualized lung apices. CT CHEST PULMONARY EMBOLISM W CONTRAST   Final Result   1. No evidence of pulmonary embolism. 2. Bubbly opacification of the right lower lobe bronchus, likely related to   aspiration. No focal consolidation. 3. Emphysematous changes of the lungs. CT HEAD WO CONTRAST   Final Result   No acute intracranial abnormality. No change in appearance of the brain         XR CHEST PORTABLE   Final Result      Lungs are clear           CT HEAD WO CONTRAST    Result Date: 1/4/2023  EXAMINATION: CT OF THE HEAD WITHOUT CONTRAST  1/4/2023 8:23 pm TECHNIQUE: CT of the head was performed without the administration of intravenous contrast. Automated exposure control, iterative reconstruction, and/or weight based adjustment of the mA/kV was utilized to reduce the radiation dose to as low as reasonably achievable. COMPARISON: 03/14/2020.  HISTORY: ORDERING SYSTEM PROVIDED HISTORY: SYNCOPE and fall TECHNOLOGIST PROVIDED HISTORY: Reason for exam:->SYNCOPE and fall Has a \"code stroke\" or \"stroke alert\" been called? ->No Decision Support Exception - unselect if not a suspected or confirmed emergency medical condition->Emergency Medical Condition (MA) Reason for Exam: pt passed out while coughing and fell and hit ther head on the floor FINDINGS: BRAIN/VENTRICLES: There is no acute intracranial hemorrhage, mass effect or midline shift. No abnormal extra-axial fluid collection. The gray-white differentiation is maintained without evidence of an acute infarct. There is no evidence of hydrocephalus. Ventricles are mildly prominent. Cerebellar volume loss. Hypodensity in the periventricular white matter. This is unchanged in appearance. ORBITS: The visualized portion of the orbits demonstrate no acute abnormality. Lenses are postsurgical. SINUSES: The visualized paranasal sinuses and mastoid air cells demonstrate no acute abnormality. SOFT TISSUES/SKULL:  No acute abnormality of the visualized skull or soft tissues. No acute intracranial abnormality. No change in appearance of the brain     CT CERVICAL SPINE WO CONTRAST    Result Date: 1/4/2023  EXAMINATION: CT OF THE CERVICAL SPINE WITHOUT CONTRAST 1/4/2023 8:24 pm TECHNIQUE: CT of the cervical spine was performed without the administration of intravenous contrast. Multiplanar reformatted images are provided for review. Automated exposure control, iterative reconstruction, and/or weight based adjustment of the mA/kV was utilized to reduce the radiation dose to as low as reasonably achievable. COMPARISON: 03/14/2020. HISTORY: ORDERING SYSTEM PROVIDED HISTORY: Syncope and collapse TECHNOLOGIST PROVIDED HISTORY: Reason for exam:->syncope and collapse Decision Support Exception - unselect if not a suspected or confirmed emergency medical condition->Emergency Medical Condition (MA) Reason for Exam: fall,denies any neck pain FINDINGS: BONES/ALIGNMENT: There is no acute fracture or traumatic malalignment. Grade 1 anterolisthesis of C3 on C4 and of C4 on C5.   Grade 1 retrolisthesis of C5 on C6. DEGENERATIVE CHANGES: Multilevel cervical spondylosis with associated neural foraminal narrowing. No high-grade bony spinal canal stenosis. SOFT TISSUES: There is no prevertebral soft tissue swelling. Emphysematous changes of the visualized lung apices. 1. No acute abnormality of the cervical spine. 2. Multilevel cervical spondylosis and spondylolisthesis. No high-grade bony spinal canal stenosis 3. Emphysematous changes of the visualized lung apices. XR CHEST PORTABLE    Result Date: 1/4/2023  EXAMINATION: ONE XRAY VIEW OF THE CHEST 1/4/2023 7:10 pm COMPARISON: 08/11/2022 HISTORY: ORDERING SYSTEM PROVIDED HISTORY: chest pain TECHNOLOGIST PROVIDED HISTORY: Reason for exam:->chest pain Reason for Exam: chest pain FINDINGS: Heart size is normal  Aorta is normal.  Lungs are normally expanded and clear. No pleural effusions. Mild spondylosis     Lungs are clear     CT CHEST PULMONARY EMBOLISM W CONTRAST    Result Date: 1/4/2023  EXAMINATION: CTA OF THE CHEST 1/4/2023 8:25 pm TECHNIQUE: CTA of the chest was performed after the administration of intravenous contrast.  Multiplanar reformatted images are provided for review. MIP images are provided for review. Automated exposure control, iterative reconstruction, and/or weight based adjustment of the mA/kV was utilized to reduce the radiation dose to as low as reasonably achievable. COMPARISON: 03/13/2022. HISTORY: ORDERING SYSTEM PROVIDED HISTORY: r/o PE TECHNOLOGIST PROVIDED HISTORY: Reason for exam:->r/o PE Decision Support Exception - unselect if not a suspected or confirmed emergency medical condition->Emergency Medical Condition (MA) Reason for Exam: chest pain,shortness of breath Additional signs and symptoms: syncopal episode,cough for weeks,right leg swollen Relevant Medical/Surgical History: smoker x 55 years FINDINGS: Pulmonary Arteries: Pulmonary arteries are adequately opacified for evaluation.   No evidence of intraluminal filling defect to suggest pulmonary embolism. Main pulmonary artery is normal in caliber. Mediastinum: No evidence of mediastinal lymphadenopathy. The heart and pericardium demonstrate no acute abnormality. Coronary artery calcifications. There is no acute abnormality of the thoracic aorta. Lungs/pleura: Emphysematous changes of the lungs. Bubbly opacification of the right lower lobe bronchus, likely related to aspiration. No focal consolidation or pulmonary edema. No evidence of pleural effusion or pneumothorax. Upper Abdomen: Limited images of the upper abdomen are unremarkable. Soft Tissues/Bones: No acute bone or soft tissue abnormality. Multilevel spondylosis. No high-grade bony spinal canal stenosis. 1. No evidence of pulmonary embolism. 2. Bubbly opacification of the right lower lobe bronchus, likely related to aspiration. No focal consolidation. 3. Emphysematous changes of the lungs. No results found. PROCEDURES   Unless otherwise noted below, none     Procedures    CRITICAL CARE TIME (.cctime)       PAST MEDICAL HISTORY      has a past medical history of Abnormal ECG (12/14/2012), Anemia, Arthritis, Back pain, chronic (3/13/2013), Bipolar disorder ([address] Bronchitis chronic, CHF (congestive heart failure) ([address] (9/30/2016), Chronic back pain, Chronic neck pain, Clostridium difficile infection (3/29/12, 5/22/12), 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[address] (01/10/2019), Coronary artery disease involving native coronary artery of native heart with angina pectoris ([address] (3/8/2016), Depression, Emphysema of lung ([address] Fibromyalgia, hyperlipidemia (8/11/2011), Hypertension, Kidney stone, Liver disease, Lung disease, MDD (major depressive disorder) (4/4/2012), Mood disorder ([address] (3/13/2013), Movement disorder, Neck pain, chronic (3/13/2013), Opiate misuse, Personality disorder ([address] Pneumonia, and Polypharmacy (2/16/2013).      Chronic Conditions affecting Care: CHF    EMERGENCY DEPARTMENT COURSE and DIFFERENTIAL DIAGNOSIS/MDM:   Vitals:    Vitals:    01/05/23 7227 01/05/23 0213 01/05/23 0238 01/05/23 0341   BP: 132/65 132/65 (!) 150/69 (!) 140/74   Pulse: 78 78 76 75   Resp: 17  18 16   Temp:    97.6 °F (36.4 °C)   TempSrc:    Oral   SpO2: 91%  91% 91%       Patient was given the following medications:  Medications   amLODIPine (NORVASC) tablet 5 mg (has no administration in time range)   atorvastatin (LIPITOR) tablet 40 mg (40 mg Oral Given 1/5/23 0213)   busPIRone (BUSPAR) tablet 15 mg (has no administration in time range)   carvedilol (COREG) tablet 12.5 mg (12.5 mg Oral Given 1/5/23 0213)   divalproex (DEPAKOTE ER) extended release tablet 250 mg (has no administration in time range)   DULoxetine (CYMBALTA) extended release capsule 60 mg (has no administration in time range)   lurasidone (LATUDA) tablet 80 mg (has no administration in time range)   montelukast (SINGULAIR) tablet 10 mg (has no administration in time range)   nicotine (NICODERM CQ) 14 MG/24HR 1 patch (has no administration in time range)   oxybutynin (DITROPAN-XL) extended release tablet 10 mg (has no administration in time range)   pantoprazole (PROTONIX) tablet 40 mg (has no administration in time range)   QUEtiapine (SEROQUEL) tablet 300 mg (has no administration in time range)   sodium chloride flush 0.9 % injection 10 mL (has no administration in time range)   0.9 % sodium chloride infusion (has no administration in time range)   potassium chloride (KLOR-CON M) extended release tablet 40 mEq (has no administration in time range)     Or   potassium bicarb-citric acid (EFFER-K) effervescent tablet 40 mEq (has no administration in time range)     Or   potassium chloride 10 mEq/100 mL IVPB (Peripheral Line) (has no administration in time range)   magnesium sulfate 1000 mg in dextrose 5% 100 mL IVPB (has no administration in time range)   enoxaparin (LOVENOX) injection 40 mg (has no administration in time range) ondansetron (ZOFRAN-ODT) disintegrating tablet 4 mg (has no administration in time range)     Or   ondansetron (ZOFRAN) injection 4 mg (has no administration in time range)   acetaminophen (TYLENOL) tablet 650 mg (has no administration in time range)     Or   acetaminophen (TYLENOL) suppository 650 mg (has no administration in time range)   melatonin tablet 3 mg (has no administration in time range)   calcium carbonate (TUMS) chewable tablet 1,000 mg (has no administration in time range)   methylPREDNISolone sodium (SOLU-MEDROL) injection 40 mg (has no administration in time range)   ipratropium-albuterol (DUONEB) nebulizer solution 1 ampule (has no administration in time range)   albuterol (PROVENTIL) nebulizer solution 2.5 mg (has no administration in time range)   benzonatate (TESSALON) capsule 200 mg (has no administration in time range)   doxycycline hyclate (VIBRA-TABS) tablet 100 mg (has no administration in time range)   ipratropium-albuterol (DUONEB) nebulizer solution 1 ampule (1 ampule Inhalation Given 1/4/23 1947)   iopamidol (ISOVUE-370) 76 % injection 75 mL (75 mLs IntraVENous Given 1/4/23 2044)   magnesium sulfate 2000 mg in 50 mL IVPB premix (0 mg IntraVENous Stopped 1/5/23 0041)   methylPREDNISolone sodium (SOLU-MEDROL) injection 125 mg (125 mg IntraVENous Given 1/4/23 2243)             Is this patient to be included in the SEP-1 Core Measure due to severe sepsis or septic shock? No   Exclusion criteria - the patient is NOT to be included for SEP-1 Core Measure due to: Infection is not suspected    CONSULTS: (Who and What was discussed)  None      Social Determinants : None    Records Reviewed : None    CC/HPI Summary, DDx, ED Course, and Reassessment: Patient presents complaining of chest pain and leg swelling. Also complaining of shortness of breath. Differential diagnosis includes viral infection, pneumonia, PE.     Work-up in the emergency department today includes:  EKG interpreted by attending physician. CBC without evidence of leukocytosis or acute anemia  CMP with sodium of 132, potassium was 3.4. Renal function maintained. No transaminitis. Troponin is negative  COVID and flu are negative. Urinalysis without evidence of infection. BNP is 159  Magnesium is 1.6  CT head without intracranial abnormality. CT cervical spine with chronic findings but no acute findings. CT chest PE study with no evidence of PE. Is noted to have bubbly opacification right lower lobe bronchus likely related to aspiration. Patient received a breathing treatment for her symptoms without significant relief. She did become hypoxic with ambulation. She also became hypoxic while sleeping and was ultimately placed on 2 L of oxygen. At this time I am recommending admission for further evaluation and treatment of patient's symptoms. Patient and family are in agreement treatment plan. Hospitalist was consulted at this time. Disposition Considerations (include 1 Tests not done, Shared Decision Making, Pt Expectation of Test or Tx.): Patient will be admitted for further evaluation and treatment for acute respiratory failure with hypoxia and also multiple syncopal episodes.       Results for orders placed or performed during the hospital encounter of 01/04/23   COVID-19 & Influenza Combo    Specimen: Nasopharyngeal Swab   Result Value Ref Range    SARS-CoV-2 RNA, RT PCR NOT DETECTED NOT DETECTED    INFLUENZA A NOT DETECTED NOT DETECTED    INFLUENZA B NOT DETECTED NOT DETECTED   CBC with Auto Differential   Result Value Ref Range    WBC 7.7 4.0 - 11.0 K/uL    RBC 4.41 4.00 - 5.20 M/uL    Hemoglobin 13.0 12.0 - 16.0 g/dL    Hematocrit 38.4 36.0 - 48.0 %    MCV 87.1 80.0 - 100.0 fL    MCH 29.5 26.0 - 34.0 pg    MCHC 33.9 31.0 - 36.0 g/dL    RDW 14.7 12.4 - 15.4 %    Platelets 953 038 - 524 K/uL    MPV 7.1 5.0 - 10.5 fL    Neutrophils % 60.2 %    Lymphocytes % 26.8 %    Monocytes % 10.1 %    Eosinophils % 2.2 % Basophils % 0.7 %    Neutrophils Absolute 4.6 1.7 - 7.7 K/uL    Lymphocytes Absolute 2.1 1.0 - 5.1 K/uL    Monocytes Absolute 0.8 0.0 - 1.3 K/uL    Eosinophils Absolute 0.2 0.0 - 0.6 K/uL    Basophils Absolute 0.1 0.0 - 0.2 K/uL   CMP w/ Reflex to MG   Result Value Ref Range    Sodium 132 (L) 136 - 145 mmol/L    Potassium reflex Magnesium 3.4 (L) 3.5 - 5.1 mmol/L    Chloride 92 (L) 99 - 110 mmol/L    CO2 31 21 - 32 mmol/L    Anion Gap 9 3 - 16    Glucose 103 (H) 70 - 99 mg/dL    BUN 8 7 - 20 mg/dL    Creatinine 0.7 0.6 - 1.2 mg/dL    Est, Glom Filt Rate >60 >60    Calcium 9.3 8.3 - 10.6 mg/dL    Total Protein 7.6 6.4 - 8.2 g/dL    Albumin 4.2 3.4 - 5.0 g/dL    Albumin/Globulin Ratio 1.2 1.1 - 2.2    Total Bilirubin <0.2 0.0 - 1.0 mg/dL    Alkaline Phosphatase 104 40 - 129 U/L    ALT 8 (L) 10 - 40 U/L    AST 13 (L) 15 - 37 U/L   Troponin   Result Value Ref Range    Troponin <0.01 <0.01 ng/mL   D-Dimer, Quantitative   Result Value Ref Range    D-Dimer, Quant 0.46 0.00 - 0.60 ug/mL FEU   Brain Natriuretic Peptide   Result Value Ref Range    Pro- (H) 0 - 124 pg/mL   Magnesium   Result Value Ref Range    Magnesium 1.60 (L) 1.80 - 2.40 mg/dL   Urinalysis with Reflex to Culture    Specimen: Urine   Result Value Ref Range    Color, UA Yellow Straw/Yellow    Clarity, UA Clear Clear    Glucose, Ur Negative Negative mg/dL    Bilirubin Urine Negative Negative    Ketones, Urine Negative Negative mg/dL    Specific Gravity, UA 1.010 1.005 - 1.030    Blood, Urine Negative Negative    pH, UA 6.5 5.0 - 8.0    Protein, UA Negative Negative mg/dL    Urobilinogen, Urine 0.2 <2.0 E.U./dL    Nitrite, Urine Negative Negative    Leukocyte Esterase, Urine TRACE (A) Negative    Microscopic Examination YES     Urine Type NotGiven     Urine Reflex to Culture Not Indicated    Microscopic Urinalysis   Result Value Ref Range    Hyaline Casts, UA 0-2 0 - 2 /LPF    WBC, UA 3-5 0 - 5 /HPF    RBC, UA None seen 0 - 4 /HPF    Epithelial Cells, UA 6-10 (A) 0 - 5 /HPF   EKG 12 Lead   Result Value Ref Range    Ventricular Rate 82 BPM    Atrial Rate 82 BPM    P-R Interval 164 ms    QRS Duration 100 ms    Q-T Interval 394 ms    QTc Calculation (Bazett) 460 ms    P Axis 33 degrees    R Axis 0 degrees    T Axis 43 degrees    Diagnosis       Normal sinus rhythmRSR' or QR pattern in V1 suggests right ventricular conduction delayPossible Lateral infarct , age undeterminedAbnormal ECGWhen compared with ECG of 02-NOV-2021 15:01,Premature ventricular complexes are no longer Present       I spoke with Dr. [facility] hospitalist. We discussed the history, physical exam, diagnostics, as well as their emergency department course. Based upon that discussion, we've decided to admit Guanako Moncada for further observation and evaluation of Tr Fitzpatrick's   1. Syncope and collapse    2. Leg swelling    3. Hypomagnesemia    4. Acute respiratory failure with hypoxia ([address]    . I am the Primary Clinician of Record. FINAL IMPRESSION      1. Syncope and collapse    2. Leg swelling    3. Hypomagnesemia    4. Acute respiratory failure with hypoxia ([address]          DISPOSITION/PLAN     DISPOSITION Admitted 01/05/2023 12:04:53 AM      PATIENT REFERRED TO:  No follow-up provider specified.     DISCHARGE MEDICATIONS:  Current Discharge Medication List          DISCONTINUED MEDICATIONS:  Current Discharge Medication List                 (Please note that portions of this note were completed with a voice recognition program.  Efforts were made to edit the dictations but occasionally words are mis-transcribed.)    CEE Navarrete (electronically signed)           Mandy Pinto, [id number]  01/05/23 [id number]

## 2023-01-05 NOTE — ED NOTES
Pt noted to have desat when sleeping to 86% on RA, pt placed on 2L NC with improvement to 93%     Esequiel Corey RN  01/04/23 [id number]

## 2023-01-05 NOTE — H&P
Hospital Medicine History & Physical      Patient: Chandan Shen  :  1950  MRN:  [id number]    Date of Service: 23    Chief Complaint   Patient presents with    Chest Pain    Leg Swelling     PT REPORTS for past week, leg swelling. Right is worse. Chest pain began 1 week ago + cough. HISTORY OF PRESENT ILLNESS:    Chandan Shen is a 71 y.o. female. She presented to the ER from home for dyspnea, cough, and sycnopal episodes. She has a h/o COPD. For roughly the past month she has been having coughing fits. Sometimes coughing is severe and causes cough syncope and injuries due to falls. For the most part the patient's cough has been nonproductive but sometimes she does expectorate small amounts of thick yellowish sputum. She denies pleuritic chest pain and hemoptysis. She has not had loss of appetite, rigors, sweats, nausea, or diarrhea. She does c/o multiple bruises and sore spots from where she has fallen. She lives with her  and daughter. She does not take any medications for her lungs except one inhaler which she thinks is albuterol. She still smokes but wants to quit. Review of Systems:  All pertinent positives and negatives are as noted in the HPI section. All other systems were reviewed and are negative.     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    Coronary artery disease involving native coronary artery of native heart with angina pectoris ([address] 3/8/2016    Depression     Emphysema of lung ([address]     Fibromyalgia     hyperlipidemia 2011    Hypertension     Kidney stone     Liver disease     Lung disease MDD (major depressive disorder) 4/4/2012    Mood disorder ([facility] 3/13/2013    Movement disorder     Neck pain, chronic 3/13/2013    Opiate misuse     Personality disorder ([facility]     Pneumonia     Polypharmacy 2/16/2013       Past Surgical History:   Procedure Laterality Date    COLONOSCOPY  1/19/12    DIAGNOSTIC CARDIAC CATH LAB PROCEDURE  Feb, 2007    Normal cor angio Feb, 2007    HERNIA REPAIR  07/11/2019    robotic ventral hernia repair with mesh    HERNIA REPAIR N/A 7/11/2019    ROBOTIC VENTRAL HERNIA REPAIR WITH MESH performed by James Craven MD at [facility], TOTAL ABDOMINAL (CERVIX REMOVED)      KIDNEY STONE SURGERY       Outpatient Medications  Amlodipine 5mg daily  Buspirone 15mg TID  Carvedilol  12.5 mg BID  Depakote  BID  Duloxetine 60 mg daily  Protonix 40 mg daily   Oxybutynin 10 mg daily  Atorvastatin 40mg hs  Quetiapine 300mg qhs  Lurasidone 80mg qam  Furosemide 20mg daily    Allergies:   Dicyclomine, Ibuprofen, Sumatriptan, Tape [adhesive tape], Tizanidine, and Motrin [ibuprofen micronized]    Social:   reports that she has been smoking cigarettes. She has a 94.00 pack-year smoking history. She has never used smokeless tobacco.   reports no history of alcohol use. Social History     Substance and Sexual Activity   Drug Use Not Currently       Family History   Problem Relation Age of Onset    Emphysema Mother     Heart Disease Mother     Arthritis Mother     Depression Mother     High Blood Pressure Mother     Heart Failure Father     Heart Disease Father     Arthritis Father     Diabetes Father     High Blood Pressure Father     Stroke Father     Substance Abuse Father     High Blood Pressure Brother     Heart Disease Sister     Kidney Disease Daughter     Breast Cancer Maternal Aunt        PHYSICAL EXAM:  I performed this physical examination.     Vitals:  Patient Vitals for the past 24 hrs:   BP Temp Pulse Resp SpO2   01/04/23 2312 (!) 152/71 -- 76 14 92 %   01/04/23 2305 -- -- 77 14 94 %   01/04/23 2304 -- -- 76 14 91 %   01/04/23 2300 -- -- 79 15 (!) 85 %   01/04/23 2259 -- -- 76 15 (!) 87 %   01/04/23 2208 (!) 161/62 -- 78 21 91 %   01/04/23 2158 (!) 145/65 -- 90 19 91 %   01/04/23 2138 (!) 148/65 -- 78 17 --   01/04/23 2003 (!) 156/68 -- 75 16 96 %   01/04/23 1903 (!) 156/88 -- 83 16 94 %   01/04/23 1900 (!) 156/88 98.4 °F (36.9 °C) 84 11 96 %   01/04/23 1859 -- -- 84 18 94 %     1 L/min O2    GEN:  Appearance:  age appropriate WF in NAD . Level of Consciousness:  alert . Orientation:  full    HEENT: Sclera anicteric.  no conjunctival chemosis. moist mucus membranes. no specific or diagnostic oral lesions. NECK:  no signs of meningismus. Jugular veins not distended. Carotid pulses  2+.  no cervical lymphadenopathy. no thyromegaly. CV:  regular rhythm. normal S1 & S2.    no murmur. no rub.  no gallop. PULM:  Chest excursion is symmetric. Breath sounds are diminished throughout. .    Adventitious sounds:  scattered inspiratory crackles over both lower lobes. Prolonged expiratory wheezes throughout. AB:  Abdominal shape is normal.  Bowel sounds are active. Generally soft to palpation. no tenderness is present. no involuntary guarding. no rebound guarding. EXTR:  Skin is warm. Capillary refill brisk. no specific or pathognomic rash. no clubbing. 1-2+ pitting edema of both legs, right > left, as high as distal thighs  no active wound or ulcer.   Pulses 2+ x 4    LABS:  Lab Results   Component Value Date    WBC 7.7 01/04/2023    HGB 13.0 01/04/2023    HCT 38.4 01/04/2023    MCV 87.1 01/04/2023     01/04/2023     Lab Results   Component Value Date    CREATININE 0.7 01/04/2023    BUN 8 01/04/2023     (L) 01/04/2023    K 3.4 (L) 01/04/2023    CL 92 (L) 01/04/2023    CO2 31 01/04/2023     Lab Results   Component Value Date    ALT 8 (L) 01/04/2023    AST 13 (L) 01/04/2023    ALKPHOS 104 01/04/2023    BILITOT <0.2 01/04/2023     Lab Results   Component Value Date    CKTOTAL 140 03/17/2017    TROPONINI <0.01 01/04/2023     No results for input(s): PHART, DBN0SWM, PO2ART in the last 72 hours. IMAGING:  CT HEAD WO CONTRAST    Result Date: 1/4/2023  EXAMINATION: CT OF THE HEAD WITHOUT CONTRAST  1/4/2023 8:23 pm TECHNIQUE: CT of the head was performed without the administration of intravenous contrast. Automated exposure control, iterative reconstruction, and/or weight based adjustment of the mA/kV was utilized to reduce the radiation dose to as low as reasonably achievable. COMPARISON: 03/14/2020. HISTORY: ORDERING SYSTEM PROVIDED HISTORY: SYNCOPE and fall TECHNOLOGIST PROVIDED HISTORY: Reason for exam:->SYNCOPE and fall Has a \"code stroke\" or \"stroke alert\" been called? ->No Decision Support Exception - unselect if not a suspected or confirmed emergency medical condition->Emergency Medical Condition (MA) Reason for Exam: pt passed out while coughing and fell and hit ther head on the floor FINDINGS: BRAIN/VENTRICLES: There is no acute intracranial hemorrhage, mass effect or midline shift. No abnormal extra-axial fluid collection. The gray-white differentiation is maintained without evidence of an acute infarct. There is no evidence of hydrocephalus. Ventricles are mildly prominent. Cerebellar volume loss. Hypodensity in the periventricular white matter. This is unchanged in appearance. ORBITS: The visualized portion of the orbits demonstrate no acute abnormality. Lenses are postsurgical. SINUSES: The visualized paranasal sinuses and mastoid air cells demonstrate no acute abnormality. SOFT TISSUES/SKULL:  No acute abnormality of the visualized skull or soft tissues. No acute intracranial abnormality.   No change in appearance of the brain     CT CERVICAL SPINE WO CONTRAST    Result Date: 1/4/2023  EXAMINATION: CT OF THE CERVICAL SPINE WITHOUT CONTRAST 1/4/2023 8:24 pm TECHNIQUE: CT of the cervical spine was performed without the administration of intravenous contrast. Multiplanar reformatted images are provided for review. Automated exposure control, iterative reconstruction, and/or weight based adjustment of the mA/kV was utilized to reduce the radiation dose to as low as reasonably achievable. COMPARISON: 03/14/2020. HISTORY: ORDERING SYSTEM PROVIDED HISTORY: Syncope and collapse TECHNOLOGIST PROVIDED HISTORY: Reason for exam:->syncope and collapse Decision Support Exception - unselect if not a suspected or confirmed emergency medical condition->Emergency Medical Condition (MA) Reason for Exam: fall,denies any neck pain FINDINGS: BONES/ALIGNMENT: There is no acute fracture or traumatic malalignment.  Grade 1 anterolisthesis of C3 on C4 and of C4 on C5.  Grade 1 retrolisthesis of C5 on C6. DEGENERATIVE CHANGES: Multilevel cervical spondylosis with associated neural foraminal narrowing.  No high-grade bony spinal canal stenosis. SOFT TISSUES: There is no prevertebral soft tissue swelling.  Emphysematous changes of the visualized lung apices.     1. No acute abnormality of the cervical spine. 2. Multilevel cervical spondylosis and spondylolisthesis.  No high-grade bony spinal canal stenosis 3. Emphysematous changes of the visualized lung apices.     XR CHEST PORTABLE    Result Date: 1/4/2023  EXAMINATION: ONE XRAY VIEW OF THE CHEST 1/4/2023 7:10 pm COMPARISON: 08/11/2022 HISTORY: ORDERING SYSTEM PROVIDED HISTORY: chest pain TECHNOLOGIST PROVIDED HISTORY: Reason for exam:->chest pain Reason for Exam: chest pain FINDINGS: Heart size is normal  Aorta is normal.  Lungs are normally expanded and clear. No pleural effusions. Mild spondylosis     Lungs are clear     CT CHEST PULMONARY EMBOLISM W CONTRAST    Result Date: 1/4/2023  EXAMINATION: CTA OF THE CHEST 1/4/2023 8:25 pm TECHNIQUE: CTA of the chest was performed after the administration of intravenous contrast.  Multiplanar reformatted images are provided for review.  MIP images are provided  for review. Automated exposure control, iterative reconstruction, and/or weight based adjustment of the mA/kV was utilized to reduce the radiation dose to as low as reasonably achievable. COMPARISON: 03/13/2022. HISTORY: ORDERING SYSTEM PROVIDED HISTORY: r/o PE TECHNOLOGIST PROVIDED HISTORY: Reason for exam:->r/o PE Decision Support Exception - unselect if not a suspected or confirmed emergency medical condition->Emergency Medical Condition (MA) Reason for Exam: chest pain,shortness of breath Additional signs and symptoms: syncopal episode,cough for weeks,right leg swollen Relevant Medical/Surgical History: smoker x 55 years FINDINGS: Pulmonary Arteries: Pulmonary arteries are adequately opacified for evaluation. No evidence of intraluminal filling defect to suggest pulmonary embolism. Main pulmonary artery is normal in caliber. Mediastinum: No evidence of mediastinal lymphadenopathy. The heart and pericardium demonstrate no acute abnormality. Coronary artery calcifications. There is no acute abnormality of the thoracic aorta. Lungs/pleura: Emphysematous changes of the lungs. Bubbly opacification of the right lower lobe bronchus, likely related to aspiration. No focal consolidation or pulmonary edema. No evidence of pleural effusion or pneumothorax. Upper Abdomen: Limited images of the upper abdomen are unremarkable. Soft Tissues/Bones: No acute bone or soft tissue abnormality. Multilevel spondylosis. No high-grade bony spinal canal stenosis. 1. No evidence of pulmonary embolism. 2. Bubbly opacification of the right lower lobe bronchus, likely related to aspiration. No focal consolidation. 3. Emphysematous changes of the lungs. I directly reviewed all recent imaging studies as well as pertinent prior studies. Radiology reports may or may not be available at the time of my review. EKG:  New and pertinent prior tracings were directly reviewed.   My interpretation is as follows:  Normal sinus rhythm with IRBBB. Active Hospital Problems    Diagnosis Date Noted    COPD exacerbation ([place]) [J44.1] 01/05/2023     Priority: Medium    Chronic heart failure with preserved ejection fraction ([place]) [I50.32] 01/04/2023     Priority: Medium    Noncompliance [Z91.199] 01/04/2023     Priority: Medium    Bipolar disorder ([place]) [F31.9] 08/27/2015     Priority: Medium    HTN (hypertension) [I10]     Tobacco abuse [Z72.0] 01/30/2016       ASSESSMENT & PLAN  COPD exacerbation, Tobacco abuse  -  Patient had normal PFT's in 2011. No PFT's since then. -  Titrate level of respiratory support according to patient's needs. Target goal SpO2 = 90-94%. Currently patient is requiring 1 L/min O2 support while at rest and thinks it is helping her. At baseline she does not require supplemental O2.  -  Start solumedrol 40mg IV q12h, doxycycline 100mg BID, duonebs QID, prn albuterol nebs. Continue home montelukast.  -  Patient needs updated PFT's, a home O2 evaluation (ordered), and would likely benefit from a maintenance inhaler regimen. -  Smoking cessation advised. NRT provided. Chronic HFpEF, HTN, HLD  -  Continue home amlodipine, atorvastatin, carvedilol.  -  Change home lasix to 20mg IV daily. Bipolar Disorder  -  Continue home depakote, buspirone, quetiapine, lurasidone, and duloxetine. DVT prophylaxis: SCDs, lovenox   Code Status:  Full  Disposition:  Inpatient w/ anticipated eventual d/c to home.     Galen Greene MD MD

## 2023-01-05 NOTE — PROGRESS NOTES
Occupational Therapy  Facility/Department: [facility]  Occupational Therapy Initial Assessment    Name: Candance Hitchcock  : 1950  MRN: [id number]  Date of Service: 2023    Discharge Recommendations:  Home with Home health OT, Home with assist PRN, 24 hour supervision or assist  OT Equipment Recommendations  Equipment Needed: Yes  Mobility Devices: ADL Assistive Devices  ADL Assistive Devices: Transfer Tub Bench  Patient Diagnosis(es): The primary encounter diagnosis was Syncope and collapse. Diagnoses of Leg swelling, Hypomagnesemia, and Acute respiratory failure with hypoxia ([address] were also pertinent to this visit. Past Medical History:  has a past medical history of Abnormal ECG, Anemia, Arthritis, Back pain, chronic, Bipolar disorder (HCC), Bronchitis chronic, CHF (congestive heart failure) (HCC), Chronic back pain, Chronic neck pain, Clostridium difficile infection, 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[address] Coronary artery disease involving native coronary artery of native heart with angina pectoris ([address] Depression, Emphysema of lung ([address] Fibromyalgia, hyperlipidemia, Hypertension, Kidney stone, Liver disease, Lung disease, MDD (major depressive disorder), Mood disorder ([address] Movement disorder, Neck pain, chronic, Opiate misuse, Personality disorder ([address] Pneumonia, and Polypharmacy. Past Surgical History:  has a past surgical history that includes Hysterectomy, total abdominal; Kidney stone surgery; Diagnostic Cardiac Cath Lab Procedure (2007); Colonoscopy (12); hernia repair (2019); and hernia repair (N/A, 2019). Treatment Diagnosis: impaired ADLs      Assessment   Performance deficits / Impairments: Decreased functional mobility ; Decreased ADL status; Decreased ROM; Decreased strength;Decreased safe awareness;Decreased endurance;Decreased balance;Decreased posture  Assessment: 72 yo female adm with severe coughing and syncopal events.  PMH includes COPD, smoking, HF, bipolar disorder. Pt lives with family in single level home with 3 steps to access. Pt reports she amb indep without device, but has sedentary lifestyle and does not leave house often. PLOF Ind in Basical ADLs with some PRN assist from family, family responsible for all IADLs. Currently pt demonstrates modified indep bed mob and transfers, requires only SBA to amb 20 ft x 2 using Rolling walker on 1L O2. Pt wtih SBA for toileting and grooming at sink. Pt wtih max fatigue, poor activity edna and endurance wtih minimal ADL/ambulation. Pt will benefit from skilled OT to address defiicts. Recommend home with initial 24h/a fading to assit PRN and HHOT. Pt will need a TTB. Pt was seen in collaboration with PT to maximize function and safety due to orthostatic hypotension and  hx of falls. Treatment Diagnosis: impaired ADLs  Prognosis: Good  Decision Making: Medium Complexity  REQUIRES OT FOLLOW-UP: Yes  Activity Tolerance  Activity Tolerance: Patient Tolerated treatment well  Activity Tolerance Comments: see vitals for orthostatic BP, pt tolerated well, no LOB, no lightheadedness        Plan   Occupational Therapy Plan  Times Per Week: 3-5x per week  Current Treatment Recommendations: Strengthening, Balance training, Functional mobility training, Endurance training, Safety education & training, Patient/Caregiver education & training, Equipment evaluation, education, & procurement, Self-Care / ADL     Restrictions  Restrictions/Precautions  Restrictions/Precautions: Fall Risk, Up as Tolerated  Position Activity Restriction  Other position/activity restrictions: 1L O2, IV    Subjective   General  Chart Reviewed: Yes, Orders, Progress Notes, Labs, History and Physical  Patient assessed for rehabilitation services?: Yes  Additional Pertinent Hx: per H&P \"Najma Rosales is a 71 y.o. female. She presented to the ER from home for dyspnea, cough, and sycnopal episodes. She has a h/o COPD.   For roughly the past month she has been having coughing fits. Sometimes coughing is severe and causes cough syncope and injuries due to falls. For the most part the patient's cough has been nonproductive but sometimes she does expectorate small amounts of thick yellowish sputum. She denies pleuritic chest pain and hemoptysis. She has not had loss of appetite, rigors, sweats, nausea, or diarrhea. She does c/o multiple bruises and sore spots from where she has fallen. She lives with her  and daughter. She does not take any medications for her lungs except one inhaler which she thinks is albuterol. She still smokes but wants to quit. \"  Response to previous treatment: Patient with no complaints from previous session  Family / Caregiver Present: No  Referring Practitioner: Cuba Mejia MD  Diagnosis: COPD exacerbation ([facility]  Subjective  Subjective: pt agreeable to OT/PT evaluation this AM     Social/Functional History  Social/Functional History  Lives With: Spouse, Family, Daughter (2 grandchildren)  Type of Home: House  Home Layout: One level  Home Access: Stairs to enter with rails  Entrance Stairs - Number of Steps: 3-4  Entrance Stairs - Rails: Both  Bathroom Shower/Tub: Tub/Shower unit, Shower chair with back  [employer]: Handicap height  Bathroom Equipment: Grab bars in shower, Grab bars around toilet  Bathroom Accessibility: Not accessible  Has the patient had two or more falls in the past year or any fall with injury in the past year?: Yes (about 5 \"blackout\" falls, pt was found to be positive for orthostatic BP)  Receives Help From: Family  ADL Assistance: Needs assistance (pt needing mod A for Lower body dressing)  Homemaking Responsibilities: No  Ambulation Assistance: Independent  Transfer Assistance: Independent  Active : No  Patient's  Info: daughter       Objective   Heart Rate: 85  Heart Rate Source: Monitor  BP: (!) 154/78  BP Location: Right lower arm  BP Method: Automatic  Patient Position: Semi nola  MAP (Calculated): 103  Resp: 16  SpO2: 92 %  O2 Device: Nasal cannula  Comment: 138/78 BP sitting, 85HR. 149/73 BP standing, 86HR. Observation/Palpation  Edema: mild edema BLEs R>L, skin dry and flaky on legs  Safety Devices  Type of Devices: Gait belt; All fall risk precautions in place; Bed alarm in place;Call light within reach; Left in bed;Patient at risk for falls;Nurse notified; All aleksander prominences offloaded  Bed Mobility Training  Bed Mobility Training: Yes  Overall Level of Assistance: Modified independent  Balance  Sitting: Intact  Standing: High guard  Standing - Static: Fair;Occasional (SBA)  Transfer Training  Transfer Training: Yes  Sit to Stand: Supervision;Modified independent  Stand to Sit: Supervision;Modified independent  Toilet Transfer: Stand-by assistance  Gait  Overall Level of Assistance: Stand-by assistance        ADL  Feeding: Independent  Grooming: Stand by assistance  Grooming Skilled Clinical Factors: standing at sink for handwashing  Toileting: Stand by assistance  Toileting Skilled Clinical Factors: standing with RW for hygiene and pants management     Activity Tolerance  Activity Tolerance: Patient tolerated evaluation without incident;Patient tolerated treatment well  Activity Tolerance Comments: BP supine 154/78  HR 85  SpO2 92% on 1L O2. Sitting 138/78  HR 85 . Standing 149/73  HR 86   No dizziness reported        Vision  Vision: Impaired  Vision Exceptions: Wears glasses for reading  Hearing  Hearing: Within functional limits  Cognition  Overall Cognitive Status: WFL  Orientation  Overall Orientation Status: Within Normal Limits  Orientation Level: Oriented X4                  Education Given To: Patient  Education Provided: Role of Therapy;Plan of Care;Home Exercise Program;Precautions; ADL Adaptive Strategies;Transfer Training;Energy Conservation;IADL Safety; Fall Prevention Strategies  Education Provided Comments: CHF education, role of OT, role of therapy, importance of OOB activity,  Education Method: Verbal;Demonstration;Printed Information/Hand-outs; Teach Back  Barriers to Learning: None (first time infromation)  Education Outcome: Verbalized understanding;Demonstrated understanding;Continued education needed                    AM-PAC Score        AM-PAC Inpatient Daily Activity Raw Score: 19 (23)  AM-PAC Inpatient ADL T-Scale Score : 40.22 (23)  ADL Inpatient CMS 0-100% Score: 42.8 (23)  ADL Inpatient CMS G-Code Modifier : CK (23)  Goals  Short Term Goals  Time Frame for Short Term Goals: 1 week by 23  Short Term Goal 1: Pt will complete toileting with SPV  Short Term Goal 2: Pt will complete toilet tranfser with SPV  Short Term Goal 3: Pt will complete LBD with SPV  Short Term Goal 4: Pt will complete standing ADLs x 5 minutes for improved endurance/activity tolreance  Short Term Goal 5: Pt will meet 2/4 CHF goals- MET  Patient Goals   Patient goals : to return home       Therapy Time   Individual Concurrent Group Co-treatment   Time In 0842         Time Out 0928         Minutes 46         Timed Code Treatment Minutes: 30 Minutes (16 minute evaluation)       Sharie Gaucher, OT    OCCUPATIONAL THERAPY HEART FAILURE EDUCATION    Mercedes Acevedo    : 1950  Acct #: [de-identified]  MRN: [id number]  PHYSICIAN:  Rosaline Jacome MD    OT Heart Failure Education Goals    Patient educated and demonstrates /verbalizes appropriate teach back with ability to self rate on NGOC and identify HF activity zone, prior to initiation of ADLs to appropriately determine need for daily activity level/ energy conservation/pacing. [x] Goal Met, [] Goal Not Met    Patient demonstrates /[id number] understanding of appropriate employment of Energy Conservation with every day activity.    [x] Goal Met, [] Goal Not Met    Patient demonstrates/verbalizes ability to correctly identify mL to cups conversion, what constitutes FLUID in diet, and knowledge of when to communicate changes in weight to physician consistent with patient orders and HF education. [] Goal Met, [] Goal Not Met, [x] Goal not appropriate for pt     Pt voices and demonstrates appropriate teach back of Heart Failure Education Program with the use of:  [x] Energy Conservation techniques                              [x] Choosing daily activity level  [x] Importance of fluid restriction               Pt will benefit from reinforcement of education due to   [] Readiness to learn                               [] Decreased cognition  [] Language barrier                                  [] Decreased vision/hearing  [x] First introduction to new information      [] Current Living (LTC, SNF, etc)  []Other:    Education provided via:  [x] Oral instruction  [x] Demonstration  [x] Written handout    ------------------------------------------------------------------------------------------------------------------------------------                    1)Heart Failure Zones Self Check Plan referencing Red/Yellow/Green Light Symbol with:  [] Green Zone/All Clear:   physical activity is normal for you,   No new swelling in feet, ankles, legs or stomach,   No weight gain of more than 2-3 pounds,   No chest pain or worsening of shortness of breath. (Continue daily: weight check, meds as directed, low salt eating, monitoring of fluid intake, balance activity, follow up visits)  [x] Yellow Zone/Caution:   Increased cough or shortness of breath with activity  Increased swelling in your feet, ankles, legs or stomach from baseline  Weight gain or loss of more than 2-3 pounds in 1 day.   Increase in the number of pillows needed while sleeping  (Check In!: You need to contact your doctor or provider as soon as possible)  [] Red Zone/Medical Alert:  Unrelieved chest pain or shortness of breath, especially while resting  Increased Discomfort or swelling in the abdomen or lower body  Sudden weight gain of more than 5 pounds in a week  Increased cough with bubbly and/or pink sputum  (Warning: You need to be seen right away . If you cannot reach your physician, call 911)    Patient educated in and []demonstrated/[x]verbalized understanding of identifying HF activity zone with the Self Check Plan referencing Red/Yellow/Green Light Symbol. Pt in yellow zone prior and after toileting  ------------------------------------------------------------------------------------------------------------------------------------         2)Kat Rating of Perceived Exertion (RPE) Scale     The Kat rating scale ranges from 6 to 20, where 6 means \"no exertion at all\" and 20 means \"maximal exertion. \" The patient chooses the number that best describes their level of exertion. This will objectify the intensity level of the patient's activity, and the therapist can use this information to accelerate or decelerate activity levels to reach the desired range. The patient should appraise their feeling of exertion as honestly as possible, without thinking about what the actual physical load is. Their feeling of effort and exertion is important, and it should not be compared to the level of others. Kat RPE Scale  Activity Completed: toileting, ambulation to bathroom    [] 6  No exertion at all  [] 7  Extremely light  [] 8  [] 9  Very light (For a healthy person, it is like walking slowly at his or her own pace for some minutes)  []10  []11  Light  []12  []13  Somewhat hard (exercise, but it still feels OK to continue)  []14  [x]15  Hard (heavy)  []16  []17  Very hard (can still go on, but really has to push himself. It feels very heavy, and the person is very tired.)  []18  []19  Extremely hard (For most people this is the most strenuous exercise they have ever experienced.)  []20  Maximal exertion.     Patient educated in and []demonstrated/[x]verbalized understanding []teach back  [x]Rating self on KAT and   [x]Identifying HF activity zone with the Self Check Plan referencing Red/Yellow/Green Light Symbol *:prior to ADls/activity to appropriately determine daily activity level.    ------------------------------------------------------------------------------------------------------------------------------------         3) 5 P's of Energy Conservation    Pt was educated on the following aspects of Energy Conservation techniques. Prioritize/Plan: Decide what needs to be done today, and what can wait for a later date, write to do lists, Plan ahead to avoid extra trips, Gather supplies and equipment needed before starting an activity. Position: Avoid tiring and awkward posture that may impair breathing  Pace: Slow and steady pace, never rushing! Pursed lip breathing. Pursed Lip Breathing: \"Smell the roses, blow out the candles\"    Patient []demonstrates / [x]verbalizes understanding of appropriate employment of Energy Conservation with every day activity.    ------------------------------------------------------------------------------------------------------------------------------------         4) Fluid intake tracking    Patient  []demonstrates/[]verbalizes ability to correctly identify mL to cups conversion, what constitutes FLUID in diet, and  knowledge of when to  communicate changes in weight to physician consistent with patient orders and HF education.       Pt participated in the following fluid tracking management   [] Identifying 4, 8, and 12 ounces of fluid size  [] Identify mL to cup conversion  [] Understanding Jello, watermelon and Ice cream contributing to fluid intake   [] Acknowledge current fluid restriction limits/orders  Pt required assistance or correction of error in the following:     [x] Not appropriate for patient currently not on fluid restriction  ------------------------------------------------------------------------------------------------------------------------------------         Pt was left in bed with alarm set, needs within reach, educated to use RED call light to alert nursing staff of needs. RN aware. If pt is unable to be seen after this session, please see prior Occupational Therapy Evaluation and/or Treatment note as discharge summary. Please see case management note for discharge disposition.       Thank you,   Electronically signed by Rosa Isela Blake OT on 1/5/2023 at 10:44 AM

## 2023-01-05 NOTE — CARE COORDINATION
CASE MANAGEMENT INITIAL ASSESSMENT      Reviewed chart and completed assessment with patient:bedside  Family present: none  Explained Case Management role/services. Primary contact information:Brent/    Health Care Decision Maker :   Primary Decision Maker: Michele Gay Spouse - [phone]    Secondary Decision Maker: Yolanda Anderson - Child - [phone]    Secondary Decision Maker: Bri Patricia Child - [phone]          Can this person be reached and be able to respond quickly, such as within a few minutes or hours? Yes    Admit date/status:1/5/23 INPT  Diagnosis: chest pain, BLE edema   Is this a Readmission?:  No      Insurance:[email or web address]   Precert required for SNF: Yes       3 night stay required: No    Living arrangements, Adls, care needs, prior to admission: pt lives in a 1 story house with , daughter and grandchildren. [address] at home:  Walker_x_Cane__RTS__ BSC__Shower Chair__  02__ HHN__ CPAP__  BiPap__  Hospital Bed__ W/C___ Other_____    Services in the home and/or outpatient, prior to admission:none    Current PCP:Geena SMILEY                                Medications: Prescription coverage? Yes Will pt require financial assistance with medications No     Transportation needs: none/private. Family provides transportation       PT/OT recs:home with jE  -pt refusing    Hospital Exemption Notification (HEN):needed for SNF    Barriers to discharge:none    Plan/comments: COPD, CHF. Management per IM. ECHO, IV lasix and electrolyte replacement. Pt on 1 liter of oxygen, none at home. Watching for O2 needs. PTOT rec home with [facility]. Pt refusing. States that she has enough help at home. CM will continue to follow, should needs arise.  Priscilla Coreas RN     ECOC on chart for MD signature

## 2023-01-05 NOTE — CONSULTS
Nutrition Education    Consult received for CHF diet education. Provided pt with written and verbal instructions on HF nutrition therapy. Discussed low sodium diet, daily weights, and fluid restriction. Pt reports that her and her  both try to follow a low sodium diet at home. She does not add salt to her foods and reads her labels. Reviewed list of foods to choose and foods to avoid. Pt's voiced understanding. Time spent: 5 minutes    Educated on CHF diet   Learners: Patient  Readiness: Acceptance  Method: Explanation and Handout  Response: Verbalizes Understanding  Contact name and number provided.     Eugene James RD, LD  Contact Number: [phone]

## 2023-01-05 NOTE — PROGRESS NOTES
Chelnet c/o continued headache, states she take fioricent at home. Message sent to Dr. Augusto Mcknight via perfect serve.

## 2023-01-05 NOTE — DISCHARGE INSTRUCTIONS
Heart Failure Resources:    Heart Failure Interactive Workbook:   Go to [email or web address] for a Free Heart Failure Interactive Workbook provided by [employer]. This interactive workbook will provide information on Healthier Living with Heart Failure. Please copy and paste link into search bar. Use your mouse to scroll through the pages. HF Jackson teddy:   Heart Failure Free smart phone teddy available for iPhone and Android download. Use your phone to track sodium intake, fluid intake, symptoms, and weight. Low Sodium Diet:  Go to www. [facility]. org website for SCANOh My Green! which is Low Sodium! [facility] is a dialysis company, but this website offers free seasonal cookbooks. Each quarter, they will release 25-30 new recipes with a breakdown of calories, sodium, and glucose. Recipes:   Go to www.[employer]/recipes website for free recipes.

## 2023-01-05 NOTE — ED NOTES
Report called to [facility] , all questions addressed.       Jerel Polanco RN  01/05/23 0775

## 2023-01-05 NOTE — PROGRESS NOTES
Physical Therapy  Facility/Department: [place] [address] - [facility] SURG  Physical Therapy Initial Assessment/ Treatment    Name: Mercedes Acevedo  : 1950  MRN: [id number]  Date of Service: 2023    Discharge Recommendations:  Home with Home health PT, Home with assist PRN       If pt discharges prior to next PT session this note will serve as discharge summary. Patient Diagnosis(es): The primary encounter diagnosis was Syncope and collapse. Diagnoses of Leg swelling, Hypomagnesemia, and Acute respiratory failure with hypoxia ([address] were also pertinent to this visit. Past Medical History:  has a past medical history of Abnormal ECG, Anemia, Arthritis, Back pain, chronic, Bipolar disorder (HCC), Bronchitis chronic, CHF (congestive heart failure) (HCC), Chronic back pain, Chronic neck pain, Clostridium difficile infection, 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[address] Coronary artery disease involving native coronary artery of native heart with angina pectoris ([address] Depression, Emphysema of lung ([address] Fibromyalgia, hyperlipidemia, Hypertension, Kidney stone, Liver disease, Lung disease, MDD (major depressive disorder), Mood disorder ([address] Movement disorder, Neck pain, chronic, Opiate misuse, Personality disorder ([address] Pneumonia, and Polypharmacy. Past Surgical History:  has a past surgical history that includes Hysterectomy, total abdominal; Kidney stone surgery; Diagnostic Cardiac Cath Lab Procedure (2007); Colonoscopy (12); hernia repair (2019); and hernia repair (N/A, 2019). Assessment   Body Structures, Functions, Activity Limitations Requiring Skilled Therapeutic Intervention: Decreased functional mobility ; Decreased endurance;Decreased strength  Assessment: 72 yo female adm with severe coughing and syncopal events. PMH includes COPD, smoking, HF, bipolar disorder. Pt lives with family in single level home with 3 steps to access.  Pt reports she amb indep without device, but has sedentary lifestyle and does not leave house often. Today pt demonstrates modified indep bed mob and transfers, requires only SBA to amb 20 ft x 2 using Rolling walker on 1L O2. Distance amb limited by endurance/eary fatigue. Pt will benefit from skilled PT to address defiicts. Recommend home with assist as needed and OP PT at discharge. Pt was seen in collaboration with OT to maximize function and safety due to orthostatic hypotension and  hx of falls. Treatment Diagnosis: decreased gait and endurance  Therapy Prognosis: Good  Decision Making: Medium Complexity  Barriers to Learning: none  Requires PT Follow-Up: Yes  Activity Tolerance  Activity Tolerance: Patient tolerated evaluation without incident;Patient tolerated treatment well  Activity Tolerance Comments: BP supine 154/78  HR 85  SpO2 92% on 1L O2. Sitting 138/78  HR 85 .     Standing 149/73  HR 86   No dizziness reported     Plan   Physcial Therapy Plan  General Plan: 3-5 times per week  Current Treatment Recommendations: Strengthening, Endurance training, Functional mobility training, Transfer training, Gait training, Safety education & training, Home exercise program, Therapeutic activities  Safety Devices  Type of Devices: Gait belt, All fall risk precautions in place, Bed alarm in place, Call light within reach, Left in bed, Patient at risk for falls, Nurse notified     Restrictions  Restrictions/Precautions  Restrictions/Precautions: Fall Risk, Up as Tolerated  Position Activity Restriction  Other position/activity restrictions: 1L O2, IV     Subjective   General  Chart Reviewed: Yes  Patient assessed for rehabilitation services?: Yes  Additional Pertinent Hx: COPD, cough, HF, smoker, bipolar disorder  Family / Caregiver Present: No  Referring Practitioner: Moreno Garcia  Referral Date : 01/05/23  Diagnosis: coughing, syncope  Follows Commands: Within Functional Limits  General Comment  Comments: RN cleared pt for therapy, pt resting in bed on approach  Subjective  Subjective: Pt agrees to therapy, reports hx of coughing episodes leading to syncopal events. Pt reports 5 falls in the last yr. PAIN: 2/10 Headache. Pt repositioned for rest at end of session with warm blankets provided. No changes in headache reported. Social/Functional History  Social/Functional History  Lives With: Spouse, Family, Daughter (2 grandchildren)  Type of Home: House  Home Layout: One level  Home Access: Stairs to enter with rails  Entrance Stairs - Number of Steps: 3-4  Entrance Stairs - Rails: Both  Bathroom Shower/Tub: Tub/Shower unit, Shower chair with back  [employer]: Handicap height  Bathroom Equipment: Grab bars in shower, Grab bars around toilet  Bathroom Accessibility: Not accessible  Has the patient had two or more falls in the past year or any fall with injury in the past year?: Yes (about 5 \"blackout\" falls, pt was found to be positive for orthostatic BP)  Receives Help From: Family  ADL Assistance: Needs assistance (pt needing mod A for Lower body dressing)  Homemaking Responsibilities: No  Ambulation Assistance: Independent  Transfer Assistance: Independent  Active : No  Patient's  Info: daughter  Vision/Hearing  Vision  Vision: Impaired  Vision Exceptions: Wears glasses for reading  Hearing  Hearing: Within functional limits    Cognition   Orientation  Overall Orientation Status: Within Normal Limits  Orientation Level: Oriented X4  Cognition  Overall Cognitive Status: WFL     Objective   Heart Rate: 85  Heart Rate Source: Monitor  BP: (!) 154/78  BP Location: Right lower arm  BP Method: Automatic  Patient Position: Semi fowlers  MAP (Calculated): 103  Resp: 16  SpO2: 92 %  O2 Device: Nasal cannula  Comment: 138/78 BP sitting, 85HR. 149/73 BP standing, 86HR.      Observation/Palpation  Edema: mild edema BLEs R>L, skin dry and flaky on legs  Gross Assessment  AROM: Within functional limits  Strength: Generally decreased, functional Balance  Sitting: Intact  Standing: High guard  Standing - Static: Fair;Occasional (SBA)    Bed mobility  Supine to Sit: Modified independent  Sit to Supine: Modified independent    Transfers  Sit to Stand: Modified independent  Stand to Sit: Modified independent    Ambulation  Surface: Level tile  Device: Rolling Walker  Other Apparatus: O2  Assistance: Stand by assistance  Quality of Gait: slow pace, recipricol pattern, good safety awareness. Distance: 20 ft x 2  Comments: Pt does not typically use walker, does not own one, she felt it was beneficial for support today        Exercise Treatment: Ankle pumps: 10 x B  Quad sets: 10 x B  GLuteal sets: 10 x B  Heel slides: 5 x B    Hip abd supine: 5 x B  Diaphragmatic breathing with TA set: 5 x   Shoulder shrugs: 5 x B    AM-PAC Score  AM-PAC Inpatient Mobility Raw Score : 20 (01/05/23 0945)  AM-PAC Inpatient T-Scale Score : 47.67 (01/05/23 0945)  Mobility Inpatient CMS 0-100% Score: 35.83 (01/05/23 0945)  Mobility Inpatient CMS G-Code Modifier : CJ (01/05/23 0945)      PHYSICAL THERAPY HEART FAILURE EDUCATION:  PHYSICAL THERAPY HEART FAILURE EDUCATION GOALS:  1. Identifying HF Activity Zone with the Self Check Plan prior to exercises or walking    Patient educated in and demonstrated/verbalized understanding Identifying HF activity zone with the Self Check Plan referencing Red/Yellow/Green Light Symbol prior to exercises or walking  to appropriately determine daily activity level. 2.Rating self on NGOC scale of perceived exertion   Patient educated in and demonstrated and/or verbalized understanding Rating self on NGOC scale of perceived exertion  3.  HF Therapeutic Exercises  Pt educated in and completed Therapeutic exercises (Supine, Seated ,Standing, walking) as indicated below for 1 set) to promote circulation and prevent complications of bedrest with patient verbalizes understanding of employing green zone, yellow zone and red zone to seek provider input and evaluation. 4. Daily Weight check/Stepping on Scale  Pt educated in importance of checking body weight daily. Barriers to completion of Daily weight check are identified with recommendations made or Patient demonstrates and/or verbalizes safety in:stepping up to weight self and complete downward gaze/head nod to read scale on standard scale  and safety stepping off scale. 5. Teach back of Elements of PT HF Education  Pt voices and demonstrates appropriate teach back of elements of Physical Therapy Heart Failure Education Program                        1)Heart Failure Zones Self Check Plan referencing Red/Yellow/Green Light Symbol with:  []Green Zone/All Clear:   physical activity is normal for you,   No new swelling in feet, ankles, legs or stomach,   No weight gain of more than 2-3 pounds,   No chest pain or worsening of shortness of breath. (Continue daily: weight check, meds as directed, low salt eating, monitoring of fluid intake, balance activity, follow up visits)  [x]Yellow Zone/Caution:   Increased cough or shortness of breath with activity  Increased swelling in your feet, ankles, legs or stomach from baseline  Weight gain or loss of more than 2-3 pounds in 1 day. Increase in the number of pillows needed while sleeping  (Check In!: You need to contact your doctor or provider as soon as possible)  []Red Zone/Medical Alert:  Unrelieved chest pain or shortness of breath, especially while resting  Increased Discomfort or swelling in the abdomen or lower body  Sudden weight gain of more than 5 pounds in a week  Increased cough with bubbly and/or pink sputum  (Warning: You need to be seen right away . If you cannot reach your physician, call 911)    2)Kat Rating of Perceived Exertion (RPE) Scale     The Kat rating scale ranges from 6 to 20, where 6 means \"no exertion at all\" and 20 means \"maximal exertion. \" The patient chooses the number that best describes their level of exertion.  This will objectify the intensity level of the patient's activity, and the therapist can use this information to accelerate or decelerate activity levels to reach the desired range. The patient should appraise their feeling of exertion as honestly as possible, without thinking about what the actual physical load is. Their feeling of effort and exertion is important, and it should not be compared to the level of others. Patient's should target exercises for very light to moderate (9-11/20) for this phase of their rehab. Kat RPE Scale    Activity Completed: Supine therapeutic ex, transfers, gait training with RW 20 ft x 2      []6  No exertion at all  []7  Extremely light  []8  [] 9  Very light (For a healthy person, it is like walking slowly at his or her own pace for some minutes)  []10  [x]11  Light  []12  []13  Somewhat hard (exercise, but it still feels OK to continue)  []14  []15  Hard (heavy)  []16  []17  Very hard (can still go on, but really has to push himself. It feels very heavy, and the person is very tired. )[]18  []19  Extremely hard (For most people this is the most strenuous exercise they have ever experienced.)  []20  Maximal exertion. 3) Pt educated in and completed Therapeutic exercise (as indicated below for 1 set) to promote circulation and prevent complications of bedrest with patient verbalizes understanding of employing green zone, yellow zone and red zone to seek provider input and evaluation.   Educated patient in :  [x]Supine    Level 1: Bed Exercise 10-15 reps, 2x/day  [x]Ankle Pumps  [x]Heel Slides  [x]Hip Abduction  [x]Buttocks Squeeze  [x]Diaphragmatic Breathing with TA set  [x]Shoulder Shrugs  []Bicep Curl  []Hands open/close                          []Sitting    Level 2: Seated Exercises 10-15 reps, 2x/day  []Toe raise/heel raise  []Long Arc Quad  []Seated March  []Seated Clamshell  []Diaphragmatic Breathing with TA set and BUE ER and IR  []Shoulder Shrugs  []Bicep Curls  []Hands open/close []Standing   Level 3: Standing Exercises 10-15 reps, 2x/day     []Sit to/from stand  []Standing march  []Standing side steps  []Standing bilateral heel raise  []Diaphragmatic breathing with TA set and BUE flex/ext  []Shoulder Shrugs  []Bicep Curls  []Hands open/close                        [x]WalkingLevel 1: Educated patient in initiating walking when in the Green zone and to Start with 2-5 minutes daily and work up to 10 minutes per day. Walk at a slow, comfortable pace with your exertion level Very Light (NGOC 9) to Light (NGOC 11)   You should be able to comfortably hold a conversation while walking. 4)Daily Weight check/Stepping on Scale  [x]Pt educated in importance of checking body weight daily and [x]demonstrate/[x]verbalizes safety in:stepping up to weigh self and complete downward gaze/head nod to read scale on standard scale  and safety stepping off scale. []Barriers to completion of Daily weight check:       5)Pt voices and demonstrates appropriate teach back of Heart Failure Education Program with : use of the   [x]1. Identifying Appropriate Zone from HF Zone Self-Check Plan                          [x]2. Rating self on NGOC. [x]3. Therapeutic exercise/walking program      [x]4.  Importance of taking daily weight measurement             [x]5. Safely stepping on and off scale    []Pt will benefit from reinforcement of education due to   []Readiness to learn                               []Decreased cognition  []Language barrier                                  []Decreased vision/hearing  [x]First introduction to new information    []Requires intermittent cues  []Other:    Education provided via:  [x]Oral instruction  [x]Demonstration  [x]Written handout    Goals  Short Term Goals  Time Frame for Short Term Goals: 1 week 1/16 unless otherwise specified  Short Term Goal 1: pt to ambulate 100 ft with least restrictive or no device  Short Term Goal 2: pt to participate in 10-12 reps therapeutic ex by 1/10  Short Term Goal 3: pt to meet at least 3/5 CHF program goals- MET 1/5  Patient Goals   Patient Goals : \"to avoid future falls and get around my home safely\"     Education  Patient Education  Education Given To: Patient  Education Provided: Role of Therapy;Plan of Care;Home Exercise Program;Transfer Training;Energy Conservation; Fall Prevention Strategies  Education Provided Comments: CHF education initiated  Education Method: Demonstration;Verbal;Teach Back;Printed Information/Hand-outs  Barriers to Learning: None  Education Outcome: Verbalized understanding;Demonstrated understanding    Therapy Time   Individual Concurrent Group Co-treatment   Time In 0842         Time Out 0928         Minutes 46         Timed Code Treatment Minutes: 56 Ankita Talley, PT

## 2023-01-05 NOTE — PROGRESS NOTES
Hospitalist Progress Note      PCP: BALJIT Page - CNP    Date of Admission: 1/4/2023    Chief Complaint:   Shortness of breath     Hospital Course: Jayashree Thompson is a 71 y.o. female. She presented to the ER from home for dyspnea, cough, and sycnopal episodes. She has a h/o COPD. For roughly the past month she has been having coughing fits. Sometimes coughing is severe and causes cough syncope and injuries due to falls. For the most part the patient's cough has been nonproductive but sometimes she does expectorate small amounts of thick yellowish sputum. She denies pleuritic chest pain and hemoptysis. She has not had loss of appetite, rigors, sweats, nausea, or diarrhea. She does c/o multiple bruises and sore spots from where she has fallen. She lives with her  and daughter. She does not take any medications for her lungs except one inhaler which she thinks is albuterol. She still smokes but wants to quit. Subjective:   \Seen resting in bed, no new complaints, states breathing better, updated on plan of care.         Medications:  Reviewed    Infusion Medications    sodium chloride       Scheduled Medications    amLODIPine  5 mg Oral Daily    atorvastatin  40 mg Oral Nightly    busPIRone  15 mg Oral TID    carvedilol  12.5 mg Oral BID WC    divalproex  250 mg Oral BID    DULoxetine  60 mg Oral Daily    lurasidone  80 mg Oral Daily    montelukast  10 mg Oral Nightly    nicotine  1 patch TransDERmal Daily    oxybutynin  10 mg Oral Daily    pantoprazole  40 mg Oral QAM AC    QUEtiapine  300 mg Oral Nightly    enoxaparin  40 mg SubCUTAneous Daily    methylPREDNISolone  40 mg IntraVENous Q12H    doxycycline hyclate  100 mg Oral 2 times per day    furosemide  20 mg IntraVENous Daily     PRN Meds: sodium chloride flush, sodium chloride, potassium chloride **OR** potassium alternative oral replacement **OR** potassium chloride, magnesium sulfate, ondansetron **OR** ondansetron, acetaminophen **OR** acetaminophen, melatonin, calcium carbonate, albuterol, benzonatate, ipratropium-albuterol    No intake or output data in the 24 hours ending 01/05/23 0910    Physical Exam Performed:    BP (!) 157/76   Pulse 77   Temp 98.3 °F (36.8 °C) (Oral)   Resp 16   Wt 181 lb 1.6 oz (82.1 kg)   SpO2 95%   BMI 34.22 kg/m²     General appearance: No apparent distress, appears stated age and cooperative. HEENT: Pupils equal, round, and reactive to light. Conjunctivae/corneas clear. Neck: Supple, with full range of motion. No jugular venous distention. Trachea midline. Respiratory: On 2L . Normal respiratory effort. Diminished   Cardiovascular: Regular rate and rhythm with normal S1/S2 without murmurs, rubs or gallops. Abdomen: Soft, non-tender, non-distended with normal bowel sounds. Musculoskeletal: No clubbing, cyanosis or edema bilaterally. Full range of motion without deformity. Skin: Skin color, texture, turgor normal.  No rashes or lesions. Neurologic:  Neurovascularly intact without any focal sensory/motor deficits. Cranial nerves: II-XII intact, grossly non-focal.  Psychiatric: Alert and oriented, thought content appropriate, normal insight  Capillary Refill: Brisk, 3 seconds, normal   Peripheral Pulses: +2 palpable, equal bilaterally       Labs:   Recent Labs     01/04/23 1918 01/05/23  0603   WBC 7.7 6.8   HGB 13.0 12.6   HCT 38.4 38.0    180     Recent Labs     01/04/23 1918 01/05/23  0603   * 136   K 3.4* 3.8   CL 92* 94*   CO2 31 33*   BUN 8 9   CREATININE 0.7 0.6   CALCIUM 9.3 9.2     Recent Labs     01/04/23 1918   AST 13*   ALT 8*   BILITOT <0.2   ALKPHOS 104     No results for input(s): INR in the last 72 hours.   Recent Labs     01/04/23 1918   TROPONINI <0.01       Urinalysis:      Lab Results   Component Value Date/Time    NITRU Negative 01/04/2023 08:15 PM    WBCUA 3-5 01/04/2023 08:15 PM    BACTERIA Rare 08/12/2021 05:54 PM    RBCUA None seen 01/04/2023 08:15 PM    BLOODU Negative 01/04/2023 08:15 PM    SPECGRAV 1.010 01/04/2023 08:15 PM    GLUCOSEU Negative 01/04/2023 08:15 PM    GLUCOSEU NEGATIVE 06/03/2012 07:19 PM       Radiology:  CT CERVICAL SPINE WO CONTRAST   Final Result   1. No acute abnormality of the cervical spine. 2. Multilevel cervical spondylosis and spondylolisthesis. No high-grade bony   spinal canal stenosis   3. Emphysematous changes of the visualized lung apices. CT CHEST PULMONARY EMBOLISM W CONTRAST   Final Result   1. No evidence of pulmonary embolism. 2. Bubbly opacification of the right lower lobe bronchus, likely related to   aspiration. No focal consolidation. 3. Emphysematous changes of the lungs. CT HEAD WO CONTRAST   Final Result   No acute intracranial abnormality. No change in appearance of the brain         XR CHEST PORTABLE   Final Result      Lungs are clear             None    Assessment/Plan:    Active Hospital Problems    Diagnosis     COPD exacerbation ([address] [J44.1]      Priority: Medium    Chronic heart failure with preserved ejection fraction ([address] [I50.32]      Priority: Medium    Noncompliance [Z91.199]      Priority: Medium    Bipolar disorder ([address] [F31.9]      Priority: Medium    HTN (hypertension) [I10]     Tobacco abuse [Z72.0]      COPD exacerbation, Tobacco abuse  -  Patient had normal PFT's in 2011. No PFT's since then. -  Titrate level of respiratory support according to patient's needs. Target goal SpO2 = 90-94%. Currently patient is requiring 1 L/min O2 support while at rest and thinks it is helping her. At baseline she does not require supplemental O2.  -  Start solumedrol 40mg IV q12h, doxycycline 100mg BID, duonebs QID, prn albuterol nebs. Continue home montelukast.  -  Patient needs updated PFT's, a home O2 evaluation (ordered), and would likely benefit from a maintenance inhaler regimen. -  Smoking cessation advised. NRT provided.      Chronic HFpEF, HTN, HLD  - Continue home amlodipine, atorvastatin, carvedilol.  -  Change home lasix to 20mg IV daily. Bipolar Disorder  -  Continue home depakote, buspirone, quetiapine, lurasidone, and duloxetine. DVT Prophylaxis: Lovenox   Diet: ADULT DIET;  Regular; Low Fat/Low Chol/High Fiber/LOURDES  Code Status: Full Code  PT/OT Eval Status: Home PT/OT, 24 hour assist     Dispo - 1-2 days pending clinical cours, possible O2 needs at dc     Appropriate for A1 Discharge Unit: [facility]

## 2023-01-05 NOTE — PROGRESS NOTES
Patient admitted to room 367 from ED. Patient oriented to room, call light, bed rails, phone, lights and bathroom. Patient instructed about the schedule of the day including: vital sign frequency, lab draws, possible tests, frequency of MD and staff rounds, including RN/MD rounding together at bedside, daily weights, and I &O's. Patient instructed about prescribed diet, how to use 8MENU, and television. Bed alarm in place, patient aware of placement and reason. Telemetry box 31 in place, patient aware of placement and reason. Bed locked, in lowest position, side rails up 2/4, call light within reach. Will continue to monitor.

## 2023-01-06 VITALS
WEIGHT: 179.2 LBS | SYSTOLIC BLOOD PRESSURE: 186 MMHG | BODY MASS INDEX: 33.83 KG/M2 | DIASTOLIC BLOOD PRESSURE: 81 MMHG | OXYGEN SATURATION: 94 % | RESPIRATION RATE: 20 BRPM | HEART RATE: 84 BPM | TEMPERATURE: 97.8 F | HEIGHT: 61 IN

## 2023-01-06 LAB
ANION GAP SERPL CALCULATED.3IONS-SCNC: 8 MMOL/L (ref 3–16)
BASOPHILS ABSOLUTE: 0 K/UL (ref 0–0.2)
BASOPHILS RELATIVE PERCENT: 0.3 %
BUN BLDV-MCNC: 14 MG/DL (ref 7–20)
CALCIUM SERPL-MCNC: 9.5 MG/DL (ref 8.3–10.6)
CHLORIDE BLD-SCNC: 91 MMOL/L (ref 99–110)
CO2: 35 MMOL/L (ref 21–32)
CREAT SERPL-MCNC: 0.6 MG/DL (ref 0.6–1.2)
EOSINOPHILS ABSOLUTE: 0 K/UL (ref 0–0.6)
EOSINOPHILS RELATIVE PERCENT: 0 %
GFR SERPL CREATININE-BSD FRML MDRD: >60 ML/MIN/{1.73_M2}
GLUCOSE BLD-MCNC: 123 MG/DL (ref 70–99)
HCT VFR BLD CALC: 36.4 % (ref 36–48)
HEMOGLOBIN: 12.3 G/DL (ref 12–16)
LV EF: 58 %
LVEF MODALITY: NORMAL
LYMPHOCYTES ABSOLUTE: 1.3 K/UL (ref 1–5.1)
LYMPHOCYTES RELATIVE PERCENT: 12.9 %
MAGNESIUM: 2.2 MG/DL (ref 1.8–2.4)
MCH RBC QN AUTO: 29.1 PG (ref 26–34)
MCHC RBC AUTO-ENTMCNC: 33.8 G/DL (ref 31–36)
MCV RBC AUTO: 86.1 FL (ref 80–100)
MONOCYTES ABSOLUTE: 0.7 K/UL (ref 0–1.3)
MONOCYTES RELATIVE PERCENT: 7.1 %
NEUTROPHILS ABSOLUTE: 8 K/UL (ref 1.7–7.7)
NEUTROPHILS RELATIVE PERCENT: 79.7 %
PDW BLD-RTO: 14.9 % (ref 12.4–15.4)
PLATELET # BLD: 182 K/UL (ref 135–450)
PMV BLD AUTO: 7 FL (ref 5–10.5)
POTASSIUM SERPL-SCNC: 4.1 MMOL/L (ref 3.5–5.1)
RBC # BLD: 4.23 M/UL (ref 4–5.2)
SODIUM BLD-SCNC: 134 MMOL/L (ref 136–145)
WBC # BLD: 10 K/UL (ref 4–11)

## 2023-01-06 PROCEDURE — 6360000002 HC RX W HCPCS: Performed by: INTERNAL MEDICINE

## 2023-01-06 PROCEDURE — 94761 N-INVAS EAR/PLS OXIMETRY MLT: CPT

## 2023-01-06 PROCEDURE — 97116 GAIT TRAINING THERAPY: CPT

## 2023-01-06 PROCEDURE — 93308 TTE F-UP OR LMTD: CPT

## 2023-01-06 PROCEDURE — 6370000000 HC RX 637 (ALT 250 FOR IP): Performed by: INTERNAL MEDICINE

## 2023-01-06 PROCEDURE — 97110 THERAPEUTIC EXERCISES: CPT

## 2023-01-06 PROCEDURE — 83735 ASSAY OF MAGNESIUM: CPT

## 2023-01-06 PROCEDURE — 80048 BASIC METABOLIC PNL TOTAL CA: CPT

## 2023-01-06 PROCEDURE — 36415 COLL VENOUS BLD VENIPUNCTURE: CPT

## 2023-01-06 PROCEDURE — 85025 COMPLETE CBC W/AUTO DIFF WBC: CPT

## 2023-01-06 PROCEDURE — 6370000000 HC RX 637 (ALT 250 FOR IP): Performed by: PHYSICIAN ASSISTANT

## 2023-01-06 PROCEDURE — 2700000000 HC OXYGEN THERAPY PER DAY

## 2023-01-06 RX ORDER — PREDNISONE 20 MG/1
40 TABLET ORAL DAILY
Status: DISCONTINUED | OUTPATIENT
Start: 2023-01-06 | End: 2023-01-06 | Stop reason: HOSPADM

## 2023-01-06 RX ORDER — BUTALBITAL, ACETAMINOPHEN AND CAFFEINE 50; 325; 40 MG/1; MG/1; MG/1
1 TABLET ORAL EVERY 6 HOURS PRN
Qty: 120 TABLET | Refills: 0 | Status: SHIPPED | OUTPATIENT
Start: 2023-01-06 | End: 2023-02-05

## 2023-01-06 RX ORDER — DOXYCYCLINE HYCLATE 100 MG
100 TABLET ORAL EVERY 12 HOURS SCHEDULED
Qty: 7 TABLET | Refills: 0 | Status: SHIPPED | OUTPATIENT
Start: 2023-01-06 | End: 2023-01-10

## 2023-01-06 RX ORDER — PREDNISONE 20 MG/1
40 TABLET ORAL DAILY
Qty: 10 TABLET | Refills: 0 | Status: SHIPPED | OUTPATIENT
Start: 2023-01-06 | End: 2023-01-11

## 2023-01-06 RX ADMIN — OXYBUTYNIN CHLORIDE 10 MG: 5 TABLET, EXTENDED RELEASE ORAL at 08:25

## 2023-01-06 RX ADMIN — FUROSEMIDE 20 MG: 10 INJECTION, SOLUTION INTRAMUSCULAR; INTRAVENOUS at 08:27

## 2023-01-06 RX ADMIN — ENOXAPARIN SODIUM 40 MG: 100 INJECTION SUBCUTANEOUS at 08:24

## 2023-01-06 RX ADMIN — AMLODIPINE BESYLATE 5 MG: 5 TABLET ORAL at 08:25

## 2023-01-06 RX ADMIN — LURASIDONE HYDROCHLORIDE 80 MG: 40 TABLET, FILM COATED ORAL at 08:27

## 2023-01-06 RX ADMIN — CARVEDILOL 12.5 MG: 6.25 TABLET, FILM COATED ORAL at 08:24

## 2023-01-06 RX ADMIN — DIVALPROEX SODIUM 250 MG: 250 TABLET, EXTENDED RELEASE ORAL at 08:25

## 2023-01-06 RX ADMIN — DOXYCYCLINE HYCLATE 100 MG: 100 TABLET, COATED ORAL at 08:25

## 2023-01-06 RX ADMIN — BUTALBITAL, ACETAMINOPHEN, AND CAFFEINE 1 TABLET: 50; 325; 40 TABLET ORAL at 12:22

## 2023-01-06 RX ADMIN — PANTOPRAZOLE SODIUM 40 MG: 40 TABLET, DELAYED RELEASE ORAL at 05:46

## 2023-01-06 RX ADMIN — DULOXETINE HYDROCHLORIDE 60 MG: 60 CAPSULE, DELAYED RELEASE ORAL at 08:27

## 2023-01-06 RX ADMIN — BUSPIRONE HYDROCHLORIDE 15 MG: 5 TABLET ORAL at 12:18

## 2023-01-06 RX ADMIN — BUSPIRONE HYDROCHLORIDE 15 MG: 5 TABLET ORAL at 08:25

## 2023-01-06 RX ADMIN — PREDNISONE 40 MG: 20 TABLET ORAL at 12:18

## 2023-01-06 RX ADMIN — METHYLPREDNISOLONE SODIUM SUCCINATE 40 MG: 40 INJECTION, POWDER, FOR SOLUTION INTRAMUSCULAR; INTRAVENOUS at 08:27

## 2023-01-06 ASSESSMENT — PAIN DESCRIPTION - LOCATION: LOCATION: HEAD

## 2023-01-06 ASSESSMENT — PAIN DESCRIPTION - ORIENTATION: ORIENTATION: MID

## 2023-01-06 ASSESSMENT — PAIN SCALES - GENERAL
PAINLEVEL_OUTOF10: 8
PAINLEVEL_OUTOF10: 0

## 2023-01-06 NOTE — PLAN OF CARE
Problem: Discharge Planning  Goal: Discharge to home or other facility with appropriate resources  Outcome: Progressing     Problem: Pain  Goal: Verbalizes/displays adequate comfort level or baseline comfort level  Outcome: Progressing     Problem: Safety - Adult  Goal: Free from fall injury  Outcome: Progressing     Problem: Respiratory - Adult  Goal: Achieves optimal ventilation and oxygenation  Outcome: Progressing     Problem: Cardiovascular - Adult  Goal: Maintains optimal cardiac output and hemodynamic stability  Outcome: Progressing  Goal: Absence of cardiac dysrhythmias or at baseline  Outcome: Progressing     Problem: Genitourinary - Adult  Goal: Absence of urinary retention  Outcome: Progressing     Problem: Metabolic/Fluid and Electrolytes - Adult  Goal: Electrolytes maintained within normal limits  Outcome: Progressing  Goal: Hemodynamic stability and optimal renal function maintained  Outcome: Progressing     Problem: Hematologic - Adult  Goal: Maintains hematologic stability  Outcome: Progressing

## 2023-01-06 NOTE — PROGRESS NOTES
Oxygen documentation:     1. O2 saturation at REST on ROOM AIR = 93%     If saturation is 89% or above please proceed with steps 2 and 3..........     2. O2 saturation with AMBULATION of 20 feet on ROOM AIR = 93%   3. O2 saturation with AMBULATION on current liter flow = 95%     DCP notified: Yes

## 2023-01-06 NOTE — PROGRESS NOTES
Physical Therapy  Facility/Department: [facility] B3 - MED SURG  Daily Treatment Note  NAME: Mireya Jones  : 1950  MRN: [id number]    Date of Service: 2023    Discharge Recommendations:  Home with Home health PT, Home with assist PRN   PT Equipment Recommendations  Equipment Needed: No    [facility] 6 Clicks Inpatient Mobility:  AM-PAC Mobility Inpatient   How much difficulty turning over in bed?: None  How much difficulty sitting down on / standing up from a chair with arms?: A Little  How much difficulty moving from lying on back to sitting on side of bed?: None  How much help from another person moving to and from a bed to a chair?: A Little  How much help from another person needed to walk in hospital room?: A Little  How much help from another person for climbing 3-5 steps with a railing?: A Little  AM-PAC Inpatient Mobility Raw Score : 20  AM-PAC Inpatient T-Scale Score : 47.67  Mobility Inpatient CMS 0-100% Score: 35.83  Mobility Inpatient CMS G-Code Modifier : CJ    Patient Diagnosis(es): The primary encounter diagnosis was Syncope and collapse. Diagnoses of Leg swelling, Hypomagnesemia, Acute respiratory failure with hypoxia ([facility] [address] and Chronic heart failure with preserved ejection fraction [facility]) were also pertinent to this visit. Assessment   Assessment: Pt continues to require grossly SBA/supervision for transfers and ambulation with use of Rw. Decreased endurance is pt's most limiting factor. Will continue to progress mobility as pt tolerates. Pt safe to return home with PRN A. Activity Tolerance: Patient tolerated treatment well  Equipment Needed: No     Plan    Physcial Therapy Plan  General Plan: 3-5 times per week  Current Treatment Recommendations: Strengthening; Endurance training;Functional mobility training;Transfer training;Gait training; Safety education & training;Home exercise program;Therapeutic activities     Restrictions  Restrictions/Precautions  Restrictions/Precautions: Fall Risk, Up as Tolerated  Position Activity Restriction  Other position/activity restrictions: 1L O2, IV     Subjective    Subjective  Subjective: Pt agreeable to therapy. Reports needing to use the bathroom. Pain: Denies pain. Objective   Vitals  Heart Rate: 77  BP: (!) 170/80  MAP (Calculated): 110  SpO2: 94 %  O2 Device: Nasal cannula  Bed Mobility Training  Bed Mobility Training: Yes  Supine to Sit: Modified independent  Balance  Sitting: Intact  Standing: With support (SBA at [employer])  Transfer Training  Transfer Training: Yes  Sit to Stand: Supervision  Stand to Sit: Supervision  Toilet Transfer: Supervision  Gait Training  Gait Training: Yes  Gait  Overall Level of Assistance: Stand-by assistance  Speed/Misti: Slow  Step Length: Right shortened;Left shortened  Gait Abnormalities: Trunk sway increased; Path deviations  Distance (ft): 20 Feet (and 10 ft)  Assistive Device: Walker, rolling;Gait belt     PT Exercises  Exercise Treatment: Seated ther ex x15 reps BLE: ankle pumps, LAQ, hip flexion, hip abd/add, glute sets. Dynamic Standing Balance Exercises: Pt able to manage pants up/down with supervision     Safety Devices  Type of Devices: Gait belt; All fall risk precautions in place;Call light within reach; Patient at risk for falls;Nurse notified; All aleksander prominences offloaded;Left in chair;Chair alarm in place       Goals  Short Term Goals  Time Frame for Short Term Goals: 1 week 1/16 unless otherwise specified  Short Term Goal 1: pt to ambulate 100 ft with least restrictive or no device  Short Term Goal 2: pt to participate in 10-12 reps therapeutic ex by 1/10 Goal met 1/06  Short Term Goal 3: pt to meet at least 3/5 CHF program goals- MET 1/5  Patient Goals   Patient Goals : \"to avoid future falls and get around my home safely\"    Education  Patient Education  Education Given To: Patient  Education Provided: Role of Therapy;Plan of Care;Home Exercise Program;Transfer Training;Energy Conservation; Fall Prevention Strategies  Education Method: Demonstration;Verbal  Barriers to Learning: None  Education Outcome: Verbalized understanding;Demonstrated understanding    Therapy Time   Individual Concurrent Group Co-treatment   Time In 2052         Time Out 1012         Minutes 25         Timed Code Treatment Minutes: [address] [place], [place] [address]

## 2023-01-06 NOTE — PLAN OF CARE
Problem: Discharge Planning  Goal: Discharge to home or other facility with appropriate resources  Recent Flowsheet Documentation  Taken 1/6/2023 1530 by Denise Perez RN  Discharge to home or other facility with appropriate resources: Identify barriers to discharge with patient and caregiver  1/6/2023 1246 by Denise Perez RN  Outcome: Completed  1/6/2023 1246 by Denise Perez RN  Outcome: Progressing  1/6/2023 0929 by Denise Perez RN  Outcome: Progressing  1/6/2023 0338 by Haley Moffett RN  Outcome: Progressing     Problem: Pain  Goal: Verbalizes/displays adequate comfort level or baseline comfort level  1/6/2023 1246 by Denise Perez RN  Outcome: Completed  1/6/2023 1246 by Denise Perez RN  Outcome: Progressing  1/6/2023 0929 by Denise Perez RN  Outcome: Progressing  1/6/2023 0338 by Haley Moffett RN  Outcome: Progressing     Problem: Safety - Adult  Goal: Free from fall injury  Recent Flowsheet Documentation  Taken 1/6/2023 1531 by Denise Perez RN  Free From Fall Injury: Instruct family/caregiver on patient safety  1/6/2023 1246 by Denise Perez RN  Outcome: Completed  1/6/2023 1246 by Denise Perez RN  Outcome: Progressing  1/6/2023 0929 by Denise Perez RN  Outcome: Progressing  1/6/2023 0338 by Haley Moffett RN  Outcome: Progressing     Problem: Respiratory - Adult  Goal: Achieves optimal ventilation and oxygenation  Recent Flowsheet Documentation  Taken 1/6/2023 1530 by Denise Perez RN  Achieves optimal ventilation and oxygenation: Assess for changes in respiratory status  1/6/2023 1246 by Denise Perez RN  Outcome: Completed  1/6/2023 1246 by Denise Perez RN  Outcome: Progressing  1/6/2023 0929 by Denise Perez RN  Outcome: Progressing  1/6/2023 0338 by Haley Moffett RN  Outcome: Progressing     Problem: Cardiovascular - Adult  Goal: Maintains optimal cardiac output and hemodynamic stability  Recent Flowsheet Documentation  Taken 1/6/2023 1530 by Denise Perez RN  Maintains optimal cardiac output and hemodynamic stability: Monitor blood pressure and heart rate  1/6/2023 1246 by Apolinar Lorenz RN  Outcome: Completed  1/6/2023 1246 by Apolinar Lorenz RN  Outcome: Progressing  1/6/2023 0929 by Apolinar Lorenz RN  Outcome: Progressing  1/6/2023 0338 by Abhijit Chung RN  Outcome: Progressing  Goal: Absence of cardiac dysrhythmias or at baseline  Recent Flowsheet Documentation  Taken 1/6/2023 1530 by Apolinar Lorenz RN  Absence of cardiac dysrhythmias or at baseline: Monitor cardiac rate and rhythm  1/6/2023 1246 by Apolinar Lorenz RN  Outcome: Completed  1/6/2023 1246 by Apolinar Lorenz RN  Outcome: Progressing  1/6/2023 0929 by Apolinar Lorenz RN  Outcome: Progressing  1/6/2023 0338 by Abhijit Chung RN  Outcome: Progressing     Problem: Genitourinary - Adult  Goal: Absence of urinary retention  Recent Flowsheet Documentation  Taken 1/6/2023 1530 by Apolinar Lorenz RN  Absence of urinary retention: Assess patients ability to void and empty bladder  1/6/2023 1246 by Apolinar Lorenz RN  Outcome: Completed  1/6/2023 1246 by Apolinar Lorenz RN  Outcome: Progressing  1/6/2023 0929 by Apolinar Lorenz RN  Outcome: Progressing  1/6/2023 0338 by Abhijit Chung RN  Outcome: Progressing     Problem: Metabolic/Fluid and Electrolytes - Adult  Goal: Electrolytes maintained within normal limits  Recent Flowsheet Documentation  Taken 1/6/2023 1530 by Apolinar Lorenz RN  Electrolytes maintained within normal limits: Monitor labs and assess patient for signs and symptoms of electrolyte imbalances  1/6/2023 1246 by Apolinar Lorenz RN  Outcome: Completed  1/6/2023 1246 by Apolinar Lorenz RN  Outcome: Progressing  1/6/2023 0929 by Apolinar Lorenz RN  Outcome: Progressing  1/6/2023 0338 by Abhijit Chung RN  Outcome: Progressing  Goal: Hemodynamic stability and optimal renal function maintained  1/6/2023 1246 by Apolinar Lorenz RN  Outcome: Completed  1/6/2023 1246 by Apolinar Lorenz RN  Outcome: Progressing  1/6/2023 0929 by Renee Monterroso RN  Outcome: Progressing  1/6/2023 0338 by Ginger Banks RN  Outcome: Progressing     Problem: Hematologic - Adult  Goal: Maintains hematologic stability  Recent Flowsheet Documentation  Taken 1/6/2023 1530 by Renee Monterroso RN  Maintains hematologic stability: Assess for signs and symptoms of bleeding or hemorrhage  1/6/2023 1246 by Renee Monterroso RN  Outcome: Completed  1/6/2023 1246 by Renee Monterroso RN  Outcome: Progressing  1/6/2023 0929 by Renee Monterroso RN  Outcome: Progressing  1/6/2023 0338 by Ginger Banks RN  Outcome: Progressing     Problem: Chronic Conditions and Co-morbidities  Goal: Patient's chronic conditions and co-morbidity symptoms are monitored and maintained or improved  Recent Flowsheet Documentation  Taken 1/6/2023 1530 by Renee Monterroso RN  Care Plan - Patient's Chronic Conditions and Co-Morbidity Symptoms are Monitored and Maintained or Improved: Monitor and assess patient's chronic conditions and comorbid symptoms for stability, deterioration, or improvement  1/6/2023 1246 by Renee Monterroso RN  Outcome: Completed  1/6/2023 1246 by Renee Monterroso RN  Outcome: Progressing     Problem: Respiratory - Adult  Goal: Achieves optimal ventilation and oxygenation  1/6/2023 1531 by Renee Monterroso RN  Outcome: Completed  1/6/2023 1531 by Renee Monterroso RN  Outcome: Progressing  Flowsheets (Taken 1/6/2023 1530)  Achieves optimal ventilation and oxygenation: Assess for changes in respiratory status     Problem: Musculoskeletal - Adult  Goal: Return mobility to safest level of function  1/6/2023 1531 by Renee Monterroso RN  Outcome: Completed  1/6/2023 1531 by Renee Monterroso RN  Outcome: Progressing  Flowsheets (Taken 1/6/2023 1530)  Return Mobility to Safest Level of Function: Assess patient stability and activity tolerance for standing, transferring and ambulating with or without assistive devices

## 2023-01-06 NOTE — PLAN OF CARE
Patient's EF (Ejection Fraction) is greater than 40%    Heart Failure Medications:  Diuretics:: Furosemide    (One of the following REQUIRED for EF </= 40%/SYSTOLIC FAILURE but MAY be used in EF% >40%/DIASTOLIC FAILURE)        ACE:: None        ARB:: None         ARNI:: None    (Beta Blockers)  NON- Evidenced Based Beta Blocker (for EF% >40%/DIASTOLIC FAILURE): None    Evidenced Based Beta Blocker::(REQUIRED for EF% <40%/SYSTOLIC FAILURE) Carvedilol- Coreg  ...................................................................................................................................................  Patient's weights and intake/output reviewed: Yes    Patient's Last Weight: 179 lbs obtained by standing scale. Difference of 2 lbs less than last documented weight.      Intake/Output Summary (Last 24 hours) at 1/6/2023 0339  Last data filed at 1/5/2023 2330  Gross per 24 hour   Intake 1450 ml   Output 300 ml   Net 1150 ml       Education Booklet Provided: yes    Comorbidities Reviewed Yes    Patient has a past medical history of Abnormal ECG, Anemia, Arthritis, Back pain, chronic, Bipolar disorder ([facility]), Bronchitis chronic, CHF (congestive heart failure) ([facility]), Chronic back pain, Chronic neck pain, Clostridium difficile infection, 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[facility]), Coronary artery disease involving native coronary artery of native heart with angina pectoris ([facility]), Depression, Emphysema of lung ([facility]), Fibromyalgia, hyperlipidemia, Hypertension, Kidney stone, Liver disease, Lung disease, MDD (major depressive disorder), Mood disorder ([facility]), Movement disorder, Neck pain, chronic, Opiate misuse, Personality disorder ([facility]), Pneumonia, and Polypharmacy.     >>For CHF and Comorbidity documentation on Education Time and Topics, please see Education Tab    Progressive Mobility Assessment:  What is this patient's Current Level of Mobility?: Ambulatory- with Assistance  How was this patient Mobilized today?:  Up to  Toilet/Shower, ambulated 20 ft                 With Whom? Nurse and PCA                 Level of Difficulty/Assistance: 1x Assist     Pt resting in bed at this time on  1 L O2. Pt denies shortness of breath. Pt with nonpitting lower extremity edema.      Patient and/or Family's stated Goal of Care this Admission: increase activity tolerance, better understand heart failure and disease management, be more comfortable, and reduce lower extremity edema prior to discharge        :

## 2023-01-06 NOTE — PLAN OF CARE
Problem: Respiratory - Adult  Goal: Achieves optimal ventilation and oxygenation  Outcome: Progressing  Flowsheets (Taken 1/6/2023 1530)  Achieves optimal ventilation and oxygenation: Assess for changes in respiratory status     Problem: Musculoskeletal - Adult  Goal: Return mobility to safest level of function  Outcome: Progressing  Flowsheets (Taken 1/6/2023 1530)  Return Mobility to Safest Level of Function: Assess patient stability and activity tolerance for standing, transferring and ambulating with or without assistive devices     Problem: Discharge Planning  Goal: Discharge to home or other facility with appropriate resources  Recent Flowsheet Documentation  Taken 1/6/2023 1530 by Blair Man RN  Discharge to home or other facility with appropriate resources: Identify barriers to discharge with patient and caregiver  1/6/2023 1246 by Blair Man RN  Outcome: Completed  1/6/2023 1246 by Blair Man RN  Outcome: Progressing  1/6/2023 0929 by Blair Man RN  Outcome: Progressing  1/6/2023 0338 by Jorgito Crabajal RN  Outcome: Progressing     Problem: Pain  Goal: Verbalizes/displays adequate comfort level or baseline comfort level  1/6/2023 1246 by Blair Man RN  Outcome: Completed  1/6/2023 1246 by Blair Man RN  Outcome: Progressing  1/6/2023 0929 by Blair Man RN  Outcome: Progressing  1/6/2023 0338 by Jorgito Carbajal RN  Outcome: Progressing     Problem: Safety - Adult  Goal: Free from fall injury  Recent Flowsheet Documentation  Taken 1/6/2023 1531 by Blair Man RN  Free From Fall Injury: Instruct family/caregiver on patient safety  1/6/2023 1246 by Blair Man RN  Outcome: Completed  1/6/2023 1246 by Blair Man RN  Outcome: Progressing  1/6/2023 0929 by Blair Man RN  Outcome: Progressing  1/6/2023 0338 by Jorgito Carbajal RN  Outcome: Progressing     Problem: Respiratory - Adult  Goal: Achieves optimal ventilation and oxygenation  Recent Flowsheet  Documentation  Taken 1/6/2023 1530 by Martinez Mcintyre RN  Achieves optimal ventilation and oxygenation: Assess for changes in respiratory status  1/6/2023 1246 by Martinez Mcintyre RN  Outcome: Completed  1/6/2023 1246 by Martinez Mcintyre RN  Outcome: Progressing  1/6/2023 0929 by Martinez Mcintyre RN  Outcome: Progressing  1/6/2023 0338 by Faustino Levine RN  Outcome: Progressing     Problem: Cardiovascular - Adult  Goal: Maintains optimal cardiac output and hemodynamic stability  Recent Flowsheet Documentation  Taken 1/6/2023 1530 by Martinez Mcintyre RN  Maintains optimal cardiac output and hemodynamic stability: Monitor blood pressure and heart rate  1/6/2023 1246 by Martinez Mcintyre RN  Outcome: Completed  1/6/2023 1246 by Martinez Mcintyre RN  Outcome: Progressing  1/6/2023 0929 by Martinez Mcintyre RN  Outcome: Progressing  1/6/2023 0338 by Faustino Levine RN  Outcome: Progressing  Goal: Absence of cardiac dysrhythmias or at baseline  Recent Flowsheet Documentation  Taken 1/6/2023 1530 by Martinez Mcintyre RN  Absence of cardiac dysrhythmias or at baseline: Monitor cardiac rate and rhythm  1/6/2023 1246 by Martinez Mcintyre RN  Outcome: Completed  1/6/2023 1246 by Martinez Mcintyre RN  Outcome: Progressing  1/6/2023 0929 by Martinez Mcintyre RN  Outcome: Progressing  1/6/2023 0338 by Faustino Levine RN  Outcome: Progressing     Problem: Genitourinary - Adult  Goal: Absence of urinary retention  Recent Flowsheet Documentation  Taken 1/6/2023 1530 by Martinez Mcintyre RN  Absence of urinary retention: Assess patients ability to void and empty bladder  1/6/2023 1246 by Martinez Mcintyre RN  Outcome: Completed  1/6/2023 1246 by Martinez Mcintyre RN  Outcome: Progressing  1/6/2023 0929 by Martinez Mcintyre RN  Outcome: Progressing  1/6/2023 0338 by Faustino Levine RN  Outcome: Progressing     Problem: Metabolic/Fluid and Electrolytes - Adult  Goal: Electrolytes maintained within normal limits  Recent Flowsheet Documentation  Taken 1/6/2023 1530 by Blaine Mitchell RN  Electrolytes maintained within normal limits: Monitor labs and assess patient for signs and symptoms of electrolyte imbalances  1/6/2023 1246 by Blaine Mitchell RN  Outcome: Completed  1/6/2023 1246 by Blaine Mitchell RN  Outcome: Progressing  1/6/2023 0929 by Blaine Mitchell RN  Outcome: Progressing  1/6/2023 0338 by Stephon Edwards RN  Outcome: Progressing  Goal: Hemodynamic stability and optimal renal function maintained  1/6/2023 1246 by Blaine Mitchell RN  Outcome: Completed  1/6/2023 1246 by Blaine Mitchell RN  Outcome: Progressing  1/6/2023 0929 by Blaine Mitchell RN  Outcome: Progressing  1/6/2023 0338 by Stephon Edwards RN  Outcome: Progressing     Problem: Hematologic - Adult  Goal: Maintains hematologic stability  Recent Flowsheet Documentation  Taken 1/6/2023 1530 by Blaine Mitchell RN  Maintains hematologic stability: Assess for signs and symptoms of bleeding or hemorrhage  1/6/2023 1246 by Blaine Mitchell RN  Outcome: Completed  1/6/2023 1246 by Blaine Mitchell RN  Outcome: Progressing  1/6/2023 0929 by Blaine Mitchell RN  Outcome: Progressing  1/6/2023 0338 by Stephon Edwards RN  Outcome: Progressing     Problem: Chronic Conditions and Co-morbidities  Goal: Patient's chronic conditions and co-morbidity symptoms are monitored and maintained or improved  Recent Flowsheet Documentation  Taken 1/6/2023 1530 by Blaine Mitchell RN  Care Plan - Patient's Chronic Conditions and Co-Morbidity Symptoms are Monitored and Maintained or Improved: Monitor and assess patient's chronic conditions and comorbid symptoms for stability, deterioration, or improvement  1/6/2023 1246 by lBaine Mitchell RN  Outcome: Completed  1/6/2023 1246 by Blaine Mitchell RN  Outcome: Progressing

## 2023-01-06 NOTE — PLAN OF CARE
Problem: Safety - Adult  Goal: Free from fall injury  1/6/2023 0929 by Savannah Hayden RN  Outcome: Progressing  1/6/2023 0338 by Theo Denis RN  Outcome: Progressing

## 2023-01-06 NOTE — CARE COORDINATION
Discharge order noted. Pt from home with . IPTA. Pt refusing HHC. Did not qualify for home oxygen. CM will sign off, but remains available should needs arise.  Priscilla Sanabria RN

## 2023-01-06 NOTE — PLAN OF CARE
Patient's EF (Ejection Fraction) is greater than 40%    Heart Failure Medications:  Diuretics[de-identified] Furosemide    (One of the following REQUIRED for EF </= 40%/SYSTOLIC FAILURE but MAY be used in EF% >40%/DIASTOLIC FAILURE)        ACE[de-identified] None        ARB[de-identified] None         ARNI[de-identified] None    (Beta Blockers)  NON- Evidenced Based Beta Blocker (for EF% >40%/DIASTOLIC FAILURE): None    Evidenced Based Beta Blocker::(REQUIRED for EF% <40%/SYSTOLIC FAILURE) Carvedilol- Coreg  . .................................................................................................................................................. Patient's weights and intake/output reviewed: Yes    Patient's Last Weight: 179 lbs obtained by standing scale. Difference of less than 2 lbs less than last documented weight. Intake/Output Summary (Last 24 hours) at 1/6/2023 0953  Last data filed at 1/6/2023 9713  Gross per 24 hour   Intake 1450 ml   Output 300 ml   Net 1150 ml       Education Booklet Provided: yes    Comorbidities Reviewed Yes    Patient has a past medical history of Abnormal ECG, Anemia, Arthritis, Back pain, chronic, Bipolar disorder ([facility]), Bronchitis chronic, CHF (congestive heart failure) ([facility]), Chronic back pain, Chronic neck pain, Clostridium difficile infection, 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[address] Coronary artery disease involving native coronary artery of native heart with angina pectoris ([address] Depression, Emphysema of lung ([address] Fibromyalgia, hyperlipidemia, Hypertension, Kidney stone, Liver disease, Lung disease, MDD (major depressive disorder), Mood disorder ([address] Movement disorder, Neck pain, chronic, Opiate misuse, Personality disorder ([facility] [address] Pneumonia, and Polypharmacy.      >>For CHF and Comorbidity documentation on Education Time and Topics, please see Education Tab    Progressive Mobility Assessment:  What is this patient's Current Level of Mobility?: Ambulatory- with Assistance  How was this patient Mobilized today?: Edge of Bed,  Up to Toilet/Shower, and Up in Room, ambulated 20 ft                 With Whom? Nurse and Self                 Level of Difficulty/Assistance: Independent     Pt resting in bed at this time on room air. Pt denies shortness of breath. Pt with nonpitting lower extremity edema.      Patient and/or Family's stated Goal of Care this Admission: reduce shortness of breath, increase activity tolerance, better understand heart failure and disease management, be more comfortable, and reduce lower extremity edema prior to discharge        :

## 2023-01-07 NOTE — ED PROVIDER NOTES
Emergency Department Attending Provider Note  Location: [place] [address] - MED SURG  1/4/2023     Chief Complaint   Patient presents with    Chest Pain    Leg Swelling     PT REPORTS for past week, leg swelling. Right is worse. Chest pain began 1 week ago + cough. PATIENT ID:  Estela Montero is a 71 y.o. 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    Coronary artery disease involving native coronary artery of native heart with angina pectoris ([address] 3/8/2016    Depression     Emphysema of lung (HCC)     Fibromyalgia     hyperlipidemia 8/11/2011    Hypertension     Kidney stone     Liver disease     Lung disease     MDD (major depressive disorder) 4/4/2012    Mood disorder ([address] 3/13/2013    Movement disorder     Neck pain, chronic 3/13/2013    Opiate misuse     Personality disorder ([address]     Pneumonia     Polypharmacy 2/16/2013       Evangelista Fitzpatrick was evaluated in the Emergency Department for coughing, concerns for possible CHF exacerbation as she is having some leg swelling, right greater than left, but PCP, per patient, is mostly concerned that she is having syncopal episodes. She last fell 2 days ago, denies any headaches, vision changes, but does say she hit her head this past time. On Lasix at home, says she takes it as best as she can. She has some shortness of breath. Denies any obvious head trauma, no lacerations or bleeding. Denies any chest pain at this time. Otherwise, has a significant history of COPD as well, and has had multiple episodes of coughing to the point where she is lightheaded over the past couple of days.     Here in the emergency department, patient is in no physical distress, no respiratory distress, though intermittently goes down to 92% while conversing with me. Denies any chest pain, lungs are diminished bilaterally. Right greater than left pitting edema, however no significant pain. Although initial history and physical exam information was obtained by Darrian Coto (who also dictated a record of this visit), I personally saw the patient and performed a substantive portion of the visit including all aspects of the medical decision making. EKG Interpretation:    EKG obtained today in the emergency department shows a normal sinus rhythm with a ventricular rate of 82 bpm.  Baseline artifact. No ST segment elevation, ST segment depression, hyperacute T waves. RSR pattern, consistent with previous EKGs back to 2021. CT CERVICAL SPINE WO CONTRAST   Final Result   1. No acute abnormality of the cervical spine. 2. Multilevel cervical spondylosis and spondylolisthesis. No high-grade bony   spinal canal stenosis   3. Emphysematous changes of the visualized lung apices. CT CHEST PULMONARY EMBOLISM W CONTRAST   Final Result   1. No evidence of pulmonary embolism. 2. Bubbly opacification of the right lower lobe bronchus, likely related to   aspiration. No focal consolidation. 3. Emphysematous changes of the lungs. CT HEAD WO CONTRAST   Final Result   No acute intracranial abnormality.   No change in appearance of the brain         XR CHEST PORTABLE   Final Result      Lungs are clear             Labs Reviewed   COMPREHENSIVE METABOLIC PANEL W/ REFLEX TO MG FOR LOW K - Abnormal; Notable for the following components:       Result Value    Sodium 132 (*)     Potassium reflex Magnesium 3.4 (*)     Chloride 92 (*)     Glucose 103 (*)     ALT 8 (*)     AST 13 (*)     All other components within normal limits   BRAIN NATRIURETIC PEPTIDE - Abnormal; Notable for the following components:    Pro- (*)     All other components within normal limits   MAGNESIUM - Abnormal; Notable for the following components:    Magnesium 1.60 (*)     All other components within normal limits   URINALYSIS WITH REFLEX TO CULTURE - Abnormal; Notable for the following components:    Leukocyte Esterase, Urine TRACE (*)     All other components within normal limits   MICROSCOPIC URINALYSIS - Abnormal; Notable for the following components:    Epithelial Cells, UA 6-10 (*)     All other components within normal limits   BASIC METABOLIC PANEL - Abnormal; Notable for the following components:    Chloride 94 (*)     CO2 33 (*)     Glucose 156 (*)     All other components within normal limits   CBC WITH AUTO DIFFERENTIAL - Abnormal; Notable for the following components:    Lymphocytes Absolute 0.7 (*)     All other components within normal limits   BASIC METABOLIC PANEL - Abnormal; Notable for the following components:    Sodium 134 (*)     Chloride 91 (*)     CO2 35 (*)     Glucose 123 (*)     All other components within normal limits   CBC WITH AUTO DIFFERENTIAL - Abnormal; Notable for the following components:    Neutrophils Absolute 8.0 (*)     All other components within normal limits   COVID-19 & INFLUENZA COMBO   CBC WITH AUTO DIFFERENTIAL   TROPONIN   D-DIMER, QUANTITATIVE   MAGNESIUM   MAGNESIUM         PLAN:   -Patient seen and evaluated.  Relevant records reviewed.    Patient presented emergency department today with cough, shortness of breath, right greater than left leg swelling, and also multiple episodes of syncope at home.  Patient has multiple comorbidities putting her at high risk for syncopal episodes, which is why her PCP sent her in here.  On initial exam, she is in no acute clinical cardiopulmonary distress, however does have diminished breath sounds.  Eventually in the emergency department, however, she does dip into the 80s, placed on 2 L nasal cannula.  Concerning for patient who has a cough, history of CHF, history of COPD,  and is newly hypoxic. Additionally, in the ED, troponin is reassuring, lightly hyponatremic, but reassuring renal function. No signs of infection or acute anemia. CT head, CT cervical spine in the setting of fall without acute pathology. CT PE negative. Admitted per PA documentation for concerns of syncope and collapse, cough, leg swelling, hypomagnesemia, which was replaced today in the emergency department, and hypoxia. Medications   ipratropium-albuterol (DUONEB) nebulizer solution 1 ampule (1 ampule Inhalation Given 1/4/23 1947)   iopamidol (ISOVUE-370) 76 % injection 75 mL (75 mLs IntraVENous Given 1/4/23 2044)   magnesium sulfate 2000 mg in 50 mL IVPB premix (0 mg IntraVENous Stopped 1/5/23 0041)   methylPREDNISolone sodium (SOLU-MEDROL) injection 125 mg (125 mg IntraVENous Given 1/4/23 2243)           IMPRESSION:    ICD-10-CM    1. Syncope and collapse  R55       2. Leg swelling  M79.89       3. Hypomagnesemia  E83.42       4. Acute respiratory failure with hypoxia (HCC)  J96.01       5. Chronic heart failure with preserved ejection fraction ([facility]  I50.32 ECHO Limited - Clinic Performed            I, Aarti Oconnor,  am the primary clinician of record. This chart was generated in part by using Dragon Dictation system and may contain errors related to that system including errors in grammar, punctuation, and spelling, as well as words and phrases that may be inappropriate. If there are any questions or concerns please feel free to contact the dictating provider for clarification.      AARTI OCONNOR DO  [address],   01/06/23 1931

## 2023-01-08 NOTE — DISCHARGE SUMMARY
Hospital Medicine Discharge Summary    Patient ID: Guanako Moncada      Patient's PCP: BALJIT Johnson CNP    Admit Date: 1/4/2023     Discharge Date: 1/6/2023      Admitting Provider: Philip Kramer MD     Discharge Provider: CEE Holder     Discharge Diagnoses: Active Hospital Problems    Diagnosis     COPD exacerbation ([facility] [J44.1]      Priority: Medium    Chronic heart failure with preserved ejection fraction (HCC) [I50.32]      Priority: Medium    Noncompliance [Z91.199]      Priority: Medium    Bipolar disorder ([facility] [F31.9]      Priority: Medium    HTN (hypertension) [I10]     Tobacco abuse [Z72.0]        The patient was seen and examined on day of discharge and this discharge summary is in conjunction with any daily progress note from day of discharge. Hospital Course: Guanako Moncada is a 71 y.o. female. She presented to the ER from home for dyspnea, cough, and sycnopal episodes. She has a h/o COPD. For roughly the past month she has been having coughing fits. Sometimes coughing is severe and causes cough syncope and injuries due to falls. For the most part the patient's cough has been nonproductive but sometimes she does expectorate small amounts of thick yellowish sputum. She denies pleuritic chest pain and hemoptysis. She has not had loss of appetite, rigors, sweats, nausea, or diarrhea. She does c/o multiple bruises and sore spots from where she has fallen. She lives with her  and daughter. She does not take any medications for her lungs except one inhaler which she thinks is albuterol. She still smokes but wants to quit. COPD exacerbation, Tobacco abuse  -Patient had normal PFT's in 2011. No PFT's since then.  -Weaned to room air upon dc, no O2 at dc  -  Started solumedrol 40mg IV transition to prednisone 40 mg for 5 more days, doxycycline 100mg BID,  prn albuterol nebs.   Continue home montelukast.  -  Patient needs updated PFT's, and would likely benefit from a maintenance inhaler regimen. -  Smoking cessation advised. NRT provided. Chronic HFpEF, HTN, HLD  -  Continue home amlodipine, atorvastatin, carvedilol.  - Continue home Lasix  -Echo wnl     Bipolar Disorder  -  Continue home depakote, buspirone, quetiapine, lurasidone, and duloxetine. Physical Exam Performed:     BP (!) 186/81   Pulse 84   Temp 97.8 °F (36.6 °C) (Oral)   Resp 20   Ht 5' 1\" (1.549 m)   Wt 179 lb 3.2 oz (81.3 kg)   SpO2 94%   BMI 33.86 kg/m²       General appearance:  No apparent distress, appears stated age and cooperative. HEENT:  Normal cephalic, atraumatic without obvious deformity. Pupils equal, round, and reactive to light. Extra ocular muscles intact. Conjunctivae/corneas clear. Neck: Supple, with full range of motion. No jugular venous distention. Trachea midline. Respiratory:  Normal respiratory effort. Clear to auscultation, bilaterally without Rales/Wheezes/Rhonchi. Cardiovascular:  Regular rate and rhythm with normal S1/S2 without murmurs, rubs or gallops. Abdomen: Soft, non-tender, non-distended with normal bowel sounds. Musculoskeletal:  No clubbing, cyanosis or edema bilaterally. Full range of motion without deformity. Skin: Skin color, texture, turgor normal.  No rashes or lesions. Neurologic:  Neurovascularly intact without any focal sensory/motor deficits. Cranial nerves: II-XII intact, grossly non-focal.  Psychiatric:  Alert and oriented, thought content appropriate, normal insight  Capillary Refill: Brisk,< 3 seconds   Peripheral Pulses: +2 palpable, equal bilaterally       Labs:  For convenience and continuity at follow-up the following most recent labs are provided:      CBC:    Lab Results   Component Value Date/Time    WBC 10.0 01/06/2023 07:15 AM    HGB 12.3 01/06/2023 07:15 AM    HCT 36.4 01/06/2023 07:15 AM     01/06/2023 07:15 AM       Renal:    Lab Results   Component Value Date/Time     01/06/2023 07:15 AM    K 4.1 01/06/2023 07:15 AM    K 3.4 01/04/2023 07:18 PM    CL 91 01/06/2023 07:15 AM    CO2 35 01/06/2023 07:15 AM    BUN 14 01/06/2023 07:15 AM    CREATININE 0.6 01/06/2023 07:15 AM    CALCIUM 9.5 01/06/2023 07:15 AM    PHOS 2.2 10/19/2019 06:33 AM         Significant Diagnostic Studies    Radiology:   CT CERVICAL SPINE WO CONTRAST   Final Result   1. No acute abnormality of the cervical spine. 2. Multilevel cervical spondylosis and spondylolisthesis. No high-grade bony   spinal canal stenosis   3. Emphysematous changes of the visualized lung apices. CT CHEST PULMONARY EMBOLISM W CONTRAST   Final Result   1. No evidence of pulmonary embolism. 2. Bubbly opacification of the right lower lobe bronchus, likely related to   aspiration. No focal consolidation. 3. Emphysematous changes of the lungs. CT HEAD WO CONTRAST   Final Result   No acute intracranial abnormality.   No change in appearance of the brain         XR CHEST PORTABLE   Final Result      Lungs are clear                Consults:     IP CONSULT TO HEART FAILURE NURSE/COORDINATOR  IP CONSULT TO DIETITIAN    Disposition:  Home      Condition at Discharge: Stable    Discharge Instructions/Follow-up:    Follow-up with PCP  Follow-up with Pulm, needs updated PFTs     Code Status:  Prior FULL    Activity: activity as tolerated    Diet: regular diet and low fat, low cholesterol diet      Discharge Medications:     Discharge Medication List as of 1/6/2023  2:48 PM             Details   doxycycline hyclate (VIBRA-TABS) 100 MG tablet Take 1 tablet by mouth every 12 hours for 7 doses, Disp-7 tablet, R-0Normal      predniSONE (DELTASONE) 20 MG tablet Take 2 tablets by mouth daily for 5 doses, Disp-10 tablet, R-0Normal      butalbital-acetaminophen-caffeine (FIORICET, ESGIC) -40 MG per tablet Take 1 tablet by mouth every 6 hours as needed for Headaches, Disp-120 tablet, R-0, DAWNormal                Details   atorvastatin (LIPITOR) 40 MG tablet Historical Med      divalproex (DEPAKOTE ER) 250 MG extended release tablet Take 500 mg by mouth nightlyHistorical Med      furosemide (LASIX) 20 MG tablet Take 20 mg by mouth dailyHistorical Med      hydrOXYzine HCl (ATARAX) 10 MG tablet Historical Med      LORazepam (ATIVAN) 0.5 MG tablet Take 0.5 mg by mouth 2 times daily as needed. Historical Med      montelukast (SINGULAIR) 10 MG tablet Historical Med      lurasidone (LATUDA) 80 MG TABS tablet Take 80 mg by mouth dailyHistorical Med      QUEtiapine (SEROQUEL) 100 MG tablet Take 3 tablets by mouth nightly, Disp-1 tablet, R-0Adjust Sig      carvedilol (COREG) 12.5 MG tablet Take 1 tablet by mouth 2 times daily (with meals), Disp-60 tablet, R-0Normal      amLODIPine (NORVASC) 5 MG tablet Take 1 tablet by mouth daily, Disp-30 tablet, R-0Normal      gabapentin (NEURONTIN) 100 MG capsule Take 100 mg by mouth 3 times daily. Historical Med      oxybutynin (DITROPAN-XL) 10 MG extended release tablet Take 10 mg by mouth dailyHistorical Med      fluticasone-umeclidin-vilant (TRELEGY ELLIPTA) 100-62.5-25 MCG/INH AEPB Inhale 1 puff into the lungs dailyHistorical Med      primidone (MYSOLINE) 50 MG tablet Take 100 mg by mouth daily Historical Med      busPIRone (BUSPAR) 15 MG tablet Take 15 mg by mouth 3 times dailyHistorical Med      pantoprazole (PROTONIX) 40 MG tablet Take 1 tablet by mouth every morning (before breakfast), Disp-30 tablet,R-0Print      nicotine (NICODERM CQ) 14 MG/24HR Place 1 patch onto the skin daily, Disp-42 patch, R-0Print      DULoxetine (CYMBALTA) 60 MG extended release capsule Take 60 mg by mouth daily Historical Med      fluticasone (FLONASE) 50 MCG/ACT nasal spray 1 spray by Nasal route daily as needed Historical Med      divalproex (DEPAKOTE) 250 MG DR tablet Take 250 mg by mouth every morning              Time Spent on discharge: 33 minutes  in the examination, evaluation, counseling and review of medications and discharge plan.      Signed:    CEE Cruz   1/8/2023      Thank you BALJIT Garcia CNP for the opportunity to be involved in this patient's care. If you have any questions or concerns, please feel free to contact me at [phone].

## 2023-01-09 ENCOUNTER — FOLLOWUP TELEPHONE ENCOUNTER (OUTPATIENT)
Dept: TELEMETRY | Age: 73
End: 2023-01-09

## 2023-01-09 NOTE — TELEPHONE ENCOUNTER
Care Transitions Initial Follow Up Call    Call within 2 business days of discharge: Yes     Patient: Chapo Fitzpatrick Patient : 1950 MRN: [id number]    [unfilled]    RARS: Readmission Risk Score: 10.6       Spoke with: patient    Discharge department/facility: home    Non-face-to-face services provided:  Scheduled appointment with PCP-1/10/22    Spoke to patient for hospital follow up. She denies any questions or concerns. Confirms she has follow up appt tomorrow with BALJIT Randall CNP. States she plans on getting a scale this week to monitor for s/s of CHF. Denies any additional needs    Follow Up  No future appointments.     Lolita Miranda RN

## 2023-02-15 ENCOUNTER — HOSPITAL ENCOUNTER (INPATIENT)
Age: 73
LOS: 1 days | Discharge: HOME | End: 2023-02-17
Attending: EMERGENCY MEDICINE | Admitting: INTERNAL MEDICINE
Payer: COMMERCIAL

## 2023-02-15 ENCOUNTER — APPOINTMENT (OUTPATIENT)
Dept: CT IMAGING | Age: 73
End: 2023-02-15
Payer: COMMERCIAL

## 2023-02-15 ENCOUNTER — APPOINTMENT (OUTPATIENT)
Dept: GENERAL RADIOLOGY | Age: 73
End: 2023-02-15
Payer: COMMERCIAL
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
## 2023-02-15 DIAGNOSIS — I95.9 HYPOTENSION, UNSPECIFIED HYPOTENSION TYPE: ICD-10-CM

## 2023-02-15 DIAGNOSIS — D64.9 ANEMIA, UNSPECIFIED TYPE: ICD-10-CM

## 2023-02-15 DIAGNOSIS — E87.8 ELECTROLYTE DISTURBANCE: ICD-10-CM

## 2023-02-15 LAB
A/G RATIO: 1.1 (ref 1.1–2.2)
ALBUMIN SERPL-MCNC: 2.7 G/DL (ref 3.4–5)
ALP BLD-CCNC: 70 U/L (ref 40–129)
ALT SERPL-CCNC: 7 U/L (ref 10–40)
ANION GAP SERPL CALCULATED.3IONS-SCNC: 9 MMOL/L (ref 3–16)
AST SERPL-CCNC: 11 U/L (ref 15–37)
BASOPHILS ABSOLUTE: 0.1 K/UL (ref 0–0.2)
BASOPHILS RELATIVE PERCENT: 0.9 %
BILIRUB SERPL-MCNC: <0.2 MG/DL (ref 0–1)
BUN BLDV-MCNC: 13 MG/DL (ref 7–20)
CALCIUM SERPL-MCNC: 8.1 MG/DL (ref 8.3–10.6)
CHLORIDE BLD-SCNC: 92 MMOL/L (ref 99–110)
CO2: 30 MMOL/L (ref 21–32)
CREAT SERPL-MCNC: 1 MG/DL (ref 0.6–1.2)
EOSINOPHILS ABSOLUTE: 0.1 K/UL (ref 0–0.6)
EOSINOPHILS RELATIVE PERCENT: 1.6 %
GFR SERPL CREATININE-BSD FRML MDRD: >60 ML/MIN/{1.73_M2}
GLUCOSE BLD-MCNC: 110 MG/DL (ref 70–99)
GLUCOSE BLD-MCNC: 232 MG/DL (ref 70–99)
HCT VFR BLD CALC: 31.5 % (ref 36–48)
HEMOGLOBIN: 10.6 G/DL (ref 12–16)
INR BLD: 1.04 (ref 0.87–1.14)
LACTIC ACID, SEPSIS: 1.2 MMOL/L (ref 0.4–1.9)
LYMPHOCYTES ABSOLUTE: 1.9 K/UL (ref 1–5.1)
LYMPHOCYTES RELATIVE PERCENT: 26.4 %
MAGNESIUM: 1.7 MG/DL (ref 1.8–2.4)
MCH RBC QN AUTO: 29.2 PG (ref 26–34)
MCHC RBC AUTO-ENTMCNC: 33.7 G/DL (ref 31–36)
MCV RBC AUTO: 86.8 FL (ref 80–100)
MONOCYTES ABSOLUTE: 0.8 K/UL (ref 0–1.3)
MONOCYTES RELATIVE PERCENT: 11.1 %
NEUTROPHILS ABSOLUTE: 4.3 K/UL (ref 1.7–7.7)
NEUTROPHILS RELATIVE PERCENT: 60 %
PDW BLD-RTO: 15.4 % (ref 12.4–15.4)
PERFORMED ON: ABNORMAL
PLATELET # BLD: 155 K/UL (ref 135–450)
PMV BLD AUTO: 6.7 FL (ref 5–10.5)
POTASSIUM REFLEX MAGNESIUM: 2.9 MMOL/L (ref 3.5–5.1)
PROTHROMBIN TIME: 13.5 SEC (ref 11.7–14.5)
RBC # BLD: 3.63 M/UL (ref 4–5.2)
SODIUM BLD-SCNC: 131 MMOL/L (ref 136–145)
TOTAL PROTEIN: 5.2 G/DL (ref 6.4–8.2)
TROPONIN: <0.01 NG/ML
WBC # BLD: 7.2 K/UL (ref 4–11)

## 2023-02-15 PROCEDURE — 70450 CT HEAD/BRAIN W/O DYE: CPT

## 2023-02-15 PROCEDURE — 71045 X-RAY EXAM CHEST 1 VIEW: CPT

## 2023-02-15 PROCEDURE — 4A03X5D MEASUREMENT OF ARTERIAL FLOW, INTRACRANIAL, EXTERNAL APPROACH: ICD-10-PCS | Performed by: RADIOLOGY

## 2023-02-15 PROCEDURE — 93005 ELECTROCARDIOGRAM TRACING: CPT | Performed by: EMERGENCY MEDICINE

## 2023-02-15 PROCEDURE — 84484 ASSAY OF TROPONIN QUANT: CPT

## 2023-02-15 PROCEDURE — 83605 ASSAY OF LACTIC ACID: CPT

## 2023-02-15 PROCEDURE — 83735 ASSAY OF MAGNESIUM: CPT

## 2023-02-15 PROCEDURE — 96374 THER/PROPH/DIAG INJ IV PUSH: CPT

## 2023-02-15 PROCEDURE — 70498 CT ANGIOGRAPHY NECK: CPT

## 2023-02-15 PROCEDURE — 99285 EMERGENCY DEPT VISIT HI MDM: CPT

## 2023-02-15 PROCEDURE — 2700000000 HC OXYGEN THERAPY PER DAY

## 2023-02-15 PROCEDURE — 2580000003 HC RX 258: Performed by: EMERGENCY MEDICINE

## 2023-02-15 PROCEDURE — 6360000004 HC RX CONTRAST MEDICATION: Performed by: EMERGENCY MEDICINE

## 2023-02-15 PROCEDURE — 84100 ASSAY OF PHOSPHORUS: CPT

## 2023-02-15 PROCEDURE — 85025 COMPLETE CBC W/AUTO DIFF WBC: CPT

## 2023-02-15 PROCEDURE — 85610 PROTHROMBIN TIME: CPT

## 2023-02-15 PROCEDURE — 80053 COMPREHEN METABOLIC PANEL: CPT

## 2023-02-15 PROCEDURE — 87040 BLOOD CULTURE FOR BACTERIA: CPT

## 2023-02-15 PROCEDURE — 96375 TX/PRO/DX INJ NEW DRUG ADDON: CPT

## 2023-02-15 RX ORDER — 0.9 % SODIUM CHLORIDE 0.9 %
1000 INTRAVENOUS SOLUTION INTRAVENOUS ONCE
Status: COMPLETED | OUTPATIENT
Start: 2023-02-15 | End: 2023-02-16

## 2023-02-15 RX ADMIN — IOPAMIDOL 75 ML: 755 INJECTION, SOLUTION INTRAVENOUS at 22:56

## 2023-02-15 RX ADMIN — SODIUM CHLORIDE 1000 ML: 9 INJECTION, SOLUTION INTRAVENOUS at 23:20

## 2023-02-16 ENCOUNTER — APPOINTMENT (OUTPATIENT)
Dept: MRI IMAGING | Age: 73
End: 2023-02-16
Payer: COMMERCIAL

## 2023-02-16 PROBLEM — R29.90 STROKE-LIKE SYMPTOMS: Status: ACTIVE | Noted: 2023-02-16

## 2023-02-16 LAB
ANION GAP SERPL CALCULATED.3IONS-SCNC: 6 MMOL/L (ref 3–16)
BASOPHILS ABSOLUTE: 0 K/UL (ref 0–0.2)
BASOPHILS RELATIVE PERCENT: 0.5 %
BILIRUBIN URINE: NEGATIVE
BLOOD, URINE: NEGATIVE
BUN BLDV-MCNC: 10 MG/DL (ref 7–20)
CALCIUM SERPL-MCNC: 8.4 MG/DL (ref 8.3–10.6)
CHLORIDE BLD-SCNC: 98 MMOL/L (ref 99–110)
CHOLESTEROL, TOTAL: 132 MG/DL (ref 0–199)
CLARITY: CLEAR
CO2: 32 MMOL/L (ref 21–32)
COLOR: YELLOW
CREAT SERPL-MCNC: 0.7 MG/DL (ref 0.6–1.2)
EKG ATRIAL RATE: 70 BPM
EKG DIAGNOSIS: NORMAL
EKG P AXIS: -36 DEGREES
EKG P-R INTERVAL: 152 MS
EKG Q-T INTERVAL: 440 MS
EKG QRS DURATION: 116 MS
EKG QTC CALCULATION (BAZETT): 475 MS
EKG R AXIS: -1 DEGREES
EKG T AXIS: 32 DEGREES
EKG VENTRICULAR RATE: 70 BPM
EOSINOPHILS ABSOLUTE: 0.1 K/UL (ref 0–0.6)
EOSINOPHILS RELATIVE PERCENT: 1.3 %
ESTIMATED AVERAGE GLUCOSE: 116.9 MG/DL
GFR SERPL CREATININE-BSD FRML MDRD: >60 ML/MIN/{1.73_M2}
GLUCOSE BLD-MCNC: 108 MG/DL (ref 70–99)
GLUCOSE URINE: NEGATIVE MG/DL
HBA1C MFR BLD: 5.7 %
HCT VFR BLD CALC: 35.7 % (ref 36–48)
HDLC SERPL-MCNC: 51 MG/DL (ref 40–60)
HEMOGLOBIN: 11.6 G/DL (ref 12–16)
KETONES, URINE: NEGATIVE MG/DL
LDL CHOLESTEROL CALCULATED: 65 MG/DL
LEUKOCYTE ESTERASE, URINE: NEGATIVE
LV EF: 58 %
LVEF MODALITY: NORMAL
LYMPHOCYTES ABSOLUTE: 1.2 K/UL (ref 1–5.1)
LYMPHOCYTES RELATIVE PERCENT: 14.7 %
MCH RBC QN AUTO: 28.6 PG (ref 26–34)
MCHC RBC AUTO-ENTMCNC: 32.6 G/DL (ref 31–36)
MCV RBC AUTO: 87.7 FL (ref 80–100)
MICROSCOPIC EXAMINATION: NORMAL
MONOCYTES ABSOLUTE: 0.8 K/UL (ref 0–1.3)
MONOCYTES RELATIVE PERCENT: 9.6 %
NEUTROPHILS ABSOLUTE: 6.2 K/UL (ref 1.7–7.7)
NEUTROPHILS RELATIVE PERCENT: 73.9 %
NITRITE, URINE: NEGATIVE
OCCULT BLOOD DIAGNOSTIC: NORMAL
PDW BLD-RTO: 15.1 % (ref 12.4–15.4)
PH UA: 6.5 (ref 5–8)
PHOSPHORUS: 5.1 MG/DL (ref 2.5–4.9)
PLATELET # BLD: 143 K/UL (ref 135–450)
PMV BLD AUTO: 6.7 FL (ref 5–10.5)
POTASSIUM REFLEX MAGNESIUM: 4 MMOL/L (ref 3.5–5.1)
PROTEIN UA: NEGATIVE MG/DL
RBC # BLD: 4.07 M/UL (ref 4–5.2)
SODIUM BLD-SCNC: 136 MMOL/L (ref 136–145)
SPECIFIC GRAVITY UA: 1.01 (ref 1–1.03)
TRIGL SERPL-MCNC: 81 MG/DL (ref 0–150)
URINE REFLEX TO CULTURE: NORMAL
URINE TYPE: NORMAL
UROBILINOGEN, URINE: 0.2 E.U./DL
VLDLC SERPL CALC-MCNC: 16 MG/DL
WBC # BLD: 8.3 K/UL (ref 4–11)

## 2023-02-16 PROCEDURE — 97530 THERAPEUTIC ACTIVITIES: CPT

## 2023-02-16 PROCEDURE — 2700000000 HC OXYGEN THERAPY PER DAY

## 2023-02-16 PROCEDURE — 97166 OT EVAL MOD COMPLEX 45 MIN: CPT

## 2023-02-16 PROCEDURE — 83036 HEMOGLOBIN GLYCOSYLATED A1C: CPT

## 2023-02-16 PROCEDURE — 2580000003 HC RX 258: Performed by: NURSE PRACTITIONER

## 2023-02-16 PROCEDURE — 82270 OCCULT BLOOD FECES: CPT

## 2023-02-16 PROCEDURE — 85025 COMPLETE CBC W/AUTO DIFF WBC: CPT

## 2023-02-16 PROCEDURE — 93306 TTE W/DOPPLER COMPLETE: CPT

## 2023-02-16 PROCEDURE — 6370000000 HC RX 637 (ALT 250 FOR IP): Performed by: INTERNAL MEDICINE

## 2023-02-16 PROCEDURE — 70551 MRI BRAIN STEM W/O DYE: CPT

## 2023-02-16 PROCEDURE — 99223 1ST HOSP IP/OBS HIGH 75: CPT | Performed by: PSYCHIATRY & NEUROLOGY

## 2023-02-16 PROCEDURE — 36415 COLL VENOUS BLD VENIPUNCTURE: CPT

## 2023-02-16 PROCEDURE — 94761 N-INVAS EAR/PLS OXIMETRY MLT: CPT

## 2023-02-16 PROCEDURE — 80061 LIPID PANEL: CPT

## 2023-02-16 PROCEDURE — 81003 URINALYSIS AUTO W/O SCOPE: CPT

## 2023-02-16 PROCEDURE — 1200000000 HC SEMI PRIVATE

## 2023-02-16 PROCEDURE — 6370000000 HC RX 637 (ALT 250 FOR IP): Performed by: NURSE PRACTITIONER

## 2023-02-16 PROCEDURE — 97116 GAIT TRAINING THERAPY: CPT

## 2023-02-16 PROCEDURE — 80048 BASIC METABOLIC PNL TOTAL CA: CPT

## 2023-02-16 PROCEDURE — 6360000002 HC RX W HCPCS: Performed by: INTERNAL MEDICINE

## 2023-02-16 PROCEDURE — 2580000003 HC RX 258: Performed by: EMERGENCY MEDICINE

## 2023-02-16 PROCEDURE — 87040 BLOOD CULTURE FOR BACTERIA: CPT

## 2023-02-16 PROCEDURE — 6360000002 HC RX W HCPCS: Performed by: EMERGENCY MEDICINE

## 2023-02-16 PROCEDURE — 97162 PT EVAL MOD COMPLEX 30 MIN: CPT

## 2023-02-16 PROCEDURE — 93010 ELECTROCARDIOGRAM REPORT: CPT | Performed by: INTERNAL MEDICINE

## 2023-02-16 PROCEDURE — 97535 SELF CARE MNGMENT TRAINING: CPT

## 2023-02-16 RX ORDER — ONDANSETRON 4 MG/1
4 TABLET, ORALLY DISINTEGRATING ORAL EVERY 8 HOURS PRN
Status: DISCONTINUED | OUTPATIENT
Start: 2023-02-16 | End: 2023-02-16

## 2023-02-16 RX ORDER — MAGNESIUM SULFATE HEPTAHYDRATE 500 MG/ML
1000 INJECTION, SOLUTION INTRAMUSCULAR; INTRAVENOUS ONCE
Status: DISCONTINUED | OUTPATIENT
Start: 2023-02-16 | End: 2023-02-16

## 2023-02-16 RX ORDER — BUSPIRONE HYDROCHLORIDE 5 MG/1
15 TABLET ORAL 3 TIMES DAILY
Status: DISCONTINUED | OUTPATIENT
Start: 2023-02-16 | End: 2023-02-17 | Stop reason: HOSPADM

## 2023-02-16 RX ORDER — DIVALPROEX SODIUM 250 MG/1
500 TABLET, EXTENDED RELEASE ORAL NIGHTLY
Status: DISCONTINUED | OUTPATIENT
Start: 2023-02-16 | End: 2023-02-17 | Stop reason: HOSPADM

## 2023-02-16 RX ORDER — PANTOPRAZOLE SODIUM 40 MG/1
40 TABLET, DELAYED RELEASE ORAL
Status: DISCONTINUED | OUTPATIENT
Start: 2023-02-16 | End: 2023-02-17 | Stop reason: HOSPADM

## 2023-02-16 RX ORDER — POLYETHYLENE GLYCOL 3350 17 G/17G
17 POWDER, FOR SOLUTION ORAL DAILY PRN
Status: DISCONTINUED | OUTPATIENT
Start: 2023-02-16 | End: 2023-02-17 | Stop reason: HOSPADM

## 2023-02-16 RX ORDER — NICOTINE 21 MG/24HR
1 PATCH, TRANSDERMAL 24 HOURS TRANSDERMAL DAILY
Status: DISCONTINUED | OUTPATIENT
Start: 2023-02-16 | End: 2023-02-17 | Stop reason: HOSPADM

## 2023-02-16 RX ORDER — QUETIAPINE FUMARATE 100 MG/1
300 TABLET, FILM COATED ORAL NIGHTLY
Status: DISCONTINUED | OUTPATIENT
Start: 2023-02-16 | End: 2023-02-17 | Stop reason: HOSPADM

## 2023-02-16 RX ORDER — POTASSIUM CHLORIDE 7.45 MG/ML
10 INJECTION INTRAVENOUS
Status: DISPENSED | OUTPATIENT
Start: 2023-02-16 | End: 2023-02-16

## 2023-02-16 RX ORDER — ASPIRIN 81 MG/1
81 TABLET ORAL DAILY
Qty: 30 TABLET | Refills: 3 | Status: SHIPPED | OUTPATIENT
Start: 2023-02-17

## 2023-02-16 RX ORDER — PRIMIDONE 50 MG/1
100 TABLET ORAL DAILY
Status: DISCONTINUED | OUTPATIENT
Start: 2023-02-16 | End: 2023-02-17 | Stop reason: HOSPADM

## 2023-02-16 RX ORDER — DIVALPROEX SODIUM 250 MG/1
250 TABLET, DELAYED RELEASE ORAL EVERY MORNING
Status: DISCONTINUED | OUTPATIENT
Start: 2023-02-16 | End: 2023-02-17 | Stop reason: HOSPADM

## 2023-02-16 RX ORDER — DULOXETIN HYDROCHLORIDE 60 MG/1
60 CAPSULE, DELAYED RELEASE ORAL DAILY
Status: DISCONTINUED | OUTPATIENT
Start: 2023-02-16 | End: 2023-02-17 | Stop reason: HOSPADM

## 2023-02-16 RX ORDER — ATORVASTATIN CALCIUM 40 MG/1
40 TABLET, FILM COATED ORAL NIGHTLY
Status: DISCONTINUED | OUTPATIENT
Start: 2023-02-16 | End: 2023-02-17 | Stop reason: HOSPADM

## 2023-02-16 RX ORDER — POTASSIUM CHLORIDE 7.45 MG/ML
10 INJECTION INTRAVENOUS
Status: COMPLETED | OUTPATIENT
Start: 2023-02-16 | End: 2023-02-16

## 2023-02-16 RX ORDER — ONDANSETRON 2 MG/ML
4 INJECTION INTRAMUSCULAR; INTRAVENOUS EVERY 6 HOURS PRN
Status: DISCONTINUED | OUTPATIENT
Start: 2023-02-16 | End: 2023-02-16

## 2023-02-16 RX ORDER — SODIUM CHLORIDE 9 MG/ML
INJECTION, SOLUTION INTRAVENOUS CONTINUOUS
Status: ACTIVE | OUTPATIENT
Start: 2023-02-16 | End: 2023-02-16

## 2023-02-16 RX ORDER — GABAPENTIN 100 MG/1
100 CAPSULE ORAL 3 TIMES DAILY
Status: DISCONTINUED | OUTPATIENT
Start: 2023-02-16 | End: 2023-02-16

## 2023-02-16 RX ORDER — ASPIRIN 81 MG/1
81 TABLET ORAL DAILY
Status: DISCONTINUED | OUTPATIENT
Start: 2023-02-16 | End: 2023-02-17 | Stop reason: HOSPADM

## 2023-02-16 RX ORDER — ACETAMINOPHEN 325 MG/1
650 TABLET ORAL EVERY 4 HOURS PRN
Status: DISCONTINUED | OUTPATIENT
Start: 2023-02-16 | End: 2023-02-17 | Stop reason: HOSPADM

## 2023-02-16 RX ORDER — ASPIRIN 300 MG/1
300 SUPPOSITORY RECTAL DAILY
Status: DISCONTINUED | OUTPATIENT
Start: 2023-02-16 | End: 2023-02-17 | Stop reason: HOSPADM

## 2023-02-16 RX ORDER — MAGNESIUM SULFATE 1 G/100ML
1000 INJECTION INTRAVENOUS ONCE
Status: COMPLETED | OUTPATIENT
Start: 2023-02-16 | End: 2023-02-16

## 2023-02-16 RX ORDER — CARVEDILOL 6.25 MG/1
12.5 TABLET ORAL 2 TIMES DAILY WITH MEALS
Status: DISCONTINUED | OUTPATIENT
Start: 2023-02-16 | End: 2023-02-17 | Stop reason: HOSPADM

## 2023-02-16 RX ORDER — 0.9 % SODIUM CHLORIDE 0.9 %
1000 INTRAVENOUS SOLUTION INTRAVENOUS ONCE
Status: COMPLETED | OUTPATIENT
Start: 2023-02-16 | End: 2023-02-16

## 2023-02-16 RX ADMIN — DIVALPROEX SODIUM 250 MG: 250 TABLET, DELAYED RELEASE ORAL at 08:17

## 2023-02-16 RX ADMIN — POTASSIUM CHLORIDE 10 MEQ: 7.46 INJECTION, SOLUTION INTRAVENOUS at 01:26

## 2023-02-16 RX ADMIN — CARVEDILOL 12.5 MG: 6.25 TABLET, FILM COATED ORAL at 17:29

## 2023-02-16 RX ADMIN — DIVALPROEX SODIUM 500 MG: 250 TABLET, EXTENDED RELEASE ORAL at 22:03

## 2023-02-16 RX ADMIN — ATORVASTATIN CALCIUM 40 MG: 40 TABLET, FILM COATED ORAL at 19:53

## 2023-02-16 RX ADMIN — LURASIDONE HYDROCHLORIDE 80 MG: 40 TABLET, FILM COATED ORAL at 08:17

## 2023-02-16 RX ADMIN — POTASSIUM CHLORIDE 10 MEQ: 7.46 INJECTION, SOLUTION INTRAVENOUS at 04:33

## 2023-02-16 RX ADMIN — BUSPIRONE HYDROCHLORIDE 15 MG: 5 TABLET ORAL at 14:33

## 2023-02-16 RX ADMIN — SODIUM CHLORIDE 1000 ML: 9 INJECTION, SOLUTION INTRAVENOUS at 00:41

## 2023-02-16 RX ADMIN — BUSPIRONE HYDROCHLORIDE 15 MG: 5 TABLET ORAL at 19:53

## 2023-02-16 RX ADMIN — GABAPENTIN 100 MG: 100 CAPSULE ORAL at 08:17

## 2023-02-16 RX ADMIN — SODIUM CHLORIDE: 9 INJECTION, SOLUTION INTRAVENOUS at 06:29

## 2023-02-16 RX ADMIN — MAGNESIUM SULFATE HEPTAHYDRATE 1000 MG: 1 INJECTION, SOLUTION INTRAVENOUS at 00:29

## 2023-02-16 RX ADMIN — DULOXETINE HYDROCHLORIDE 60 MG: 60 CAPSULE, DELAYED RELEASE ORAL at 08:17

## 2023-02-16 RX ADMIN — POTASSIUM CHLORIDE 10 MEQ: 7.46 INJECTION, SOLUTION INTRAVENOUS at 00:30

## 2023-02-16 RX ADMIN — PRIMIDONE 100 MG: 50 TABLET ORAL at 08:17

## 2023-02-16 RX ADMIN — QUETIAPINE FUMARATE 300 MG: 100 TABLET ORAL at 19:53

## 2023-02-16 RX ADMIN — BUSPIRONE HYDROCHLORIDE 15 MG: 5 TABLET ORAL at 08:17

## 2023-02-16 RX ADMIN — PANTOPRAZOLE SODIUM 40 MG: 40 TABLET, DELAYED RELEASE ORAL at 05:09

## 2023-02-16 RX ADMIN — ASPIRIN 81 MG: 81 TABLET, COATED ORAL at 08:17

## 2023-02-16 RX ADMIN — CARVEDILOL 12.5 MG: 6.25 TABLET, FILM COATED ORAL at 08:17

## 2023-02-16 ASSESSMENT — PAIN DESCRIPTION - ORIENTATION
ORIENTATION: ANTERIOR;OTHER (COMMENT)
ORIENTATION: ANTERIOR

## 2023-02-16 ASSESSMENT — PAIN DESCRIPTION - LOCATION
LOCATION: HEAD
LOCATION: THROAT

## 2023-02-16 ASSESSMENT — PAIN SCALES - GENERAL
PAINLEVEL_OUTOF10: 8
PAINLEVEL_OUTOF10: 4

## 2023-02-16 ASSESSMENT — PAIN DESCRIPTION - DESCRIPTORS
DESCRIPTORS: ACHING
DESCRIPTORS: ACHING

## 2023-02-16 NOTE — PLAN OF CARE
TODAYS DATE:  2/16/2023    Discussed personal risk factors for Stroke /TIA with patient/family, and ways to reduce the risk for a recurrent stroke. Patient's personal risk factors which were identified are:     [] Alcohol Abuse: check with your physician before any alcohol consumption. [] Atrial fibrillation: may cause blood clots. [] Drug Abuse: Seek help, talk with your doctor  [] Clotting Disorder  [] Diabetes  [] Family history of stroke or heart disease  [] High Blood Pressure/Hypertension: work with your physician.  [] High cholesterol: monitor cholesterol levels with your physician.   [] Overweight/Obesity: work with your physician for your ideal body weight.  [] Physical Inactivity: get regular exercise as directed by your physician. [] Personal history of previous TIA or stroke  [] Poor Diet; decrease salt (sodium) in your diet, follow diet directed by physician. [x] Smoking: Cigarette/Cigar: stop smoking. Reviewed the Following Education with Patient and/or Family:   -Signs and Symptoms of Stroke:     (facial droop, weakness/numbness especially on one side, speech difficulty, sudden confusion, sudden loss of vision, sudden severe headache,       sudden loss of balance or having difficulty walking, syncope or seizure)  -How to activate EMS (911)   -Importance of Follow Up Appointment at Discharge   -Importance of Compliance with Medications Prescribed at Discharge     Pt verbalized understanding.      Family Present during Education: no     Stroke Education Booklet given to patient/family which includes above education: yes     Electronically signed by Constantino Díaz RN on 2/16/2023 at 3:27 AM

## 2023-02-16 NOTE — CONSULTS
In patient Neurology consult        [facility]      MD Kodi Aaron  1950    Date of Service: 2/16/2023    Referring Physician: Falguni Byrne MD      Reason for the consult and CC: Acute left-sided weakness and possible stroke    HPI:   The patient is a 71y.o.  years old female with history of hypertension, CAD, COPD and hyperlipidemia who was admitted to the hospital yesterday with acute left-sided weakness and falling. Symptoms started hours prior to admission. Description acute left-sided weakness both arm and leg with difficulties with ambulation. She fell briefly but no significant LOC or ORIANA or hitting her head. Degree was severe. Duration was minutes. No speech impairment or chest pain. No dysphagia or dysarthria. No other relieving or aggravating factors. When she came to the ED, her blood pressure was on the low side. No other associated symptoms per initial CT showed no acute stroke or large vessel occlusion. She was admitted to the hospital.  Today she feels back to her baseline. No residual deficit. MRI of the brain showed no acute stroke. Blood pressure now is in 115/44. Other review of system was unremarkable. Constitutional:   Vitals:    02/16/23 0730 02/16/23 0857 02/16/23 1047 02/16/23 1114   BP: (!) 128/57 (!) 115/47 (!) 106/45 (!) 115/44   Pulse: 79 73 74 79   Resp: 18   18   Temp: 98 °F (36.7 °C)   98.3 °F (36.8 °C)   TempSrc: Oral   Oral   SpO2: 96% 95% 93% 93%   Weight:       Height:             I personally reviewed and updated social history, past medical history, medications, allergy, surgical history, and family history as documented in the patient's electronic health records. ROS: 10-14 ROS reviewed with the patient/nurse/family which were unremarkable except mentioned in H&P. General appearance:  Normal development and appear in no acute distress. Mental Status:   Oriented to person, place, problem, and time. Memory: Good immediate recall. Intact remote memory  Normal attention span and concentration. Language: intact naming, repeating and fluency   Good fund of Knowledge. Aware of current events and vocabulary   Cranial Nerves:   II: Visual fields: Full. Pupils: equal, round, reactive to light, bilaterally  III,IV,VI: Extra Ocular Movements are intact. No nystagmus  V: Facial sensation is intact  VII: Facial strength and movements: intact and symmetric  IX: Normal palatal elevation and shoulder shrug  XII: Tongue movements are normal  Musculoskeletal: 5/5 in all 4 extremities. Good range of motion. No muscle atrophy. Tone: Normal tone. Reflexes: Symmetric 2+ in the arms and 1+ in the legs   Planters: Flexor bilaterally  Coordination: no pronator drift, no dysmetria with FNF and normal REM  Sensation: normal in both arms and legs. Gait/Posture: steady gait with mild assistant .          Medical decision making:  Data: reviewed   LABS:   Lab Results   Component Value Date/Time     02/16/2023 06:38 AM    K 4.0 02/16/2023 06:38 AM    CL 98 02/16/2023 06:38 AM    CO2 32 02/16/2023 06:38 AM    BUN 10 02/16/2023 06:38 AM    CREATININE 0.7 02/16/2023 06:38 AM    GFRAA >60 11/05/2021 04:00 PM    GFRAA >60 05/14/2013 05:00 AM    LABGLOM >60 02/16/2023 06:38 AM    GLUCOSE 108 02/16/2023 06:38 AM    GLUCOSE 92 08/02/2017 02:47 PM    PHOS 5.1 02/15/2023 11:10 PM    MG 1.70 02/15/2023 11:10 PM    CALCIUM 8.4 02/16/2023 06:38 AM     Lab Results   Component Value Date/Time    WBC 8.3 02/16/2023 06:38 AM    RBC 4.07 02/16/2023 06:38 AM    RBC 3.96 08/09/2017 01:57 PM    HGB 11.6 02/16/2023 06:38 AM    HCT 35.7 02/16/2023 06:38 AM    MCV 87.7 02/16/2023 06:38 AM    RDW 15.1 02/16/2023 06:38 AM     02/16/2023 06:38 AM     Lab Results   Component Value Date    INR 1.04 02/15/2023    PROTIME 13.5 02/15/2023       Neuroimaging was independently reviewed by myself and discussed results with the patient  Reviewed notes from different physicians including H&P and ED notes. Reviewed lab and blood testing    Impression:  Acute left-sided weakness and mild mechanical fall. Likely TIA from hypoperfusion and low blood pressure  Hypertension with hypotension  Hyperlipidemia  CAD  CHF      Recommendation:  PT and OT  Speech evaluation  Hydration  Echo  Continue aspirin and continue statin  Blood pressure monitor and blood pressure control.   Avoid blood pressure below 089 systolic  Nicotine patch  Smoking cessation  Telemetry  Follow LDL and A1c  Stroke education and stroke prevention provided to the patient today  Respiratory support  Can be discharged once medically stable            Patient's instructions:    Discussed the following stroke prevention goals with the patient:     AP therapy: Risk and benefit, side effect and possible bleeding and the need to be consistent with AP therapy      Blood Pressure:  Goal - reduce BP 10/5 from baseline for all patients   History of hypertension: goal BP < 140/90  History of diabetes or renal disease: goal BP < 130/80  No history of hypertension: goal BP < 120/80     Dyslipidemia:  Atherosclerotic Stroke or TIA: LDL goal < 70mg/dl or 50% reduction in LDL from baseline  Symptomatic atherosclerotic cardiovascular disease and ? 76yo: high-intensity statin  Symptomatic atherosclerotic cardiovascular disease and > 76yo: moderate-intensity statin  Medications:   High-intensity statin: atorvastatin 40-80mg, rosuvastatin 20-40mg  Moderate-intensity statin: atorvastatin 10-20mg, rosuvastatin 5-10mg, simvastatin 20-40mg, pravastatin 40-80mg, lovastatin 40mg, fluvastatin 40mg bid (XL 80mg daily), pitavastatin 2-4mg  Side effects from statin were discussed and addressed the need to follow LFT and CK if necessary with PCP     Diabetes Mellitus:   Goal - HbA1c < 7%     Cigarette smoking:  Goal - complete cessation        Physical Activity:  Goal - at least 30 minutes of moderate intensity physical exercise 1-3 times per week     Diet:  Low-fat, low-sodium, and Mediterranean or Dietary Approaches to Stop Hypertension (DASH) or Diabetic Diet when applicable     Obesity:  Goal BMI 18.5-25 kg/m2        Thank you for referring such patient. If you have any questions regarding my consult note, please don't hesitate to call me. Rogers Nelson MD  [phone]    This dictation was generated by voice recognition computer software.  Although all attempts are made to edit the dictation for accuracy, there may be errors in the  transcription that are not intended

## 2023-02-16 NOTE — CONSULTS
Consult placed    Florin Organ  Date:2/16/2023,  Time:7:46 AM        Electronically signed by Curt Ramos on 2/16/2023 at 7:46 AM

## 2023-02-16 NOTE — ED PROVIDER NOTES
[place] [address] - MED SURG  EMERGENCY DEPARTMENT ENCOUNTER        Pt Name: Najma Fitzpatrick  MRN: [id number]  Birthdate 1950  Date of evaluation: 2/15/2023  Provider: Kenia Ni MD  PCP: BALJIT Chopra - CNP  Note Started: 7:05 AM EST 2/16/23    CHIEF COMPLAINT       Chief Complaint   Patient presents with    Fall     Pt was up walking and fell. Hit mid back on the corner of the dresser. Also c/o left sided weakness and numbness that started around 2130. C/o pain in mid back      Extremity Weakness       HISTORY OF PRESENT ILLNESS: 1 or more Elements             Najma Fitzpatrick is a 72 y.o. female who presents to the emergency room initially for possible CVA.  According to EMS the patient's  called because she had fallen and since she fell she had had weakness to her left side.  He states that the last known well time was about an hour.  However when I see this patient's he states that she has been falling frequently over the past month or so.  She describes generalized weakness perhaps worse in her left upper extremity.  She states that she has had some blurry vision on the right but that is also being for a few months.  Patient states that she was walking today and she was getting to the doorway she felt weak and fell which is thought of what has been happening to her.    Nursing Notes were all reviewed and agreed with or any disagreements were addressed in the HPI.    REVIEW OF SYSTEMS :      Review of Systems    Positives and Pertinent negatives as per HPI.     SURGICAL HISTORY     Past Surgical History:   Procedure Laterality Date    COLONOSCOPY  1/19/12    DIAGNOSTIC CARDIAC CATH LAB PROCEDURE  Feb, 2007    Normal cor angio Feb, 2007    HERNIA REPAIR  07/11/2019    robotic ventral hernia repair with mesh    HERNIA REPAIR N/A 7/11/2019    ROBOTIC VENTRAL HERNIA REPAIR WITH MESH performed by Yuriy Rider MD at [facility] [place] [facility] OR    HYSTERECTOMY, TOTAL ABDOMINAL (CERVIX REMOVED)      KIDNEY  Yuri 60       Current Discharge Medication List        CONTINUE these medications which have NOT CHANGED    Details   butalbital-acetaminophen-caffeine (FIORICET, ESGIC) -40 MG per tablet Take 1 tablet by mouth every 6 hours as needed for Headaches  Qty: 120 tablet, Refills: 0      atorvastatin (LIPITOR) 40 MG tablet       divalproex (DEPAKOTE ER) 250 MG extended release tablet Take 500 mg by mouth nightly      furosemide (LASIX) 20 MG tablet Take 20 mg by mouth daily      hydrOXYzine HCl (ATARAX) 10 MG tablet       LORazepam (ATIVAN) 0.5 MG tablet Take 0.5 mg by mouth 2 times daily as needed. montelukast (SINGULAIR) 10 MG tablet       lurasidone (LATUDA) 80 MG TABS tablet Take 80 mg by mouth daily      QUEtiapine (SEROQUEL) 100 MG tablet Take 3 tablets by mouth nightly  Qty: 1 tablet, Refills: 0      carvedilol (COREG) 12.5 MG tablet Take 1 tablet by mouth 2 times daily (with meals)  Qty: 60 tablet, Refills: 0      amLODIPine (NORVASC) 5 MG tablet Take 1 tablet by mouth daily  Qty: 30 tablet, Refills: 0      gabapentin (NEURONTIN) 100 MG capsule Take 100 mg by mouth 3 times daily.       oxybutynin (DITROPAN-XL) 10 MG extended release tablet Take 10 mg by mouth daily      fluticasone-umeclidin-vilant (TRELEGY ELLIPTA) 100-62.5-25 MCG/INH AEPB Inhale 1 puff into the lungs daily      primidone (MYSOLINE) 50 MG tablet Take 100 mg by mouth daily       busPIRone (BUSPAR) 15 MG tablet Take 15 mg by mouth 3 times daily      pantoprazole (PROTONIX) 40 MG tablet Take 1 tablet by mouth every morning (before breakfast)  Qty: 30 tablet, Refills: 0      nicotine (NICODERM CQ) 14 MG/24HR Place 1 patch onto the skin daily  Qty: 42 patch, Refills: 0      DULoxetine (CYMBALTA) 60 MG extended release capsule Take 60 mg by mouth daily       fluticasone (FLONASE) 50 MCG/ACT nasal spray 1 spray by Nasal route daily as needed       divalproex (DEPAKOTE) 250 MG DR tablet Take 250 mg by mouth every morning              ALLERGIES     Dicyclomine, Ibuprofen, Sumatriptan, Tape [adhesive tape], Tizanidine, and Motrin [ibuprofen micronized]    FAMILYHISTORY       Family History   Problem Relation Age of Onset    Emphysema Mother     Heart Disease Mother     Arthritis Mother     Depression Mother     High Blood Pressure Mother     Heart Failure Father     Heart Disease Father     Arthritis Father     Diabetes Father     High Blood Pressure Father     Stroke Father     Substance Abuse Father     High Blood Pressure Brother     Heart Disease Sister     Kidney Disease Daughter     Breast Cancer Maternal Aunt         SOCIAL HISTORY       Social History     Tobacco Use    Smoking status: Every Day     Packs/day: 2.50     Years: 47.00     Pack years: 117.50     Types: Cigarettes    Smokeless tobacco: Never   Vaping Use    Vaping Use: Never used   Substance Use Topics    Alcohol use: No    Drug use: Not Currently       SCREENINGS   NIH Stroke Scale  Interval: Baseline  Level of Consciousness (1a): Alert  LOC Questions (1b): Answers both correctly  LOC Commands (1c): Performs both tasks correctly  Best Gaze (2): Normal  Visual (3): No visual loss  Facial Palsy (4): Normal symmetrical movement  Motor Arm, Left (5a): No drift  Motor Arm, Right (5b): No drift  Motor Leg, Left (6a): No drift  Motor Leg, Right (6b):  No drift  Limb Ataxia (7): Absent  Sensory (8): (!) Mild to Moderate (tingling)  Best Language (9): No aphasia  Dysarthria (10): Normal  Extinction and Inattention (11): No abnormality  Total: 1    [place] Coma Scale  Eye Opening: Spontaneous  Best Verbal Response: Oriented  Best Motor Response: Obeys commands  [place] Coma Scale Score: 15                CIWA Assessment  BP: 135/65  Heart Rate: 77           PHYSICAL EXAM  1 or more Elements     ED Triage Vitals   BP Temp Temp Source Heart Rate Resp SpO2 Height Weight   02/15/23 2300 02/15/23 2317 02/15/23 2317 02/15/23 2300 02/15/23 2300 02/15/23 2300 02/15/23 2300 02/15/23 2300   (!) 63/31 97.8 °F (36.6 °C) Oral 73 12 97 % 5' 2\" (1.575 m) 174 lb (78.9 kg)       Physical Exam  Vitals and nursing note reviewed. Constitutional:       Appearance: Normal appearance. She is well-developed. She is not ill-appearing. HENT:      Head: Normocephalic and atraumatic. Right Ear: External ear normal.      Left Ear: External ear normal.      Nose: Nose normal.   Eyes:      General: No scleral icterus. Right eye: No discharge. Left eye: No discharge. Conjunctiva/sclera: Conjunctivae normal.   Cardiovascular:      Rate and Rhythm: Normal rate and regular rhythm. Heart sounds: Normal heart sounds. Pulmonary:      Effort: Pulmonary effort is normal. No respiratory distress. Breath sounds: Normal breath sounds. No wheezing or rales. Abdominal:      General: Bowel sounds are normal. There is no distension. Palpations: Abdomen is soft. Tenderness: There is no abdominal tenderness. There is no guarding or rebound. Musculoskeletal:      Cervical back: Neck supple. Skin:     Coloration: Skin is not pale. Neurological:      Mental Status: She is alert.    Psychiatric:         Mood and Affect: Mood normal.         Behavior: Behavior normal.           DIAGNOSTIC RESULTS   LABS:    Labs Reviewed   CBC WITH AUTO DIFFERENTIAL - Abnormal; Notable for the following components:       Result Value    RBC 3.63 (*)     Hemoglobin 10.6 (*)     Hematocrit 31.5 (*)     All other components within normal limits   COMPREHENSIVE METABOLIC PANEL W/ REFLEX TO MG FOR LOW K - Abnormal; Notable for the following components:    Sodium 131 (*)     Potassium reflex Magnesium 2.9 (*)     Chloride 92 (*)     Glucose 110 (*)     Calcium 8.1 (*)     Total Protein 5.2 (*)     Albumin 2.7 (*)     ALT 7 (*)     AST 11 (*)     All other components within normal limits    Narrative:     Ruth Jordan tel. [phone],  Chemistry results called to and read back by Zheng Ralph Heraclio Veronica RN, 02/15/2023 23:57,  by Ezequiel Diaz - Abnormal; Notable for the following components:    Magnesium 1.70 (*)     All other components within normal limits    Narrative:     Elbert Stubbs tel. [phone],  Chemistry results called to and read back by Joy Levy RN, 02/15/2023 23:57,  by Alex Soriano   PHOSPHORUS - Abnormal; Notable for the following components:    Phosphorus 5.1 (*)     All other components within normal limits    Narrative:     [address],  Chemistry results called to and read back by Joy Levy RN, 02/15/2023 23:57,  by Alex Soriano   BASIC METABOLIC PANEL W/ REFLEX TO MG FOR LOW K - Abnormal; Notable for the following components:    Chloride 98 (*)     Glucose 108 (*)     All other components within normal limits   CBC WITH AUTO DIFFERENTIAL - Abnormal; Notable for the following components:    Hemoglobin 11.6 (*)     Hematocrit 35.7 (*)     All other components within normal limits   POCT GLUCOSE - Abnormal; Notable for the following components:    POC Glucose 232 (*)     All other components within normal limits   CULTURE, BLOOD 1   CULTURE, BLOOD 2   TROPONIN    Narrative:     Elbert Stubbs tel. [phone],  Chemistry results called to and read back by Joy Levy RN, 02/15/2023 23:57,  by [address] [place] [address] [id number], SEPSIS   BLOOD OCCULT STOOL DIAGNOSTIC    Narrative:     ORDER#: [id number]                          ORDERED BY: PATRICE Apple  SOURCE: Stool                              COLLECTED:  02/16/23 01:04  ANTIBIOTICS AT JULIA.:                      RECEIVED :  02/16/23 01:18   HEMOGLOBIN A1C   LIPID PANEL   POCT GLUCOSE       When ordered only abnormal lab results are displayed. All other labs were within normal range or not returned as of this dictation. EKG: Twelve-lead EKG has been interpreted myself shows normal sinus rhythm at a rate of 70 bpm, TX interval and QRS normal.  Prolonged QTc.   No acute ischemic findings no significant change when compared to prior EKG. RADIOLOGY:   Non-plain film images such as CT, Ultrasound and MRI are read by the radiologist. Plain radiographic images are visualized and preliminarily interpreted by the ED Provider with the below findings:        Interpretation per the Radiologist below, if available at the time of this note:    XR CHEST PORTABLE   Final Result      Lungs are clear         CTA HEAD NECK W CONTRAST   Final Result   No large vessel occlusion in the head or neck. This scan was analyzed using Viz. ai contact LVO. Identification of suspected   findings is not for diagnostic use beyond notification. Viz LVO is limited to   analysis of imaging data and should not be used in-lieu of full patient   evaluation or relied upon to make or confirm diagnosis. CT HEAD WO CONTRAST   Final Result   Addendum (preliminary) 1 of 1   ADDENDUM:   Stroke CT head results were communicated by [facility] to Dr. Jackie Araujo on   02/15/2023 at 10:56 p.m. Final   No acute intracranial abnormality. The findings were sent to the Radiology Results [id number] at 10:53   pm on 2/15/2023 to be communicated to a licensed caregiver. MRI brain without contrast    (Results Pending)     CT HEAD WO CONTRAST    Addendum Date: 2/15/2023    ADDENDUM: Stroke CT head results were communicated by [facility] to Dr. Jackie Araujo on 02/15/2023 at 10:56 p.m. Result Date: 2/15/2023  EXAMINATION: CT OF THE HEAD WITHOUT CONTRAST  2/15/2023 10:34 pm TECHNIQUE: CT of the head was performed without the administration of intravenous contrast. Automated exposure control, iterative reconstruction, and/or weight based adjustment of the mA/kV was utilized to reduce the radiation dose to as low as reasonably achievable. COMPARISON: 01/04/2023. HISTORY: ORDERING SYSTEM PROVIDED HISTORY: Stroke Symptoms TECHNOLOGIST PROVIDED HISTORY: Has a \"code stroke\" or \"stroke alert\" been called? ->Yes Reason for exam:->Stroke Symptoms Decision Support Exception - unselect if not a suspected or confirmed emergency medical condition->Emergency Medical Condition (MA) Reason for Exam: fall, left sided weakness FINDINGS: BRAIN/VENTRICLES: There is no acute intracranial hemorrhage, mass effect or midline shift. No abnormal extra-axial fluid collection. The gray-white differentiation is maintained without evidence of an acute infarct. There is prominence of the ventricles and sulci due to global parenchymal volume loss. There are nonspecific areas of hypoattenuation within the periventricular and subcortical white matter, which likely represent chronic microvascular ischemic change. ORBITS: The visualized portion of the orbits demonstrate no acute abnormality. SINUSES: The visualized paranasal sinuses and mastoid air cells demonstrate no acute abnormality. SOFT TISSUES/SKULL: No acute abnormality of the visualized skull or soft tissues. No acute intracranial abnormality. The findings were sent to the Radiology Results [id number] at 10:53 pm on 2/15/2023 to be communicated to a licensed caregiver. XR CHEST PORTABLE    Result Date: 2/15/2023  EXAMINATION: ONE XRAY VIEW OF THE CHEST 2/15/2023 11:12 pm COMPARISON: 01/04/2023 HISTORY: ORDERING SYSTEM PROVIDED HISTORY: stroke symptoms TECHNOLOGIST PROVIDED HISTORY: Reason for exam:->stroke symptoms Reason for Exam: code stroke  left side weakness FINDINGS: Heart size is normal  Aorta is normal.  Lungs are normally expanded and clear. No pleural effusions. Mild spondylosis     Lungs are clear     CTA HEAD NECK W CONTRAST    Result Date: 2/15/2023  EXAMINATION: CTA OF THE HEAD AND NECK WITH CONTRAST 2/15/2023 10:57 pm: TECHNIQUE: CTA of the head and neck was performed with the administration of intravenous contrast. Multiplanar reformatted images are provided for review. MIP images are provided for review.  Stenosis of the internal carotid arteries measured using NASCET criteria. Automated exposure control, iterative reconstruction, and/or weight based adjustment of the mA/kV was utilized to reduce the radiation dose to as low as reasonably achievable. COMPARISON: Noncontrast CT head done same day. CT angiogram head and neck done 02/06/2018. HISTORY: ORDERING SYSTEM PROVIDED HISTORY: Stroke Symptoms TECHNOLOGIST PROVIDED HISTORY: Has a \"code stroke\" or \"stroke alert\" been called? ->Yes Reason for exam:->Stroke Symptoms Decision Support Exception - unselect if not a suspected or confirmed emergency medical condition->Emergency Medical Condition (MA) Reason for Exam: fall, left sided weakness FINDINGS: CTA NECK: AORTIC ARCH/ARCH VESSELS: No dissection or arterial injury. Atherosclerosis in the visualized thoracic aorta, right brachiocephalic and bilateral subclavian arteries. Focal 50% stenosis at the origin of the right subclavian artery. CAROTID ARTERIES: No dissection, arterial injury, or hemodynamically significant stenosis by NASCET criteria. Atherosclerosis in the bilateral common carotid arteries and carotid bulbs. VERTEBRAL ARTERIES: No dissection, arterial injury, or significant stenosis. SOFT TISSUES: The lung apices are clear. No cervical or superior mediastinal lymphadenopathy. The larynx and pharynx are unremarkable. No acute abnormality of the salivary and thyroid glands. BONES: No acute osseous abnormality. Multilevel degenerative changes in the visualized spine are centered at C5-C6 and C6-C7. CTA HEAD: ANTERIOR CIRCULATION: No significant stenosis of the intracranial internal carotid, anterior cerebral, or middle cerebral arteries. No aneurysm. Calcifications in the bilateral intracranial internal carotid arteries without hemodynamically significant stenosis. POSTERIOR CIRCULATION: No significant stenosis of the vertebral, basilar, or posterior cerebral arteries. No aneurysm. OTHER: No dural venous sinus thrombosis on this non-dedicated study.  BRAIN: No mass effect or midline shift. No extra-axial fluid collection. The gray-white differentiation is maintained. No large vessel occlusion in the head or neck. This scan was analyzed using Viz. ai contact LVO. Identification of suspected findings is not for diagnostic use beyond notification. Viz LVO is limited to analysis of imaging data and should not be used in-lieu of full patient evaluation or relied upon to make or confirm diagnosis. No results found. PROCEDURES   Unless otherwise noted below, none     Procedures    CRITICAL CARE TIME   Total Critical Care time was 45 minutes, excluding separately reportable procedures. There was a high probability of clinically significant/life threatening deterioration in the patient's condition which required my urgent intervention. 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      EMERGENCY DEPARTMENT COURSE and DIFFERENTIAL DIAGNOSIS/MDM:   Vitals:    Vitals:    02/16/23 0136 02/16/23 0154 02/16/23 0244 02/16/23 0400   BP: (!) 99/47 (!) 99/48 (!) 105/42 135/65   Pulse: 69 69 69 77   Resp: 15 16 18 18   Temp:  97.8 °F (36.6 °C) 97.7 °F (36.5 °C) 97.6 °F (36.4 °C)   TempSrc:  Oral Oral Oral   SpO2: 96% 97% 94% 92%   Weight:       Height:           Patient was given the following medications:  Medications   potassium chloride 10 mEq/100 mL IVPB (Peripheral Line) (10 mEq IntraVENous Not Given 2/16/23 0621)   QUEtiapine (SEROQUEL) tablet 300 mg (has no administration in time range)   primidone (MYSOLINE) tablet 100 mg (has no administration in time range)   pantoprazole (PROTONIX) tablet 40 mg (40 mg Oral Given 2/16/23 0509)   lurasidone (LATUDA) tablet 80 mg (has no administration in time range)   gabapentin (NEURONTIN) capsule 100 mg (has no administration in time range)   DULoxetine (CYMBALTA) extended release capsule 60 mg (has no administration in time range)   divalproex (DEPAKOTE) DR tablet 250 mg (has no administration in time range)   divalproex (DEPAKOTE ER) extended release tablet 500 mg (has no administration in time range)   carvedilol (COREG) tablet 12.5 mg (has no administration in time range)   busPIRone (BUSPAR) tablet 15 mg (has no administration in time range)   atorvastatin (LIPITOR) tablet 40 mg (has no administration in time range)   polyethylene glycol (GLYCOLAX) packet 17 g (has no administration in time range)   aspirin EC tablet 81 mg (has no administration in time range)     Or   aspirin suppository 300 mg (has no administration in time range)   acetaminophen (TYLENOL) tablet 650 mg (has no administration in time range)   nicotine (NICODERM CQ) 14 MG/24HR 1 patch (has no administration in time range)   0.9 % sodium chloride infusion ( IntraVENous New Bag 2/16/23 0629)   iopamidol (ISOVUE-370) 76 % injection 75 mL (75 mLs IntraVENous Given 2/15/23 2256)   0.9 % sodium chloride bolus (0 mLs IntraVENous Stopped 2/16/23 0001)   potassium chloride 10 mEq/100 mL IVPB (Peripheral Line) (10 mEq IntraVENous New Bag 2/16/23 0126)   magnesium sulfate 1000 mg in dextrose 5% 100 mL IVPB (0 mg IntraVENous Stopped 2/16/23 0142)   0.9 % sodium chloride bolus (0 mLs IntraVENous Stopped 2/16/23 0317) Is this patient to be included in the SEP-1 Core Measure due to severe sepsis or septic shock? No   Exclusion criteria - the patient is NOT to be included for SEP-1 Core Measure due to: Infection is not suspected    Chronic Conditions:     CONSULTS: (Who and What was discussed)  IP CONSULT TO PHARMACY  PHARMACY TO CHANGE BASE FLUIDS  IP CONSULT TO HOSPITALIST  IP CONSULT TO NEUROLOGY                CC/HPI Summary, DDx, ED Course, and Reassessment: Elderly female who was initially brought in for possible CVA. Code stroke was initiated and the [facility]AR spoke to the  stroke team who did not think that this patient was a candidate for TNK because of mild symptoms. When I evaluate this patient NIH stroke is 0 and his symptoms have been going on for at least a month. I do not believe that the patient has an acute CVA presently. Laboratory studies chest x-ray EKG ordered. CT head and CTA head and neck were initially ordered because of the \"stroke. No acute CT findings. Patient was placed on cardiac blood pressure and pulse oximetry monitoring. Cardiac monitor as read and interpreted by myself showed normal sinus rhythm. Laboratory studies show a near two-point drop in the patient's hemoglobin when compared to January. A rectal exam was performed. No gross blood. Hemoccult is negative. Patient is hypokalemic and hypomagnesemic. These are replaced. She is also hyponatremic and hypochloremic care. Patient was hypotensive here in the emergency room. She was given a liter bolus of normal saline to which she responded. The exact cause of this patient's symptoms uncertain at this time however hypotension may be contributing to her frequent falls. I do believe that she should be admitted to the hospital for further evaluation. This is discussed with the patient and she is agreeable.   A consult was placed to the hospitalist on duty was agreed to admit this patient to his service. Disposition Considerations (tests considered but not done, Shared Decision Making, Pt Expectation of Test or Tx.):         I am the Primary Clinician of Record. FINAL IMPRESSION      1. Fall, initial encounter    2. Anemia, unspecified type    3. Hypotension, unspecified hypotension type    4. Electrolyte disturbance          DISPOSITION/PLAN     DISPOSITION Admitted 02/16/2023 01:17:56 AM      PATIENT REFERRED TO:  No follow-up provider specified.     DISCHARGE MEDICATIONS:  Current Discharge Medication List          DISCONTINUED MEDICATIONS:  Current Discharge Medication List                 (Please note that portions of this note were completed with a voice recognition program.  Efforts were made to edit the dictations but occasionally words are mis-transcribed.)    Edil Herman MD (electronically signed)           Edil Herman MD  02/16/23 0568

## 2023-02-16 NOTE — ED NOTES
Pt transported to B3 with all belongings in stable condition at this time     Krian Ruiz, ROMAIN  02/16/23 0206

## 2023-02-16 NOTE — PROGRESS NOTES
Occupational Therapy  Facility/Department: [facility] - MED SURG  Occupational Therapy Initial Assessment/Treatment    Name: Mike Oliva  : 1950  MRN: [id number]  Date of Service: 2023    Discharge Recommendations:  Subacute/Skilled Nursing Facility  OT Equipment Recommendations  Equipment Needed: No  Other: defer to next level of care    Patient Diagnosis(es): The primary encounter diagnosis was Fall, initial encounter. Diagnoses of Anemia, unspecified type, Hypotension, unspecified hypotension type, and Electrolyte disturbance were also pertinent to this visit. Past Medical History:  has a past medical history of Abnormal ECG, Anemia, Arthritis, Back pain, chronic, Bipolar disorder (HCC), Bronchitis chronic, CHF (congestive heart failure) (HCC), Chronic back pain, Chronic neck pain, Clostridium difficile infection, 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[address] Coronary artery disease involving native coronary artery of native heart with angina pectoris ([address] Depression, Emphysema of lung ([address] Fibromyalgia, hyperlipidemia, Hypertension, Kidney stone, Liver disease, Lung disease, MDD (major depressive disorder), Mood disorder ([address] Movement disorder, Neck pain, chronic, Opiate misuse, Personality disorder ([address] Pneumonia, and Polypharmacy. Past Surgical History:  has a past surgical history that includes Hysterectomy, total abdominal; Kidney stone surgery; Diagnostic Cardiac Cath Lab Procedure (2007); Colonoscopy (12); hernia repair (2019); and hernia repair (N/A, 2019). Assessment   Pt is 72 yo F presenting with stroke like symptoms. Pt resides with family in one level home no ROXIE. PTA pt was ind for ADLs/transfers/ambulation. At this time pt is grossly CGA transfers/ambulation/toileting with RW needing cues for safe and proper hand placement and body mechanics when using walker.  Pt is currently functioning below baseline, will continue to benefit from skilled OT services in the acute care setting, and SNF is recommended at discharge to increase pt safety and ind with ADLs/transfers/ambulation. Performance deficits / Impairments: Decreased functional mobility ; Decreased safe awareness;Decreased endurance;Decreased balance;Decreased ADL status; Decreased strength;Decreased cognition;Decreased posture  Prognosis: Good  Decision Making: Medium Complexity  REQUIRES OT FOLLOW-UP: Yes  Activity Tolerance: Patient Tolerated treatment well;Treatment limited secondary to decreased cognition        Plan  Occupational Therapy Plan  Times Per Week: 3-5x/wk  Current Treatment Recommendations: Strengthening, ROM, Balance training, Functional mobility training, Endurance training, Gait training, Safety education & training, Patient/Caregiver education & training, Equipment evaluation, education, & procurement, Positioning, Self-Care / ADL     Restrictions  Restrictions/Precautions: Fall Risk, General Precautions, Up as Tolerated  Other position/activity restrictions: tele, IV, 2.5L O2    Subjective   Chart Reviewed: Yes, Orders, Progress Notes, History and Physical, Labs  Patient assessed for rehabilitation services?: Yes  Referring Practitioner: Lee Ann Ayala MD  Diagnosis: Stroke-like symptoms  Subjective: pt seated in recliner and agreeable to OT services upon arrival. RN approved therapy. Pain: Pt denies pain at this time.     Social/Functional History  Lives With: Spouse, Daughter ( & daughter (both can provide pt with 24-hr sup/assist), also 2 grandsons)  Type of Home: House  Home Layout: One level  Home Access: Level entry  Bathroom Shower/Tub: Tub/Shower unit  Bathroom Toilet: Standard  Bathroom Equipment: Grab bars in shower, Shower chair  Home Equipment: Yasir Necessary, rolling (no home O2 at baseline)  Has the patient had two or more falls in the past year or any fall with injury in the past year?: Yes (pt endorses 1 fall 6 months ago due to [address]; chart review indicates several more recent falls)  Receives Help From: Family  ADL Assistance: Independent  Homemaking Assistance: Needs assistance  Homemaking Responsibilities: No (family completes all homemaking for pt)  Ambulation Assistance: Independent (using cane, usually holds cane in L hand)  Transfer Assistance: Independent  Active : No  Patient's  Info:  and/or daughter  Mode of Transportation: Family  Occupation: Retired  Type of Occupation: Helped manage a [employer]  Leisure & Hobbies: Coloring, watching old movies, spending time with family    Objective  Vitals  Heart Rate: 74  Heart Rate Source: Monitor  BP: (!) 106/45  BP Location: Left upper arm  BP Method: Automatic  Patient Position: Sitting;Up in chair  MAP (Calculated): 65  Resp: 18  SpO2: 93 %  O2 Device: Nasal cannula  Comment: 2.5L O2    Observation/Palpation  Posture: Fair    Safety Devices  Type of Devices: All fall risk precautions in place;Call light within reach; Chair alarm in place;Gait belt;Patient at risk for falls; Left in chair;Nurse notified  Restraints Initially in Place: No    Bed Mobility Training: No (pt in recliner at start and end of session)    Balance  Sitting: Intact (seated on toilet)  Standing: With support (RW)  Standing - Static: Fair;Constant support (min/CGA x 1 needed during standing while therapist assisted with briefs change & removal of PureWick (pt's briefs were saturated with urine))  Standing - Dynamic: Fair;Constant support    Transfer Training  Interventions: Safety awareness training;Verbal cues; Weight shifting training/pressure relief (pt req cues for proper hand placement and body mechanics)  Sit to Stand: Contact-guard assistance; Adaptive equipment; Additional time (from recliner to [address] [place] [address])  Stand to Sit: Contact-guard assistance; Adaptive equipment; Additional time (from RW to recliner)    Gait  Overall Level of Assistance: Minimum assistance; Adaptive equipment; Additional time (RW, pt req cues for proper body mechanics and staying inside walker, pt req Isai for RW management)  Ambulate to from bathroom in room     Strength/ROM  AROM: Generally decreased, functional  PROM: Generally decreased, functional  Strength: Generally decreased, functional  Coordination: Generally decreased, functional  Tone: Normal  Sensation: Intact (pr denies N/T)    ADL  Toileting: Contact guard assistance  Toileting Skilled Clinical Factors: standard toilet, grab bars, RW     Activity Tolerance  Activity Tolerance: Patient tolerated evaluation without incident;Patient tolerated treatment well    Vision  Vision: Impaired  Vision Exceptions: Wears glasses for reading    Hearing  Hearing: Within functional limits    Cognition  Overall Cognitive Status: Exceptions  Arousal/Alertness: Appropriate responses to stimuli  Following Commands: Follows one step commands with increased time; Follows one step commands with repetition  Attention Span: Attends with cues to redirect  Safety Judgement: Decreased awareness of need for assistance;Decreased awareness of need for safety  Problem Solving: Assistance required to implement solutions;Assistance required to identify errors made;Decreased awareness of errors;Assistance required to generate solutions;Assistance required to correct errors made  Insights: Decreased awareness of deficits  Initiation: Requires cues for some  Sequencing: Requires cues for some  Cognition Comment: pt stated \"he's mad\" when asked who the pt responed with \"the little fellow in my dream I was just having\" and then explained the dream; pt asked \"did mom and dad come home funny last night?\"; pt stated \"im all confused in my head and I hate it when i do that\"    Orientation  Overall Orientation Status: Within Normal Limits  Orientation Level: Oriented X4    Perception  Overall Perceptual Status: [facility]    Education  Education Given To: Patient  Education Provided: Role of Therapy; Energy Conservation; Fall Prevention Strategies; Plan of Care;Equipment;Transfer Training  Education Method: Demonstration;Verbal  Barriers to Learning: Cognition  Education Outcome: Verbalized understanding;Demonstrated understanding;Continued education needed  Disease specific: Pt educated on benefits of OOB activity to decrease risks associated with prolonged bedrest while in hospital. Pt verbalized understanding. AM-PAC Score  AM-PAC Inpatient Daily Activity Raw Score: 19 (02/16/23 1110)  AM-PAC Inpatient ADL T-Scale Score : 40.22 (02/16/23 1110)  ADL Inpatient CMS 0-100% Score: 42.8 (02/16/23 1110)  ADL Inpatient CMS G-Code Modifier : CK (02/16/23 1110)    Goals  Short Term Goals  Time Frame for Short Term Goals: 1 wk 2/23/2023 unless otherwise noted  Short Term Goal 1: pt will complete toileting SPV 2/20/2023  Short Term Goal 2: pt will complete stance sink side ADLs SBA  Short Term Goal 3: pt will complete LB dressing SBA    Therapy Time   Individual Concurrent Group Co-treatment   Time In 1035         Time Out 1108         Minutes 33         Timed Code Treatment Minutes: 23 Minutes (10 min eval)    Sampson Ulloa OTR/L  If pt is unable to be seen after this session, please let this note serve as discharge summary. Please see case management note for discharge disposition. Thank you.

## 2023-02-16 NOTE — PLAN OF CARE
Problem: Discharge Planning  Goal: Discharge to home or other facility with appropriate resources  Flowsheets (Taken 2/16/2023 1253)  Discharge to home or other facility with appropriate resources:   Identify barriers to discharge with patient and caregiver   Arrange for needed discharge resources and transportation as appropriate   Identify discharge learning needs (meds, wound care, etc)     Problem: Safety - Adult  Goal: Free from fall injury  Flowsheets (Taken 2/16/2023 1253)  Free From Fall Injury:   Instruct family/caregiver on patient safety   Based on caregiver fall risk screen, instruct family/caregiver to ask for assistance with transferring infant if caregiver noted to have fall risk factors     Problem: Cardiovascular - Adult  Goal: Maintains optimal cardiac output and hemodynamic stability  2/16/2023 1253 by Diann Felix RN  Flowsheets (Taken 2/16/2023 1253)  Maintains optimal cardiac output and hemodynamic stability:   Monitor blood pressure and heart rate   Monitor urine output and notify Licensed Independent Practitioner for values outside of normal range   Assess for signs of decreased cardiac output  2/16/2023 0325 by Viupl Perez RN  Outcome: Progressing  Flowsheets (Taken 2/16/2023 0325)  Maintains optimal cardiac output and hemodynamic stability: Monitor blood pressure and heart rate  Goal: Absence of cardiac dysrhythmias or at baseline  2/16/2023 0325 by Vipul Perez RN  Outcome: Progressing  Flowsheets (Taken 2/16/2023 0325)  Absence of cardiac dysrhythmias or at baseline: Monitor cardiac rate and rhythm

## 2023-02-16 NOTE — ED NOTES
Pt straight cathed at this time. Pt tolerated well.  400 ml of urine drained       Oscar Moore RN  02/16/23 9836

## 2023-02-16 NOTE — DISCHARGE SUMMARY
Hospital Medicine Discharge Summary    Patient ID: Surinder Morin      Patient's PCP: BALJIT Marie CNP    Admit Date: 2/15/2023     Discharge Date:   02/16/23     Admitting Provider: Yesenia Cain MD     Discharge Provider: Zoey Rivers MD     Discharge Diagnoses: Active Hospital Problems    Diagnosis     Stroke-like symptoms [R29.90]      Priority: Medium    Chronic heart failure with preserved ejection fraction (HCC) [I50.32]      Priority: Medium    COPD (chronic obstructive pulmonary disease) ([facility]) [J44.9]      Priority: Medium    Obesity [E66.9]     Dementia due to Alzheimer's disease ([facility] [G30.9, F02.80]     HTN (hypertension) [I10]     CAD in native artery [I25.10]     Tobacco abuse [Z72.0]        The patient was seen and examined on day of discharge and this discharge summary is in conjunction with any daily progress note from day of discharge. Hospital Course:     Patient presented with weakness. Found to be hypotensive and dehydrated. Symptoms resolved after IV fluids. Remained asymptomatic. MRI of the brain was normal with no CVA. Neurology was consulted  Patient's furosemide and Norvasc were discontinued. Medications that may have effect on central nervous system were also adjusted. According to patient's daughter, patient takes some medications, mainly gabapentin, Hollier at higher doses than prescribed. Also has an outpatient evaluation ongoing for anemia. Hemoglobin here is not alarming at 11.6 g.  Skilled nursing facility recommended but declined by the patient. After medication adjustments and blood pressure normalization, patient became asymptomatic. Still weak. However, prefers to go home. Patient is a smoker. Has advanced COPD and will require oxygen at this time. Does not have any acute infection that might have intervened. Not diabetic. One spot non-fasting blood sugar was 232. However, hemoglobin A1c is 5.2.   Recommend follow-up with PCP for possible hypoglycemia episodes. Also recommend stopping smoking. Being discharged home in baseline condition. Physical Exam Performed:     BP (!) 115/44   Pulse 79   Temp 98.3 °F (36.8 °C) (Oral)   Resp 18   Ht 5' 2\" (1.575 m)   Wt 174 lb (78.9 kg)   SpO2 93%   BMI 31.83 kg/m²       General appearance:  No apparent distress, appears stated age and cooperative. HEENT:  Normal cephalic, atraumatic without obvious deformity. Pupils equal, round, and reactive to light. Extra ocular muscles intact. Conjunctivae/corneas clear. Neck: Supple, with full range of motion. No jugular venous distention. Trachea midline. Respiratory:  Normal respiratory effort. Clear to auscultation, bilaterally without Rales/Wheezes/Rhonchi. Cardiovascular:  Regular rate and rhythm with normal S1/S2 without murmurs, rubs or gallops. Abdomen: Soft, non-tender, non-distended with normal bowel sounds. Musculoskeletal:  No clubbing, cyanosis or edema bilaterally. Full range of motion without deformity. Skin: Skin color, texture, turgor normal.  No rashes or lesions. Neurologic:  Neurovascularly intact without any focal sensory/motor deficits. Cranial nerves: II-XII intact, grossly non-focal.  Psychiatric:  Alert and oriented, thought content appropriate, normal insight  Capillary Refill: Brisk,< 3 seconds   Peripheral Pulses: +2 palpable, equal bilaterally       Labs:  For convenience and continuity at follow-up the following most recent labs are provided:      CBC:    Lab Results   Component Value Date/Time    WBC 8.3 02/16/2023 06:38 AM    HGB 11.6 02/16/2023 06:38 AM    HCT 35.7 02/16/2023 06:38 AM     02/16/2023 06:38 AM       Renal:    Lab Results   Component Value Date/Time     02/16/2023 06:38 AM    K 4.0 02/16/2023 06:38 AM    CL 98 02/16/2023 06:38 AM    CO2 32 02/16/2023 06:38 AM    BUN 10 02/16/2023 06:38 AM    CREATININE 0.7 02/16/2023 06:38 AM    CALCIUM 8.4 02/16/2023 06:38 AM    PHOS 5.1 02/15/2023 11:10 PM         Significant Diagnostic Studies    Radiology:   MRI brain without contrast   Final Result   1. Patient motion limits evaluation. 2. No acute intracranial abnormality. No acute infarct. 3. Mild global parenchymal volume loss with chronic microvascular ischemic   changes. XR CHEST PORTABLE   Final Result      Lungs are clear         CTA HEAD NECK W CONTRAST   Final Result   No large vessel occlusion in the head or neck. This scan was analyzed using Viz. ai contact LVO. Identification of suspected   findings is not for diagnostic use beyond notification. Viz LVO is limited to   analysis of imaging data and should not be used in-lieu of full patient   evaluation or relied upon to make or confirm diagnosis. CT HEAD WO CONTRAST   Final Result   Addendum (preliminary) 1 of 1   ADDENDUM:   Stroke CT head results were communicated by [facility] to Dr. Duwaine Apley on   02/15/2023 at 10:56 p.m. Final   No acute intracranial abnormality. The findings were sent to the Radiology Results [address] at 10:53   pm on 2/15/2023 to be communicated to a licensed caregiver.                    Consults:     IP CONSULT TO PHARMACY  PHARMACY TO CHANGE BASE FLUIDS  IP CONSULT TO NEUROLOGY    Disposition: Home with home health care    Condition at Discharge: Stable    Discharge Instructions/Follow-up: PCP    Code Status:  Full Code     Activity: activity as tolerated    Diet: regular diet      Discharge Medications:     Current Discharge Medication List             Details   aspirin 81 MG EC tablet Take 1 tablet by mouth daily  Qty: 30 tablet, Refills: 3                Details   butalbital-acetaminophen-caffeine (FIORICET, ESGIC) -40 MG per tablet Take 1 tablet by mouth every 6 hours as needed for Headaches  Qty: 120 tablet, Refills: 0      atorvastatin (LIPITOR) 40 MG tablet       divalproex (DEPAKOTE ER) 250 MG extended release tablet Take 500 mg by mouth nightly LORazepam (ATIVAN) 0.5 MG tablet Take 0.5 mg by mouth 2 times daily as needed. montelukast (SINGULAIR) 10 MG tablet       lurasidone (LATUDA) 80 MG TABS tablet Take 80 mg by mouth daily      QUEtiapine (SEROQUEL) 100 MG tablet Take 3 tablets by mouth nightly  Qty: 1 tablet, Refills: 0      carvedilol (COREG) 12.5 MG tablet Take 1 tablet by mouth 2 times daily (with meals)  Qty: 60 tablet, Refills: 0      fluticasone-umeclidin-vilant (TRELEGY ELLIPTA) 100-62.5-25 MCG/INH AEPB Inhale 1 puff into the lungs daily      primidone (MYSOLINE) 50 MG tablet Take 100 mg by mouth daily       busPIRone (BUSPAR) 15 MG tablet Take 15 mg by mouth 3 times daily      pantoprazole (PROTONIX) 40 MG tablet Take 1 tablet by mouth every morning (before breakfast)  Qty: 30 tablet, Refills: 0      nicotine (NICODERM CQ) 14 MG/24HR Place 1 patch onto the skin daily  Qty: 42 patch, Refills: 0      DULoxetine (CYMBALTA) 60 MG extended release capsule Take 60 mg by mouth daily       fluticasone (FLONASE) 50 MCG/ACT nasal spray 1 spray by Nasal route daily as needed       divalproex (DEPAKOTE) 250 MG DR tablet Take 250 mg by mouth every morning              Time Spent on discharge: 42 minutes in the examination, evaluation, counseling and review of medications and discharge plan. Signed:    Ange Russo MD   2/16/2023      Thank you BALJIT Tom - ANDREW for the opportunity to be involved in this patient's care. If you have any questions or concerns, please feel free to contact me at [phone].

## 2023-02-16 NOTE — CARE COORDINATION
CM spoke with pt again. Pt now refusing SNF. Agreeable to [place] [id number] and new home O2. CM sent referral to [facility] will send to another [place] [address] agency. Referral to [facility] for new home O2.  Priscilla Benitez RN

## 2023-02-16 NOTE — PLAN OF CARE
Problem: Cardiovascular - Adult  Goal: Maintains optimal cardiac output and hemodynamic stability  Outcome: Progressing  Flowsheets (Taken 2/16/2023 0325)  Maintains optimal cardiac output and hemodynamic stability: Monitor blood pressure and heart rate  Goal: Absence of cardiac dysrhythmias or at baseline  Outcome: Progressing  Flowsheets (Taken 2/16/2023 0325)  Absence of cardiac dysrhythmias or at baseline: Monitor cardiac rate and rhythm     Problem: Skin/Tissue Integrity - Adult  Goal: Skin integrity remains intact  Outcome: Progressing

## 2023-02-16 NOTE — PROGRESS NOTES
4 Eyes Skin Assessment     The patient is being assess for   Admission    I agree that 2 RN's have performed a thorough Head to Toe Skin Assessment on the patient. ALL assessment sites listed below have been assessed. Areas assessed for pressure by both nurses:   [x]   Head, Face, and Ears   [x]   Shoulders, Back, and Chest, Abdomen  [x]   Arms, Elbows, and Hands   [x]   Coccyx, Sacrum, and Ischium  [x]   Legs, Feet, and Heels        Skin Assessed Under all Medical Devices by both nurses:  O2 device tubing              All Mepilex Borders were peeled back and area peeked at by both nurses:  No:    Please list where Mepilex Borders are located:               **SHARE this note so that the co-signing nurse is able to place an eSignature**    Co-signer eSignature: Electronically signed by Obdulia Payne RN on 2/16/23 at 3:16 AM EST    Does the Patient have Skin Breakdown related to pressure?   No              Emmanuel Prevention initiated:  No   Wound Care Orders initiated:  No      [facility] nurse consulted for Pressure Injury (Stage 3,4, Unstageable, DTI, NWPT, Complex wounds)and New or Established Ostomies:  No      Primary Nurse eSignature: Electronically signed by Fortunato Patel RN on 2/16/23 at 3:15 AM EST

## 2023-02-16 NOTE — DISCHARGE INSTR - COC
Continuity of Care Form    Patient Name: Vandana Williamson   :  1950  MRN:  [id number]    Admit date:  2/15/2023  Discharge date:  23    Code Status Order: Full Code   Advance Directives:     Admitting Physician:  Michelle Mckay MD  PCP: BALJIT Do - CNP    Discharging Nurse: [facility] Unit/Room#: 0102/5555-79  Discharging Unit Phone Number: ***    Emergency Contact:   Extended Emergency Contact Information  Primary Emergency Contact: Brent Fitzpatrick  Address: [address] Phone: [phone]  Mobile Phone: [phone]  Relation: Spouse  Secondary Emergency Contact: Margarita Fitzpatrick  Address: [place]           [address] Phone: [phone]  Relation: Child    Past Surgical History:  Past Surgical History:   Procedure Laterality Date    COLONOSCOPY  12    DIAGNOSTIC CARDIAC CATH LAB PROCEDURE  2007    Normal cor angio 2007    HERNIA REPAIR  2019    robotic ventral hernia repair with mesh    HERNIA REPAIR N/A 2019    ROBOTIC [facility] performed by Renny Mace MD at [facility], [address] (CERVIX REMOVED)      KIDNEY STONE SURGERY         Immunization History:   Immunization History   Administered Date(s) Administered    COVID-19, MODERNA BLUE border, Primary or Immunocompromised, (age 12y+), IM, 100 mcg/0.5mL 2021, 2021, 11/15/2021, 2022    COVID-19, MODERNA Bivalent BOOSTER, (age 12y+), IM, 50 mcg/0.5 mL 10/13/2022    Influenza Vaccine, unspecified formulation 2018    Influenza Virus Vaccine 10/01/2012, 2013, 2015    Influenza Whole 10/28/2010    Influenza, FLUARIX, FLULAVAL, FLUZONE (age 10 mo+) AND AFLURIA, (age 1 y+), PF, 0.5mL 10/04/2016    Influenza, FLUCELVAX, (age 10 mo+), MDCK, PF, 0.5mL 10/11/2017 Pneumococcal Conjugate 7-valent (Prevnar7) 10/28/2010    Pneumococcal Polysaccharide ([id number]) 01/17/2015, 09/21/2015    Tdap (Boostrix, Adacel) 08/23/2015       Active Problems:  Patient Active Problem List   Diagnosis Code    Other chronic pain G89.29    COPD (chronic obstructive pulmonary disease) ([facility] J44.9    Suicidal ideation R45.851    Discitis M46.40    Diarrhea R19.7    Discitis of cervicothoracic region M46.43    Bipolar disorder ([facility] F31.9    Ataxia R27.0    Wernicke encephalopathy syndrome E51.2    Acute exacerbation of chronic obstructive pulmonary disease (COPD) ([facility] J44.1    Altered mental state R41.82    Prolonged QT interval B18.71    Metabolic encephalopathy J65.39    Syncope and collapse R55    Depressive disorder, not elsewhere classified F32.89    Tobacco abuse Z72.0    Renal artery stenosis ([facility]) I70.1    Midline low back pain without sciatica M54.50    Discitis of thoracic region M46.44    Other chest pain R07.89    CAD in native artery I25.10    Dyslipidemia E78.5    Anemia D64.9    Headache R51.9    SOB (shortness of breath) on exertion R06.02    Generalized weakness R53.1    CHF (congestive heart failure) ([facility]) I50.9    HTN (hypertension) I10    SBO (small bowel obstruction) ([facility]) K56.609    Pain in thoracic spine M54.6    Spondylosis of thoracic region without myelopathy or radiculopathy M47.814    Iron deficiency anemia secondary to inadequate dietary iron intake D50.8    Viral labyrinthitis H83.09    Peripheral vertigo, bilateral H81.393    Dizziness R42    Dementia due to Alzheimer's disease ([facility]) G30.9, F02.80    H/O orthostatic hypotension Z86.79    Mood disorder ([facility]) F39    Tobacco dependence F17.200    Cannabis abuse F12.10    Opiate abuse, episodic ([facility]) F11.10    Coccygeal pain, acute M53.3    Hallucinations R44.3    Ventral hernia without obstruction or gangrene K43.9    Obesity E66.9    Moderate malnutrition ([facility]) E44.0    Acute cystitis with hematuria N30.01    Primary osteoarthritis of right knee M17.11    Complex tear of medial meniscus of right knee as current injury S83.231A    Complex tear of lateral meniscus of right knee as current injury S83.271A    OAB (overactive bladder) N32.81    Depression F32. A    Intractable migraine without status migrainosus G43.919    Chronic heart failure with preserved ejection fraction ([facility]) I50.32    Noncompliance Z91.199    COPD exacerbation ([facility]) J44.1    Stroke-like symptoms R29.90       Isolation/Infection:   Isolation            No Isolation          Patient Infection Status       Infection Onset Added Last Indicated Last Indicated By Review Planned Expiration Resolved Resolved By    None active    Resolved    COVID-19 (Rule Out) 01/04/23 01/04/23 01/04/23 COVID-19 & Influenza Combo (Ordered)   01/04/23 Rule-Out Test Resulted    COVID-19 (Rule Out) 11/05/21 11/05/21 11/05/21 COVID-19 & Influenza Combo (Ordered)   11/05/21 Rule-Out Test Resulted    COVID-19 (Rule Out) 05/17/21 05/17/21 05/17/21 COVID-19, Rapid (Ordered)   05/17/21 Rule-Out Test Resulted    COVID-19 (Rule Out) 10/31/20 10/31/20 10/31/20 COVID-19 (Ordered)   11/02/20 Rule-Out Test Resulted            Nurse Assessment:  Last Vital Signs: BP (!) 115/44   Pulse 79   Temp 98.3 °F (36.8 °C) (Oral)   Resp 18   Ht 5' 2\" (1.575 m)   Wt 174 lb (78.9 kg)   SpO2 93%   BMI 31.83 kg/m²     Last documented pain score (0-10 scale): Pain Level: 8  Last Weight:   Wt Readings from Last 1 Encounters:   02/15/23 174 lb (78.9 kg)     Mental Status:  oriented and alert    IV Access:  - None    Nursing Mobility/ADLs:  Walking   Assisted  Transfer  Assisted  Bathing  Assisted  Dressing  Assisted  Toileting  Assisted  Feeding  Independent  Med Admin  Assisted  Med Delivery   whole    Wound Care Documentation and Therapy:        Elimination:  Continence:    Bowel: Yes  Bladder: Yes  Urinary Catheter: None   Colostomy/Ileostomy/Ileal Conduit: No       Date of Last BM:     Intake/Output Summary (Last 24 hours) at 2/16/2023 1336  Last data filed at 2/16/2023 1047  Gross per 24 hour   Intake 920 ml   Output 50 ml   Net 870 ml     I/O last 3 completed shifts: In: 680 [P.O.:150; I.V.:530]  Out: 50 [Urine:50]    Safety Concerns: At Risk for Falls    Impairments/Disabilities:      None    Nutrition Therapy:  Current Nutrition Therapy:   - Oral Diet:  General    Routes of Feeding: Oral  Liquids: Thin Liquids  Daily Fluid Restriction: no  Last Modified Barium Swallow with Video (Video Swallowing Test): not done    Treatments at the Time of Hospital Discharge:   Respiratory Treatments: oxygen  Oxygen Therapy:  is on oxygen at 2 L/min per nasal cannula. Ventilator:    - No ventilator support    Rehab Therapies:   Weight Bearing Status/Restrictions: No weight bearing restrictions  Other Medical Equipment (for information only, NOT a DME order):  oxygen  Other Treatments:     Patient's personal belongings (please select all that are sent with patient):  None    RN SIGNATURE:  Electronically signed by Joe Terrell RN on 2/17/23 at 5:10 PM EST    CASE MANAGEMENT/SOCIAL WORK SECTION    Inpatient Status Date: 02/16/2023    Readmission Risk Assessment Score:  Readmission Risk              Risk of Unplanned Readmission:  29       Discharging to Facility/ [address] [place] [address]. [address] [facility]     / signature: Electronically signed by Aurea Doran RN on 2/17/23 at 4:13 PM EST    PHYSICIAN SECTION    Prognosis: Fair    Condition at Discharge: Stable    Rehab Potential (if transferring to Rehab): Fair    Recommended Labs or Other Treatments After Discharge: PCP    Physician Certification: I certify the above information and transfer of Carolyn Felipe  is necessary for the continuing treatment of the diagnosis listed and that she requires Home Care for less 30 days.      Update Admission H&P: No change in H&P    PHYSICIAN SIGNATURE:  Electronically signed by Zoey Rivers MD on 2/16/23 at 1:36 PM EST

## 2023-02-16 NOTE — H&P
Hospital Medicine History & Physical      PCP: Lazarus Kaska, APRN - CNP    Date of Admission: 2/15/2023    Date of Service: Pt seen/examined on 2/16/2023 and Admitted to Inpatient with expected LOS greater than two midnights due to medical therapy. Chief Complaint: Left-sided weakness and falls    History Of Present Illness:      71 y.o. female, with past medical history of hypertension, CAD, hyperlipidemia, CHF, COPD, obesity, who presented to [facility] with left-sided weakness and falls. History obtained from the patient and review of EMR. Per EMR, the patient fell and was complaining of some left-sided weakness. She was brought to the emergency room for a CVA work-up. Upon further questioning, the patient stated that she has been falling frequently over the last month or so. She stated she experiences generalized weakness and then loses her balance. The patient was initially hypotensive at 89/45. She was given 2 L of normal saline and responded well. The patient was also found to be anemic with a drop in her hemoglobin since January 2023. A rectal exam was not done in the emergency room that was negative for any blood, but a occult stool was sent to the lab. In the emergency room a CT head was obtained that revealed no acute intracranial abnormality. CTA head and neck was also obtained that revealed No large vessel occlusion in the head or neck. The patient's UA was negative for infection. She received 2 L of normal saline and aspirin. The patient was admitted for further evaluation and treatment. An MRI of the brain without contrast, echocardiogram and neurology consult have been ordered for the a. m. The patient denied any other associated symptoms as well as any aggravating and/or alleviating factors. At the time of this assessment, the patient was resting comfortably in bed.   She currently denies any chest pain, back pain, abdominal pain, shortness of breath, numbness, tingling, N/V/C/D, fever and/or chills. 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    Coronary artery disease involving native coronary artery of native heart with angina pectoris ([address] 3/8/2016    Depression     Emphysema of lung (HCC)     Fibromyalgia     hyperlipidemia 8/11/2011    Hypertension     Kidney stone     Liver disease     Lung disease     MDD (major depressive disorder) 4/4/2012    Mood disorder ([facility] 3/13/2013    Movement disorder     Neck pain, chronic 3/13/2013    Opiate misuse     Personality disorder ([facility]     Pneumonia     Polypharmacy 2/16/2013     Past Surgical History:          Procedure Laterality Date    COLONOSCOPY  1/19/12    DIAGNOSTIC CARDIAC CATH LAB PROCEDURE  Feb, 2007    Normal cor angio Feb, 2007    HERNIA REPAIR  07/11/2019    robotic ventral hernia repair with mesh    HERNIA REPAIR N/A 7/11/2019    ROBOTIC 1250 [facility] performed by Lauren Archer MD at [facility], [address] (CERVIX REMOVED)      KIDNEY STONE SURGERY       Medications Prior to Admission:      Prior to Admission medications    Medication Sig Start Date End Date Taking?  Authorizing Provider   butalbital-acetaminophen-caffeine (FIORICET, ESGIC) -86 MG per tablet Take 1 tablet by mouth every 6 hours as needed for Headaches 1/6/23 2/5/23  Elverna Aase, PA   atorvastatin (LIPITOR) 40 MG tablet  9/7/22   Historical Provider, MD   divalproex (DEPAKOTE ER) 250 MG extended release tablet Take 500 mg by mouth nightly 9/7/22   Historical Provider, MD   furosemide (LASIX) 20 MG tablet Take 20 mg by mouth daily 9/7/22 Historical Provider, MD   hydrOXYzine HCl (ATARAX) 10 MG tablet  9/14/22   Historical Provider, MD   LORazepam (ATIVAN) 0.5 MG tablet Take 0.5 mg by mouth 2 times daily as needed. Historical Provider, MD   montelukast (SINGULAIR) 10 MG tablet  9/9/22   Historical Provider, MD   lurasidone (LATUDA) 80 MG TABS tablet Take 80 mg by mouth daily    Historical Provider, MD   QUEtiapine (SEROQUEL) 100 MG tablet Take 3 tablets by mouth nightly 8/16/21   Sandhya Rivera MD   carvedilol (COREG) 12.5 MG tablet Take 1 tablet by mouth 2 times daily (with meals) 8/16/21   Sandhya Rivera MD   amLODIPine (NORVASC) 5 MG tablet Take 1 tablet by mouth daily 8/17/21   Sandhya Rivera MD   gabapentin (NEURONTIN) 100 MG capsule Take 100 mg by mouth 3 times daily.     Historical Provider, MD   oxybutynin (DITROPAN-XL) 10 MG extended release tablet Take 10 mg by mouth daily    Historical Provider, MD   fluticasone-umeclidin-vilant (TRELEGY ELLIPTA) 100-62.5-25 MCG/INH AEPB Inhale 1 puff into the lungs daily  Patient not taking: Reported on 9/19/2022    Historical Provider, MD   primidone (MYSOLINE) 50 MG tablet Take 100 mg by mouth daily     Historical Provider, MD   busPIRone (BUSPAR) 15 MG tablet Take 15 mg by mouth 3 times daily    Historical Provider, MD   pantoprazole (PROTONIX) 40 MG tablet Take 1 tablet by mouth every morning (before breakfast) 9/30/20   Mariam Saldivar DO   nicotine (NICODERM CQ) 14 MG/24HR Place 1 patch onto the skin daily 3/14/20 9/19/22  Amanda Ordonez APRN - CNP   DULoxetine (CYMBALTA) 60 MG extended release capsule Take 60 mg by mouth daily     Historical Provider, MD   fluticasone (FLONASE) 50 MCG/ACT nasal spray 1 spray by Nasal route daily as needed     Historical Provider, MD   divalproex (DEPAKOTE) 250 MG DR tablet Take 250 mg by mouth every morning     Historical Provider, MD     Allergies:  Dicyclomine, Ibuprofen, Sumatriptan, Tape [adhesive tape], Tizanidine, and Motrin [ibuprofen micronized]    Social History:      The patient currently lives at home    TOBACCO:   reports that she has been smoking cigarettes. She has a 117.50 pack-year smoking history. She has never used smokeless tobacco.  ETOH:   reports no history of alcohol use. E-cigarette/Vaping       Questions Responses    E-cigarette/Vaping Use Never User    Start Date     Passive Exposure     Quit Date     Counseling Given     Comments           Family History:      Reviewed and negative in regards to presenting illness/complaint. Problem Relation Age of Onset    Emphysema Mother     Heart Disease Mother     Arthritis Mother     Depression Mother     High Blood Pressure Mother     Heart Failure Father     Heart Disease Father     Arthritis Father     Diabetes Father     High Blood Pressure Father     Stroke Father     Substance Abuse Father     High Blood Pressure Brother     Heart Disease Sister     Kidney Disease Daughter     Breast Cancer Maternal Aunt      REVIEW OF SYSTEMS COMPLETED:   Pertinent positives as noted in the HPI. All other systems reviewed and negative. PHYSICAL EXAM PERFORMED:    BP (!) 99/48   Pulse 69   Temp 97.8 °F (36.6 °C) (Oral)   Resp 16   Ht 5' 2\" (1.575 m)   Wt 174 lb (78.9 kg)   SpO2 97%   BMI 31.83 kg/m²     General appearance: Pleasant, obese female in no apparent distress, appears stated age and cooperative. HEENT:  Pupils equal, round, and reactive to light. Extra ocular muscles intact. Conjunctivae/corneas clear. Neck: Supple, with full range of motion. No jugular venous distention. Trachea midline. Respiratory:  Normal respiratory effort. Clear to auscultation, bilaterally without Rales/Wheezes/Rhonchi. Cardiovascular:  Regular rate and rhythm with normal S1/S2 without murmurs, rubs or gallops. Abdomen: Soft, obese, round non-tender, non-distended with normal bowel sounds. Musculoskeletal:  No clubbing, cyanosis or edema bilaterally.   Full range of motion without deformity. Skin: Skin color, texture, turgor normal.  No significant rashes or lesions. Neurologic:  Neurovascularly intact. Cranial nerves: II-XII intact, grossly non-focal.  Psychiatric:  Alert and oriented, but forgetful thought content appropriate, normal insight  Capillary Refill: Brisk,3 seconds, normal  Peripheral Pulses: +2 palpable, equal bilaterally     Labs:     Recent Labs     02/15/23  2310   WBC 7.2   HGB 10.6*   HCT 31.5*        Recent Labs     02/15/23  2310   *   K 2.9*   CL 92*   CO2 30   BUN 13   CREATININE 1.0   CALCIUM 8.1*   PHOS 5.1*     Recent Labs     02/15/23  2310   AST 11*   ALT 7*   BILITOT <0.2   ALKPHOS 70     Recent Labs     02/15/23  2310   INR 1.04     Recent Labs     02/15/23  2310   TROPONINI <0.01     Urinalysis:      Lab Results   Component Value Date/Time    NITRU Negative 02/16/2023 01:09 AM    WBCUA 3-5 01/04/2023 08:15 PM    BACTERIA Rare 08/12/2021 05:54 PM    RBCUA None seen 01/04/2023 08:15 PM    BLOODU Negative 02/16/2023 01:09 AM    SPECGRAV 1.010 02/16/2023 01:09 AM    GLUCOSEU Negative 02/16/2023 01:09 AM    GLUCOSEU NEGATIVE 06/03/2012 07:19 PM     Radiology:     CXR: I have reviewed the CXR with the following interpretation: No acute cardiopulmonary findings    EKG:  I have reviewed the EKG with the following interpretation: The Ekg interpreted in the absence of a cardiologist shows Unusual P axis, possible ectopic atrial rhythm. RSR' or QR pattern in V1 suggests right ventricular conduction delay. Prolonged QT. Abnormal ECG. When compared with ECG of 04-JAN-2023 19:06, Ectopic atrial rhythm has replaced Sinus rhythm    XR CHEST PORTABLE   Final Result      Lungs are clear         CTA HEAD NECK W CONTRAST   Final Result   No large vessel occlusion in the head or neck. This scan was analyzed using Viz. ai contact LVO. Identification of suspected   findings is not for diagnostic use beyond notification.  Viz LVO is limited to   analysis of imaging data and should not be used in-lieu of full patient   evaluation or relied upon to make or confirm diagnosis. CT HEAD WO CONTRAST   Final Result   Addendum (preliminary) 1 of 1   ADDENDUM:   Stroke CT head results were communicated by [facility] to Dr. Shakir Adam on   02/15/2023 at 10:56 p.m. Final   No acute intracranial abnormality. The findings were sent to the Radiology Results [address] at 10:53   pm on 2/15/2023 to be communicated to a licensed caregiver. MRI brain without contrast    (Results Pending)     Consults:    IP CONSULT TO PHARMACY  PHARMACY TO CHANGE BASE FLUIDS  IP CONSULT TO HOSPITALIST  IP CONSULT TO NEUROLOGY    ASSESSMENT:    Active Hospital Problems    Diagnosis Date Noted    Stroke-like symptoms [R29.90] 02/16/2023     Priority: Medium    Chronic heart failure with preserved ejection fraction ([facility] [I50.32] 01/04/2023     Priority: Medium    COPD (chronic obstructive pulmonary disease) ([facility] [J44.9] 11/17/2013     Priority: Medium    Obesity [E66.9] 10/18/2019    Dementia due to Alzheimer's disease ([facility] [G30.9, F02.80]     HTN (hypertension) [I10]     CAD in native artery [I25.10] 03/08/2016    Tobacco abuse [Z72.0] 01/30/2016     PLAN:    TIA vs CVA - symptoms include left sided weakness and falls  -CT head revealed: No acute intracranial abnormality  -CTA head and neck revealed: No large vessel occlusion in the head or neck. -UA negative for infection  -2 L normal saline given in ED  -Aspirin given in ED  -MRI brain without contrast in a.m.  -Neuro checks  -PT/ OT eval  -Echo in a.m.  -Monitor on tele. -Consult neurology for further recs - appreciated in advance.     Essential HTN in setting of known CAD  -continue Coreg  -Continue aspirin    Hyperlipidemia  -Continue a atorvastatin    CHF with preserved EF, stable  -Does not appear to be in acute exacerbation at this time  -Strict I&O  -Daily weight  -Lasix    COPD, stable  -Does not appear to be in acute exacerbation at this time  -Continue home inhalers   -Oxygen therapy if needed    Obesity  With Body mass index is 32 kg/m². Complicating assessment and treatment. Placing patient at risk for multiple co-morbidities as well as early death and contributing to the patient's presentation. Counseled on weight loss    Anxiety and depression inpatient with dementia due to Alzheimer's  -Supportive care  -Mood appears stable at this time  -Continue home medications    Tobacco use  -Tobacco cessation  -Nicotine patch    Normocytic anemia, acute loss since 1/4/2023  -No signs or symptoms of bleeding at this time  -Rectal exam negative  -CBC in a.m.  -Occult stool pending    Hyponatremia  -Likely secondary to dehydration  -Monitor with IV fluids  -BMP in a.m. Hypokalemia  -Replaced in ED  -BMP in a.m. Hypomagnesemia  -Replaced in ED  -BMP in a.m. DVT Prophylaxis: SCDs    Diet: ADULT DIET; Regular    Code Status: Full Code    PT/OT Eval Status: Ordered    Dispo -2-3 days pending clinical improvement     BALJIT Preston CNP    Thank you BALJIT Aguila CNP for the opportunity to be involved in this patient's care.  If you have any questions or concerns please feel free to contact me at [phone].  -------------------Anticipated Dr. Bhavna jimenez--------------------------

## 2023-02-16 NOTE — CARE COORDINATION
Home O2 evaluation completed by OT. Oxygen documentation:    1. O2 saturation at REST on ROOM AIR = __91____%    If saturation is 89% or above please proceed with steps 2 and 3. 2. O2 saturation with AMBULATION of __5___ feet on ROOM AIR = ___85__%  3.  O2 saturation with AMBULATION on 2 liter flow = ___92___%    DCP notified: ______

## 2023-02-16 NOTE — PROGRESS NOTES
Physical Therapy  Facility/Department: [place] [id number] - MED SURG  Physical Therapy Initial Assessment    Name: Carolyn Felipe  : 1950  MRN: [id number]  Date of Service: 2023    Discharge Recommendations:  Subacute/Skilled Nursing Facility   PT Equipment Recommendations  Equipment Needed: No  Other: TBD at SNF      Patient Diagnosis(es): The primary encounter diagnosis was Fall, initial encounter. Diagnoses of Anemia, unspecified type, Hypotension, unspecified hypotension type, and Electrolyte disturbance were also pertinent to this visit. Past Medical History:  has a past medical history of Abnormal ECG, Anemia, Arthritis, Back pain, chronic, Bipolar disorder (HCC), Bronchitis chronic, CHF (congestive heart failure) (HCC), Chronic back pain, Chronic neck pain, Clostridium difficile infection, 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[address] Coronary artery disease involving native coronary artery of native heart with angina pectoris ([address] Depression, Emphysema of lung ([address] Fibromyalgia, hyperlipidemia, Hypertension, Kidney stone, Liver disease, Lung disease, MDD (major depressive disorder), Mood disorder ([address] Movement disorder, Neck pain, chronic, Opiate misuse, Personality disorder ([address] Pneumonia, and Polypharmacy. Past Surgical History:  has a past surgical history that includes Hysterectomy, total abdominal; Kidney stone surgery; Diagnostic Cardiac Cath Lab Procedure (2007); Colonoscopy (12); hernia repair (2019); and hernia repair (N/A, 2019). Assessment   Body Structures, Functions, Activity Limitations Requiring Skilled Therapeutic Intervention: Decreased functional mobility ; Decreased strength;Decreased safe awareness;Decreased endurance;Decreased balance;Decreased posture  Assessment: Pt referred for PT evaluation during current hospital stay with dx of L-sided weakness and frequent falls, with physician(s) ruling out possible TIA/CVA.   At time of PT evaluation, pt demonstrates no noticeable deficits in strength between RLE/LLE but demonstrates impaired balance compared to baseline. Today pt required Isai x 1 for sit<>stand transfers and majority of gait training with support of walker, experiencing 2-3 posterior LOB episodes during gait training which required Isai from therapist to correct. Given pt's current deficits and hx frequent falls prior to hospital admission, recommend SNF at D/C. Treatment Diagnosis: Impaired balance, decreased (I) with transfers/gait  Specific Instructions for Next Treatment: Progress ther ex and mobility as tolerated  Therapy Prognosis: Good  Decision Making: Medium Complexity  Barriers to Learning: Impaired safety awareness at times  Requires PT Follow-Up: Yes  Activity Tolerance: Patient tolerated evaluation without incident;Patient tolerated treatment well     Plan   General Plan: 3-5 times per week  Specific Instructions for Next Treatment: Progress ther ex and mobility as tolerated  Current Treatment Recommendations: Strengthening, Balance training, Functional mobility training, Transfer training, Endurance training, Gait training, Safety education & training, Equipment evaluation, education, & procurement, Therapeutic activities, Home exercise program  Safety Devices: All fall risk precautions in place, Call light within reach, Chair alarm in place, Gait belt, Patient at risk for falls, Left in chair, Nurse notified     Restrictions  Restrictions/Precautions  Restrictions/Precautions: Fall Risk, General Precautions, Up as Tolerated  Position Activity Restriction  Other position/activity restrictions: tele, IV     Subjective   Pain: Pt denies pain.   General  Chart Reviewed: Yes  Patient assessed for rehabilitation services?: Yes  Family / Caregiver Present: No  Referring Practitioner: Dr. Lisbeth Coombs  Referral Date : 02/16/23  Diagnosis: L-sided weakness, falls, r/o TIA/CVA  Follows Commands: Within Functional Limits  General Comment  Comments: Pt resting in bed upon entry of therapy staff  Subjective  Subjective: Pt agreeable to work with PT this morning, pleasant and cooperative. Jaleesa Members to get OOB and try to walk.     Social/Functional History  Social/Functional History  Lives With: Spouse, Daughter ( & daughter (both can provide pt with 24-hr sup/assist), also 2 grandsons)  Type of Home: House  Home Layout: One level  Home Access: Level entry  Bathroom Shower/Tub: Tub/Shower unit  Bathroom Toilet: Standard  Bathroom Equipment: Grab bars in shower, Shower chair  Home Equipment: [employer], rolling (no home O2 at baseline)  Has the patient had two or more falls in the past year or any fall with injury in the past year?: Yes (pt endorses 1 fall 6 months ago due to [address]; chart review indicates several more recent falls)  Receives Help From: Family  ADL Assistance: Independent  Homemaking Assistance: Needs assistance  Homemaking Responsibilities: No (family completes all homemaking for pt)  Ambulation Assistance: Independent (using cane, usually holds cane in L hand)  Transfer Assistance: Independent  Active : No  Patient's  Info:  and/or daughter  Mode of Transportation: Family  Occupation: Retired  Type of Occupation: Helped manage a [employer]  Leisure & Hobbies: Coloring, watching old movies, spending time with family    Vision/Hearing  Vision  Vision: Impaired  Vision Exceptions: Wears glasses for reading  Hearing  Hearing: Within functional limits      Cognition   Orientation  Overall Orientation Status: Within Normal Limits     Objective   Vitals  Heart Rate: 73  Heart Rate Source: Monitor  BP: (!) 115/47  BP Location: Left upper arm  BP Method: Automatic  Patient Position: Semi fowlers  MAP (Calculated): 70  Resp: 18  SpO2: 95 %  O2 Device: Nasal cannula (4L O2)    Observation/Palpation  Posture: Fair    Gross Assessment  AROM: Generally decreased, functional  Strength: Generally decreased, functional  Coordination: Generally decreased, functional  Tone: Normal     Bed Mobility Training  Interventions: Safety awareness training;Verbal cues  Supine to Sit: Stand-by assistance  Scooting: Stand-by assistance    Balance  Sitting: Intact  Standing: Impaired  Standing - Static: Fair;Constant support (min/CGA x 1 needed during standing while therapist assisted with briefs change & removal of PureWick (pt's briefs were saturated with urine))  Standing - Dynamic: Fair;Constant support    Transfer Training  Interventions: Safety awareness training;Verbal cues; Visual cues; Tactile cues  Sit to Stand: Minimum assistance  Stand to Sit: Minimum assistance  Bed to Chair: Minimum assistance (using RW, mildly unsteady during transfer)    Gait Training  Overall Level of Assistance: Minimum assistance;Contact-guard assistance  Interventions: Safety awareness training;Verbal cues; Visual cues  Base of Support: Narrowed (narrowed at times)  Speed/Misti: Slow;Pace decreased (< 100 feet/min)  Step Length: Right shortened;Left shortened  Gait Abnormalities: Trunk sway increased; Path deviations;Decreased step clearance (mild path deviations toward R/L sides, requires RW for balance, mildly unsteady with 2-3 LOB episodes (posteriorly) with Isai needed from PT to correct and prevent fall(s))  Assistive Device: Walker;Gait belt (std. walker + gait belt)    AM-PAC Score  AM-PAC Inpatient Mobility Raw Score : 17 (02/16/23 1033)  AM-PAC Inpatient T-Scale Score : 42.13 (02/16/23 1033)  Mobility Inpatient CMS 0-100% Score: 50.57 (02/16/23 1033)  Mobility Inpatient CMS G-Code Modifier : CK (02/16/23 1033)    Goals  Short Term Goals  Time Frame for Short Term Goals: 1 week, 2/23/23 (unless otherwise specified)  Short Term Goal 1: Pt will transfer supine <-> sit with modified(I)  Short Term Goal 2: Pt will transfer sit <-> stand and bed>chair using RW with supervision  Short Term Goal 3: Pt will ambulate x 100 feet using RW with SBA  Short Term Goal 4: By 2/19/23: Pt will tolerate 12-15 reps BLE exercise for strengthening, balance, and endurance  Patient Goals   Patient Goals : \"To get stronger and go home if possible\"       Education  Patient Education  Education Given To: Patient  Education Provided: Role of Therapy;Plan of Care;Precautions;Transfer Training;Equipment; Fall Prevention Strategies  Education Method: Demonstration;Verbal  Barriers to Learning: Other (Comment) (decreased safety awareness at times)  Education Outcome: Verbalized understanding;Demonstrated understanding;Continued education needed      Therapy Time   Individual Concurrent Group Co-treatment   Time In 0836         Time Out 0915         Minutes 39         Timed Code Treatment Minutes: [address] [place], [place] #[id number]    If pt is unable to be seen after this session, please let this note serve as discharge summary. Please see case management note for discharge disposition. Thank you.

## 2023-02-16 NOTE — CONSULTS
Pharmacy Consult: [address] [place] [address] has been asked by Dr. Stephy Latif to review this patient's medication profile to evaluate IV medications and change all base solutions to 0.9% sodium chloride if possible based on compatibility and product availability. This profile review will include an assessment of total IV fluids being administered to the patient. If a current order exists for continuous infusion of non-hypertonic plain IV fluid, the pharmacist will contact the prescriber to recommend the discontinuation of the IV fluid order. The following intermittent IV drips/infusions have been adjusted to 0.9% sodium chloride: n/a    The following medications must remain in D5W due to incompatibility with 0.9% sodium chloride:        Amphotericin    Mycophenolate    Nitroprusside                Penicillin G Potassium    Please be aware that the patient may have Dextrose 10% ordered as part of hypoglycemia orderset. Pharmacy will follow daily to ensure all new IVPBs + infusions are in 0.9% sodium chloride. Please call with questions.   Thank you,     Yousif Hernandez, PharmD 2/15/2023 10:36 PM

## 2023-02-16 NOTE — CARE COORDINATION
[facility]    Referral received from  to follow for home care services.    [facility] unable to staff this week    Patient has no preference in agency    Referral sent to [facility]  Accepted by Caty Gillis RN, BSN CTN  [facility] [phone]

## 2023-02-16 NOTE — CARE COORDINATION
Case Management Assessment  Initial Evaluation    Date/Time of Evaluation: 2/16/2023 12:38 PM  Assessment Completed by: Thony Davis RN    If patient is discharged prior to next notation, then this note serves as note for discharge by case management. Patient Name: Severiano Pellet                   YOB: 1950  Diagnosis: Stroke-like symptoms [R29.90]                   Date / Time: 2/15/2023 10:47 PM    Patient Admission Status: Inpatient   Readmission Risk (Low < 19, Mod (19-27), High > 27): Readmission Risk Score: 17.4    Current PCP: BALJIT Whitmore CNP  PCP verified by CM? Yes    Chart Reviewed: Yes      History Provided by: Patient  Patient Orientation: Alert and Oriented, Person, Place, Self    Patient Cognition: Alert    Hospitalization in the last 30 days (Readmission):  No    If yes, Readmission Assessment in  Navigator will be completed. Advance Directives:      Code Status: Full Code   Patient's Primary Decision Maker is: Legal Next of Kin    Primary Decision Maker: Evangelina Serrato Spouse - [phone]    Secondary Decision Maker: Lili Wilkerson - Child - [phone]    Secondary Decision Maker: Constantino Hamilton Child - [phone]    Discharge Planning:    Patient lives with: Spouse/Significant Other, Family Members Type of Home: Trailer/Mobile Home  Primary Care Giver: Self  Patient Support Systems include: Spouse/Significant Other, Family Members   Current Financial resources: Medicare  Current community resources: None  Current services prior to admission: None            Current DME:              Type of Home Care services:  None (may need HHC if refuses SNF)    ADLS  Prior functional level: Independent in ADLs/IADLs  Current functional level: Assistance with the following:, Toileting, Mobility, Bathing, Dressing    PT AM-PAC: 17 /24  OT AM-PAC: 19 /24    Family can provide assistance at DC:  Yes  Would you like Case Management to discuss the discharge plan with any other family members/significant others, and if so, who? No  Plans to Return to Present Housing: Unknown at present  Other Identified Issues/Barriers to RETURNING to current housing: none  Potential Assistance needed at discharge: [address], Other (Comment) (home O2)            Potential DME:    Patient expects to discharge to: Unknown (SNF vs home w/HHC)  Plan for transportation at discharge:      Financial    Payor: Fauzia Padgett / Plan: Fauzia Padgett / Product Type: *No Product type* /     Does insurance require precert for SNF: Yes    Potential assistance Purchasing Medications: No  Meds-to-Beds request:        [facility] - [address], [address] [facility] [phone] Carlos Johnson [phone]  [address] [place] [address] [address] [address]  Phone: [phone] Fax: [phone]      Notes:    Factors facilitating achievement of predicted outcomes: Family support, Cooperative, Pleasant, and Has needed Durable Medical Equipment at home    Barriers to discharge: Medical complications    Additional Case Management Notes: Left-sided weakness. Management per neuro and IM. MRI and ECHO ordered. Pt from home with  and daughter. Pt on 2 liters oxygen, none at baseline. Home O2 eval completed by PT. Pt will qualify for home O2. PTOT rec SNF. Referral called to [facility]. They are able to accept and have a bed open tomorrow after 5 pm. No precert required. CM notified pt. Pt wants to discuss with . Does not want CM to call , says she will talk to him herself. CM will follow up for decision. If SNF refused, pt will need home O2 and HHC. Provider notified. The Plan for Transition of Care is related to the following treatment goals of Stroke-like symptoms [X68.21]    IF APPLICABLE: The Patient and/or patient representative Latasha Fagan and her family were provided with a choice of provider and agrees with the discharge plan.  Freedom of choice list with basic dialogue that supports the patient's individualized plan of care/goals and shares the quality data associated with the providers was provided to:     Patient Representative Name:       The Patient and/or Patient Representative Agree with the Discharge Plan?       Juarez Barrios RN  Case Management Department  Ph: [phone] Fax: [phone]

## 2023-02-17 VITALS
HEIGHT: 62 IN | BODY MASS INDEX: 32.22 KG/M2 | SYSTOLIC BLOOD PRESSURE: 182 MMHG | WEIGHT: 175.1 LBS | OXYGEN SATURATION: 98 % | DIASTOLIC BLOOD PRESSURE: 79 MMHG | RESPIRATION RATE: 18 BRPM | HEART RATE: 75 BPM | TEMPERATURE: 98.2 F

## 2023-02-17 DIAGNOSIS — J44.9 CHRONIC OBSTRUCTIVE PULMONARY DISEASE, UNSPECIFIED COPD TYPE (HCC): Primary | ICD-10-CM

## 2023-02-17 LAB
ANION GAP SERPL CALCULATED.3IONS-SCNC: 6 MMOL/L (ref 3–16)
BASOPHILS ABSOLUTE: 0 K/UL (ref 0–0.2)
BASOPHILS RELATIVE PERCENT: 0.4 %
BLOOD CULTURE, ROUTINE: NORMAL
BUN BLDV-MCNC: 7 MG/DL (ref 7–20)
CALCIUM SERPL-MCNC: 8.9 MG/DL (ref 8.3–10.6)
CHLORIDE BLD-SCNC: 98 MMOL/L (ref 99–110)
CO2: 32 MMOL/L (ref 21–32)
CREAT SERPL-MCNC: <0.5 MG/DL (ref 0.6–1.2)
CULTURE, BLOOD 2: NORMAL
EOSINOPHILS ABSOLUTE: 0.1 K/UL (ref 0–0.6)
EOSINOPHILS RELATIVE PERCENT: 1.7 %
GFR SERPL CREATININE-BSD FRML MDRD: >60 ML/MIN/{1.73_M2}
GLUCOSE BLD-MCNC: 109 MG/DL (ref 70–99)
HCT VFR BLD CALC: 33.2 % (ref 36–48)
HEMOGLOBIN: 11 G/DL (ref 12–16)
LYMPHOCYTES ABSOLUTE: 1 K/UL (ref 1–5.1)
LYMPHOCYTES RELATIVE PERCENT: 19.5 %
MCH RBC QN AUTO: 28.8 PG (ref 26–34)
MCHC RBC AUTO-ENTMCNC: 33.2 G/DL (ref 31–36)
MCV RBC AUTO: 86.8 FL (ref 80–100)
MONOCYTES ABSOLUTE: 0.4 K/UL (ref 0–1.3)
MONOCYTES RELATIVE PERCENT: 8.3 %
NEUTROPHILS ABSOLUTE: 3.5 K/UL (ref 1.7–7.7)
NEUTROPHILS RELATIVE PERCENT: 70.1 %
PDW BLD-RTO: 15.3 % (ref 12.4–15.4)
PLATELET # BLD: 140 K/UL (ref 135–450)
PMV BLD AUTO: 6.6 FL (ref 5–10.5)
POTASSIUM REFLEX MAGNESIUM: 3.8 MMOL/L (ref 3.5–5.1)
RBC # BLD: 3.83 M/UL (ref 4–5.2)
SODIUM BLD-SCNC: 136 MMOL/L (ref 136–145)
WBC # BLD: 5 K/UL (ref 4–11)

## 2023-02-17 PROCEDURE — 94761 N-INVAS EAR/PLS OXIMETRY MLT: CPT

## 2023-02-17 PROCEDURE — 85025 COMPLETE CBC W/AUTO DIFF WBC: CPT

## 2023-02-17 PROCEDURE — 6370000000 HC RX 637 (ALT 250 FOR IP): Performed by: INTERNAL MEDICINE

## 2023-02-17 PROCEDURE — 2700000000 HC OXYGEN THERAPY PER DAY

## 2023-02-17 PROCEDURE — 36415 COLL VENOUS BLD VENIPUNCTURE: CPT

## 2023-02-17 PROCEDURE — 99223 1ST HOSP IP/OBS HIGH 75: CPT | Performed by: INTERNAL MEDICINE

## 2023-02-17 PROCEDURE — 80048 BASIC METABOLIC PNL TOTAL CA: CPT

## 2023-02-17 RX ORDER — PREDNISONE 20 MG/1
20 TABLET ORAL 2 TIMES DAILY
Qty: 10 TABLET | Refills: 0 | Status: SHIPPED | OUTPATIENT
Start: 2023-02-17 | End: 2023-02-17 | Stop reason: SDUPTHER

## 2023-02-17 RX ORDER — BUTALBITAL, ACETAMINOPHEN AND CAFFEINE 50; 325; 40 MG/1; MG/1; MG/1
1 TABLET ORAL EVERY 6 HOURS PRN
Status: DISCONTINUED | OUTPATIENT
Start: 2023-02-17 | End: 2023-02-17 | Stop reason: HOSPADM

## 2023-02-17 RX ORDER — FLUTICASONE FUROATE, UMECLIDINIUM BROMIDE AND VILANTEROL TRIFENATATE 100; 62.5; 25 UG/1; UG/1; UG/1
1 POWDER RESPIRATORY (INHALATION) DAILY
Qty: 1 EACH | Refills: 5 | Status: SHIPPED | OUTPATIENT
Start: 2023-02-17 | End: 2023-02-17 | Stop reason: HOSPADM

## 2023-02-17 RX ORDER — PREDNISONE 20 MG/1
20 TABLET ORAL 2 TIMES DAILY
Qty: 10 TABLET | Refills: 0 | Status: SHIPPED | OUTPATIENT
Start: 2023-02-17 | End: 2023-02-22

## 2023-02-17 RX ADMIN — CARVEDILOL 12.5 MG: 6.25 TABLET, FILM COATED ORAL at 17:35

## 2023-02-17 RX ADMIN — PRIMIDONE 100 MG: 50 TABLET ORAL at 08:42

## 2023-02-17 RX ADMIN — ACETAMINOPHEN 650 MG: 325 TABLET ORAL at 08:41

## 2023-02-17 RX ADMIN — ASPIRIN 81 MG: 81 TABLET, COATED ORAL at 08:42

## 2023-02-17 RX ADMIN — BUTALBITAL, ACETAMINOPHEN, AND CAFFEINE 1 TABLET: 50; 325; 40 TABLET ORAL at 14:25

## 2023-02-17 RX ADMIN — LURASIDONE HYDROCHLORIDE 80 MG: 40 TABLET, FILM COATED ORAL at 09:22

## 2023-02-17 RX ADMIN — PANTOPRAZOLE SODIUM 40 MG: 40 TABLET, DELAYED RELEASE ORAL at 05:05

## 2023-02-17 RX ADMIN — BUSPIRONE HYDROCHLORIDE 15 MG: 5 TABLET ORAL at 14:09

## 2023-02-17 RX ADMIN — DULOXETINE HYDROCHLORIDE 60 MG: 60 CAPSULE, DELAYED RELEASE ORAL at 08:41

## 2023-02-17 RX ADMIN — CARVEDILOL 12.5 MG: 6.25 TABLET, FILM COATED ORAL at 08:42

## 2023-02-17 RX ADMIN — BUSPIRONE HYDROCHLORIDE 15 MG: 5 TABLET ORAL at 08:42

## 2023-02-17 RX ADMIN — DIVALPROEX SODIUM 250 MG: 250 TABLET, DELAYED RELEASE ORAL at 09:22

## 2023-02-17 ASSESSMENT — PAIN DESCRIPTION - DESCRIPTORS
DESCRIPTORS: ACHING

## 2023-02-17 ASSESSMENT — PAIN DESCRIPTION - ORIENTATION
ORIENTATION: LOWER
ORIENTATION: LOWER

## 2023-02-17 ASSESSMENT — PAIN - FUNCTIONAL ASSESSMENT: PAIN_FUNCTIONAL_ASSESSMENT: ACTIVITIES ARE NOT PREVENTED

## 2023-02-17 ASSESSMENT — PAIN DESCRIPTION - LOCATION
LOCATION: BACK
LOCATION: HEAD

## 2023-02-17 ASSESSMENT — PAIN DESCRIPTION - PAIN TYPE: TYPE: ACUTE PAIN

## 2023-02-17 ASSESSMENT — PAIN SCALES - GENERAL
PAINLEVEL_OUTOF10: 9
PAINLEVEL_OUTOF10: 9

## 2023-02-17 NOTE — CONSULTS
INPATIENT PULMONARY CRITICAL CARE CONSULT NOTE      Chief Complaint/Referring Provider:  Patient is being seen at the request of Dr. Franklin Montalvo for a consultation for new onset hypoxia     Presenting HPI: Tarik Carrera is a pleasant 80-year-old female living with her . The patient has been a smoker since age 13, smoked at least 1 PPD, in the last 10 years and smoked up to 2 PPD. The patient does not drink alcohol. 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 She has not worked for several years. She has 3 children, her daughter and grandchildren live with her. The patient has a history of hypertension, grade 1 diastolic dysfunction, HFpEF, renal artery stenosis, hyperlipidemia, bipolar disorder. She was suspected to have COPD, was seen by Dr. Claudia Davila at [facility], stop follow-up. She had an appointment to see Dr. Denzil Hashimoto, canceled appointment. In January she had an episode of COPD exacerbation and possible cough syncope. The patient says her breathing has been getting worse. She does have occasional wheezing, is short of breath ambulating around the house. She does have cough with cream-colored phlegm in the mornings. She denies hemoptysis, weight loss. She does have leg edema, is not very compliant with diuretics it seems. She has a good appetite. She has interrupted sleep, is not sleepy in the daytime. She does not think she snores, has not been evaluated for sleep apnea. The patient was recently given amoxicillin by her PCP, took 2 days of treatment. She does not think she received steroid. She has a red inhaler and a blue inhaler, does not know what they are. She does know one of them is an albuterol inhaler. The patient was hospitalized with a fall. She does not recollect details. She was walking and fell down and her back struck against the edge of dresser. She had back pain.   It appears these episodes are similar to her cough syncope in the past.  She was seen for possible CVA, evaluated in the ER with negative imaging.  She was to be discharged, but was found to have new oxygen requirement, hence the consultation.    Patient Active Problem List    Diagnosis Date Noted    Altered mental state 09/20/2015    Wernicke encephalopathy syndrome 08/27/2015    Suicidal ideation 11/17/2013    Stroke-like symptoms 02/16/2023    COPD exacerbation ([facility]) 01/05/2023    Chronic heart failure with preserved ejection fraction ([facility]) 01/04/2023    Noncompliance 01/04/2023    Depressive disorder, not elsewhere classified     Bipolar disorder ([facility]) 08/27/2015    COPD (chronic obstructive pulmonary disease) ([facility]) 11/17/2013    Intractable migraine without status migrainosus     Depression 11/05/2021    OAB (overactive bladder)     Primary osteoarthritis of right knee 06/16/2020    Complex tear of medial meniscus of right knee as current injury 06/16/2020    Complex tear of lateral meniscus of right knee as current injury 06/16/2020    Acute cystitis with hematuria     Obesity 10/18/2019    Moderate malnutrition ([facility]) 10/18/2019    Ventral hernia without obstruction or gangrene     Hallucinations     Mood disorder ([facility]) 01/09/2019    Tobacco dependence 01/09/2019    Cannabis abuse 01/09/2019    Opiate abuse, episodic ([facility]) 01/09/2019    Coccygeal pain, acute     H/O orthostatic hypotension     Viral labyrinthitis 02/07/2018    Peripheral vertigo, bilateral     Dizziness     Dementia due to Alzheimer's disease ([facility])     Iron deficiency anemia secondary to inadequate dietary iron intake 08/03/2017    Pain in thoracic spine 07/26/2017    Spondylosis of thoracic region without myelopathy or radiculopathy 07/26/2017    SBO (small bowel obstruction) ([facility])     HTN (hypertension)     Headache 09/30/2016    SOB (shortness of breath) on exertion 09/30/2016    Generalized weakness 09/30/2016    CHF (congestive heart failure) ([facility]) 09/30/2016    Anemia 07/24/2016    Other chest pain 03/08/2016    CAD in native artery  03/08/2016    Dyslipidemia 03/08/2016    Discitis of thoracic region     Midline low back pain without sciatica     Tobacco abuse 01/30/2016    Renal artery stenosis (HCC) 97/14/7496    Metabolic encephalopathy     Syncope and collapse     Prolonged QT interval 09/20/2015    Ataxia 08/27/2015    Acute exacerbation of chronic obstructive pulmonary disease (COPD) ([address] 08/27/2015    Discitis of cervicothoracic region     Diarrhea     Discitis 01/21/2015    Other chronic pain 03/13/2013       Past Medical History:   Diagnosis Date    Abnormal ECG 12/14/2012    Anemia     Arthritis     Back pain, chronic 3/13/2013    Bipolar disorder (HCC)     Bronchitis chronic     CHF (congestive heart failure) ([address] 9/30/2016    Chronic back pain     Chronic neck pain     Clostridium difficile infection 3/29/12, 5/22/12    positive stool DNA probe    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    Coronary artery disease involving native coronary artery of native heart with angina pectoris ([address] 3/8/2016    Depression     Emphysema of lung (HCC)     Fibromyalgia     hyperlipidemia 8/11/2011    Hypertension     Kidney stone     Liver disease     Lung disease     MDD (major depressive disorder) 4/4/2012    Mood disorder ([address] 3/13/2013    Movement disorder     Neck pain, chronic 3/13/2013    Opiate misuse     Personality disorder ([address]     Pneumonia     Polypharmacy 2/16/2013        Past Surgical History:   Procedure Laterality Date    COLONOSCOPY  1/19/12    DIAGNOSTIC CARDIAC CATH LAB PROCEDURE  Feb, 2007    Normal cor angio Feb, 2007    HERNIA REPAIR  07/11/2019    robotic ventral hernia repair with mesh    HERNIA REPAIR N/A 7/11/2019    ROBOTIC VENTRAL HERNIA REPAIR WITH MESH performed by Lauren Archer MD at [facility], TOTAL ABDOMINAL (CERVIX REMOVED)      KIDNEY STONE SURGERY          Family History   Problem Relation Age of Onset    Emphysema Mother     Heart Disease Mother     Arthritis Mother     Depression Mother     High Blood Pressure Mother     Heart Failure Father     Heart Disease Father     Arthritis Father     Diabetes Father     High Blood Pressure Father     Stroke Father     Substance Abuse Father     High Blood Pressure Brother     Heart Disease Sister     Kidney Disease Daughter     Breast Cancer Maternal Aunt         Social History     Tobacco Use    Smoking status: Every Day     Packs/day: 2.50     Years: 47.00     Pack years: 117.50     Types: Cigarettes    Smokeless tobacco: Never   Substance Use Topics    Alcohol use: No        Allergies   Allergen Reactions    Dicyclomine      listed in pxysis    Ibuprofen Nausea Only    Sumatriptan      In pxysis listing  Other reaction(s): throat swelling    Tape [Adhesive Tape]      Tears off patient's skin very easily     Tizanidine      Listed in pxysis    Motrin [Ibuprofen Micronized] Nausea And Vomiting       Physical Exam:  Blood pressure (!) 169/73, pulse 77, temperature 98.1 °F (36.7 °C), temperature source Oral, resp. rate 18, height 5' 2\" (1.575 m), weight 175 lb 1.6 oz (79.4 kg), SpO2 100 %, not currently breastfeeding.'   Constitutional: Pleasant, short, obese. No acute distress. HENT:  Oropharynx is clear and moist.  Upper denture, posteriorly, no oral lesions. Eyes:  Conjunctivae are normal. Pupils equal, round, and reactive to light. No scleral icterus. Neck: Relatively short neck, no JVD. No tracheal deviation present. No obvious thyroid mass. Cardiovascular: Normal rate, regular rhythm, normal heart sounds. No right ventricular heave. No lower extremity edema. Pulmonary/Chest: Diminished air entry, end expiratory wheezes,? Kyphosis. No accessory muscle usage or stridor. Abdominal: Soft, fatty abdominal wall. Protuberant. Bowel sounds present. No distension or hernia. No tenderness. Musculoskeletal: No cyanosis. No clubbing. No obvious joint deformity. Lymphadenopathy: No cervical or supraclavicular adenopathy.    Skin: Skin is warm and dry. No rash or nodules on the exposed extremities. Psychiatric: Normal mood and affect. Behavior is normal.  No anxiety. Neurologic: Alert, awake and oriented. PERRL. Speech fluent. No obvious neurologic deficit, detailed exam not performed    Results:  CBC:   Recent Labs     02/15/23  2310 02/16/23  0638 02/17/23  0643   WBC 7.2 8.3 5.0   HGB 10.6* 11.6* 11.0*   HCT 31.5* 35.7* 33.2*   MCV 86.8 87.7 86.8    143 140     BMP:   Recent Labs     02/15/23  2310 02/16/23  0638 02/17/23  0643   * 136 136   K 2.9* 4.0 3.8   CL 92* 98* 98*   CO2 30 32 32   PHOS 5.1*  --   --    BUN 13 10 7   CREATININE 1.0 0.7 <0.5*     LIVER PROFILE:   Recent Labs     02/15/23  2310   AST 11*   ALT 7*   BILITOT <0.2   ALKPHOS 70     PT/INR:   Recent Labs     02/15/23  2310   PROTIME 13.5   INR 1.04       UA:  Recent Labs     02/16/23  0109   COLORU Yellow   PHUR 6.5   CLARITYU Clear   SPECGRAV 1.010   LEUKOCYTESUR Negative   UROBILINOGEN 0.2   BILIRUBINUR Negative   BLOODU Negative   GLUCOSEU Negative       Imaging:  I have reviewed radiology images personally. MRI brain without contrast   Final Result   1. Patient motion limits evaluation. 2. No acute intracranial abnormality. No acute infarct. 3. Mild global parenchymal volume loss with chronic microvascular ischemic   changes. XR CHEST PORTABLE   Final Result      Lungs are clear         CTA HEAD NECK W CONTRAST   Final Result   No large vessel occlusion in the head or neck. This scan was analyzed using [employer] contact LVO. Identification of suspected   findings is not for diagnostic use beyond notification. [employer] LVO is limited to   analysis of imaging data and should not be used in-lieu of full patient   evaluation or relied upon to make or confirm diagnosis.          CT HEAD WO CONTRAST   Final Result   Addendum (preliminary) 1 of 1   ADDENDUM:   Stroke CT head results were communicated by [facility] to Dr. Chandan Magaña on   02/15/2023 at 10:56 p.m. Final   No acute intracranial abnormality. The findings were sent to the Radiology Results [id number] at 10:53   pm on 2/15/2023 to be communicated to a licensed caregiver. CT HEAD WO CONTRAST    Addendum Date: 2/15/2023    ADDENDUM: Stroke CT head results were communicated by [facility] to Dr. Siva Joshua on 02/15/2023 at 10:56 p.m. Result Date: 2/15/2023  EXAMINATION: CT OF THE HEAD WITHOUT CONTRAST  2/15/2023 10:34 pm TECHNIQUE: CT of the head was performed without the administration of intravenous contrast. Automated exposure control, iterative reconstruction, and/or weight based adjustment of the mA/kV was utilized to reduce the radiation dose to as low as reasonably achievable. COMPARISON: 01/04/2023. HISTORY: ORDERING SYSTEM PROVIDED HISTORY: Stroke Symptoms TECHNOLOGIST PROVIDED HISTORY: Has a \"code stroke\" or \"stroke alert\" been called? ->Yes Reason for exam:->Stroke Symptoms Decision Support Exception - unselect if not a suspected or confirmed emergency medical condition->Emergency Medical Condition (MA) Reason for Exam: fall, left sided weakness FINDINGS: BRAIN/VENTRICLES: There is no acute intracranial hemorrhage, mass effect or midline shift. No abnormal extra-axial fluid collection. The gray-white differentiation is maintained without evidence of an acute infarct. There is prominence of the ventricles and sulci due to global parenchymal volume loss. There are nonspecific areas of hypoattenuation within the periventricular and subcortical white matter, which likely represent chronic microvascular ischemic change. ORBITS: The visualized portion of the orbits demonstrate no acute abnormality. SINUSES: The visualized paranasal sinuses and mastoid air cells demonstrate no acute abnormality. SOFT TISSUES/SKULL: No acute abnormality of the visualized skull or soft tissues. No acute intracranial abnormality.  The findings were sent to the Radiology Results Communication Center at 10:53 pm on 2/15/2023 to be communicated to a licensed caregiver. XR CHEST PORTABLE    Result Date: 2/15/2023  EXAMINATION: ONE XRAY VIEW OF THE CHEST 2/15/2023 11:12 pm COMPARISON: 01/04/2023 HISTORY: ORDERING SYSTEM PROVIDED HISTORY: stroke symptoms TECHNOLOGIST PROVIDED HISTORY: Reason for exam:->stroke symptoms Reason for Exam: code stroke  left side weakness FINDINGS: Heart size is normal  Aorta is normal.  Lungs are normally expanded and clear. No pleural effusions. Mild spondylosis     Lungs are clear     CTA HEAD NECK W CONTRAST    Result Date: 2/15/2023  EXAMINATION: CTA OF THE HEAD AND NECK WITH CONTRAST 2/15/2023 10:57 pm: TECHNIQUE: CTA of the head and neck was performed with the administration of intravenous contrast. Multiplanar reformatted images are provided for review. MIP images are provided for review. Stenosis of the internal carotid arteries measured using NASCET criteria. Automated exposure control, iterative reconstruction, and/or weight based adjustment of the mA/kV was utilized to reduce the radiation dose to as low as reasonably achievable. COMPARISON: Noncontrast CT head done same day. CT angiogram head and neck done 02/06/2018. HISTORY: ORDERING SYSTEM PROVIDED HISTORY: Stroke Symptoms TECHNOLOGIST PROVIDED HISTORY: Has a \"code stroke\" or \"stroke alert\" been called? ->Yes Reason for exam:->Stroke Symptoms Decision Support Exception - unselect if not a suspected or confirmed emergency medical condition->Emergency Medical Condition (MA) Reason for Exam: fall, left sided weakness FINDINGS: CTA NECK: AORTIC ARCH/ARCH VESSELS: No dissection or arterial injury. Atherosclerosis in the visualized thoracic aorta, right brachiocephalic and bilateral subclavian arteries. Focal 50% stenosis at the origin of the right subclavian artery. CAROTID ARTERIES: No dissection, arterial injury, or hemodynamically significant stenosis by NASCET criteria.   Atherosclerosis in the bilateral common carotid arteries and carotid bulbs. VERTEBRAL ARTERIES: No dissection, arterial injury, or significant stenosis. SOFT TISSUES: The lung apices are clear. No cervical or superior mediastinal lymphadenopathy. The larynx and pharynx are unremarkable. No acute abnormality of the salivary and thyroid glands. BONES: No acute osseous abnormality. Multilevel degenerative changes in the visualized spine are centered at C5-C6 and C6-C7. CTA HEAD: ANTERIOR CIRCULATION: No significant stenosis of the intracranial internal carotid, anterior cerebral, or middle cerebral arteries. No aneurysm. Calcifications in the bilateral intracranial internal carotid arteries without hemodynamically significant stenosis. POSTERIOR CIRCULATION: No significant stenosis of the vertebral, basilar, or posterior cerebral arteries. No aneurysm. OTHER: No dural venous sinus thrombosis on this non-dedicated study. BRAIN: No mass effect or midline shift. No extra-axial fluid collection. The gray-white differentiation is maintained. No large vessel occlusion in the head or neck. This scan was analyzed using [employer]. [employer] contact LVO. Identification of suspected findings is not for diagnostic use beyond notification. [employer] LVO is limited to analysis of imaging data and should not be used in-lieu of full patient evaluation or relied upon to make or confirm diagnosis. MRI brain without contrast    Result Date: 2/16/2023  EXAMINATION: MRI OF THE BRAIN WITHOUT CONTRAST  2/16/2023 9:32 am TECHNIQUE: Multiplanar multisequence MRI of the brain was performed without the administration of intravenous contrast. COMPARISON: None.  HISTORY: ORDERING SYSTEM PROVIDED HISTORY: Stroke-like symptoms TECHNOLOGIST PROVIDED HISTORY: Reason for exam:->Stroke-like symptoms What is the sedation requirement?->None Decision Support Exception - unselect if not a suspected or confirmed emergency medical condition->Emergency Medical Condition (MA) Reason for Exam: Stroke-like symptoms Initial evaluation. FINDINGS: Motion degrades images limiting evaluation. INTRACRANIAL STRUCTURES/VENTRICLES: There is no acute infarct. No mass effect or midline shift. No evidence of an acute intracranial hemorrhage. Areas of T2 FLAIR hyperintensity are seen in the periventricular and subcortical white matter, which are nonspecific, but may represent chronic microvascular ischemic change. There is mild global parenchymal volume loss. Otherwise, the ventricles and sulci are normal in size and configuration. The sellar/suprasellar regions appear unremarkable. The normal signal voids within the major intracranial vessels appear maintained. There is prominence of the retro cerebellar CSF space, which may be related to a tiff cisterna magna versus an arachnoid cyst. ORBITS: The visualized portion of the orbits demonstrate no acute abnormality. SINUSES: The visualized paranasal sinuses and mastoid air cells demonstrate no acute abnormality. BONES/SOFT TISSUES: The bone marrow signal intensity appears normal. The soft tissues demonstrate no acute abnormality. 1. Patient motion limits evaluation. 2. No acute intracranial abnormality. No acute infarct. 3. Mild global parenchymal volume loss with chronic microvascular ischemic changes. Echocardiogram 2/16/2023:  The left ventricle is normal in size with mild concentric hypertrophy. Left ventricular systolic function is normal with a visually estimated     ejection fraction of   55-60%   No obvious regional wall motion abnormalities noted. Grade I diastolic dysfunction with normal LV pressure. Normal right ventricular size and function. Mild mitral regurgitation is present. Inadequate tricuspid valve regurgitation to estimate systolic pulmonary    artery pressure. PFT 8/29/2011:  Normal spirometry, lung volumes, diffusion capacity      Assessment and plan:  Acute respiratory failure, hypoxemic.   For now discharge on home O2 at 2 LPM.  Will set up 6-minute walk test as an outpatient. COPD, paraseptal emphysema, likely COPD exacerbation. Sent a prescription for prednisone 40 mg daily for 5 days, Trelegy. She does have rescue albuterol. We will set up outpatient PFT  Chronic heart failure with preserved ejection fraction ([facility] grade 1 diastolic dysfunction. Does not appear that she is in CHF. Follows up with Dr. Becky Velazquez  Stroke-like symptoms. Less likely CVA/TIA. Suspect she has bouts of cough syncope. Symptoms had occurred in January as well. Cough may not resolve until she quit smoking. Hyponatremia, hypomagnesemia, hypokalemia. Electrolyte abnormalities per IM  Anemia. Normochromic, normocytic. Mild  HTN, CAD, renal artery stenosis, HLD, bipolar disorder? Dementia. Per IM  Tobacco abuse. The patient is aware she needs to quit smoking altogether  Obesity. We will have to reduce calories, she does not have much mobility  DVT prophylaxis. Was on SCDs      I have examined patient, reviewed labs, images and medical records. My impression and recommendations are as above. Discussed with the patient, Dr. Bhavna Sesay. Thank you for the consultation. The patient can be discharged on supplemental oxygen at 2 LPM.  I have set up PFT, 6-minute walk test in 1 month. I have sent a message to our office for her to follow-up with Dr. Nelly Collier. Sent prescriptions for prednisone and Trelegy to her pharmacy.     Electronically signed by:  Sandra Arriaza MD    2/17/2023    3:10 PM.

## 2023-02-17 NOTE — CARE COORDINATION
CASE MANAGEMENT DISCHARGE SUMMARY      Discharge to: Home with spouse and home care     Precertification completed: [address] Exemption Notification (HENS) completed: n/a    IMM given: 02/17/23    New Durable Medical Equipment ordered/agency: [facility] for home oxygen    Transportation:    Family/car: Private       Confirmed discharge plan with:     Patient: yes per nurse     Family:  patient to notify     Facility/Agency, name:      [facility] [facility] ADENIKE/AVS faxed        RN, name: Angely Keene RN        Note: Discharging nurse to complete ADENIKE, reconcile AVS, and place final copy with patient's discharge packet. RN to ensure that written prescriptions for  Level II medications are sent with patient to the facility as per protocol.

## 2023-02-17 NOTE — PROGRESS NOTES
Pt d/c [facility] home per order on 2L O2 via nasal cannula. D/C instructions reviewed. Verbalized understanding.   Pt taken off floor via wheelchair in stable condition

## 2023-02-17 NOTE — PROGRESS NOTES
Physical Therapy    Attempted to see pt for follow up PT tx. Upon arrival, pt finishing walk test with RN, reports she completed it without difficulty. Pt denies further PT needs and has plans for d/c later this date. All questions answered at this time.     No charges filed  Emre Diaz, DPT

## 2023-02-17 NOTE — PROGRESS NOTES
Oxygen documentation:      O2 saturation at REST on ROOM AIR = 93%      If saturation is 89% or above please proceed with steps 2 and 3.       O2 saturation with AMBULATION of 50 feet on ROOM AIR =77%  O2 saturation with AMBULATION on 2L = 94%

## 2023-02-17 NOTE — DISCHARGE SUMMARY
Hospital Medicine Discharge Summary    Patient ID: Virgilio Kehr      Patient's PCP: Shayne Jefferson, BALJIT - CNP    Admit Date: 2/15/2023     Discharge Date:   02/17/23     Admitting Provider: Augustin Irving MD     Discharge Provider: Malick Rivas MD     Discharge Diagnoses: Active Hospital Problems    Diagnosis     Stroke-like symptoms [R29.90]      Priority: Medium    Chronic heart failure with preserved ejection fraction (HCC) [I50.32]      Priority: Medium    COPD (chronic obstructive pulmonary disease) (HCC) [J44.9]      Priority: Medium    Obesity [E66.9]     Dementia due to Alzheimer's disease ([facility] [address] [G30.9, F02.80]     HTN (hypertension) [I10]     CAD in native artery [I25.10]     Tobacco abuse [Z72.0]        The patient was seen and examined on day of discharge and this discharge summary is in conjunction with any daily progress note from day of discharge. Hospital Course:     Patient presented with weakness. Found to be hypotensive and dehydrated. Symptoms resolved after IV fluids. Remained asymptomatic. MRI of the brain was normal with no CVA. Neurology was consulted  Patient's furosemide and Norvasc were discontinued. Medications that may have effect on central nervous system were also adjusted. According to patient's daughter, patient takes some medications, mainly gabapentin, Hollier at higher doses than prescribed. Also has an outpatient evaluation ongoing for anemia. Hemoglobin here is not alarming at 11.6 g.  Skilled nursing facility recommended but declined by the patient. After medication adjustments and blood pressure normalization, patient became asymptomatic. Still weak. However, prefers to go home. Patient is a smoker. Has advanced COPD and will require oxygen at this time. Does not have any acute infection that might have intervened. Not diabetic. One spot non-fasting blood sugar was 232. However, hemoglobin A1c is 5.2.   Recommend follow-up with PCP for possible hypoglycemia episodes. Also recommend stopping smoking. Being discharged home in baseline condition. Pulmonology consulted for hypoxia which is a new finding from before. Prescriptions adjusted per pulmonologist. Will need 2 LPM NC oxygen, ordered. Patient to follow up with pulmonology as outpatient for further workup including sleep apnea. Noted patient does not consider an issue and is not interested in stopping cessation. Physical Exam Performed:     BP (!) 169/73   Pulse 77   Temp 98.1 °F (36.7 °C) (Oral)   Resp 18   Ht 5' 2\" (1.575 m)   Wt 175 lb 1.6 oz (79.4 kg)   SpO2 100%   BMI 32.03 kg/m²       General appearance:  No apparent distress, appears stated age and cooperative. HEENT:  Normal cephalic, atraumatic without obvious deformity. Pupils equal, round, and reactive to light. Extra ocular muscles intact. Conjunctivae/corneas clear. Neck: Supple, with full range of motion. No jugular venous distention. Trachea midline. Respiratory:  Normal respiratory effort. Clear to auscultation, bilaterally without Rales/Wheezes/Rhonchi. Cardiovascular:  Regular rate and rhythm with normal S1/S2 without murmurs, rubs or gallops. Abdomen: Soft, non-tender, non-distended with normal bowel sounds. Musculoskeletal:  No clubbing, cyanosis or edema bilaterally. Full range of motion without deformity. Skin: Skin color, texture, turgor normal.  No rashes or lesions. Neurologic:  Neurovascularly intact without any focal sensory/motor deficits. Cranial nerves: II-XII intact, grossly non-focal.  Psychiatric:  Alert and oriented, thought content appropriate, normal insight  Capillary Refill: Brisk,< 3 seconds   Peripheral Pulses: +2 palpable, equal bilaterally       Labs:  For convenience and continuity at follow-up the following most recent labs are provided:      CBC:    Lab Results   Component Value Date/Time    WBC 5.0 02/17/2023 06:43 AM    HGB 11.0 02/17/2023 06:43 AM    HCT 33.2 02/17/2023 06:43 AM     02/17/2023 06:43 AM       Renal:    Lab Results   Component Value Date/Time     02/17/2023 06:43 AM    K 3.8 02/17/2023 06:43 AM    CL 98 02/17/2023 06:43 AM    CO2 32 02/17/2023 06:43 AM    BUN 7 02/17/2023 06:43 AM    CREATININE <0.5 02/17/2023 06:43 AM    CALCIUM 8.9 02/17/2023 06:43 AM    PHOS 5.1 02/15/2023 11:10 PM         Significant Diagnostic Studies    Radiology:   MRI brain without contrast   Final Result   1. Patient motion limits evaluation. 2. No acute intracranial abnormality. No acute infarct. 3. Mild global parenchymal volume loss with chronic microvascular ischemic   changes. XR CHEST PORTABLE   Final Result      Lungs are clear         CTA HEAD NECK W CONTRAST   Final Result   No large vessel occlusion in the head or neck. This scan was analyzed using [employer]. ai contact LVO. Identification of suspected   findings is not for diagnostic use beyond notification. Viz LVO is limited to   analysis of imaging data and should not be used in-lieu of full patient   evaluation or relied upon to make or confirm diagnosis. CT HEAD WO CONTRAST   Final Result   Addendum (preliminary) 1 of 1   ADDENDUM:   Stroke CT head results were communicated by [facility] to Dr. Nicole Bell on   02/15/2023 at 10:56 p.m. Final   No acute intracranial abnormality. The findings were sent to the Radiology Results [address] at 10:53   pm on 2/15/2023 to be communicated to a licensed caregiver.                    Consults:     IP CONSULT TO PHARMACY  PHARMACY TO CHANGE BASE FLUIDS  IP CONSULT TO HOME CARE NEEDS  IP CONSULT TO PULMONOLOGY    Disposition: Home with home health care    Condition at Discharge: Stable    Discharge Instructions/Follow-up: PCP    Code Status:  Full Code     Activity: activity as tolerated    Diet: regular diet      Discharge Medications:     Current Discharge Medication List             Details   aspirin 81 MG EC tablet Take 1 tablet by mouth daily  Qty: 30 tablet, Refills: 3      !! fluticasone-umeclidin-vilant (TRELEGY ELLIPTA) 100-62.5-25 MCG/ACT AEPB inhaler Inhale 1 puff into the lungs daily  Qty: 1 each, Refills: 5      predniSONE (DELTASONE) 20 MG tablet Take 1 tablet by mouth 2 times daily for 5 days  Qty: 10 tablet, Refills: 0       !! - Potential duplicate medications found. Please discuss with provider. Details   butalbital-acetaminophen-caffeine (FIORICET, ESGIC) -40 MG per tablet Take 1 tablet by mouth every 6 hours as needed for Headaches  Qty: 120 tablet, Refills: 0      atorvastatin (LIPITOR) 40 MG tablet       divalproex (DEPAKOTE ER) 250 MG extended release tablet Take 500 mg by mouth nightly      LORazepam (ATIVAN) 0.5 MG tablet Take 0.5 mg by mouth 2 times daily as needed. montelukast (SINGULAIR) 10 MG tablet       lurasidone (LATUDA) 80 MG TABS tablet Take 80 mg by mouth daily      QUEtiapine (SEROQUEL) 100 MG tablet Take 3 tablets by mouth nightly  Qty: 1 tablet, Refills: 0      carvedilol (COREG) 12.5 MG tablet Take 1 tablet by mouth 2 times daily (with meals)  Qty: 60 tablet, Refills: 0      !! fluticasone-umeclidin-vilant (TRELEGY ELLIPTA) 100-62.5-25 MCG/INH AEPB Inhale 1 puff into the lungs daily      primidone (MYSOLINE) 50 MG tablet Take 100 mg by mouth daily       busPIRone (BUSPAR) 15 MG tablet Take 15 mg by mouth 3 times daily      pantoprazole (PROTONIX) 40 MG tablet Take 1 tablet by mouth every morning (before breakfast)  Qty: 30 tablet, Refills: 0      nicotine (NICODERM CQ) 14 MG/24HR Place 1 patch onto the skin daily  Qty: 42 patch, Refills: 0      DULoxetine (CYMBALTA) 60 MG extended release capsule Take 60 mg by mouth daily       fluticasone (FLONASE) 50 MCG/ACT nasal spray 1 spray by Nasal route daily as needed       divalproex (DEPAKOTE) 250 MG DR tablet Take 250 mg by mouth every morning        !! - Potential duplicate medications found. Please discuss with provider. Time Spent on discharge: 42 minutes in the examination, evaluation, counseling and review of medications and discharge plan. Signed:    Dede Alejandro MD   2/17/2023      Thank you BALJIT Szymanski CNP for the opportunity to be involved in this patient's care. If you have any questions or concerns, please feel free to contact me at [phone].

## 2023-02-17 NOTE — PROGRESS NOTES
Transfer to B3 from [facility]        Upon transferring patient to ordered level of care, patients medications were gathered from the following locations and given to receiving nurse during bedside report:      Lock Box in room :     [x] Yes   [] No   Pyxis Bin:       [x] Yes   [] No      Pyxis Refrigerator:      [x] Yes   [] No   Tube System:       [x] Yes   [] No   LDA's documented:  [x] Yes   [] No          The following paperwork was transferred with patient:    Blue medication book:       [] Yes   [] No      12 hour chart check:       [] Yes   [] No   Patient belongings:       [] Yes   [] No      Continuous pulse ox:   [] Yes   [] No      [] N/A      Tele monitor number  assigned to patient and placed on patient prior to transfer.     CMU Notified at Transfer: [x] Yes   [] No     Name of [address] [place] [address] Staff:  Megan Bee MD notified via [employer]:   [x] Yes   [] No    Family notified of transfer:    [x] Yes   [] No    Spoke with:  pt informed family

## 2023-02-20 LAB
BLOOD CULTURE, ROUTINE: NORMAL
CULTURE, BLOOD 2: NORMAL

## 2023-06-25 ENCOUNTER — HOSPITAL ENCOUNTER (INPATIENT)
Age: 73
LOS: 3 days | Discharge: HOME | DRG: 871 | End: 2023-06-28
Attending: STUDENT IN AN ORGANIZED HEALTH CARE EDUCATION/TRAINING PROGRAM | Admitting: INTERNAL MEDICINE
Payer: COMMERCIAL

## 2023-06-25 ENCOUNTER — APPOINTMENT (OUTPATIENT)
Dept: GENERAL RADIOLOGY | Age: 73
DRG: 871 | End: 2023-06-25
Payer: COMMERCIAL

## 2023-06-25 ENCOUNTER — APPOINTMENT (OUTPATIENT)
Dept: CT IMAGING | Age: 73
DRG: 871 | End: 2023-06-25
Payer: COMMERCIAL

## 2023-06-25 DIAGNOSIS — B88.8 INFESTATION BY BED BUG: ICD-10-CM

## 2023-06-25 DIAGNOSIS — J18.9 MULTIFOCAL PNEUMONIA: ICD-10-CM

## 2023-06-25 DIAGNOSIS — J96.01 ACUTE HYPOXEMIC RESPIRATORY FAILURE (HCC): ICD-10-CM

## 2023-06-25 DIAGNOSIS — G93.40 ACUTE ENCEPHALOPATHY: Primary | ICD-10-CM

## 2023-06-25 LAB
ALBUMIN SERPL-MCNC: 3.5 G/DL (ref 3.4–5)
ALBUMIN/GLOB SERPL: 1 {RATIO} (ref 1.1–2.2)
ALP SERPL-CCNC: 106 U/L (ref 40–129)
ALT SERPL-CCNC: 10 U/L (ref 10–40)
AMPHETAMINES UR QL SCN>1000 NG/ML: ABNORMAL
ANION GAP SERPL CALCULATED.3IONS-SCNC: 15 MMOL/L (ref 3–16)
ANISOCYTOSIS BLD QL SMEAR: ABNORMAL
APTT BLD: 38.3 SEC (ref 22.7–35.9)
AST SERPL-CCNC: 34 U/L (ref 15–37)
BARBITURATES UR QL SCN>200 NG/ML: POSITIVE
BASE EXCESS BLDV CALC-SCNC: 1 MMOL/L (ref -3–3)
BASOPHILS # BLD: 0 K/UL (ref 0–0.2)
BASOPHILS NFR BLD: 0 %
BENZODIAZ UR QL SCN>200 NG/ML: ABNORMAL
BILIRUB SERPL-MCNC: 0.3 MG/DL (ref 0–1)
BILIRUB UR QL STRIP.AUTO: NEGATIVE
BUN SERPL-MCNC: 11 MG/DL (ref 7–20)
CALCIUM SERPL-MCNC: 8.8 MG/DL (ref 8.3–10.6)
CANNABINOIDS UR QL SCN>50 NG/ML: ABNORMAL
CHLORIDE SERPL-SCNC: 93 MMOL/L (ref 99–110)
CLARITY UR: CLEAR
CO2 BLDV-SCNC: 25 MMOL/L
CO2 SERPL-SCNC: 23 MMOL/L (ref 21–32)
COCAINE UR QL SCN: ABNORMAL
COHGB MFR BLDV: 4.6 % (ref 0–1.5)
COLOR UR: YELLOW
CREAT SERPL-MCNC: 0.7 MG/DL (ref 0.6–1.2)
DEPRECATED RDW RBC AUTO: 14.4 % (ref 12.4–15.4)
DRUG SCREEN COMMENT UR-IMP: ABNORMAL
EKG ATRIAL RATE: 82 BPM
EKG DIAGNOSIS: NORMAL
EKG P AXIS: 5 DEGREES
EKG P-R INTERVAL: 134 MS
EKG Q-T INTERVAL: 400 MS
EKG QRS DURATION: 92 MS
EKG QTC CALCULATION (BAZETT): 467 MS
EKG R AXIS: -12 DEGREES
EKG T AXIS: 34 DEGREES
EKG VENTRICULAR RATE: 82 BPM
EOSINOPHIL # BLD: 0 K/UL (ref 0–0.6)
EOSINOPHIL NFR BLD: 0 %
FENTANYL SCREEN, URINE: ABNORMAL
GFR SERPLBLD CREATININE-BSD FMLA CKD-EPI: >60 ML/MIN/{1.73_M2}
GLUCOSE BLD-MCNC: 151 MG/DL (ref 70–99)
GLUCOSE SERPL-MCNC: 138 MG/DL (ref 70–99)
GLUCOSE UR STRIP.AUTO-MCNC: NEGATIVE MG/DL
HCO3 BLDV-SCNC: 24.1 MMOL/L (ref 23–29)
HCT VFR BLD AUTO: 39.4 % (ref 36–48)
HGB BLD-MCNC: 13 G/DL (ref 12–16)
HGB UR QL STRIP.AUTO: NEGATIVE
INR PPP: 1.1 (ref 0.84–1.16)
KETONES UR STRIP.AUTO-MCNC: NEGATIVE MG/DL
LACTATE BLDV-SCNC: 1.7 MMOL/L (ref 0.4–1.9)
LEUKOCYTE ESTERASE UR QL STRIP.AUTO: NEGATIVE
LYMPHOCYTES # BLD: 0.5 K/UL (ref 1–5.1)
LYMPHOCYTES NFR BLD: 3 %
MACROCYTES BLD QL SMEAR: ABNORMAL
MCH RBC QN AUTO: 29.2 PG (ref 26–34)
MCHC RBC AUTO-ENTMCNC: 32.9 G/DL (ref 31–36)
MCV RBC AUTO: 88.6 FL (ref 80–100)
METHADONE UR QL SCN>300 NG/ML: ABNORMAL
METHGB MFR BLDV: 0.3 %
MICROCYTES BLD QL SMEAR: ABNORMAL
MONOCYTES # BLD: 0.9 K/UL (ref 0–1.3)
MONOCYTES NFR BLD: 5 %
NEUTROPHILS # BLD: 16.7 K/UL (ref 1.7–7.7)
NEUTROPHILS NFR BLD: 85 %
NEUTS BAND NFR BLD MANUAL: 7 % (ref 0–7)
NITRITE UR QL STRIP.AUTO: NEGATIVE
NT-PROBNP SERPL-MCNC: 749 PG/ML (ref 0–124)
O2 THERAPY: ABNORMAL
OPIATES UR QL SCN>300 NG/ML: ABNORMAL
OXYCODONE UR QL SCN: ABNORMAL
PCO2 BLDV: 34 MMHG (ref 40–50)
PCP UR QL SCN>25 NG/ML: ABNORMAL
PERFORMED ON: ABNORMAL
PH BLDV: 7.47 [PH] (ref 7.35–7.45)
PH UR STRIP.AUTO: 6.5 [PH] (ref 5–8)
PH UR STRIP: 6.5 [PH]
PLATELET # BLD AUTO: 135 K/UL (ref 135–450)
PLATELET BLD QL SMEAR: ADEQUATE
PMV BLD AUTO: 7.8 FL (ref 5–10.5)
PO2 BLDV: 115.4 MMHG (ref 25–40)
POLYCHROMASIA BLD QL SMEAR: ABNORMAL
POTASSIUM SERPL-SCNC: 4.3 MMOL/L (ref 3.5–5.1)
PROT SERPL-MCNC: 7.1 G/DL (ref 6.4–8.2)
PROT UR STRIP.AUTO-MCNC: NEGATIVE MG/DL
PROTHROMBIN TIME: 14.2 SEC (ref 11.5–14.8)
RBC # BLD AUTO: 4.45 M/UL (ref 4–5.2)
SAO2 % BLDV: 98 %
SLIDE REVIEW: ABNORMAL
SODIUM SERPL-SCNC: 131 MMOL/L (ref 136–145)
SP GR UR STRIP.AUTO: <=1.005 (ref 1–1.03)
TROPONIN, HIGH SENSITIVITY: 17 NG/L (ref 0–14)
TROPONIN, HIGH SENSITIVITY: 17 NG/L (ref 0–14)
UA COMPLETE W REFLEX CULTURE PNL UR: NORMAL
UA DIPSTICK W REFLEX MICRO PNL UR: NORMAL
URN SPEC COLLECT METH UR: NORMAL
UROBILINOGEN UR STRIP-ACNC: 0.2 E.U./DL
WBC # BLD AUTO: 18.2 K/UL (ref 4–11)

## 2023-06-25 PROCEDURE — 94761 N-INVAS EAR/PLS OXIMETRY MLT: CPT

## 2023-06-25 PROCEDURE — 83880 ASSAY OF NATRIURETIC PEPTIDE: CPT

## 2023-06-25 PROCEDURE — 82803 BLOOD GASES ANY COMBINATION: CPT

## 2023-06-25 PROCEDURE — 6370000000 HC RX 637 (ALT 250 FOR IP): Performed by: INTERNAL MEDICINE

## 2023-06-25 PROCEDURE — 2580000003 HC RX 258: Performed by: STUDENT IN AN ORGANIZED HEALTH CARE EDUCATION/TRAINING PROGRAM

## 2023-06-25 PROCEDURE — 87040 BLOOD CULTURE FOR BACTERIA: CPT

## 2023-06-25 PROCEDURE — 80053 COMPREHEN METABOLIC PANEL: CPT

## 2023-06-25 PROCEDURE — 70496 CT ANGIOGRAPHY HEAD: CPT

## 2023-06-25 PROCEDURE — 71045 X-RAY EXAM CHEST 1 VIEW: CPT

## 2023-06-25 PROCEDURE — 2580000003 HC RX 258

## 2023-06-25 PROCEDURE — 84484 ASSAY OF TROPONIN QUANT: CPT

## 2023-06-25 PROCEDURE — 2060000000 HC ICU INTERMEDIATE R&B

## 2023-06-25 PROCEDURE — 70450 CT HEAD/BRAIN W/O DYE: CPT

## 2023-06-25 PROCEDURE — 99285 EMERGENCY DEPT VISIT HI MDM: CPT

## 2023-06-25 PROCEDURE — 81003 URINALYSIS AUTO W/O SCOPE: CPT

## 2023-06-25 PROCEDURE — 85610 PROTHROMBIN TIME: CPT

## 2023-06-25 PROCEDURE — 6360000002 HC RX W HCPCS: Performed by: INTERNAL MEDICINE

## 2023-06-25 PROCEDURE — 6370000000 HC RX 637 (ALT 250 FOR IP): Performed by: STUDENT IN AN ORGANIZED HEALTH CARE EDUCATION/TRAINING PROGRAM

## 2023-06-25 PROCEDURE — 83605 ASSAY OF LACTIC ACID: CPT

## 2023-06-25 PROCEDURE — 87449 NOS EACH ORGANISM AG IA: CPT

## 2023-06-25 PROCEDURE — 93005 ELECTROCARDIOGRAM TRACING: CPT | Performed by: STUDENT IN AN ORGANIZED HEALTH CARE EDUCATION/TRAINING PROGRAM

## 2023-06-25 PROCEDURE — 96365 THER/PROPH/DIAG IV INF INIT: CPT

## 2023-06-25 PROCEDURE — 85730 THROMBOPLASTIN TIME PARTIAL: CPT

## 2023-06-25 PROCEDURE — 85025 COMPLETE CBC W/AUTO DIFF WBC: CPT

## 2023-06-25 PROCEDURE — 96366 THER/PROPH/DIAG IV INF ADDON: CPT

## 2023-06-25 PROCEDURE — 93010 ELECTROCARDIOGRAM REPORT: CPT | Performed by: INTERNAL MEDICINE

## 2023-06-25 PROCEDURE — 6360000004 HC RX CONTRAST MEDICATION: Performed by: STUDENT IN AN ORGANIZED HEALTH CARE EDUCATION/TRAINING PROGRAM

## 2023-06-25 PROCEDURE — 94640 AIRWAY INHALATION TREATMENT: CPT

## 2023-06-25 PROCEDURE — 6360000002 HC RX W HCPCS: Performed by: STUDENT IN AN ORGANIZED HEALTH CARE EDUCATION/TRAINING PROGRAM

## 2023-06-25 PROCEDURE — 2700000000 HC OXYGEN THERAPY PER DAY

## 2023-06-25 PROCEDURE — 2580000003 HC RX 258: Performed by: INTERNAL MEDICINE

## 2023-06-25 PROCEDURE — 80307 DRUG TEST PRSMV CHEM ANLYZR: CPT

## 2023-06-25 PROCEDURE — 4A03X5D MEASUREMENT OF ARTERIAL FLOW, INTRACRANIAL, EXTERNAL APPROACH: ICD-10-PCS | Performed by: STUDENT IN AN ORGANIZED HEALTH CARE EDUCATION/TRAINING PROGRAM

## 2023-06-25 PROCEDURE — 94660 CPAP INITIATION&MGMT: CPT

## 2023-06-25 PROCEDURE — 96375 TX/PRO/DX INJ NEW DRUG ADDON: CPT

## 2023-06-25 RX ORDER — METHYLPREDNISOLONE SODIUM SUCCINATE 125 MG/2ML
125 INJECTION, POWDER, LYOPHILIZED, FOR SOLUTION INTRAMUSCULAR; INTRAVENOUS ONCE
Status: COMPLETED | OUTPATIENT
Start: 2023-06-25 | End: 2023-06-25

## 2023-06-25 RX ORDER — DIVALPROEX SODIUM 250 MG/1
500 TABLET, EXTENDED RELEASE ORAL NIGHTLY
Status: DISCONTINUED | OUTPATIENT
Start: 2023-06-25 | End: 2023-06-28 | Stop reason: HOSPADM

## 2023-06-25 RX ORDER — AZITHROMYCIN 250 MG/1
250 TABLET, FILM COATED ORAL DAILY
Status: DISCONTINUED | OUTPATIENT
Start: 2023-06-26 | End: 2023-06-28 | Stop reason: HOSPADM

## 2023-06-25 RX ORDER — BUSPIRONE HYDROCHLORIDE 5 MG/1
15 TABLET ORAL 3 TIMES DAILY
Status: DISCONTINUED | OUTPATIENT
Start: 2023-06-25 | End: 2023-06-28 | Stop reason: HOSPADM

## 2023-06-25 RX ORDER — POTASSIUM CHLORIDE 20 MEQ/1
40 TABLET, EXTENDED RELEASE ORAL PRN
Status: DISCONTINUED | OUTPATIENT
Start: 2023-06-25 | End: 2023-06-28 | Stop reason: HOSPADM

## 2023-06-25 RX ORDER — METHYLPREDNISOLONE SODIUM SUCCINATE 40 MG/ML
40 INJECTION, POWDER, LYOPHILIZED, FOR SOLUTION INTRAMUSCULAR; INTRAVENOUS EVERY 12 HOURS
Status: DISCONTINUED | OUTPATIENT
Start: 2023-06-25 | End: 2023-06-28

## 2023-06-25 RX ORDER — PANTOPRAZOLE SODIUM 40 MG/1
40 TABLET, DELAYED RELEASE ORAL
Status: DISCONTINUED | OUTPATIENT
Start: 2023-06-25 | End: 2023-06-28 | Stop reason: HOSPADM

## 2023-06-25 RX ORDER — SODIUM CHLORIDE 9 MG/ML
INJECTION, SOLUTION INTRAVENOUS PRN
Status: DISCONTINUED | OUTPATIENT
Start: 2023-06-25 | End: 2023-06-28 | Stop reason: HOSPADM

## 2023-06-25 RX ORDER — SODIUM CHLORIDE 0.9 % (FLUSH) 0.9 %
10 SYRINGE (ML) INJECTION PRN
Status: DISCONTINUED | OUTPATIENT
Start: 2023-06-25 | End: 2023-06-28 | Stop reason: HOSPADM

## 2023-06-25 RX ORDER — SODIUM CHLORIDE, SODIUM LACTATE, POTASSIUM CHLORIDE, CALCIUM CHLORIDE 600; 310; 30; 20 MG/100ML; MG/100ML; MG/100ML; MG/100ML
INJECTION, SOLUTION INTRAVENOUS CONTINUOUS
Status: ACTIVE | OUTPATIENT
Start: 2023-06-25 | End: 2023-06-26

## 2023-06-25 RX ORDER — DIVALPROEX SODIUM 250 MG/1
250 TABLET, DELAYED RELEASE ORAL EVERY MORNING
Status: DISCONTINUED | OUTPATIENT
Start: 2023-06-26 | End: 2023-06-28 | Stop reason: HOSPADM

## 2023-06-25 RX ORDER — QUETIAPINE FUMARATE 100 MG/1
300 TABLET, FILM COATED ORAL NIGHTLY
Status: DISCONTINUED | OUTPATIENT
Start: 2023-06-25 | End: 2023-06-28 | Stop reason: HOSPADM

## 2023-06-25 RX ORDER — ENOXAPARIN SODIUM 100 MG/ML
40 INJECTION SUBCUTANEOUS DAILY
Status: DISCONTINUED | OUTPATIENT
Start: 2023-06-25 | End: 2023-06-28 | Stop reason: HOSPADM

## 2023-06-25 RX ORDER — MAGNESIUM SULFATE IN WATER 40 MG/ML
2000 INJECTION, SOLUTION INTRAVENOUS ONCE
Status: COMPLETED | OUTPATIENT
Start: 2023-06-25 | End: 2023-06-25

## 2023-06-25 RX ORDER — IPRATROPIUM BROMIDE AND ALBUTEROL SULFATE 2.5; .5 MG/3ML; MG/3ML
1 SOLUTION RESPIRATORY (INHALATION) ONCE
Status: COMPLETED | OUTPATIENT
Start: 2023-06-25 | End: 2023-06-25

## 2023-06-25 RX ORDER — BUTALBITAL, ACETAMINOPHEN AND CAFFEINE 50; 325; 40 MG/1; MG/1; MG/1
1 TABLET ORAL EVERY 6 HOURS PRN
Status: DISCONTINUED | OUTPATIENT
Start: 2023-06-25 | End: 2023-06-28 | Stop reason: HOSPADM

## 2023-06-25 RX ORDER — PRIMIDONE 50 MG/1
100 TABLET ORAL DAILY
Status: DISCONTINUED | OUTPATIENT
Start: 2023-06-25 | End: 2023-06-28 | Stop reason: HOSPADM

## 2023-06-25 RX ORDER — THIAMINE HYDROCHLORIDE 100 MG/ML
200 INJECTION, SOLUTION INTRAMUSCULAR; INTRAVENOUS ONCE
Status: COMPLETED | OUTPATIENT
Start: 2023-06-25 | End: 2023-06-25

## 2023-06-25 RX ORDER — CALCIUM CARBONATE 500 MG/1
1000 TABLET, CHEWABLE ORAL 3 TIMES DAILY PRN
Status: DISCONTINUED | OUTPATIENT
Start: 2023-06-25 | End: 2023-06-28 | Stop reason: HOSPADM

## 2023-06-25 RX ORDER — WATER 1000 ML/1000ML
INJECTION, SOLUTION INTRAVENOUS
Status: COMPLETED
Start: 2023-06-25 | End: 2023-06-25

## 2023-06-25 RX ORDER — ONDANSETRON 4 MG/1
4 TABLET, ORALLY DISINTEGRATING ORAL EVERY 8 HOURS PRN
Status: DISCONTINUED | OUTPATIENT
Start: 2023-06-25 | End: 2023-06-28 | Stop reason: HOSPADM

## 2023-06-25 RX ORDER — ACETAMINOPHEN 650 MG/1
650 SUPPOSITORY RECTAL EVERY 6 HOURS PRN
Status: DISCONTINUED | OUTPATIENT
Start: 2023-06-25 | End: 2023-06-28 | Stop reason: HOSPADM

## 2023-06-25 RX ORDER — ZIPRASIDONE MESYLATE 20 MG/ML
10 INJECTION, POWDER, LYOPHILIZED, FOR SOLUTION INTRAMUSCULAR EVERY 12 HOURS PRN
Status: DISCONTINUED | OUTPATIENT
Start: 2023-06-25 | End: 2023-06-28 | Stop reason: HOSPADM

## 2023-06-25 RX ORDER — IPRATROPIUM BROMIDE AND ALBUTEROL SULFATE 2.5; .5 MG/3ML; MG/3ML
1 SOLUTION RESPIRATORY (INHALATION) EVERY 4 HOURS PRN
Status: DISCONTINUED | OUTPATIENT
Start: 2023-06-25 | End: 2023-06-28 | Stop reason: HOSPADM

## 2023-06-25 RX ORDER — DULOXETIN HYDROCHLORIDE 60 MG/1
60 CAPSULE, DELAYED RELEASE ORAL DAILY
Status: DISCONTINUED | OUTPATIENT
Start: 2023-06-25 | End: 2023-06-28 | Stop reason: HOSPADM

## 2023-06-25 RX ORDER — ATORVASTATIN CALCIUM 10 MG/1
20 TABLET, FILM COATED ORAL NIGHTLY
Status: DISCONTINUED | OUTPATIENT
Start: 2023-06-25 | End: 2023-06-28 | Stop reason: HOSPADM

## 2023-06-25 RX ORDER — NICOTINE 21 MG/24HR
1 PATCH, TRANSDERMAL 24 HOURS TRANSDERMAL DAILY
Status: DISCONTINUED | OUTPATIENT
Start: 2023-06-25 | End: 2023-06-28 | Stop reason: HOSPADM

## 2023-06-25 RX ORDER — POTASSIUM CHLORIDE 7.45 MG/ML
10 INJECTION INTRAVENOUS PRN
Status: DISCONTINUED | OUTPATIENT
Start: 2023-06-25 | End: 2023-06-28 | Stop reason: HOSPADM

## 2023-06-25 RX ORDER — LANOLIN ALCOHOL/MO/W.PET/CERES
3 CREAM (GRAM) TOPICAL NIGHTLY PRN
Status: DISCONTINUED | OUTPATIENT
Start: 2023-06-25 | End: 2023-06-28 | Stop reason: HOSPADM

## 2023-06-25 RX ORDER — LURASIDONE HYDROCHLORIDE 40 MG/1
80 TABLET, FILM COATED ORAL DAILY
Status: DISCONTINUED | OUTPATIENT
Start: 2023-06-25 | End: 2023-06-28 | Stop reason: HOSPADM

## 2023-06-25 RX ORDER — ACETAMINOPHEN 325 MG/1
650 TABLET ORAL EVERY 6 HOURS PRN
Status: DISCONTINUED | OUTPATIENT
Start: 2023-06-25 | End: 2023-06-28 | Stop reason: HOSPADM

## 2023-06-25 RX ORDER — LORAZEPAM 0.5 MG/1
0.5 TABLET ORAL 2 TIMES DAILY PRN
Status: DISCONTINUED | OUTPATIENT
Start: 2023-06-25 | End: 2023-06-28 | Stop reason: HOSPADM

## 2023-06-25 RX ORDER — ONDANSETRON 2 MG/ML
4 INJECTION INTRAMUSCULAR; INTRAVENOUS EVERY 6 HOURS PRN
Status: DISCONTINUED | OUTPATIENT
Start: 2023-06-25 | End: 2023-06-28 | Stop reason: HOSPADM

## 2023-06-25 RX ORDER — IPRATROPIUM BROMIDE AND ALBUTEROL SULFATE 2.5; .5 MG/3ML; MG/3ML
1 SOLUTION RESPIRATORY (INHALATION) 4 TIMES DAILY
Status: DISCONTINUED | OUTPATIENT
Start: 2023-06-25 | End: 2023-06-25

## 2023-06-25 RX ORDER — MAGNESIUM SULFATE 1 G/100ML
1000 INJECTION INTRAVENOUS PRN
Status: DISCONTINUED | OUTPATIENT
Start: 2023-06-25 | End: 2023-06-28 | Stop reason: HOSPADM

## 2023-06-25 RX ORDER — ASPIRIN 81 MG/1
81 TABLET ORAL DAILY
Status: DISCONTINUED | OUTPATIENT
Start: 2023-06-25 | End: 2023-06-28 | Stop reason: HOSPADM

## 2023-06-25 RX ORDER — ALBUTEROL SULFATE 2.5 MG/3ML
2.5 SOLUTION RESPIRATORY (INHALATION) EVERY 4 HOURS PRN
Status: DISCONTINUED | OUTPATIENT
Start: 2023-06-25 | End: 2023-06-28 | Stop reason: HOSPADM

## 2023-06-25 RX ADMIN — Medication 10 ML: at 20:41

## 2023-06-25 RX ADMIN — WATER 10 ML: 1 INJECTION INTRAMUSCULAR; INTRAVENOUS; SUBCUTANEOUS at 20:45

## 2023-06-25 RX ADMIN — METHYLPREDNISOLONE SODIUM SUCCINATE 40 MG: 40 INJECTION INTRAMUSCULAR; INTRAVENOUS at 20:41

## 2023-06-25 RX ADMIN — ENOXAPARIN SODIUM 40 MG: 100 INJECTION SUBCUTANEOUS at 09:30

## 2023-06-25 RX ADMIN — PRIMIDONE 100 MG: 50 TABLET ORAL at 17:40

## 2023-06-25 RX ADMIN — DIVALPROEX SODIUM 500 MG: 250 TABLET, EXTENDED RELEASE ORAL at 20:40

## 2023-06-25 RX ADMIN — ACETAMINOPHEN 650 MG: 325 TABLET ORAL at 12:19

## 2023-06-25 RX ADMIN — ATORVASTATIN CALCIUM 20 MG: 10 TABLET, FILM COATED ORAL at 20:40

## 2023-06-25 RX ADMIN — CEFTRIAXONE SODIUM 1000 MG: 1 INJECTION, POWDER, FOR SOLUTION INTRAMUSCULAR; INTRAVENOUS at 05:27

## 2023-06-25 RX ADMIN — BUTALBITAL, ACETAMINOPHEN, AND CAFFEINE 1 TABLET: 50; 325; 40 TABLET ORAL at 16:58

## 2023-06-25 RX ADMIN — QUETIAPINE FUMARATE 300 MG: 100 TABLET ORAL at 20:41

## 2023-06-25 RX ADMIN — PANTOPRAZOLE SODIUM 40 MG: 40 TABLET, DELAYED RELEASE ORAL at 09:32

## 2023-06-25 RX ADMIN — THIAMINE HYDROCHLORIDE 200 MG: 100 INJECTION, SOLUTION INTRAMUSCULAR; INTRAVENOUS at 05:26

## 2023-06-25 RX ADMIN — METHYLPREDNISOLONE SODIUM SUCCINATE 125 MG: 125 INJECTION INTRAMUSCULAR; INTRAVENOUS at 02:01

## 2023-06-25 RX ADMIN — BUSPIRONE HYDROCHLORIDE 15 MG: 5 TABLET ORAL at 16:58

## 2023-06-25 RX ADMIN — SODIUM CHLORIDE, POTASSIUM CHLORIDE, SODIUM LACTATE AND CALCIUM CHLORIDE: 600; 310; 30; 20 INJECTION, SOLUTION INTRAVENOUS at 09:33

## 2023-06-25 RX ADMIN — METHYLPREDNISOLONE SODIUM SUCCINATE 40 MG: 40 INJECTION INTRAMUSCULAR; INTRAVENOUS at 09:32

## 2023-06-25 RX ADMIN — ASPIRIN 81 MG: 81 TABLET, COATED ORAL at 16:58

## 2023-06-25 RX ADMIN — MAGNESIUM SULFATE HEPTAHYDRATE 2000 MG: 40 INJECTION, SOLUTION INTRAVENOUS at 02:07

## 2023-06-25 RX ADMIN — Medication 3 MG: at 20:40

## 2023-06-25 RX ADMIN — DULOXETINE HYDROCHLORIDE 60 MG: 60 CAPSULE, DELAYED RELEASE ORAL at 16:58

## 2023-06-25 RX ADMIN — BUSPIRONE HYDROCHLORIDE 15 MG: 5 TABLET ORAL at 20:40

## 2023-06-25 RX ADMIN — IPRATROPIUM BROMIDE AND ALBUTEROL SULFATE 1 DOSE: 2.5; .5 SOLUTION RESPIRATORY (INHALATION) at 02:00

## 2023-06-25 RX ADMIN — LURASIDONE HYDROCHLORIDE 80 MG: 40 TABLET, FILM COATED ORAL at 17:40

## 2023-06-25 RX ADMIN — AZITHROMYCIN MONOHYDRATE 500 MG: 500 INJECTION, POWDER, LYOPHILIZED, FOR SOLUTION INTRAVENOUS at 05:31

## 2023-06-25 RX ADMIN — IOPAMIDOL 75 ML: 755 INJECTION, SOLUTION INTRAVENOUS at 01:40

## 2023-06-25 ASSESSMENT — PAIN DESCRIPTION - PAIN TYPE: TYPE: ACUTE PAIN

## 2023-06-25 ASSESSMENT — PAIN SCALES - GENERAL
PAINLEVEL_OUTOF10: 6
PAINLEVEL_OUTOF10: 0
PAINLEVEL_OUTOF10: 4

## 2023-06-25 ASSESSMENT — PAIN DESCRIPTION - ORIENTATION
ORIENTATION: MID
ORIENTATION: MID

## 2023-06-25 ASSESSMENT — PAIN DESCRIPTION - DESCRIPTORS
DESCRIPTORS: ACHING
DESCRIPTORS: ACHING

## 2023-06-25 ASSESSMENT — PAIN - FUNCTIONAL ASSESSMENT
PAIN_FUNCTIONAL_ASSESSMENT: ACTIVITIES ARE NOT PREVENTED
PAIN_FUNCTIONAL_ASSESSMENT: ACTIVITIES ARE NOT PREVENTED

## 2023-06-25 ASSESSMENT — PAIN DESCRIPTION - LOCATION
LOCATION: HEAD
LOCATION: HEAD

## 2023-06-25 ASSESSMENT — PAIN DESCRIPTION - ONSET: ONSET: GRADUAL

## 2023-06-26 LAB
BASOPHILS # BLD: 0 K/UL (ref 0–0.2)
BASOPHILS NFR BLD: 0.1 %
DEPRECATED RDW RBC AUTO: 14.9 % (ref 12.4–15.4)
EOSINOPHIL # BLD: 0 K/UL (ref 0–0.6)
EOSINOPHIL NFR BLD: 0 %
HCT VFR BLD AUTO: 34.4 % (ref 36–48)
HGB BLD-MCNC: 11.1 G/DL (ref 12–16)
LYMPHOCYTES # BLD: 1.3 K/UL (ref 1–5.1)
LYMPHOCYTES NFR BLD: 8.1 %
MCH RBC QN AUTO: 29 PG (ref 26–34)
MCHC RBC AUTO-ENTMCNC: 32.4 G/DL (ref 31–36)
MCV RBC AUTO: 89.6 FL (ref 80–100)
MONOCYTES # BLD: 0.9 K/UL (ref 0–1.3)
MONOCYTES NFR BLD: 5.6 %
NEUTROPHILS # BLD: 13.7 K/UL (ref 1.7–7.7)
NEUTROPHILS NFR BLD: 86.2 %
PLATELET # BLD AUTO: 122 K/UL (ref 135–450)
PMV BLD AUTO: 7.7 FL (ref 5–10.5)
RBC # BLD AUTO: 3.84 M/UL (ref 4–5.2)
WBC # BLD AUTO: 15.9 K/UL (ref 4–11)

## 2023-06-26 PROCEDURE — 97162 PT EVAL MOD COMPLEX 30 MIN: CPT

## 2023-06-26 PROCEDURE — 6370000000 HC RX 637 (ALT 250 FOR IP): Performed by: INTERNAL MEDICINE

## 2023-06-26 PROCEDURE — 97166 OT EVAL MOD COMPLEX 45 MIN: CPT

## 2023-06-26 PROCEDURE — 94761 N-INVAS EAR/PLS OXIMETRY MLT: CPT

## 2023-06-26 PROCEDURE — 85025 COMPLETE CBC W/AUTO DIFF WBC: CPT

## 2023-06-26 PROCEDURE — 94660 CPAP INITIATION&MGMT: CPT

## 2023-06-26 PROCEDURE — 2580000003 HC RX 258: Performed by: INTERNAL MEDICINE

## 2023-06-26 PROCEDURE — 6360000002 HC RX W HCPCS: Performed by: INTERNAL MEDICINE

## 2023-06-26 PROCEDURE — 97530 THERAPEUTIC ACTIVITIES: CPT

## 2023-06-26 PROCEDURE — 2700000000 HC OXYGEN THERAPY PER DAY

## 2023-06-26 PROCEDURE — 2060000000 HC ICU INTERMEDIATE R&B

## 2023-06-26 PROCEDURE — 2500000003 HC RX 250 WO HCPCS: Performed by: INTERNAL MEDICINE

## 2023-06-26 RX ORDER — CARVEDILOL 6.25 MG/1
12.5 TABLET ORAL EVERY 12 HOURS
Status: DISCONTINUED | OUTPATIENT
Start: 2023-06-26 | End: 2023-06-28 | Stop reason: HOSPADM

## 2023-06-26 RX ORDER — GUAIFENESIN 600 MG/1
600 TABLET, EXTENDED RELEASE ORAL 2 TIMES DAILY
Status: DISCONTINUED | OUTPATIENT
Start: 2023-06-26 | End: 2023-06-28 | Stop reason: HOSPADM

## 2023-06-26 RX ORDER — BENZONATATE 100 MG/1
200 CAPSULE ORAL 3 TIMES DAILY PRN
Status: DISCONTINUED | OUTPATIENT
Start: 2023-06-26 | End: 2023-06-28 | Stop reason: HOSPADM

## 2023-06-26 RX ORDER — HYDRALAZINE HYDROCHLORIDE 20 MG/ML
10 INJECTION INTRAMUSCULAR; INTRAVENOUS EVERY 6 HOURS PRN
Status: DISCONTINUED | OUTPATIENT
Start: 2023-06-26 | End: 2023-06-28 | Stop reason: HOSPADM

## 2023-06-26 RX ORDER — GUAIFENESIN/DEXTROMETHORPHAN 100-10MG/5
10 SYRUP ORAL EVERY 4 HOURS PRN
Status: DISCONTINUED | OUTPATIENT
Start: 2023-06-26 | End: 2023-06-28 | Stop reason: HOSPADM

## 2023-06-26 RX ADMIN — ACETAMINOPHEN 650 MG: 325 TABLET ORAL at 15:29

## 2023-06-26 RX ADMIN — MICONAZOLE NITRATE: 2 POWDER TOPICAL at 07:39

## 2023-06-26 RX ADMIN — BUSPIRONE HYDROCHLORIDE 15 MG: 5 TABLET ORAL at 07:37

## 2023-06-26 RX ADMIN — METHYLPREDNISOLONE SODIUM SUCCINATE 40 MG: 40 INJECTION INTRAMUSCULAR; INTRAVENOUS at 20:47

## 2023-06-26 RX ADMIN — ASPIRIN 81 MG: 81 TABLET, COATED ORAL at 07:37

## 2023-06-26 RX ADMIN — ATORVASTATIN CALCIUM 20 MG: 10 TABLET, FILM COATED ORAL at 20:48

## 2023-06-26 RX ADMIN — DULOXETINE HYDROCHLORIDE 60 MG: 60 CAPSULE, DELAYED RELEASE ORAL at 07:37

## 2023-06-26 RX ADMIN — DIVALPROEX SODIUM 500 MG: 250 TABLET, EXTENDED RELEASE ORAL at 20:48

## 2023-06-26 RX ADMIN — DIVALPROEX SODIUM 250 MG: 250 TABLET, DELAYED RELEASE ORAL at 07:37

## 2023-06-26 RX ADMIN — BUSPIRONE HYDROCHLORIDE 15 MG: 5 TABLET ORAL at 15:29

## 2023-06-26 RX ADMIN — AZITHROMYCIN MONOHYDRATE 250 MG: 250 TABLET ORAL at 07:37

## 2023-06-26 RX ADMIN — MICONAZOLE NITRATE: 2 POWDER TOPICAL at 00:55

## 2023-06-26 RX ADMIN — CARVEDILOL 12.5 MG: 6.25 TABLET, FILM COATED ORAL at 20:00

## 2023-06-26 RX ADMIN — METHYLPREDNISOLONE SODIUM SUCCINATE 40 MG: 40 INJECTION INTRAMUSCULAR; INTRAVENOUS at 07:37

## 2023-06-26 RX ADMIN — PRIMIDONE 100 MG: 50 TABLET ORAL at 07:43

## 2023-06-26 RX ADMIN — ENOXAPARIN SODIUM 40 MG: 100 INJECTION SUBCUTANEOUS at 07:38

## 2023-06-26 RX ADMIN — LURASIDONE HYDROCHLORIDE 80 MG: 40 TABLET, FILM COATED ORAL at 07:38

## 2023-06-26 RX ADMIN — GUAIFENESIN 600 MG: 600 TABLET ORAL at 20:54

## 2023-06-26 RX ADMIN — HYDRALAZINE HYDROCHLORIDE 10 MG: 20 INJECTION INTRAMUSCULAR; INTRAVENOUS at 19:54

## 2023-06-26 RX ADMIN — BENZONATATE 200 MG: 100 CAPSULE ORAL at 15:30

## 2023-06-26 RX ADMIN — GUAIFENESIN AND DEXTROMETHORPHAN 10 ML: 100; 10 SYRUP ORAL at 15:30

## 2023-06-26 RX ADMIN — MICONAZOLE NITRATE: 2 POWDER TOPICAL at 20:54

## 2023-06-26 RX ADMIN — BUSPIRONE HYDROCHLORIDE 15 MG: 5 TABLET ORAL at 20:48

## 2023-06-26 RX ADMIN — BUTALBITAL, ACETAMINOPHEN, AND CAFFEINE 1 TABLET: 50; 325; 40 TABLET ORAL at 08:01

## 2023-06-26 RX ADMIN — GUAIFENESIN 600 MG: 600 TABLET ORAL at 15:32

## 2023-06-26 RX ADMIN — QUETIAPINE FUMARATE 300 MG: 100 TABLET ORAL at 20:47

## 2023-06-26 RX ADMIN — CEFTRIAXONE SODIUM 1000 MG: 1 INJECTION, POWDER, FOR SOLUTION INTRAMUSCULAR; INTRAVENOUS at 05:22

## 2023-06-26 RX ADMIN — PANTOPRAZOLE SODIUM 40 MG: 40 TABLET, DELAYED RELEASE ORAL at 05:28

## 2023-06-26 ASSESSMENT — PAIN SCALES - GENERAL
PAINLEVEL_OUTOF10: 0
PAINLEVEL_OUTOF10: 8
PAINLEVEL_OUTOF10: 0
PAINLEVEL_OUTOF10: 8

## 2023-06-27 LAB
ANION GAP SERPL CALCULATED.3IONS-SCNC: 10 MMOL/L (ref 3–16)
BASOPHILS # BLD: 0 K/UL (ref 0–0.2)
BASOPHILS NFR BLD: 0.1 %
BUN SERPL-MCNC: 15 MG/DL (ref 7–20)
CALCIUM SERPL-MCNC: 8.9 MG/DL (ref 8.3–10.6)
CHLORIDE SERPL-SCNC: 95 MMOL/L (ref 99–110)
CO2 SERPL-SCNC: 27 MMOL/L (ref 21–32)
CREAT SERPL-MCNC: <0.5 MG/DL (ref 0.6–1.2)
DEPRECATED RDW RBC AUTO: 14.5 % (ref 12.4–15.4)
EOSINOPHIL # BLD: 0 K/UL (ref 0–0.6)
EOSINOPHIL NFR BLD: 0 %
GFR SERPLBLD CREATININE-BSD FMLA CKD-EPI: >60 ML/MIN/{1.73_M2}
GLUCOSE SERPL-MCNC: 113 MG/DL (ref 70–99)
HCT VFR BLD AUTO: 32.8 % (ref 36–48)
HGB BLD-MCNC: 11 G/DL (ref 12–16)
LEGIONELLA AG UR QL: NORMAL
LYMPHOCYTES # BLD: 1 K/UL (ref 1–5.1)
LYMPHOCYTES NFR BLD: 9.3 %
MAGNESIUM SERPL-MCNC: 1.8 MG/DL (ref 1.8–2.4)
MCH RBC QN AUTO: 29.6 PG (ref 26–34)
MCHC RBC AUTO-ENTMCNC: 33.6 G/DL (ref 31–36)
MCV RBC AUTO: 88.2 FL (ref 80–100)
MONOCYTES # BLD: 0.5 K/UL (ref 0–1.3)
MONOCYTES NFR BLD: 4.7 %
NEUTROPHILS # BLD: 9.5 K/UL (ref 1.7–7.7)
NEUTROPHILS NFR BLD: 85.9 %
PLATELET # BLD AUTO: 136 K/UL (ref 135–450)
PMV BLD AUTO: 7.6 FL (ref 5–10.5)
POTASSIUM SERPL-SCNC: 4.1 MMOL/L (ref 3.5–5.1)
RBC # BLD AUTO: 3.72 M/UL (ref 4–5.2)
S PNEUM AG UR QL: NORMAL
SODIUM SERPL-SCNC: 132 MMOL/L (ref 136–145)
VALPROATE SERPL-MCNC: 36.1 UG/ML (ref 50–100)
WBC # BLD AUTO: 11.1 K/UL (ref 4–11)

## 2023-06-27 PROCEDURE — 6370000000 HC RX 637 (ALT 250 FOR IP): Performed by: INTERNAL MEDICINE

## 2023-06-27 PROCEDURE — 80048 BASIC METABOLIC PNL TOTAL CA: CPT

## 2023-06-27 PROCEDURE — 2700000000 HC OXYGEN THERAPY PER DAY

## 2023-06-27 PROCEDURE — 97530 THERAPEUTIC ACTIVITIES: CPT

## 2023-06-27 PROCEDURE — 97116 GAIT TRAINING THERAPY: CPT

## 2023-06-27 PROCEDURE — 36415 COLL VENOUS BLD VENIPUNCTURE: CPT

## 2023-06-27 PROCEDURE — 85025 COMPLETE CBC W/AUTO DIFF WBC: CPT

## 2023-06-27 PROCEDURE — 97535 SELF CARE MNGMENT TRAINING: CPT

## 2023-06-27 PROCEDURE — 97110 THERAPEUTIC EXERCISES: CPT

## 2023-06-27 PROCEDURE — 2060000000 HC ICU INTERMEDIATE R&B

## 2023-06-27 PROCEDURE — 6360000002 HC RX W HCPCS: Performed by: INTERNAL MEDICINE

## 2023-06-27 PROCEDURE — 80164 ASSAY DIPROPYLACETIC ACD TOT: CPT

## 2023-06-27 PROCEDURE — 94761 N-INVAS EAR/PLS OXIMETRY MLT: CPT

## 2023-06-27 PROCEDURE — 83735 ASSAY OF MAGNESIUM: CPT

## 2023-06-27 PROCEDURE — 2580000003 HC RX 258: Performed by: INTERNAL MEDICINE

## 2023-06-27 RX ORDER — AMLODIPINE BESYLATE 5 MG/1
5 TABLET ORAL DAILY
Status: DISCONTINUED | OUTPATIENT
Start: 2023-06-27 | End: 2023-06-28 | Stop reason: HOSPADM

## 2023-06-27 RX ORDER — CLONIDINE HYDROCHLORIDE 0.1 MG/1
0.1 TABLET ORAL EVERY 12 HOURS PRN
Status: DISCONTINUED | OUTPATIENT
Start: 2023-06-27 | End: 2023-06-28 | Stop reason: HOSPADM

## 2023-06-27 RX ORDER — LOPERAMIDE HYDROCHLORIDE 2 MG/1
2 CAPSULE ORAL 4 TIMES DAILY PRN
Status: DISCONTINUED | OUTPATIENT
Start: 2023-06-27 | End: 2023-06-28 | Stop reason: HOSPADM

## 2023-06-27 RX ADMIN — DIVALPROEX SODIUM 250 MG: 250 TABLET, DELAYED RELEASE ORAL at 08:53

## 2023-06-27 RX ADMIN — ENOXAPARIN SODIUM 40 MG: 100 INJECTION SUBCUTANEOUS at 08:54

## 2023-06-27 RX ADMIN — METHYLPREDNISOLONE SODIUM SUCCINATE 40 MG: 40 INJECTION INTRAMUSCULAR; INTRAVENOUS at 21:21

## 2023-06-27 RX ADMIN — BUSPIRONE HYDROCHLORIDE 15 MG: 5 TABLET ORAL at 13:30

## 2023-06-27 RX ADMIN — AZITHROMYCIN MONOHYDRATE 250 MG: 250 TABLET ORAL at 08:53

## 2023-06-27 RX ADMIN — QUETIAPINE FUMARATE 300 MG: 100 TABLET ORAL at 21:13

## 2023-06-27 RX ADMIN — CEFTRIAXONE SODIUM 1000 MG: 1 INJECTION, POWDER, FOR SOLUTION INTRAMUSCULAR; INTRAVENOUS at 06:09

## 2023-06-27 RX ADMIN — CARVEDILOL 12.5 MG: 6.25 TABLET, FILM COATED ORAL at 06:03

## 2023-06-27 RX ADMIN — GUAIFENESIN 600 MG: 600 TABLET ORAL at 08:53

## 2023-06-27 RX ADMIN — ATORVASTATIN CALCIUM 20 MG: 10 TABLET, FILM COATED ORAL at 21:14

## 2023-06-27 RX ADMIN — HYDRALAZINE HYDROCHLORIDE 10 MG: 20 INJECTION INTRAMUSCULAR; INTRAVENOUS at 07:55

## 2023-06-27 RX ADMIN — HYDRALAZINE HYDROCHLORIDE 10 MG: 20 INJECTION INTRAMUSCULAR; INTRAVENOUS at 13:30

## 2023-06-27 RX ADMIN — BUSPIRONE HYDROCHLORIDE 15 MG: 5 TABLET ORAL at 08:54

## 2023-06-27 RX ADMIN — METHYLPREDNISOLONE SODIUM SUCCINATE 40 MG: 40 INJECTION INTRAMUSCULAR; INTRAVENOUS at 08:54

## 2023-06-27 RX ADMIN — BUTALBITAL, ACETAMINOPHEN, AND CAFFEINE 1 TABLET: 50; 325; 40 TABLET ORAL at 13:57

## 2023-06-27 RX ADMIN — LURASIDONE HYDROCHLORIDE 80 MG: 40 TABLET, FILM COATED ORAL at 08:54

## 2023-06-27 RX ADMIN — BUSPIRONE HYDROCHLORIDE 15 MG: 5 TABLET ORAL at 21:13

## 2023-06-27 RX ADMIN — AMLODIPINE BESYLATE 5 MG: 5 TABLET ORAL at 12:03

## 2023-06-27 RX ADMIN — GUAIFENESIN 600 MG: 600 TABLET ORAL at 21:14

## 2023-06-27 RX ADMIN — ACETAMINOPHEN 650 MG: 325 TABLET ORAL at 08:53

## 2023-06-27 RX ADMIN — CARVEDILOL 12.5 MG: 6.25 TABLET, FILM COATED ORAL at 17:11

## 2023-06-27 RX ADMIN — ASPIRIN 81 MG: 81 TABLET, COATED ORAL at 08:53

## 2023-06-27 RX ADMIN — ACETAMINOPHEN 650 MG: 325 TABLET ORAL at 17:11

## 2023-06-27 RX ADMIN — PANTOPRAZOLE SODIUM 40 MG: 40 TABLET, DELAYED RELEASE ORAL at 08:53

## 2023-06-27 RX ADMIN — LOPERAMIDE HYDROCHLORIDE 2 MG: 2 CAPSULE ORAL at 12:03

## 2023-06-27 RX ADMIN — DIVALPROEX SODIUM 500 MG: 250 TABLET, EXTENDED RELEASE ORAL at 21:13

## 2023-06-27 RX ADMIN — MICONAZOLE NITRATE: 2 POWDER TOPICAL at 21:18

## 2023-06-27 RX ADMIN — Medication 10 ML: at 21:14

## 2023-06-27 RX ADMIN — MICONAZOLE NITRATE: 2 POWDER TOPICAL at 08:54

## 2023-06-27 RX ADMIN — Medication 10 ML: at 07:56

## 2023-06-27 RX ADMIN — CLONIDINE HYDROCHLORIDE 0.1 MG: 0.1 TABLET ORAL at 21:13

## 2023-06-27 RX ADMIN — DULOXETINE HYDROCHLORIDE 60 MG: 60 CAPSULE, DELAYED RELEASE ORAL at 08:53

## 2023-06-27 RX ADMIN — PRIMIDONE 100 MG: 50 TABLET ORAL at 08:53

## 2023-06-27 ASSESSMENT — PAIN SCALES - GENERAL
PAINLEVEL_OUTOF10: 0
PAINLEVEL_OUTOF10: 5
PAINLEVEL_OUTOF10: 0

## 2023-06-27 ASSESSMENT — PAIN DESCRIPTION - LOCATION: LOCATION: HEAD

## 2023-06-27 ASSESSMENT — PAIN DESCRIPTION - DESCRIPTORS: DESCRIPTORS: ACHING

## 2023-06-27 ASSESSMENT — PAIN DESCRIPTION - PAIN TYPE: TYPE: ACUTE PAIN

## 2023-06-27 ASSESSMENT — PAIN - FUNCTIONAL ASSESSMENT: PAIN_FUNCTIONAL_ASSESSMENT: ACTIVITIES ARE NOT PREVENTED

## 2023-06-27 ASSESSMENT — PAIN DESCRIPTION - FREQUENCY: FREQUENCY: CONTINUOUS

## 2023-06-27 ASSESSMENT — PAIN DESCRIPTION - ONSET: ONSET: ON-GOING

## 2023-06-28 VITALS
HEIGHT: 62 IN | RESPIRATION RATE: 16 BRPM | WEIGHT: 180.8 LBS | TEMPERATURE: 98.8 F | SYSTOLIC BLOOD PRESSURE: 180 MMHG | OXYGEN SATURATION: 95 % | DIASTOLIC BLOOD PRESSURE: 89 MMHG | BODY MASS INDEX: 33.27 KG/M2 | HEART RATE: 91 BPM

## 2023-06-28 LAB
ANION GAP SERPL CALCULATED.3IONS-SCNC: 13 MMOL/L (ref 3–16)
ANISOCYTOSIS BLD QL SMEAR: ABNORMAL
BASOPHILS # BLD: 0 K/UL (ref 0–0.2)
BASOPHILS NFR BLD: 0 %
BUN SERPL-MCNC: 12 MG/DL (ref 7–20)
CALCIUM SERPL-MCNC: 9.2 MG/DL (ref 8.3–10.6)
CHLORIDE SERPL-SCNC: 90 MMOL/L (ref 99–110)
CO2 SERPL-SCNC: 28 MMOL/L (ref 21–32)
CREAT SERPL-MCNC: <0.5 MG/DL (ref 0.6–1.2)
DEPRECATED RDW RBC AUTO: 14.5 % (ref 12.4–15.4)
EOSINOPHIL # BLD: 0 K/UL (ref 0–0.6)
EOSINOPHIL NFR BLD: 0 %
GFR SERPLBLD CREATININE-BSD FMLA CKD-EPI: >60 ML/MIN/{1.73_M2}
GLUCOSE SERPL-MCNC: 125 MG/DL (ref 70–99)
HCT VFR BLD AUTO: 36.2 % (ref 36–48)
HGB BLD-MCNC: 12.1 G/DL (ref 12–16)
LYMPHOCYTES # BLD: 0.8 K/UL (ref 1–5.1)
LYMPHOCYTES NFR BLD: 10 %
MACROCYTES BLD QL SMEAR: ABNORMAL
MAGNESIUM SERPL-MCNC: 2 MG/DL (ref 1.8–2.4)
MCH RBC QN AUTO: 29.4 PG (ref 26–34)
MCHC RBC AUTO-ENTMCNC: 33.4 G/DL (ref 31–36)
MCV RBC AUTO: 88.2 FL (ref 80–100)
METAMYELOCYTES NFR BLD MANUAL: 2 %
MICROCYTES BLD QL SMEAR: ABNORMAL
MONOCYTES # BLD: 0.4 K/UL (ref 0–1.3)
MONOCYTES NFR BLD: 5 %
NEUTROPHILS # BLD: 7.1 K/UL (ref 1.7–7.7)
NEUTROPHILS NFR BLD: 78 %
NEUTS BAND NFR BLD MANUAL: 5 % (ref 0–7)
PLATELET # BLD AUTO: 170 K/UL (ref 135–450)
PLATELET BLD QL SMEAR: ADEQUATE
PMV BLD AUTO: 7 FL (ref 5–10.5)
POLYCHROMASIA BLD QL SMEAR: ABNORMAL
POTASSIUM SERPL-SCNC: 3.9 MMOL/L (ref 3.5–5.1)
RBC # BLD AUTO: 4.11 M/UL (ref 4–5.2)
SLIDE REVIEW: ABNORMAL
SODIUM SERPL-SCNC: 131 MMOL/L (ref 136–145)
SODIUM UR-SCNC: 129 MMOL/L
WBC # BLD AUTO: 8.3 K/UL (ref 4–11)

## 2023-06-28 PROCEDURE — 97535 SELF CARE MNGMENT TRAINING: CPT

## 2023-06-28 PROCEDURE — 6370000000 HC RX 637 (ALT 250 FOR IP): Performed by: INTERNAL MEDICINE

## 2023-06-28 PROCEDURE — 80048 BASIC METABOLIC PNL TOTAL CA: CPT

## 2023-06-28 PROCEDURE — 83930 ASSAY OF BLOOD OSMOLALITY: CPT

## 2023-06-28 PROCEDURE — 85025 COMPLETE CBC W/AUTO DIFF WBC: CPT

## 2023-06-28 PROCEDURE — 97530 THERAPEUTIC ACTIVITIES: CPT

## 2023-06-28 PROCEDURE — 2580000003 HC RX 258: Performed by: INTERNAL MEDICINE

## 2023-06-28 PROCEDURE — 94761 N-INVAS EAR/PLS OXIMETRY MLT: CPT

## 2023-06-28 PROCEDURE — 83935 ASSAY OF URINE OSMOLALITY: CPT

## 2023-06-28 PROCEDURE — 6360000002 HC RX W HCPCS: Performed by: INTERNAL MEDICINE

## 2023-06-28 PROCEDURE — 2700000000 HC OXYGEN THERAPY PER DAY

## 2023-06-28 PROCEDURE — 84300 ASSAY OF URINE SODIUM: CPT

## 2023-06-28 PROCEDURE — 36415 COLL VENOUS BLD VENIPUNCTURE: CPT

## 2023-06-28 PROCEDURE — 6370000000 HC RX 637 (ALT 250 FOR IP): Performed by: STUDENT IN AN ORGANIZED HEALTH CARE EDUCATION/TRAINING PROGRAM

## 2023-06-28 PROCEDURE — 97116 GAIT TRAINING THERAPY: CPT

## 2023-06-28 PROCEDURE — 83735 ASSAY OF MAGNESIUM: CPT

## 2023-06-28 PROCEDURE — 82088 ASSAY OF ALDOSTERONE: CPT

## 2023-06-28 PROCEDURE — 84244 ASSAY OF RENIN: CPT

## 2023-06-28 RX ORDER — AMLODIPINE BESYLATE 5 MG/1
5 TABLET ORAL DAILY
Qty: 30 TABLET | Refills: 0 | Status: SHIPPED | OUTPATIENT
Start: 2023-06-29

## 2023-06-28 RX ORDER — PREDNISONE 20 MG/1
40 TABLET ORAL DAILY
Qty: 16 TABLET | Refills: 0 | Status: SHIPPED | OUTPATIENT
Start: 2023-06-28 | End: 2023-07-01

## 2023-06-28 RX ORDER — CEFUROXIME AXETIL 500 MG/1
500 TABLET ORAL 2 TIMES DAILY
Qty: 10 TABLET | Refills: 0 | Status: SHIPPED | OUTPATIENT
Start: 2023-06-28 | End: 2023-07-03

## 2023-06-28 RX ORDER — LOSARTAN POTASSIUM 25 MG/1
50 TABLET ORAL DAILY
Status: DISCONTINUED | OUTPATIENT
Start: 2023-06-28 | End: 2023-06-28 | Stop reason: HOSPADM

## 2023-06-28 RX ORDER — GUAIFENESIN 600 MG/1
600 TABLET, EXTENDED RELEASE ORAL 2 TIMES DAILY
Qty: 10 TABLET | Refills: 0 | Status: SHIPPED | OUTPATIENT
Start: 2023-06-28 | End: 2023-07-03

## 2023-06-28 RX ORDER — PREDNISONE 20 MG/1
40 TABLET ORAL DAILY
Status: DISCONTINUED | OUTPATIENT
Start: 2023-06-28 | End: 2023-06-28 | Stop reason: HOSPADM

## 2023-06-28 RX ORDER — LOSARTAN POTASSIUM 50 MG/1
50 TABLET ORAL DAILY
Qty: 15 TABLET | Refills: 0 | Status: SHIPPED | OUTPATIENT
Start: 2023-06-28 | End: 2023-07-13

## 2023-06-28 RX ADMIN — PRIMIDONE 100 MG: 50 TABLET ORAL at 09:01

## 2023-06-28 RX ADMIN — Medication 10 ML: at 06:56

## 2023-06-28 RX ADMIN — LORAZEPAM 0.5 MG: 0.5 TABLET ORAL at 14:22

## 2023-06-28 RX ADMIN — ASPIRIN 81 MG: 81 TABLET, COATED ORAL at 09:01

## 2023-06-28 RX ADMIN — DIVALPROEX SODIUM 250 MG: 250 TABLET, DELAYED RELEASE ORAL at 09:01

## 2023-06-28 RX ADMIN — MICONAZOLE NITRATE: 2 POWDER TOPICAL at 09:03

## 2023-06-28 RX ADMIN — DULOXETINE HYDROCHLORIDE 60 MG: 60 CAPSULE, DELAYED RELEASE ORAL at 09:01

## 2023-06-28 RX ADMIN — LURASIDONE HYDROCHLORIDE 80 MG: 40 TABLET, FILM COATED ORAL at 09:10

## 2023-06-28 RX ADMIN — BUTALBITAL, ACETAMINOPHEN, AND CAFFEINE 1 TABLET: 50; 325; 40 TABLET ORAL at 09:13

## 2023-06-28 RX ADMIN — METHYLPREDNISOLONE SODIUM SUCCINATE 40 MG: 40 INJECTION INTRAMUSCULAR; INTRAVENOUS at 09:02

## 2023-06-28 RX ADMIN — LOSARTAN POTASSIUM 50 MG: 25 TABLET, FILM COATED ORAL at 13:30

## 2023-06-28 RX ADMIN — SODIUM CHLORIDE 25 ML: 9 INJECTION, SOLUTION INTRAVENOUS at 06:23

## 2023-06-28 RX ADMIN — BUSPIRONE HYDROCHLORIDE 15 MG: 5 TABLET ORAL at 09:01

## 2023-06-28 RX ADMIN — GUAIFENESIN 600 MG: 600 TABLET ORAL at 09:01

## 2023-06-28 RX ADMIN — PANTOPRAZOLE SODIUM 40 MG: 40 TABLET, DELAYED RELEASE ORAL at 06:15

## 2023-06-28 RX ADMIN — CLONIDINE HYDROCHLORIDE 0.1 MG: 0.1 TABLET ORAL at 11:40

## 2023-06-28 RX ADMIN — HYDRALAZINE HYDROCHLORIDE 10 MG: 20 INJECTION INTRAMUSCULAR; INTRAVENOUS at 04:09

## 2023-06-28 RX ADMIN — AZITHROMYCIN MONOHYDRATE 250 MG: 250 TABLET ORAL at 09:01

## 2023-06-28 RX ADMIN — CEFTRIAXONE SODIUM 1000 MG: 1 INJECTION, POWDER, FOR SOLUTION INTRAMUSCULAR; INTRAVENOUS at 06:24

## 2023-06-28 RX ADMIN — ENOXAPARIN SODIUM 40 MG: 100 INJECTION SUBCUTANEOUS at 09:02

## 2023-06-28 RX ADMIN — LOPERAMIDE HYDROCHLORIDE 2 MG: 2 CAPSULE ORAL at 06:55

## 2023-06-28 RX ADMIN — CARVEDILOL 12.5 MG: 6.25 TABLET, FILM COATED ORAL at 06:15

## 2023-06-28 RX ADMIN — AMLODIPINE BESYLATE 5 MG: 5 TABLET ORAL at 09:01

## 2023-06-28 RX ADMIN — BUSPIRONE HYDROCHLORIDE 15 MG: 5 TABLET ORAL at 13:30

## 2023-06-28 RX ADMIN — PREDNISONE 40 MG: 20 TABLET ORAL at 11:39

## 2023-06-28 RX ADMIN — Medication 10 ML: at 09:04

## 2023-06-28 ASSESSMENT — PAIN - FUNCTIONAL ASSESSMENT: PAIN_FUNCTIONAL_ASSESSMENT: ACTIVITIES ARE NOT PREVENTED

## 2023-06-28 ASSESSMENT — PAIN DESCRIPTION - ONSET: ONSET: GRADUAL

## 2023-06-28 ASSESSMENT — PAIN DESCRIPTION - FREQUENCY: FREQUENCY: INTERMITTENT

## 2023-06-28 ASSESSMENT — PAIN DESCRIPTION - LOCATION: LOCATION: HEAD

## 2023-06-28 ASSESSMENT — PAIN SCALES - GENERAL
PAINLEVEL_OUTOF10: 0
PAINLEVEL_OUTOF10: 8
PAINLEVEL_OUTOF10: 4

## 2023-06-28 ASSESSMENT — PAIN DESCRIPTION - ORIENTATION: ORIENTATION: MID

## 2023-06-28 ASSESSMENT — PAIN DESCRIPTION - PAIN TYPE: TYPE: ACUTE PAIN

## 2023-06-28 ASSESSMENT — PAIN DESCRIPTION - DESCRIPTORS: DESCRIPTORS: POUNDING

## 2023-06-29 LAB
ALDOST SERPL-MCNC: 7.7 NG/DL
BACTERIA BLD CULT ORG #2: NORMAL
BACTERIA BLD CULT: NORMAL
OSMOLALITY SERPL: 286 MOSM/KG (ref 280–301)
OSMOLALITY UR: 552 MOSM/KG (ref 390–1070)

## 2023-06-30 LAB — RENIN PLAS-CCNC: 0.3 NG/ML/HR

## 2023-07-24 ENCOUNTER — TELEPHONE (OUTPATIENT)
Dept: OTHER | Facility: CLINIC | Age: 73
End: 2023-07-24

## 2023-07-24 ENCOUNTER — APPOINTMENT (OUTPATIENT)
Dept: GENERAL RADIOLOGY | Age: 73
End: 2023-07-24
Payer: COMMERCIAL

## 2023-07-24 ENCOUNTER — HOSPITAL ENCOUNTER (EMERGENCY)
Age: 73
Discharge: HOME | End: 2023-07-24
Attending: EMERGENCY MEDICINE
Payer: COMMERCIAL

## 2023-07-24 VITALS
SYSTOLIC BLOOD PRESSURE: 165 MMHG | HEART RATE: 78 BPM | BODY MASS INDEX: 33.79 KG/M2 | HEIGHT: 61 IN | TEMPERATURE: 98.9 F | DIASTOLIC BLOOD PRESSURE: 79 MMHG | RESPIRATION RATE: 20 BRPM | WEIGHT: 179 LBS | OXYGEN SATURATION: 93 %

## 2023-07-24 DIAGNOSIS — R60.9 DEPENDENT EDEMA: Primary | ICD-10-CM

## 2023-07-24 LAB
ALBUMIN SERPL-MCNC: 4 G/DL (ref 3.4–5)
ALBUMIN/GLOB SERPL: 1.2 {RATIO} (ref 1.1–2.2)
ALP SERPL-CCNC: 105 U/L (ref 40–129)
ALT SERPL-CCNC: 7 U/L (ref 10–40)
ANION GAP SERPL CALCULATED.3IONS-SCNC: 13 MMOL/L (ref 3–16)
AST SERPL-CCNC: 11 U/L (ref 15–37)
BASOPHILS # BLD: 0.1 K/UL (ref 0–0.2)
BASOPHILS NFR BLD: 1.3 %
BILIRUB SERPL-MCNC: <0.2 MG/DL (ref 0–1)
BILIRUB UR QL STRIP.AUTO: NEGATIVE
BUN SERPL-MCNC: 7 MG/DL (ref 7–20)
CALCIUM SERPL-MCNC: 9.7 MG/DL (ref 8.3–10.6)
CHLORIDE SERPL-SCNC: 95 MMOL/L (ref 99–110)
CLARITY UR: CLEAR
CO2 SERPL-SCNC: 29 MMOL/L (ref 21–32)
COLOR UR: YELLOW
CREAT SERPL-MCNC: 0.7 MG/DL (ref 0.6–1.2)
DEPRECATED RDW RBC AUTO: 15.2 % (ref 12.4–15.4)
EOSINOPHIL # BLD: 0.3 K/UL (ref 0–0.6)
EOSINOPHIL NFR BLD: 4.2 %
GFR SERPLBLD CREATININE-BSD FMLA CKD-EPI: >60 ML/MIN/{1.73_M2}
GLUCOSE SERPL-MCNC: 99 MG/DL (ref 70–99)
GLUCOSE UR STRIP.AUTO-MCNC: NEGATIVE MG/DL
HCT VFR BLD AUTO: 39.9 % (ref 36–48)
HGB BLD-MCNC: 13.2 G/DL (ref 12–16)
HGB UR QL STRIP.AUTO: NEGATIVE
KETONES UR STRIP.AUTO-MCNC: NEGATIVE MG/DL
LEUKOCYTE ESTERASE UR QL STRIP.AUTO: NEGATIVE
LYMPHOCYTES # BLD: 1.9 K/UL (ref 1–5.1)
LYMPHOCYTES NFR BLD: 26.2 %
MCH RBC QN AUTO: 29.8 PG (ref 26–34)
MCHC RBC AUTO-ENTMCNC: 33 G/DL (ref 31–36)
MCV RBC AUTO: 90.3 FL (ref 80–100)
MONOCYTES # BLD: 0.6 K/UL (ref 0–1.3)
MONOCYTES NFR BLD: 9.1 %
NEUTROPHILS # BLD: 4.2 K/UL (ref 1.7–7.7)
NEUTROPHILS NFR BLD: 59.2 %
NITRITE UR QL STRIP.AUTO: NEGATIVE
NT-PROBNP SERPL-MCNC: 230 PG/ML (ref 0–124)
PH UR STRIP.AUTO: 6.5 [PH] (ref 5–8)
PLATELET # BLD AUTO: 210 K/UL (ref 135–450)
PMV BLD AUTO: 7.3 FL (ref 5–10.5)
POTASSIUM SERPL-SCNC: 3.5 MMOL/L (ref 3.5–5.1)
PROT SERPL-MCNC: 7.3 G/DL (ref 6.4–8.2)
PROT UR STRIP.AUTO-MCNC: NEGATIVE MG/DL
RBC # BLD AUTO: 4.42 M/UL (ref 4–5.2)
SODIUM SERPL-SCNC: 137 MMOL/L (ref 136–145)
SP GR UR STRIP.AUTO: 1.02 (ref 1–1.03)
TROPONIN, HIGH SENSITIVITY: 12 NG/L (ref 0–14)
TROPONIN, HIGH SENSITIVITY: 14 NG/L (ref 0–14)
UA DIPSTICK W REFLEX MICRO PNL UR: NORMAL
URN SPEC COLLECT METH UR: NORMAL
UROBILINOGEN UR STRIP-ACNC: 0.2 E.U./DL
WBC # BLD AUTO: 7.1 K/UL (ref 4–11)

## 2023-07-24 PROCEDURE — 84484 ASSAY OF TROPONIN QUANT: CPT

## 2023-07-24 PROCEDURE — 80053 COMPREHEN METABOLIC PANEL: CPT

## 2023-07-24 PROCEDURE — 81003 URINALYSIS AUTO W/O SCOPE: CPT

## 2023-07-24 PROCEDURE — 99285 EMERGENCY DEPT VISIT HI MDM: CPT

## 2023-07-24 PROCEDURE — 85025 COMPLETE CBC W/AUTO DIFF WBC: CPT

## 2023-07-24 PROCEDURE — 93005 ELECTROCARDIOGRAM TRACING: CPT | Performed by: PHYSICIAN ASSISTANT

## 2023-07-24 PROCEDURE — 83880 ASSAY OF NATRIURETIC PEPTIDE: CPT

## 2023-07-24 PROCEDURE — 71045 X-RAY EXAM CHEST 1 VIEW: CPT

## 2023-07-24 ASSESSMENT — PAIN DESCRIPTION - LOCATION: LOCATION: LEG

## 2023-07-24 ASSESSMENT — PAIN - FUNCTIONAL ASSESSMENT: PAIN_FUNCTIONAL_ASSESSMENT: 0-10

## 2023-07-24 ASSESSMENT — PAIN SCALES - GENERAL: PAINLEVEL_OUTOF10: 9

## 2023-07-24 ASSESSMENT — PAIN DESCRIPTION - ORIENTATION: ORIENTATION: LEFT;RIGHT

## 2023-07-25 ENCOUNTER — TELEPHONE (OUTPATIENT)
Dept: OTHER | Facility: CLINIC | Age: 73
End: 2023-07-25

## 2023-07-25 LAB
EKG ATRIAL RATE: 74 BPM
EKG DIAGNOSIS: NORMAL
EKG P AXIS: -18 DEGREES
EKG P-R INTERVAL: 136 MS
EKG Q-T INTERVAL: 402 MS
EKG QRS DURATION: 102 MS
EKG QTC CALCULATION (BAZETT): 446 MS
EKG R AXIS: 13 DEGREES
EKG T AXIS: 50 DEGREES
EKG VENTRICULAR RATE: 74 BPM

## 2023-07-25 PROCEDURE — 93010 ELECTROCARDIOGRAM REPORT: CPT | Performed by: INTERNAL MEDICINE

## 2023-07-25 NOTE — ED NOTES
[facility] for d/c. Stable, by wheelchair. No questions at d/c. Left w/ all personal belongings,  to p/u pt and is on his way from [place] right now.       Irena Reis RN  07/24/23 1043

## 2023-07-25 NOTE — TELEPHONE ENCOUNTER
Writer contacted Dr. Eusebio Goldman to inform of 30 day readmission risk. RN informed writer of intention to discharge and follow up recommended.

## 2023-07-25 NOTE — TELEPHONE ENCOUNTER
This writer contacted pt to assist with scheduling a follow up appt. Pt declined assistance and said she could call them herself.

## 2023-07-26 NOTE — ED PROVIDER NOTES
for the next 3 days and then resume 20 mg/day after that. Recent echo:   Conclusions   Summary   The left ventricle is normal in size with mild concentric hypertrophy. Left ventricular systolic function is normal with a visually estimated   ejection fraction of 55-60%   No obvious regional wall motion abnormalities noted. Grade I diastolic dysfunction with normal LV pressure. Normal right ventricular size and function. Mild mitral regurgitation is present. Inadequate tricuspid valve regurgitation to estimate systolic pulmonary   artery pressure. CLINICAL IMPRESSION  1. Dependent edema          IMarycarmen DO, am the primary clinician of record. I personally saw the patient and independently provided 0 minutes of non-concurrent critical care out of the total shared critical care time provided. This chart was generated in part by using Dragon Dictation system and may contain errors related to that system including errors in grammar, punctuation, and spelling, as well as words and phrases that may be inappropriate. If there are any questions or concerns please feel free to contact the dictating provider for clarification.      Wilfredo Radford DO   [employer]       Wilfredo Radford DO  07/24/23 5409
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 Social Determinants:   None identified at this time. Consults:   None necessary    Reassessment:      Remains without significant complaints on reevaluation and vitals have remained stable. MDM/Disposition Considerations:   Briefly Markell Hennessy presents for leg swelling and lightheadedness with mild dyspnea and fatigue. Cardiac work-up obtained with negative troponin, stable chest x-ray, nonspecific EKG. BNP of 220. Repeat troponin pending. Please refer to Dr. Aurora Schilder documentation regarding ED course, evaluation, treatment, medical decision making, and disposition for this patient. FINAL IMPRESSION  1. Dependent edema      (Please note that portions of this note were completed with a voice recognition program.  Efforts were made to edit the dictations but occasionally words are mis-transcribed).          [place]  07/25/23 1014

## 2023-07-31 ENCOUNTER — APPOINTMENT (OUTPATIENT)
Dept: GENERAL RADIOLOGY | Age: 73
End: 2023-07-31
Payer: COMMERCIAL

## 2023-07-31 ENCOUNTER — APPOINTMENT (OUTPATIENT)
Dept: CT IMAGING | Age: 73
End: 2023-07-31
Payer: COMMERCIAL

## 2023-07-31 ENCOUNTER — HOSPITAL ENCOUNTER (EMERGENCY)
Age: 73
Discharge: HOME | End: 2023-07-31
Attending: EMERGENCY MEDICINE
Payer: COMMERCIAL

## 2023-07-31 VITALS
HEART RATE: 88 BPM | SYSTOLIC BLOOD PRESSURE: 178 MMHG | OXYGEN SATURATION: 95 % | TEMPERATURE: 98.5 F | DIASTOLIC BLOOD PRESSURE: 81 MMHG | HEIGHT: 61 IN | WEIGHT: 179 LBS | RESPIRATION RATE: 13 BRPM | BODY MASS INDEX: 33.79 KG/M2

## 2023-07-31 DIAGNOSIS — J44.1 COPD EXACERBATION (HCC): Primary | ICD-10-CM

## 2023-07-31 LAB
ALBUMIN SERPL-MCNC: 4.2 G/DL (ref 3.4–5)
ALBUMIN/GLOB SERPL: 1.4 {RATIO} (ref 1.1–2.2)
ALP SERPL-CCNC: 108 U/L (ref 40–129)
ALT SERPL-CCNC: 7 U/L (ref 10–40)
ANION GAP SERPL CALCULATED.3IONS-SCNC: 11 MMOL/L (ref 3–16)
AST SERPL-CCNC: 10 U/L (ref 15–37)
BASE EXCESS BLDV CALC-SCNC: 7 MMOL/L (ref -3–3)
BASOPHILS # BLD: 0 K/UL (ref 0–0.2)
BASOPHILS NFR BLD: 0.6 %
BILIRUB SERPL-MCNC: <0.2 MG/DL (ref 0–1)
BUN SERPL-MCNC: 3 MG/DL (ref 7–20)
CALCIUM SERPL-MCNC: 9.3 MG/DL (ref 8.3–10.6)
CHLORIDE SERPL-SCNC: 92 MMOL/L (ref 99–110)
CO2 BLDV-SCNC: 36 MMOL/L
CO2 SERPL-SCNC: 36 MMOL/L (ref 21–32)
COHGB MFR BLDV: 4.2 % (ref 0–1.5)
CREAT SERPL-MCNC: <0.5 MG/DL (ref 0.6–1.2)
DEPRECATED RDW RBC AUTO: 14.9 % (ref 12.4–15.4)
EKG ATRIAL RATE: 87 BPM
EKG DIAGNOSIS: NORMAL
EKG P AXIS: 62 DEGREES
EKG P-R INTERVAL: 156 MS
EKG Q-T INTERVAL: 396 MS
EKG QRS DURATION: 96 MS
EKG QTC CALCULATION (BAZETT): 476 MS
EKG R AXIS: -5 DEGREES
EKG T AXIS: 29 DEGREES
EKG VENTRICULAR RATE: 87 BPM
EOSINOPHIL # BLD: 0.1 K/UL (ref 0–0.6)
EOSINOPHIL NFR BLD: 1 %
GFR SERPLBLD CREATININE-BSD FMLA CKD-EPI: >60 ML/MIN/{1.73_M2}
GLUCOSE SERPL-MCNC: 118 MG/DL (ref 70–99)
HCO3 BLDV-SCNC: 34.2 MMOL/L (ref 23–29)
HCT VFR BLD AUTO: 41.1 % (ref 36–48)
HGB BLD-MCNC: 13.7 G/DL (ref 12–16)
LYMPHOCYTES # BLD: 1.1 K/UL (ref 1–5.1)
LYMPHOCYTES NFR BLD: 14.9 %
MAGNESIUM SERPL-MCNC: 1.8 MG/DL (ref 1.8–2.4)
MCH RBC QN AUTO: 29.5 PG (ref 26–34)
MCHC RBC AUTO-ENTMCNC: 33.3 G/DL (ref 31–36)
MCV RBC AUTO: 88.8 FL (ref 80–100)
METHGB MFR BLDV: 0.3 %
MONOCYTES # BLD: 0.6 K/UL (ref 0–1.3)
MONOCYTES NFR BLD: 8.4 %
NEUTROPHILS # BLD: 5.7 K/UL (ref 1.7–7.7)
NEUTROPHILS NFR BLD: 75.1 %
NT-PROBNP SERPL-MCNC: 532 PG/ML (ref 0–124)
O2 THERAPY: ABNORMAL
PCO2 BLDV: 59.1 MMHG (ref 40–50)
PH BLDV: 7.38 [PH] (ref 7.35–7.45)
PLATELET # BLD AUTO: 229 K/UL (ref 135–450)
PMV BLD AUTO: 6.9 FL (ref 5–10.5)
PO2 BLDV: 32.9 MMHG (ref 25–40)
POTASSIUM SERPL-SCNC: 3.5 MMOL/L (ref 3.5–5.1)
PROT SERPL-MCNC: 7.2 G/DL (ref 6.4–8.2)
RBC # BLD AUTO: 4.63 M/UL (ref 4–5.2)
SAO2 % BLDV: 60 %
SODIUM SERPL-SCNC: 139 MMOL/L (ref 136–145)
SPECIMEN STATUS: NORMAL
TROPONIN, HIGH SENSITIVITY: 17 NG/L (ref 0–14)
TROPONIN, HIGH SENSITIVITY: 19 NG/L (ref 0–14)
WBC # BLD AUTO: 7.6 K/UL (ref 4–11)

## 2023-07-31 PROCEDURE — 83735 ASSAY OF MAGNESIUM: CPT

## 2023-07-31 PROCEDURE — 96374 THER/PROPH/DIAG INJ IV PUSH: CPT

## 2023-07-31 PROCEDURE — 6370000000 HC RX 637 (ALT 250 FOR IP): Performed by: PHYSICIAN ASSISTANT

## 2023-07-31 PROCEDURE — 84484 ASSAY OF TROPONIN QUANT: CPT

## 2023-07-31 PROCEDURE — 82803 BLOOD GASES ANY COMBINATION: CPT

## 2023-07-31 PROCEDURE — 93005 ELECTROCARDIOGRAM TRACING: CPT | Performed by: EMERGENCY MEDICINE

## 2023-07-31 PROCEDURE — 80053 COMPREHEN METABOLIC PANEL: CPT

## 2023-07-31 PROCEDURE — 93010 ELECTROCARDIOGRAM REPORT: CPT | Performed by: INTERNAL MEDICINE

## 2023-07-31 PROCEDURE — 6360000002 HC RX W HCPCS: Performed by: PHYSICIAN ASSISTANT

## 2023-07-31 PROCEDURE — 83880 ASSAY OF NATRIURETIC PEPTIDE: CPT

## 2023-07-31 PROCEDURE — 71260 CT THORAX DX C+: CPT

## 2023-07-31 PROCEDURE — 6360000004 HC RX CONTRAST MEDICATION: Performed by: PHYSICIAN ASSISTANT

## 2023-07-31 PROCEDURE — 99285 EMERGENCY DEPT VISIT HI MDM: CPT

## 2023-07-31 PROCEDURE — 85025 COMPLETE CBC W/AUTO DIFF WBC: CPT

## 2023-07-31 PROCEDURE — 71046 X-RAY EXAM CHEST 2 VIEWS: CPT

## 2023-07-31 RX ORDER — DOXYCYCLINE HYCLATE 100 MG
100 TABLET ORAL 2 TIMES DAILY
Qty: 14 TABLET | Refills: 0 | Status: SHIPPED | OUTPATIENT
Start: 2023-07-31 | End: 2023-08-07

## 2023-07-31 RX ORDER — PREDNISONE 20 MG/1
40 TABLET ORAL DAILY
Qty: 10 TABLET | Refills: 0 | Status: SHIPPED | OUTPATIENT
Start: 2023-07-31 | End: 2023-08-05

## 2023-07-31 RX ORDER — METHYLPREDNISOLONE SODIUM SUCCINATE 125 MG/2ML
125 INJECTION, POWDER, LYOPHILIZED, FOR SOLUTION INTRAMUSCULAR; INTRAVENOUS ONCE
Status: COMPLETED | OUTPATIENT
Start: 2023-07-31 | End: 2023-07-31

## 2023-07-31 RX ORDER — IPRATROPIUM BROMIDE AND ALBUTEROL SULFATE 2.5; .5 MG/3ML; MG/3ML
1 SOLUTION RESPIRATORY (INHALATION) ONCE
Status: COMPLETED | OUTPATIENT
Start: 2023-07-31 | End: 2023-07-31

## 2023-07-31 RX ADMIN — IPRATROPIUM BROMIDE AND ALBUTEROL SULFATE 1 DOSE: .5; 3 SOLUTION RESPIRATORY (INHALATION) at 15:43

## 2023-07-31 RX ADMIN — IOPAMIDOL 75 ML: 755 INJECTION, SOLUTION INTRAVENOUS at 17:03

## 2023-07-31 RX ADMIN — METHYLPREDNISOLONE SODIUM SUCCINATE 125 MG: 125 INJECTION, POWDER, LYOPHILIZED, FOR SOLUTION INTRAMUSCULAR; INTRAVENOUS at 15:44

## 2023-07-31 NOTE — ED TRIAGE NOTES
Patient to ED, complaints of shortness of breath, denies oxygen use at home, states she does take nebulizer when she gets short of breath and states they have not been working.

## 2023-07-31 NOTE — ED NOTES
Ambulated pt with pulse oximeter. Initial SpO2 was 95%, hr was 85 bpm. Pt ambulated approximately 100 feet, pt did not tolerate ambulation well. Pt took 5 stops during ambulation to gather breath. Pt complaining of shortness of breath during ambulation. SpO2 was maintained around 94-90%, HR was maintained around 100-105 bpm during ambulation. ROMAIN heath.      Karel Rosa  07/31/23 7842

## 2023-07-31 NOTE — ED PROVIDER NOTES
Emergency Department Attending Provider Note  Location: [address] [place] [address]  ED  7/31/2023     Chief Complaint   Patient presents with    Shortness of Breath        PATIENT ID:  Karlee Arroyo is a 71 y.o. 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    Coronary artery disease involving native coronary artery of native heart with angina pectoris ([address]) 3/8/2016    Depression     Emphysema of lung (HCC)     Fibromyalgia     hyperlipidemia 8/11/2011    Hypertension     Kidney stone     Liver disease     Lung disease     MDD (major depressive disorder) 4/4/2012    Mood disorder ([address]) 3/13/2013    Movement disorder     Neck pain, chronic 3/13/2013    Opiate misuse     Personality disorder ([address])     Pneumonia     Polypharmacy 2/16/2013       Emma Fitzpatrick was evaluated in the Emergency Department for concerns of shortness of breath and history of COPD, concerned that she is having a COPD exacerbation. Patient had been very concerned that she needed admitted to the hospital, however with ambulation she had not drop below 90%. However, by the time I evaluate her, patient says she feels much better and just wants to go home. Has been treated with Solu-Medrol and breathing treatment at this point in the ED. Never had any chest pain. Although initial history and physical exam information was obtained by Omer Patterson (who also dictated a record of this visit), I personally saw the patient and performed a substantive portion of the visit including all aspects of the medical decision making. BASIC EXAM:  No acute distress. Interactive, conversational, pleasant. only got to 90%. She has been in the high 90s the whole time. No chest pain, and I offered another breathing treatment prior to discharge, patient says she has breathing treatments at home. Risks and benefits discussed, she was hypercapnic here in the emergency department, although appears chronic as she has had renal compensation with a venous pH of 7.3. No acute respiratory failure here in the ER. Troponins x2 reassuring. CBC unremarkable. Otherwise, discharged home per PA documentation. Medications   ipratropium 0.5 mg-albuterol 2.5 mg (DUONEB) nebulizer solution 1 Dose (1 Dose Inhalation Given 7/31/23 1543)   methylPREDNISolone sodium succ (SOLU-MEDROL) injection 125 mg (125 mg IntraVENous Given 7/31/23 1544)   iopamidol (ISOVUE-370) 76 % injection 75 mL (75 mLs IntraVENous Given 7/31/23 1703)         IMPRESSION:    ICD-10-CM    1. COPD exacerbation ([address])  J44.1             I, Aarti Oconnor DO am the primary clinician of record. I personally saw the patient and I independently provided 00 minutes of non-concurrent critical care out of the total shared critical care time provided. This chart was generated in part by using Dragon Dictation system and may contain errors related to that system including errors in grammar, punctuation, and spelling, as well as words and phrases that may be inappropriate. If there are any questions or concerns please feel free to contact the dictating provider for clarification.      AARTI OCONNOR DO   [address] [facility] [address], DO  08/04/23 0148

## 2023-08-01 NOTE — DISCHARGE INSTRUCTIONS
Follow-up with your primary care physician for further evaluation and treatment. I have prescribed short course of doxycycline and steroid for COPD exacerbation. Return to the emergency department if you develop any new or worsening symptoms.

## 2023-11-21 ENCOUNTER — TRANSCRIBE ORDERS (OUTPATIENT)
Dept: ADMINISTRATIVE | Age: 73
End: 2023-11-21

## 2023-11-21 DIAGNOSIS — M79.605 LEFT LEG PAIN: Primary | ICD-10-CM

## 2023-11-21 NOTE — PROGRESS NOTES
4 Eyes Skin Assessment     The patient is being assessed for  Admission    I agree that 2 RN's have performed a thorough Head to Toe Skin Assessment on the patient. ALL assessment sites listed below have been assessed. Areas assessed for pressure by both nurses:   [x]   Head, Face, and Ears   [x]   Shoulders, Back, and Chest  [x]   Arms, Elbows, and Hands   [x]   Coccyx, Sacrum, and Ischum  [x]   Legs, Feet, and Heels                                Skin Assessed Under all Medical Devices by both nurses:  n/a          All Mepilex Borders were peeled back and area peeked at by both nurses:  n/a  Please list where Mepilex Borders are located:  none                 Does the Patient have Skin Breakdown related to pressure?   No              Emmanuel Prevention initiated:  No   Wound Care Orders initiated:  No      [facility] nurse consulted for Pressure Injury (Stage 3,4, Unstageable, DTI, NWPT, Complex wounds)and New or Established Ostomies:  No        Nurse 1 eSignature: Electronically signed by Kush Merlos RN on 8/12/21 at 11:56 PM EDT    **SHARE this note so that the co-signing nurse is able to place an eSignature**    Nurse 2 eSignature: Electronically signed by Ayan Elaine RN on 8/13/21 at 12:11 AM EDT Dr. Sullivan

## 2023-11-22 ENCOUNTER — HOSPITAL ENCOUNTER (OUTPATIENT)
Dept: VASCULAR LAB | Age: 73
Discharge: HOME | End: 2023-11-22
Payer: COMMERCIAL

## 2023-11-22 DIAGNOSIS — M79.605 LEFT LEG PAIN: ICD-10-CM

## 2023-11-22 PROCEDURE — 93971 EXTREMITY STUDY: CPT

## 2023-12-06 NOTE — ED PROVIDER NOTES
[address] ED  eMERGENCY dEPARTMENT eNCOUnter        Pt Name: Bakari Daugherty  MRN: [id number]  [place] 1950  Date of evaluation: 8/15/2018  Provider: BALJIT Rose - CNP  PCP: Marguerite Moore MD  ED Attending: No att. providers found    [facility]       Chief Complaint   Patient presents with    Pneumonia     dx with pneumonia on sunday here, called [address] [place] [address] Road office today and they told her to come here for her shortness of breath       HISTORY OF PRESENT ILLNESS   (Location/Symptom, Timing/Onset, Context/Setting, Quality, Duration, Modifying Factors, Severity)  Note limiting factors. Bakari Daugherty is a 78 y.o. female for Left anterior rib pain. Onset was the last few days. Duration has been since the onset. Context includes patient reports that she has had left lower rib pain and right flank pain for the last few days. She also complains of generalized weakness. Patient reports that she was seen and evaluated on Sunday and was diagnosed with pneumonitis. She was given antibiotics. Patient reports she's been taking the antibiotics however her pain is not improving. Patient denies any chest pain or shortness breath. Alleviating factors include nothing. Aggravating factors include nothing. Pain is 8/10. Nothing has been used for pain today. Nursing Notes were all reviewed and agreed with or any disagreements were addressed  in the HPI. REVIEW OF SYSTEMS    (2-9 systems for level 4, 10 or more for level 5)     Review of Systems   Constitutional: Negative for fever. Respiratory: Negative for shortness of breath. Left lower rib pain   Cardiovascular: Negative for chest pain. Gastrointestinal: Negative for abdominal pain. Genitourinary: Positive for flank pain. Negative for difficulty urinating. All other systems reviewed and are negative. Positives and Pertinent negatives as per HPI.   Except as noted above in the ROS, all other systems were Problem: METABOLIC, FLUID AND ELECTROLYTES - ADULT  Goal: Electrolytes maintained within normal limits  Description: INTERVENTIONS:  - Monitor labs and assess patient for signs and symptoms of electrolyte imbalances  - Administer electrolyte replacement as ordered  - Monitor response to electrolyte replacements, including repeat lab results as appropriate  - Instruct patient on fluid and nutrition as appropriate  Outcome: Progressing  Goal: Fluid balance maintained  Description: INTERVENTIONS:  - Monitor labs   - Monitor I/O and WT  - Instruct patient on fluid and nutrition as appropriate  - Assess for signs & symptoms of volume excess or deficit  Outcome: Progressing     Problem: PAIN - ADULT  Goal: Verbalizes/displays adequate comfort level or baseline comfort level  Description: Interventions:  - Encourage patient to monitor pain and request assistance  - Assess pain using appropriate pain scale  - Administer analgesics based on type and severity of pain and evaluate response  - Implement non-pharmacological measures as appropriate and evaluate response  - Consider cultural and social influences on pain and pain management  - Notify physician/advanced practitioner if interventions unsuccessful or patient reports new pain  Outcome: Progressing     Problem: INFECTION - ADULT  Goal: Absence or prevention of progression during hospitalization  Description: INTERVENTIONS:  - Assess and monitor for signs and symptoms of infection  - Monitor lab/diagnostic results  - Monitor all insertion sites, i.e. indwelling lines, tubes, and drains  - [place] appropriate cooling/warming therapies per order  - Administer medications as ordered  - Instruct and encourage patient and family to use good hand hygiene technique  - Identify and instruct in appropriate isolation precautions for identified infection/condition  Outcome: Progressing  Goal: Absence of fever/infection during neutropenic period  Description: INTERVENTIONS:  - Monitor WBC    Outcome: Progressing     Problem: SAFETY ADULT  Goal: Patient will remain free of falls  Description: INTERVENTIONS:  - Educate patient/family on patient safety including physical limitations  - Instruct patient to call for assistance with activity   - Consult OT/PT to assist with strengthening/mobility   - Keep Call bell within reach  - Keep bed low and locked with side rails adjusted as appropriate  - Keep care items and personal belongings within reach  - Initiate and maintain comfort rounds  - Make Fall Risk Sign visible to staff  - Apply yellow socks and bracelet for high fall risk patients  - Consider moving patient to room near nurses station  Outcome: Progressing  Goal: Maintain or return to baseline ADL function  Description: INTERVENTIONS:  -  Assess patient's ability to carry out ADLs; assess patient's baseline for ADL function and identify physical deficits which impact ability to perform ADLs (bathing, care of mouth/teeth, toileting, grooming, dressing, etc.)  - Assess/evaluate cause of self-care deficits   - Assess range of motion  - Assess patient's mobility; develop plan if impaired  - Assess patient's need for assistive devices and provide as appropriate  - Encourage maximum independence but intervene and supervise when necessary  - Involve family in performance of ADLs  - Assess for home care needs following discharge   - Consider OT consult to assist with ADL evaluation and planning for discharge  - Provide patient education as appropriate  Outcome: Progressing  Goal: Maintains/Returns to pre admission functional level  Description: INTERVENTIONS:  - Perform AM-PAC 6 Click Basic Mobility/ Daily Activity assessment daily.  - Set and communicate daily mobility goal to care team and patient/family/caregiver. - Collaborate with rehabilitation services on mobility goals if consulted  - Perform Range of Motion 4 times a day. - Reposition patient every 2 hours.   - Dangle patient 3 times reviewed and negative. PAST MEDICAL HISTORY     Past Medical History:   Diagnosis Date    Abnormal ECG 12/14/2012    Anemia     Arthritis     Back pain, chronic 3/13/2013    Bipolar 1 disorder??? 4/4/2012    Bipolar disorder (HCC)     Bronchitis chronic     CHF [facility]congestive [facility] failure) [facility] 9/30/2016    Chronic back pain     Chronic neck pain     Clostridium difficile infection 3/29/12, 5/22/12    positive stool DNA probe    Coronary artery disease involving native coronary artery of native heart with angina pectoris ([facility] 3/8/2016    Depression     Emphysema of lung ([facility]     Fibromyalgia     hyperlipidemia 8/11/2011    Hypertension     Kidney stone     Liver disease     Lung disease     MDD (major depressive disorder) 4/4/2012    [facility] 3/13/2013    Movement disorder     Neck pain, chronic 3/13/2013    Pneumonia     Polypharmacy 2/16/2013         SURGICAL HISTORY       Past Surgical History:   Procedure Laterality Date    COLONOSCOPY  1/19/12    DIAGNOSTIC CARDIAC CATH LAB PROCEDURE  Feb, 2007    Normal cor angio Feb, 2007    HYSTERECTOMY, TOTAL ABDOMINAL      KIDNEY STONE SURGERY           CURRENT MEDICATIONS       Previous Medications    ALBUTEROL SULFATE HFA (PROVENTIL HFA) 108 (90 BASE) MCG/ACT INHALER    Inhale 2 puffs into the lungs every 4 hours as needed for Wheezing or Shortness of Breath (Space out to every 6 hours as symptoms improve) Space out to every 6 hours as symptoms improve. BLOOD PRESSURE MONITORING (BLOOD PRESSURE MONITOR AUTOMAT) SOCORRO    1 each by Does not apply route daily. DIVALPROEX (DEPAKOTE) 250 MG DR TABLET    Take 250 mg by mouth every morning     DOXYCYCLINE MONOHYDRATE (ADOXA) 100 MG TABLET    Take 1 tablet by mouth 2 times daily for 10 days May substitute another form of Doxycycline if insurance requires.     FLUTICASONE (FLONASE) 50 MCG/ACT NASAL SPRAY    1 spray by Nasal route daily    GABAPENTIN (NEURONTIN) 800 MG TABLET a day  - Stand patient 4 times a day  - Ambulate patient 4 times a day  - Out of bed to chair 3 times a day   - Out of bed for meals 3 times a day  - Out of bed for toileting  - Record patient progress and toleration of activity level   Outcome: Progressing     Problem: DISCHARGE PLANNING  Goal: Discharge to home or other facility with appropriate resources  Description: INTERVENTIONS:  - Identify barriers to discharge w/patient and caregiver  - Arrange for needed discharge resources and transportation as appropriate  - Identify discharge learning needs (meds, wound care, etc.)  - Arrange for interpretive services to assist at discharge as needed  - Refer to Case Management Department for coordinating discharge planning if the patient needs post-hospital services based on physician/advanced practitioner order or complex needs related to functional status, cognitive ability, or social support system  Outcome: Progressing     Problem: Knowledge Deficit  Goal: Patient/family/caregiver demonstrates understanding of disease process, treatment plan, medications, and discharge instructions  Description: Complete learning assessment and assess knowledge base.   Interventions:  - Provide teaching at level of understanding  - Provide teaching via preferred learning methods  Outcome: Progressing     Problem: Prexisting or High Potential for Compromised Skin Integrity  Goal: Skin integrity is maintained or improved  Description: INTERVENTIONS:  - Identify patients at risk for skin breakdown  - Assess and monitor skin integrity  - Assess and monitor nutrition and hydration status  - Monitor labs   - Assess for incontinence   - Turn and reposition patient  - Assist with mobility/ambulation  - Relieve pressure over bony prominences  - Avoid friction and shearing  - Provide appropriate hygiene as needed including keeping skin clean and dry  - Evaluate need for skin moisturizer/barrier cream  - Collaborate with interdisciplinary team - Patient/family teaching  - Consider wound care consult   Outcome: Progressing 1546 08/15/18 1546 08/15/18 1546 08/15/18 1546   (!) 167/78 97.8 °F (36.6 °C) Temporal 103 12 96 % 5' 1\" (1.549 m) 150 lb (68 kg)       Physical Exam   Constitutional: She is oriented to person, place, and time. She appears well-developed and well-nourished. HENT:   Head: Normocephalic and atraumatic. Neck: Normal range of motion. Cardiovascular: Normal rate. Pulmonary/Chest: Effort normal. No respiratory distress. She has decreased breath sounds in the right lower field and the left lower field. She has rhonchi in the right upper field and the left upper field. Abdominal: Soft. She exhibits no distension. There is no tenderness. Musculoskeletal: Normal range of motion. Neurological: She is alert and oriented to person, place, and time. Skin: Skin is warm and dry. Psychiatric: She has a normal mood and affect.        DIAGNOSTIC RESULTS   LABS:    Labs Reviewed   CBC WITH AUTO DIFFERENTIAL - Abnormal; Notable for the following:        Result Value    RBC 3.98 (*)     Neutrophils # 9.1 (*)     All other components within normal limits    Narrative:     CALL  [place]  [facility] tel. [phone],  Rejected Test Name/Called to: [facility] ER desk, 08/15/2018 16:28, by Danis Vital  Performed at:  [place] [address],  [employer] [email or web address]   Phone [phone]   COMPREHENSIVE METABOLIC PANEL - Abnormal; Notable for the following:     Sodium 130 (*)     Chloride 94 (*)     Glucose 117 (*)     BUN 24 (*)     ALT 8 (*)     AST 10 (*)     All other components within normal limits    Narrative:     Performed at:  [place] [address],  [facility]   Phone [phone]   TROPONIN    Narrative:     Performed at:  [place] [address],  [place]   Phone [phone]   PROTIME-INR    Narrative:     Performed at:  [facility]  [address] [address] lymph node, likely reactive. Unchanged sclerosis and irregularity of the T7-T10 vertebral bodies. Given   stability since 10/08/2017, this is favored to be degenerative in etiology. RECOMMENDATIONS:   Fleischner Society guidelines for follow-up and management of incidentally   detected pulmonary nodules:      Single Solid Nodule:      Nodule size less than 6 mm   In a low-risk patient, no routine follow-up. In a high-risk patient, optional CT at 12 months. - Low risk patients include individuals with minimal or absent history of   smoking and other known risk factors. - High risk patients include individuals with a history or smoking or known   risk factors. Radiology 2017 http://pubs. rsna.org/doi/full/10.1148/radiol. 9499207261         XR CHEST STANDARD (2 VW)   Final Result   No definite acute pulmonary finding. Moderate to severe multilevel degenerative disc changes within the mid and   lower thoracic spine. Chest x-ray interpreted by radiologist for  Impression:    No definite acute pulmonary finding. Moderate to severe multilevel degenerative disc changes within the mid and  lower thoracic spine. Xr Chest Standard (2 Vw)    Result Date: 8/15/2018  EXAMINATION: TWO VIEWS OF THE CHEST 8/15/2018 4:15 pm COMPARISON: August 12, 2018, April 2018, and February 2018. HISTORY: ORDERING SYSTEM PROVIDED HISTORY: shortnees of breath TECHNOLOGIST PROVIDED HISTORY: Reason for exam:->shortnees of breath Ordering Physician Provided Reason for Exam: Pneumonia (dx with pneumonia on sunday here, called [address] [place] [address] Road office today and they told her to come here for her shortness of breath) Acuity: Chronic Type of Exam: Subsequent/Follow-up Relevant Medical/Surgical History: HTN PNA EMPHYSEMA FINDINGS: Heart and pulmonary vascularity within normal limits. Stable calcified 7 mm granuloma right middle lobe-unchanged. Stable calcified right paratracheal lymph nodes.   No acute alveolar infiltrate or consolidation. No pleural effusion or pneumothorax. Increased density seen overlying the mid to lower thoracic spine on the lateral film is not significantly changed allowing for difference in technique and likely related to moderate to severe degenerative disc changes and sclerosis within the vertebral bodies rather than overlying pulmonary infiltrate. No definite acute pulmonary finding. Moderate to severe multilevel degenerative disc changes within the mid and lower thoracic spine. PROCEDURES   Unless otherwise noted below, none     Procedures    CRITICAL CARE TIME   N/A    CONSULTS:  None      EMERGENCY DEPARTMENT COURSE and DIFFERENTIAL DIAGNOSIS/MDM:   Vitals:    Vitals:    08/15/18 1713 08/15/18 1714 08/15/18 1952 08/15/18 2039   BP:  (!) 160/78 (!) 186/82 (!) 178/94   Pulse: 87  84 82   Resp: 28  19 17   Temp:       TempSrc:       SpO2: 96%  98% 96%   Weight:       Height:           Patient was given the following medications:  Medications   iopamidol (ISOVUE-370) 76 % injection 75 mL (75 mLs Intravenous Given 8/15/18 1817)   HYDROcodone-acetaminophen (NORCO) 5-325 MG per tablet 1 tablet (1 tablet Oral Given 8/15/18 1712)   0.9 % sodium chloride bolus (0 mLs Intravenous Stopped 8/15/18 2037)       Patient was seen and evaluated by Kay Hardwick and myself. Patient here for left lower rib pain and right flank pain. Patient reports she is currently being treated for pneumonia. She also reports she's had a cough and is having increased rib pain. States that she no longer has a fever and does not have any nausea vomiting diarrhea. She denies any chest pain or shortness of breath. Lab baseball been reviewed and interpreted. CT was negative for any PE however there were pulmonary nodules. Patient was given pain medications here. She was requesting pain medication refill. Patient be discharged home with instructions to follow up with her primary care doctor in the next few days. She is given a referral for pulmonology and encouraged to continue taking her antibiotics. She was encouraged to return to the ED for any worsening symptoms. Patient was given additional pain medications but was encouraged to transition her over to Motrin and Tylenol. She was also discharged with a prescription for [facility]. Patient was discharged home with all questions answered. The patient tolerated their visit well. They were seen and evaluated by the attending physician, No att. providers found who agreed with the assessment and plan. The patient and / or the family were informed of the results of any tests, a time was given to answer questions, a plan was proposed and they agreed with plan. FINAL IMPRESSION      1. Pulmonary nodule    2. History of pneumonia    3. Rib pain    4. Cough    5. Osteoarthritis of spine, unspecified spinal osteoarthritis complication status, unspecified spinal region    6. Encounter for medication refill          DISPOSITION/PLAN   DISPOSITION Discharge - Pending Orders Complete 08/15/2018 09:06:25 PM      PATIENT REFERRED TO:  Sharmaine Cuevas MD  [address] [place] [address] Dr. Phuong Coleman [address] [place] [address]  [phone]    Schedule an appointment as soon as possible for a visit in 2 days      [facility] ED  [address], [address] [place] [address] [address] [place] [address]    Schedule an appointment as soon as possible for a visit   for re-evaluation      DISCHARGE MEDICATIONS:  New Prescriptions    BENZONATATE (TESSALON PERLES) 100 MG CAPSULE    Take 1 capsule by mouth 3 times daily as needed for Cough    HYDROCODONE-ACETAMINOPHEN (NORCO) 5-325 MG PER TABLET    Take 1 tablet by mouth every 6 hours as needed for Pain for up to 3 days. Intended supply: 3 days. Take lowest dose possible to manage pain.        DISCONTINUED MEDICATIONS:  Discontinued Medications    No medications on file Attestation: The Prescription Monitoring Report for this patient was reviewed today.  (BALJIT Reed CNP)    (Please note that portions of this note were completed with a voice recognition program.  Efforts were made to edit the dictations but occasionally words are mis-transcribed.)    ABLJIT Reed CNP (electronically signed)     BALJIT Reed CNP  08/15/18 9236

## 2024-02-15 ENCOUNTER — HOSPITAL ENCOUNTER (OUTPATIENT)
Dept: VASCULAR LAB | Age: 74
Discharge: HOME | End: 2024-02-15
Payer: COMMERCIAL

## 2024-02-15 DIAGNOSIS — M79.605 LEFT LEG PAIN: ICD-10-CM

## 2024-02-15 PROCEDURE — 93970 EXTREMITY STUDY: CPT

## 2024-03-04 ENCOUNTER — HOSPITAL ENCOUNTER (OUTPATIENT)
Dept: GENERAL RADIOLOGY | Age: 74
Discharge: HOME | End: 2024-03-04
Payer: COMMERCIAL

## 2024-03-04 ENCOUNTER — HOSPITAL ENCOUNTER (OUTPATIENT)
Age: 74
Discharge: HOME | End: 2024-03-04
Payer: COMMERCIAL

## 2024-03-04 DIAGNOSIS — M25.562 CHRONIC PAIN OF LEFT KNEE: ICD-10-CM

## 2024-03-04 DIAGNOSIS — G89.29 CHRONIC PAIN OF LEFT KNEE: ICD-10-CM

## 2024-03-04 PROCEDURE — 73562 X-RAY EXAM OF KNEE 3: CPT

## 2024-04-01 ENCOUNTER — APPOINTMENT (OUTPATIENT)
Dept: CT IMAGING | Age: 74
DRG: 865 | End: 2024-04-01
Payer: COMMERCIAL

## 2024-04-01 ENCOUNTER — APPOINTMENT (OUTPATIENT)
Dept: GENERAL RADIOLOGY | Age: 74
DRG: 865 | End: 2024-04-01
Payer: COMMERCIAL

## 2024-04-01 ENCOUNTER — HOSPITAL ENCOUNTER (INPATIENT)
Age: 74
LOS: 5 days | Discharge: HOME HEALTH | DRG: 865 | End: 2024-04-07
Attending: STUDENT IN AN ORGANIZED HEALTH CARE EDUCATION/TRAINING PROGRAM | Admitting: FAMILY MEDICINE
Payer: COMMERCIAL

## 2024-04-01 DIAGNOSIS — J96.01 ACUTE RESPIRATORY FAILURE WITH HYPOXIA AND HYPERCAPNIA (HCC): Primary | ICD-10-CM

## 2024-04-01 DIAGNOSIS — G93.40 ACUTE ENCEPHALOPATHY: ICD-10-CM

## 2024-04-01 DIAGNOSIS — E83.42 HYPOMAGNESEMIA: ICD-10-CM

## 2024-04-01 DIAGNOSIS — E87.6 HYPOKALEMIA: ICD-10-CM

## 2024-04-01 DIAGNOSIS — J10.1 INFLUENZA A: ICD-10-CM

## 2024-04-01 DIAGNOSIS — J44.1 COPD WITH ACUTE EXACERBATION (HCC): ICD-10-CM

## 2024-04-01 DIAGNOSIS — J96.02 ACUTE RESPIRATORY FAILURE WITH HYPOXIA AND HYPERCAPNIA (HCC): Primary | ICD-10-CM

## 2024-04-01 DIAGNOSIS — E87.1 HYPONATREMIA: ICD-10-CM

## 2024-04-01 DIAGNOSIS — J18.9 COMMUNITY ACQUIRED PNEUMONIA OF LEFT LOWER LOBE OF LUNG: ICD-10-CM

## 2024-04-01 DIAGNOSIS — R79.89 ELEVATED TROPONIN: ICD-10-CM

## 2024-04-01 LAB
ALBUMIN SERPL-MCNC: 3.2 G/DL (ref 3.4–5)
ALBUMIN/GLOB SERPL: 1.2 {RATIO} (ref 1.1–2.2)
ALP SERPL-CCNC: 67 U/L (ref 40–129)
ALT SERPL-CCNC: 10 U/L (ref 10–40)
AMORPH SED URNS QL MICRO: ABNORMAL /HPF
ANION GAP SERPL CALCULATED.3IONS-SCNC: 11 MMOL/L (ref 3–16)
ANISOCYTOSIS BLD QL SMEAR: ABNORMAL
AST SERPL-CCNC: 20 U/L (ref 15–37)
BACTERIA URNS QL MICRO: ABNORMAL /HPF
BASE EXCESS BLDV CALC-SCNC: 4 MMOL/L (ref -3–3)
BASOPHILS # BLD: 0 K/UL (ref 0–0.2)
BASOPHILS NFR BLD: 0 %
BILIRUB SERPL-MCNC: <0.2 MG/DL (ref 0–1)
BILIRUB UR QL STRIP.AUTO: NEGATIVE
BUN SERPL-MCNC: 9 MG/DL (ref 7–20)
CALCIUM SERPL-MCNC: 8 MG/DL (ref 8.3–10.6)
CHLORIDE SERPL-SCNC: 91 MMOL/L (ref 99–110)
CLARITY UR: CLEAR
CO2 BLDV-SCNC: 34 MMOL/L
CO2 SERPL-SCNC: 30 MMOL/L (ref 21–32)
COHGB MFR BLDV: 5.8 % (ref 0–1.5)
COLOR UR: ABNORMAL
CREAT SERPL-MCNC: <0.5 MG/DL (ref 0.6–1.2)
DEPRECATED RDW RBC AUTO: 16.4 % (ref 12.4–15.4)
EOSINOPHIL # BLD: 0 K/UL (ref 0–0.6)
EOSINOPHIL NFR BLD: 0 %
EPI CELLS #/AREA URNS HPF: ABNORMAL /HPF (ref 0–5)
FLUAV RNA RESP QL NAA+PROBE: DETECTED
FLUBV RNA RESP QL NAA+PROBE: NOT DETECTED
GFR SERPLBLD CREATININE-BSD FMLA CKD-EPI: >90 ML/MIN/{1.73_M2}
GLUCOSE SERPL-MCNC: 147 MG/DL (ref 70–99)
GLUCOSE UR STRIP.AUTO-MCNC: NEGATIVE MG/DL
HCO3 BLDV-SCNC: 31.8 MMOL/L (ref 23–29)
HCT VFR BLD AUTO: 35.6 % (ref 36–48)
HGB BLD-MCNC: 11.5 G/DL (ref 12–16)
HGB UR QL STRIP.AUTO: NEGATIVE
HYALINE CASTS #/AREA URNS LPF: ABNORMAL /LPF (ref 0–2)
KETONES UR STRIP.AUTO-MCNC: NEGATIVE MG/DL
LACTATE BLDV-SCNC: 1.2 MMOL/L (ref 0.4–1.9)
LACTATE BLDV-SCNC: 2.1 MMOL/L (ref 0.4–1.9)
LEUKOCYTE ESTERASE UR QL STRIP.AUTO: NEGATIVE
LYMPHOCYTES # BLD: 0.8 K/UL (ref 1–5.1)
LYMPHOCYTES NFR BLD: 8 %
MAGNESIUM SERPL-MCNC: 1.7 MG/DL (ref 1.8–2.4)
MCH RBC QN AUTO: 26.9 PG (ref 26–34)
MCHC RBC AUTO-ENTMCNC: 32.3 G/DL (ref 31–36)
MCV RBC AUTO: 83.3 FL (ref 80–100)
METAMYELOCYTES NFR BLD MANUAL: 1 %
METHGB MFR BLDV: 0.3 %
MICROCYTES BLD QL SMEAR: ABNORMAL
MONOCYTES # BLD: 0.8 K/UL (ref 0–1.3)
MONOCYTES NFR BLD: 10 %
MUCOUS THREADS #/AREA URNS LPF: ABNORMAL /LPF
NEUTROPHILS # BLD: 6.6 K/UL (ref 1.7–7.7)
NEUTROPHILS NFR BLD: 78 %
NEUTS BAND NFR BLD MANUAL: 1 % (ref 0–7)
NITRITE UR QL STRIP.AUTO: NEGATIVE
NT-PROBNP SERPL-MCNC: 3535 PG/ML (ref 0–124)
O2 THERAPY: ABNORMAL
OVALOCYTES BLD QL SMEAR: ABNORMAL
PATH INTERP BLD-IMP: YES
PCO2 BLDV: 63.2 MMHG (ref 40–50)
PELGER HUET CELLS BLD QL SMEAR: PRESENT
PH BLDV: 7.32 [PH] (ref 7.35–7.45)
PH UR STRIP.AUTO: 6 [PH] (ref 5–8)
PLATELET # BLD AUTO: 115 K/UL (ref 135–450)
PLATELET BLD QL SMEAR: ABNORMAL
PMV BLD AUTO: 7.4 FL (ref 5–10.5)
PO2 BLDV: 55.7 MMHG (ref 25–40)
POIKILOCYTOSIS BLD QL SMEAR: ABNORMAL
POLYCHROMASIA BLD QL SMEAR: ABNORMAL
POTASSIUM SERPL-SCNC: 3.2 MMOL/L (ref 3.5–5.1)
PROT SERPL-MCNC: 5.9 G/DL (ref 6.4–8.2)
PROT UR STRIP.AUTO-MCNC: 30 MG/DL
RBC # BLD AUTO: 4.27 M/UL (ref 4–5.2)
RBC #/AREA URNS HPF: ABNORMAL /HPF (ref 0–4)
SAO2 % BLDV: 86 %
SARS-COV-2 RNA RESP QL NAA+PROBE: NOT DETECTED
SLIDE REVIEW: ABNORMAL
SODIUM SERPL-SCNC: 132 MMOL/L (ref 136–145)
SP GR UR STRIP.AUTO: >=1.03 (ref 1–1.03)
TROPONIN, HIGH SENSITIVITY: 31 NG/L (ref 0–14)
TROPONIN, HIGH SENSITIVITY: 34 NG/L (ref 0–14)
UA DIPSTICK W REFLEX MICRO PNL UR: YES
URN SPEC COLLECT METH UR: ABNORMAL
UROBILINOGEN UR STRIP-ACNC: 0.2 E.U./DL
VARIANT LYMPHS NFR BLD MANUAL: 2 % (ref 0–6)
WBC # BLD AUTO: 8.2 K/UL (ref 4–11)
WBC #/AREA URNS HPF: ABNORMAL /HPF (ref 0–5)

## 2024-04-01 PROCEDURE — 70450 CT HEAD/BRAIN W/O DYE: CPT

## 2024-04-01 PROCEDURE — 83605 ASSAY OF LACTIC ACID: CPT

## 2024-04-01 PROCEDURE — 87040 BLOOD CULTURE FOR BACTERIA: CPT

## 2024-04-01 PROCEDURE — 94761 N-INVAS EAR/PLS OXIMETRY MLT: CPT

## 2024-04-01 PROCEDURE — 71045 X-RAY EXAM CHEST 1 VIEW: CPT

## 2024-04-01 PROCEDURE — 83880 ASSAY OF NATRIURETIC PEPTIDE: CPT

## 2024-04-01 PROCEDURE — 96374 THER/PROPH/DIAG INJ IV PUSH: CPT

## 2024-04-01 PROCEDURE — 6360000002 HC RX W HCPCS: Performed by: PHYSICIAN ASSISTANT

## 2024-04-01 PROCEDURE — 99285 EMERGENCY DEPT VISIT HI MDM: CPT

## 2024-04-01 PROCEDURE — 83735 ASSAY OF MAGNESIUM: CPT

## 2024-04-01 PROCEDURE — 93005 ELECTROCARDIOGRAM TRACING: CPT | Performed by: PHYSICIAN ASSISTANT

## 2024-04-01 PROCEDURE — 82803 BLOOD GASES ANY COMBINATION: CPT

## 2024-04-01 PROCEDURE — 85025 COMPLETE CBC W/AUTO DIFF WBC: CPT

## 2024-04-01 PROCEDURE — 84484 ASSAY OF TROPONIN QUANT: CPT

## 2024-04-01 PROCEDURE — 87636 SARSCOV2 & INF A&B AMP PRB: CPT

## 2024-04-01 PROCEDURE — 2700000000 HC OXYGEN THERAPY PER DAY

## 2024-04-01 PROCEDURE — 96365 THER/PROPH/DIAG IV INF INIT: CPT

## 2024-04-01 PROCEDURE — 81001 URINALYSIS AUTO W/SCOPE: CPT

## 2024-04-01 PROCEDURE — 80053 COMPREHEN METABOLIC PANEL: CPT

## 2024-04-01 RX ORDER — MAGNESIUM SULFATE IN WATER 40 MG/ML
2000 INJECTION, SOLUTION INTRAVENOUS ONCE
Status: COMPLETED | OUTPATIENT
Start: 2024-04-01 | End: 2024-04-02

## 2024-04-01 RX ORDER — SODIUM CHLORIDE AND POTASSIUM CHLORIDE 300; 900 MG/100ML; MG/100ML
INJECTION, SOLUTION INTRAVENOUS CONTINUOUS
Status: DISCONTINUED | OUTPATIENT
Start: 2024-04-01 | End: 2024-04-02

## 2024-04-01 RX ADMIN — POTASSIUM CHLORIDE AND SODIUM CHLORIDE: 900; 300 INJECTION, SOLUTION INTRAVENOUS at 23:07

## 2024-04-01 RX ADMIN — MAGNESIUM SULFATE HEPTAHYDRATE 2000 MG: 40 INJECTION, SOLUTION INTRAVENOUS at 23:51

## 2024-04-01 ASSESSMENT — PAIN - FUNCTIONAL ASSESSMENT: PAIN_FUNCTIONAL_ASSESSMENT: 0-10

## 2024-04-01 ASSESSMENT — PAIN SCALES - GENERAL: PAINLEVEL_OUTOF10: 8

## 2024-04-01 ASSESSMENT — PAIN DESCRIPTION - LOCATION: LOCATION: HEAD

## 2024-04-02 PROBLEM — J44.1 COPD WITH ACUTE EXACERBATION (HCC): Status: ACTIVE | Noted: 2024-04-02

## 2024-04-02 LAB
BASE EXCESS BLDV CALC-SCNC: 2.8 MMOL/L (ref -3–3)
CO2 BLDV-SCNC: 31 MMOL/L
COHGB MFR BLDV: 3.2 % (ref 0–1.5)
EKG ATRIAL RATE: 64 BPM
EKG DIAGNOSIS: NORMAL
EKG P AXIS: 66 DEGREES
EKG P-R INTERVAL: 150 MS
EKG Q-T INTERVAL: 320 MS
EKG QRS DURATION: 102 MS
EKG QTC CALCULATION (BAZETT): 330 MS
EKG R AXIS: 1 DEGREES
EKG T AXIS: 105 DEGREES
EKG VENTRICULAR RATE: 64 BPM
HCO3 BLDV-SCNC: 29.7 MMOL/L (ref 23–29)
LACTATE BLDV-SCNC: 0.9 MMOL/L (ref 0.4–1.9)
METHGB MFR BLDV: 0.2 %
O2 THERAPY: ABNORMAL
PATH INTERP BLD-IMP: NORMAL
PCO2 BLDV: 55.7 MMHG (ref 40–50)
PH BLDV: 7.34 [PH] (ref 7.35–7.45)
PO2 BLDV: 48.9 MMHG (ref 25–40)
SAO2 % BLDV: 83 %

## 2024-04-02 PROCEDURE — 6370000000 HC RX 637 (ALT 250 FOR IP): Performed by: FAMILY MEDICINE

## 2024-04-02 PROCEDURE — 6370000000 HC RX 637 (ALT 250 FOR IP): Performed by: PHYSICIAN ASSISTANT

## 2024-04-02 PROCEDURE — 2700000000 HC OXYGEN THERAPY PER DAY

## 2024-04-02 PROCEDURE — 2580000003 HC RX 258: Performed by: FAMILY MEDICINE

## 2024-04-02 PROCEDURE — 2580000003 HC RX 258: Performed by: PHYSICIAN ASSISTANT

## 2024-04-02 PROCEDURE — 83605 ASSAY OF LACTIC ACID: CPT

## 2024-04-02 PROCEDURE — 82803 BLOOD GASES ANY COMBINATION: CPT

## 2024-04-02 PROCEDURE — 6360000002 HC RX W HCPCS: Performed by: FAMILY MEDICINE

## 2024-04-02 PROCEDURE — 6360000002 HC RX W HCPCS: Performed by: STUDENT IN AN ORGANIZED HEALTH CARE EDUCATION/TRAINING PROGRAM

## 2024-04-02 PROCEDURE — 94761 N-INVAS EAR/PLS OXIMETRY MLT: CPT

## 2024-04-02 PROCEDURE — 2060000000 HC ICU INTERMEDIATE R&B

## 2024-04-02 PROCEDURE — 5A09457 ASSISTANCE WITH RESPIRATORY VENTILATION, 24-96 CONSECUTIVE HOURS, CONTINUOUS POSITIVE AIRWAY PRESSURE: ICD-10-PCS | Performed by: PHYSICIAN ASSISTANT

## 2024-04-02 PROCEDURE — 36415 COLL VENOUS BLD VENIPUNCTURE: CPT

## 2024-04-02 PROCEDURE — 93010 ELECTROCARDIOGRAM REPORT: CPT | Performed by: INTERNAL MEDICINE

## 2024-04-02 PROCEDURE — 94640 AIRWAY INHALATION TREATMENT: CPT

## 2024-04-02 PROCEDURE — 94660 CPAP INITIATION&MGMT: CPT

## 2024-04-02 PROCEDURE — 6360000002 HC RX W HCPCS: Performed by: PHYSICIAN ASSISTANT

## 2024-04-02 RX ORDER — OSELTAMIVIR PHOSPHATE 75 MG/1
75 CAPSULE ORAL 2 TIMES DAILY
Status: COMPLETED | OUTPATIENT
Start: 2024-04-02 | End: 2024-04-06

## 2024-04-02 RX ORDER — MONTELUKAST SODIUM 10 MG/1
10 TABLET ORAL NIGHTLY
Status: DISCONTINUED | OUTPATIENT
Start: 2024-04-02 | End: 2024-04-07 | Stop reason: HOSPADM

## 2024-04-02 RX ORDER — FUROSEMIDE 10 MG/ML
40 INJECTION INTRAMUSCULAR; INTRAVENOUS DAILY
Status: DISCONTINUED | OUTPATIENT
Start: 2024-04-02 | End: 2024-04-03

## 2024-04-02 RX ORDER — ASPIRIN 81 MG/1
81 TABLET ORAL DAILY
Status: DISCONTINUED | OUTPATIENT
Start: 2024-04-02 | End: 2024-04-07 | Stop reason: HOSPADM

## 2024-04-02 RX ORDER — IPRATROPIUM BROMIDE AND ALBUTEROL SULFATE 2.5; .5 MG/3ML; MG/3ML
1 SOLUTION RESPIRATORY (INHALATION) ONCE
Status: COMPLETED | OUTPATIENT
Start: 2024-04-02 | End: 2024-04-02

## 2024-04-02 RX ORDER — AMLODIPINE BESYLATE 5 MG/1
5 TABLET ORAL DAILY
Status: DISCONTINUED | OUTPATIENT
Start: 2024-04-02 | End: 2024-04-07 | Stop reason: HOSPADM

## 2024-04-02 RX ORDER — ENOXAPARIN SODIUM 100 MG/ML
40 INJECTION SUBCUTANEOUS DAILY
Status: DISCONTINUED | OUTPATIENT
Start: 2024-04-02 | End: 2024-04-07 | Stop reason: HOSPADM

## 2024-04-02 RX ORDER — POLYETHYLENE GLYCOL 3350 17 G/17G
17 POWDER, FOR SOLUTION ORAL DAILY PRN
Status: DISCONTINUED | OUTPATIENT
Start: 2024-04-02 | End: 2024-04-07 | Stop reason: HOSPADM

## 2024-04-02 RX ORDER — ACETAMINOPHEN 325 MG/1
650 TABLET ORAL EVERY 6 HOURS PRN
Status: DISCONTINUED | OUTPATIENT
Start: 2024-04-02 | End: 2024-04-07 | Stop reason: HOSPADM

## 2024-04-02 RX ORDER — PREDNISONE 20 MG/1
40 TABLET ORAL DAILY
Status: DISCONTINUED | OUTPATIENT
Start: 2024-04-04 | End: 2024-04-03

## 2024-04-02 RX ORDER — CARVEDILOL 6.25 MG/1
12.5 TABLET ORAL 2 TIMES DAILY WITH MEALS
Status: DISCONTINUED | OUTPATIENT
Start: 2024-04-02 | End: 2024-04-03

## 2024-04-02 RX ORDER — PRIMIDONE 50 MG/1
100 TABLET ORAL DAILY
Status: DISCONTINUED | OUTPATIENT
Start: 2024-04-02 | End: 2024-04-04

## 2024-04-02 RX ORDER — SODIUM CHLORIDE 0.9 % (FLUSH) 0.9 %
5-40 SYRINGE (ML) INJECTION EVERY 12 HOURS SCHEDULED
Status: DISCONTINUED | OUTPATIENT
Start: 2024-04-02 | End: 2024-04-07 | Stop reason: HOSPADM

## 2024-04-02 RX ORDER — SODIUM CHLORIDE 9 MG/ML
INJECTION, SOLUTION INTRAVENOUS PRN
Status: DISCONTINUED | OUTPATIENT
Start: 2024-04-02 | End: 2024-04-07 | Stop reason: HOSPADM

## 2024-04-02 RX ORDER — SODIUM CHLORIDE 0.9 % (FLUSH) 0.9 %
5-40 SYRINGE (ML) INJECTION PRN
Status: DISCONTINUED | OUTPATIENT
Start: 2024-04-02 | End: 2024-04-07 | Stop reason: HOSPADM

## 2024-04-02 RX ORDER — ALBUTEROL SULFATE 2.5 MG/3ML
2.5 SOLUTION RESPIRATORY (INHALATION)
Status: DISCONTINUED | OUTPATIENT
Start: 2024-04-02 | End: 2024-04-07 | Stop reason: HOSPADM

## 2024-04-02 RX ORDER — ZIPRASIDONE MESYLATE 20 MG/ML
20 INJECTION, POWDER, LYOPHILIZED, FOR SOLUTION INTRAMUSCULAR ONCE
Status: DISCONTINUED | OUTPATIENT
Start: 2024-04-02 | End: 2024-04-02

## 2024-04-02 RX ORDER — ATORVASTATIN CALCIUM 40 MG/1
40 TABLET, FILM COATED ORAL DAILY
Status: DISCONTINUED | OUTPATIENT
Start: 2024-04-02 | End: 2024-04-07 | Stop reason: HOSPADM

## 2024-04-02 RX ORDER — DIVALPROEX SODIUM 250 MG/1
500 TABLET, EXTENDED RELEASE ORAL NIGHTLY
Status: DISCONTINUED | OUTPATIENT
Start: 2024-04-02 | End: 2024-04-03

## 2024-04-02 RX ORDER — LURASIDONE HYDROCHLORIDE 80 MG/1
80 TABLET, FILM COATED ORAL DAILY
Status: DISCONTINUED | OUTPATIENT
Start: 2024-04-02 | End: 2024-04-04

## 2024-04-02 RX ORDER — ACETAMINOPHEN 650 MG/1
650 SUPPOSITORY RECTAL EVERY 6 HOURS PRN
Status: DISCONTINUED | OUTPATIENT
Start: 2024-04-02 | End: 2024-04-07 | Stop reason: HOSPADM

## 2024-04-02 RX ORDER — ONDANSETRON 2 MG/ML
4 INJECTION INTRAMUSCULAR; INTRAVENOUS EVERY 6 HOURS PRN
Status: DISCONTINUED | OUTPATIENT
Start: 2024-04-02 | End: 2024-04-07 | Stop reason: HOSPADM

## 2024-04-02 RX ORDER — DULOXETIN HYDROCHLORIDE 30 MG/1
60 CAPSULE, DELAYED RELEASE ORAL DAILY
Status: DISCONTINUED | OUTPATIENT
Start: 2024-04-02 | End: 2024-04-07 | Stop reason: HOSPADM

## 2024-04-02 RX ORDER — QUETIAPINE FUMARATE 100 MG/1
300 TABLET, FILM COATED ORAL NIGHTLY
Status: DISCONTINUED | OUTPATIENT
Start: 2024-04-02 | End: 2024-04-07 | Stop reason: HOSPADM

## 2024-04-02 RX ORDER — ZIPRASIDONE MESYLATE 20 MG/ML
10 INJECTION, POWDER, LYOPHILIZED, FOR SOLUTION INTRAMUSCULAR ONCE
Status: COMPLETED | OUTPATIENT
Start: 2024-04-02 | End: 2024-04-03

## 2024-04-02 RX ORDER — PANTOPRAZOLE SODIUM 40 MG/1
40 TABLET, DELAYED RELEASE ORAL
Status: DISCONTINUED | OUTPATIENT
Start: 2024-04-02 | End: 2024-04-07 | Stop reason: HOSPADM

## 2024-04-02 RX ORDER — ONDANSETRON 4 MG/1
4 TABLET, ORALLY DISINTEGRATING ORAL EVERY 8 HOURS PRN
Status: DISCONTINUED | OUTPATIENT
Start: 2024-04-02 | End: 2024-04-07 | Stop reason: HOSPADM

## 2024-04-02 RX ORDER — BUSPIRONE HYDROCHLORIDE 15 MG/1
15 TABLET ORAL 3 TIMES DAILY
Status: DISCONTINUED | OUTPATIENT
Start: 2024-04-02 | End: 2024-04-07 | Stop reason: HOSPADM

## 2024-04-02 RX ADMIN — ASPIRIN 81 MG: 81 TABLET, COATED ORAL at 12:43

## 2024-04-02 RX ADMIN — OSELTAMIVIR PHOSPHATE 75 MG: 75 CAPSULE ORAL at 12:43

## 2024-04-02 RX ADMIN — WATER 40 MG: 1 INJECTION INTRAMUSCULAR; INTRAVENOUS; SUBCUTANEOUS at 15:58

## 2024-04-02 RX ADMIN — AMLODIPINE BESYLATE 5 MG: 5 TABLET ORAL at 13:46

## 2024-04-02 RX ADMIN — PANTOPRAZOLE SODIUM 40 MG: 40 TABLET, DELAYED RELEASE ORAL at 12:43

## 2024-04-02 RX ADMIN — OSELTAMIVIR PHOSPHATE 75 MG: 75 CAPSULE ORAL at 21:29

## 2024-04-02 RX ADMIN — CARVEDILOL 12.5 MG: 6.25 TABLET, FILM COATED ORAL at 15:59

## 2024-04-02 RX ADMIN — SODIUM CHLORIDE, PRESERVATIVE FREE 10 ML: 5 INJECTION INTRAVENOUS at 12:46

## 2024-04-02 RX ADMIN — WATER 125 MG: 1 INJECTION INTRAMUSCULAR; INTRAVENOUS; SUBCUTANEOUS at 02:29

## 2024-04-02 RX ADMIN — FUROSEMIDE 40 MG: 10 INJECTION, SOLUTION INTRAMUSCULAR; INTRAVENOUS at 12:56

## 2024-04-02 RX ADMIN — QUETIAPINE FUMARATE 300 MG: 100 TABLET ORAL at 21:29

## 2024-04-02 RX ADMIN — ATORVASTATIN CALCIUM 40 MG: 40 TABLET, FILM COATED ORAL at 12:43

## 2024-04-02 RX ADMIN — DULOXETINE HYDROCHLORIDE 60 MG: 30 CAPSULE, DELAYED RELEASE ORAL at 12:42

## 2024-04-02 RX ADMIN — IPRATROPIUM BROMIDE AND ALBUTEROL SULFATE 1 DOSE: 2.5; .5 SOLUTION RESPIRATORY (INHALATION) at 01:44

## 2024-04-02 RX ADMIN — ENOXAPARIN SODIUM 40 MG: 100 INJECTION SUBCUTANEOUS at 13:46

## 2024-04-02 RX ADMIN — ALBUTEROL SULFATE 2.5 MG: 2.5 SOLUTION RESPIRATORY (INHALATION) at 19:46

## 2024-04-02 RX ADMIN — ALBUTEROL SULFATE 2.5 MG: 2.5 SOLUTION RESPIRATORY (INHALATION) at 07:27

## 2024-04-02 RX ADMIN — CARVEDILOL 12.5 MG: 6.25 TABLET, FILM COATED ORAL at 12:42

## 2024-04-02 RX ADMIN — WATER 40 MG: 1 INJECTION INTRAMUSCULAR; INTRAVENOUS; SUBCUTANEOUS at 12:41

## 2024-04-02 RX ADMIN — BUSPIRONE HYDROCHLORIDE 15 MG: 15 TABLET ORAL at 21:29

## 2024-04-02 RX ADMIN — BUSPIRONE HYDROCHLORIDE 15 MG: 15 TABLET ORAL at 13:46

## 2024-04-02 RX ADMIN — ALBUTEROL SULFATE 2.5 MG: 2.5 SOLUTION RESPIRATORY (INHALATION) at 12:27

## 2024-04-02 RX ADMIN — DIVALPROEX SODIUM 500 MG: 250 TABLET, EXTENDED RELEASE ORAL at 21:29

## 2024-04-02 RX ADMIN — ALBUTEROL SULFATE 2.5 MG: 2.5 SOLUTION RESPIRATORY (INHALATION) at 15:24

## 2024-04-02 ASSESSMENT — PAIN SCALES - GENERAL
PAINLEVEL_OUTOF10: 0
PAINLEVEL_OUTOF10: 0

## 2024-04-02 ASSESSMENT — PAIN - FUNCTIONAL ASSESSMENT: PAIN_FUNCTIONAL_ASSESSMENT: NONE - DENIES PAIN

## 2024-04-02 NOTE — ED PROVIDER NOTES
[facility] A[facility] CARD TELEMETRY  Emergency Department Encounter    Patient Name: Najma Fitzpatrick  MRN: [id number]  YOB: 1950  Date of Evaluation: 4/1/2024  Provider: Geena Sotomayor APRN - CNP  Note Started: 12:05 AM EDT 4/2/24    CHIEF COMPLAINT  Shortness of Breath (EMS called for SOB. Pt was in the 70s on RA upon EMS arrival. Family states pt hasn't urinated today and has been more confused recently as well, pt appears to be A&O x4 during triage )    SHARED SERVICE VISIT  I have seen and evaluated this patient with my supervising physician, Dr. Israel.     HISTORY OF PRESENT ILLNESS  Najma Fitzpatrick is a 73 y.o. female who presents to the ED for evaluation several day history of feeling unwell with cough congestion.   present and reports that patient has been mildly confused over the past several days.  Patient reports mild headache.  No dizziness.  She denies changes in vision.  No difficulty speaking or swallowing.  Does feel short of breath with cough.  Denies chest pain.  They deny fevers or chills.  Patient reports some mild abdominal discomfort without nausea or vomiting.  No diarrhea or constipation.  Denies leg pain or swelling..    No other complaints, modifying factors or associated symptoms.     Nursing notes reviewed were all reviewed and agreed with or any disagreements were addressed in the HPI.    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    Coronary artery disease involving native coronary artery of native heart with angina pectoris ([facility]) 3/8/2016    Depression     Emphysema of lung ([facility])     Fibromyalgia     hyperlipidemia  8/11/2011    Hypertension     Kidney stone     Liver disease     Lung disease     MDD (major depressive disorder) 4/4/2012    Mood disorder (HCC) 3/13/2013    Movement disorder     Neck pain, chronic 3/13/2013    Opiate misuse     Personality disorder (HCC)     Pneumonia     Polypharmacy 2/16/2013     Surgical History:  Past Surgical History:   Procedure Laterality Date    COLONOSCOPY  1/19/12    DIAGNOSTIC CARDIAC CATH LAB PROCEDURE  Feb, 2007    Normal cor angio Feb, 2007    HERNIA REPAIR  07/11/2019    robotic ventral hernia repair with mesh    HERNIA REPAIR N/A 7/11/2019    ROBOTIC VENTRAL HERNIA REPAIR WITH MESH performed by Y[place]y Rider [place] [facility] [place]    HYSTERECTOMY, TOTAL ABDOMINAL (CERVIX REMOVED)      KIDNEY STONE SURGERY       Family History:  Family History   Problem Relation Age of Onset    Emphysema Mother     Heart Disease Mother     Arthritis Mother     Depression Mother     High Blood Pressure Mother     Heart Failure Father     Heart Disease Father     Arthritis Father     Diabetes Father     High Blood Pressure Father     Stroke Father     Substance Abuse Father     High Blood Pressure Brother     Heart Disease Sister     Kidney Disease Daughter     Breast Cancer Maternal Aunt      Social History:  Social History     Socioeconomic History    Marital status:      Spouse name: Not on file    Number of children: Not on file    Years of education: Not on file    Highest education level: Not on file   Occupational History    Not on file   Tobacco Use    Smoking status: Every Day     Current packs/day: 2.50     Average packs/day: 2.5 packs/day for 47.0 years (117.5 ttl pk-yrs)     Types: Cigarettes    Smokeless tobacco: Never   Vaping Use    Vaping Use: Never used   Substance and Sexual Activity    Alcohol use: No    Drug use: Not Currently    Sexual activity: Yes     Partners: Male   Other Topics Concern    Not on file   Social History Narrative    Not on file     Social Determinants of

## 2024-04-02 NOTE — PROGRESS NOTES
Pt admitted to room 209 from ED. Attempted to orient pt to room and call light, pt is disoriented. Call light within reach, bed in lowest position and locked. Bed alarm on. Skin warm and dry, airway patent, Pt is alert, disoriented to person, place, and time. MD notified of pt's arrival to unit.

## 2024-04-02 NOTE — PROGRESS NOTES
04/02/24 0408   RT Protocol   History Pulmonary Disease 2   Respiratory pattern 2   Breath sounds 2   Cough 0   Indications for Bronchodilator Therapy On home bronchodilators   Bronchodilator Assessment Score 6

## 2024-04-02 NOTE — PROGRESS NOTES
04/02/24 1949   NIV Type   NIV Started/Stopped On   Equipment Type v60   Mode Bilevel   Mask Type Full face mask   Mask Size Medium   Assessment   Pulse 74   Respirations 20   SpO2 96 %   Skin Assessment Clean, dry, & intact   Skin Protection for O2 Device Yes   Orientation Middle   Location Nose   Intervention(s) Skin Barrier   Settings/Measurements   PIP Observed 15 cm H20   IPAP 15 cmH20   CPAP/EPAP 5 cmH2O   Vt (Measured) 409 mL   Rate Ordered 14   FiO2  30 %   I Time/ I Time % 1 s   Minute Volume (L/min) 7 Liters   Mask Leak (lpm) 0 lpm   Patient's Home Machine No   Alarm Settings   Alarms On Y   Low Pressure (cmH2O) 6 cmH2O   High Pressure (cmH2O) 30 cmH2O   Apnea (secs) 20 secs   RR Low (bpm) 6   RR High (bpm) 40 br/min

## 2024-04-02 NOTE — PROGRESS NOTES
04/02/24 0731   NIV Type   NIV Started/Stopped On   Equipment Type v60   Mode Bilevel   Mask Type Full face mask   Mask Size Medium   Assessment   Pulse 69   Respirations 16   SpO2 97 %   Using Accessory Muscles No   Mask Compliance Good   Skin Protection for O2 Device Yes   Location Nose   Settings/Measurements   PIP Observed 15 cm H20   IPAP 15 cmH20   CPAP/EPAP 5 cmH2O   Vt (Measured) 389 mL   Rate Ordered 14   FiO2  40 %   I Time/ I Time % 1 s   Minute Volume (L/min) 6.6 Liters   Mask Leak (lpm) 0 lpm   Patient's Home Machine No   Alarm Settings   Alarms On Y   Low Pressure (cmH2O) 6 cmH2O   High Pressure (cmH2O) 30 cmH2O   Apnea (secs) 20 secs   RR Low (bpm) 6   RR High (bpm) 40 br/min

## 2024-04-02 NOTE — PROGRESS NOTES
04/02/24 1528   NIV Type   NIV Started/Stopped On   Equipment Type v60   Mode Bilevel   Mask Type Full face mask   Mask Size Medium   Assessment   Pulse 70   Respirations 20   SpO2 95 %   Comfort Level Good   Using Accessory Muscles No   Mask Compliance Good   Skin Assessment Clean, dry, & intact   Skin Protection for O2 Device Yes   Orientation Middle   Location Nose   Intervention(s) Skin Barrier   Settings/Measurements   PIP Observed 13 cm H20   IPAP 15 cmH20   CPAP/EPAP 5 cmH2O   Vt (Measured) 789 mL   Rate Ordered 14   FiO2  30 %   I Time/ I Time % 1 s   Minute Volume (L/min) 15.8 Liters   Mask Leak (lpm) 15 lpm   Patient's Home Machine No   Alarm Settings   Alarms On Y   Low Pressure (cmH2O) 6 cmH2O   High Pressure (cmH2O) 30 cmH2O   Apnea (secs) 20 secs   RR Low (bpm) 6   RR High (bpm) 40 br/min

## 2024-04-02 NOTE — PROGRESS NOTES
04/02/24 0148   NIV Type   $NIV $Daily Charge   NIV Started/Stopped On   Equipment Type v60   Mode Bilevel   Mask Type Full face mask   Mask Size Medium   Assessment   Pulse 66   Respirations 18   SpO2 100 %   Settings/Measurements   IPAP 15 cmH20   CPAP/EPAP 5 cmH2O   Vt (Measured) 305 mL   Rate Ordered 14   FiO2  40 %   Patient's Home Machine No   Alarm Settings   Alarms On Y   Low Pressure (cmH2O) 6 cmH2O   High Pressure (cmH2O) 30 cmH2O   Patient Observation   Observations mepilex on nose

## 2024-04-02 NOTE — PROGRESS NOTES
04/02/24 0910   NIV Type   NIV Started/Stopped On   Equipment Type v60   Mode Bilevel   Mask Type Full face mask   Mask Size Medium   Assessment   Pulse 74   Respirations 30   SpO2 98 %   Comfort Level Good   Using Accessory Muscles No   Mask Compliance Good   Skin Protection for O2 Device Yes   Location Nose   Settings/Measurements   PIP Observed 13 cm H20   IPAP 15 cmH20   CPAP/EPAP 5 cmH2O   Vt (Measured) 448 mL   Rate Ordered 14   FiO2  40 %   I Time/ I Time % 1 s   Minute Volume (L/min) 15.5 Liters   Mask Leak (lpm) 15 lpm   Patient's Home Machine No   Alarm Settings   Alarms On Y   Low Pressure (cmH2O) 6 cmH2O   High Pressure (cmH2O) 30 cmH2O   Apnea (secs) 20 secs   RR Low (bpm) 6   RR High (bpm) 40 br/min

## 2024-04-02 NOTE — ED NOTES
Pt with dried stool on bilateral lower extremities and left forearm. Pt given bed bath and placed in hospital gown. Pt arrived in depends (dry). Pt states she has not urinated since last night.

## 2024-04-02 NOTE — PROGRESS NOTES
04/02/24 0404   NIV Type   NIV Started/Stopped On   Equipment Type v60   Mode Bilevel   Mask Type Full face mask   Mask Size Medium   Assessment   Pulse 63   Respirations 14   SpO2 94 %   Settings/Measurements   IPAP 15 cmH20   CPAP/EPAP 5 cmH2O   Vt (Measured) 299 mL   Rate Ordered 14   FiO2  40 %   Mask Leak (lpm) 10 lpm   Patient's Home Machine No   Alarm Settings   Alarms On Y   Low Pressure (cmH2O) 6 cmH2O   High Pressure (cmH2O) 30 cmH2O   Patient Observation   Observations mepilex on nose

## 2024-04-02 NOTE — PROGRESS NOTES
Patient admitted earlier this morning by my partner.  History and physical reviewed.  Agree with history and physical.  Continue with plan of care.  Continue current measures for now.    Patient seen and examined at bedside. More awake. Was asking to have BIPAP removed. VBG pending. Pt stopped taking her home lasix few days ago. Exam noted for bilateral crackles and 1+ bilateral edema. Will add lasix 40 mg IV daily, strict intake/output.     Abdiaziz Patrick MD

## 2024-04-02 NOTE — H&P
Hospital Medicine History & Physical      Date of Admission: 4/1/2024    Date of Service:  Pt seen/examined on 4/2/2024    [x]Admitted to Inpatient with expected LOS greater than two midnights due to medical therapy.  []Placed in Observation status.    Chief Admission Complaint: Shortness of breath    Presenting Admission History:      73 y.o. female with past medical history of COPD, hypertension, hyperlipidemia who presented to the hospital complaining of shortness of breath which has been going on for several hours and worsening.  Patient was also noted to be confused and unable to provide any history.  Most of the history was obtained from the ED records.  Patient was seen laying on the bed on BiPAP.  Patient denies chest pain, fever or chills.      Assessment/Plan:    Acute hypercapnic respiratory failure  Influenza A infection  Acute metabolic encephalopathy  Hyponatremia/hypochloremia  Hypokalemia  Hypomagnesemia  Elevated troponin    Plan  Acute hypercapnic respiratory failure: Patient has been started on BiPAP in the ER due to hypercapnia.  Confusion seems to be improving as patient is now alert and oriented x 2.  Continue supplemental oxygen to keep saturations above 90%.  Continue Solu-Medrol and DuoNebs.    Influenza A infection: Will start on Tamiflu.  Placed on droplet precautions.    Acute metabolic encephalopathy: Likely secondary to above, continue to monitor.    Hyponatremia/hypochloremia: Placed on gentle IV hydration and monitor.    Hypokalemia: Replace and monitor.    Hypomagnesemia: Replace and monitor.    Elevated troponin: Likely due to supply/demand mismatch.  Does not report chest pain.  Continue cardiac monitoring.      Discussed management and the need for Hospitalization of the patient w/ the Emergency Department Provider      EKG:  I have reviewed the EKG with the following interpretation: Normal sinus rhythm at 64 bpm, no acute ST changes.      Physical Exam Performed:      BP (!)  congestion.  No focal infiltrates.  No pulmonary edema.  No active pleural disease.     Cardiomegaly and congestion.  No focal infiltrates or pulmonary edema.     XR KNEE LEFT (3 VIEWS)    Result Date: 3/4/2024  EXAMINATION: THREE XRAY VIEWS OF THE LEFT KNEE 3/4/2024 2:48 pm COMPARISON: None. HISTORY: ORDERING SYSTEM PROVIDED HISTORY: Chronic pain of left knee TECHNOLOGIST PROVIDED HISTORY: Reason for Exam: chronic knee pain FINDINGS: No evidence of acute fracture or dislocation.  No focal osseous lesion.  No evidence of joint effusion. No focal soft tissue abnormality.     No acute abnormality of the knee.       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/2019    Coronary artery disease involving native coronary artery of native heart with angina pectoris (formerly [facility]) 3/8/2016    Depression     Emphysema of lung (formerly [facility])     Fibromyalgia     hyperlipidemia 8/11/2011    Hypertension     Kidney stone     Liver disease     Lung disease     MDD (major depressive disorder) 4/4/2012    Mood disorder (formerly [facility]) 3/13/2013    Movement disorder     Neck pain, chronic 3/13/2013    Opiate misuse     Personality disorder (formerly [facility])     Pneumonia     Polypharmacy 2/16/2013       Past Surgical History:        Procedure Laterality Date    COLONOSCOPY  1/19/12    DIAGNOSTIC CARDIAC CATH LAB PROCEDURE  Feb, 2007    Normal cor angio Feb, 2007    HERNIA REPAIR  07/11/2019    robotic ventral hernia repair with mesh    HERNIA REPAIR N/A 7/11/2019    ROBOTIC VENTRAL HERNIA REPAIR WITH MESH performed by Yuriy Rider MD at [facility]    HYSTERECTOMY, TOTAL ABDOMINAL  (CERVIX REMOVED)      KIDNEY STONE SURGERY         Medications Prior to Admission:   Prior to Admission medications    Medication Sig Start Date End Date Taking? Authorizing Provider   amLODIPine (NORVASC) 5 MG tablet Take 1 tablet by mouth daily 6/29/23   Ayleen Garces MD   losartan (COZAAR) 50 MG tablet Take 1 tablet by mouth daily for 15 days 6/28/23 7/13/23  Ayleen Garces MD   fluticasone-umeclidin-vilant (TRELEGY ELLIPTA) 100-62.5-25 MCG/ACT AEPB inhaler Inhale 1 puff into the lungs daily 2/17/23   Prosper Rodriguez MD   aspirin 81 MG EC tablet Take 1 tablet by mouth daily  Patient not taking: Reported on 6/25/2023 2/17/23   Prosper Rodriguez MD   butalbital-acetaminophen-caffeine (FIORICET, ESGIC) -40 MG per tablet Take 1 tablet by mouth every 6 hours as needed for Headaches 1/6/23 2/5/23  Deep Adame, PASteveC   atorvastatin (LIPITOR) 40 MG tablet Take 1 tablet by mouth daily 9/7/22   Vida Camarillo MD   divalproex (DEPAKOTE ER) 250 MG extended release tablet Take 2 tablets by mouth nightly 9/7/22   Vida Camarillo MD   LORazepam (ATIVAN) 0.5 MG tablet Take 1 tablet by mouth 2 times daily as needed.    Vida Camarillo MD   montelukast (SINGULAIR) 10 MG tablet  9/9/22   Vida Camarillo MD   lurasidone (LATUDA) 80 MG TABS tablet Take 1 tablet by mouth daily    Vida Camarillo MD   QUEtiapine (SEROQUEL) 100 MG tablet Take 3 tablets by mouth nightly 8/16/21   Khoa Shields MD   carvedilol (COREG) 12.5 MG tablet Take 1 tablet by mouth 2 times daily (with meals) 8/16/21   Khoa Shields MD   primidone (MYSOLINE) 50 MG tablet Take 2 tablets by mouth daily    Vida Camarillo MD   busPIRone (BUSPAR) 15 MG tablet Take 15 mg by mouth 3 times daily    Vida Camarillo MD   pantoprazole (PROTONIX) 40 MG tablet Take 1 tablet by mouth every morning (before breakfast) 9/30/20   Hakeem Horn,    nicotine (NICODERM CQ) 14 MG/24HR Place 1 patch onto the

## 2024-04-02 NOTE — PROGRESS NOTES
Spoke with daughter Maricel. Daughter states pt is usually a&ox4. Daughter states she last seen mother about 2 weeks ago. States pt lives at home with , son, daughter, and grandchildren.

## 2024-04-02 NOTE — PROGRESS NOTES
04/02/24 1227   NIV Type   NIV Started/Stopped On   Equipment Type v60   Mode Bilevel   Mask Type Full face mask   Mask Size Medium   Assessment   Pulse 68   Respirations 21   SpO2 100 %   Comfort Level Fair   Mask Compliance Good   Skin Protection for O2 Device Yes   Location Nose   Settings/Measurements   PIP Observed 16 cm H20   IPAP 15 cmH20   CPAP/EPAP 5 cmH2O   Vt (Measured) 334 mL   Rate Ordered 14   FiO2  40 %  (Decreased to 30%)   I Time/ I Time % 1 s   Minute Volume (L/min) 11.2 Liters   Mask Leak (lpm) 1 lpm   Patient's Home Machine No   Alarm Settings   Alarms On Y   Low Pressure (cmH2O) 6 cmH2O   High Pressure (cmH2O) 30 cmH2O   Apnea (secs) 20 secs   RR Low (bpm) 6   RR High (bpm) 40 br/min

## 2024-04-03 LAB
ANION GAP SERPL CALCULATED.3IONS-SCNC: 9 MMOL/L (ref 3–16)
BASE EXCESS BLDV CALC-SCNC: 13.3 MMOL/L (ref -3–3)
BASOPHILS # BLD: 0.1 K/UL (ref 0–0.2)
BASOPHILS NFR BLD: 0.5 %
BUN SERPL-MCNC: 15 MG/DL (ref 7–20)
CALCIUM SERPL-MCNC: 8.8 MG/DL (ref 8.3–10.6)
CHLORIDE SERPL-SCNC: 92 MMOL/L (ref 99–110)
CO2 BLDV-SCNC: 42 MMOL/L
CO2 SERPL-SCNC: 34 MMOL/L (ref 21–32)
COHGB MFR BLDV: 2.7 % (ref 0–1.5)
CREAT SERPL-MCNC: <0.5 MG/DL (ref 0.6–1.2)
DEPRECATED RDW RBC AUTO: 16.8 % (ref 12.4–15.4)
EOSINOPHIL # BLD: 0 K/UL (ref 0–0.6)
EOSINOPHIL NFR BLD: 0 %
GFR SERPLBLD CREATININE-BSD FMLA CKD-EPI: >90 ML/MIN/{1.73_M2}
GLUCOSE SERPL-MCNC: 126 MG/DL (ref 70–99)
HCO3 BLDV-SCNC: 40.5 MMOL/L (ref 23–29)
HCT VFR BLD AUTO: 37.8 % (ref 36–48)
HGB BLD-MCNC: 12 G/DL (ref 12–16)
LYMPHOCYTES # BLD: 0.8 K/UL (ref 1–5.1)
LYMPHOCYTES NFR BLD: 5 %
MCH RBC QN AUTO: 26.6 PG (ref 26–34)
MCHC RBC AUTO-ENTMCNC: 31.7 G/DL (ref 31–36)
MCV RBC AUTO: 83.9 FL (ref 80–100)
METHGB MFR BLDV: 0.1 %
MONOCYTES # BLD: 1.3 K/UL (ref 0–1.3)
MONOCYTES NFR BLD: 8.6 %
NEUTROPHILS # BLD: 13.2 K/UL (ref 1.7–7.7)
NEUTROPHILS NFR BLD: 85.9 %
O2 THERAPY: ABNORMAL
OSMOLALITY SERPL: 297 MOSM/KG (ref 280–301)
PCO2 BLDV: 62.4 MMHG (ref 40–50)
PH BLDV: 7.43 [PH] (ref 7.35–7.45)
PLATELET # BLD AUTO: 122 K/UL (ref 135–450)
PMV BLD AUTO: 7.1 FL (ref 5–10.5)
PO2 BLDV: 30.9 MMHG (ref 25–40)
POTASSIUM SERPL-SCNC: 3.6 MMOL/L (ref 3.5–5.1)
RBC # BLD AUTO: 4.5 M/UL (ref 4–5.2)
SAO2 % BLDV: 60 %
SODIUM SERPL-SCNC: 135 MMOL/L (ref 136–145)
WBC # BLD AUTO: 15.4 K/UL (ref 4–11)

## 2024-04-03 PROCEDURE — 6360000002 HC RX W HCPCS: Performed by: FAMILY MEDICINE

## 2024-04-03 PROCEDURE — 6370000000 HC RX 637 (ALT 250 FOR IP): Performed by: FAMILY MEDICINE

## 2024-04-03 PROCEDURE — 94761 N-INVAS EAR/PLS OXIMETRY MLT: CPT

## 2024-04-03 PROCEDURE — 6360000002 HC RX W HCPCS: Performed by: STUDENT IN AN ORGANIZED HEALTH CARE EDUCATION/TRAINING PROGRAM

## 2024-04-03 PROCEDURE — 6360000002 HC RX W HCPCS: Performed by: NURSE PRACTITIONER

## 2024-04-03 PROCEDURE — 6370000000 HC RX 637 (ALT 250 FOR IP): Performed by: STUDENT IN AN ORGANIZED HEALTH CARE EDUCATION/TRAINING PROGRAM

## 2024-04-03 PROCEDURE — 94640 AIRWAY INHALATION TREATMENT: CPT

## 2024-04-03 PROCEDURE — 82803 BLOOD GASES ANY COMBINATION: CPT

## 2024-04-03 PROCEDURE — 85025 COMPLETE CBC W/AUTO DIFF WBC: CPT

## 2024-04-03 PROCEDURE — 36415 COLL VENOUS BLD VENIPUNCTURE: CPT

## 2024-04-03 PROCEDURE — 2060000000 HC ICU INTERMEDIATE R&B

## 2024-04-03 PROCEDURE — 2580000003 HC RX 258: Performed by: FAMILY MEDICINE

## 2024-04-03 PROCEDURE — 83930 ASSAY OF BLOOD OSMOLALITY: CPT

## 2024-04-03 PROCEDURE — 2700000000 HC OXYGEN THERAPY PER DAY

## 2024-04-03 PROCEDURE — 80048 BASIC METABOLIC PNL TOTAL CA: CPT

## 2024-04-03 RX ORDER — GABAPENTIN 300 MG/1
300 CAPSULE ORAL 3 TIMES DAILY
Status: ON HOLD | COMMUNITY
Start: 2024-02-13 | End: 2024-04-03 | Stop reason: CLARIF

## 2024-04-03 RX ORDER — FUROSEMIDE 20 MG/1
20 TABLET ORAL DAILY
Status: DISCONTINUED | OUTPATIENT
Start: 2024-04-03 | End: 2024-04-07 | Stop reason: HOSPADM

## 2024-04-03 RX ORDER — QUETIAPINE FUMARATE 25 MG/1
25 TABLET, FILM COATED ORAL 2 TIMES DAILY PRN
Status: DISCONTINUED | OUTPATIENT
Start: 2024-04-03 | End: 2024-04-07 | Stop reason: HOSPADM

## 2024-04-03 RX ORDER — CARVEDILOL 25 MG/1
25 TABLET ORAL 2 TIMES DAILY WITH MEALS
Status: DISCONTINUED | OUTPATIENT
Start: 2024-04-04 | End: 2024-04-07 | Stop reason: HOSPADM

## 2024-04-03 RX ORDER — GABAPENTIN 300 MG/1
300 CAPSULE ORAL 3 TIMES DAILY
Status: DISCONTINUED | OUTPATIENT
Start: 2024-04-03 | End: 2024-04-07 | Stop reason: HOSPADM

## 2024-04-03 RX ORDER — CARVEDILOL 25 MG/1
25 TABLET ORAL 2 TIMES DAILY WITH MEALS
COMMUNITY

## 2024-04-03 RX ORDER — HYDROXYZINE PAMOATE 25 MG/1
50 CAPSULE ORAL 3 TIMES DAILY PRN
Status: DISCONTINUED | OUTPATIENT
Start: 2024-04-03 | End: 2024-04-07 | Stop reason: HOSPADM

## 2024-04-03 RX ORDER — QUETIAPINE FUMARATE 25 MG/1
25 TABLET, FILM COATED ORAL 2 TIMES DAILY
COMMUNITY

## 2024-04-03 RX ORDER — DIVALPROEX SODIUM 250 MG/1
250 TABLET, DELAYED RELEASE ORAL EVERY 8 HOURS SCHEDULED
Status: DISCONTINUED | OUTPATIENT
Start: 2024-04-03 | End: 2024-04-07 | Stop reason: HOSPADM

## 2024-04-03 RX ORDER — DIVALPROEX SODIUM 250 MG/1
250 TABLET, DELAYED RELEASE ORAL EVERY 12 HOURS SCHEDULED
Status: DISCONTINUED | OUTPATIENT
Start: 2024-04-03 | End: 2024-04-03

## 2024-04-03 RX ORDER — CARVEDILOL 6.25 MG/1
12.5 TABLET ORAL ONCE
Status: COMPLETED | OUTPATIENT
Start: 2024-04-03 | End: 2024-04-03

## 2024-04-03 RX ORDER — HYDROCODONE BITARTRATE AND ACETAMINOPHEN 5; 325 MG/1; MG/1
1 TABLET ORAL EVERY 12 HOURS PRN
Status: DISCONTINUED | OUTPATIENT
Start: 2024-04-03 | End: 2024-04-07 | Stop reason: HOSPADM

## 2024-04-03 RX ORDER — BUTALBITAL, ACETAMINOPHEN AND CAFFEINE 50; 325; 40 MG/1; MG/1; MG/1
1 TABLET ORAL DAILY PRN
Status: ON HOLD | COMMUNITY
End: 2024-04-07 | Stop reason: HOSPADM

## 2024-04-03 RX ORDER — HYDROCODONE BITARTRATE AND ACETAMINOPHEN 5; 325 MG/1; MG/1
1 TABLET ORAL DAILY PRN
COMMUNITY
Start: 2024-03-05

## 2024-04-03 RX ORDER — HYDROXYZINE HYDROCHLORIDE 10 MG/1
10 TABLET, FILM COATED ORAL 3 TIMES DAILY PRN
Status: ON HOLD | COMMUNITY
End: 2024-04-03 | Stop reason: CLARIF

## 2024-04-03 RX ORDER — LORAZEPAM 0.5 MG/1
0.5 TABLET ORAL 2 TIMES DAILY PRN
Status: DISCONTINUED | OUTPATIENT
Start: 2024-04-03 | End: 2024-04-03

## 2024-04-03 RX ORDER — HYDROXYZINE PAMOATE 50 MG/1
50 CAPSULE ORAL 3 TIMES DAILY PRN
Status: ON HOLD | COMMUNITY
Start: 2010-06-23 | End: 2024-04-03 | Stop reason: CLARIF

## 2024-04-03 RX ORDER — DIVALPROEX SODIUM 250 MG/1
250 TABLET, DELAYED RELEASE ORAL EVERY MORNING
Status: DISCONTINUED | OUTPATIENT
Start: 2024-04-03 | End: 2024-04-03

## 2024-04-03 RX ORDER — FUROSEMIDE 20 MG/1
20 TABLET ORAL DAILY
Status: ON HOLD | COMMUNITY
Start: 2024-03-27 | End: 2024-04-07

## 2024-04-03 RX ADMIN — CARVEDILOL 12.5 MG: 6.25 TABLET, FILM COATED ORAL at 17:29

## 2024-04-03 RX ADMIN — WATER 40 MG: 1 INJECTION INTRAMUSCULAR; INTRAVENOUS; SUBCUTANEOUS at 08:10

## 2024-04-03 RX ADMIN — PANTOPRAZOLE SODIUM 40 MG: 40 TABLET, DELAYED RELEASE ORAL at 06:52

## 2024-04-03 RX ADMIN — ALBUTEROL SULFATE 2.5 MG: 2.5 SOLUTION RESPIRATORY (INHALATION) at 11:20

## 2024-04-03 RX ADMIN — DIVALPROEX SODIUM 250 MG: 250 TABLET, DELAYED RELEASE ORAL at 20:30

## 2024-04-03 RX ADMIN — GABAPENTIN 300 MG: 300 CAPSULE ORAL at 20:30

## 2024-04-03 RX ADMIN — CARVEDILOL 12.5 MG: 6.25 TABLET, FILM COATED ORAL at 20:29

## 2024-04-03 RX ADMIN — BUSPIRONE HYDROCHLORIDE 15 MG: 15 TABLET ORAL at 08:13

## 2024-04-03 RX ADMIN — OSELTAMIVIR PHOSPHATE 75 MG: 75 CAPSULE ORAL at 20:30

## 2024-04-03 RX ADMIN — OSELTAMIVIR PHOSPHATE 75 MG: 75 CAPSULE ORAL at 08:10

## 2024-04-03 RX ADMIN — DULOXETINE HYDROCHLORIDE 60 MG: 30 CAPSULE, DELAYED RELEASE ORAL at 08:10

## 2024-04-03 RX ADMIN — GABAPENTIN 300 MG: 300 CAPSULE ORAL at 14:18

## 2024-04-03 RX ADMIN — ALBUTEROL SULFATE 2.5 MG: 2.5 SOLUTION RESPIRATORY (INHALATION) at 17:04

## 2024-04-03 RX ADMIN — DIVALPROEX SODIUM 250 MG: 250 TABLET, DELAYED RELEASE ORAL at 14:19

## 2024-04-03 RX ADMIN — ATORVASTATIN CALCIUM 40 MG: 40 TABLET, FILM COATED ORAL at 08:10

## 2024-04-03 RX ADMIN — QUETIAPINE FUMARATE 300 MG: 100 TABLET ORAL at 20:30

## 2024-04-03 RX ADMIN — SODIUM CHLORIDE, PRESERVATIVE FREE 10 ML: 5 INJECTION INTRAVENOUS at 08:11

## 2024-04-03 RX ADMIN — HYDROCODONE BITARTRATE AND ACETAMINOPHEN 1 TABLET: 5; 325 TABLET ORAL at 14:18

## 2024-04-03 RX ADMIN — WATER 40 MG: 1 INJECTION INTRAMUSCULAR; INTRAVENOUS; SUBCUTANEOUS at 00:17

## 2024-04-03 RX ADMIN — ALBUTEROL SULFATE 2.5 MG: 2.5 SOLUTION RESPIRATORY (INHALATION) at 07:45

## 2024-04-03 RX ADMIN — BUSPIRONE HYDROCHLORIDE 15 MG: 15 TABLET ORAL at 14:18

## 2024-04-03 RX ADMIN — CARVEDILOL 12.5 MG: 6.25 TABLET, FILM COATED ORAL at 08:10

## 2024-04-03 RX ADMIN — ASPIRIN 81 MG: 81 TABLET, COATED ORAL at 08:10

## 2024-04-03 RX ADMIN — ZIPRASIDONE MESYLATE 10 MG: 20 INJECTION, POWDER, LYOPHILIZED, FOR SOLUTION INTRAMUSCULAR at 00:16

## 2024-04-03 RX ADMIN — ENOXAPARIN SODIUM 40 MG: 100 INJECTION SUBCUTANEOUS at 08:09

## 2024-04-03 RX ADMIN — BUSPIRONE HYDROCHLORIDE 15 MG: 15 TABLET ORAL at 20:30

## 2024-04-03 RX ADMIN — HYDROXYZINE PAMOATE 50 MG: 25 CAPSULE ORAL at 14:18

## 2024-04-03 RX ADMIN — ALBUTEROL SULFATE 2.5 MG: 2.5 SOLUTION RESPIRATORY (INHALATION) at 21:22

## 2024-04-03 RX ADMIN — AMLODIPINE BESYLATE 5 MG: 5 TABLET ORAL at 08:10

## 2024-04-03 RX ADMIN — FUROSEMIDE 40 MG: 10 INJECTION, SOLUTION INTRAMUSCULAR; INTRAVENOUS at 08:10

## 2024-04-03 RX ADMIN — FUROSEMIDE 20 MG: 20 TABLET ORAL at 14:19

## 2024-04-03 ASSESSMENT — PAIN SCALES - GENERAL
PAINLEVEL_OUTOF10: 0
PAINLEVEL_OUTOF10: 0
PAINLEVEL_OUTOF10: 2
PAINLEVEL_OUTOF10: 0

## 2024-04-03 ASSESSMENT — PAIN DESCRIPTION - LOCATION: LOCATION: BACK

## 2024-04-03 NOTE — PLAN OF CARE
Problem: Chronic Conditions and Co-morbidities  Goal: Patient's chronic conditions and co-morbidity symptoms are monitored and maintained or improved  Outcome: Progressing  Note:   CHF Care Plan      Patient's EF (Ejection Fraction) is greater than 40%    Heart Failure Medications:  Diuretics:: Furosemide    (One of the following REQUIRED for EF </= 40%/SYSTOLIC FAILURE but MAY be used in EF% >40%/DIASTOLIC FAILURE)        ACE:: None        ARB:: None         ARNI:: None    (Beta Blockers)  NON- Evidenced Based Beta Blocker (for EF% >40%/DIASTOLIC FAILURE): None    Evidenced Based Beta Blocker::(REQUIRED for EF% <40%/SYSTOLIC FAILURE) None  ...................................................................................................................................................    Failed to redirect to the Timeline version of the [employer] SmartLink.      Patient's weights and intake/output reviewed    Daily Weight log at bedside, patient/family participation in use of log: \"yes    Patient's current weight today shows a difference of 8 lbs less than last documented weight.      Intake/Output Summary (Last 24 hours) at 4/3/2024 1118  Last data filed at 4/3/2024 0911  Gross per 24 hour   Intake 160 ml   Output 1750 ml   Net -1590 ml       Education Booklet Provided: yes    Comorbidities Reviewed Yes    Patient has a past medical history of Abnormal ECG, Anemia, Arthritis, Back pain, chronic, Bipolar disorder ([facility]), Bronchitis chronic, CHF (congestive heart failure) ([facility]), Chronic back pain, Chronic neck pain, Clostridium difficile infection, 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[facility]), Coronary artery disease involving native coronary artery of native heart with angina pectoris ([facility]), Depression, Emphysema of lung ([facility]), Fibromyalgia, hyperlipidemia, Hypertension, Kidney stone, Liver disease, Lung disease, MDD (major depressive disorder), Mood disorder (HCC), Movement disorder, Neck pain, chronic, Opiate misuse, Personality

## 2024-04-03 NOTE — PROGRESS NOTES
Patient's EF (Ejection Fraction) is greater than 40%    Heart Failure Medications:  Diuretics:: Furosemide  (One of the following REQUIRED for EF <40%/SYSTOLIC FAILURE but MAY be used in EF% >40%/DIASTOLIC FAILURE)        ACE:: none        ARB:: none        ARNI:: None    (Beta Blockers)  NON- Evidenced Based Beta Blocker (for EF% >40%/DIASTOLIC FAILURE): Evidenced Based Beta Blocker::(REQUIRED for EF% <40%/SYSTOLIC FAILURE)  Carvedilol  ...................................................................................................................................................    Patient's Last Weight: 77kg    obtained by standing scale. Difference in weight is 0 pounds  0  than last documented weight.    Intake/Output Summary (Last 24 hours) at 4/3/2024 0318  Last data filed at 4/2/2024 2212  Gross per 24 hour   Intake 160 ml   Output 1250 ml   Net -1090 ml       CHF Education booklet provided: yes    Comorbidities Reviewed Yes  Patient has a past medical history of Abnormal ECG, Anemia, Arthritis, Back pain, chronic, Bipolar disorder ([facility]), Bronchitis chronic, CHF (congestive heart failure) ([facility]), Chronic back pain, Chronic neck pain, Clostridium difficile infection, 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[facility]), Coronary artery disease involving native coronary artery of native heart with angina pectoris ([facility]), Depression, Emphysema of lung ([facility]), Fibromyalgia, hyperlipidemia, Hypertension, Kidney stone, Liver disease, Lung disease, MDD (major depressive disorder), Mood disorder ([facility]), Movement disorder, Neck pain, chronic, Opiate misuse, Personality disorder ([facility]), Pneumonia, and Polypharmacy.     >> For CHF and Comorbidity Education Time and Topics, please see Education Tab.    Progressive Mobility Assessment:  What is this patient's Current Level of Mobility?: Requires Bed Rest  How was this patient Mobilized today?: Unable to Mobilize                 With Whom? Self                 Level of Difficulty/Assistance:   dependent      Pt is currently  4L . Pt trace lower extremity edema. Patient's weights and intake/output reviewed:      Patient and/or Family's stated Goal of Care this Admission: reduce shortness of breath, increase activity tolerance, better understand heart failure and disease management, be more comfortable, and reduce lower extremity edema prior to discharge

## 2024-04-03 NOTE — DISCHARGE INSTRUCTIONS
- please schedule an appointment to see your PCP  - please schedule an appointment to see pulmonologist  and nephrologist  - please go to lab in one week for blood work  - please take medications as prescribed  - please take Lasix Monday, Wednesday and Fridays.        Heart Failure Resources:  Heart Failure Interactive Workbook:  Go to https://YouSticker.Jiff/[email or web address] for a Free Heart Failure Interactive Workbook provided by The American [employer]. This interactive workbook will provide information on Healthier Living with Heart Failure. Please copy and paste link into search bar. Use your mouse to scroll through the pages.    HF [place] teddy:   Heart Failure Free smart phone teddy available for iPhone and Android download. Use your phone to track sodium intake, fluid intake, symptoms, and weight.     Low Sodium Diet / Recipes:  Go to [email or web address] website for “renal” diet which is Low Sodium! [employer] is a dialysis company, but this website offers free seasonal cookbooks. Each quarter, they will release 25-30 new recipes with a breakdown of calories, sodium, and glucose. You can also go to [email or web address]/recipes website for free recipes.     Home Exercise Program:   Identification of Green/Yellow/Red zones:  You should be able to identify when you feel good (green zone), if you have 1-2 symptoms of HF (yellow zone), or if you are in need of medical attention (red zone).  In your CHF education folder you were provided a “stop light tool” to outline this information.     We want to you to rate your exertion levels:    Our therapy team has discussed means of identification with you such as the \"Kat scale.\"  The Kat rating scale ranges from 6 to 20, where 6 means \"no exertion at all\" and 20 means \"maximal exertion.\" The goal is to use this to gauge how much effort it is taking for you to do your normal daily tasks.   You should be able to recognize when too much exertion is being  expended.    Elements of Energy Conservation:   Prioritize/Plan: Decide what needs to be done today, and what can wait for a later date, write to do lists, plan ahead to avoid extra trips, and gather supplies and equipment needed before starting an activity.   Position: Avoid tiring and awkward posture that may impair breathing.  Pace: Slow and steady pace, never rushing!  Pursed lip breathing.  Pursed Lip Breathing: \"Smell the roses, blow out the candles\".

## 2024-04-03 NOTE — RT PROTOCOL NOTE
RT Inhaler-Nebulizer Bronchodilator Protocol Note    There is a bronchodilator order in the chart from a provider indicating to follow the RT Bronchodilator Protocol and there is an “Initiate RT Inhaler-Nebulizer Bronchodilator Protocol” order as well (see protocol at bottom of note).    CXR Findings:  XR CHEST PORTABLE    Result Date: 4/2/2024  Cardiomegaly and congestion.  No focal infiltrates or pulmonary edema.       The findings from the last RT Protocol Assessment were as follows:   History Pulmonary Disease: Chronic pulmonary disease  Respiratory Pattern: Regular pattern and RR 12-20 bpm  Breath Sounds: Inspiratory and expiratory or bilateral wheezing and/or rhonchi  Cough: Strong, spontaneous, non-productive  Indication for Bronchodilator Therapy: On home bronchodilators  Bronchodilator Assessment Score: 8    Aerosolized bronchodilator medication orders have been revised according to the RT Inhaler-Nebulizer Bronchodilator Protocol below.    Respiratory Therapist to perform RT Therapy Protocol Assessment initially then follow the protocol.  Repeat RT Therapy Protocol Assessment PRN for score 0-3 or on second treatment, BID, and PRN for scores above 3.    No Indications - adjust the frequency to every 6 hours PRN wheezing or bronchospasm, if no treatments needed after 48 hours then discontinue using Per Protocol order mode.     If indication present, adjust the RT bronchodilator orders based on the Bronchodilator Assessment Score as indicated below.  Use Inhaler orders unless patient has one or more of the following: on home nebulizer, not able to hold breath for 10 seconds, is not alert and oriented, cannot activate and use MDI correctly, or respiratory rate 25 breaths per minute or more, then use the equivalent nebulizer order(s) with same Frequency and PRN reasons based on the score.  If a patient is on this medication at home then do not decrease Frequency below that used at home.    0-3 - enter or revise

## 2024-04-03 NOTE — CARE COORDINATION
Case Management Assessment  Initial Evaluation    Date/Time of Evaluation: 4/3/2024 1:05 PM  Assessment Completed by: Alida Graham RN    If patient is discharged prior to next notation, then this note serves as note for discharge by case management.    Patient Name: Najma Fitzpatrick                   YOB: 1950  Diagnosis: Hypokalemia [E87.6]  Hypomagnesemia [E83.42]  Influenza A [J10.1]  Elevated troponin [R79.89]  COPD with acute exacerbation (HCC) [J44.1]  Acute encephalopathy [G93.40]  Acute hypoxemic respiratory failure (HCC) [J96.01]                   Date / Time: 4/1/2024  8:31 PM    Patient Admission Status: Inpatient   Readmission Risk (Low < 19, Mod (19-27), High > 27): Readmission Risk Score: 14.4    Current PCP: Geena Sotomayor APRN - CNP  PCP verified by CM? Yes    Chart Reviewed: Yes      History Provided by: Child/Family  Patient Orientation: Other (see comment) (encephalopathy/ confusion - info from daughter Maricel)    Patient Cognition: Other (see comment) (as above)    Hospitalization in the last 30 days (Readmission):  No    If yes, Readmission Assessment in  Navigator will be completed.    Advance Directives:      Code Status: Full Code   Patient's Primary Decision Maker is: Legal Next of Kin    Primary Decision Maker: Brent Fitzpatrick - Spouse - [phone]    Secondary Decision Maker: Margarita Fitzpatrick - Child - [phone]    Secondary Decision Maker: Maricel Esquivel - Child - [phone]    Discharge Planning:    Patient lives with: Spouse/Significant Other, Family Members, Children Type of Home: House  Primary Care Giver: Self (family can assist)  Patient Support Systems include: Spouse/Significant Other, Children, Family Members   Current Financial resources: Medicare  Current community resources: None  Current services prior to admission: None            Current DME:              Type of Home Care services:  None    ADLS  Prior functional level: Independent in  that supports the patient's individualized plan of care/goals and shares the quality data associated with the providers was provided to:     Patient Representative Name:       The Patient and/or Patient Representative Agree with the Discharge Plan?      Alida Graham RN  Case Management Department  Ph: [phone] Fax: [phone]

## 2024-04-03 NOTE — PROGRESS NOTES
04/02/24 2346   Oxygen Therapy/Pulse Ox   O2 Therapy Oxygen   O2 Device (S)  Nasal cannula   O2 Flow Rate (L/min) (S)  4 L/min   SpO2 100 %     Pt off bipap. Pt in restraints and currently on 4LNC sats 99% Will continue to monitor.

## 2024-04-03 NOTE — PROGRESS NOTES
V2.0  [facility] [place]      Name:  Najma Fitzpatrick /Age/Sex: 1950  (73 y.o. female)   MRN & CSN:  [id number] & [id number] Encounter Date/Time: 4/3/2024 2:21 PM EDT    Location:  020 PCP: Geena Sotomayor APRN - CNP       Hospital Day: 3      Subjective:     Chief Complaint:  Shortness of Breath (EMS called for SOB. Pt was in the 70s on RA upon EMS arrival. Family states pt hasn't urinated today and has been more confused recently as well, pt appears to be A&O x4 during triage )     Overnight, pt was very agitated, ended up having restraints. Geodin dose given as well.    Pt is off BIPAP now    Patient seen and examined at bedside. Was alert, oriented x3. Was however having hallucations thoughts. Was also complaining of back pain asking for her pain meds to be resumed.   Feels her SOB has improved. No new complaints or concerns.      Assessment and Plan:   Acute hypercapnic respiratory failure 2/2 Influenza A triggered COPD exacerbation. Patient has been started on BiPAP in the ER due to hypercapnia.  Confusion seems to be improving as patient is now alert and oriented x 3.    - Continue supplemental oxygen to keep saturations above 90%.    - will decrease steroids as pt becoming psychotic  - continue breathing tx  - continue symptomatic management   - continue Tamiflu  - resume home lasix       Acute metabolic encephalopathy. Came confused but now improved but v agitated with hallucinations. Was restratined now off restraints.   Likely hypercapnia contributed to confusion on admission.   Now having hyperactive delirium due to not resuming meds, Flu  - resume home meds; Seroquel, Depakote  - delirium precautions       Chronic diastolic CHF. Last ECHO 2023 EF 55-60%, grade I diastolic CHF.   Concern for mild volume overload given clinical exam, resp status, CXR and BNP. Received few doses lasix IV  - resume home lasix  - monitor I/o    Hyponatremia/hypochloremia. Received

## 2024-04-03 NOTE — PROGRESS NOTES
Pt becoming agitated, removing bipap, refusing to put it back on, pt hitting staff, yelling profanities at staff. Getting out of bed. Np on call informed and order obtained for restraints and prn med for agitation. Will cont to closely monitor for safety.

## 2024-04-03 NOTE — PLAN OF CARE
Problem: Skin/Tissue Integrity  Goal: Absence of new skin breakdown  Description: 1.  Monitor for areas of redness and/or skin breakdown  2.  Assess vascular access sites hourly  3.  Every 4-6 hours minimum:  Change oxygen saturation probe site  4.  Every 4-6 hours:  If on nasal continuous positive airway pressure, respiratory therapy assess nares and determine need for appliance change or resting period.  4/2/2024 2208 by Loraine Sena, RN  Note: No skin breakdown noted. Pt turned and repositioned every two hours, and checked for incontinence. Pt on low air loss pressure alternating mattress. Will cont to monitor skin integrity.      Problem: Safety - Adult  Goal: Free from fall injury  4/2/2024 2208 by Loraine Sena, RN  Note: Pt attempted to get out of bed without assistance once. Pt reminded to call for assist before ambulating. Nonskid socks on. Bed locked and in lowest position. Side rails up x3. Exit alarm in use on sensitive setting. Call light in reach. Remains free from injury. Will cont to monitor safety.

## 2024-04-03 NOTE — CONSULTS
Pt.'s hme med list was updated based on recent refill hx at her current pharmacy:   - [facility] in [place] ( [phone])

## 2024-04-03 NOTE — ED PROVIDER NOTES
Attending Supervisory Note/Shared Visit       I personally saw the patient and made/approved the management plan and take responsibility for the patient management.      History: 73-year-old female presents with shortness of breath, found to have the flu.  At the time my involvement of the case, patient on supplemental oxygen for treatment of hypoxemic respiratory failure, she began to desat shortly after my involvement of the case while she was pending admission, and when to immediately go see the patient.  Exam: Alert, oriented, tachypneic, shallow respirations, diminished breath sounds all throughout.  Wheezing present.  MDM: 73-year-old female presents with respiratory failure secondary to influenza, requires BiPAP on my evaluation for assistance with work of breathing, placed on BiPAP with improved aeration, decreased work of breathing throughout.  This will also help with hypercapnic component of her respiratory failure.  Ongoing repletion of magnesium, potassium.    CT HEAD WO CONTRAST   Final Result   No acute intracranial abnormality.         XR CHEST PORTABLE   Final Result   Cardiomegaly and congestion.  No focal infiltrates or pulmonary edema.                 CRITICAL CARE TIME   I personally saw the patient and independently provided 38 minutes of non-concurrent critical care out of the total shared critical care time provided, excluding separately reportable procedures.  There was a high probability of clinically significant/life threatening deterioration in the patient's condition which required my urgent intervention.    Frequent reassessments of patient's hemodynamic status, respiratory status, management of noninvasive positive pressure ventilation and administration of supplemental oxygen for hypoxemic and hypercapnic respiratory failure with comorbid likely COPD exacerbation, IV methylprednisolone administration, nebs.    I personally discussed the patient's management with the inpatient

## 2024-04-04 LAB
ANION GAP SERPL CALCULATED.3IONS-SCNC: 8 MMOL/L (ref 3–16)
BASOPHILS # BLD: 0 K/UL (ref 0–0.2)
BASOPHILS NFR BLD: 0.2 %
BUN SERPL-MCNC: 23 MG/DL (ref 7–20)
CALCIUM SERPL-MCNC: 8.4 MG/DL (ref 8.3–10.6)
CHLORIDE SERPL-SCNC: 91 MMOL/L (ref 99–110)
CO2 SERPL-SCNC: 38 MMOL/L (ref 21–32)
CREAT SERPL-MCNC: 0.9 MG/DL (ref 0.6–1.2)
DEPRECATED RDW RBC AUTO: 17 % (ref 12.4–15.4)
EOSINOPHIL # BLD: 0 K/UL (ref 0–0.6)
EOSINOPHIL NFR BLD: 0.1 %
GFR SERPLBLD CREATININE-BSD FMLA CKD-EPI: 68 ML/MIN/{1.73_M2}
GLUCOSE SERPL-MCNC: 98 MG/DL (ref 70–99)
HCT VFR BLD AUTO: 34.2 % (ref 36–48)
HGB BLD-MCNC: 11 G/DL (ref 12–16)
LYMPHOCYTES # BLD: 1.5 K/UL (ref 1–5.1)
LYMPHOCYTES NFR BLD: 14.9 %
MCH RBC QN AUTO: 27 PG (ref 26–34)
MCHC RBC AUTO-ENTMCNC: 32.1 G/DL (ref 31–36)
MCV RBC AUTO: 84 FL (ref 80–100)
MONOCYTES # BLD: 1 K/UL (ref 0–1.3)
MONOCYTES NFR BLD: 10.5 %
NEUTROPHILS # BLD: 7.4 K/UL (ref 1.7–7.7)
NEUTROPHILS NFR BLD: 74.3 %
OSMOLALITY SERPL: 293 MOSM/KG (ref 280–301)
PLATELET # BLD AUTO: 111 K/UL (ref 135–450)
PMV BLD AUTO: 7.1 FL (ref 5–10.5)
POTASSIUM SERPL-SCNC: 3.2 MMOL/L (ref 3.5–5.1)
RBC # BLD AUTO: 4.07 M/UL (ref 4–5.2)
SODIUM SERPL-SCNC: 137 MMOL/L (ref 136–145)
WBC # BLD AUTO: 10 K/UL (ref 4–11)

## 2024-04-04 PROCEDURE — 2060000000 HC ICU INTERMEDIATE R&B

## 2024-04-04 PROCEDURE — 6360000002 HC RX W HCPCS: Performed by: FAMILY MEDICINE

## 2024-04-04 PROCEDURE — 83930 ASSAY OF BLOOD OSMOLALITY: CPT

## 2024-04-04 PROCEDURE — 94640 AIRWAY INHALATION TREATMENT: CPT

## 2024-04-04 PROCEDURE — 80048 BASIC METABOLIC PNL TOTAL CA: CPT

## 2024-04-04 PROCEDURE — 2700000000 HC OXYGEN THERAPY PER DAY

## 2024-04-04 PROCEDURE — 6370000000 HC RX 637 (ALT 250 FOR IP): Performed by: REGISTERED NURSE

## 2024-04-04 PROCEDURE — 51798 US URINE CAPACITY MEASURE: CPT

## 2024-04-04 PROCEDURE — 85025 COMPLETE CBC W/AUTO DIFF WBC: CPT

## 2024-04-04 PROCEDURE — 6370000000 HC RX 637 (ALT 250 FOR IP): Performed by: FAMILY MEDICINE

## 2024-04-04 PROCEDURE — 6370000000 HC RX 637 (ALT 250 FOR IP): Performed by: STUDENT IN AN ORGANIZED HEALTH CARE EDUCATION/TRAINING PROGRAM

## 2024-04-04 PROCEDURE — 36415 COLL VENOUS BLD VENIPUNCTURE: CPT

## 2024-04-04 PROCEDURE — 94660 CPAP INITIATION&MGMT: CPT

## 2024-04-04 PROCEDURE — 2580000003 HC RX 258: Performed by: FAMILY MEDICINE

## 2024-04-04 RX ORDER — POTASSIUM CHLORIDE 20 MEQ/1
40 TABLET, EXTENDED RELEASE ORAL ONCE
Status: COMPLETED | OUTPATIENT
Start: 2024-04-04 | End: 2024-04-04

## 2024-04-04 RX ORDER — GUAIFENESIN/DEXTROMETHORPHAN 100-10MG/5
10 SYRUP ORAL EVERY 4 HOURS PRN
Status: DISCONTINUED | OUTPATIENT
Start: 2024-04-04 | End: 2024-04-07 | Stop reason: HOSPADM

## 2024-04-04 RX ADMIN — BUSPIRONE HYDROCHLORIDE 15 MG: 15 TABLET ORAL at 20:38

## 2024-04-04 RX ADMIN — ALBUTEROL SULFATE 2.5 MG: 2.5 SOLUTION RESPIRATORY (INHALATION) at 08:42

## 2024-04-04 RX ADMIN — CARVEDILOL 25 MG: 25 TABLET, FILM COATED ORAL at 18:12

## 2024-04-04 RX ADMIN — AMLODIPINE BESYLATE 5 MG: 5 TABLET ORAL at 10:01

## 2024-04-04 RX ADMIN — DULOXETINE HYDROCHLORIDE 60 MG: 30 CAPSULE, DELAYED RELEASE ORAL at 10:02

## 2024-04-04 RX ADMIN — ENOXAPARIN SODIUM 40 MG: 100 INJECTION SUBCUTANEOUS at 10:02

## 2024-04-04 RX ADMIN — SODIUM CHLORIDE, PRESERVATIVE FREE 10 ML: 5 INJECTION INTRAVENOUS at 20:39

## 2024-04-04 RX ADMIN — ASPIRIN 81 MG: 81 TABLET, COATED ORAL at 10:01

## 2024-04-04 RX ADMIN — GABAPENTIN 300 MG: 300 CAPSULE ORAL at 16:35

## 2024-04-04 RX ADMIN — ALBUTEROL SULFATE 2.5 MG: 2.5 SOLUTION RESPIRATORY (INHALATION) at 12:00

## 2024-04-04 RX ADMIN — QUETIAPINE FUMARATE 300 MG: 100 TABLET ORAL at 20:38

## 2024-04-04 RX ADMIN — GABAPENTIN 300 MG: 300 CAPSULE ORAL at 10:01

## 2024-04-04 RX ADMIN — FUROSEMIDE 20 MG: 20 TABLET ORAL at 10:01

## 2024-04-04 RX ADMIN — CARVEDILOL 25 MG: 25 TABLET, FILM COATED ORAL at 10:01

## 2024-04-04 RX ADMIN — HYDROCODONE BITARTRATE AND ACETAMINOPHEN 1 TABLET: 5; 325 TABLET ORAL at 10:01

## 2024-04-04 RX ADMIN — HYDROCODONE BITARTRATE AND ACETAMINOPHEN 1 TABLET: 5; 325 TABLET ORAL at 22:21

## 2024-04-04 RX ADMIN — DIVALPROEX SODIUM 250 MG: 250 TABLET, DELAYED RELEASE ORAL at 16:35

## 2024-04-04 RX ADMIN — DIVALPROEX SODIUM 250 MG: 250 TABLET, DELAYED RELEASE ORAL at 22:21

## 2024-04-04 RX ADMIN — GABAPENTIN 300 MG: 300 CAPSULE ORAL at 20:38

## 2024-04-04 RX ADMIN — OSELTAMIVIR PHOSPHATE 75 MG: 75 CAPSULE ORAL at 10:29

## 2024-04-04 RX ADMIN — ALBUTEROL SULFATE 2.5 MG: 2.5 SOLUTION RESPIRATORY (INHALATION) at 16:22

## 2024-04-04 RX ADMIN — ALBUTEROL SULFATE 2.5 MG: 2.5 SOLUTION RESPIRATORY (INHALATION) at 20:00

## 2024-04-04 RX ADMIN — POTASSIUM CHLORIDE 40 MEQ: 1500 TABLET, EXTENDED RELEASE ORAL at 10:29

## 2024-04-04 RX ADMIN — SODIUM CHLORIDE, PRESERVATIVE FREE 10 ML: 5 INJECTION INTRAVENOUS at 10:02

## 2024-04-04 RX ADMIN — PREDNISONE 30 MG: 20 TABLET ORAL at 10:29

## 2024-04-04 RX ADMIN — OSELTAMIVIR PHOSPHATE 75 MG: 75 CAPSULE ORAL at 20:38

## 2024-04-04 RX ADMIN — ATORVASTATIN CALCIUM 40 MG: 40 TABLET, FILM COATED ORAL at 10:01

## 2024-04-04 RX ADMIN — GUAIFENESIN AND DEXTROMETHORPHAN 10 ML: 100; 10 SYRUP ORAL at 20:39

## 2024-04-04 ASSESSMENT — PAIN DESCRIPTION - FREQUENCY
FREQUENCY: CONTINUOUS
FREQUENCY: CONTINUOUS

## 2024-04-04 ASSESSMENT — PAIN SCALES - GENERAL
PAINLEVEL_OUTOF10: 8

## 2024-04-04 ASSESSMENT — PAIN DESCRIPTION - LOCATION
LOCATION: BACK

## 2024-04-04 ASSESSMENT — PAIN DESCRIPTION - ONSET
ONSET: ON-GOING
ONSET: ON-GOING

## 2024-04-04 NOTE — PLAN OF CARE
Problem: Discharge Planning  Goal: Discharge to home or other facility with appropriate resources  Outcome: Progressing     Problem: Pain  Goal: Verbalizes/displays adequate comfort level or baseline comfort level  Outcome: Progressing     Problem: Safety - Adult  Goal: Free from fall injury  4/4/2024 1055 by Stephania Armstrong, RN  Outcome: Progressing     Problem: Chronic Conditions and Co-morbidities  Goal: Patient's chronic conditions and co-morbidity symptoms are monitored and maintained or improved  Outcome: Progressing

## 2024-04-04 NOTE — PROGRESS NOTES
04/04/24 0406   NIV Type   Equipment Type v60   Mode Bilevel   Mask Type Full face mask   Mask Size Medium   Assessment   Respirations 14   SpO2 92 %   Comfort Level Good   Using Accessory Muscles No   Mask Compliance Good   Skin Protection for O2 Device Yes   Orientation Middle   Location Nose   Settings/Measurements   PIP Observed 16 cm H20   IPAP 15 cmH20   CPAP/EPAP 5 cmH2O   Vt (Measured) 563 mL   Rate Ordered 14   FiO2  30 %  (increased to 40%)   I Time/ I Time % 1 s   Minute Volume (L/min) 8.5 Liters   Mask Leak (lpm) 11 lpm   Patient's Home Machine No   Alarm Settings   Alarms On Y   Low Pressure (cmH2O) 6 cmH2O   High Pressure (cmH2O) 30 cmH2O   Apnea (secs) 20 secs   RR Low (bpm) 6   RR High (bpm) 40 br/min

## 2024-04-04 NOTE — PROGRESS NOTES
04/04/24 0006   NIV Type   NIV Started/Stopped On   Equipment Type v60   Mode Bilevel   Mask Type Full face mask   Assessment   Pulse 65   Respirations 14   SpO2 93 %   Comfort Level Good   Using Accessory Muscles No   Mask Compliance Good   Skin Assessment Clean, dry, & intact   Skin Protection for O2 Device Yes   Location Nose   Settings/Measurements   PIP Observed 15 cm H20   IPAP 15 cmH20   CPAP/EPAP 5 cmH2O   Vt (Measured) 625 mL   Rate Ordered 14   Insp Rise Time (%) 3 %   FiO2  30 %   I Time/ I Time % 1 s   Minute Volume (L/min) 8.8 Liters   Mask Leak (lpm) 33 lpm   Patient's Home Machine No   Alarm Settings   Alarms On Y   Low Pressure (cmH2O) 6 cmH2O   High Pressure (cmH2O) 30 cmH2O   Delay Alarm 20 sec(s)   RR Low (bpm) 6   RR High (bpm) 40 br/min

## 2024-04-04 NOTE — PLAN OF CARE
Problem: Safety - Adult  Goal: Free from fall injury  4/3/2024 2301 by Loraine Sena, RN  Note: No attempts to get out of bed without assistance. Pt reminded to call for assist before ambulating. Nonskid socks on. Bed locked and in lowest position. Side rails up x3. Exit alarm in use. Call light in reach. Remains free from injury. Will cont to monitor safety.For patient safety, visual surveillance in place to more closely monitor the patient’s safety and condition.

## 2024-04-04 NOTE — DISCHARGE INSTR - COC
Continuity of Care Form    Patient Name: Najma Fitzpatrick   :  1950  MRN:  [id number]    Admit date:  2024  Discharge date:  24    Code Status Order: Full Code   Advance Directives:     Admitting Physician:  Elo Degroot MD  PCP: Geena Sotomayor, APRN - CNP    Discharging Nurse: Vega HOYOS  Discharging Hospital Unit/Room#: 0209/0209-01  Discharging Unit Phone Number: [phone]    Emergency Contact:   Extended Emergency Contact Information  Primary Emergency Contact: Brent Fitzpatrick  Address: [address] [place], [place] [id number] [facility]  Home Phone: [phone]  Mobile Phone: [phone]  Relation: Spouse  Secondary Emergency Contact: TiltonMargarita patino  Address: [address]           [id number]           [place] [id number] [facility]  Home Phone: [phone]  Relation: Child    Past Surgical History:  Past Surgical History:   Procedure Laterality Date    COLONOSCOPY  12    DIAGNOSTIC CARDIAC CATH LAB PROCEDURE  2007    Normal cor angio 2007    HERNIA REPAIR  2019    robotic ventral hernia repair with mesh    HERNIA REPAIR N/A 2019    ROBOTIC VENTRAL HERNIA REPAIR WITH MESH performed by Yuriy Rider [place] at [facility]    HYSTERECTOMY, TOTAL ABDOMINAL (CERVIX REMOVED)      KIDNEY STONE SURGERY         Immunization History:   Immunization History   Administered Date(s) Administered    COVID-19, MODERNA BLUE border, Primary or Immunocompromised, (age 12y+), IM, 100 mcg/0.5mL 2021, 2021, 11/15/2021, 2022    COVID-19, MODERNA Bivalent, (age 12y+), IM, 50 mcg/0.5 mL 10/13/2022, 2023    COVID-19, PFIZER, ( formula), (age 12y+), IM, 30mcg/0.3mL 10/09/2023    Influenza Vaccine, unspecified formulation 2018    Influenza Virus Vaccine 10/01/2012, 2013, 2015    Influenza Whole 10/28/2010    Influenza, FLUARIX, FLULAVAL, FLUZONE (age 6 mo+) AND AFLURIA, (age 3 y+),  PF, 0.5mL 10/04/2016    Influenza, FLUCELVAX, (age 6 mo+), MDCK, PF, 0.5mL 10/11/2017    Pneumococcal Conjugate 7-valent (Prevnar7) 10/28/2010    Pneumococcal, PPSV23, PNEUMOVAX 23, (age 2y+), SC/IM, 0.5mL 01/17/2015, 09/21/2015    TDaP, ADACEL (age 10y-64y), BOOSTRIX (age 10y+), IM, 0.5mL 08/23/2015       Active Problems:  Patient Active Problem List   Diagnosis Code    Other chronic pain G89.29    COPD (chronic obstructive pulmonary disease) ([facility]) J44.9    Suicidal ideation R45.851    Discitis M46.40    Diarrhea R19.7    Discitis of cervicothoracic region M46.43    Bipolar disorder ([facility]) F31.9    Ataxia R27.0    Wernicke encephalopathy syndrome E51.2    Acute exacerbation of chronic obstructive pulmonary disease (COPD) ([facility]) J44.1    Altered mental state R41.82    Prolonged QT interval R94.31    Metabolic encephalopathy G93.41    Syncope and collapse R55    Depressive disorder, not elsewhere classified F32.89    Tobacco abuse Z72.0    Renal artery stenosis ([facility]) I70.1    Midline low back pain without sciatica M54.50    Discitis of thoracic region M46.44    Other chest pain R07.89    CAD in native artery I25.10    Dyslipidemia E78.5    Anemia D64.9    Headache R51.9    SOB (shortness of breath) on exertion R06.02    Generalized weakness R53.1    CHF (congestive heart failure) ([facility]) I50.9    HTN (hypertension) I10    SBO (small bowel obstruction) ([facility]) K56.609    Pain in thoracic spine M54.6    Spondylosis of thoracic region without myelopathy or radiculopathy M47.814    Iron deficiency anemia secondary to inadequate dietary iron intake D50.8    Viral labyrinthitis H83.09    Peripheral vertigo, bilateral H81.393    Dizziness R42    Dementia due to Alzheimer's disease ([facility]) G30.9, F02.80    H/O orthostatic hypotension Z86.79    Mood disorder ([facility]) F39    Tobacco dependence F17.200    Cannabis abuse F12.10    Opiate abuse, episodic ([facility]) F11.10    Coccygeal pain, acute M53.3    Hallucinations R44.3    Ventral hernia  without obstruction or gangrene K43.9    Obesity E66.9    Moderate malnutrition ([facility]) E44.0    Acute cystitis with hematuria N30.01    Primary osteoarthritis of right knee M17.11    Complex tear of medial meniscus of right knee as current injury S83.231A    Complex tear of lateral meniscus of right knee as current injury S83.271A    Acute on chronic respiratory failure with hypoxia ([facility]) J96.21    OAB (overactive bladder) N32.81    Depression F32.A    Intractable migraine without status migrainosus G43.919    Chronic heart failure with preserved ejection fraction ([facility]) I50.32    Noncompliance Z91.199    COPD exacerbation ([facility]) J44.1    Stroke-like symptoms R29.90    Community acquired pneumonia of left lower lobe of lung J18.9    COPD with acute exacerbation ([facility]) J44.1       Isolation/Infection:   Isolation            Droplet  Contact          Patient Infection Status       Infection Onset Added Last Indicated Last Indicated By Review Planned Expiration Resolved Resolved By    Influenza 04/01/24 04/01/24 04/01/24 COVID-19 & Influenza Combo 04/08/24 04/11/24                         Nurse Assessment:  Last Vital Signs: BP (!) 145/67   Pulse 75   Temp 98.4 °F (36.9 °C) (Oral)   Resp 18   Ht 1.549 m (5' 1\")   Wt 78.5 kg (173 lb)   SpO2 96%   BMI 32.69 kg/m²     Last documented pain score (0-10 scale): Pain Level: 8  Last Weight:   Wt Readings from Last 1 Encounters:   04/04/24 78.5 kg (173 lb)     Mental Status:  oriented, alert, coherent, logical, thought processes intact, and able to concentrate and follow conversation    IV Access:  - None    Nursing Mobility/ADLs:  Walking   Assisted  Transfer  Assisted  Bathing  Assisted  Dressing  Assisted  Toileting  Assisted  Feeding  Independent  Med Admin  Assisted  Med Delivery   whole    Wound Care Documentation and Therapy:        Elimination:  Continence:   Bowel: Yes  Bladder: Yes  Urinary Catheter: None   Colostomy/Ileostomy/Ileal Conduit: No       Date of Last

## 2024-04-04 NOTE — PROGRESS NOTES
V2.0  [facility] [place] Hospital[place] Progress Note      Name:  Najma Fitzpatrick /Age/Sex: 1950  (73 y.o. female)   MRN & CSN:  [id number] & [id number] Encounter Date/Time: 2024 2:21 PM EDT    Location:  020 PCP: Geena Sotomayor APRN - CNP       Hospital Day: 4      Subjective:     Chief Complaint:  Shortness of Breath (EMS called for SOB. Pt was in the 70s on RA upon EMS arrival. Family states pt hasn't urinated today and has been more confused recently as well, pt appears to be A&O x4 during triage )     Pt doing better today. Alert, oriented comfortable. States her SOB has improved. Still has cough.    No more hallucinations.    Assessment and Plan:   Acute hypercapnic respiratory failure 2/2 Influenza A triggered COPD exacerbation. Patient has been started on BiPAP in the ER due to hypercapnia.  Confusion seems to be improving as patient is now alert and oriented x 3.    - Continue supplemental oxygen to keep saturations above 90%.    - continue steroids low dose as pt was becoming psychotic  - continue breathing tx  - continue symptomatic management   - continue Tamiflu  - resume home lasix       Acute metabolic encephalopathy. Came confused but now improved ; had agitation and hallucinations all resolved after resuming home meds. Was restratined now off restraints.   - continue home meds; Seroquel, Depakote  - delirium precautions       Chronic diastolic CHF. Last ECHO 2023 EF 55-60%, grade I diastolic CHF.   Concern for mild volume overload given clinical exam, resp status, CXR and BNP. Received few doses lasix IV  - resume home lasix  - monitor I/o    Hyponatremia/hypochloremia. Received IVF.  - avoid giving more IVF as pt was having leg edema       Hypokalemia, Hypomagnesemia: Replace and monitor.     Elevated troponin: Likely due to supply/demand mismatch.  Does not report chest pain.  Continue cardiac monitoring.     Personally reviewed Lab Studies and Imaging     Imaging that was  interpreted personally includes CXR and results pulm congestion    Drugs that require monitoring for toxicity include lasix and the method of monitoring was renal      Diet ADULT DIET; Regular   DVT Prophylaxis [x] Lovenox, []  Heparin, [] SCDs, [] Ambulation,  [] Eliquis, [] Xarelto  [] Coumadin   Code Status Full Code   Disposition From: home  Expected Disposition: TBD  Estimated Date of Discharge: 1-2 days  Patient requires continued admission due to high O2 requirement   Surrogate Decision Maker/ POA      Review of Systems:    Review of Systems    See above    Objective:     Intake/Output Summary (Last 24 hours) at 4/4/2024 1048  Last data filed at 4/4/2024 1031  Gross per 24 hour   Intake 120 ml   Output 1400 ml   Net -1280 ml        Vitals:   Vitals:    04/04/24 1031   BP:    Pulse:    Resp: 18   Temp:    SpO2:        Physical Exam:     General: NAD  Eyes: EOMI  ENT: neck supple  Cardiovascular: Regular rate.  Respiratory: bilateral crackles  Gastrointestinal: Soft, non tender  Genitourinary: no suprapubic tenderness  Musculoskeletal: NO edema  Skin: warm, dry  Neuro: Alert.  Psych: Mood appropriate.     Medications:   Medications:    predniSONE  30 mg Oral Daily    divalproex  250 mg Oral 3 times per day    furosemide  20 mg Oral Daily    gabapentin  300 mg Oral TID    carvedilol  25 mg Oral BID WC    sodium chloride flush  5-40 mL IntraVENous 2 times per day    enoxaparin  40 mg SubCUTAneous Daily    albuterol  2.5 mg Nebulization 4x Daily RT    oseltamivir  75 mg Oral BID    amLODIPine  5 mg Oral Daily    aspirin  81 mg Oral Daily    atorvastatin  40 mg Oral Daily    busPIRone  15 mg Oral TID    DULoxetine  60 mg Oral Daily    fluticasone-umeclidin-vilant  1 puff Inhalation Daily    lurasidone  80 mg Oral Daily    montelukast  10 mg Oral Nightly    pantoprazole  40 mg Oral QAM AC    primidone  100 mg Oral Daily    QUEtiapine  300 mg Oral Nightly      Infusions:    sodium chloride       PRN Meds:      No acute intracranial abnormality.       Electronically signed by Abdiaziz Patrick MD on 4/4/2024 at 10:48 AM

## 2024-04-04 NOTE — PLAN OF CARE
CHF Care Plan      Patient's EF (Ejection Fraction) is greater than 40%    Heart Failure Medications:  Diuretics:: Furosemide    (One of the following REQUIRED for EF </= 40%/SYSTOLIC FAILURE but MAY be used in EF% >40%/DIASTOLIC FAILURE)        ACE:: None        ARB:: None         ARNI:: None    (Beta Blockers)  NON- Evidenced Based Beta Blocker (for EF% >40%/DIASTOLIC FAILURE): None    Evidenced Based Beta Blocker::(REQUIRED for EF% <40%/SYSTOLIC FAILURE) Carvedilol- Coreg  ...................................................................................................................................................    Failed to redirect to the Timeline version of the [employer] SmartLink.      Patient's weights and intake/output reviewed    Daily Weight log at bedside, patient/family participation in use of log: \"yes    Patient's current weight today shows a difference of 7 lbs more than last documented weight.      Intake/Output Summary (Last 24 hours) at 4/4/2024 1058  Last data filed at 4/4/2024 1031  Gross per 24 hour   Intake 120 ml   Output 1400 ml   Net -1280 ml       Education Booklet Provided: yes    Comorbidities Reviewed Yes    Patient has a past medical history of Abnormal ECG, Anemia, Arthritis, Back pain, chronic, Bipolar disorder ([facility]), Bronchitis chronic, CHF (congestive heart failure) ([facility]), Chronic back pain, Chronic neck pain, Clostridium difficile infection, 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[facility]), Coronary artery disease involving native coronary artery of native heart with angina pectoris ([facility]), Depression, Emphysema of lung ([facility]), Fibromyalgia, hyperlipidemia, Hypertension, Kidney stone, Liver disease, Lung disease, MDD (major depressive disorder), Mood disorder ([facility]), Movement disorder, Neck pain, chronic, Opiate misuse, Personality disorder (HCC), Pneumonia, and Polypharmacy.     >>For CHF and Comorbidity documentation on Education Time and Topics, please see Education Tab      Pt resting in bed at

## 2024-04-04 NOTE — PLAN OF CARE
CHF Care Plan      Patient's EF (Ejection Fraction) is greater than 40%    Heart Failure Medications:  Diuretics:: Furosemide    (One of the following REQUIRED for EF </= 40%/SYSTOLIC FAILURE but MAY be used in EF% >40%/DIASTOLIC FAILURE)        ACE:: None        ARB:: None         ARNI:: None    (Beta Blockers)  NON- Evidenced Based Beta Blocker (for EF% >40%/DIASTOLIC FAILURE): None    Evidenced Based Beta Blocker::(REQUIRED for EF% <40%/SYSTOLIC FAILURE) Carvedilol- Coreg  ...................................................................................................................................................    Failed to redirect to the Timeline version of the [facility] SmartLink.      Patient's weights and intake/output reviewed    Daily Weight log at bedside, patient/family participation in use of log: \"yes    Patient's current weight today shows a difference of 8 lbs less than last documented weight.      Intake/Output Summary (Last 24 hours) at 4/4/2024 0419  Last data filed at 4/3/2024 2053  Gross per 24 hour   Intake --   Output 1800 ml   Net -1800 ml       Education Booklet Provided: yes    Comorbidities Reviewed Yes    Patient has a past medical history of Abnormal ECG, Anemia, Arthritis, Back pain, chronic, Bipolar disorder ([facility]), Bronchitis chronic, CHF (congestive heart failure) ([facility]), Chronic back pain, Chronic neck pain, Clostridium difficile infection, 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[facility]), 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     >>For CHF and Comorbidity documentation on Education Time and Topics, please see Education Tab      Pt resting in bed at  this time on BiPAP. Pt denies shortness of breath. Pt with pitting lower extremity edema.     Patient and/or Family's stated Goal of Care this Admission: reduce shortness of breath, increase activity tolerance, better understand heart failure and disease management, be more comfortable, and reduce lower extremity edema prior to discharge        :

## 2024-04-04 NOTE — PROGRESS NOTES
Pt pleasant and cooperative with care. Oriented x1-2. No attempts to get oob. Bed alarm on. Will cont to monitor.

## 2024-04-04 NOTE — PROGRESS NOTES
04/03/24 2127   NIV Type   NIV Started/Stopped On   Equipment Type v60   Mode Bilevel   Mask Type Full face mask   Mask Size Medium   Assessment   Pulse 75   Respirations 18   SpO2 96 %   Mask Compliance Good   Skin Assessment Clean, dry, & intact   Skin Protection for O2 Device Yes   Orientation Middle   Location Nose   Breath Sounds   Breath Sounds Bilateral Diminished;Expiratory wheezing   Settings/Measurements   PIP Observed 15 cm H20   IPAP 15 cmH20   CPAP/EPAP 5 cmH2O   Vt (Measured) 553 mL   Rate Ordered 14   FiO2  30 %   I Time/ I Time % 1 s   Minute Volume (L/min) 8.7 Liters   Mask Leak (lpm) 19 lpm   Patient's Home Machine No   Alarm Settings   Alarms On Y   Low Pressure (cmH2O) 6 cmH2O   High Pressure (cmH2O) 30 cmH2O   Apnea (secs) 20 secs   RR Low (bpm) 6   RR High (bpm) 40 br/min   Patient Observation   Observations mepilex on nose   Oxygen Therapy/Pulse Ox   O2 Therapy Oxygen   O2 Device Nasal cannula

## 2024-04-05 ENCOUNTER — APPOINTMENT (OUTPATIENT)
Dept: GENERAL RADIOLOGY | Age: 74
DRG: 865 | End: 2024-04-05
Payer: COMMERCIAL

## 2024-04-05 LAB
ANION GAP SERPL CALCULATED.3IONS-SCNC: 8 MMOL/L (ref 3–16)
BACTERIA BLD CULT ORG #2: NORMAL
BACTERIA BLD CULT: NORMAL
BASE EXCESS BLDV CALC-SCNC: 13.4 MMOL/L (ref -3–3)
BASOPHILS # BLD: 0 K/UL (ref 0–0.2)
BASOPHILS NFR BLD: 0.3 %
BUN SERPL-MCNC: 22 MG/DL (ref 7–20)
CALCIUM SERPL-MCNC: 8.2 MG/DL (ref 8.3–10.6)
CHLORIDE SERPL-SCNC: 86 MMOL/L (ref 99–110)
CO2 BLDV-SCNC: 43 MMOL/L
CO2 SERPL-SCNC: 36 MMOL/L (ref 21–32)
COHGB MFR BLDV: 2 % (ref 0–1.5)
CREAT SERPL-MCNC: <0.5 MG/DL (ref 0.6–1.2)
DEPRECATED RDW RBC AUTO: 17.1 % (ref 12.4–15.4)
EOSINOPHIL # BLD: 0 K/UL (ref 0–0.6)
EOSINOPHIL NFR BLD: 0.3 %
GFR SERPLBLD CREATININE-BSD FMLA CKD-EPI: >90 ML/MIN/{1.73_M2}
GLUCOSE SERPL-MCNC: 100 MG/DL (ref 70–99)
HCO3 BLDV-SCNC: 41 MMOL/L (ref 23–29)
HCT VFR BLD AUTO: 34.6 % (ref 36–48)
HGB BLD-MCNC: 11 G/DL (ref 12–16)
LYMPHOCYTES # BLD: 1.2 K/UL (ref 1–5.1)
LYMPHOCYTES NFR BLD: 12.2 %
MCH RBC QN AUTO: 26.8 PG (ref 26–34)
MCHC RBC AUTO-ENTMCNC: 31.9 G/DL (ref 31–36)
MCV RBC AUTO: 84.1 FL (ref 80–100)
METHGB MFR BLDV: 0.2 %
MONOCYTES # BLD: 0.7 K/UL (ref 0–1.3)
MONOCYTES NFR BLD: 6.7 %
NEUTROPHILS # BLD: 8 K/UL (ref 1.7–7.7)
NEUTROPHILS NFR BLD: 80.5 %
NT-PROBNP SERPL-MCNC: 531 PG/ML (ref 0–124)
O2 THERAPY: ABNORMAL
PCO2 BLDV: 68.2 MMHG (ref 40–50)
PH BLDV: 7.4 [PH] (ref 7.35–7.45)
PLATELET # BLD AUTO: 107 K/UL (ref 135–450)
PMV BLD AUTO: 6.9 FL (ref 5–10.5)
PO2 BLDV: 105.7 MMHG (ref 25–40)
POTASSIUM SERPL-SCNC: 3.8 MMOL/L (ref 3.5–5.1)
PROCALCITONIN SERPL IA-MCNC: 0.05 NG/ML (ref 0–0.15)
RBC # BLD AUTO: 4.12 M/UL (ref 4–5.2)
SAO2 % BLDV: 98 %
SODIUM SERPL-SCNC: 130 MMOL/L (ref 136–145)
WBC # BLD AUTO: 10 K/UL (ref 4–11)

## 2024-04-05 PROCEDURE — 94761 N-INVAS EAR/PLS OXIMETRY MLT: CPT

## 2024-04-05 PROCEDURE — 6370000000 HC RX 637 (ALT 250 FOR IP): Performed by: STUDENT IN AN ORGANIZED HEALTH CARE EDUCATION/TRAINING PROGRAM

## 2024-04-05 PROCEDURE — 6370000000 HC RX 637 (ALT 250 FOR IP): Performed by: FAMILY MEDICINE

## 2024-04-05 PROCEDURE — 87641 MR-STAPH DNA AMP PROBE: CPT

## 2024-04-05 PROCEDURE — 85025 COMPLETE CBC W/AUTO DIFF WBC: CPT

## 2024-04-05 PROCEDURE — 51701 INSERT BLADDER CATHETER: CPT

## 2024-04-05 PROCEDURE — 80048 BASIC METABOLIC PNL TOTAL CA: CPT

## 2024-04-05 PROCEDURE — 84145 PROCALCITONIN (PCT): CPT

## 2024-04-05 PROCEDURE — 94669 MECHANICAL CHEST WALL OSCILL: CPT

## 2024-04-05 PROCEDURE — 6360000002 HC RX W HCPCS: Performed by: STUDENT IN AN ORGANIZED HEALTH CARE EDUCATION/TRAINING PROGRAM

## 2024-04-05 PROCEDURE — 2580000003 HC RX 258: Performed by: FAMILY MEDICINE

## 2024-04-05 PROCEDURE — 87449 NOS EACH ORGANISM AG IA: CPT

## 2024-04-05 PROCEDURE — 2580000003 HC RX 258: Performed by: STUDENT IN AN ORGANIZED HEALTH CARE EDUCATION/TRAINING PROGRAM

## 2024-04-05 PROCEDURE — 6360000002 HC RX W HCPCS: Performed by: FAMILY MEDICINE

## 2024-04-05 PROCEDURE — 36415 COLL VENOUS BLD VENIPUNCTURE: CPT

## 2024-04-05 PROCEDURE — 82803 BLOOD GASES ANY COMBINATION: CPT

## 2024-04-05 PROCEDURE — 83935 ASSAY OF URINE OSMOLALITY: CPT

## 2024-04-05 PROCEDURE — 94640 AIRWAY INHALATION TREATMENT: CPT

## 2024-04-05 PROCEDURE — 2060000000 HC ICU INTERMEDIATE R&B

## 2024-04-05 PROCEDURE — 51798 US URINE CAPACITY MEASURE: CPT

## 2024-04-05 PROCEDURE — 83880 ASSAY OF NATRIURETIC PEPTIDE: CPT

## 2024-04-05 PROCEDURE — 2700000000 HC OXYGEN THERAPY PER DAY

## 2024-04-05 PROCEDURE — 71045 X-RAY EXAM CHEST 1 VIEW: CPT

## 2024-04-05 RX ORDER — GUAIFENESIN 600 MG/1
600 TABLET, EXTENDED RELEASE ORAL 2 TIMES DAILY
Status: DISCONTINUED | OUTPATIENT
Start: 2024-04-05 | End: 2024-04-07 | Stop reason: HOSPADM

## 2024-04-05 RX ADMIN — PANTOPRAZOLE SODIUM 40 MG: 40 TABLET, DELAYED RELEASE ORAL at 05:49

## 2024-04-05 RX ADMIN — CEFTRIAXONE SODIUM 1000 MG: 1 INJECTION, POWDER, FOR SOLUTION INTRAMUSCULAR; INTRAVENOUS at 14:48

## 2024-04-05 RX ADMIN — GABAPENTIN 300 MG: 300 CAPSULE ORAL at 20:12

## 2024-04-05 RX ADMIN — CARVEDILOL 25 MG: 25 TABLET, FILM COATED ORAL at 08:52

## 2024-04-05 RX ADMIN — SODIUM CHLORIDE: 9 INJECTION, SOLUTION INTRAVENOUS at 14:47

## 2024-04-05 RX ADMIN — DULOXETINE HYDROCHLORIDE 60 MG: 30 CAPSULE, DELAYED RELEASE ORAL at 08:52

## 2024-04-05 RX ADMIN — QUETIAPINE FUMARATE 300 MG: 100 TABLET ORAL at 20:13

## 2024-04-05 RX ADMIN — GUAIFENESIN 600 MG: 600 TABLET ORAL at 20:13

## 2024-04-05 RX ADMIN — SODIUM CHLORIDE, PRESERVATIVE FREE 10 ML: 5 INJECTION INTRAVENOUS at 08:52

## 2024-04-05 RX ADMIN — AMLODIPINE BESYLATE 5 MG: 5 TABLET ORAL at 08:52

## 2024-04-05 RX ADMIN — AZITHROMYCIN MONOHYDRATE 500 MG: 500 INJECTION, POWDER, LYOPHILIZED, FOR SOLUTION INTRAVENOUS at 16:00

## 2024-04-05 RX ADMIN — BUSPIRONE HYDROCHLORIDE 15 MG: 15 TABLET ORAL at 14:49

## 2024-04-05 RX ADMIN — BUSPIRONE HYDROCHLORIDE 15 MG: 15 TABLET ORAL at 08:52

## 2024-04-05 RX ADMIN — GUAIFENESIN 600 MG: 600 TABLET ORAL at 10:39

## 2024-04-05 RX ADMIN — ALBUTEROL SULFATE 2.5 MG: 2.5 SOLUTION RESPIRATORY (INHALATION) at 09:04

## 2024-04-05 RX ADMIN — PREDNISONE 30 MG: 20 TABLET ORAL at 08:52

## 2024-04-05 RX ADMIN — CARVEDILOL 25 MG: 25 TABLET, FILM COATED ORAL at 17:38

## 2024-04-05 RX ADMIN — OSELTAMIVIR PHOSPHATE 75 MG: 75 CAPSULE ORAL at 08:52

## 2024-04-05 RX ADMIN — GABAPENTIN 300 MG: 300 CAPSULE ORAL at 14:49

## 2024-04-05 RX ADMIN — DIVALPROEX SODIUM 250 MG: 250 TABLET, DELAYED RELEASE ORAL at 05:49

## 2024-04-05 RX ADMIN — ACETAMINOPHEN 650 MG: 325 TABLET ORAL at 08:52

## 2024-04-05 RX ADMIN — SODIUM CHLORIDE, PRESERVATIVE FREE 10 ML: 5 INJECTION INTRAVENOUS at 20:15

## 2024-04-05 RX ADMIN — ALBUTEROL SULFATE 2.5 MG: 2.5 SOLUTION RESPIRATORY (INHALATION) at 15:55

## 2024-04-05 RX ADMIN — GABAPENTIN 300 MG: 300 CAPSULE ORAL at 08:52

## 2024-04-05 RX ADMIN — ENOXAPARIN SODIUM 40 MG: 100 INJECTION SUBCUTANEOUS at 08:52

## 2024-04-05 RX ADMIN — ATORVASTATIN CALCIUM 40 MG: 40 TABLET, FILM COATED ORAL at 08:52

## 2024-04-05 RX ADMIN — ALBUTEROL SULFATE 2.5 MG: 2.5 SOLUTION RESPIRATORY (INHALATION) at 12:23

## 2024-04-05 RX ADMIN — HYDROXYZINE PAMOATE 50 MG: 25 CAPSULE ORAL at 20:12

## 2024-04-05 RX ADMIN — DIVALPROEX SODIUM 250 MG: 250 TABLET, DELAYED RELEASE ORAL at 14:49

## 2024-04-05 RX ADMIN — FUROSEMIDE 20 MG: 20 TABLET ORAL at 08:52

## 2024-04-05 RX ADMIN — ASPIRIN 81 MG: 81 TABLET, COATED ORAL at 08:52

## 2024-04-05 RX ADMIN — MONTELUKAST 10 MG: 10 TABLET, FILM COATED ORAL at 20:13

## 2024-04-05 RX ADMIN — Medication 2 PUFF: at 19:28

## 2024-04-05 RX ADMIN — WATER 40 MG: 1 INJECTION INTRAMUSCULAR; INTRAVENOUS; SUBCUTANEOUS at 18:30

## 2024-04-05 RX ADMIN — OSELTAMIVIR PHOSPHATE 75 MG: 75 CAPSULE ORAL at 20:12

## 2024-04-05 RX ADMIN — ALBUTEROL SULFATE 2.5 MG: 2.5 SOLUTION RESPIRATORY (INHALATION) at 19:22

## 2024-04-05 RX ADMIN — HYDROCODONE BITARTRATE AND ACETAMINOPHEN 1 TABLET: 5; 325 TABLET ORAL at 10:39

## 2024-04-05 RX ADMIN — BUSPIRONE HYDROCHLORIDE 15 MG: 15 TABLET ORAL at 20:13

## 2024-04-05 ASSESSMENT — PAIN DESCRIPTION - ORIENTATION
ORIENTATION: LOWER
ORIENTATION: LOWER
ORIENTATION: MID;UPPER

## 2024-04-05 ASSESSMENT — PAIN SCALES - GENERAL
PAINLEVEL_OUTOF10: 6
PAINLEVEL_OUTOF10: 4
PAINLEVEL_OUTOF10: 0
PAINLEVEL_OUTOF10: 10
PAINLEVEL_OUTOF10: 0
PAINLEVEL_OUTOF10: 0
PAINLEVEL_OUTOF10: 4

## 2024-04-05 ASSESSMENT — PAIN SCALES - WONG BAKER: WONGBAKER_NUMERICALRESPONSE: HURTS LITTLE MORE

## 2024-04-05 ASSESSMENT — PAIN DESCRIPTION - DESCRIPTORS
DESCRIPTORS: ACHING

## 2024-04-05 ASSESSMENT — PAIN DESCRIPTION - LOCATION
LOCATION: BACK

## 2024-04-05 NOTE — PLAN OF CARE
Problem: Chronic Conditions and Co-morbidities  Goal: Patient's chronic conditions and co-morbidity symptoms are monitored and maintained or improved  Outcome: Progressing  Note:   CHF Care Plan      Patient's EF (Ejection Fraction) is greater than 40%    Heart Failure Medications:  Diuretics:: Furosemide    (One of the following REQUIRED for EF </= 40%/SYSTOLIC FAILURE but MAY be used in EF% >40%/DIASTOLIC FAILURE)        ACE:: None        ARB:: None         ARNI:: None    (Beta Blockers)  NON- Evidenced Based Beta Blocker (for EF% >40%/DIASTOLIC FAILURE): None    Evidenced Based Beta Blocker::(REQUIRED for EF% <40%/SYSTOLIC FAILURE) Carvedilol- Coreg  ...................................................................................................................................................    Failed to redirect to the Timeline version of the [employer] SmartLink.      Patient's weights and intake/output reviewed    Daily Weight log at bedside, patient/family participation in use of log: \"yes    Patient's current weight today shows a difference of 1 lbs more than last documented weight.      Intake/Output Summary (Last 24 hours) at 4/5/2024 1653  Last data filed at 4/5/2024 1603  Gross per 24 hour   Intake 580 ml   Output 575 ml   Net 5 ml       Education Booklet Provided: yes    Comorbidities Reviewed Yes    Patient has a past medical history of Abnormal ECG, Anemia, Arthritis, Back pain, chronic, Bipolar disorder ([facility]), Bronchitis chronic, CHF (congestive heart failure) ([facility]), Chronic back pain, Chronic neck pain, Clostridium difficile infection, 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[facility]), Coronary artery disease involving native coronary artery of native heart with angina pectoris ([facility]), Depression, Emphysema of lung ([facility]), Fibromyalgia, hyperlipidemia, Hypertension, Kidney stone, Liver disease, Lung disease, MDD (major depressive disorder), Mood disorder (HCC), Movement disorder, Neck pain, chronic, Opiate misuse,

## 2024-04-05 NOTE — PROGRESS NOTES
V2.0  [facility] [place] [facility] Hospitalist Progress Note      Name:  Najma Fitzpatrick /Age/Sex: 1950  (73 y.o. female)   MRN & CSN:  [id number] & [id number] Encounter Date/Time: 2024 2:21 PM EDT    Location:   PCP: Geena Sotomayor APRN - CNP       Hospital Day: 5      Subjective:     Chief Complaint:  Shortness of Breath (EMS called for SOB. Pt was in the 70s on RA upon EMS arrival. Family states pt hasn't urinated today and has been more confused recently as well, pt appears to be A&O x4 during triage )     Pt doing better today. Was asking about discharge. States her SOB has improved. Still has cough able to bring up some sputum.      Assessment and Plan:   Acute hypercapnic respiratory failure 2/2 Influenza A triggered COPD exacerbation. Patient has been started on BiPAP in the ER due to hypercapnia.  Confusion seems to be improving as patient is now alert and oriented x 3.    Remains on 4L  - Continue supplemental oxygen to keep saturations above 90%.    - continue steroids low dose as pt was becoming psychotic  - continue breathing tx  - continue symptomatic management   - continue Tamiflu  - add acapalla, chest physiotherapy  - resume home lasix       Acute metabolic encephalopathy. Came confused but now improved ; had agitation and hallucinations all resolved after resuming home meds. Was restratined now off restraints.   - continue home meds; Seroquel, Depakote  - delirium precautions       Chronic diastolic CHF. Last ECHO 2023 EF 55-60%, grade I diastolic CHF.   Concern for mild volume overload given clinical exam, resp status, CXR and BNP. Received few doses lasix IV  - resume home lasix  - monitor I/o    Hyponatremia/hypochloremia. Received IVF.  Likely worsened 2/2 IV diuresis   - avoid giving more IVF as pt was having leg edema  - monitor     Hypokalemia, Hypomagnesemia: Replace and monitor.     Elevated troponin: Likely due to supply/demand mismatch.  Does not report chest pain.  Continue  Reason for Exam: cough FINDINGS: Cardiomegaly and congestion.  No focal infiltrates.  No pulmonary edema.  No active pleural disease.     Cardiomegaly and congestion.  No focal infiltrates or pulmonary edema.     CT HEAD WO CONTRAST    Result Date: 4/2/2024  EXAMINATION: CT OF THE HEAD WITHOUT CONTRAST  4/1/2024 11:18 pm TECHNIQUE: CT of the head was performed without the administration of intravenous contrast. Automated exposure control, iterative reconstruction, and/or weight based adjustment of the mA/kV was utilized to reduce the radiation dose to as low as reasonably achievable. COMPARISON: 06/25/2023 HISTORY: ORDERING SYSTEM PROVIDED HISTORY: confusion TECHNOLOGIST PROVIDED HISTORY: Reason for exam:->confusion Has a \"code stroke\" or \"stroke alert\" been called?->No Decision Support Exception - unselect if not a suspected or confirmed emergency medical condition->Emergency Medical Condition (MA) Reason for Exam: confusion FINDINGS: BRAIN/VENTRICLES: There is no acute intracranial hemorrhage, mass effect or midline shift.  No abnormal extra-axial fluid collection.  The gray-white differentiation is maintained without evidence of an acute infarct.  There is no evidence of hydrocephalus. Ventricles are prominent.  No midline shift. ORBITS: The visualized portion of the orbits demonstrate no acute abnormality.  Lenses are postsurgical. SINUSES: Thickening in the sphenoid sinuses and the ethmoid sinuses.  Mild thickening in the maxillary sinuses and the left frontal sinus.  Mastoid air cells are clear but the right is under aerated. SOFT TISSUES/SKULL:  No acute abnormality of the visualized skull or soft tissues.     No acute intracranial abnormality.       Electronically signed by Abdiaziz Patrick MD on 4/5/2024 at 10:50 AM

## 2024-04-05 NOTE — PLAN OF CARE
Problem: Pain  Goal: Verbalizes/displays adequate comfort level or baseline comfort level  Outcome: Progressing  Note: Pt will be satisfied with pain control. Pt uses numeric pain rating scale with reassessments after pain med administration. Will continue to monitor progression throughout shift.      Problem: Skin/Tissue Integrity  Goal: Absence of new skin breakdown  Description: 1.  Monitor for areas of redness and/or skin breakdown  Outcome: Progressing     Problem: Safety - Adult  Goal: Free from fall injury  Outcome: Progressing  Note: Pt will remain free from falls throughout hospital stay. Fall precautions in place, bed alarm on, bed in lowest position with wheels locked and side rails 2/4 up.  Will continue to monitor throughout shift.      Problem: Chronic Conditions and Co-morbidities  Goal: Patient's chronic conditions and co-morbidity symptoms are monitored and maintained or improved  Outcome: Progressing  Note:   CHF Care Plan      Patient's EF (Ejection Fraction) is greater than 40%    Heart Failure Medications:  Diuretics:: Furosemide    (One of the following REQUIRED for EF </= 40%/SYSTOLIC FAILURE but MAY be used in EF% >40%/DIASTOLIC FAILURE)        ACE:: None        ARB:: None         ARNI:: None    (Beta Blockers)  NON- Evidenced Based Beta Blocker (for EF% >40%/DIASTOLIC FAILURE): None    Evidenced Based Beta Blocker::(REQUIRED for EF% <40%/SYSTOLIC FAILURE) Carvedilol- Coreg  ...................................................................................................................................................    Failed to redirect to the Timeline version of the [employer] SmartLink.      Patient's weights and intake/output reviewed    Daily Weight log at bedside, patient/family participation in use of log: \"yes    Patient's current weight today shows a difference of 12 lbs more than last documented weight.      Intake/Output Summary (Last 24 hours) at 4/5/2024 0131  Last data filed at  4/4/2024 1638  Gross per 24 hour   Intake 120 ml   Output 500 ml   Net -380 ml       Education Booklet Provided: yes    Comorbidities Reviewed Yes    Patient has a past medical history of Abnormal ECG, Anemia, Arthritis, Back pain, chronic, Bipolar disorder ([facility]), Bronchitis chronic, CHF (congestive heart failure) ([facility]), Chronic back pain, Chronic neck pain, Clostridium difficile infection, 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[facility]), Coronary artery disease involving native coronary artery of native heart with angina pectoris ([facility]), Depression, Emphysema of lung ([facility]), Fibromyalgia, hyperlipidemia, Hypertension, Kidney stone, Liver disease, Lung disease, MDD (major depressive disorder), Mood disorder ([facility]), Movement disorder, Neck pain, chronic, Opiate misuse, Personality disorder ([facility]), Pneumonia, and Polypharmacy.     >>For CHF and Comorbidity documentation on Education Time and Topics, please see Education Tab      Pt resting in bed at this time on  3 L O2. Pt with complaints of shortness of breath. Pt with pitting lower extremity edema.     Patient and/or Family's stated Goal of Care this Admission: reduce shortness of breath, increase activity tolerance, better understand heart failure and disease management, be more comfortable, and reduce lower extremity edema prior to discharge        :

## 2024-04-06 LAB
ANION GAP SERPL CALCULATED.3IONS-SCNC: 8 MMOL/L (ref 3–16)
BASE EXCESS BLDV CALC-SCNC: 11.1 MMOL/L (ref -3–3)
BASOPHILS # BLD: 0 K/UL (ref 0–0.2)
BASOPHILS NFR BLD: 0.3 %
BUN SERPL-MCNC: 16 MG/DL (ref 7–20)
CALCIUM SERPL-MCNC: 8.5 MG/DL (ref 8.3–10.6)
CHLORIDE SERPL-SCNC: 83 MMOL/L (ref 99–110)
CO2 BLDV-SCNC: 38 MMOL/L
CO2 SERPL-SCNC: 36 MMOL/L (ref 21–32)
COHGB MFR BLDV: 3.4 % (ref 0–1.5)
CREAT SERPL-MCNC: <0.5 MG/DL (ref 0.6–1.2)
DEPRECATED RDW RBC AUTO: 16.6 % (ref 12.4–15.4)
EOSINOPHIL # BLD: 0 K/UL (ref 0–0.6)
EOSINOPHIL NFR BLD: 0 %
GFR SERPLBLD CREATININE-BSD FMLA CKD-EPI: >90 ML/MIN/{1.73_M2}
GLUCOSE SERPL-MCNC: 201 MG/DL (ref 70–99)
HCO3 BLDV-SCNC: 36.7 MMOL/L (ref 23–29)
HCT VFR BLD AUTO: 33.4 % (ref 36–48)
HGB BLD-MCNC: 10.8 G/DL (ref 12–16)
LEGIONELLA AG UR QL: NORMAL
LYMPHOCYTES # BLD: 0.3 K/UL (ref 1–5.1)
LYMPHOCYTES NFR BLD: 3.7 %
MCH RBC QN AUTO: 26.9 PG (ref 26–34)
MCHC RBC AUTO-ENTMCNC: 32.4 G/DL (ref 31–36)
MCV RBC AUTO: 83.2 FL (ref 80–100)
METHGB MFR BLDV: 0.2 %
MONOCYTES # BLD: 0.2 K/UL (ref 0–1.3)
MONOCYTES NFR BLD: 2.1 %
MRSA DNA SPEC QL NAA+PROBE: NORMAL
NEUTROPHILS # BLD: 8.3 K/UL (ref 1.7–7.7)
NEUTROPHILS NFR BLD: 93.9 %
O2 THERAPY: ABNORMAL
PCO2 BLDV: 53.1 MMHG (ref 40–50)
PH BLDV: 7.46 [PH] (ref 7.35–7.45)
PLATELET # BLD AUTO: 106 K/UL (ref 135–450)
PMV BLD AUTO: 7 FL (ref 5–10.5)
PO2 BLDV: 42.8 MMHG (ref 25–40)
POTASSIUM SERPL-SCNC: 4.4 MMOL/L (ref 3.5–5.1)
RBC # BLD AUTO: 4.02 M/UL (ref 4–5.2)
S PNEUM AG UR QL: NORMAL
SAO2 % BLDV: 81 %
SODIUM SERPL-SCNC: 127 MMOL/L (ref 136–145)
WBC # BLD AUTO: 8.8 K/UL (ref 4–11)

## 2024-04-06 PROCEDURE — 2700000000 HC OXYGEN THERAPY PER DAY

## 2024-04-06 PROCEDURE — 51798 US URINE CAPACITY MEASURE: CPT

## 2024-04-06 PROCEDURE — 85025 COMPLETE CBC W/AUTO DIFF WBC: CPT

## 2024-04-06 PROCEDURE — 94761 N-INVAS EAR/PLS OXIMETRY MLT: CPT

## 2024-04-06 PROCEDURE — 6370000000 HC RX 637 (ALT 250 FOR IP): Performed by: STUDENT IN AN ORGANIZED HEALTH CARE EDUCATION/TRAINING PROGRAM

## 2024-04-06 PROCEDURE — 94669 MECHANICAL CHEST WALL OSCILL: CPT

## 2024-04-06 PROCEDURE — 6360000002 HC RX W HCPCS: Performed by: FAMILY MEDICINE

## 2024-04-06 PROCEDURE — 6360000002 HC RX W HCPCS: Performed by: STUDENT IN AN ORGANIZED HEALTH CARE EDUCATION/TRAINING PROGRAM

## 2024-04-06 PROCEDURE — 2580000003 HC RX 258: Performed by: STUDENT IN AN ORGANIZED HEALTH CARE EDUCATION/TRAINING PROGRAM

## 2024-04-06 PROCEDURE — 6370000000 HC RX 637 (ALT 250 FOR IP): Performed by: FAMILY MEDICINE

## 2024-04-06 PROCEDURE — 2060000000 HC ICU INTERMEDIATE R&B

## 2024-04-06 PROCEDURE — 80048 BASIC METABOLIC PNL TOTAL CA: CPT

## 2024-04-06 PROCEDURE — 82803 BLOOD GASES ANY COMBINATION: CPT

## 2024-04-06 PROCEDURE — 94640 AIRWAY INHALATION TREATMENT: CPT

## 2024-04-06 PROCEDURE — 36415 COLL VENOUS BLD VENIPUNCTURE: CPT

## 2024-04-06 PROCEDURE — 94660 CPAP INITIATION&MGMT: CPT

## 2024-04-06 PROCEDURE — 2580000003 HC RX 258: Performed by: FAMILY MEDICINE

## 2024-04-06 RX ORDER — DULOXETIN HYDROCHLORIDE 60 MG/1
60 CAPSULE, DELAYED RELEASE ORAL DAILY
COMMUNITY

## 2024-04-06 RX ORDER — SODIUM CHLORIDE 9 MG/ML
INJECTION, SOLUTION INTRAVENOUS CONTINUOUS
Status: ACTIVE | OUTPATIENT
Start: 2024-04-06 | End: 2024-04-07

## 2024-04-06 RX ORDER — DIVALPROEX SODIUM 250 MG/1
250 TABLET, EXTENDED RELEASE ORAL DAILY
COMMUNITY

## 2024-04-06 RX ADMIN — PANTOPRAZOLE SODIUM 40 MG: 40 TABLET, DELAYED RELEASE ORAL at 05:50

## 2024-04-06 RX ADMIN — ENOXAPARIN SODIUM 40 MG: 100 INJECTION SUBCUTANEOUS at 08:36

## 2024-04-06 RX ADMIN — TIOTROPIUM BROMIDE INHALATION SPRAY 2 PUFF: 3.12 SPRAY, METERED RESPIRATORY (INHALATION) at 08:12

## 2024-04-06 RX ADMIN — ALBUTEROL SULFATE 2.5 MG: 2.5 SOLUTION RESPIRATORY (INHALATION) at 15:53

## 2024-04-06 RX ADMIN — GABAPENTIN 300 MG: 300 CAPSULE ORAL at 14:01

## 2024-04-06 RX ADMIN — QUETIAPINE FUMARATE 25 MG: 25 TABLET ORAL at 18:48

## 2024-04-06 RX ADMIN — DIVALPROEX SODIUM 250 MG: 250 TABLET, DELAYED RELEASE ORAL at 05:50

## 2024-04-06 RX ADMIN — Medication 2 PUFF: at 19:35

## 2024-04-06 RX ADMIN — DULOXETINE HYDROCHLORIDE 60 MG: 30 CAPSULE, DELAYED RELEASE ORAL at 08:36

## 2024-04-06 RX ADMIN — DIVALPROEX SODIUM 250 MG: 250 TABLET, DELAYED RELEASE ORAL at 14:01

## 2024-04-06 RX ADMIN — ALBUTEROL SULFATE 2.5 MG: 2.5 SOLUTION RESPIRATORY (INHALATION) at 11:39

## 2024-04-06 RX ADMIN — WATER 40 MG: 1 INJECTION INTRAMUSCULAR; INTRAVENOUS; SUBCUTANEOUS at 05:30

## 2024-04-06 RX ADMIN — QUETIAPINE FUMARATE 300 MG: 100 TABLET ORAL at 21:15

## 2024-04-06 RX ADMIN — ATORVASTATIN CALCIUM 40 MG: 40 TABLET, FILM COATED ORAL at 08:37

## 2024-04-06 RX ADMIN — BUSPIRONE HYDROCHLORIDE 15 MG: 15 TABLET ORAL at 14:01

## 2024-04-06 RX ADMIN — FUROSEMIDE 20 MG: 20 TABLET ORAL at 08:36

## 2024-04-06 RX ADMIN — CARVEDILOL 25 MG: 25 TABLET, FILM COATED ORAL at 16:35

## 2024-04-06 RX ADMIN — ALBUTEROL SULFATE 2.5 MG: 2.5 SOLUTION RESPIRATORY (INHALATION) at 08:12

## 2024-04-06 RX ADMIN — AZITHROMYCIN MONOHYDRATE 500 MG: 500 INJECTION, POWDER, LYOPHILIZED, FOR SOLUTION INTRAVENOUS at 14:00

## 2024-04-06 RX ADMIN — SODIUM CHLORIDE, PRESERVATIVE FREE 10 ML: 5 INJECTION INTRAVENOUS at 21:17

## 2024-04-06 RX ADMIN — CEFTRIAXONE SODIUM 1000 MG: 1 INJECTION, POWDER, FOR SOLUTION INTRAMUSCULAR; INTRAVENOUS at 16:35

## 2024-04-06 RX ADMIN — OSELTAMIVIR PHOSPHATE 75 MG: 75 CAPSULE ORAL at 08:37

## 2024-04-06 RX ADMIN — ACETAMINOPHEN 650 MG: 325 TABLET ORAL at 08:41

## 2024-04-06 RX ADMIN — SODIUM CHLORIDE, PRESERVATIVE FREE 10 ML: 5 INJECTION INTRAVENOUS at 08:37

## 2024-04-06 RX ADMIN — SODIUM CHLORIDE: 9 INJECTION, SOLUTION INTRAVENOUS at 13:58

## 2024-04-06 RX ADMIN — GABAPENTIN 300 MG: 300 CAPSULE ORAL at 08:37

## 2024-04-06 RX ADMIN — OSELTAMIVIR PHOSPHATE 75 MG: 75 CAPSULE ORAL at 21:15

## 2024-04-06 RX ADMIN — CARVEDILOL 25 MG: 25 TABLET, FILM COATED ORAL at 08:37

## 2024-04-06 RX ADMIN — SODIUM CHLORIDE: 9 INJECTION, SOLUTION INTRAVENOUS at 13:59

## 2024-04-06 RX ADMIN — AMLODIPINE BESYLATE 5 MG: 5 TABLET ORAL at 08:36

## 2024-04-06 RX ADMIN — WATER 40 MG: 1 INJECTION INTRAMUSCULAR; INTRAVENOUS; SUBCUTANEOUS at 18:48

## 2024-04-06 RX ADMIN — DIVALPROEX SODIUM 250 MG: 250 TABLET, DELAYED RELEASE ORAL at 21:15

## 2024-04-06 RX ADMIN — ALBUTEROL SULFATE 2.5 MG: 2.5 SOLUTION RESPIRATORY (INHALATION) at 19:34

## 2024-04-06 RX ADMIN — Medication 2 PUFF: at 08:12

## 2024-04-06 RX ADMIN — HYDROCODONE BITARTRATE AND ACETAMINOPHEN 1 TABLET: 5; 325 TABLET ORAL at 05:51

## 2024-04-06 RX ADMIN — BUSPIRONE HYDROCHLORIDE 15 MG: 15 TABLET ORAL at 08:37

## 2024-04-06 RX ADMIN — ASPIRIN 81 MG: 81 TABLET, COATED ORAL at 08:37

## 2024-04-06 RX ADMIN — GUAIFENESIN 600 MG: 600 TABLET ORAL at 08:37

## 2024-04-06 ASSESSMENT — PAIN SCALES - WONG BAKER
WONGBAKER_NUMERICALRESPONSE: NO HURT
WONGBAKER_NUMERICALRESPONSE: HURTS A LITTLE BIT
WONGBAKER_NUMERICALRESPONSE: NO HURT

## 2024-04-06 ASSESSMENT — PAIN SCALES - GENERAL: PAINLEVEL_OUTOF10: 3

## 2024-04-06 ASSESSMENT — PAIN DESCRIPTION - ORIENTATION: ORIENTATION: LOWER

## 2024-04-06 ASSESSMENT — PAIN DESCRIPTION - LOCATION: LOCATION: BACK

## 2024-04-06 ASSESSMENT — PAIN DESCRIPTION - DESCRIPTORS: DESCRIPTORS: ACHING

## 2024-04-06 NOTE — PROGRESS NOTES
04/06/24 0335   NIV Type   NIV Started/Stopped On   Equipment Type v60   Mode Bilevel   Mask Type Full face mask   Mask Size Medium   Assessment   Respirations 16   Settings/Measurements   IPAP 15 cmH20   CPAP/EPAP 5 cmH2O   Vt (Measured) 462 mL   Rate Ordered 14   FiO2  40 %   Mask Leak (lpm) 40 lpm   Patient's Home Machine No   Alarm Settings   Alarms On Y   Low Pressure (cmH2O) 6 cmH2O   High Pressure (cmH2O) 30 cmH2O

## 2024-04-06 NOTE — PLAN OF CARE
Problem: Discharge Planning  Goal: Discharge to home or other facility with appropriate resources  4/6/2024 0558 by Candida Lockwood RN  Outcome: Progressing     Problem: Pain  Goal: Verbalizes/displays adequate comfort level or baseline comfort level  4/6/2024 0558 by Candida Lockwood RN  Outcome: Progressing     Problem: Chronic Conditions and Co-morbidities  Goal: Patient's chronic conditions and co-morbidity symptoms are monitored and maintained or improved  4/6/2024 0558 by Candida Lockwood RN  Outcome: Progressing     Problem: Skin/Tissue Integrity  Goal: Absence of new skin breakdown  Description: 1.  Monitor for areas of redness and/or skin breakdown  2.  Assess vascular access sites hourly  3.  Every 4-6 hours minimum:  Change oxygen saturation probe site  4.  Every 4-6 hours:  If on nasal continuous positive airway pressure, respiratory therapy assess nares and determine need for appliance change or resting period.  4/6/2024 0558 by Candida Lockwood RN  Outcome: Progressing     Problem: Safety - Adult  Goal: Free from fall injury  4/6/2024 0558 by Candida Lockwood RN  Outcome: Progressing    CHF Care Plan      Patient's EF (Ejection Fraction) is greater than 40%    Heart Failure Medications:  Diuretics:: Furosemide    (One of the following REQUIRED for EF </= 40%/SYSTOLIC FAILURE but MAY be used in EF% >40%/DIASTOLIC FAILURE)        ACE:: None        ARB:: None         ARNI:: None    (Beta Blockers)  NON- Evidenced Based Beta Blocker (for EF% >40%/DIASTOLIC FAILURE): None    Evidenced Based Beta Blocker::(REQUIRED for EF% <40%/SYSTOLIC FAILURE) Carvedilol- Coreg  ...................................................................................................................................................    Failed to redirect to the Timeline version of the [employer] SmartLink.      Patient's weights and intake/output reviewed    Daily Weight log at bedside, patient/family  verbalized understanding.      :

## 2024-04-06 NOTE — PROGRESS NOTES
04/06/24 0008   NIV Type   $NIV $Daily Charge   NIV Started/Stopped On   Equipment Type v60   Mode Bilevel   Mask Type Full face mask   Mask Size Medium   Assessment   Respirations 18   Settings/Measurements   IPAP 15 cmH20   CPAP/EPAP 5 cmH2O   Vt (Measured) 422 mL   Rate Ordered 14   FiO2  40 %   Mask Leak (lpm) 32 lpm   Patient's Home Machine No   Alarm Settings   Alarms On Y   Low Pressure (cmH2O) 6 cmH2O   High Pressure (cmH2O) 30 cmH2O

## 2024-04-06 NOTE — PROGRESS NOTES
V2.0  [facility] [place] [place]      Name:  Najma Fitzpatrick /Age/Sex: 1950  (73 y.o. female)   MRN & CSN:  [id number] & [id number] Encounter Date/Time: 2024 2:21 PM EDT    Location:   PCP: Geena Sotomayor APRN - CNP       Hospital Day: 6      Subjective:     Chief Complaint:  Shortness of Breath (EMS called for SOB. Pt was in the 70s on RA upon EMS arrival. Family states pt hasn't urinated today and has been more confused recently as well, pt appears to be A&O x4 during triage )   Yesterday, pt had worsening hypoxia reaching 6L, VBG showed worsening hypercapnia, placed on BIPAP.  Today, pt is doing better. Continues to improve. Now on 4L sating 97%. Still has cough able to bring up some sputum.      Assessment and Plan:   Acute hypercapnic respiratory failure 2/2 Influenza A triggered COPD exacerbation. pCO2 around 60's. Confusion seems to be improving as patient is now alert and oriented x 3.    Has been on/off BIPAP  Remains on 4L but sating 97%  Repeat CXR possible PNA RLL. Procal negative no leukocytosis; although less likely superimposed bacterial PNA, will do short course abx.   - wean O2 as tolerated; goal spO2 > 90%; bipap for naps and sleep  - back on IV steroids, plan to transition to oral tomorrow; no sign of psychosis after resuming meds  - continue breathing tx  - azithro, rocephin  - continue symptomatic management   - continue Tamiflu  - added acapalla, chest physiotherapy     Acute metabolic encephalopathy. Came confused but now improved ; had agitation and hallucinations all resolved after resuming home meds. Was restratined now off restraints.   - continue home meds; Seroquel, Depakote  - delirium precautions       Chronic diastolic CHF. Last ECHO 2023 EF 55-60%, grade I diastolic CHF.   Concern for mild volume overload on admission given clinical exam day, resp status, CXR and BNP but CTPA no fluid. Received few doses lasix IV  Today, no sign of volume

## 2024-04-06 NOTE — PLAN OF CARE
Problem: Discharge Planning  Goal: Discharge to home or other facility with appropriate resources  4/6/2024 0912 by Arlene Irving RN  Outcome: Progressing  Note:   CHF Care Plan      Patient's EF (Ejection Fraction) is greater than 40%    Heart Failure Medications:  Diuretics:: Furosemide    (One of the following REQUIRED for EF </= 40%/SYSTOLIC FAILURE but MAY be used in EF% >40%/DIASTOLIC FAILURE)        ACE:: None        ARB:: None         ARNI:: None    (Beta Blockers)  NON- Evidenced Based Beta Blocker (for EF% >40%/DIASTOLIC FAILURE): None    Evidenced Based Beta Blocker::(REQUIRED for EF% <40%/SYSTOLIC FAILURE) Carvedilol- Coreg  ...................................................................................................................................................    Failed to redirect to the Timeline version of the [employer] SmartLink.      Patient's weights and intake/output reviewed    Daily Weight log at bedside, patient/family participation in use of log: \"yes    Patient's current weight today shows a difference of 0 lbs less than last documented weight.      Intake/Output Summary (Last 24 hours) at 4/6/2024 0912  Last data filed at 4/6/2024 0846  Gross per 24 hour   Intake 1100 ml   Output 1325 ml   Net -225 ml       Education Booklet Provided: yes    Comorbidities Reviewed Yes    Patient has a past medical history of Abnormal ECG, Anemia, Arthritis, Back pain, chronic, Bipolar disorder ([facility]), Bronchitis chronic, CHF (congestive heart failure) ([facility]), Chronic back pain, Chronic neck pain, Clostridium difficile infection, 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[facility]), Coronary artery disease involving native coronary artery of native heart with angina pectoris ([facility]), Depression, Emphysema of lung ([facility]), Fibromyalgia, hyperlipidemia, Hypertension, Kidney stone, Liver disease, Lung disease, MDD (major depressive disorder), Mood disorder (HCC), Movement disorder, Neck pain, chronic, Opiate misuse, Personality

## 2024-04-07 VITALS
WEIGHT: 174.1 LBS | TEMPERATURE: 97.9 F | OXYGEN SATURATION: 95 % | RESPIRATION RATE: 16 BRPM | BODY MASS INDEX: 32.87 KG/M2 | DIASTOLIC BLOOD PRESSURE: 69 MMHG | HEART RATE: 84 BPM | SYSTOLIC BLOOD PRESSURE: 176 MMHG | HEIGHT: 61 IN

## 2024-04-07 LAB
ANION GAP SERPL CALCULATED.3IONS-SCNC: 6 MMOL/L (ref 3–16)
BASE EXCESS BLDV CALC-SCNC: 13.8 MMOL/L (ref -3–3)
BUN SERPL-MCNC: 12 MG/DL (ref 7–20)
CALCIUM SERPL-MCNC: 8.4 MG/DL (ref 8.3–10.6)
CHLORIDE SERPL-SCNC: 89 MMOL/L (ref 99–110)
CO2 BLDV-SCNC: 41 MMOL/L
CO2 SERPL-SCNC: 37 MMOL/L (ref 21–32)
COHGB MFR BLDV: 1.8 % (ref 0–1.5)
CREAT SERPL-MCNC: <0.5 MG/DL (ref 0.6–1.2)
GFR SERPLBLD CREATININE-BSD FMLA CKD-EPI: >90 ML/MIN/{1.73_M2}
GLUCOSE SERPL-MCNC: 123 MG/DL (ref 70–99)
HCO3 BLDV-SCNC: 38.9 MMOL/L (ref 23–29)
METHGB MFR BLDV: 0.2 %
O2 THERAPY: ABNORMAL
OSMOLALITY UR: 435 MOSM/KG (ref 390–1070)
PCO2 BLDV: 50.8 MMHG (ref 40–50)
PH BLDV: 7.5 [PH] (ref 7.35–7.45)
PO2 BLDV: 72.7 MMHG (ref 25–40)
POTASSIUM SERPL-SCNC: 4.4 MMOL/L (ref 3.5–5.1)
SAO2 % BLDV: 95 %
SODIUM SERPL-SCNC: 132 MMOL/L (ref 136–145)

## 2024-04-07 PROCEDURE — 2580000003 HC RX 258: Performed by: FAMILY MEDICINE

## 2024-04-07 PROCEDURE — 6360000002 HC RX W HCPCS: Performed by: FAMILY MEDICINE

## 2024-04-07 PROCEDURE — 2700000000 HC OXYGEN THERAPY PER DAY

## 2024-04-07 PROCEDURE — 2580000003 HC RX 258: Performed by: STUDENT IN AN ORGANIZED HEALTH CARE EDUCATION/TRAINING PROGRAM

## 2024-04-07 PROCEDURE — 1800000000 HC LEAVE OF ABSENCE

## 2024-04-07 PROCEDURE — 6370000000 HC RX 637 (ALT 250 FOR IP): Performed by: FAMILY MEDICINE

## 2024-04-07 PROCEDURE — 94640 AIRWAY INHALATION TREATMENT: CPT

## 2024-04-07 PROCEDURE — 82803 BLOOD GASES ANY COMBINATION: CPT

## 2024-04-07 PROCEDURE — 94761 N-INVAS EAR/PLS OXIMETRY MLT: CPT

## 2024-04-07 PROCEDURE — 80048 BASIC METABOLIC PNL TOTAL CA: CPT

## 2024-04-07 PROCEDURE — 6370000000 HC RX 637 (ALT 250 FOR IP): Performed by: NURSE PRACTITIONER

## 2024-04-07 PROCEDURE — 6360000002 HC RX W HCPCS: Performed by: STUDENT IN AN ORGANIZED HEALTH CARE EDUCATION/TRAINING PROGRAM

## 2024-04-07 PROCEDURE — 36415 COLL VENOUS BLD VENIPUNCTURE: CPT

## 2024-04-07 PROCEDURE — 6370000000 HC RX 637 (ALT 250 FOR IP): Performed by: STUDENT IN AN ORGANIZED HEALTH CARE EDUCATION/TRAINING PROGRAM

## 2024-04-07 PROCEDURE — 94669 MECHANICAL CHEST WALL OSCILL: CPT

## 2024-04-07 RX ORDER — PREDNISONE 20 MG/1
40 TABLET ORAL DAILY
Qty: 6 TABLET | Refills: 0 | Status: SHIPPED | OUTPATIENT
Start: 2024-04-08 | End: 2024-04-11

## 2024-04-07 RX ORDER — IRBESARTAN 150 MG/1
150 TABLET ORAL DAILY
Qty: 30 TABLET | Refills: 0 | Status: SHIPPED | OUTPATIENT
Start: 2024-04-07 | End: 2024-04-07

## 2024-04-07 RX ORDER — FUROSEMIDE 20 MG/1
20 TABLET ORAL
Qty: 60 TABLET | Refills: 3 | Status: SHIPPED
Start: 2024-04-08

## 2024-04-07 RX ORDER — LOSARTAN POTASSIUM 25 MG/1
50 TABLET ORAL DAILY
Status: DISCONTINUED | OUTPATIENT
Start: 2024-04-07 | End: 2024-04-07 | Stop reason: HOSPADM

## 2024-04-07 RX ORDER — CEFUROXIME AXETIL 500 MG/1
500 TABLET ORAL 2 TIMES DAILY
Qty: 6 TABLET | Refills: 0 | Status: SHIPPED | OUTPATIENT
Start: 2024-04-07 | End: 2024-04-10

## 2024-04-07 RX ORDER — UMECLIDINIUM 62.5 UG/1
1 AEROSOL, POWDER ORAL DAILY
Qty: 1 EACH | Refills: 0 | Status: SHIPPED | OUTPATIENT
Start: 2024-04-07 | End: 2024-05-07

## 2024-04-07 RX ORDER — GUAIFENESIN/DEXTROMETHORPHAN 100-10MG/5
10 SYRUP ORAL EVERY 4 HOURS PRN
Qty: 120 ML | Refills: 0 | Status: SHIPPED | OUTPATIENT
Start: 2024-04-07 | End: 2024-04-17

## 2024-04-07 RX ORDER — IRBESARTAN 300 MG/1
300 TABLET ORAL DAILY
Qty: 30 TABLET | Refills: 0 | Status: SHIPPED | OUTPATIENT
Start: 2024-04-07 | End: 2024-05-07

## 2024-04-07 RX ORDER — NICOTINE 21 MG/24HR
1 PATCH, TRANSDERMAL 24 HOURS TRANSDERMAL DAILY
Status: DISCONTINUED | OUTPATIENT
Start: 2024-04-07 | End: 2024-04-07 | Stop reason: HOSPADM

## 2024-04-07 RX ORDER — ASPIRIN 81 MG/1
81 TABLET ORAL DAILY
Qty: 30 TABLET | Refills: 3 | Status: SHIPPED | OUTPATIENT
Start: 2024-04-07

## 2024-04-07 RX ORDER — AZITHROMYCIN 500 MG/1
500 TABLET, FILM COATED ORAL DAILY
Qty: 1 TABLET | Refills: 0 | Status: SHIPPED | OUTPATIENT
Start: 2024-04-07 | End: 2024-04-08

## 2024-04-07 RX ORDER — GUAIFENESIN 600 MG/1
600 TABLET, EXTENDED RELEASE ORAL 2 TIMES DAILY
Qty: 20 TABLET | Refills: 0 | Status: SHIPPED | OUTPATIENT
Start: 2024-04-07 | End: 2024-04-17

## 2024-04-07 RX ORDER — LOSARTAN POTASSIUM 25 MG/1
50 TABLET ORAL ONCE
Status: COMPLETED | OUTPATIENT
Start: 2024-04-07 | End: 2024-04-07

## 2024-04-07 RX ADMIN — DULOXETINE HYDROCHLORIDE 60 MG: 30 CAPSULE, DELAYED RELEASE ORAL at 08:04

## 2024-04-07 RX ADMIN — SODIUM CHLORIDE, PRESERVATIVE FREE 10 ML: 5 INJECTION INTRAVENOUS at 08:06

## 2024-04-07 RX ADMIN — BUSPIRONE HYDROCHLORIDE 15 MG: 15 TABLET ORAL at 13:43

## 2024-04-07 RX ADMIN — ALBUTEROL SULFATE 2.5 MG: 2.5 SOLUTION RESPIRATORY (INHALATION) at 08:09

## 2024-04-07 RX ADMIN — AMLODIPINE BESYLATE 5 MG: 5 TABLET ORAL at 08:03

## 2024-04-07 RX ADMIN — GABAPENTIN 300 MG: 300 CAPSULE ORAL at 13:43

## 2024-04-07 RX ADMIN — GUAIFENESIN 600 MG: 600 TABLET ORAL at 08:04

## 2024-04-07 RX ADMIN — ALBUTEROL SULFATE 2.5 MG: 2.5 SOLUTION RESPIRATORY (INHALATION) at 12:03

## 2024-04-07 RX ADMIN — GABAPENTIN 300 MG: 300 CAPSULE ORAL at 08:03

## 2024-04-07 RX ADMIN — HYDROCODONE BITARTRATE AND ACETAMINOPHEN 1 TABLET: 5; 325 TABLET ORAL at 00:56

## 2024-04-07 RX ADMIN — Medication 2 PUFF: at 08:09

## 2024-04-07 RX ADMIN — WATER 40 MG: 1 INJECTION INTRAMUSCULAR; INTRAVENOUS; SUBCUTANEOUS at 05:52

## 2024-04-07 RX ADMIN — ASPIRIN 81 MG: 81 TABLET, COATED ORAL at 08:04

## 2024-04-07 RX ADMIN — CARVEDILOL 25 MG: 25 TABLET, FILM COATED ORAL at 08:04

## 2024-04-07 RX ADMIN — ALBUTEROL SULFATE 2.5 MG: 2.5 SOLUTION RESPIRATORY (INHALATION) at 16:05

## 2024-04-07 RX ADMIN — TIOTROPIUM BROMIDE INHALATION SPRAY 2 PUFF: 3.12 SPRAY, METERED RESPIRATORY (INHALATION) at 08:09

## 2024-04-07 RX ADMIN — DIVALPROEX SODIUM 250 MG: 250 TABLET, DELAYED RELEASE ORAL at 13:43

## 2024-04-07 RX ADMIN — DIVALPROEX SODIUM 250 MG: 250 TABLET, DELAYED RELEASE ORAL at 05:52

## 2024-04-07 RX ADMIN — ENOXAPARIN SODIUM 40 MG: 100 INJECTION SUBCUTANEOUS at 08:04

## 2024-04-07 RX ADMIN — ATORVASTATIN CALCIUM 40 MG: 40 TABLET, FILM COATED ORAL at 08:04

## 2024-04-07 RX ADMIN — ACETAMINOPHEN 650 MG: 325 TABLET ORAL at 09:17

## 2024-04-07 RX ADMIN — LOSARTAN POTASSIUM 50 MG: 25 TABLET, FILM COATED ORAL at 10:18

## 2024-04-07 RX ADMIN — PANTOPRAZOLE SODIUM 40 MG: 40 TABLET, DELAYED RELEASE ORAL at 05:52

## 2024-04-07 RX ADMIN — CARVEDILOL 25 MG: 25 TABLET, FILM COATED ORAL at 16:31

## 2024-04-07 RX ADMIN — BUSPIRONE HYDROCHLORIDE 15 MG: 15 TABLET ORAL at 08:03

## 2024-04-07 RX ADMIN — LOSARTAN POTASSIUM 50 MG: 25 TABLET, FILM COATED ORAL at 12:49

## 2024-04-07 ASSESSMENT — PAIN SCALES - WONG BAKER
WONGBAKER_NUMERICALRESPONSE: NO HURT
WONGBAKER_NUMERICALRESPONSE: HURTS A LITTLE BIT
WONGBAKER_NUMERICALRESPONSE: NO HURT
WONGBAKER_NUMERICALRESPONSE: HURTS A LITTLE BIT
WONGBAKER_NUMERICALRESPONSE: NO HURT

## 2024-04-07 ASSESSMENT — PAIN SCALES - GENERAL
PAINLEVEL_OUTOF10: 7
PAINLEVEL_OUTOF10: 0
PAINLEVEL_OUTOF10: 0
PAINLEVEL_OUTOF10: 9
PAINLEVEL_OUTOF10: 9
PAINLEVEL_OUTOF10: 0
PAINLEVEL_OUTOF10: 1

## 2024-04-07 ASSESSMENT — PAIN DESCRIPTION - ORIENTATION: ORIENTATION: MID

## 2024-04-07 ASSESSMENT — PAIN DESCRIPTION - LOCATION
LOCATION: BACK
LOCATION: HEAD
LOCATION: HEAD

## 2024-04-07 ASSESSMENT — PAIN DESCRIPTION - DESCRIPTORS: DESCRIPTORS: ACHING;THROBBING

## 2024-04-07 NOTE — CARE COORDINATION
CASE MANAGEMENT DISCHARGE SUMMARY      Discharge to: Home with [facility]    New Durable Medical Equipment ordered/agency: [employer] for home oxygen. Portable concentrator delivered to room.     Transportation:    Family/car: yes      Confirmed discharge plan with:     Patient: yes per RN     Family: yes spouse Brent [phone]          RN, name: Vega HOYOS    Note: Discharging nurse to complete ADENIKE, reconcile AVS, and place final copy with patient's discharge packet. RN to ensure that written prescriptions for  Level II medications are sent with patient to the facility as per protocol.

## 2024-04-07 NOTE — PROGRESS NOTES
04/06/24 2331   NIV Type   NIV Started/Stopped On   Equipment Type v60   Mode Bilevel   Mask Type Full face mask   Mask Size Medium   Assessment   Respirations 17   Settings/Measurements   IPAP 12 cmH20   CPAP/EPAP 8 cmH2O   Vt (Measured) 398 mL   Rate Ordered 14   FiO2  40 %   Mask Leak (lpm) 11 lpm   Patient's Home Machine No   Alarm Settings   Alarms On Y   Low Pressure (cmH2O) 6 cmH2O   High Pressure (cmH2O) 30 cmH2O

## 2024-04-07 NOTE — PLAN OF CARE
Problem: Discharge Planning  Goal: Discharge to home or other facility with appropriate resources  Outcome: Progressing     Problem: Pain  Goal: Verbalizes/displays adequate comfort level or baseline comfort level  Outcome: Progressing     Problem: Chronic Conditions and Co-morbidities  Goal: Patient's chronic conditions and co-morbidity symptoms are monitored and maintained or improved  Outcome: Progressing     Problem: Skin/Tissue Integrity  Goal: Absence of new skin breakdown  Description: 1.  Monitor for areas of redness and/or skin breakdown  2.  Assess vascular access sites hourly  3.  Every 4-6 hours minimum:  Change oxygen saturation probe site  4.  Every 4-6 hours:  If on nasal continuous positive airway pressure, respiratory therapy assess nares and determine need for appliance change or resting period.  Outcome: Progressing     Problem: Safety - Adult  Goal: Free from fall injury  Outcome: Progressing     CHF Care Plan      Patient's EF (Ejection Fraction) is greater than 40%    Heart Failure Medications:  Diuretics:: None    (One of the following REQUIRED for EF </= 40%/SYSTOLIC FAILURE but MAY be used in EF% >40%/DIASTOLIC FAILURE)        ACE:: None        ARB:: None         ARNI:: None    (Beta Blockers)  NON- Evidenced Based Beta Blocker (for EF% >40%/DIASTOLIC FAILURE): None    Evidenced Based Beta Blocker::(REQUIRED for EF% <40%/SYSTOLIC FAILURE) Carvedilol- Coreg  ...................................................................................................................................................    Failed to redirect to the Timeline version of the [employer] SmartLink.      Patient's weights and intake/output reviewed    Daily Weight log at bedside, patient/family participation in use of log: \"yes    Patient's current weight today shows a difference of 1 lbs more than last documented weight.      Intake/Output Summary (Last 24 hours) at 4/7/2024 0642  Last data filed at 4/7/2024  0605  Gross per 24 hour   Intake 1580 ml   Output 2300 ml   Net -720 ml       Education Booklet Provided: yes    Comorbidities Reviewed Yes    Patient has a past medical history of Abnormal ECG, Anemia, Arthritis, Back pain, chronic, Bipolar disorder ([facility]), Bronchitis chronic, CHF (congestive heart failure) ([facility]), Chronic back pain, Chronic neck pain, Clostridium difficile infection, 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[facility]), Coronary artery disease involving native coronary artery of native heart with angina pectoris ([facility]), Depression, Emphysema of lung ([facility]), Fibromyalgia, hyperlipidemia, Hypertension, Kidney stone, Liver disease, Lung disease, MDD (major depressive disorder), Mood disorder ([facility]), Movement disorder, Neck pain, chronic, Opiate misuse, Personality disorder ([facility]), Pneumonia, and Polypharmacy.     >>For CHF and Comorbidity documentation on Education Time and Topics, please see Education Tab      Pt resting in bed at this time on  4 L O2. Pt denies shortness of breath. Pt with pitting lower extremity edema.     Patient and/or Family's stated Goal of Care this Admission: reduce shortness of breath, increase activity tolerance, better understand heart failure and disease management, be more comfortable, and reduce lower extremity edema prior to discharge        :

## 2024-04-07 NOTE — PROGRESS NOTES
Oxygen documentation:    O2 saturation at REST on ROOM AIR = __91____%    If saturation is 89% or above please proceed with steps 2 and 3……….    O2 saturation with AMBULATION of _10____ feet on ROOM AIR = ___88__%  O2 saturation with AMBULATION on ____2___ liter/min = ___93___%    DCP notified: _yes_____

## 2024-04-07 NOTE — PLAN OF CARE
Problem: Pain  Goal: Verbalizes/displays adequate comfort level or baseline comfort level  4/7/2024 0926 by Vega Morris, RN  Outcome: Progressing  Note: Pt will be satisfied with pain control. Pt uses numeric pain rating scale with reassessments after pain med administration. Will continue to monitor progression throughout shift.  Problem: Skin/Tissue Integrity  Goal: Absence of new skin breakdown  Description: 1.  Monitor for areas of redness and/or skin breakdown  2.  Assess vascular access sites hourly  3.  Every 4-6 hours minimum:  Change oxygen saturation probe site  4.  Every 4-6 hours:  If on nasal continuous positive airway pressure, respiratory therapy assess nares and determine need for appliance change or resting period.  4/7/2024 0926 by Vega Morris, RN  Outcome: Progressing  Note: Pt is at risk for skin breakdown. Pt will have skin assessments every shift, encourage safe ambulation, heels elevated off of the bed, and friction and shear prevented when possible. Will continue to monitor for signs of skin breakdown and enforce prevention measures.  Problem: Safety - Adult  Goal: Free from fall injury  4/7/2024 0926 by Vega Morris, RN  Outcome: Progressing  Note: Pt will remain free from falls throughout hospital stay. Fall precautions in place, bed alarm on, bed in lowest position with wheels locked and side rails 2/4 up. Room door open and hourly rounding completed. AVASYS in use. Will continue to monitor throughout shift.

## 2024-04-07 NOTE — PROGRESS NOTES
Patient okay for discharge per Dr. Patrick. Pt d/c'd home with  with all belongings including dentures.  Removed peripheral IV and stopped bleeding.  Catheter intact. Pt tolerated well. No redness noted at site.  Notified [facility] and removed tele box. Reviewed d/c instructions, home meds, and  f/u information utilizing teach-back method.  Meds to beds sent to patient's pharmacy. Patient's  verbalized understanding. Patient taken to main entrance in wheelchair. Patient discharged with home oxygen.

## 2024-04-07 NOTE — DISCHARGE SUMMARY
Discharge Summary    Name:  Najma Fitzpatrick /Age/Sex: 1950  (73 y.o. female)   MRN & CSN:  [id number] & [id number] Admission Date/Time: 2024  8:31 PM   Attending:  Abdiaziz Patrick MD Discharging Physician: Abdiaziz Patrick MD     Hospital Course:   Najma Fitzpatrick is a 73 y.o.  female  who presents with COPD with acute exacerbation (HCC)    HPI  \"73 y.o. female with past medical history of COPD, hypertension, hyperlipidemia who presented to the hospital complaining of shortness of breath which has been going on for several hours and worsening.  Patient was also noted to be confused and unable to provide any history.  Most of the history was obtained from the ED records.  Patient was seen laying on the bed on BiPAP.  Patient denies chest pain, fever or chills. \"       The following problems have been addressed during this hospitalization.    Acute hypercapnic respiratory failure 2/2 Influenza A triggered COPD exacerbation; suspected superimposed bacterial PNA. Confusion seems to be improving as patient is now alert and oriented x 3.    Has been on/off BIPAP  Was found to be sating 90% on room air. Home o2 evaluation ordered.   Repeat CXR possible PNA RLL. Procal negative no leukocytosis; although less likely superimposed bacterial PNA, plan to do short course abx.   - received IV steroids, discharged on PO  - received breathing tx  - received azithro, rocephin; discharged on ceftin 3 more days, azithro 1 more day  - continue symptomatic management    - completed Tamiflu  - received acapalla, IS chest physiotherapy   - added Incruse Ellipta on discharge until she sees pulm; pt needs PFT but hasn't gotten one yet    Acute metabolic encephalopathy. Came confused but improved ; had agitation and hallucinations all resolved after resuming home meds. Was restratined now off restraints.   - pt to continue home meds; Seroquel Depakote  - delirium precautions     Chronic diastolic CHF. Last ECHO 2023 EF 55-60%, grade I  tablet by mouth every morning (before breakfast)     QUEtiapine 25 MG tablet  Commonly known as: SEROQUEL            STOP taking these medications      butalbital-acetaminophen-caffeine -40 MG per tablet  Commonly known as: FIORICET, ESGIC     dextromethorphan-guaiFENesin  MG per extended release tablet  Commonly known as: MUCINEX DM  Replaced by: guaiFENesin-dextromethorphan 100-10 MG/5ML syrup     fluticasone-umeclidin-vilant 100-62.5-25 MCG/ACT Aepb inhaler  Commonly known as: Trelegy Ellipta     primidone 50 MG tablet  Commonly known as: MYSOLINE            ASK your doctor about these medications      HYDROcodone-acetaminophen 5-325 MG per tablet  Commonly known as: NORCO     montelukast 10 MG tablet  Commonly known as: SINGULAIR               Where to Get Your Medications        These medications were sent to [facility] [place] [address] [place] - [address] [phone] -  [phone]  [address] [place] [address]      Phone: [phone]   aspirin 81 MG EC tablet  azithromycin 500 MG tablet  cefUROXime 500 MG tablet  guaiFENesin 600 MG extended release tablet  guaiFENesin-dextromethorphan 100-10 MG/5ML syrup  Incruse Ellipta 62.5 MCG/ACT inhaler  irbesartan 150 MG tablet  predniSONE 20 MG tablet       Information about where to get these medications is not yet available    Ask your nurse or doctor about these medications  furosemide 20 MG tablet         Objective Findings at Discharge:       BMP/CBC  Recent Labs     04/05/24  0539 04/06/24  1007 04/07/24  0516   * 127* 132*   K 3.8 4.4 4.4   CL 86* 83* 89*   CO2 36* 36* 37*   BUN 22* 16 12   CREATININE <0.5* <0.5* <0.5*   WBC 10.0 8.8  --    HCT 34.6* 33.4*  --    * 106*  --        IMAGING:      Additional Information: Patient seen and examined day of discharge. For more information regarding patient's care please contact [facility] records [phone]    Discharge Time of 35

## 2024-04-07 NOTE — PROGRESS NOTES
04/06/24 2219   NIV Type   NIV Started/Stopped On   Equipment Type v60   Mode Bilevel   Mask Type Full face mask   Mask Size Medium   Assessment   Respirations 23   SpO2 95 %   Comfort Level Good   Using Accessory Muscles No   Mask Compliance Good   Skin Assessment Clean, dry, & intact   Settings/Measurements   PIP Observed 12 cm H20   IPAP 12 cmH20   CPAP/EPAP 8 cmH2O   Vt (Measured) 335 mL   Rate Ordered 14   FiO2  40 %   I Time/ I Time % 1 s   Minute Volume (L/min) 8.2 Liters   Mask Leak (lpm) 8 lpm   Patient's Home Machine No   Alarm Settings   Alarms On Y   Low Pressure (cmH2O) 6 cmH2O   High Pressure (cmH2O) 30 cmH2O   RR Low (bpm) 6   RR High (bpm) 40 br/min

## 2024-04-08 PROCEDURE — 1800000000 HC LEAVE OF ABSENCE

## 2024-04-09 ENCOUNTER — APPOINTMENT (OUTPATIENT)
Dept: GENERAL RADIOLOGY | Age: 74
DRG: 865 | End: 2024-04-09
Payer: COMMERCIAL

## 2024-04-09 ENCOUNTER — HOSPITAL ENCOUNTER (INPATIENT)
Age: 74
LOS: 3 days | Discharge: HOME | DRG: 865 | End: 2024-04-12
Attending: STUDENT IN AN ORGANIZED HEALTH CARE EDUCATION/TRAINING PROGRAM | Admitting: INTERNAL MEDICINE
Payer: COMMERCIAL

## 2024-04-09 DIAGNOSIS — J44.1 COPD EXACERBATION (HCC): ICD-10-CM

## 2024-04-09 DIAGNOSIS — J18.9 PNEUMONIA OF BOTH LOWER LOBES DUE TO INFECTIOUS ORGANISM: Primary | ICD-10-CM

## 2024-04-09 LAB
ALBUMIN SERPL-MCNC: 3.2 G/DL (ref 3.4–5)
ALBUMIN/GLOB SERPL: 1 {RATIO} (ref 1.1–2.2)
ALP SERPL-CCNC: 53 U/L (ref 40–129)
ALT SERPL-CCNC: 9 U/L (ref 10–40)
ANION GAP SERPL CALCULATED.3IONS-SCNC: 9 MMOL/L (ref 3–16)
AST SERPL-CCNC: 8 U/L (ref 15–37)
BASE EXCESS BLDV CALC-SCNC: 9.7 MMOL/L (ref -3–3)
BASOPHILS # BLD: 0 K/UL (ref 0–0.2)
BASOPHILS NFR BLD: 0 %
BILIRUB SERPL-MCNC: 0.4 MG/DL (ref 0–1)
BUN SERPL-MCNC: 7 MG/DL (ref 7–20)
CALCIUM SERPL-MCNC: 9 MG/DL (ref 8.3–10.6)
CHLORIDE SERPL-SCNC: 90 MMOL/L (ref 99–110)
CO2 BLDV-SCNC: 37 MMOL/L
CO2 SERPL-SCNC: 36 MMOL/L (ref 21–32)
COHGB MFR BLDV: 2.4 % (ref 0–1.5)
CREAT SERPL-MCNC: <0.5 MG/DL (ref 0.6–1.2)
DEPRECATED RDW RBC AUTO: 17.3 % (ref 12.4–15.4)
EKG ATRIAL RATE: 82 BPM
EKG DIAGNOSIS: NORMAL
EKG P AXIS: 72 DEGREES
EKG P-R INTERVAL: 140 MS
EKG Q-T INTERVAL: 376 MS
EKG QRS DURATION: 96 MS
EKG QTC CALCULATION (BAZETT): 439 MS
EKG R AXIS: 0 DEGREES
EKG T AXIS: 47 DEGREES
EKG VENTRICULAR RATE: 82 BPM
EOSINOPHIL # BLD: 0 K/UL (ref 0–0.6)
EOSINOPHIL NFR BLD: 0.3 %
FLUAV RNA RESP QL NAA+PROBE: DETECTED
FLUBV RNA RESP QL NAA+PROBE: NOT DETECTED
GFR SERPLBLD CREATININE-BSD FMLA CKD-EPI: >90 ML/MIN/{1.73_M2}
GLUCOSE SERPL-MCNC: 116 MG/DL (ref 70–99)
HCO3 BLDV-SCNC: 35.3 MMOL/L (ref 23–29)
HCT VFR BLD AUTO: 36.4 % (ref 36–48)
HGB BLD-MCNC: 11.2 G/DL (ref 12–16)
LACTATE BLDV-SCNC: 0.8 MMOL/L (ref 0.4–2)
LYMPHOCYTES # BLD: 0.9 K/UL (ref 1–5.1)
LYMPHOCYTES NFR BLD: 7 %
MAGNESIUM SERPL-MCNC: 1.5 MG/DL (ref 1.8–2.4)
MCH RBC QN AUTO: 26.6 PG (ref 26–34)
MCHC RBC AUTO-ENTMCNC: 30.8 G/DL (ref 31–36)
MCV RBC AUTO: 86.4 FL (ref 80–100)
METHGB MFR BLDV: 0.2 %
MONOCYTES # BLD: 1.1 K/UL (ref 0–1.3)
MONOCYTES NFR BLD: 8.1 %
NEUTROPHILS # BLD: 11.1 K/UL (ref 1.7–7.7)
NEUTROPHILS NFR BLD: 84.6 %
NT-PROBNP SERPL-MCNC: 981 PG/ML (ref 0–124)
O2 THERAPY: ABNORMAL
PCO2 BLDV: 51.9 MMHG (ref 40–50)
PH BLDV: 7.45 [PH] (ref 7.35–7.45)
PLATELET # BLD AUTO: 134 K/UL (ref 135–450)
PMV BLD AUTO: 7 FL (ref 5–10.5)
PO2 BLDV: 76.8 MMHG (ref 25–40)
POTASSIUM SERPL-SCNC: 3.2 MMOL/L (ref 3.5–5.1)
PROCALCITONIN SERPL IA-MCNC: 0.05 NG/ML (ref 0–0.15)
PROT SERPL-MCNC: 6.4 G/DL (ref 6.4–8.2)
RBC # BLD AUTO: 4.22 M/UL (ref 4–5.2)
REASON FOR REJECTION: NORMAL
REJECTED TEST: NORMAL
SAO2 % BLDV: 95 %
SARS-COV-2 RNA RESP QL NAA+PROBE: NOT DETECTED
SODIUM SERPL-SCNC: 135 MMOL/L (ref 136–145)
TROPONIN, HIGH SENSITIVITY: 21 NG/L (ref 0–14)
TROPONIN, HIGH SENSITIVITY: 22 NG/L (ref 0–14)
TROPONIN, HIGH SENSITIVITY: 24 NG/L (ref 0–14)
WBC # BLD AUTO: 13.2 K/UL (ref 4–11)

## 2024-04-09 PROCEDURE — 82803 BLOOD GASES ANY COMBINATION: CPT

## 2024-04-09 PROCEDURE — 83735 ASSAY OF MAGNESIUM: CPT

## 2024-04-09 PROCEDURE — 6360000002 HC RX W HCPCS: Performed by: STUDENT IN AN ORGANIZED HEALTH CARE EDUCATION/TRAINING PROGRAM

## 2024-04-09 PROCEDURE — 85025 COMPLETE CBC W/AUTO DIFF WBC: CPT

## 2024-04-09 PROCEDURE — 6370000000 HC RX 637 (ALT 250 FOR IP)

## 2024-04-09 PROCEDURE — 2580000003 HC RX 258: Performed by: STUDENT IN AN ORGANIZED HEALTH CARE EDUCATION/TRAINING PROGRAM

## 2024-04-09 PROCEDURE — 83605 ASSAY OF LACTIC ACID: CPT

## 2024-04-09 PROCEDURE — 99285 EMERGENCY DEPT VISIT HI MDM: CPT

## 2024-04-09 PROCEDURE — 2060000000 HC ICU INTERMEDIATE R&B

## 2024-04-09 PROCEDURE — 96365 THER/PROPH/DIAG IV INF INIT: CPT

## 2024-04-09 PROCEDURE — 2700000000 HC OXYGEN THERAPY PER DAY

## 2024-04-09 PROCEDURE — 71045 X-RAY EXAM CHEST 1 VIEW: CPT

## 2024-04-09 PROCEDURE — 83880 ASSAY OF NATRIURETIC PEPTIDE: CPT

## 2024-04-09 PROCEDURE — 87449 NOS EACH ORGANISM AG IA: CPT

## 2024-04-09 PROCEDURE — 96366 THER/PROPH/DIAG IV INF ADDON: CPT

## 2024-04-09 PROCEDURE — 96374 THER/PROPH/DIAG INJ IV PUSH: CPT

## 2024-04-09 PROCEDURE — 94761 N-INVAS EAR/PLS OXIMETRY MLT: CPT

## 2024-04-09 PROCEDURE — 87636 SARSCOV2 & INF A&B AMP PRB: CPT

## 2024-04-09 PROCEDURE — 6370000000 HC RX 637 (ALT 250 FOR IP): Performed by: STUDENT IN AN ORGANIZED HEALTH CARE EDUCATION/TRAINING PROGRAM

## 2024-04-09 PROCEDURE — 87081 CULTURE SCREEN ONLY: CPT

## 2024-04-09 PROCEDURE — 94640 AIRWAY INHALATION TREATMENT: CPT

## 2024-04-09 PROCEDURE — 84145 PROCALCITONIN (PCT): CPT

## 2024-04-09 PROCEDURE — 6370000000 HC RX 637 (ALT 250 FOR IP): Performed by: INTERNAL MEDICINE

## 2024-04-09 PROCEDURE — 80053 COMPREHEN METABOLIC PANEL: CPT

## 2024-04-09 PROCEDURE — 36415 COLL VENOUS BLD VENIPUNCTURE: CPT

## 2024-04-09 PROCEDURE — 2580000003 HC RX 258

## 2024-04-09 PROCEDURE — 93010 ELECTROCARDIOGRAM REPORT: CPT | Performed by: INTERNAL MEDICINE

## 2024-04-09 PROCEDURE — 6360000002 HC RX W HCPCS

## 2024-04-09 PROCEDURE — 93005 ELECTROCARDIOGRAM TRACING: CPT | Performed by: STUDENT IN AN ORGANIZED HEALTH CARE EDUCATION/TRAINING PROGRAM

## 2024-04-09 PROCEDURE — 84484 ASSAY OF TROPONIN QUANT: CPT

## 2024-04-09 RX ORDER — DIVALPROEX SODIUM 250 MG/1
500 TABLET, EXTENDED RELEASE ORAL
Status: DISCONTINUED | OUTPATIENT
Start: 2024-04-09 | End: 2024-04-12 | Stop reason: HOSPADM

## 2024-04-09 RX ORDER — ACETAMINOPHEN 650 MG/1
650 SUPPOSITORY RECTAL EVERY 6 HOURS PRN
Status: DISCONTINUED | OUTPATIENT
Start: 2024-04-09 | End: 2024-04-12 | Stop reason: HOSPADM

## 2024-04-09 RX ORDER — IPRATROPIUM BROMIDE AND ALBUTEROL SULFATE 2.5; .5 MG/3ML; MG/3ML
1 SOLUTION RESPIRATORY (INHALATION) ONCE
Status: COMPLETED | OUTPATIENT
Start: 2024-04-09 | End: 2024-04-09

## 2024-04-09 RX ORDER — DIVALPROEX SODIUM 250 MG/1
500 TABLET, EXTENDED RELEASE ORAL NIGHTLY
Status: DISCONTINUED | OUTPATIENT
Start: 2024-04-09 | End: 2024-04-09 | Stop reason: SDUPTHER

## 2024-04-09 RX ORDER — DIVALPROEX SODIUM 250 MG/1
250 TABLET, EXTENDED RELEASE ORAL DAILY
Status: DISCONTINUED | OUTPATIENT
Start: 2024-04-09 | End: 2024-04-09

## 2024-04-09 RX ORDER — PANTOPRAZOLE SODIUM 40 MG/1
40 TABLET, DELAYED RELEASE ORAL
Status: DISCONTINUED | OUTPATIENT
Start: 2024-04-10 | End: 2024-04-12 | Stop reason: HOSPADM

## 2024-04-09 RX ORDER — QUETIAPINE FUMARATE 100 MG/1
300 TABLET, FILM COATED ORAL NIGHTLY
Status: DISCONTINUED | OUTPATIENT
Start: 2024-04-09 | End: 2024-04-12 | Stop reason: HOSPADM

## 2024-04-09 RX ORDER — ACETAMINOPHEN 325 MG/1
650 TABLET ORAL EVERY 6 HOURS PRN
Status: DISCONTINUED | OUTPATIENT
Start: 2024-04-09 | End: 2024-04-12 | Stop reason: HOSPADM

## 2024-04-09 RX ORDER — MAGNESIUM SULFATE IN WATER 40 MG/ML
2000 INJECTION, SOLUTION INTRAVENOUS ONCE
Status: COMPLETED | OUTPATIENT
Start: 2024-04-09 | End: 2024-04-09

## 2024-04-09 RX ORDER — ONDANSETRON 2 MG/ML
4 INJECTION INTRAMUSCULAR; INTRAVENOUS EVERY 6 HOURS PRN
Status: DISCONTINUED | OUTPATIENT
Start: 2024-04-09 | End: 2024-04-12 | Stop reason: HOSPADM

## 2024-04-09 RX ORDER — ONDANSETRON 4 MG/1
4 TABLET, ORALLY DISINTEGRATING ORAL EVERY 8 HOURS PRN
Status: DISCONTINUED | OUTPATIENT
Start: 2024-04-09 | End: 2024-04-12 | Stop reason: HOSPADM

## 2024-04-09 RX ORDER — LOSARTAN POTASSIUM 100 MG/1
100 TABLET ORAL DAILY
Status: DISCONTINUED | OUTPATIENT
Start: 2024-04-09 | End: 2024-04-12 | Stop reason: HOSPADM

## 2024-04-09 RX ORDER — GUAIFENESIN 600 MG/1
600 TABLET, EXTENDED RELEASE ORAL 2 TIMES DAILY
Status: DISCONTINUED | OUTPATIENT
Start: 2024-04-09 | End: 2024-04-12 | Stop reason: HOSPADM

## 2024-04-09 RX ORDER — LEVOFLOXACIN 5 MG/ML
750 INJECTION, SOLUTION INTRAVENOUS ONCE
Status: COMPLETED | OUTPATIENT
Start: 2024-04-09 | End: 2024-04-09

## 2024-04-09 RX ORDER — SODIUM CHLORIDE 0.9 % (FLUSH) 0.9 %
5-40 SYRINGE (ML) INJECTION EVERY 12 HOURS SCHEDULED
Status: DISCONTINUED | OUTPATIENT
Start: 2024-04-09 | End: 2024-04-12 | Stop reason: HOSPADM

## 2024-04-09 RX ORDER — ATORVASTATIN CALCIUM 40 MG/1
40 TABLET, FILM COATED ORAL DAILY
Status: DISCONTINUED | OUTPATIENT
Start: 2024-04-09 | End: 2024-04-12 | Stop reason: HOSPADM

## 2024-04-09 RX ORDER — QUETIAPINE FUMARATE 25 MG/1
25 TABLET, FILM COATED ORAL NIGHTLY
Status: DISCONTINUED | OUTPATIENT
Start: 2024-04-09 | End: 2024-04-09

## 2024-04-09 RX ORDER — DULOXETIN HYDROCHLORIDE 60 MG/1
60 CAPSULE, DELAYED RELEASE ORAL DAILY
Status: DISCONTINUED | OUTPATIENT
Start: 2024-04-10 | End: 2024-04-12 | Stop reason: HOSPADM

## 2024-04-09 RX ORDER — ENOXAPARIN SODIUM 100 MG/ML
40 INJECTION SUBCUTANEOUS DAILY
Status: DISCONTINUED | OUTPATIENT
Start: 2024-04-09 | End: 2024-04-12 | Stop reason: HOSPADM

## 2024-04-09 RX ORDER — POLYETHYLENE GLYCOL 3350 17 G/17G
17 POWDER, FOR SOLUTION ORAL DAILY PRN
Status: DISCONTINUED | OUTPATIENT
Start: 2024-04-09 | End: 2024-04-12 | Stop reason: HOSPADM

## 2024-04-09 RX ORDER — DIVALPROEX SODIUM 250 MG/1
250 TABLET, EXTENDED RELEASE ORAL EVERY MORNING
Status: DISCONTINUED | OUTPATIENT
Start: 2024-04-10 | End: 2024-04-12 | Stop reason: HOSPADM

## 2024-04-09 RX ORDER — ASPIRIN 81 MG/1
81 TABLET ORAL DAILY
Status: DISCONTINUED | OUTPATIENT
Start: 2024-04-09 | End: 2024-04-12 | Stop reason: HOSPADM

## 2024-04-09 RX ORDER — SODIUM CHLORIDE 9 MG/ML
INJECTION, SOLUTION INTRAVENOUS PRN
Status: DISCONTINUED | OUTPATIENT
Start: 2024-04-09 | End: 2024-04-12 | Stop reason: HOSPADM

## 2024-04-09 RX ORDER — LEVOFLOXACIN 5 MG/ML
750 INJECTION, SOLUTION INTRAVENOUS EVERY 24 HOURS
Status: DISCONTINUED | OUTPATIENT
Start: 2024-04-10 | End: 2024-04-12 | Stop reason: HOSPADM

## 2024-04-09 RX ORDER — SODIUM CHLORIDE 0.9 % (FLUSH) 0.9 %
5-40 SYRINGE (ML) INJECTION PRN
Status: DISCONTINUED | OUTPATIENT
Start: 2024-04-09 | End: 2024-04-12 | Stop reason: HOSPADM

## 2024-04-09 RX ORDER — BUTALBITAL, ACETAMINOPHEN AND CAFFEINE 50; 325; 40 MG/1; MG/1; MG/1
1 TABLET ORAL ONCE
Status: COMPLETED | OUTPATIENT
Start: 2024-04-09 | End: 2024-04-09

## 2024-04-09 RX ORDER — POTASSIUM CHLORIDE 20 MEQ/1
40 TABLET, EXTENDED RELEASE ORAL ONCE
Status: COMPLETED | OUTPATIENT
Start: 2024-04-09 | End: 2024-04-09

## 2024-04-09 RX ORDER — PREDNISONE 20 MG/1
40 TABLET ORAL DAILY
Status: DISCONTINUED | OUTPATIENT
Start: 2024-04-10 | End: 2024-04-12 | Stop reason: HOSPADM

## 2024-04-09 RX ORDER — FUROSEMIDE 20 MG/1
20 TABLET ORAL
Status: DISCONTINUED | OUTPATIENT
Start: 2024-04-10 | End: 2024-04-12 | Stop reason: HOSPADM

## 2024-04-09 RX ORDER — IPRATROPIUM BROMIDE AND ALBUTEROL SULFATE 2.5; .5 MG/3ML; MG/3ML
1 SOLUTION RESPIRATORY (INHALATION)
Status: DISCONTINUED | OUTPATIENT
Start: 2024-04-09 | End: 2024-04-09

## 2024-04-09 RX ORDER — CARVEDILOL 25 MG/1
25 TABLET ORAL 2 TIMES DAILY WITH MEALS
Status: DISCONTINUED | OUTPATIENT
Start: 2024-04-09 | End: 2024-04-12 | Stop reason: HOSPADM

## 2024-04-09 RX ADMIN — Medication 10 ML: at 20:17

## 2024-04-09 RX ADMIN — QUETIAPINE FUMARATE 300 MG: 100 TABLET ORAL at 20:15

## 2024-04-09 RX ADMIN — GUAIFENESIN 600 MG: 600 TABLET, EXTENDED RELEASE ORAL at 20:16

## 2024-04-09 RX ADMIN — IPRATROPIUM BROMIDE AND ALBUTEROL SULFATE 1 DOSE: 2.5; .5 SOLUTION RESPIRATORY (INHALATION) at 11:36

## 2024-04-09 RX ADMIN — ACETAMINOPHEN 650 MG: 325 TABLET ORAL at 14:27

## 2024-04-09 RX ADMIN — BUTALBITAL, ACETAMINOPHEN AND CAFFEINE 1 TABLET: 325; 50; 40 TABLET ORAL at 14:47

## 2024-04-09 RX ADMIN — CEFTRIAXONE SODIUM 1000 MG: 1 INJECTION, POWDER, FOR SOLUTION INTRAMUSCULAR; INTRAVENOUS at 09:14

## 2024-04-09 RX ADMIN — ASPIRIN 81 MG: 81 TABLET, COATED ORAL at 20:15

## 2024-04-09 RX ADMIN — METHYLPREDNISOLONE SODIUM SUCCINATE 40 MG: 40 INJECTION, POWDER, LYOPHILIZED, FOR SOLUTION INTRAMUSCULAR; INTRAVENOUS at 18:47

## 2024-04-09 RX ADMIN — WATER 125 MG: 1 INJECTION INTRAMUSCULAR; INTRAVENOUS; SUBCUTANEOUS at 07:30

## 2024-04-09 RX ADMIN — ENOXAPARIN SODIUM 40 MG: 100 INJECTION SUBCUTANEOUS at 18:46

## 2024-04-09 RX ADMIN — POTASSIUM CHLORIDE 40 MEQ: 1500 TABLET, EXTENDED RELEASE ORAL at 07:30

## 2024-04-09 RX ADMIN — MAGNESIUM SULFATE HEPTAHYDRATE 2000 MG: 40 INJECTION, SOLUTION INTRAVENOUS at 07:31

## 2024-04-09 RX ADMIN — ATORVASTATIN CALCIUM 40 MG: 40 TABLET, FILM COATED ORAL at 20:15

## 2024-04-09 RX ADMIN — LOSARTAN POTASSIUM 100 MG: 100 TABLET, FILM COATED ORAL at 20:16

## 2024-04-09 RX ADMIN — DIVALPROEX SODIUM 500 MG: 250 TABLET, EXTENDED RELEASE ORAL at 20:15

## 2024-04-09 RX ADMIN — CARVEDILOL 25 MG: 25 TABLET, FILM COATED ORAL at 20:16

## 2024-04-09 RX ADMIN — LEVOFLOXACIN 750 MG: 5 INJECTION, SOLUTION INTRAVENOUS at 09:50

## 2024-04-09 ASSESSMENT — PAIN DESCRIPTION - ORIENTATION: ORIENTATION: MID

## 2024-04-09 ASSESSMENT — PAIN - FUNCTIONAL ASSESSMENT
PAIN_FUNCTIONAL_ASSESSMENT: NONE - DENIES PAIN
PAIN_FUNCTIONAL_ASSESSMENT: NONE - DENIES PAIN
PAIN_FUNCTIONAL_ASSESSMENT: 0-10
PAIN_FUNCTIONAL_ASSESSMENT: NONE - DENIES PAIN
PAIN_FUNCTIONAL_ASSESSMENT: NONE - DENIES PAIN

## 2024-04-09 ASSESSMENT — PAIN DESCRIPTION - DESCRIPTORS: DESCRIPTORS: ACHING

## 2024-04-09 ASSESSMENT — PAIN SCALES - GENERAL
PAINLEVEL_OUTOF10: 8
PAINLEVEL_OUTOF10: 9
PAINLEVEL_OUTOF10: 8
PAINLEVEL_OUTOF10: 7
PAINLEVEL_OUTOF10: 0

## 2024-04-09 ASSESSMENT — PAIN DESCRIPTION - LOCATION
LOCATION: BACK
LOCATION: BACK

## 2024-04-09 NOTE — CARE COORDINATION
Case Management Assessment  Initial Evaluation    Date/Time of Evaluation: 4/9/2024 9:15 AM  Assessment Completed by: ISRA Campos    If patient is discharged prior to next notation, then this note serves as note for discharge by case management.    Patient Name: Najma Fitzpatrick                   YOB: 1950  Diagnosis: No admission diagnoses are documented for this encounter.                   Date / Time: 4/9/2024  5:56 AM    Patient Admission Status: Emergency   Readmission Risk (Low < 19, Mod (19-27), High > 27): Readmission Risk Score: 13.8    Current PCP: Geena Sotomayor APRN - CNP  PCP verified by CM? (P) Yes    Chart Reviewed: Yes      History Provided by: (P) Patient  Patient Orientation: (P) Alert and Oriented    Patient Cognition: (P) Alert    Hospitalization in the last 30 days (Readmission):  Yes    If yes, Readmission Assessment in  Navigator will be completed.    Advance Directives:      Code Status: Prior   Patient's Primary Decision Maker is: (P) Legal Next of Kin    Primary Decision Maker: Brent Fitzpatrick - Spouse - [phone]    Secondary Decision Maker: Margarita Fitzpatrick - Child - [phone]    Secondary Decision Maker: EsquivelZoee  Child - [phone]    Discharge Planning:    Patient lives with: (P) Spouse/Significant Other, Children Type of Home: (P) House  Primary Care Giver: (P) Self  Patient Support Systems include: (P) Spouse/Significant Other, Children   Current Financial resources: (P) None  Current community resources: (P) ECF/Home Care  Current services prior to admission: (P) Durable Medical Equipment, Home Care            Current DME: (P) Oxygen Therapy (Comment) ([facility])            Type of Home Care services:  (P) PT, OT, Nursing Services    ADLS  Prior functional level: (P) Assistance with the following:, Cooking, Housework, Shopping  Current functional level: (P) Assistance with the following:, Bathing, Dressing, Toileting, Cooking, Housework,  Shopping, Mobility    PT AM-PAC:   /24  OT AM-PAC:   /24    Family can provide assistance at DC: (P) Yes  Would you like Case Management to discuss the discharge plan with any other family members/significant others, and if so, who? (P) Yes  Plans to Return to Present Housing: (P) Unknown at present  Other Identified Issues/Barriers to RETURNING to current housing: weakness  Potential Assistance needed at discharge: (P) Skilled Nursing Facility            Potential DME:    Patient expects to discharge to: (P) House  Plan for transportation at discharge: (P) Family    Financial    Payor: MEDIGOLD / Plan: MEDIGOLD / Product Type: *No Product type* /     Does insurance require precert for SNF: Yes    Potential assistance Purchasing Medications: (P) No  Meds-to-Beds request:        [facility] [place] [id number] [place] [address], [place] - [address] [phone] - F [phone]  [address] [facility] [address]  [facility] [address]  Phone: [phone] Fax: [phone]      Notes:    Factors facilitating achievement of predicted outcomes: Family support, Cooperative, and Pleasant    Barriers to discharge: Decreased endurance, Upper extremity weakness, Lower extremity weakness, and Medical complications    Additional Case Management Notes: Referred to patient for d/c planning.  Spoke to patient.  Patient is a 73 year old female admitted for pneumonia.  Patient lives at home with  and daughter.  Patient reports increased weakness.  [facility]  Patient has cane  and walker.  Patient may benefit from SNF on d/c.  CM to follow.     The Plan for Transition of Care is related to the following treatment goals of No admission diagnoses are documented for this encounter.    IF APPLICABLE: The Patient and/or patient representative Najma and her family were provided with a choice of provider and agrees with the discharge plan. Freedom of choice list with basic dialogue that supports the

## 2024-04-09 NOTE — ADT AUTH CERT
[facility]  [address] [facility] [address]  NPI: [id number]  TAX ID: [id number]    Auth number: N/A  [id number] - (Medicare Managed)    Return Contact Information:  Georgie Cordoba  PH: [phone]  FAX: [phone]     Patient Demographics    Name Patient ID SSN Gender Identity Birth Date  Najma Fitzpatrick [id number]  Female [birth year] (73 yrs)    Address Phone Email Employer   [address] [place] [address] [phone] (H)  [phone] (M) [email or web address]     County Race Occupation Emp Status   [place] (non-) -- Disabled     Reg Status PCP Date Last Verified Next Review Date   Verified Geena Sotomayor APRN - CNP  [phone]     Admission Date Discharge Date Admitting Provider    24 Elo Degroot MD      Marital Status Muslim      Non-Church       Emergency Contact 1 Emergency Contact 2 Emergency Contact 3  Brent Fitzpatrick (2)  [address] [place], [place] [id number]  [facility]  [phone] (H)  [phone] (M) Margarita Fitzpatrick (C)  CLARA FREGOSO  [phone]  [address]  [phone] (H) Maricel Fregoso (C)  [phone] (H)  [phone] (M)    Physician Progress Notes  Notes from 24 through 24  Progress Notes by Abdiaziz Patrick MD at 2024  1:25 PM    Author: Abdiaziz Patrick MD Service: Internal Medicine Author Type: Physician  Filed: 2024  3:50 PM Date of Service: 2024  1:25 PM Status: Addendum  : Abdiaziz Patrick MD (Physician)  Related Notes: Original Note by Abdiaziz Patrick MD (Physician) filed at 2024  1:44 PM  Expand All Collapse All       V2.0  [facility] Hospitalist Progress Note                              Name:  Najma Fitzpatrick /Age/Sex: 1950  (73 y.o. female)  MRN & CSN:  [id number] & [id number] Encounter Date/Time: 2024 2:21 PM EDT   Location:  [address] [place], [place] [address]- PCP: Geena Sotomayor,  pneumophila.  Normal Range: Presumptive Negative     Blood gas, venous    Collection Time: 04/06/24  5:05 AM  Result Value Ref Range    pH, Storm 7.458 (H) 7.350 - 7.450    pCO2, Storm 53.1 (H) 40.0 - 50.0 mmHg    pO2, Storm 42.8 (H) 25.0 - 40.0 mmHg    HCO3, Venous 36.7 (H) 23.0 - 29.0 mmol/L    Base Excess, Storm 11.1 (H) -3.0 - 3.0 mmol/L    O2 Sat, Storm 81 Not Established %    Carboxyhemoglobin 3.4 (H) 0.0 - 1.5 %    MetHgb, Storm 0.2 <1.5 %    TC02 (Calc), Storm 38 Not Established mmol/L    O2 Therapy Unknown    CBC with Auto Differential    Collection Time: 04/06/24 10:07 AM  Result Value Ref Range    WBC 8.8 4.0 - 11.0 K/uL    RBC 4.02 4.00 - 5.20 M/uL    Hemoglobin 10.8 (L) 12.0 - 16.0 g/dL    Hematocrit 33.4 (L) 36.0 - 48.0 %    MCV 83.2 80.0 - 100.0 fL    MCH 26.9 26.0 - 34.0 pg    MCHC 32.4 31.0 - 36.0 g/dL    RDW 16.6 (H) 12.4 - 15.4 %    Platelets 106 (L) 135 - 450 K/uL    MPV 7.0 5.0 - 10.5 fL    Neutrophils % 93.9 %    Lymphocytes % 3.7 %    Monocytes % 2.1 %    Eosinophils % 0.0 %    Basophils % 0.3 %    Neutrophils Absolute 8.3 (H) 1.7 - 7.7 K/uL    Lymphocytes Absolute 0.3 (L) 1.0 - 5.1 K/uL    Monocytes Absolute 0.2 0.0 - 1.3 K/uL    Eosinophils Absolute 0.0 0.0 - 0.6 K/uL    Basophils Absolute 0.0 0.0 - 0.2 K/uL  Basic Metabolic Panel w/ Reflex to MG    Collection Time: 04/06/24 10:07 AM  Result Value Ref Range    Sodium 127 (L) 136 - 145 mmol/L    Potassium reflex Magnesium 4.4 3.5 - 5.1 mmol/L    Chloride 83 (L) 99 - 110 mmol/L    CO2 36 (H) 21 - 32 mmol/L    Anion Gap 8 3 - 16    Glucose 201 (H) 70 - 99 mg/dL    BUN 16 7 - 20 mg/dL    Creatinine <0.5 (L) 0.6 - 1.2 mg/dL    Est, Glom Filt Rate >90 >60    Calcium 8.5 8.3 - 10.6 mg/dL            Imaging/Diagnostics Last 24 Hours  XR CHEST PORTABLE     Result Date: 4/2/2024  EXAMINATION: ONE XRAY VIEW OF THE CHEST 4/1/2024 8:47 pm COMPARISON: July 31, 2023 HISTORY: ORDERING SYSTEM PROVIDED HISTORY: cough/sob TECHNOLOGIST PROVIDED HISTORY: Reason for  0806(a)  04/07/24 0806(r) 10 mL   IntraVENous      Vega Morris  230 Held 04/05/24 0830(s)  04/05/24 0857(a)  04/05/24 0857(r) 0 puff   Inhalation     Reason: Medication not available Shania Naranjo  231 Given 04/06/24 0800(s)  04/06/24 0812(a)  04/06/24 0813(r) 2 puff   Inhalation      Jennifer Celaya  232 Given 04/07/24 0800(s)  04/07/24 0809(a)  04/07/24 0809(r) 2 puff   Inhalation      Vinnie Loo  233 Held 04/02/24 0100(s)  04/02/24 0402(a)  04/02/24 0402(r) 0 mg   IntraMUSCular     pt status changed  Reason: Other Cheikh Encarnacion    (s)=scheduled time  (a)=action time  (r)=recorded time

## 2024-04-09 NOTE — H&P
Hospital Medicine History & Physical      Date of Admission: 4/9/2024    Date of Service:  Pt seen/examined on 4/9/24     [x]Admitted to Inpatient with expected LOS greater than two midnights due to medical therapy.  []Placed in Observation status.    Chief Admission Complaint:  Shortness of breath      Presenting Admission History:      73 y.o. female who presented to [facility] with shortness of breath.  Patient notes shortness of breath started about 22 weeks ago.  She was previously hospitalized from April 1st to April 7th due to concerns of acute hypoxic respiratory failure secondary to influenza and possible superimposed pneumonia with COPD exacerbation.  She was just discharged on 7th April.  Since discharge she notes her symptoms have steadily worsened over the last 2 days.  She feels more short of breath than usual.  She is on 2 to 4 L oxygen at home, she is unsure the correct amount.  She also reports a productive cough with yellow-tinged sputum. Notes she has been more fatigued and weak as well as she has barely been able to move around at home.  She lives at home with her  and daughter.  She also affirms on and off chest pains in her midsternal region, describes it as a dull sensation.  States it similar show when she was previously admitted.  Denies that it is occurring at this time but has been happening on and off over the past few days.  She is a smoker however has not smoked over the past 2 weeks due to recent hospitalization.  Denies any alcohol use.    In the emergency department workup done for her acute hypoxic respiratory failure.  Lab work showed leukocytosis of 13.2, slight anemia at 11.2, hypokalemia at 3.2, sodium 135, hypomagnesemia at 1.5, Pro-Kyler was within normal limits at 0.05, troponin slightly elevated at 24 and repeat at 22, kidney function okay, liver enzymes within normal limits, platelets 134.  She was found to be influenza A positive.  VBG obtained showing pH  evidence of an acute infarct.  There is no evidence of hydrocephalus. Ventricles are prominent.  No midline shift. ORBITS: The visualized portion of the orbits demonstrate no acute abnormality.  Lenses are postsurgical. SINUSES: Thickening in the sphenoid sinuses and the ethmoid sinuses.  Mild thickening in the maxillary sinuses and the left frontal sinus.  Mastoid air cells are clear but the right is under aerated. SOFT TISSUES/SKULL:  No acute abnormality of the visualized skull or soft tissues.     No acute intracranial abnormality.       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    Coronary artery disease involving native coronary artery of native heart with angina pectoris ([facility]) 3/8/2016    Depression     Emphysema of lung ([facility])     Fibromyalgia     hyperlipidemia 8/11/2011    Hypertension     Kidney stone     Liver disease     Lung disease     MDD (major depressive disorder) 4/4/2012    Mood disorder (HCC) 3/13/2013    Movement disorder     Neck pain, chronic 3/13/2013    Opiate misuse     Personality disorder ([facility])     Pneumonia     Polypharmacy 2/16/2013       Past Surgical History:        Procedure Laterality Date    COLONOSCOPY  1/19/12    DIAGNOSTIC CARDIAC CATH LAB PROCEDURE  Feb, 2007    Normal cor angio Feb, 2007    HERNIA REPAIR  07/11/2019    robotic ventral hernia repair with mesh    HERNIA REPAIR N/A 7/11/2019    ROBOTIC VENTRAL HERNIA REPAIR WITH MESH performed by Yuriy Rdier MD at [facility] OR    HYSTERECTOMY, TOTAL ABDOMINAL (CERVIX REMOVED)      KIDNEY STONE SURGERY         Medications Prior to

## 2024-04-09 NOTE — PROGRESS NOTES
4 Eyes Skin Assessment     NAME:  Najma Fitzpatrick  YOB: 1950  MEDICAL RECORD NUMBER:  [id number]    The patient is being assessed for  Admission    I agree that at least one RN has performed a thorough Head to Toe Skin Assessment on the patient. ALL assessment sites listed below have been assessed.      Areas assessed by both nurses:    Head, Face, Ears, Shoulders, Back, Chest, Arms, Elbows, Hands, Sacrum. Buttock, Coccyx, Ischium, and Legs. Feet and Heels        Does the Patient have a Wound? No noted wound(s)       Emmanuel Prevention initiated by RN: No  Wound Care Orders initiated by RN: No    Pressure Injury (Stage 3,4, Unstageable, DTI, NWPT, and Complex wounds) if present, place Wound referral order by RN under : No    New Ostomies, if present place, Ostomy referral order under : No     Nurse 1 eSignature: Electronically signed by Prudence Burt RN on 4/9/24 at 5:44 PM EDT    **SHARE this note so that the co-signing nurse can place an eSignature**    Nurse 2 eSignature: Electronically signed by Nabila PEREZ RN on 4/9/24 at 6:33 PM EDT

## 2024-04-09 NOTE — ED PROVIDER NOTES
[facility]  ED  EMERGENCY DEPARTMENT ENCOUNTER        Patient Name: Najma Fitzpatrick  MRN: [id number]  Birthdate 1950  Date of evaluation: 4/9/2024  Provider: Carleen Armstrong MD  PCP: Geena Sotomayor APRN - CNP  Note Started: 6:25 AM EDT 4/9/24      CHIEF COMPLAINT  Shortness of breath         HISTORY & PHYSICAL     HISTORY OF PRESENT ILLNESS  History from : Patient    Limitations to history : None    Najma Fitzpatrick is a 73 y.o. female  has a past medical history of Abnormal ECG (12/14/2012), Anemia, Arthritis, Back pain, chronic (3/13/2013), Bipolar disorder ([facility]), Bronchitis chronic, CHF (congestive heart failure) ([facility]) (9/30/2016), Chronic back pain, Chronic neck pain, Clostridium difficile infection (3/29/12, 5/22/12), 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[facility]) (01/10/2019), Coronary artery disease involving native coronary artery of native heart with angina pectoris ([facility]) (3/8/2016), Depression, Emphysema of lung ([facility]), Fibromyalgia, hyperlipidemia (8/11/2011), Hypertension, Kidney stone, Liver disease, Lung disease, MDD (major depressive disorder) (4/4/2012), Mood disorder ([facility]) (3/13/2013), Movement disorder, Neck pain, chronic (3/13/2013), Opiate misuse, Personality disorder ([facility]), Pneumonia, and Polypharmacy (2/16/2013)., who presents to the ED complaining of shortness of breath.  Patient arrives on 12 L nonrebreather.  Patient typically wears 3L at home since being discharged from the hospital 2 days ago currently has been weaned back to this.  Patient recently admitted 4/1 for acute hypercapnic respiratory failure with flu and suspected superimposed bacterial pneumonia.  Patient also had acute metabolic encephalopathy and chronic diastolic CHF.  Patient does report cough, productive of tan sputum.  No known fever.  Patient does report midsternal chest discomfort, reports it is similar to when she was admitted.  Patient states she feels it is a combination of pneumonia and COPD..  She is  Partners: Male   Other Topics Concern    Not on file   Social History Narrative    Not on file     Social Determinants of Health     Financial Resource Strain: Low Risk  (11/5/2021)    Overall Financial Resource Strain (CARDIA)     Difficulty of Paying Living Expenses: Not hard at all   Food Insecurity: No Food Insecurity (4/2/2024)    Hunger Vital Sign     Worried About Running Out of Food in the Last Year: Never true     Ran Out of Food in the Last Year: Never true   Transportation Needs: No Transportation Needs (4/2/2024)    PRAPARE - Transportation     Lack of Transportation (Medical): No     Lack of Transportation (Non-Medical): No   Physical Activity: Inactive (11/5/2021)    Exercise Vital Sign     Days of Exercise per Week: 0 days     Minutes of Exercise per Session: 0 min   Stress: Stress Concern Present (11/5/2021)    [employer] - Occupational Stress Questionnaire     Feeling of Stress : Very much   Social Connections: Moderately Integrated (11/5/2021)    Social Connection and Isolation Panel [NHANES]     Frequency of Communication with Friends and Family: Twice a week     Frequency of Social Gatherings with Friends and Family: Once a week     Attends Restorationism Services: More than 4 times per year     Active Member of Clubs or Organizations: No     Attends Club or Organization Meetings: Never     Marital Status:    Intimate Partner Violence: Not At Risk (11/5/2021)    Humiliation, Afraid, Rape, and Kick questionnaire     Fear of Current or Ex-Partner: No     Emotionally Abused: No     Physically Abused: No     Sexually Abused: No   Housing Stability: Low Risk  (4/2/2024)    Housing Stability Vital Sign     Unable to Pay for Housing in the Last Year: No     Number of Places Lived in the Last Year: 1     Unstable Housing in the Last Year: No         Differential diagnosis includes but is not limited to: COVID, flu, Pneumonia, pneumothorax, pleural effusion, ACS, CHF  exacerbation, COPD exacerbation, asthma, arrhythmia, anemia    EKG, laboratory studies, and imaging obtained.     WORKUP INTERPRETATION:  ED Course as of 04/09/24 1427   Tue Apr 09, 2024   0650 New leukocytosis to thirteen 2.2 compared to previous.  Stable anemia 11.2.  Stable thrombocytopenia at 134 [ER]   0650 Chest x-ray on my independent interpretation showed bibasilar opacities  ---------------  IMPRESSION:  Slightly improved right basilar airspace opacity with interval development of the left-sided mid lung airspace opacity.   [ER]   0654 Blood gas shows no acidemia, does show hypercarbia to 52 [ER]   0654 The Ekg independently interpreted by me shows  normal sinus rhythm with a rate of 82  Axis is   Normal  QTc is  within an acceptable range  Intervals and Durations are unremarkable.      ST Segments: nonspecific changes  Nonspecific change from prior EKG dated 4/1/24 [ER]   0701 Troponin elevated to 21, this is downtrending compared to 8 days ago.  Will trend [ER]   0702 BNP elevated to 981.  This is down trended compared to 8 days ago but up trended compared to 3 days ago. [ER]   0702 Hyponatremia 135, hypokalemia to 3.2, hypochloremia to 90, elevated bicarb at 36.  No evidence of kidney dysfunction. [ER]   0702 Liver function testing unremarkable [ER]   0715 Hypomagnesemia and a 1.5.  Will replete [ER]   0747 Patient still testing positive for flu A [ER]   0748 Procalcitonin within normal limits [ER]   0806 Repeat troponin stable at 22 [ER]   0848 Lactate within normal limits [ER]      ED Course User Index  [ER] Carleen Armstrong MD      At this time I have low concern for pulmonary embolism.  Patient has not had a previous blood clot.  Patient denies other risk factors for pulmonary embolism.  Patient does not have any evidence of DVT on exam.  Patient is low risk on Wells criteria. At this time, considering that risks associated with further work-up for pulmonary embolism outweigh the likelihood of this

## 2024-04-09 NOTE — ACP (ADVANCE CARE PLANNING)
Advance Care Planning     General Advance Care Planning (ACP) Conversation    Date of Conversation: 4/9/2024  Conducted with: Patient with Decision Making Capacity    Healthcare Decision Maker:    Primary Decision Maker: Brent Fitzpatrick E - Spouse - [phone]    Secondary Decision Maker: Margarita Fitzpatrick - Child - [phone]    Secondary Decision Maker: Maricel Esquivel - Child - [phone]  Click here to complete Healthcare Decision Makers including selection of the Healthcare Decision Maker Relationship (ie \"Primary\").   Today we documented Decision Maker(s) consistent with Legal Next of Kin hierarchy.    Content/Action Overview:  Has NO ACP documents-Information provided        Length of Voluntary ACP Conversation in minutes:  <16 minutes (Non-Billable)    ISRA Campos

## 2024-04-10 LAB
ANION GAP SERPL CALCULATED.3IONS-SCNC: 6 MMOL/L (ref 3–16)
BASOPHILS # BLD: 0 K/UL (ref 0–0.2)
BASOPHILS NFR BLD: 0.1 %
BUN SERPL-MCNC: 14 MG/DL (ref 7–20)
CALCIUM SERPL-MCNC: 9 MG/DL (ref 8.3–10.6)
CHLORIDE SERPL-SCNC: 90 MMOL/L (ref 99–110)
CO2 SERPL-SCNC: 38 MMOL/L (ref 21–32)
CREAT SERPL-MCNC: <0.5 MG/DL (ref 0.6–1.2)
DEPRECATED RDW RBC AUTO: 16.8 % (ref 12.4–15.4)
EOSINOPHIL # BLD: 0 K/UL (ref 0–0.6)
EOSINOPHIL NFR BLD: 0 %
GFR SERPLBLD CREATININE-BSD FMLA CKD-EPI: >90 ML/MIN/{1.73_M2}
GLUCOSE SERPL-MCNC: 163 MG/DL (ref 70–99)
HCT VFR BLD AUTO: 31.9 % (ref 36–48)
HGB BLD-MCNC: 10.3 G/DL (ref 12–16)
LEGIONELLA AG UR QL: NORMAL
LYMPHOCYTES # BLD: 0.7 K/UL (ref 1–5.1)
LYMPHOCYTES NFR BLD: 5.2 %
MAGNESIUM SERPL-MCNC: 1.8 MG/DL (ref 1.8–2.4)
MCH RBC QN AUTO: 26.8 PG (ref 26–34)
MCHC RBC AUTO-ENTMCNC: 32.2 G/DL (ref 31–36)
MCV RBC AUTO: 83.1 FL (ref 80–100)
MONOCYTES # BLD: 0.8 K/UL (ref 0–1.3)
MONOCYTES NFR BLD: 5.9 %
NEUTROPHILS # BLD: 12.3 K/UL (ref 1.7–7.7)
NEUTROPHILS NFR BLD: 88.8 %
PLATELET # BLD AUTO: 150 K/UL (ref 135–450)
PMV BLD AUTO: 7.3 FL (ref 5–10.5)
POTASSIUM SERPL-SCNC: 3.9 MMOL/L (ref 3.5–5.1)
POTASSIUM SERPL-SCNC: 3.9 MMOL/L (ref 3.5–5.1)
RBC # BLD AUTO: 3.85 M/UL (ref 4–5.2)
S PNEUM AG UR QL: NORMAL
SODIUM SERPL-SCNC: 134 MMOL/L (ref 136–145)
WBC # BLD AUTO: 13.8 K/UL (ref 4–11)

## 2024-04-10 PROCEDURE — 6370000000 HC RX 637 (ALT 250 FOR IP)

## 2024-04-10 PROCEDURE — 97530 THERAPEUTIC ACTIVITIES: CPT

## 2024-04-10 PROCEDURE — 80048 BASIC METABOLIC PNL TOTAL CA: CPT

## 2024-04-10 PROCEDURE — 97162 PT EVAL MOD COMPLEX 30 MIN: CPT

## 2024-04-10 PROCEDURE — 2060000000 HC ICU INTERMEDIATE R&B

## 2024-04-10 PROCEDURE — 6360000002 HC RX W HCPCS

## 2024-04-10 PROCEDURE — 36415 COLL VENOUS BLD VENIPUNCTURE: CPT

## 2024-04-10 PROCEDURE — 94669 MECHANICAL CHEST WALL OSCILL: CPT

## 2024-04-10 PROCEDURE — 2700000000 HC OXYGEN THERAPY PER DAY

## 2024-04-10 PROCEDURE — 94640 AIRWAY INHALATION TREATMENT: CPT

## 2024-04-10 PROCEDURE — 83735 ASSAY OF MAGNESIUM: CPT

## 2024-04-10 PROCEDURE — 2580000003 HC RX 258

## 2024-04-10 PROCEDURE — 97166 OT EVAL MOD COMPLEX 45 MIN: CPT

## 2024-04-10 PROCEDURE — 94761 N-INVAS EAR/PLS OXIMETRY MLT: CPT

## 2024-04-10 PROCEDURE — 85025 COMPLETE CBC W/AUTO DIFF WBC: CPT

## 2024-04-10 PROCEDURE — 97116 GAIT TRAINING THERAPY: CPT

## 2024-04-10 PROCEDURE — 97535 SELF CARE MNGMENT TRAINING: CPT

## 2024-04-10 RX ORDER — BUTALBITAL, ACETAMINOPHEN AND CAFFEINE 50; 325; 40 MG/1; MG/1; MG/1
1 TABLET ORAL ONCE
Status: COMPLETED | OUTPATIENT
Start: 2024-04-10 | End: 2024-04-10

## 2024-04-10 RX ORDER — ACETYLCYSTEINE 200 MG/ML
600 SOLUTION ORAL; RESPIRATORY (INHALATION)
Status: DISCONTINUED | OUTPATIENT
Start: 2024-04-10 | End: 2024-04-12 | Stop reason: HOSPADM

## 2024-04-10 RX ORDER — IPRATROPIUM BROMIDE AND ALBUTEROL SULFATE 2.5; .5 MG/3ML; MG/3ML
1 SOLUTION RESPIRATORY (INHALATION)
Status: DISCONTINUED | OUTPATIENT
Start: 2024-04-10 | End: 2024-04-12 | Stop reason: HOSPADM

## 2024-04-10 RX ADMIN — ASPIRIN 81 MG: 81 TABLET, COATED ORAL at 08:50

## 2024-04-10 RX ADMIN — ACETAMINOPHEN 650 MG: 325 TABLET ORAL at 05:23

## 2024-04-10 RX ADMIN — DIVALPROEX SODIUM 500 MG: 250 TABLET, EXTENDED RELEASE ORAL at 20:18

## 2024-04-10 RX ADMIN — ENOXAPARIN SODIUM 40 MG: 100 INJECTION SUBCUTANEOUS at 08:50

## 2024-04-10 RX ADMIN — IPRATROPIUM BROMIDE AND ALBUTEROL SULFATE 1 DOSE: 2.5; .5 SOLUTION RESPIRATORY (INHALATION) at 16:18

## 2024-04-10 RX ADMIN — Medication 10 ML: at 08:50

## 2024-04-10 RX ADMIN — LOSARTAN POTASSIUM 100 MG: 100 TABLET, FILM COATED ORAL at 08:49

## 2024-04-10 RX ADMIN — DULOXETINE 60 MG: 60 CAPSULE, DELAYED RELEASE ORAL at 08:50

## 2024-04-10 RX ADMIN — FUROSEMIDE 20 MG: 20 TABLET ORAL at 16:41

## 2024-04-10 RX ADMIN — LEVOFLOXACIN 750 MG: 5 INJECTION, SOLUTION INTRAVENOUS at 10:04

## 2024-04-10 RX ADMIN — PANTOPRAZOLE SODIUM 40 MG: 40 TABLET, DELAYED RELEASE ORAL at 08:50

## 2024-04-10 RX ADMIN — ACETAMINOPHEN 650 MG: 325 TABLET ORAL at 15:43

## 2024-04-10 RX ADMIN — SODIUM CHLORIDE: 9 INJECTION, SOLUTION INTRAVENOUS at 10:02

## 2024-04-10 RX ADMIN — GUAIFENESIN 600 MG: 600 TABLET, EXTENDED RELEASE ORAL at 20:18

## 2024-04-10 RX ADMIN — IPRATROPIUM BROMIDE AND ALBUTEROL SULFATE 1 DOSE: 2.5; .5 SOLUTION RESPIRATORY (INHALATION) at 21:10

## 2024-04-10 RX ADMIN — GUAIFENESIN 600 MG: 600 TABLET, EXTENDED RELEASE ORAL at 08:50

## 2024-04-10 RX ADMIN — CARVEDILOL 25 MG: 25 TABLET, FILM COATED ORAL at 16:41

## 2024-04-10 RX ADMIN — CARVEDILOL 25 MG: 25 TABLET, FILM COATED ORAL at 08:50

## 2024-04-10 RX ADMIN — QUETIAPINE FUMARATE 300 MG: 100 TABLET ORAL at 20:19

## 2024-04-10 RX ADMIN — ACETYLCYSTEINE 600 MG: 200 INHALANT RESPIRATORY (INHALATION) at 21:10

## 2024-04-10 RX ADMIN — ATORVASTATIN CALCIUM 40 MG: 40 TABLET, FILM COATED ORAL at 20:19

## 2024-04-10 RX ADMIN — TIOTROPIUM BROMIDE INHALATION SPRAY 2 PUFF: 3.12 SPRAY, METERED RESPIRATORY (INHALATION) at 07:50

## 2024-04-10 RX ADMIN — BUTALBITAL, ACETAMINOPHEN AND CAFFEINE 1 TABLET: 325; 50; 40 TABLET ORAL at 12:33

## 2024-04-10 RX ADMIN — DIVALPROEX SODIUM 250 MG: 250 TABLET, EXTENDED RELEASE ORAL at 08:50

## 2024-04-10 RX ADMIN — PREDNISONE 40 MG: 20 TABLET ORAL at 08:50

## 2024-04-10 RX ADMIN — IPRATROPIUM BROMIDE AND ALBUTEROL SULFATE 1 DOSE: 2.5; .5 SOLUTION RESPIRATORY (INHALATION) at 12:07

## 2024-04-10 ASSESSMENT — PAIN SCALES - GENERAL
PAINLEVEL_OUTOF10: 5
PAINLEVEL_OUTOF10: 5
PAINLEVEL_OUTOF10: 8
PAINLEVEL_OUTOF10: 10
PAINLEVEL_OUTOF10: 8
PAINLEVEL_OUTOF10: 10

## 2024-04-10 ASSESSMENT — PAIN DESCRIPTION - ORIENTATION
ORIENTATION: MID

## 2024-04-10 ASSESSMENT — PAIN DESCRIPTION - DESCRIPTORS
DESCRIPTORS: ACHING

## 2024-04-10 ASSESSMENT — PAIN SCALES - WONG BAKER
WONGBAKER_NUMERICALRESPONSE: HURTS A LITTLE BIT
WONGBAKER_NUMERICALRESPONSE: HURTS A LITTLE BIT

## 2024-04-10 ASSESSMENT — PAIN DESCRIPTION - LOCATION
LOCATION: HEAD

## 2024-04-10 ASSESSMENT — PAIN - FUNCTIONAL ASSESSMENT: PAIN_FUNCTIONAL_ASSESSMENT: ACTIVITIES ARE NOT PREVENTED

## 2024-04-10 NOTE — PROGRESS NOTES
Patient requesting her Gabapentin. Called  to verify and he states that she has not taken this medication in awhile.

## 2024-04-10 NOTE — FLOWSHEET NOTE
04/09/24 2008   Assessment   Charting Type Shift assessment   Psychosocial   Psychosocial (WDL) WDL   Neurological   Neuro (WDL) X   Level of Consciousness 0   Orientation Level Oriented X4;Oriented/disoriented at times   Speech Clear   Radha Coma Scale   Eye Opening 4   Best Verbal Response 5   Best Motor Response 6   Radha Coma Scale Score 15   HEENT (Head, Ears, Eyes, Nose, & Throat)   HEENT (WDL) X   Right Eye Impaired vision   Left Eye Impaired vision   Teeth Dentures upper;Dentures lower  (lowers at home)   Respiratory   Respiratory (WDL) X   Respiratory Interventions H.O.B. elevated   Respiratory Pattern Regular   Respiratory Depth Normal   Respiratory Quality/Effort Dyspnea with exertion;Dyspnea at rest   Chest Assessment Chest expansion symmetrical   L Breath Sounds Diminished   R Breath Sounds Diminished   Cardiac   Cardiac (WDL) WDL   Cardiac Regularity Regular   Heart Sounds S1, S2   Cardiac Rhythm Sinus rhythm   Cardiac Monitor   Cardiac/Telemetry Monitor On Portable telemetry pack applied   Alarm Audible Centralized cardiac monitoring   Alarms Set Centralized cardiac monitoring   Gastrointestinal   Abdominal (WDL) WDL   Genitourinary   Genitourinary (WDL) X  (external catheter)   Urine Assessment   Urinary Status Voiding;External catheter   Urinary Incontinence Absent   Peripheral Vascular   Peripheral Vascular (WDL) X   Edema Right lower extremity;Left lower extremity   RLE Edema Trace;Pitting   LLE Edema Trace;Pitting   Anti-Embolism   Anti-Embolism Intervention Medication  (lovenox)   Skin Integumentary    Skin Integumentary (WDL) X   Skin Condition/Temp Dry;Warm   Skin Integrity Ecchymosis   Location scattered   Musculoskeletal   Musculoskeletal (WDL) X   RL Extremity Weakness   LL Extremity Weakness

## 2024-04-10 NOTE — PROGRESS NOTES
Assessment completed and medications given. Patient is A&Ox4 and denies any needs at this time. Call light and personal belongings are within reach. Standard safety measures in place.

## 2024-04-10 NOTE — RT PROTOCOL NOTE
RT Inhaler-Nebulizer Bronchodilator Protocol Note    There is a bronchodilator order in the chart from a provider indicating to follow the RT Bronchodilator Protocol and there is an “Initiate RT Inhaler-Nebulizer Bronchodilator Protocol” order as well (see protocol at bottom of note).    CXR Findings:  XR CHEST PORTABLE    Result Date: 4/9/2024  Slightly improved right basilar airspace opacity with interval development of the left-sided mid lung airspace opacity.       The findings from the last RT Protocol Assessment were as follows:   History Pulmonary Disease: Chronic pulmonary disease  Respiratory Pattern: Mild dyspnea at rest, irregular pattern, or RR 21-25 bpm  Breath Sounds: Slightly diminished and/or crackles  Cough: Strong, spontaneous, non-productive  Indication for Bronchodilator Therapy: Mucolytic ordered  Bronchodilator Assessment Score: 8    Aerosolized bronchodilator medication orders have been revised according to the RT Inhaler-Nebulizer Bronchodilator Protocol below.    Respiratory Therapist to perform RT Therapy Protocol Assessment initially then follow the protocol.  Repeat RT Therapy Protocol Assessment PRN for score 0-3 or on second treatment, BID, and PRN for scores above 3.    No Indications - adjust the frequency to every 6 hours PRN wheezing or bronchospasm, if no treatments needed after 48 hours then discontinue using Per Protocol order mode.     If indication present, adjust the RT bronchodilator orders based on the Bronchodilator Assessment Score as indicated below.  Use Inhaler orders unless patient has one or more of the following: on home nebulizer, not able to hold breath for 10 seconds, is not alert and oriented, cannot activate and use MDI correctly, or respiratory rate 25 breaths per minute or more, then use the equivalent nebulizer order(s) with same Frequency and PRN reasons based on the score.  If a patient is on this medication at home then do not decrease Frequency below that

## 2024-04-10 NOTE — PROGRESS NOTES
Physical Therapy  Facility/Department: [facility] A2 CARD TELEMETRY  Physical Therapy Initial Assessment/Treatment     Name: Najma Fitzpatrick  : 1950  MRN: [id number]  Date of Service: 4/10/2024    Discharge Recommendations:  Subacute/Skilled Nursing Facility   PT Equipment Recommendations  Equipment Needed: No      Patient Diagnosis(es): The primary encounter diagnosis was Pneumonia of both lower lobes due to infectious organism. A diagnosis of COPD exacerbation (HCC) was also pertinent to this visit.  Past Medical History:  has a past medical history of Abnormal ECG, Anemia, Arthritis, Back pain, chronic, Bipolar disorder (HCC), Bronchitis chronic, CHF (congestive heart failure) (HCC), Chronic back pain, Chronic neck pain, Clostridium difficile infection, 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HCC), Coronary artery disease involving native coronary artery of native heart with angina pectoris (HCC), Depression, Emphysema of lung (HCC), Fibromyalgia, hyperlipidemia, Hypertension, Kidney stone, Liver disease, Lung disease, MDD (major depressive disorder), Mood disorder (HCC), Movement disorder, Neck pain, chronic, Opiate misuse, Personality disorder (HCC), Pneumonia, and Polypharmacy.  Past Surgical History:  has a past surgical history that includes Hysterectomy, total abdominal; Kidney stone surgery; Diagnostic Cardiac Cath Lab Procedure (2007); Colonoscopy (12); hernia repair (2019); and hernia repair (N/A, 2019).    Therapy discharge recommendations are subject to collaboration from the patient’s interdisciplinary healthcare team, including MD and case management recommendations.    Barriers to Home Discharge:   [x] Steps to access home entry or bed/bath: 4 ROXIE    [x] Unable to transfer, ambulate, or propel wheelchair household distances without assist   [] Limited available assist at home upon discharge    [] Patient or family requests d/c to post-acute facility    [] Poor cognition/safety awareness for  of the   [x]1. Identifying Appropriate Zone from HF Zone Self-Check Plan                          [x]2. Rating self on NGOC.  []3.Therapeutic exercise/walking program      [x]4. Importance of taking daily weight measurement             []5. Safely stepping on and off scale    [x]Pt will benefit from reinforcement of education due to   []Readiness to learn                               []Decreased cognition  []Language barrier                                  []Decreased vision/hearing  [x]First introduction to new information    []Requires intermittent cues  []Other:    Education provided via:  [x]Oral instruction  []Demonstration  [x]Written handout      Therapy Time   Individual Concurrent Group Co-treatment   Time In 1457         Time Out 1537         Minutes 40         Timed Code Treatment Minutes: 30 Minutes (10 min eval)       Ella Carroll, PT, DPT    If pt is unable to be seen after this session, please let this note serve as discharge summary.  Please see case management note for discharge disposition.  Thank you.

## 2024-04-10 NOTE — PLAN OF CARE
Problem: Safety - Adult  Goal: Free from fall injury  4/10/2024 1031 by Vega Morris, RN  Outcome: Progressing  Note: Pt will remain free from falls throughout hospital stay. Fall precautions in place, bed alarm on, bed in lowest position with wheels locked and side rails 2/4 up. Room door open and hourly rounding completed. Will continue to monitor throughout shift.  Problem: Pain  Goal: Verbalizes/displays adequate comfort level or baseline comfort level  4/10/2024 1031 by Vega Morris, RN  Outcome: Progressing  Note: Pt will be satisfied with pain control with PRN tylenol. Pt uses numeric pain rating scale with reassessments after pain med administration. Will continue to monitor progression throughout shift.

## 2024-04-10 NOTE — PLAN OF CARE
Problem: Chronic Conditions and Co-morbidities  Goal: Patient's chronic conditions and co-morbidity symptoms are monitored and maintained or improved  Outcome: Progressing  Note:   CHF Care Plan      Patient's EF (Ejection Fraction) is greater than 40%    Heart Failure Medications:  Diuretics:: None    (One of the following REQUIRED for EF </= 40%/SYSTOLIC FAILURE but MAY be used in EF% >40%/DIASTOLIC FAILURE)        ACE:: None        ARB:: None         ARNI:: None    (Beta Blockers)  NON- Evidenced Based Beta Blocker (for EF% >40%/DIASTOLIC FAILURE): None    Evidenced Based Beta Blocker::(REQUIRED for EF% <40%/SYSTOLIC FAILURE) Carvedilol- Coreg  ...................................................................................................................................................    Failed to redirect to the Timeline version of the Cue SmartLink.      Patient's weights and intake/output reviewed    Daily Weight log at bedside, patient/family participation in use of log: \"yes    Patient's current weight today shows a difference of 4 lbs less than last documented weight.      Intake/Output Summary (Last 24 hours) at 4/10/2024 1031  Last data filed at 4/10/2024 1023  Gross per 24 hour   Intake 490 ml   Output 600 ml   Net -110 ml       Education Booklet Provided: yes    Comorbidities Reviewed Yes    Patient has a past medical history of Abnormal ECG, Anemia, Arthritis, Back pain, chronic, Bipolar disorder ([facility]), Bronchitis chronic, CHF (congestive heart failure) ([facility]), Chronic back pain, Chronic neck pain, Clostridium difficile infection, 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[facility]), Coronary artery disease involving native coronary artery of native heart with angina pectoris ([facility]), Depression, Emphysema of lung (HCC), Fibromyalgia, hyperlipidemia, Hypertension, Kidney stone, Liver disease, Lung disease, MDD (major depressive disorder), Mood disorder (HCC), Movement disorder, Neck pain, chronic, Opiate misuse,

## 2024-04-10 NOTE — PLAN OF CARE
Problem: Safety - Adult  Goal: Free from fall injury  Outcome: Progressing     Problem: Discharge Planning  Goal: Discharge to home or other facility with appropriate resources  Outcome: Progressing  Flowsheets (Taken 4/9/2024 1740 by Prudence Burt, RN)  Discharge to home or other facility with appropriate resources:   Identify barriers to discharge with patient and caregiver   Arrange for needed discharge resources and transportation as appropriate     Problem: Pain  Goal: Verbalizes/displays adequate comfort level or baseline comfort level  Outcome: Progressing

## 2024-04-10 NOTE — CONSULTS
Nutrition Note    RD received consult for HF diet education. RD unable to reach pt via phone, will attach HF handout to AVS. Handout reviews low sodium, daily weights, and fluid restrictions. Pt has received HF education in the past was compliant with low sodium per last RD education note. Will follow up pending pt and RD availability. Good intake recorded per EMR. Will continue to monitor per nutrition standards of care.     Electronically signed by Jess Maldonado MS RD LD on 4/10/24 at 1:14 PM EDT  Contact: Office: [phone]; Ty: [phone]

## 2024-04-10 NOTE — PROGRESS NOTES
Occupational Therapy  Facility/Department: [facility] A2 CARD TELEMETRY  Occupational Therapy Initial Assessment and Treatment    Name: Najma Fitzpatrick  : 1950  MRN: [id number]  Date of Service: 4/10/2024    Discharge Recommendations:  Subacute/Skilled Nursing Facility  OT Equipment Recommendations  Equipment Needed: No  Other: defer     Therapy discharge recommendations are subject to collaboration from the patient’s interdisciplinary healthcare team, including MD and case management recommendations.    Barriers to Home Discharge:   [x] Steps to access home entry or bed/bath:   [x] Unable to transfer, ambulate, or propel wheelchair household distances without assist   [] Limited available assist at home upon discharge    [] Patient or family requests d/c to post-acute facility    [] Poor cognition/safety awareness for d/c to home alone    [] Unable to maintain ordered weight bearing status    [] Patient with salient signs of long-standing immobility   [x] Decreased independence with ADLs   [x] Increased risk for falls   [] Other:    If pt is unable to be seen after this session, please let this note serve as discharge summary.  Please see case management note for discharge disposition.  Thank you.    Patient Diagnosis(es): The primary encounter diagnosis was Pneumonia of both lower lobes due to infectious organism. A diagnosis of COPD exacerbation ([facility]) was also pertinent to this visit.  Past Medical History:  has a past medical history of Abnormal ECG, Anemia, Arthritis, Back pain, chronic, Bipolar disorder ([facility]), Bronchitis chronic, CHF (congestive heart failure) ([facility]), Chronic back pain, Chronic neck pain, Clostridium difficile infection, 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[facility]), Coronary artery disease involving native coronary artery of native heart with angina pectoris ([facility]), Depression, Emphysema of lung ([facility]), Fibromyalgia, hyperlipidemia, Hypertension, Kidney stone, Liver disease, Lung disease, MDD (major  more than 2-3 pounds in 1 day.  Increase in the number of pillows needed while sleeping  (Check In!: You need to contact your doctor or provider as soon as possible)  [] Red Zone/Medical Alert:  Unrelieved chest pain or shortness of breath, especially while resting  Increased Discomfort or swelling in the abdomen or lower body  Sudden weight gain of more than 5 pounds in a week  Increased cough with bubbly and/or pink sputum  (Warning: You need to be seen right away .  If you cannot reach your physician, call 911)    ------------------------------------------------------------------------------------------------------------------------------------         2)Kat Rating of Perceived Exertion (RPE) Scale     The Kat rating scale ranges from 6 to 20, where 6 means \"no exertion at all\" and 20 means \"maximal exertion.\" The patient chooses the number that best describes their level of exertion. This will objectify the intensity level of the patient's activity, and the therapist can use this information to accelerate or decelerate activity levels to reach the desired range.    The patient should appraise their feeling of exertion as honestly as possible, without thinking about what the actual physical load is. Their feeling of effort and exertion is important, and it should not be compared to the level of others.     Kat RPE Scale  Activity Completed: bed > toilet > chair transfer    [] 6  No exertion at all  [] 7  Extremely light  [] 8  [] 9  Very light (For a healthy person, it is like walking slowly at his or her own pace for some minutes)  []10  []11  Light  []12  [x]13  Somewhat hard (exercise, but it still feels OK to continue)  []14  []15  Hard (heavy)  []16  []17  Very hard (can still go on, but really has to push himself. It feels very heavy, and the person is very tired.)  []18  []19  Extremely hard (For most people this is the most strenuous exercise they have ever experienced.)  []20  Maximal

## 2024-04-10 NOTE — PROGRESS NOTES
HEART FAILURE CARE PLAN:    Comorbidities Reviewed: Yes   Patient has a past medical history of Abnormal ECG, Anemia, Arthritis, Back pain, chronic, Bipolar disorder ([facility]), Bronchitis chronic, CHF (congestive heart failure) ([facility]), Chronic back pain, Chronic neck pain, Clostridium difficile infection, 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[facility]), Coronary artery disease involving native coronary artery of native heart with angina pectoris ([facility]), Depression, Emphysema of lung ([facility]), Fibromyalgia, hyperlipidemia, Hypertension, Kidney stone, Liver disease, Lung disease, MDD (major depressive disorder), Mood disorder ([facility]), Movement disorder, Neck pain, chronic, Opiate misuse, Personality disorder ([facility]), Pneumonia, and Polypharmacy.     Weights Reviewed: Yes   Admission weight: 77.2 kg (170 lb 4.8 oz)   Wt Readings from Last 3 Encounters:   04/10/24 77.2 kg (170 lb 4.8 oz)   04/07/24 79 kg (174 lb 1.6 oz)   07/31/23 81.2 kg (179 lb)     Intake & Output Reviewed: Yes     Intake/Output Summary (Last 24 hours) at 4/10/2024 0615  Last data filed at 4/10/2024 0400  Gross per 24 hour   Intake 130 ml   Output 500 ml   Net -370 ml       ECHOCARDIOGRAM Reviewed: Yes   Patient's Ejection Fraction (EF) is greater than 40%     Medications Reviewed: Yes   SCHEDULED HOSPITAL MEDICATIONS:   aspirin  81 mg Oral Daily    atorvastatin  40 mg Oral Daily    carvedilol  25 mg Oral BID WC    DULoxetine  60 mg Oral Daily    pantoprazole  40 mg Oral QAM AC    guaiFENesin  600 mg Oral BID    [Held by provider] furosemide  20 mg Oral Once per day on Mon Wed Fri    sodium chloride flush  5-40 mL IntraVENous 2 times per day    enoxaparin  40 mg SubCUTAneous Daily    levofloxacin  750 mg IntraVENous Q24H    losartan  100 mg Oral Daily    predniSONE  40 mg Oral Daily    QUEtiapine  300 mg Oral Nightly    divalproex  500 mg Oral QHS    divalproex  250 mg Oral QAM    tiotropium  2 puff Inhalation Daily RT     HOME MEDICATIONS:  Prior to Admission medications     Medication Sig Start Date End Date Taking? Authorizing Provider   cefUROXime (CEFTIN) 500 MG tablet Take 1 tablet by mouth 2 times daily for 3 days 4/7/24 4/10/24  Abdiaziz Patrick MD guaiFENesin-dextromethorphan (ROBITUSSIN DM) 100-10 MG/5ML syrup Take 10 mLs by mouth every 4 hours as needed for Cough  Patient not taking: Reported on 4/9/2024 4/7/24 4/17/24  Abdiaziz Patrick MD   furosemide (LASIX) 20 MG tablet Take 1 tablet by mouth Every Monday, Wednesday, and Friday 4/8/24   Abdiaziz Patrick MD   guaiFENesin (MUCINEX) 600 MG extended release tablet Take 1 tablet by mouth 2 times daily for 10 days 4/7/24 4/17/24  Abdiaziz Patrick MD   predniSONE (DELTASONE) 20 MG tablet Take 2 tablets by mouth daily for 3 days 4/8/24 4/11/24  Abdiaziz Patrick MD   umeclidinium bromide (INCRUSE ELLIPTA) 62.5 MCG/ACT inhaler Inhale 1 puff into the lungs daily 4/7/24 5/7/24  Abdiaziz Patrick MD   aspirin 81 MG EC tablet Take 1 tablet by mouth daily 4/7/24   Abdiaziz Patrick MD   irbesartan (AVAPRO) 300 MG tablet Take 1 tablet by mouth daily 4/7/24 5/7/24  Abdiaziz Patrick MD   divalproex (DEPAKOTE ER) 250 MG extended release tablet Take 1 tablet by mouth daily 250 AM, 500 HS    Vida Camarillo MD   DULoxetine (CYMBALTA) 60 MG extended release capsule Take 1 capsule by mouth daily    Vida Camarillo MD   HYDROcodone-acetaminophen (NORCO) 5-325 MG per tablet Take 1 tablet by mouth daily as needed for Pain.  Patient not taking: Reported on 4/6/2024 3/5/24   Vida Camarillo MD   carvedilol (COREG) 25 MG tablet Take 1 tablet by mouth 2 times daily (with meals)    Vida Camarillo MD   QUEtiapine (SEROQUEL) 25 MG tablet Take 1 tablet by mouth 2 times daily 25mg AM & 300mg HS    Vida Camarillo MD   atorvastatin (LIPITOR) 40 MG tablet Take 1 tablet by mouth daily 9/7/22   Provider, Historical, MD   montelukast (SINGULAIR) 10 MG tablet  9/9/22   Provider, Historical, MD   pantoprazole (PROTONIX) 40 MG tablet Take 1 tablet by mouth every

## 2024-04-10 NOTE — PROGRESS NOTES
04/10/24 1143   RT Protocol   History Pulmonary Disease 2   Respiratory pattern 4   Breath sounds 2   Cough 0   Indications for Bronchodilator Therapy Mucolytic ordered   Bronchodilator Assessment Score 8

## 2024-04-10 NOTE — DISCHARGE INSTRUCTIONS
Heart Failure Resources:  Heart Failure Interactive Workbook:  Go to [email or web address]/publication/?a=212950 for a Free Heart Failure Interactive Workbook provided by The American Heart Association. This interactive workbook will provide information on Healthier Living with Heart Failure. Please copy and paste link into search bar. Use your mouse to scroll through the pages.    HF [place] teddy:   Heart Failure Free smart phone teddy available for iPhone and Android download. Use your phone to track sodium intake, fluid intake, symptoms, and weight.     Low Sodium Diet / Recipes:  Go to [email or web address] website for “renal” diet which is Low Sodium! [employer] is a dialysis company, but this website offers free seasonal cookbooks. Each quarter, they will release 25-30 new recipes with a breakdown of calories, sodium, and glucose. You can also go to www.[employer]/recipes website for free recipes.   Home Exercise Program:   Identification of Green/Yellow/Red zones:  You should be able to identify when you feel good (green zone), if you have 1-2 symptoms of HF (yellow zone), or if you are in need of medical attention (red zone).  In your CHF education folder you were provided a “stop light tool” to outline this information.     We want to you to rate your exertion levels:    Our therapy team has discussed means of identification with you such as the \"Kat scale.\"  The Kat rating scale ranges from 6 to 20, where 6 means \"no exertion at all\" and 20 means \"maximal exertion.\" The goal is to use this to gauge how much effort it is taking for you to do your normal daily tasks.   You should be able to recognize when too much exertion is being expended.    Elements of Energy Conservation:   Prioritize/Plan: Decide what needs to be done today, and what can wait for a later date, write to do lists, plan ahead to avoid extra trips, and gather supplies and equipment needed before starting an activity.   Position: Avoid  tiring and awkward posture that may impair breathing.  Pace: Slow and steady pace, never rushing!  Pursed lip breathing.  Pursed Lip Breathing: \"Smell the roses, blow out the candles\".

## 2024-04-10 NOTE — PROGRESS NOTES
V2.0    [facility] Progress Note      Name:  Najma Fitzpatrick /Age/Sex: 1950  (73 y.o. female)   MRN & CSN:  [id number] & [id number] Encounter Date/Time: 4/10/2024 8:05 AM EDT   Location:   PCP: Geena Sotomayor APRN - CNP     Attending:Rafy Butt MD       Hospital Day: 2    Assessment and Recommendations   Najma Fitzpatrick is a 73 y.o. female with pmh of COPD, chronic diastolic CHF, depression, anxiety, insomnia, CAD, GERD, HTN, HLD who presents with Pneumonia due to infectious organism      Plan:   Acute hypoxic respiratory failure secondary to COPD exacerbation vs pneumonia  -Likely due to either COPD exacerbation or to viral/bacterial pneumonia  -Suspicion for bacterial infection low at this time as patient pro-delbert is negative, no fevers, has been on prior antibiotic,   -Chest x-ray  concerning for new developing left airspace opacity, given rocephin and Levaquin in ED  -Continue supplemental O2, wean as tolerated  -Continue Levaquin, day 2  -Continue oral prednisone 40 mg daily  -Duonebs q 4 hrs, can transition to prn per RT  -Mucomyst, Mucinex, Acapella, and percussive nebulizer for congestion improvement  -Home Spiriva on hold as receiving scheduled duonebs  -Strep pneumonia, legionella, and MRSA swab negative  -Consider pulmonology consult if breathing progresses/worsens despite treatment  -Respiratory culture sputum pending     Influenza A  -Patient positive for Influenza A  -Complete course of Tamiflu at prior hospitalization  -Droplet precautions per floor     Chronic Diastolic CHF  -Patient with hx of diastolic CHF  -Last Echo was 23 with EF 55-60%, grade I diastolic dysfunction  -CHF order set placed  -Continue home carvedilol 25 mg twice daily  -Continue losartan 100 mg daily (changed from home med of irbesartan 300 mg)  -Continue lasix 20 mg daily   -No symptoms of fluid overload/hypervolemia at this time, would recommend echo on outpatient follow  up    Leukocytosis  -Could be due to recent use of steroids  -Blood cultures negative from prior hospitalization  -Slightly elevated on admission  -On Levaquin antibiotic  -Will check daily labs     Hypokalemia  -Potassium of 3.2 on admission  -Replenish potassium as needed     Hyopmagnesemia  -magnesium of 1.5 on admission  -Replenish magnesium as needed     Elevated Troponin  -Troponin slightly elevated on admission, with decrease on repeat  -Likely elevated due to chronic diastolic CHF  -Will continue to monitor     Depression/Anxiety/Insomnia  -Patient family notes she takes Depakote  mg every morning and 500 mg nightly, Seroquel 300 mg nightly, and Cymbalta 60 mg daily  -Will continue in hospital  -Caution for acute delirium at night, consider psychiatry referral for management     Coronary Artery Disease  -Continue on aspirin and atorvastatin     GERD  -Continue on pantoprazole 40 mg daily      Diet ADULT DIET; Regular   DVT Prophylaxis [x] Lovenox, []  Heparin, [] SCDs, [] Ambulation,  [] Eliquis, [] Xarelto  [] Coumadin   Code Status Full Code   Disposition From: Home  Expected Disposition: Home vs SNF  Estimated Date of Discharge: 2-3 days pending symptomatic improvement  Patient requires continued admission due to COPD exacerbation, on supplemental O2   Surrogate Decision Maker/ BERENICE Fitzpatrick     Personally reviewed Lab Studies and Imaging       Subjective:     Chief Complaint: Shortness of breath    73 y.o. female who presented to [facility] with shortness of breath.  Patient notes shortness of breath started about 22 weeks ago.  She was previously hospitalized from April 1st to April 7th due to concerns of acute hypoxic respiratory failure secondary to influenza and possible superimposed pneumonia with COPD exacerbation.  She was just discharged on 7th April.  Since discharge she notes her symptoms have steadily worsened over the last 2 days.  She feels more short of breath than usual.  She  HPI    Objective:     Intake/Output Summary (Last 24 hours) at 4/10/2024 1218  Last data filed at 4/10/2024 1217  Gross per 24 hour   Intake 652.53 ml   Output 600 ml   Net 52.53 ml      Vitals:   Vitals:    04/10/24 0507 04/10/24 0752 04/10/24 0827 04/10/24 1159   BP:   (!) 164/84 (!) 142/70   Pulse:  84 85 77   Resp:  20 18 18   Temp:   97.8 °F (36.6 °C) 97.7 °F (36.5 °C)   TempSrc:   Oral Oral   SpO2:  94% 95% 95%   Weight: 77.2 kg (170 lb 4.8 oz)      Height:             Physical Exam:    General appearance:  No apparent distress, appears stated age and cooperative  Respiratory:  Patient on 3.5L O2 nasal cannula on examination. Coarse breath sounds bilaterally, mild wheezing bilaterally. Sounds congested with cough in room, no deep underlying rales or rhonchi appreciated  Cardiovascular:  Regular rate and rhythm with normal S1/S2 without murmurs, rubs or gallops.  Abdomen:  Soft, non-tender, non-distended with normal bowel sounds.  Musculoskeletal:  No clubbing, cyanosis or edema bilaterally.  Full range of motion without deformity.  Neurologic:  Neurovascularly intact without any focal sensory/motor deficits. Cranial nerves: II-XII intact, grossly non-focal.  Psychiatric:  Alert and oriented, thought content appropriate, normal insight  Skin:  Skin color, texture, turgor normal.  No rashes or lesions.  Capillary Refill:  Brisk,< 3 seconds   Peripheral Pulses:  +2 palpable, equal bilaterally         Medications:   Medications:    acetylcysteine  600 mg Inhalation BID RT    ipratropium 0.5 mg-albuterol 2.5 mg  1 Dose Inhalation Q4H WA RT    butalbital-acetaminophen-caffeine  1 tablet Oral Once    aspirin  81 mg Oral Daily    atorvastatin  40 mg Oral Daily    carvedilol  25 mg Oral BID WC    DULoxetine  60 mg Oral Daily    pantoprazole  40 mg Oral QAM AC    guaiFENesin  600 mg Oral BID    furosemide  20 mg Oral Once per day on Mon Wed Fri    sodium chloride flush  5-40 mL IntraVENous 2 times per day    enoxaparin

## 2024-04-11 PROBLEM — R06.89 HYPERCARBIA: Status: ACTIVE | Noted: 2024-04-11

## 2024-04-11 PROBLEM — I51.89 GRADE I DIASTOLIC DYSFUNCTION: Status: ACTIVE | Noted: 2024-04-11

## 2024-04-11 PROBLEM — J96.12 CHRONIC RESPIRATORY FAILURE WITH HYPERCAPNIA (HCC): Status: ACTIVE | Noted: 2024-04-11

## 2024-04-11 PROBLEM — R91.8 PULMONARY INFILTRATES: Status: ACTIVE | Noted: 2024-04-11

## 2024-04-11 PROBLEM — R09.89 CHEST CONGESTION: Status: ACTIVE | Noted: 2024-04-11

## 2024-04-11 PROBLEM — E66.09 CLASS 1 OBESITY DUE TO EXCESS CALORIES WITH BODY MASS INDEX (BMI) OF 32.0 TO 32.9 IN ADULT: Status: ACTIVE | Noted: 2019-10-18

## 2024-04-11 PROBLEM — E66.811 CLASS 1 OBESITY DUE TO EXCESS CALORIES WITH BODY MASS INDEX (BMI) OF 32.0 TO 32.9 IN ADULT: Status: ACTIVE | Noted: 2019-10-18

## 2024-04-11 PROBLEM — R06.89 INEFFECTIVE AIRWAY CLEARANCE: Status: ACTIVE | Noted: 2024-04-11

## 2024-04-11 LAB
ANION GAP SERPL CALCULATED.3IONS-SCNC: 6 MMOL/L (ref 3–16)
BASOPHILS # BLD: 0 K/UL (ref 0–0.2)
BASOPHILS NFR BLD: 0.2 %
BUN SERPL-MCNC: 14 MG/DL (ref 7–20)
CALCIUM SERPL-MCNC: 8.8 MG/DL (ref 8.3–10.6)
CHLORIDE SERPL-SCNC: 88 MMOL/L (ref 99–110)
CO2 SERPL-SCNC: 37 MMOL/L (ref 21–32)
CREAT SERPL-MCNC: <0.5 MG/DL (ref 0.6–1.2)
DEPRECATED RDW RBC AUTO: 16.7 % (ref 12.4–15.4)
EOSINOPHIL # BLD: 0 K/UL (ref 0–0.6)
EOSINOPHIL NFR BLD: 0.2 %
GFR SERPLBLD CREATININE-BSD FMLA CKD-EPI: >90 ML/MIN/{1.73_M2}
GLUCOSE SERPL-MCNC: 93 MG/DL (ref 70–99)
HCT VFR BLD AUTO: 32.5 % (ref 36–48)
HGB BLD-MCNC: 10.4 G/DL (ref 12–16)
LYMPHOCYTES # BLD: 1.6 K/UL (ref 1–5.1)
LYMPHOCYTES NFR BLD: 15.6 %
MAGNESIUM SERPL-MCNC: 1.6 MG/DL (ref 1.8–2.4)
MCH RBC QN AUTO: 26.7 PG (ref 26–34)
MCHC RBC AUTO-ENTMCNC: 32.2 G/DL (ref 31–36)
MCV RBC AUTO: 83.1 FL (ref 80–100)
MONOCYTES # BLD: 0.7 K/UL (ref 0–1.3)
MONOCYTES NFR BLD: 7.1 %
NEUTROPHILS # BLD: 7.7 K/UL (ref 1.7–7.7)
NEUTROPHILS NFR BLD: 76.9 %
PLATELET # BLD AUTO: 179 K/UL (ref 135–450)
PMV BLD AUTO: 7.2 FL (ref 5–10.5)
POTASSIUM SERPL-SCNC: 3.3 MMOL/L (ref 3.5–5.1)
RBC # BLD AUTO: 3.9 M/UL (ref 4–5.2)
SODIUM SERPL-SCNC: 131 MMOL/L (ref 136–145)
WBC # BLD AUTO: 10 K/UL (ref 4–11)

## 2024-04-11 PROCEDURE — 94761 N-INVAS EAR/PLS OXIMETRY MLT: CPT

## 2024-04-11 PROCEDURE — 97110 THERAPEUTIC EXERCISES: CPT

## 2024-04-11 PROCEDURE — 97535 SELF CARE MNGMENT TRAINING: CPT

## 2024-04-11 PROCEDURE — 2700000000 HC OXYGEN THERAPY PER DAY

## 2024-04-11 PROCEDURE — 80048 BASIC METABOLIC PNL TOTAL CA: CPT

## 2024-04-11 PROCEDURE — 85025 COMPLETE CBC W/AUTO DIFF WBC: CPT

## 2024-04-11 PROCEDURE — 6370000000 HC RX 637 (ALT 250 FOR IP)

## 2024-04-11 PROCEDURE — 94640 AIRWAY INHALATION TREATMENT: CPT

## 2024-04-11 PROCEDURE — 2580000003 HC RX 258

## 2024-04-11 PROCEDURE — 94669 MECHANICAL CHEST WALL OSCILL: CPT

## 2024-04-11 PROCEDURE — 99223 1ST HOSP IP/OBS HIGH 75: CPT | Performed by: INTERNAL MEDICINE

## 2024-04-11 PROCEDURE — 36415 COLL VENOUS BLD VENIPUNCTURE: CPT

## 2024-04-11 PROCEDURE — 6360000002 HC RX W HCPCS

## 2024-04-11 PROCEDURE — 83735 ASSAY OF MAGNESIUM: CPT

## 2024-04-11 PROCEDURE — 6360000002 HC RX W HCPCS: Performed by: NURSE PRACTITIONER

## 2024-04-11 PROCEDURE — 6370000000 HC RX 637 (ALT 250 FOR IP): Performed by: INTERNAL MEDICINE

## 2024-04-11 PROCEDURE — 2060000000 HC ICU INTERMEDIATE R&B

## 2024-04-11 RX ORDER — HYDROXYZINE HYDROCHLORIDE 10 MG/1
10 TABLET, FILM COATED ORAL 3 TIMES DAILY PRN
Status: DISCONTINUED | OUTPATIENT
Start: 2024-04-11 | End: 2024-04-12 | Stop reason: HOSPADM

## 2024-04-11 RX ORDER — ZIPRASIDONE MESYLATE 20 MG/ML
5 INJECTION, POWDER, LYOPHILIZED, FOR SOLUTION INTRAMUSCULAR ONCE
Status: COMPLETED | OUTPATIENT
Start: 2024-04-11 | End: 2024-04-11

## 2024-04-11 RX ORDER — POTASSIUM CHLORIDE 20 MEQ/1
40 TABLET, EXTENDED RELEASE ORAL ONCE
Status: COMPLETED | OUTPATIENT
Start: 2024-04-11 | End: 2024-04-11

## 2024-04-11 RX ORDER — BUTALBITAL, ACETAMINOPHEN AND CAFFEINE 50; 325; 40 MG/1; MG/1; MG/1
1 TABLET ORAL EVERY 6 HOURS PRN
Status: DISCONTINUED | OUTPATIENT
Start: 2024-04-11 | End: 2024-04-12 | Stop reason: HOSPADM

## 2024-04-11 RX ORDER — WATER 10 ML/10ML
INJECTION INTRAMUSCULAR; INTRAVENOUS; SUBCUTANEOUS
Status: COMPLETED
Start: 2024-04-11 | End: 2024-04-11

## 2024-04-11 RX ORDER — MAGNESIUM SULFATE IN WATER 40 MG/ML
2000 INJECTION, SOLUTION INTRAVENOUS ONCE
Status: COMPLETED | OUTPATIENT
Start: 2024-04-11 | End: 2024-04-11

## 2024-04-11 RX ADMIN — IPRATROPIUM BROMIDE AND ALBUTEROL SULFATE 1 DOSE: 2.5; .5 SOLUTION RESPIRATORY (INHALATION) at 19:56

## 2024-04-11 RX ADMIN — IPRATROPIUM BROMIDE AND ALBUTEROL SULFATE 1 DOSE: 2.5; .5 SOLUTION RESPIRATORY (INHALATION) at 07:21

## 2024-04-11 RX ADMIN — DIVALPROEX SODIUM 500 MG: 250 TABLET, EXTENDED RELEASE ORAL at 20:15

## 2024-04-11 RX ADMIN — DULOXETINE 60 MG: 60 CAPSULE, DELAYED RELEASE ORAL at 08:28

## 2024-04-11 RX ADMIN — ACETYLCYSTEINE 600 MG: 200 INHALANT RESPIRATORY (INHALATION) at 07:21

## 2024-04-11 RX ADMIN — ACETYLCYSTEINE 600 MG: 200 INHALANT RESPIRATORY (INHALATION) at 19:56

## 2024-04-11 RX ADMIN — ZIPRASIDONE MESYLATE 5 MG: 20 INJECTION, POWDER, LYOPHILIZED, FOR SOLUTION INTRAMUSCULAR at 22:00

## 2024-04-11 RX ADMIN — GUAIFENESIN 600 MG: 600 TABLET, EXTENDED RELEASE ORAL at 20:16

## 2024-04-11 RX ADMIN — IPRATROPIUM BROMIDE AND ALBUTEROL SULFATE 1 DOSE: 2.5; .5 SOLUTION RESPIRATORY (INHALATION) at 11:25

## 2024-04-11 RX ADMIN — IPRATROPIUM BROMIDE AND ALBUTEROL SULFATE 1 DOSE: 2.5; .5 SOLUTION RESPIRATORY (INHALATION) at 15:59

## 2024-04-11 RX ADMIN — ACETAMINOPHEN 650 MG: 325 TABLET ORAL at 05:49

## 2024-04-11 RX ADMIN — QUETIAPINE FUMARATE 300 MG: 100 TABLET ORAL at 20:16

## 2024-04-11 RX ADMIN — BUTALBITAL, ACETAMINOPHEN AND CAFFEINE 1 TABLET: 325; 50; 40 TABLET ORAL at 22:16

## 2024-04-11 RX ADMIN — MAGNESIUM SULFATE IN WATER 2000 MG: 40 INJECTION, SOLUTION INTRAVENOUS at 10:02

## 2024-04-11 RX ADMIN — CARVEDILOL 25 MG: 25 TABLET, FILM COATED ORAL at 16:09

## 2024-04-11 RX ADMIN — GUAIFENESIN 600 MG: 600 TABLET, EXTENDED RELEASE ORAL at 08:29

## 2024-04-11 RX ADMIN — DIVALPROEX SODIUM 250 MG: 250 TABLET, EXTENDED RELEASE ORAL at 08:29

## 2024-04-11 RX ADMIN — ENOXAPARIN SODIUM 40 MG: 100 INJECTION SUBCUTANEOUS at 08:29

## 2024-04-11 RX ADMIN — HYDROXYZINE HYDROCHLORIDE 10 MG: 10 TABLET ORAL at 16:09

## 2024-04-11 RX ADMIN — LOSARTAN POTASSIUM 100 MG: 100 TABLET, FILM COATED ORAL at 08:27

## 2024-04-11 RX ADMIN — ASPIRIN 81 MG: 81 TABLET, COATED ORAL at 08:28

## 2024-04-11 RX ADMIN — ATORVASTATIN CALCIUM 40 MG: 40 TABLET, FILM COATED ORAL at 20:16

## 2024-04-11 RX ADMIN — BUTALBITAL, ACETAMINOPHEN AND CAFFEINE 1 TABLET: 325; 50; 40 TABLET ORAL at 10:02

## 2024-04-11 RX ADMIN — CARVEDILOL 25 MG: 25 TABLET, FILM COATED ORAL at 08:28

## 2024-04-11 RX ADMIN — PREDNISONE 40 MG: 20 TABLET ORAL at 08:30

## 2024-04-11 RX ADMIN — WATER 10 ML: 1 INJECTION INTRAMUSCULAR; INTRAVENOUS; SUBCUTANEOUS at 22:09

## 2024-04-11 RX ADMIN — BUTALBITAL, ACETAMINOPHEN AND CAFFEINE 1 TABLET: 325; 50; 40 TABLET ORAL at 16:09

## 2024-04-11 RX ADMIN — PANTOPRAZOLE SODIUM 40 MG: 40 TABLET, DELAYED RELEASE ORAL at 08:29

## 2024-04-11 RX ADMIN — LEVOFLOXACIN 750 MG: 5 INJECTION, SOLUTION INTRAVENOUS at 12:07

## 2024-04-11 RX ADMIN — Medication 10 ML: at 20:17

## 2024-04-11 RX ADMIN — POTASSIUM CHLORIDE 40 MEQ: 1500 TABLET, EXTENDED RELEASE ORAL at 08:30

## 2024-04-11 ASSESSMENT — PAIN SCALES - WONG BAKER: WONGBAKER_NUMERICALRESPONSE: HURTS A LITTLE BIT

## 2024-04-11 ASSESSMENT — PAIN SCALES - GENERAL
PAINLEVEL_OUTOF10: 10
PAINLEVEL_OUTOF10: 9

## 2024-04-11 ASSESSMENT — PAIN - FUNCTIONAL ASSESSMENT: PAIN_FUNCTIONAL_ASSESSMENT: ACTIVITIES ARE NOT PREVENTED

## 2024-04-11 ASSESSMENT — PAIN DESCRIPTION - LOCATION
LOCATION: HEAD
LOCATION: HEAD

## 2024-04-11 ASSESSMENT — PAIN DESCRIPTION - DESCRIPTORS
DESCRIPTORS: ACHING
DESCRIPTORS: ACHING

## 2024-04-11 ASSESSMENT — PAIN DESCRIPTION - ORIENTATION: ORIENTATION: MID

## 2024-04-11 NOTE — CARE COORDINATION
Spoke with MD who states patient will likely be getting a bronchoscopy either today or tomorrow.   Spoke with patient at bedside for introduction.  Discussed therapy rec's of SNF but she is adamantly refusing, stating she needs to get home to her family.  She states she is active with [facility] and she would just like to resume her home care at discharge.

## 2024-04-11 NOTE — PROGRESS NOTES
CHF Care Plan      Patient's EF (Ejection Fraction) is greater than 40%   (Last echo 2/16/23: 55-60%)    Heart Failure Medications:  Diuretics:: Furosemide    (One of the following REQUIRED for EF </= 40%/SYSTOLIC FAILURE but MAY be used in EF% >40%/DIASTOLIC FAILURE)        ACE:: None        ARB:: Losartan         ARNI:: None    (Beta Blockers)  NON- Evidenced Based Beta Blocker (for EF% >40%/DIASTOLIC FAILURE): None    Evidenced Based Beta Blocker::(REQUIRED for EF% <40%/SYSTOLIC FAILURE) Carvedilol- Coreg  ...................................................................................................................................................      Patient's weights and intake/output reviewed    Daily Weight log at bedside, patient/family participation in use of log: \"yes    Patient's current weight today (4/11: 172 lb)      Intake/Output Summary (Last 24 hours) at 4/11/2024 0241  Last data filed at 4/10/2024 1840  Gross per 24 hour   Intake 1082.53 ml   Output 650 ml   Net 432.53 ml       Education Booklet Provided: yes    Comorbidities Reviewed Yes    Patient has a past medical history of Abnormal ECG, Anemia, Arthritis, Back pain, chronic, Bipolar disorder ([facility]), Bronchitis chronic, CHF (congestive heart failure) ([facility]), Chronic back pain, Chronic neck pain, Clostridium difficile infection, 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[facility]), Coronary artery disease involving native coronary artery of native heart with angina pectoris ([facility]), Depression, Emphysema of lung ([facility]), Fibromyalgia, hyperlipidemia, Hypertension, Kidney stone, Liver disease, Lung disease, MDD (major depressive disorder), Mood disorder ([facility]), Movement disorder, Neck pain, chronic, Opiate misuse, Personality disorder (HCC), Pneumonia, and Polypharmacy.     >>For CHF and Comorbidity documentation on Education Time and Topics, please see Education Tab      Pt up in chair at this time on  3.5 L O2. Pt denies shortness of breath. Pt with nonpitting lower  extremity edema.     Patient and/or Family's stated Goal of Care this Admission: reduce shortness of breath, increase activity tolerance, better understand heart failure and disease management, be more comfortable, and reduce lower extremity edema prior to discharge        :

## 2024-04-11 NOTE — CONSULTS
INPATIENT PULMONARY CRITICAL CARE CONSULT NOTE      Chief Complaint/Referring Provider:  Patient is being seen at the request of Dr. Butt for a consultation for COPD/chest congestion for possible bronchoscopy     Presenting HPI: Patient came to the hospital because of increasing shortness of breath    As per the admitting provider-73 y.o. female who presented to [facility] with shortness of breath.  Patient notes shortness of breath started about 22 weeks ago.  She was previously hospitalized from April 1st to April 7th due to concerns of acute hypoxic respiratory failure secondary to influenza and possible superimposed pneumonia with COPD exacerbation.  She was just discharged on 7th April.  Since discharge she notes her symptoms have steadily worsened over the last 2 days.  She feels more short of breath than usual.  She is on 2 to 4 L oxygen at home, she is unsure the correct amount.  She also reports a productive cough with yellow-tinged sputum. Notes she has been more fatigued and weak as well as she has barely been able to move around at home.  She lives at home with her  and daughter.  She also affirms on and off chest pains in her midsternal region, describes it as a dull sensation.  States it similar show when she was previously admitted.  Denies that it is occurring at this time but has been happening on and off over the past few days.  She is a smoker however has not smoked over the past 2 weeks due to recent hospitalization.  Denies any alcohol use.     In the emergency department workup done for her acute hypoxic respiratory failure.  Lab work showed leukocytosis of 13.2, slight anemia at 11.2, hypokalemia at 3.2, sodium 135, hypomagnesemia at 1.5, Pro-Kyler was within normal limits at 0.05, troponin slightly elevated at 24 and repeat at 22, kidney function okay, liver enzymes within normal limits, platelets 134.  She was found to be influenza A positive.  VBG obtained showing pH 7.45, pCO2     Normocytic normochromic anemia 07/24/2016    Other chest pain 03/08/2016    CAD in native artery 03/08/2016    Dyslipidemia 03/08/2016    Discitis of thoracic region     Midline low back pain without sciatica     Current smoker 01/30/2016    Renal artery stenosis (HCC) 01/30/2016    Metabolic encephalopathy     Syncope and collapse     Prolonged QT interval 09/20/2015    Ataxia 08/27/2015    Acute exacerbation of chronic obstructive pulmonary disease (COPD) (HCC) 08/27/2015    Discitis of cervicothoracic region     Diarrhea     Discitis 01/21/2015    Other chronic pain 03/13/2013       Past Medical History:   Diagnosis Date    Abnormal ECG 12/14/2012    Anemia     Arthritis     Back pain, chronic 3/13/2013    Bipolar disorder ([facility])     Bronchitis chronic     CHF (congestive heart failure) ([facility]) 9/30/2016    Chronic back pain     Chronic neck pain     Clostridium difficile infection 3/29/12, 5/22/12    positive stool DNA probe    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[facility]) 01/10/2019    Coronary artery disease involving native coronary artery of native heart with angina pectoris ([facility]) 3/8/2016    Depression     Emphysema of lung ([facility])     Fibromyalgia     hyperlipidemia 8/11/2011    Hypertension     Kidney stone     Liver disease     Lung disease     MDD (major depressive disorder) 4/4/2012    Mood disorder ([facility]) 3/13/2013    Movement disorder     Neck pain, chronic 3/13/2013    Opiate misuse     Personality disorder ([facility])     Pneumonia     Polypharmacy 2/16/2013        Past Surgical History:   Procedure Laterality Date    COLONOSCOPY  1/19/12    DIAGNOSTIC CARDIAC CATH LAB PROCEDURE  Feb, 2007    Normal cor angio Feb, 2007    HERNIA REPAIR  07/11/2019    robotic ventral hernia repair with mesh    HERNIA REPAIR N/A 7/11/2019    ROBOTIC VENTRAL HERNIA REPAIR WITH MESH [place]y Rider [place] [facility] [place]    HYSTERECTOMY, TOTAL ABDOMINAL (CERVIX REMOVED)      KIDNEY STONE SURGERY          Family History   Problem  hernia. No tenderness.  Obese  Musculoskeletal: No cyanosis. No clubbing. No obvious joint deformity.   Lymphadenopathy: No cervical or supraclavicular adenopathy.   Skin: Skin is warm and dry. No rash or nodules on the exposed extremities.  Psychiatric: Normal mood and affect. Behavior is normal.  No anxiety.   Neurologic: Alert, awake and oriented. PERRL.  Speech fluent        Results:  CBC:   Recent Labs     04/09/24  0619 04/10/24  0458 04/11/24  0458   WBC 13.2* 13.8* 10.0   HGB 11.2* 10.3* 10.4*   HCT 36.4 31.9* 32.5*   MCV 86.4 83.1 83.1   * 150 179     BMP:   Recent Labs     04/09/24  0619 04/10/24  0458 04/11/24  0458   * 134* 131*   K 3.2* 3.9  3.9 3.3*   CL 90* 90* 88*   CO2 36* 38* 37*   BUN 7 14 14   CREATININE <0.5* <0.5* <0.5*     LIVER PROFILE:   Recent Labs     04/09/24 0619   AST 8*   ALT 9*   BILITOT 0.4   ALKPHOS 53         Imaging:  I have reviewed radiology images personally.    XR CHEST PORTABLE   Final Result   Slightly improved right basilar airspace opacity with interval development of   the left-sided mid lung airspace opacity.         XR CHEST PORTABLE    (Results Pending)     XR CHEST PORTABLE    Result Date: 4/9/2024  EXAMINATION: ONE XRAY VIEW OF THE CHEST 4/9/2024 6:05 am COMPARISON: 04/05/2024 HISTORY: ORDERING SYSTEM PROVIDED HISTORY: shortness of breath TECHNOLOGIST PROVIDED HISTORY: Reason for exam:->shortness of breath Reason for Exam: sob   recent pna FINDINGS: Slightly improved right basilar airspace opacity with interval development of the left-sided mid lung airspace opacity.  No pleural effusion or pneumothorax.  Stable cardiac silhouette.  The osseous structures are stable.     Slightly improved right basilar airspace opacity with interval development of the left-sided mid lung airspace opacity.     XR CHEST PORTABLE    Result Date: 4/5/2024  EXAMINATION: ONE XRAY VIEW OF THE CHEST 4/5/2024 11:29 am COMPARISON: None. HISTORY: ORDERING SYSTEM PROVIDED HISTORY:

## 2024-04-11 NOTE — PROGRESS NOTES
V2.0    [facility] Progress Note      Name:  Najma Fitzpatrick /Age/Sex: 1950  (73 y.o. female)   MRN & CSN:  [id number] & [id number] Encounter Date/Time: 2024 8:05 AM EDT   Location:   PCP: Geena Sotomayor APRN - CNP     Attending:Rafy Butt MD       Hospital Day: 3    Assessment and Recommendations   Najma Fitzpatrick is a 73 y.o. female with pmh of COPD, chronic diastolic CHF, depression, anxiety, insomnia, CAD, GERD, HTN, HLD who presents with Pneumonia due to infectious organism      Plan:   Acute hypoxic respiratory failure secondary to COPD exacerbation vs pneumonia  -Suspicion for bacterial infection low at this time as patient pro-delbert is negative, no fevers, has been on prior antibiotic  -Chest x-ray  concerning for new developing left airspace opacity, given rocephin and Levaquin in ED  -Continue supplemental O2, wean as tolerated  -Continue Levaquin, day 3  -Continue oral prednisone 40 mg daily  -Duonebs q 4 hrs, can transition to prn per RT  -Mucomyst, Mucinex, Acapella, and percussive nebulizer for congestion improvement  -Home Spiriva on hold as receiving scheduled duonebs  -Pulmonology consulted for continuation of COPD, would benefit from bronchoscopy  -Strep pneumonia, legionella, and MRSA swab negative  -PT/OT recommending SNF, however patient adamantly declining at this time     Influenza A  -Patient positive for Influenza A  -Complete course of Tamiflu at prior hospitalization  -Droplet precautions per floor     Chronic Diastolic CHF  -Patient with hx of diastolic CHF  -Last Echo was 23 with EF 55-60%, grade I diastolic dysfunction  -CHF order set placed  -Continue home carvedilol 25 mg twice daily  -Continue losartan 100 mg daily (changed from home med of irbesartan 300 mg)  -Continue lasix 20 mg daily   -No symptoms of fluid overload/hypervolemia at this time, would recommend echo on outpatient follow up    Leukocytosis  -Could be due to recent use of  mg Oral Daily    predniSONE  40 mg Oral Daily    QUEtiapine  300 mg Oral Nightly    divalproex  500 mg Oral QHS    divalproex  250 mg Oral QAM      Infusions:    sodium chloride Stopped (04/10/24 1205)     PRN Meds: butalbital-acetaminophen-caffeine, 1 tablet, Q6H PRN  sodium chloride flush, 5-40 mL, PRN  sodium chloride, , PRN  ondansetron, 4 mg, Q8H PRN   Or  ondansetron, 4 mg, Q6H PRN  polyethylene glycol, 17 g, Daily PRN  acetaminophen, 650 mg, Q6H PRN   Or  acetaminophen, 650 mg, Q6H PRN        Labs and Imaging   XR CHEST PORTABLE    Result Date: 4/9/2024  EXAMINATION: ONE XRAY VIEW OF THE CHEST 4/9/2024 6:05 am COMPARISON: 04/05/2024 HISTORY: ORDERING SYSTEM PROVIDED HISTORY: shortness of breath TECHNOLOGIST PROVIDED HISTORY: Reason for exam:->shortness of breath Reason for Exam: sob   recent pna FINDINGS: Slightly improved right basilar airspace opacity with interval development of the left-sided mid lung airspace opacity.  No pleural effusion or pneumothorax.  Stable cardiac silhouette.  The osseous structures are stable.     Slightly improved right basilar airspace opacity with interval development of the left-sided mid lung airspace opacity.     XR CHEST PORTABLE    Result Date: 4/5/2024  EXAMINATION: ONE XRAY VIEW OF THE CHEST 4/5/2024 11:29 am COMPARISON: None. HISTORY: ORDERING SYSTEM PROVIDED HISTORY: SOB TECHNOLOGIST PROVIDED HISTORY: Reason for exam:->SOB FINDINGS: Normal cardiomediastinal silhouette.  Right lower lobe airspace infiltrates. No pneumothorax or pleural effusion     Right lower lobe airspace infiltrates.  This appears compatible with pneumonia in the appropriate clinical setting.       CBC:   Recent Labs     04/09/24  0619 04/10/24  0458 04/11/24  0458   WBC 13.2* 13.8* 10.0   HGB 11.2* 10.3* 10.4*   * 150 179     BMP:    Recent Labs     04/09/24  0619 04/10/24  0458 04/11/24  0458   * 134* 131*   K 3.2* 3.9  3.9 3.3*   CL 90* 90* 88*   CO2 36* 38* 37*   BUN 7 14 14    CREATININE <0.5* <0.5* <0.5*   GLUCOSE 116* 163* 93     Hepatic:   Recent Labs     04/09/24  0619   AST 8*   ALT 9*   BILITOT 0.4   ALKPHOS 53     Lipids:   Lab Results   Component Value Date/Time    CHOL 132 02/16/2023 06:38 AM    HDL 51 02/16/2023 06:38 AM    HDL 44 09/22/2011 05:10 AM    TRIG 81 02/16/2023 06:38 AM     Hemoglobin A1C:   Lab Results   Component Value Date/Time    LABA1C 5.7 02/16/2023 06:38 AM     TSH:   Lab Results   Component Value Date/Time    TSH 0.56 11/05/2021 04:00 PM     Troponin: No results found for: \"TROPONINT\"  Lactic Acid:   Recent Labs     04/09/24  0626   LACTA 0.8     BNP:   Recent Labs     04/09/24  0619   PROBNP 981*     UA:  Lab Results   Component Value Date/Time    NITRU Negative 04/01/2024 11:40 PM    COLORU DARK YELLOW 04/01/2024 11:40 PM    PHUR 6.0 04/01/2024 11:40 PM    LABCAST 10-20 Hyaline 07/23/2016 10:35 PM    WBCUA 3-5 04/01/2024 11:40 PM    RBCUA 0-2 04/01/2024 11:40 PM    MUCUS 4+ 04/01/2024 11:40 PM    YEAST Present 01/03/2019 03:17 PM    BACTERIA 1+ 04/01/2024 11:40 PM    CLARITYU Clear 04/01/2024 11:40 PM    SPECGRAV >=1.030 04/01/2024 11:40 PM    LEUKOCYTESUR Negative 04/01/2024 11:40 PM    UROBILINOGEN 0.2 04/01/2024 11:40 PM    BILIRUBINUR Negative 04/01/2024 11:40 PM    BILIRUBINUR neg 03/02/2013 12:59 AM    BILIRUBINUR NEGATIVE 06/03/2012 07:19 PM    BLOODU Negative 04/01/2024 11:40 PM    GLUCOSEU Negative 04/01/2024 11:40 PM    GLUCOSEU NEGATIVE 06/03/2012 07:19 PM    KETUA Negative 04/01/2024 11:40 PM    AMORPHOUS Rare 04/01/2024 11:40 PM     Urine Cultures:   Lab Results   Component Value Date/Time    LABURIN No growth at 18-36 hours 03/09/2020 06:00 PM     Blood Cultures:   Lab Results   Component Value Date/Time    BC No Growth after 4 days of incubation. 04/01/2024 09:10 PM     Lab Results   Component Value Date/Time    BLOODCULT2 No Growth after 4 days of incubation. 04/01/2024 09:15 PM     Organism:   Lab Results   Component Value Date/Time    ORG

## 2024-04-11 NOTE — PROGRESS NOTES
Occupational Therapy  Facility/Department: [facility] A2 CARD TELEMETRY  Daily Treatment Note  NAME: Najma Fitzpatrick  : 1950  MRN: [id number]    Date of Service: 2024    Discharge Recommendations:  Subacute/Skilled Nursing Facility  OT Equipment Recommendations  Equipment Needed: No  Other: defer    Therapy discharge recommendations are subject to collaboration from the patient’s interdisciplinary healthcare team, including MD and case management recommendations.     Barriers to Home Discharge:   [x] Steps to access home entry or bed/bath:   [x] Unable to transfer, ambulate, or propel wheelchair household distances without assist   [] Limited available assist at home upon discharge    [] Patient or family requests d/c to post-acute facility    [] Poor cognition/safety awareness for d/c to home alone    [] Unable to maintain ordered weight bearing status    [] Patient with salient signs of long-standing immobility   [x] Decreased independence with ADLs   [x] Increased risk for falls   [] Other:     If pt is unable to be seen after this session, please let this note serve as discharge summary.  Please see case management note for discharge disposition.  Thank you.    Patient Diagnosis(es): The primary encounter diagnosis was Pneumonia of both lower lobes due to infectious organism. A diagnosis of COPD exacerbation (HCC) was also pertinent to this visit.     Assessment    Assessment: Pt supine in bed at start of session. Pt tolerates OT session well. Pt completes bed mobility with SBA/Isai and sits  EOB for approximately 15 minutes with SBA. Pt completes seated grooming tasks with SBA and setup. Pt completes sit to stand and a few side steps along EOB with CGA. Pt tolerates in bed exercises and is educated on energy conservation techniques. Pt is limited by endurance and weakness - OT recommends SNF to increase pt's independence with ADLs/ mobility. Pt will continue to benefit from continued skilled OT services at  this time.   Activity Tolerance: Patient tolerated treatment well;Patient limited by fatigue  Discharge Recommendations: Subacute/Skilled Nursing Facility  Equipment Needed: No  Other: defer      Plan   Occupational Therapy Plan  Times Per Week: 3-5x  Current Treatment Recommendations: Strengthening;Balance training;Functional mobility training;Endurance training;Safety education & training;Equipment evaluation, education, & procurement;Self-Care / ADL     Restrictions  Restrictions/Precautions  Restrictions/Precautions: Fall Risk  Required Braces or Orthoses?: No  Position Activity Restriction  Other position/activity restrictions: up with assistance, purewick    Subjective   Subjective  Subjective: Pt supine in bed at start of session. AGreeable to OT tx. Reports 8/10 pain in head  Orientation  Overall Orientation Status: Within Functional Limits  Orientation Level: Oriented X4  Cognition  Overall Cognitive Status: WNL        Objective    Vitals  Vitals:    04/11/24    BP: (!) 172/73   Pulse: 70   Resp: 18   Temp: 98.4 °F (36.9 °C)   SpO2: 97%        Bed Mobility Training  Bed Mobility Training: Yes  Interventions: Safety awareness training;Verbal cues  Supine to Sit: Stand-by assistance;Additional time;Adaptive equipment (HOB elevated, towards L)  Sit to Supine: Minimum assistance;Additional time (HOB flat, towards R)  Scooting: Stand-by assistance  Balance  Sitting: Intact  Standing: Impaired  Standing - Static: Constant support;Fair  Standing - Dynamic: Constant support;Fair  Transfer Training  Transfer Training: Yes  Interventions: Verbal cues;Safety awareness training  Sit to Stand: Contact-guard assistance;Additional time  Stand to Sit: Contact-guard assistance;Additional time     ADL  Grooming: Stand by assistance;Setup;Increased time to complete  Grooming Skilled Clinical Factors: seated EOB pt brushed teeth and combed hair  Toileting: Dependent/Total  Toileting Skilled Clinical Factors: purewick in

## 2024-04-11 NOTE — PLAN OF CARE
Problem: Discharge Planning  Goal: Discharge to home or other facility with appropriate resources  Outcome: Progressing     Problem: Safety - Adult  Goal: Free from fall injury  Outcome: Progressing     Problem: Pain  Goal: Verbalizes/displays adequate comfort level or baseline comfort level  Outcome: Progressing     Problem: Chronic Conditions and Co-morbidities  Goal: Patient's chronic conditions and co-morbidity symptoms are monitored and maintained or improved  Outcome: Progressing   CHF Care Plan      Patient's EF (Ejection Fraction) is greater than 40%    Heart Failure Medications:  Diuretics:: Furosemide    (One of the following REQUIRED for EF </= 40%/SYSTOLIC FAILURE but MAY be used in EF% >40%/DIASTOLIC FAILURE)        ACE:: None        ARB:: Losartan         ARNI:: None    (Beta Blockers)  NON- Evidenced Based Beta Blocker (for EF% >40%/DIASTOLIC FAILURE): None    Evidenced Based Beta Blocker::(REQUIRED for EF% <40%/SYSTOLIC FAILURE) Carvedilol- Coreg  ...................................................................................................................................................    Failed to redirect to the Timeline version of the [employer] SmartLink.      Patient's weights and intake/output reviewed    Daily Weight log at bedside, patient/family participation in use of log: no    Patient's current weight today shows a difference of 2 lbs more than last documented weight.      Intake/Output Summary (Last 24 hours) at 4/11/2024 0818  Last data filed at 4/10/2024 1840  Gross per 24 hour   Intake 1082.53 ml   Output 350 ml   Net 732.53 ml       Education Booklet Provided: yes    Comorbidities Reviewed Yes    Patient has a past medical history of Abnormal ECG, Anemia, Arthritis, Back pain, chronic, Bipolar disorder (HCC), Bronchitis chronic, CHF (congestive heart failure) (HCC), Chronic back pain, Chronic neck pain, Clostridium difficile infection, 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HCC), Coronary  artery disease involving native coronary artery of native heart with angina pectoris (HCC), Depression, Emphysema of lung (HCC), Fibromyalgia, hyperlipidemia, Hypertension, Kidney stone, Liver disease, Lung disease, MDD (major depressive disorder), Mood disorder (HCC), Movement disorder, Neck pain, chronic, Opiate misuse, Personality disorder (HCC), Pneumonia, and Polypharmacy.     >>For CHF and Comorbidity documentation on Education Time and Topics, please see Education Tab      Pt resting in bed at this time on  2.5 L O2. Pt denies shortness of breath. Pt with nonpitting lower extremity edema.     Patient and/or Family's stated Goal of Care this Admission: reduce shortness of breath, increase activity tolerance, better understand heart failure and disease management, be more comfortable, and reduce lower extremity edema prior to discharge        :

## 2024-04-12 ENCOUNTER — APPOINTMENT (OUTPATIENT)
Dept: GENERAL RADIOLOGY | Age: 74
DRG: 865 | End: 2024-04-12
Payer: COMMERCIAL

## 2024-04-12 VITALS
HEIGHT: 61 IN | WEIGHT: 172.1 LBS | TEMPERATURE: 99.3 F | HEART RATE: 76 BPM | DIASTOLIC BLOOD PRESSURE: 91 MMHG | RESPIRATION RATE: 20 BRPM | OXYGEN SATURATION: 89 % | BODY MASS INDEX: 32.49 KG/M2 | SYSTOLIC BLOOD PRESSURE: 169 MMHG

## 2024-04-12 LAB
ANION GAP SERPL CALCULATED.3IONS-SCNC: 8 MMOL/L (ref 3–16)
ANISOCYTOSIS BLD QL SMEAR: ABNORMAL
BASOPHILS # BLD: 0 K/UL (ref 0–0.2)
BASOPHILS NFR BLD: 0 %
BUN SERPL-MCNC: 13 MG/DL (ref 7–20)
CALCIUM SERPL-MCNC: 8.6 MG/DL (ref 8.3–10.6)
CHLORIDE SERPL-SCNC: 91 MMOL/L (ref 99–110)
CO2 SERPL-SCNC: 33 MMOL/L (ref 21–32)
CREAT SERPL-MCNC: <0.5 MG/DL (ref 0.6–1.2)
DEPRECATED RDW RBC AUTO: 16.8 % (ref 12.4–15.4)
EOSINOPHIL # BLD: 0 K/UL (ref 0–0.6)
EOSINOPHIL NFR BLD: 0 %
GFR SERPLBLD CREATININE-BSD FMLA CKD-EPI: >90 ML/MIN/{1.73_M2}
GLUCOSE SERPL-MCNC: 103 MG/DL (ref 70–99)
HCT VFR BLD AUTO: 32.8 % (ref 36–48)
HGB BLD-MCNC: 10.7 G/DL (ref 12–16)
LYMPHOCYTES # BLD: 2 K/UL (ref 1–5.1)
LYMPHOCYTES NFR BLD: 28 %
MAGNESIUM SERPL-MCNC: 1.8 MG/DL (ref 1.8–2.4)
MCH RBC QN AUTO: 27 PG (ref 26–34)
MCHC RBC AUTO-ENTMCNC: 32.5 G/DL (ref 31–36)
MCV RBC AUTO: 83.1 FL (ref 80–100)
MONOCYTES # BLD: 0.4 K/UL (ref 0–1.3)
MONOCYTES NFR BLD: 5 %
MRSA SPEC QL CULT: NORMAL
NEUTROPHILS # BLD: 4.9 K/UL (ref 1.7–7.7)
NEUTROPHILS NFR BLD: 64 %
NEUTS BAND NFR BLD MANUAL: 3 % (ref 0–7)
NT-PROBNP SERPL-MCNC: 425 PG/ML (ref 0–124)
OVALOCYTES BLD QL SMEAR: ABNORMAL
PLATELET # BLD AUTO: 182 K/UL (ref 135–450)
PLATELET BLD QL SMEAR: ADEQUATE
PMV BLD AUTO: 7.2 FL (ref 5–10.5)
POIKILOCYTOSIS BLD QL SMEAR: ABNORMAL
POLYCHROMASIA BLD QL SMEAR: ABNORMAL
POTASSIUM SERPL-SCNC: 3.8 MMOL/L (ref 3.5–5.1)
RBC # BLD AUTO: 3.95 M/UL (ref 4–5.2)
SLIDE REVIEW: ABNORMAL
SODIUM SERPL-SCNC: 132 MMOL/L (ref 136–145)
WBC # BLD AUTO: 7.3 K/UL (ref 4–11)

## 2024-04-12 PROCEDURE — 6370000000 HC RX 637 (ALT 250 FOR IP)

## 2024-04-12 PROCEDURE — 6370000000 HC RX 637 (ALT 250 FOR IP): Performed by: INTERNAL MEDICINE

## 2024-04-12 PROCEDURE — 6360000002 HC RX W HCPCS

## 2024-04-12 PROCEDURE — 2580000003 HC RX 258

## 2024-04-12 PROCEDURE — 94640 AIRWAY INHALATION TREATMENT: CPT

## 2024-04-12 PROCEDURE — 6360000002 HC RX W HCPCS: Performed by: INTERNAL MEDICINE

## 2024-04-12 PROCEDURE — 71045 X-RAY EXAM CHEST 1 VIEW: CPT

## 2024-04-12 PROCEDURE — 80048 BASIC METABOLIC PNL TOTAL CA: CPT

## 2024-04-12 PROCEDURE — 94669 MECHANICAL CHEST WALL OSCILL: CPT

## 2024-04-12 PROCEDURE — 83735 ASSAY OF MAGNESIUM: CPT

## 2024-04-12 PROCEDURE — 94761 N-INVAS EAR/PLS OXIMETRY MLT: CPT

## 2024-04-12 PROCEDURE — 2700000000 HC OXYGEN THERAPY PER DAY

## 2024-04-12 PROCEDURE — 85025 COMPLETE CBC W/AUTO DIFF WBC: CPT

## 2024-04-12 PROCEDURE — 83880 ASSAY OF NATRIURETIC PEPTIDE: CPT

## 2024-04-12 PROCEDURE — 36415 COLL VENOUS BLD VENIPUNCTURE: CPT

## 2024-04-12 PROCEDURE — 99233 SBSQ HOSP IP/OBS HIGH 50: CPT | Performed by: INTERNAL MEDICINE

## 2024-04-12 RX ORDER — ZIPRASIDONE MESYLATE 20 MG/ML
10 INJECTION, POWDER, LYOPHILIZED, FOR SOLUTION INTRAMUSCULAR EVERY 6 HOURS PRN
Status: DISCONTINUED | OUTPATIENT
Start: 2024-04-12 | End: 2024-04-12 | Stop reason: HOSPADM

## 2024-04-12 RX ORDER — FUROSEMIDE 10 MG/ML
40 INJECTION INTRAMUSCULAR; INTRAVENOUS ONCE
Status: COMPLETED | OUTPATIENT
Start: 2024-04-12 | End: 2024-04-12

## 2024-04-12 RX ADMIN — BUTALBITAL, ACETAMINOPHEN AND CAFFEINE 1 TABLET: 325; 50; 40 TABLET ORAL at 10:24

## 2024-04-12 RX ADMIN — DIVALPROEX SODIUM 250 MG: 250 TABLET, EXTENDED RELEASE ORAL at 10:11

## 2024-04-12 RX ADMIN — IPRATROPIUM BROMIDE AND ALBUTEROL SULFATE 1 DOSE: 2.5; .5 SOLUTION RESPIRATORY (INHALATION) at 11:35

## 2024-04-12 RX ADMIN — LEVOFLOXACIN 750 MG: 5 INJECTION, SOLUTION INTRAVENOUS at 10:22

## 2024-04-12 RX ADMIN — BUTALBITAL, ACETAMINOPHEN AND CAFFEINE 1 TABLET: 325; 50; 40 TABLET ORAL at 05:33

## 2024-04-12 RX ADMIN — IPRATROPIUM BROMIDE AND ALBUTEROL SULFATE 1 DOSE: 2.5; .5 SOLUTION RESPIRATORY (INHALATION) at 16:05

## 2024-04-12 RX ADMIN — PREDNISONE 40 MG: 20 TABLET ORAL at 10:11

## 2024-04-12 RX ADMIN — GUAIFENESIN 600 MG: 600 TABLET, EXTENDED RELEASE ORAL at 10:11

## 2024-04-12 RX ADMIN — LOSARTAN POTASSIUM 100 MG: 100 TABLET, FILM COATED ORAL at 10:11

## 2024-04-12 RX ADMIN — ACETYLCYSTEINE 600 MG: 200 INHALANT RESPIRATORY (INHALATION) at 08:14

## 2024-04-12 RX ADMIN — DULOXETINE 60 MG: 60 CAPSULE, DELAYED RELEASE ORAL at 10:11

## 2024-04-12 RX ADMIN — ENOXAPARIN SODIUM 40 MG: 100 INJECTION SUBCUTANEOUS at 10:11

## 2024-04-12 RX ADMIN — ASPIRIN 81 MG: 81 TABLET, COATED ORAL at 10:11

## 2024-04-12 RX ADMIN — PANTOPRAZOLE SODIUM 40 MG: 40 TABLET, DELAYED RELEASE ORAL at 08:41

## 2024-04-12 RX ADMIN — Medication 10 ML: at 10:14

## 2024-04-12 RX ADMIN — HYDROXYZINE HYDROCHLORIDE 10 MG: 10 TABLET ORAL at 06:27

## 2024-04-12 RX ADMIN — CARVEDILOL 25 MG: 25 TABLET, FILM COATED ORAL at 08:41

## 2024-04-12 RX ADMIN — FUROSEMIDE 40 MG: 10 INJECTION, SOLUTION INTRAMUSCULAR; INTRAVENOUS at 10:25

## 2024-04-12 RX ADMIN — IPRATROPIUM BROMIDE AND ALBUTEROL SULFATE 1 DOSE: 2.5; .5 SOLUTION RESPIRATORY (INHALATION) at 08:14

## 2024-04-12 ASSESSMENT — PAIN SCALES - GENERAL
PAINLEVEL_OUTOF10: 8
PAINLEVEL_OUTOF10: 8
PAINLEVEL_OUTOF10: 10

## 2024-04-12 ASSESSMENT — PAIN DESCRIPTION - DESCRIPTORS
DESCRIPTORS: ACHING
DESCRIPTORS: ACHING

## 2024-04-12 ASSESSMENT — PAIN DESCRIPTION - ORIENTATION
ORIENTATION: MID
ORIENTATION: UPPER

## 2024-04-12 ASSESSMENT — PAIN - FUNCTIONAL ASSESSMENT
PAIN_FUNCTIONAL_ASSESSMENT: PREVENTS OR INTERFERES SOME ACTIVE ACTIVITIES AND ADLS
PAIN_FUNCTIONAL_ASSESSMENT: ACTIVITIES ARE NOT PREVENTED

## 2024-04-12 ASSESSMENT — PAIN DESCRIPTION - LOCATION
LOCATION: HEAD
LOCATION: HEAD

## 2024-04-12 NOTE — DISCHARGE SUMMARY
V2.0  Discharge Summary    Name:  Namja Fitzpatrick /Age/Sex: 1950 (73 y.o. female)   Admit Date: 2024  Discharge Date: 24    MRN & CSN:  [id number] & [id number] Encounter Date and Time 24 3:51 PM EDT    Attending:  Rafy Butt MD Discharging Provider: Mario FLORES DO       Hospital Course:     Brief HPI: Najma Fitzpatrick is a 73 y.o. female who presented to [facility] with shortness of breath.  Patient notes shortness of breath started about 22 weeks ago.  She was previously hospitalized from  to  due to concerns of acute hypoxic respiratory failure secondary to influenza and possible superimposed pneumonia with COPD exacerbation.  She was just discharged on .  Since discharge she notes her symptoms have steadily worsened over the last 2 days.  She feels more short of breath than usual.  She is on 2 to 4 L oxygen at home, she is unsure the correct amount.  She also reports a productive cough with yellow-tinged sputum. Notes she has been more fatigued and weak as well as she has barely been able to move around at home.  She lives at home with her  and daughter.  She also affirms on and off chest pains in her midsternal region, describes it as a dull sensation.  States it similar show when she was previously admitted.  Denies that it is occurring at this time but has been happening on and off over the past few days.  She is a smoker however has not smoked over the past 2 weeks due to recent hospitalization.  Denies any alcohol use.     Patient decided to leave AMA prior to medical discharge. We are diuresing patient with lasix to improve the noted pulmonary edema, and would like her to stay the night to further monitor her progress, however she does not want to stay the night. She is alert and oriented x3, is aware of her risks of leaving AMA including reoccurrence of fluid in lungs, increasing shortness of breath, and risk of possible respiratory  Labs     04/10/24  0458 04/11/24  0458 04/12/24  0443   WBC 13.8* 10.0 7.3   HGB 10.3* 10.4* 10.7*    179 182     BMP:    Recent Labs     04/10/24  0458 04/11/24  0458 04/12/24  0443   * 131* 132*   K 3.9  3.9 3.3* 3.8   CL 90* 88* 91*   CO2 38* 37* 33*   BUN 14 14 13   CREATININE <0.5* <0.5* <0.5*   GLUCOSE 163* 93 103*     Hepatic: No results for input(s): \"AST\", \"ALT\", \"ALB\", \"BILITOT\", \"ALKPHOS\" in the last 72 hours.  Lipids:   Lab Results   Component Value Date/Time    CHOL 132 02/16/2023 06:38 AM    HDL 51 02/16/2023 06:38 AM    HDL 44 09/22/2011 05:10 AM    TRIG 81 02/16/2023 06:38 AM     Hemoglobin A1C:   Lab Results   Component Value Date/Time    LABA1C 5.7 02/16/2023 06:38 AM     TSH:   Lab Results   Component Value Date/Time    TSH 0.56 11/05/2021 04:00 PM     Troponin: No results found for: \"TROPONINT\"  Lactic Acid: No results for input(s): \"LACTA\" in the last 72 hours.  BNP:   Recent Labs     04/12/24 0443   PROBNP 425*     UA:  Lab Results   Component Value Date/Time    NITRU Negative 04/01/2024 11:40 PM    COLORU DARK YELLOW 04/01/2024 11:40 PM    PHUR 6.0 04/01/2024 11:40 PM    LABCAST 10-20 Hyaline 07/23/2016 10:35 PM    WBCUA 3-5 04/01/2024 11:40 PM    RBCUA 0-2 04/01/2024 11:40 PM    MUCUS 4+ 04/01/2024 11:40 PM    YEAST Present 01/03/2019 03:17 PM    BACTERIA 1+ 04/01/2024 11:40 PM    CLARITYU Clear 04/01/2024 11:40 PM    SPECGRAV >=1.030 04/01/2024 11:40 PM    LEUKOCYTESUR Negative 04/01/2024 11:40 PM    UROBILINOGEN 0.2 04/01/2024 11:40 PM    BILIRUBINUR Negative 04/01/2024 11:40 PM    BILIRUBINUR neg 03/02/2013 12:59 AM    BILIRUBINUR NEGATIVE 06/03/2012 07:19 PM    BLOODU Negative 04/01/2024 11:40 PM    GLUCOSEU Negative 04/01/2024 11:40 PM    GLUCOSEU NEGATIVE 06/03/2012 07:19 PM    KETUA Negative 04/01/2024 11:40 PM    AMORPHOUS Rare 04/01/2024 11:40 PM     Urine Cultures:   Lab Results   Component Value Date/Time    LABURIN No growth at 18-36 hours 03/09/2020 06:00 PM

## 2024-04-12 NOTE — PROGRESS NOTES
Patient got more agitated and confused. Pt removed her oxygen and wouldn't allow nurses to check her. Also refusing to take pills. Perfect served to provider. Geodon injection given. Set up avasys on room.

## 2024-04-12 NOTE — PROGRESS NOTES
INPATIENT PULMONARY CRITICAL CARE PROGRESS NOTE      Reason for visit    COPD/chest congestion for possible bronchoscopy     SUBJECTIVE: Patient when seen this morning was feeling much better, patient did not have that much of cough or expectoration which is concerning, patient is able to bring up phlegm without any issues now, patient shortness of breath and wheezing has resolved, patient does not have any pleuritic chest pain, patient was on 2 L of nasal oxygen with saturation 94%, patient was afebrile and he medically maintained, patient had sinus rhythm on the monitor, patient urine output has been adequate, patient has a cumulative balance of -1.4 L, patient is alert and oriented, patient glycemic control is acceptable, no other pertinent review of system of concern         Physical Exam:  Blood pressure (!) 157/73, pulse 74, temperature 98.3 °F (36.8 °C), temperature source Oral, resp. rate 20, height 1.549 m (5' 1\"), weight 78.1 kg (172 lb 1.6 oz), SpO2 91 %, not currently breastfeeding.'     Constitutional: No significant respite distress or any audible chest congestion when seen  HENT:  Oropharynx is clear and moist. No thyromegaly.  Eyes:  Conjunctivae are normal. Pupils equal, round, and reactive to light. No scleral icterus.   Neck: . No tracheal deviation present. No obvious thyroid mass.  Short and large neck  Cardiovascular: Normal rate, regular rhythm, normal heart sounds.  No right ventricular heave. No lower extremity edema.  Pulmonary/Chest: No wheezes.  Minimal scattered rales.  Minimal chest congestion chest wall is not dull to percussion.  No accessory muscle usage or stridor.  Decreased breath sound intensity with prolonged expiration  Abdominal: Soft. Bowel sounds present. No distension or hernia. No tenderness.  Obese  Musculoskeletal: No cyanosis. No clubbing. No obvious joint deformity.   Lymphadenopathy: No cervical or supraclavicular adenopathy.   Skin: Skin is warm and dry. No rash or  nodules on the exposed extremities.  Psychiatric: Normal mood and affect. Behavior is normal.  No anxiety.   Neurologic: Alert, awake and oriented. PERRL.  Speech fluent      Results:  CBC:   Recent Labs     04/10/24  0458 04/11/24  0458 04/12/24  0443   WBC 13.8* 10.0 7.3   HGB 10.3* 10.4* 10.7*   HCT 31.9* 32.5* 32.8*   MCV 83.1 83.1 83.1    179 182     BMP:   Recent Labs     04/10/24  0458 04/11/24  0458 04/12/24  0443   * 131* 132*   K 3.9  3.9 3.3* 3.8   CL 90* 88* 91*   CO2 38* 37* 33*   BUN 14 14 13   CREATININE <0.5* <0.5* <0.5*         Imaging:  I have reviewed radiology images personally.    XR CHEST PORTABLE   Final Result   Slightly improved right basilar airspace opacity with interval development of   the left-sided mid lung airspace opacity.         XR CHEST PORTABLE    (Results Pending)     XR CHEST PORTABLE    Result Date: 4/9/2024  EXAMINATION: ONE XRAY VIEW OF THE CHEST 4/9/2024 6:05 am COMPARISON: 04/05/2024 HISTORY: ORDERING SYSTEM PROVIDED HISTORY: shortness of breath TECHNOLOGIST PROVIDED HISTORY: Reason for exam:->shortness of breath Reason for Exam: sob   recent pna FINDINGS: Slightly improved right basilar airspace opacity with interval development of the left-sided mid lung airspace opacity.  No pleural effusion or pneumothorax.  Stable cardiac silhouette.  The osseous structures are stable.     Slightly improved right basilar airspace opacity with interval development of the left-sided mid lung airspace opacity.     XR CHEST PORTABLE    Result Date: 4/5/2024  EXAMINATION: ONE XRAY VIEW OF THE CHEST 4/5/2024 11:29 am COMPARISON: None. HISTORY: ORDERING SYSTEM PROVIDED HISTORY: SOB TECHNOLOGIST PROVIDED HISTORY: Reason for exam:->SOB FINDINGS: Normal cardiomediastinal silhouette.  Right lower lobe airspace infiltrates. No pneumothorax or pleural effusion     Right lower lobe airspace infiltrates.  This appears compatible with pneumonia in the appropriate clinical setting.  with class I obesity and current smoker should have secondary polycythemia but patient has normocytic normochromic anemia-evaluation and management of anemia as per patient's primary team/PCP  Patient to be provisionally kept n.p.o. after midnight  Depending on patient's clinical status and the x-ray chest can decide upon any therapeutic bronchoscopy or not  PUD and DVT prophylaxis as per primary team    Case discussed with patient, nursing            Electronically signed by:  EMA ROGERS MD    4/12/2024    7:12 AM.

## 2024-04-12 NOTE — PLAN OF CARE
Problem: Safety - Adult  Goal: Free from fall injury  4/12/2024 0132 by Shira Castellon RN  Outcome: Progressing  Note: Pt will remain free from falls throughout hospital stay. Fall precautions in place, bed alarm on, bed in lowest position with wheels locked and side rails 2/4 up. Room door open and hourly rounding completed. Will continue to monitor throughout shift.     For patient safety, an AVASYS camera has been placed in patient's room.  AVASYS monitoring needed for   , Confusion, and Increased Fall Risk.  Writer placed call to monitor observer to verify camera number  and review patient name, reason for monitoring, and contact information for primary RN.  Patient notified of use of remote monitoring upon implementation of camera and verbalized understanding.    4/12/2024 0131 by Shira Castellon RN  Outcome: Progressing     Problem: Discharge Planning  Goal: Discharge to home or other facility with appropriate resources  4/12/2024 0132 by Shira Castellon RN  Outcome: Progressing  4/12/2024 0131 by Shira Castellon RN  Outcome: Progressing     Problem: Pain  Goal: Verbalizes/displays adequate comfort level or baseline comfort level  4/12/2024 0132 by Shira Castellon RN  Outcome: Progressing  Note: Pt will be satisfied with pain control. Pt uses numeric pain rating scale with reassessments after pain med administration. Will continue to monitor progression throughout shift.   4/12/2024 0131 by Shira Castellon RN  Outcome: Progressing     Problem: Chronic Conditions and Co-morbidities  Goal: Patient's chronic conditions and co-morbidity symptoms are monitored and maintained or improved  4/12/2024 0132 by Shira Castellon RN  Outcome: Progressing  4/12/2024 0131 by Shira Castellon RN  Outcome: Progressing   CHF Care Plan      Patient's EF (Ejection Fraction) is greater than 40%    Heart Failure Medications:  Diuretics:: Furosemide    (One of the following REQUIRED for EF </= 40%/SYSTOLIC FAILURE but MAY be used in EF%  >40%/DIASTOLIC FAILURE)        ACE:: None        ARB:: Losartan         ARNI:: None    (Beta Blockers)  NON- Evidenced Based Beta Blocker (for EF% >40%/DIASTOLIC FAILURE): None    Evidenced Based Beta Blocker::(REQUIRED for EF% <40%/SYSTOLIC FAILURE) Carvedilol- Coreg  ...................................................................................................................................................    Failed to redirect to the Timeline version of the [employer] SmartLink.      Patient's weights and intake/output reviewed    Daily Weight log at bedside, patient/family participation in use of log: \"yes    Patient's current weight today shows a difference of 2 lbs more than last documented weight.      Intake/Output Summary (Last 24 hours) at 4/12/2024 0133  Last data filed at 4/11/2024 1841  Gross per 24 hour   Intake 240 ml   Output 1000 ml   Net -760 ml       Education Booklet Provided: yes    Comorbidities Reviewed Yes    Patient has a past medical history of Abnormal ECG, Anemia, Arthritis, Back pain, chronic, Bipolar disorder ([facility]), Bronchitis chronic, CHF (congestive heart failure) ([facility]), Chronic back pain, Chronic neck pain, Clostridium difficile infection, 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[facility]), Coronary artery disease involving native coronary artery of native heart with angina pectoris ([facility]), Depression, Emphysema of lung ([facility]), Fibromyalgia, hyperlipidemia, Hypertension, Kidney stone, Liver disease, Lung disease, MDD (major depressive disorder), Mood disorder ([facility]), Movement disorder, Neck pain, chronic, Opiate misuse, Personality disorder ([facility]), Pneumonia, and Polypharmacy.     >>For CHF and Comorbidity documentation on Education Time and Topics, please see Education Tab      Pt resting in bed at this time on  2 L O2. Pt denies shortness of breath. Pt without lower extremity edema.     Patient and/or Family's stated Goal of Care this Admission: reduce shortness of breath, increase activity tolerance,

## 2024-04-12 NOTE — PLAN OF CARE
Problem: Safety - Adult  Goal: Free from fall injury  Outcome: Completed     Problem: Discharge Planning  Goal: Discharge to home or other facility with appropriate resources  Outcome: Completed     Problem: Pain  Goal: Verbalizes/displays adequate comfort level or baseline comfort level  Outcome: Completed     Problem: Chronic Conditions and Co-morbidities  Goal: Patient's chronic conditions and co-morbidity symptoms are monitored and maintained or improved  Outcome: Completed     Problem: Confusion  Goal: Confusion, delirium, dementia, or psychosis is improved or at baseline  Description: INTERVENTIONS:  1. Assess for possible contributors to thought disturbance, including medications, impaired vision or hearing, underlying metabolic abnormalities, dehydration, psychiatric diagnoses, and notify attending LIP  2. [place] high risk fall precautions, as indicated  3. Provide frequent short contacts to provide reality reorientation, refocusing and direction  4. Decrease environmental stimuli, including noise as appropriate  5. Monitor and intervene to maintain adequate nutrition, hydration, elimination, sleep and activity  6. If unable to ensure safety without constant attention obtain sitter and review sitter guidelines with assigned personnel  7. Initiate Psychosocial CNS and Spiritual Care consult, as indicated  Outcome: Completed

## 2024-04-12 NOTE — PROGRESS NOTES
Pt is very agitated and wants to go home. Dr. Butt up to talk with pt. Pt is saying she is really mad and she is going home. Pt family called for update and message sent to Dr. Butt to call family.Nayana Barron RN

## 2024-04-12 NOTE — PROGRESS NOTES
4 Eyes Skin Assessment     The patient is being assess for  Transfer to New Unit    I agree that 2 RN's have performed a thorough Head to Toe Skin Assessment on the patient. ALL assessment sites listed below have been assessed.       Areas assessed by both nurses: Tri PÉREZ / Joni  [x]   Head, Face, and Ears   [x]   Shoulders, Back, and Chest  [x]   Arms, Elbows, and Hands   [x]   Coccyx, Sacrum, and IschIum  [x]   Legs, Feet, and Heels        Does the Patient have Skin Breakdown?  No         Emmanuel Prevention initiated:  Yes   Wound Care Orders initiated:  NA      [facility] nurse consulted for Pressure Injury (Stage 3,4, Unstageable, DTI, NWPT, and Complex wounds), New and Established Ostomies:  NA      Nurse 1 eSignature: Electronically signed by TRI RESENDIZ RN on 4/12/24 at 7:37 AM EDT    **SHARE this note so that the co-signing nurse is able to place an eSignature**    Nurse 2 eSignature: Electronically signed by Joni Odonnell RN on 4/12/24 at 7:40 AM EDT

## 2024-04-12 NOTE — PROGRESS NOTES
V2.0    [facility] Progress Note      Name:  Najma Fitzpatrick /Age/Sex: 1950  (73 y.o. female)   MRN & CSN:  [id number] & [id number] Encounter Date/Time: 2024 8:05 AM EDT   Location:  [id number] PCP: Geena Sotomayor APRN - CNP     Attending:Rafy Butt MD       Hospital Day: 4    Assessment and Recommendations   Najma Fitzpatrick is a 73 y.o. female with pmh of COPD, chronic diastolic CHF, depression, anxiety, insomnia, CAD, GERD, HTN, HLD who presents with Pneumonia due to infectious organism    Plan:   Acute hypoxic respiratory failure secondary to COPD exacerbation vs pneumonia  -Suspicion for bacterial infection low at this time as patient pro-delbert is negative, no fevers, has been on prior antibiotic  -Chest x-ray  concerning for new developing left airspace opacity, given rocephin and Levaquin in ED  -Chest x-ray  showing increasing interstitial changes centrally. Mild pulmonary edema or viral infection  -Pulmonology consulted   -Recommend against bronchoscopy as congestion improved a bit   -Continue PO prednisone   -1 dose 40 mg lasix given  for diuresis  -Continue supplemental O2, wean as tolerated  -Continue Levaquin, day 4 - likely to discontinue pending respiratory cultures  -Continue oral prednisone 40 mg daily  -Duonebs q 4 hrs, can transition to prn per RT  -Mucomyst, Mucinex, Acapella, and percussive nebulizer for congestion improvement  -Home Spiriva on hold as receiving scheduled duonebs  -Strep pneumonia, legionella, and MRSA swab negative  -PT/OT recommending SNF, however patient adamantly declining at this time     Influenza A  -Patient positive for Influenza A  -Complete course of Tamiflu at prior hospitalization  -Droplet precautions per floor ok to discontinue     Chronic Diastolic CHF  -Patient with hx of diastolic CHF  -Last Echo was 23 with EF 55-60%, grade I diastolic dysfunction  -CHF order set placed  -Continue home carvedilol 25 mg twice daily  -Continue  lobe airspace infiltrates. No pneumothorax or pleural effusion     Right lower lobe airspace infiltrates.  This appears compatible with pneumonia in the appropriate clinical setting.       CBC:   Recent Labs     04/10/24  0458 04/11/24  0458 04/12/24  0443   WBC 13.8* 10.0 7.3   HGB 10.3* 10.4* 10.7*    179 182     BMP:    Recent Labs     04/10/24  0458 04/11/24  0458 04/12/24  0443   * 131* 132*   K 3.9  3.9 3.3* 3.8   CL 90* 88* 91*   CO2 38* 37* 33*   BUN 14 14 13   CREATININE <0.5* <0.5* <0.5*   GLUCOSE 163* 93 103*     Hepatic:   No results for input(s): \"AST\", \"ALT\", \"ALB\", \"BILITOT\", \"ALKPHOS\" in the last 72 hours.    Lipids:   Lab Results   Component Value Date/Time    CHOL 132 02/16/2023 06:38 AM    HDL 51 02/16/2023 06:38 AM    HDL 44 09/22/2011 05:10 AM    TRIG 81 02/16/2023 06:38 AM     Hemoglobin A1C:   Lab Results   Component Value Date/Time    LABA1C 5.7 02/16/2023 06:38 AM     TSH:   Lab Results   Component Value Date/Time    TSH 0.56 11/05/2021 04:00 PM     Troponin: No results found for: \"TROPONINT\"  Lactic Acid:   No results for input(s): \"LACTA\" in the last 72 hours.    BNP:   Recent Labs     04/12/24 0443   PROBNP 425*     UA:  Lab Results   Component Value Date/Time    NITRU Negative 04/01/2024 11:40 PM    COLORU DARK YELLOW 04/01/2024 11:40 PM    PHUR 6.0 04/01/2024 11:40 PM    LABCAST 10-20 Hyaline 07/23/2016 10:35 PM    WBCUA 3-5 04/01/2024 11:40 PM    RBCUA 0-2 04/01/2024 11:40 PM    MUCUS 4+ 04/01/2024 11:40 PM    YEAST Present 01/03/2019 03:17 PM    BACTERIA 1+ 04/01/2024 11:40 PM    CLARITYU Clear 04/01/2024 11:40 PM    SPECGRAV >=1.030 04/01/2024 11:40 PM    LEUKOCYTESUR Negative 04/01/2024 11:40 PM    UROBILINOGEN 0.2 04/01/2024 11:40 PM    BILIRUBINUR Negative 04/01/2024 11:40 PM    BILIRUBINUR neg 03/02/2013 12:59 AM    BILIRUBINUR NEGATIVE 06/03/2012 07:19 PM    BLOODU Negative 04/01/2024 11:40 PM    GLUCOSEU Negative 04/01/2024 11:40 PM    GLUCOSEU NEGATIVE

## 2024-04-12 NOTE — PROGRESS NOTES
Pt alert and orientated. Call light within reach. Pt is ready to eat breakfast and would like to go home.Nayana Barron RN

## 2024-04-12 NOTE — PROGRESS NOTES
Pt wants to leave AMA. Family on the way to pick pt up. Pt verbalized and  understands risks of leaving AMA.Nayana Barron RN

## 2024-04-12 NOTE — PROGRESS NOTES
Physical Therapy  Attempted to see pt this afternoon. Pt agitated upon arrival. RN present and discussing when MD will come up to see pt. Pt irritated with the schedule. Pt declined therapy. \"I am not getting out of this bed!\"  Will re-attempt at a later date         Debbie Lamar, PT

## 2024-04-12 NOTE — DISCHARGE INSTR - COC
Continuity of Care Form    Patient Name: Najma Fitzpatrick   :  1950  MRN:  [id number]    Admit date:  2024  Discharge date:  ***    Code Status Order: Full Code   Advance Directives:     Admitting Physician:  Jorge Mckinney MD  PCP: Geena Sotomayor, APRN - CNP    Discharging Nurse: ***  Discharging Hospital Unit/Room#: 0342/0342-01  Discharging Unit Phone Number: ***    Emergency Contact:   Extended Emergency Contact Information  Primary Emergency Contact: NanetteBrent yu  Address: [address] [place], [place] [address] [facility]  Home Phone: [phone]  Mobile Phone: [phone]  Relation: Spouse  Secondary Emergency Contact: Margarita Fitzpatrick  Address: [place]           [phone]           [place] [address] [facility]  Home Phone: [phone]  Relation: Child    Past Surgical History:  Past Surgical History:   Procedure Laterality Date    COLONOSCOPY  12    DIAGNOSTIC CARDIAC CATH LAB PROCEDURE  2007    Normal cor angio 2007    HERNIA REPAIR  2019    robotic ventral hernia repair with mesh    HERNIA REPAIR N/A 2019    ROBOTIC VENTRAL HERNIA REPAIR WITH MESH performed by Yuriy Rider MD at [facility] OR    HYSTERECTOMY, TOTAL ABDOMINAL (CERVIX REMOVED)      KIDNEY STONE SURGERY         Immunization History:   Immunization History   Administered Date(s) Administered    COVID-19, MODERNA BLUE border, Primary or Immunocompromised, (age 12y+), IM, 100 mcg/0.5mL 2021, 2021, 11/15/2021, 2022    COVID-19, MODERNA Bivalent, (age 12y+), IM, 50 mcg/0.5 mL 10/13/2022, 2023    COVID-19, PFIZER, ( formula), (age 12y+), IM, 30mcg/0.3mL 10/09/2023    Influenza Vaccine, unspecified formulation 2018    Influenza Virus Vaccine 10/01/2012, 2013, 2015    Influenza Whole 10/28/2010    Influenza, FLUARIX, FLULAVAL, FLUZONE (age 6 mo+) AND AFLURIA, (age 3 y+), PF, 0.5mL  Score:  Readmission Risk              Risk of Unplanned Readmission:  34           Discharging to Facility/ Agency   [facility]  [facility] [place] [id number] [phone]     / signature: Electronically signed by Sybil Dudley RN on 4/12/24 at 3:54 PM EDT    PHYSICIAN SECTION    Prognosis: {Prognosis:4127163711}    Condition at Discharge: { Patient Condition:920706475}    Rehab Potential (if transferring to Rehab): {Prognosis:5834444566}    Recommended Labs or Other Treatments After Discharge: ***    Physician Certification: I certify the above information and transfer of Najma Fitzpatrick  is necessary for the continuing treatment of the diagnosis listed and that she requires {Admit to Appropriate Level of Care:62963} for {GREATER/LESS:905462862} 30 days.     Update Admission H&P: {CHP DME Changes in HandP:508332033}    PHYSICIAN SIGNATURE:  {Esignature:134265061}

## 2024-04-12 NOTE — CARE COORDINATION
Spoke with RN psyche consult pending. Spoke with Nayana HOYOS. Stated patient is wanting to get a BSC. Spoke with Ayaz with [facility]. Stated if she has no access to bathroom on level or in room medicare will not cover cost. Spoke with patient updated informed can be ordered at [facility] or [facility] if needed. Again discussed SNF options declined. Plan to order HHC resumption with [facility] will resume. Patient may decline when home. Sybil Dudley RN

## 2024-04-12 NOTE — CONSULTS
Consult Placed   Consult sent via perfect alberto   Who: Isrrael  Date: 4/12/2024   Time: 11:31     Electronically signed by Irish Mukherjee on 4/12/2024 at 11:31 AM

## 2024-04-12 NOTE — CARE COORDINATION
Case Management Assessment  Initial Evaluation    Date/Time of Evaluation: 4/12/2024 9:28 AM  Assessment Completed by: Sybil Dudley RN    If patient is discharged prior to next notation, then this note serves as note for discharge by case management.    Patient Name: Najma Fitzpatrick                   YOB: 1950  Diagnosis: COPD exacerbation (HCC) [J44.1]  Pneumonia of both lower lobes due to infectious organism [J18.9]  Pneumonia due to infectious organism [J18.9]                   Date / Time: 4/9/2024  5:56 AM    Patient Admission Status: Inpatient   Readmission Risk (Low < 19, Mod (19-27), High > 27): Readmission Risk Score: 19.5    Current PCP: Geena Sotomayor APRN - CNP  PCP verified by CM? Yes    Chart Reviewed: Yes      History Provided by: Patient  Patient Orientation: Alert and Oriented, Person, Place, Situation, Self    Patient Cognition: Alert    Hospitalization in the last 30 days (Readmission):  No    If yes, Readmission Assessment in  Navigator will be completed.    Advance Directives:      Code Status: Full Code   Patient's Primary Decision Maker is: Legal Next of Kin    Primary Decision Maker: Brent Fitzpatrick - Spouse - [phone]    Secondary Decision Maker: Margarita Fitzpatrick Child - [phone]    Secondary Decision Maker: Maricel Esquivel - Child - [phone]    Discharge Planning:    Patient lives with: Spouse/Significant Other, Children Type of Home: House  Primary Care Giver: Self  Patient Support Systems include: Spouse/Significant Other, Children   Current Financial resources: None  Current community resources: None  Current services prior to admission: Durable Medical Equipment            Current DME: Cane, Oxygen Therapy (Comment) (O2 3LNC continuous provided by [employer] will have tank for d/c home)            Type of Home Care services:  None    ADLS  Prior functional level: Assistance with the following:, Shopping, Housework, Cooking  Current functional level:

## 2024-04-12 NOTE — PROGRESS NOTES
Received pt from A2. VSS, 91% on 2L NC, skin check completed, scattered bruising noted. Avasys in room. Pt is SR on tele.

## 2024-04-15 ENCOUNTER — FOLLOWUP TELEPHONE ENCOUNTER (OUTPATIENT)
Dept: TELEMETRY | Age: 74
End: 2024-04-15

## 2024-04-15 NOTE — TELEPHONE ENCOUNTER
Care Transitions Initial Follow Up Call    Call within 2 business days of discharge: Yes     Patient: Najma Fitzpatrick Patient : 1950 MRN: [id number]    [unfilled]    RARS: Readmission Risk Score: 19.5       Spoke with: patient     Discharge department/facility: home    Non-face-to-face services provided:  Scheduled appointment with PCP- Geena Sotomayor, BALJIT - CNP     Spoke to patient for hospital follow up.  Pt denies any questions or concerns.       Follow Up  No future appointments.    Philly Vargas RN

## 2024-04-16 NOTE — ADT AUTH CERT
[facility]  MHAZ C3 TELE/MED SURG/ONC  [address] [facility] [address]  NPI: [id number]  TAX ID: [id number]    Auth number: [id number]  [id number] - (Medicare Managed)    Return Contact Information:  Georgie Cordoba  PH: [phone]  FAX: [phone]     Patient Demographics    Name Patient ID SSN Gender Identity Birth Date   Najma Fitzpatrick [id number]  Female [birth year] (73 yrs)     Address Phone Email Employer    [address] [place] [address] [phone] (H)  [phone] (M) [email or web address]      County Race Occupation Emp Status    [place] (non-) -- Disabled      Reg Status PCP Date Last Verified Next Review Date    Verified Geena Sotomayor APRN - CNP  [phone] 04/01/24 05/01/24      Admission Date Discharge Date Admitting Provider     04/09/24 04/12/24 Jorge Mckinney MD       Marital Status Quaker       Non-[employer]apt[employer]        Emergency Contact 1 Emergency Contact 2 Emergency Contact 3   Brent BERMAN Nanette (2)  [address] [place] [address]  [phone] (H)  [phone] (M) Margarita Fitzpatrick (C)  [place]  [id number]  [address]  [phone] (H) Maricel Fregoso (C)  [phone] (H)  [phone] (M)     Physician Progress Notes  Notes from 04/13/24 through 04/16/24  No notes of this type exist for this encounter.  ED Provider Notes    Carleen Armstrong MD   4/9/2024 14:28  Expand All Collapse All                [facility]  ED  EMERGENCY DEPARTMENT ENCOUNTER          Patient Name: Najma Fitzpatrick  MRN: [id number]  Birthdate 1950  Date of evaluation: 4/9/2024  Provider: Carleen Armstrong MD  PCP: Geena Sotomayor APRN - CNP  Note Started: 6:25 AM EDT 4/9/24        CHIEF COMPLAINT  Shortness of breath           HISTORY & PHYSICAL      HISTORY OF PRESENT ILLNESS  History from : Patient     Limitations to history : None     Najma Fitzpatrick is a 73 y.o.  2000(s)  04/09/24 2015(a)  04/09/24 2016(r) 81 mg   Oral      Joshua, Stella M   21 Given 04/10/24 0900(s)  04/10/24 0850(a)  04/10/24 0850(r) 81 mg   Oral      Telando, Vega L   22 Given 04/09/24 2100(s)  04/09/24 2015(a)  04/09/24 2016(r) 40 mg   Oral      Joshua, Stella M   23 Given 04/10/24 2100(s)  04/10/24 2019(a)  04/10/24 2019(r) 40 mg   Oral      Albin, Aleida   24 Given 04/09/24 2000(s)  04/09/24 2016(a)  04/09/24 2016(r) 25 mg   Oral      Joshua, Stella M   25 Given 04/10/24 0800(s)  04/10/24 0850(a)  04/10/24 0850(r) 25 mg   Oral      Telando, Vega L   26 Given 04/10/24 1700(s)  04/10/24 1641(a)  04/10/24 1641(r) 25 mg   Oral      Telando, Vega L   27 Not Given 04/09/24 1415(s)  04/09/24 1822(a)  04/09/24 1822(r) 250 mg   Oral     PM dose due 2100  Reason: Other Prudence Burt   28 Given 04/09/24 2100(s)  04/09/24 2015(a)  04/09/24 2016(r) 500 mg   Oral      Joshua, Stella M   29 Given 04/10/24 2100(s)  04/10/24 2018(a)  04/10/24 2019(r) 500 mg   Oral      Albin, Aleida   30 Given 04/09/24 1415(s)  04/09/24 1846(a)  04/09/24 1846(r) 40 mg   SubCUTAneous Abdomen RUQ (Right Upper Quadrant)    Prudence Burt   31 Given 04/10/24 0900(s)  04/10/24 0850(a)  04/10/24 0850(r) 40 mg   SubCUTAneous Abdomen LUQ (Left Upper Quadrant)    Merio, Vega L   32 MAR Hold 04/09/24 1409(s)  04/09/24 1409(a)  04/09/24 1409(r)     Oral     Reason: Other Stonecipher, Mario   33 MAR Unhold 04/10/24 1034(s)  04/10/24 1034(a)  04/10/24 1034(r)     Oral      Stonecipher, Mario   34 Given 04/10/24 1700(s)  04/10/24 1641(a)  04/10/24 1641(r) 20 mg   Oral      TelVega coppola L     Due 04/10/24 1700(s)  04/10/24 1034(r)     Oral           Automatically Held 04/10/24 1700(a)  04/09/24 1409(r)     Oral         35 Not Given 04/09/24 1415(s)  04/09/24 1822(a)  04/09/24 1822(r) 600 mg   Oral     PM dose due 2100  Reason: Other Prudence Burt C   36 Given 04/09/24 2100(s)  04/09/24 2016(a)  04/09/24 2016(r) 600 mg    Oral      Stella Lewis   37 Given 04/10/24 0900(s)  04/10/24 0850(a)  04/10/24 0850(r) 600 mg   Oral      Telando, Vega L   38 Given 04/10/24 2100(s)  04/10/24 2018(a)  04/10/24 2019(r) 600 mg   Oral      Albin, Aleida   39 Given 04/09/24 2000(s)  04/09/24 2016(a)  04/09/24 2016(r) 100 mg   Oral      Joshua, Stella M   40 Given 04/10/24 0900(s)  04/10/24 0849(a)  04/10/24 0850(r) 100 mg   Oral      Telanat, Vega L   41 Given 04/09/24 2100(s)  04/09/24 2015(a)  04/09/24 2016(r) 300 mg   Oral      Stella Lewis M   42 Given 04/10/24 2100(s)  04/10/24 2019(a)  04/10/24 2019(r) 300 mg   Oral      Albin, Aleida   43 Given 04/09/24 2100(s)  04/09/24 2017(a)  04/09/24 2017(r) 10 mL   IntraVENous      Stella Lewis   44 Given 04/10/24 0900(s)  04/10/24 0850(a)  04/10/24 0850(r) 10 mL   IntraVENous      Vega Morris   45 Not Given 04/10/24 2100(s)  04/10/24 2000(a)  04/10/24 2000(r)     IntraVENous     Reason: IV Fluid Infusing Aleida Talamantes   46 Canceled Entry 04/09/24 1915(s)  04/09/24 1907(a)  04/09/24 1907(r)     Inhalation     to start in AM Elsy Carranza   47 Given 04/10/24 0800(s)  04/10/24 0750(a)  04/10/24 0751(r) 2 puff   Inhalation      Kati Goodwin     (s)=scheduled time  (a)=action time  (r)=recorded time

## 2024-04-18 ENCOUNTER — TELEPHONE (OUTPATIENT)
Dept: PULMONOLOGY | Age: 74
End: 2024-04-18

## 2024-04-18 NOTE — TELEPHONE ENCOUNTER
Called Pt because we received a referral for them in the office for acute respiratory failure, copd, and phenomena. Pt refused to schedule appointment.

## 2024-04-22 NOTE — PROGRESS NOTES
Physician Progress Note      PATIENT:               VIKTORIA ROJO  [facility] #:                  [id number]  :                       1950  ADMIT DATE:       2024 5:56 AM  DISCH DATE:        2024 5:56 PM  RESPONDING  PROVIDER #:        ANGELLA FLORES          QUERY TEXT:    Pt admitted with COPD exacerbation/pneumonia and has chronic diastolic CHF   documented along with IV Lasix administration. If possible, please document in   progress notes and discharge summary further specificity regarding the type   and acuity of CHF:  [  The medical record reflects the following:  Risk Factors: chronic diastolic CHF,  Clinical Indicators: IV Lasix given on , , CXR :Increasing   interstitial changes centrally. Mild pulmonary edema or a developing viral   infection may be considered., \"Chronic diastolic CHF - CHF order set placed,   did not appear fluid overloaded initially on presentation.\" noted in DC   Summary  Treatment: IV Lasix x 1, I&O's, daily weights, CXR, continue lasix at home,   supportive care    Thank you,  Tea Carroll RN, CDS  [email or web address]@"[employer]"  Options provided:  -- Acute on Chronic Diastolic CHF/HFpEF, POA  -- Acute on Chronic Diastolic CHF/HFpEF, not POA  -- Other - I will add my own diagnosis  -- Disagree - Not applicable / Not valid  -- Disagree - Clinically unable to determine / Unknown  -- Refer to Clinical Documentation Reviewer    PROVIDER RESPONSE TEXT:    This patient is in acute on chronic diastolic CHF/HFpEF, POA.    Query created by: Tea Carroll on 2024 3:02 PM      Electronically signed by:  ANGELLA FLORES 2024 2:43 PM

## 2024-09-02 ENCOUNTER — HOSPITAL ENCOUNTER (INPATIENT)
Age: 74
LOS: 3 days | Discharge: HOME | End: 2024-09-05
Attending: STUDENT IN AN ORGANIZED HEALTH CARE EDUCATION/TRAINING PROGRAM | Admitting: INTERNAL MEDICINE
Payer: COMMERCIAL

## 2024-09-02 ENCOUNTER — APPOINTMENT (OUTPATIENT)
Dept: GENERAL RADIOLOGY | Age: 74
End: 2024-09-02
Payer: COMMERCIAL

## 2024-09-02 ENCOUNTER — APPOINTMENT (OUTPATIENT)
Dept: CT IMAGING | Age: 74
End: 2024-09-02
Payer: COMMERCIAL

## 2024-09-02 DIAGNOSIS — J44.9 CHRONIC OBSTRUCTIVE PULMONARY DISEASE, UNSPECIFIED COPD TYPE (HCC): ICD-10-CM

## 2024-09-02 DIAGNOSIS — J96.21 ACUTE ON CHRONIC RESPIRATORY FAILURE WITH HYPOXIA (HCC): ICD-10-CM

## 2024-09-02 DIAGNOSIS — I42.9 CARDIOMYOPATHY, UNSPECIFIED TYPE (HCC): ICD-10-CM

## 2024-09-02 DIAGNOSIS — I50.9 ACUTE ON CHRONIC CONGESTIVE HEART FAILURE, UNSPECIFIED HEART FAILURE TYPE (HCC): ICD-10-CM

## 2024-09-02 DIAGNOSIS — J44.1 COPD EXACERBATION (HCC): Primary | ICD-10-CM

## 2024-09-02 DIAGNOSIS — J44.1 COPD WITH ACUTE EXACERBATION (HCC): ICD-10-CM

## 2024-09-02 PROBLEM — J96.01 ACUTE RESPIRATORY FAILURE WITH HYPOXIA (HCC): Status: ACTIVE | Noted: 2024-09-02

## 2024-09-02 LAB
ALBUMIN SERPL-MCNC: 3.4 G/DL (ref 3.4–5)
ALBUMIN/GLOB SERPL: 1.2 {RATIO} (ref 1.1–2.2)
ALP SERPL-CCNC: 71 U/L (ref 40–129)
ALT SERPL-CCNC: <5 U/L (ref 10–40)
ANION GAP SERPL CALCULATED.3IONS-SCNC: 7 MMOL/L (ref 3–16)
AST SERPL-CCNC: 9 U/L (ref 15–37)
BASE EXCESS BLDV CALC-SCNC: 7.7 MMOL/L (ref -3–3)
BASOPHILS # BLD: 0 K/UL (ref 0–0.2)
BASOPHILS NFR BLD: 0.7 %
BILIRUB SERPL-MCNC: 0.3 MG/DL (ref 0–1)
BUN SERPL-MCNC: 7 MG/DL (ref 7–20)
CALCIUM SERPL-MCNC: 8.4 MG/DL (ref 8.3–10.6)
CHLORIDE SERPL-SCNC: 95 MMOL/L (ref 99–110)
CO2 BLDV-SCNC: 36 MMOL/L
CO2 SERPL-SCNC: 34 MMOL/L (ref 21–32)
COHGB MFR BLDV: 6.9 % (ref 0–1.5)
CREAT SERPL-MCNC: <0.5 MG/DL (ref 0.6–1.2)
DEPRECATED RDW RBC AUTO: 16.1 % (ref 12.4–15.4)
EKG ATRIAL RATE: 67 BPM
EKG DIAGNOSIS: NORMAL
EKG P AXIS: 69 DEGREES
EKG P-R INTERVAL: 156 MS
EKG Q-T INTERVAL: 450 MS
EKG QRS DURATION: 90 MS
EKG QTC CALCULATION (BAZETT): 475 MS
EKG R AXIS: 3 DEGREES
EKG T AXIS: 28 DEGREES
EKG VENTRICULAR RATE: 67 BPM
EOSINOPHIL # BLD: 0.4 K/UL (ref 0–0.6)
EOSINOPHIL NFR BLD: 5.4 %
GFR SERPLBLD CREATININE-BSD FMLA CKD-EPI: >90 ML/MIN/{1.73_M2}
GLUCOSE SERPL-MCNC: 124 MG/DL (ref 70–99)
HCO3 BLDV-SCNC: 33.9 MMOL/L (ref 23–29)
HCT VFR BLD AUTO: 35.5 % (ref 36–48)
HGB BLD-MCNC: 11.5 G/DL (ref 12–16)
LACTATE BLDV-SCNC: 1.1 MMOL/L (ref 0.4–1.9)
LYMPHOCYTES # BLD: 1.4 K/UL (ref 1–5.1)
LYMPHOCYTES NFR BLD: 20.6 %
MCH RBC QN AUTO: 26.4 PG (ref 26–34)
MCHC RBC AUTO-ENTMCNC: 32.4 G/DL (ref 31–36)
MCV RBC AUTO: 81.5 FL (ref 80–100)
METHGB MFR BLDV: 0.3 %
MONOCYTES # BLD: 0.4 K/UL (ref 0–1.3)
MONOCYTES NFR BLD: 6.7 %
NEUTROPHILS # BLD: 4.4 K/UL (ref 1.7–7.7)
NEUTROPHILS NFR BLD: 66.6 %
NT-PROBNP SERPL-MCNC: 1430 PG/ML (ref 0–124)
O2 THERAPY: ABNORMAL
PCO2 BLDV: 54.3 MMHG (ref 40–50)
PH BLDV: 7.41 [PH] (ref 7.35–7.45)
PLATELET # BLD AUTO: 125 K/UL (ref 135–450)
PMV BLD AUTO: 7.9 FL (ref 5–10.5)
PO2 BLDV: 42.8 MMHG (ref 25–40)
POTASSIUM SERPL-SCNC: 3.7 MMOL/L (ref 3.5–5.1)
PROT SERPL-MCNC: 6.3 G/DL (ref 6.4–8.2)
RBC # BLD AUTO: 4.36 M/UL (ref 4–5.2)
REASON FOR REJECTION: NORMAL
REJECTED TEST: NORMAL
SAO2 % BLDV: 78 %
SODIUM SERPL-SCNC: 136 MMOL/L (ref 136–145)
TROPONIN, HIGH SENSITIVITY: 16 NG/L (ref 0–14)
TROPONIN, HIGH SENSITIVITY: 16 NG/L (ref 0–14)
WBC # BLD AUTO: 6.6 K/UL (ref 4–11)

## 2024-09-02 PROCEDURE — 85025 COMPLETE CBC W/AUTO DIFF WBC: CPT

## 2024-09-02 PROCEDURE — 83880 ASSAY OF NATRIURETIC PEPTIDE: CPT

## 2024-09-02 PROCEDURE — 96366 THER/PROPH/DIAG IV INF ADDON: CPT

## 2024-09-02 PROCEDURE — 2580000003 HC RX 258: Performed by: STUDENT IN AN ORGANIZED HEALTH CARE EDUCATION/TRAINING PROGRAM

## 2024-09-02 PROCEDURE — 99285 EMERGENCY DEPT VISIT HI MDM: CPT

## 2024-09-02 PROCEDURE — 2580000003 HC RX 258: Performed by: INTERNAL MEDICINE

## 2024-09-02 PROCEDURE — 1200000000 HC SEMI PRIVATE

## 2024-09-02 PROCEDURE — 36415 COLL VENOUS BLD VENIPUNCTURE: CPT

## 2024-09-02 PROCEDURE — 84484 ASSAY OF TROPONIN QUANT: CPT

## 2024-09-02 PROCEDURE — 96365 THER/PROPH/DIAG IV INF INIT: CPT

## 2024-09-02 PROCEDURE — 71260 CT THORAX DX C+: CPT

## 2024-09-02 PROCEDURE — 93005 ELECTROCARDIOGRAM TRACING: CPT | Performed by: REGISTERED NURSE

## 2024-09-02 PROCEDURE — 6360000002 HC RX W HCPCS: Performed by: REGISTERED NURSE

## 2024-09-02 PROCEDURE — 96375 TX/PRO/DX INJ NEW DRUG ADDON: CPT

## 2024-09-02 PROCEDURE — 6370000000 HC RX 637 (ALT 250 FOR IP): Performed by: REGISTERED NURSE

## 2024-09-02 PROCEDURE — 6360000002 HC RX W HCPCS: Performed by: INTERNAL MEDICINE

## 2024-09-02 PROCEDURE — 93010 ELECTROCARDIOGRAM REPORT: CPT | Performed by: INTERNAL MEDICINE

## 2024-09-02 PROCEDURE — 6360000004 HC RX CONTRAST MEDICATION: Performed by: REGISTERED NURSE

## 2024-09-02 PROCEDURE — 6360000002 HC RX W HCPCS: Performed by: STUDENT IN AN ORGANIZED HEALTH CARE EDUCATION/TRAINING PROGRAM

## 2024-09-02 PROCEDURE — 84145 PROCALCITONIN (PCT): CPT

## 2024-09-02 PROCEDURE — 80053 COMPREHEN METABOLIC PANEL: CPT

## 2024-09-02 PROCEDURE — 87040 BLOOD CULTURE FOR BACTERIA: CPT

## 2024-09-02 PROCEDURE — 83605 ASSAY OF LACTIC ACID: CPT

## 2024-09-02 PROCEDURE — 82803 BLOOD GASES ANY COMBINATION: CPT

## 2024-09-02 PROCEDURE — 71046 X-RAY EXAM CHEST 2 VIEWS: CPT

## 2024-09-02 RX ORDER — FUROSEMIDE 10 MG/ML
20 INJECTION INTRAMUSCULAR; INTRAVENOUS ONCE
Status: COMPLETED | OUTPATIENT
Start: 2024-09-02 | End: 2024-09-02

## 2024-09-02 RX ORDER — IPRATROPIUM BROMIDE AND ALBUTEROL SULFATE 2.5; .5 MG/3ML; MG/3ML
1 SOLUTION RESPIRATORY (INHALATION) ONCE
Status: COMPLETED | OUTPATIENT
Start: 2024-09-02 | End: 2024-09-02

## 2024-09-02 RX ORDER — ALBUTEROL SULFATE 0.83 MG/ML
5 SOLUTION RESPIRATORY (INHALATION) ONCE
Status: COMPLETED | OUTPATIENT
Start: 2024-09-02 | End: 2024-09-02

## 2024-09-02 RX ORDER — HYDRALAZINE HYDROCHLORIDE 20 MG/ML
10 INJECTION INTRAMUSCULAR; INTRAVENOUS EVERY 4 HOURS PRN
Status: DISCONTINUED | OUTPATIENT
Start: 2024-09-02 | End: 2024-09-05 | Stop reason: HOSPADM

## 2024-09-02 RX ORDER — IOPAMIDOL 755 MG/ML
75 INJECTION, SOLUTION INTRAVASCULAR
Status: COMPLETED | OUTPATIENT
Start: 2024-09-02 | End: 2024-09-02

## 2024-09-02 RX ADMIN — WATER 80 MG: 1 INJECTION INTRAMUSCULAR; INTRAVENOUS; SUBCUTANEOUS at 22:55

## 2024-09-02 RX ADMIN — IOPAMIDOL 75 ML: 755 INJECTION, SOLUTION INTRAVENOUS at 21:17

## 2024-09-02 RX ADMIN — HYDRALAZINE HYDROCHLORIDE 10 MG: 20 INJECTION INTRAMUSCULAR; INTRAVENOUS at 23:38

## 2024-09-02 RX ADMIN — IPRATROPIUM BROMIDE AND ALBUTEROL SULFATE 1 DOSE: 2.5; .5 SOLUTION RESPIRATORY (INHALATION) at 19:31

## 2024-09-02 RX ADMIN — FUROSEMIDE 20 MG: 10 INJECTION, SOLUTION INTRAMUSCULAR; INTRAVENOUS at 22:10

## 2024-09-02 RX ADMIN — AZITHROMYCIN DIHYDRATE 500 MG: 500 INJECTION, POWDER, LYOPHILIZED, FOR SOLUTION INTRAVENOUS at 19:50

## 2024-09-02 RX ADMIN — ALBUTEROL SULFATE 5 MG: 2.5 SOLUTION RESPIRATORY (INHALATION) at 19:43

## 2024-09-02 ASSESSMENT — ENCOUNTER SYMPTOMS
WHEEZING: 1
COUGH: 1
ABDOMINAL PAIN: 0
CHEST TIGHTNESS: 1
STRIDOR: 0
SHORTNESS OF BREATH: 1

## 2024-09-02 ASSESSMENT — PAIN DESCRIPTION - ORIENTATION: ORIENTATION: MID

## 2024-09-02 ASSESSMENT — PAIN DESCRIPTION - FREQUENCY: FREQUENCY: CONTINUOUS

## 2024-09-02 ASSESSMENT — PAIN SCALES - GENERAL: PAINLEVEL_OUTOF10: 10

## 2024-09-02 ASSESSMENT — PAIN - FUNCTIONAL ASSESSMENT: PAIN_FUNCTIONAL_ASSESSMENT: 0-10

## 2024-09-02 ASSESSMENT — PAIN DESCRIPTION - PAIN TYPE: TYPE: ACUTE PAIN

## 2024-09-02 ASSESSMENT — PAIN DESCRIPTION - DESCRIPTORS: DESCRIPTORS: ACHING

## 2024-09-02 ASSESSMENT — PAIN DESCRIPTION - LOCATION: LOCATION: HEAD

## 2024-09-02 NOTE — ED PROVIDER NOTES
I independently performed a history and physical on Najma Fitzpatrick.     I have discussed the case with the AZAR/resident at 1938 and approve / take responsibility for the initial management plan and anticipated disposition as documented below.     In summary the patient presents with apparent COPD exacerbation complicated by hypoxic respiratory failure refractory to a trial of outpatient therapy with antibiotics and steroids.  The patient has a longstanding history of COPD and has been recommended for home oxygen supplementation though she does not have this therapy currently in her home and has not been using oxygen.  She states that she was taking steroids and antibiotics for an apparent flare of her COPD but despite this her condition worsened which brought her to the emergency department today.  On my evaluation the patient is alert taking a breathing treatment.  She has some mild increased work of breathin.  She is requiring oxygen supplementation via nasal cannula to achieve adequate oxygenation.  Per EMS she was hypoxic into the 80s on room air.  Her breath sounds are coarse with some sparse wheezing.  Abdominal exam is benign extremities are warm and well-perfused, she has 2+ pitting edema bilaterally in the lower extremities.  She is started on bronchodilator therapy as she received steroids by EMS.  Will also start azithromycin though I have low suspicion for a pneumonia.  Given her oxygen requirement as well as COPD exacerbation refractory to outpatient therapy I have propose admission and the patient is agreeable to this plan.    I interpreted the following studies:  ECG: Sinus rhythm at a rate of 67 without ectopy.  Normal axis and intervals.  No evidence of acute ischemia.  EKG is not significantly changed from prior dated April 9, 2024.    I personally discussed the patient's management with the following:  na    ED Course as of 09/02/24 2212   Mon Sep 02, 2024   2055 pH, Storm: 7.413 [NG]   2055 pCO2,  Storm(!): 54.3 [NG]   2055 Bicarbonate, Venous(!): 33.9  VBG generally reassuring in the context of COPD exacerbation with a compensated respiratory acidosis but given her clinical presentation and oxygen requirement she will still benefit from hospitalization. [NG]   2055 Lactic Acid, Sepsis: 1.1  Lactate benign [NG]   2055 WBC: 6.6 [NG]   2055 Hemoglobin Quant(!): 11.5 [NG]   2055 Hematocrit(!): 35.5 [NG]   2055 Platelet Count(!): 125 [NG]   2055 Chronic anemia otherwise reassuring blood counts [NG]   2056 Chest x-ray reassuring. [NG]   2056 We will obtain CTPA given her new hypoxia and to further characterize any parenchymal disease. [NG]   2056 Troponin, High Sensitivity(!): 16  Troponin mildly elevated 16 will trend nonischemic EKG no chest pain low suspicion for ACS. [NG]   2149 Pro-BNP(!): 1,430 [NG]   2149 The patient's BNP is mildly elevated she does have some lower extremity edema and her CTPA well negative for pulmonary embolism does demonstrate some burden of pulmonary edema and with her worsening hypoxia will provide a dose of IV Lasix.  Patient is on Lasix outpatient. [NG]   2207 Troponin, High Sensitivity(!): 16  Repeat troponin is stable [NG]   2211 At this time we will proceed with admission for a likely combined COPD and CHF exacerbation despite outpatient therapies and now with a persistent oxygen requirement.  Patient is agreeable to this plan this concludes her ED course. [NG]      ED Course User Index  [NG] Eddie Thompson MD       For further details of Najma Fitzpatrick's emergency department encounter, please see the AZAR/resident's documentation. Please note the signature time recorded here indicates the limit of my supervision of this case and should the patient require further management prior to disposition I have signed the case out to my colleague Eddie Saleh MD  09/02/24 2384

## 2024-09-02 NOTE — ED PROVIDER NOTES
[facility]-SURGICAL  EMERGENCY DEPARTMENT ENCOUNTER        Pt Name: Najma Fitzpatrick  MRN: [id number]  Birthdate 1950  Date of evaluation: 9/2/2024  Provider: BALJIT Schaefer CNP  PCP: Geena Sotomayor APRN - CNP    This patient was seen and evaluated by the attending physician Dr. Thompson.    I have evaluated this patient. My supervising physician was available for consultation.      CHIEF COMPLAINT       Chief Complaint   Patient presents with    Shortness of Breath     Pt c/o SOB x 3 days; Hx of COPD; 1 duoneb and 125mg Solu-Medrol given in route        HISTORY OF PRESENT ILLNESS   (Location/Symptom, Timing/Onset, Context/Setting, Quality, Duration, Modifying Factors, Severity)  Note limiting factors.     History from : Patient  Najma Fitzpatrick is a 73 y.o. female who presents via EMS for evaluation of increasing shortness of breath and history of COPD.  Patient does not have an oxygen requirement at home but presents requiring 2 L nasal cannula to maintain saturations greater than 90%.  She states over the last 2 to 3 days she has felt increasingly short of breath with productive cough and chest pain.  She denies palpitations or swelling her extremities.  She does endorse cough but denies it is productive.  She denies any fevers or chills.  She denies abdominal pain, nausea, vomiting, diarrhea or constipation.  She denies any urinary symptoms.      Chart review reveals pt has significant PMHx of history of CHF, coronary artery disease, mood disorder, polypharmacy, depression, hyperlipidemia, anemia, arthritis, bipolar, chronic bronchitis, emphysema, hypertension. They take aspirin, a atorvastatin, carvedilol, Depakote, Cymbalta, Lasix, Norco, Incruse Ellipta, Avapro, Singulair, Protonix, Seroquel.     Nursing Notes were all reviewed and agreed with or any disagreements were addressed  in the HPI.      REVIEW OF SYSTEMS    (2-9 systems for level 4, 10 or more for level 5)     Review of  shock?   No     Exclusion criteria - the patient is NOT to be included for SEP-1 Core Measure due to:  Further workup is pending    Discharge Time out:  CC Reviewed Yes   Test Results Yes     Vitals:    09/03/24 1840   BP:    Pulse:    Resp:    Temp:    SpO2: 92%              FINAL IMPRESSION      1. COPD exacerbation (HCC)    2. Acute on chronic respiratory failure with hypoxia (HCC)    3. Acute on chronic congestive heart failure, unspecified heart failure type (HCC)    4. Cardiomyopathy, unspecified type (HCC)          DISPOSITION/PLAN   DISPOSITION Admitted 09/02/2024 10:29:06 PM  Condition at Disposition: Data Unavailable      PATIENT REFERREDTO:  Geena Sotomayor APRN - CNP  [address]  [address]  [place] [address]  [phone]    Go on 9/11/2024  Go to Follow-up Appointment on 9/11 at 3:30 PM      DISCHARGE MEDICATIONS:  Current Discharge Medication List          DISCONTINUED MEDICATIONS:  Current Discharge Medication List        STOP taking these medications       montelukast (SINGULAIR) 10 MG tablet Comments:   Reason for Stopping:                      (Please note that portions ofthis note were completed with a voice recognition program.  Efforts were made to edit the dictations but occasionally words are mis-transcribed.)    BALJIT Schaefer CNP (electronically signed)       Jess Mathew APRN - CNP  09/03/24 192

## 2024-09-03 ENCOUNTER — APPOINTMENT (OUTPATIENT)
Age: 74
End: 2024-09-03
Attending: INTERNAL MEDICINE
Payer: COMMERCIAL

## 2024-09-03 PROBLEM — R09.02 HYPOXIA: Status: ACTIVE | Noted: 2024-09-03

## 2024-09-03 PROBLEM — J40 TRACHEOBRONCHITIS: Status: ACTIVE | Noted: 2024-09-03

## 2024-09-03 PROBLEM — Z72.0 TOBACCO USE: Status: ACTIVE | Noted: 2024-09-03

## 2024-09-03 LAB
ALBUMIN SERPL-MCNC: 3.5 G/DL (ref 3.4–5)
ALBUMIN/GLOB SERPL: 1.1 {RATIO} (ref 1.1–2.2)
ALP SERPL-CCNC: 73 U/L (ref 40–129)
ALT SERPL-CCNC: <5 U/L (ref 10–40)
ANION GAP SERPL CALCULATED.3IONS-SCNC: 9 MMOL/L (ref 3–16)
AST SERPL-CCNC: 8 U/L (ref 15–37)
BASE EXCESS BLDV CALC-SCNC: 7.6 MMOL/L (ref -3–3)
BASE EXCESS BLDV CALC-SCNC: 8.1 MMOL/L (ref -3–3)
BASOPHILS # BLD: 0 K/UL (ref 0–0.2)
BASOPHILS NFR BLD: 0.4 %
BILIRUB SERPL-MCNC: <0.2 MG/DL (ref 0–1)
BUN SERPL-MCNC: 10 MG/DL (ref 7–20)
CALCIUM SERPL-MCNC: 8.6 MG/DL (ref 8.3–10.6)
CHLORIDE SERPL-SCNC: 93 MMOL/L (ref 99–110)
CO2 BLDV-SCNC: 32 MMOL/L
CO2 BLDV-SCNC: 34 MMOL/L
CO2 SERPL-SCNC: 33 MMOL/L (ref 21–32)
COHGB MFR BLDV: 2.1 % (ref 0–1.5)
COHGB MFR BLDV: 3.3 % (ref 0–1.5)
CREAT SERPL-MCNC: <0.5 MG/DL (ref 0.6–1.2)
DEPRECATED RDW RBC AUTO: 16.1 % (ref 12.4–15.4)
ECHO AO ROOT DIAM: 3.1 CM
ECHO AO ROOT INDEX: 1.71 CM/M2
ECHO AV CUSP MM: 1.7 CM
ECHO AV PEAK GRADIENT: 8 MMHG
ECHO AV PEAK VELOCITY: 1.4 M/S
ECHO BSA: 1.85 M2
ECHO LA AREA 2C: 20.5 CM2
ECHO LA AREA 4C: 20.5 CM2
ECHO LA DIAMETER INDEX: 2.21 CM/M2
ECHO LA DIAMETER: 4 CM
ECHO LA MAJOR AXIS: 5.8 CM
ECHO LA MINOR AXIS: 6.3 CM
ECHO LA TO AORTIC ROOT RATIO: 1.29
ECHO LA VOL BP: 58 ML (ref 22–52)
ECHO LA VOL MOD A2C: 55 ML (ref 22–52)
ECHO LA VOL MOD A4C: 56 ML (ref 22–52)
ECHO LA VOL/BSA BIPLANE: 32 ML/M2 (ref 16–34)
ECHO LA VOLUME INDEX MOD A2C: 30 ML/M2 (ref 16–34)
ECHO LA VOLUME INDEX MOD A4C: 31 ML/M2 (ref 16–34)
ECHO LV E' LATERAL VELOCITY: 7 CM/S
ECHO LV E' SEPTAL VELOCITY: 4 CM/S
ECHO LV EF PHYSICIAN: 63 %
ECHO LV FRACTIONAL SHORTENING: 33 % (ref 28–44)
ECHO LV INTERNAL DIMENSION DIASTOLE INDEX: 2.65 CM/M2
ECHO LV INTERNAL DIMENSION DIASTOLIC: 4.8 CM (ref 3.9–5.3)
ECHO LV INTERNAL DIMENSION SYSTOLIC INDEX: 1.77 CM/M2
ECHO LV INTERNAL DIMENSION SYSTOLIC: 3.2 CM
ECHO LV ISOVOLUMETRIC RELAXATION TIME (IVRT): 58 MS
ECHO LV IVSD: 1.2 CM (ref 0.6–0.9)
ECHO LV MASS 2D: 219.1 G (ref 67–162)
ECHO LV MASS INDEX 2D: 121.1 G/M2 (ref 43–95)
ECHO LV POSTERIOR WALL DIASTOLIC: 1.2 CM (ref 0.6–0.9)
ECHO LV RELATIVE WALL THICKNESS RATIO: 0.5
ECHO MV A VELOCITY: 1.35 M/S
ECHO MV E VELOCITY: 0.84 M/S
ECHO MV E/A RATIO: 0.62
ECHO MV E/E' LATERAL: 12
ECHO MV E/E' RATIO (AVERAGED): 16.5
ECHO MV E/E' SEPTAL: 21
ECHO PV MAX VELOCITY: 1.1 M/S
ECHO PV PEAK GRADIENT: 5 MMHG
ECHO RA AREA 4C: 12.7 CM2
ECHO RA END SYSTOLIC VOLUME APICAL 4 CHAMBER INDEX BSA: 15 ML/M2
ECHO RA VOLUME: 27 ML
ECHO RV BASAL DIMENSION: 2.8 CM
ECHO RV FREE WALL PEAK S': 20 CM/S
ECHO RV LONGITUDINAL DIMENSION: 7 CM
ECHO RV MID DIMENSION: 1.9 CM
ECHO RV TAPSE: 3 CM (ref 1.7–?)
ECHO TV PEAK GRADIENT: 1 MMHG
EOSINOPHIL # BLD: 0 K/UL (ref 0–0.6)
EOSINOPHIL NFR BLD: 0.1 %
GFR SERPLBLD CREATININE-BSD FMLA CKD-EPI: >90 ML/MIN/{1.73_M2}
GLUCOSE SERPL-MCNC: 154 MG/DL (ref 70–99)
HCO3 BLDV-SCNC: 30.5 MMOL/L (ref 23–29)
HCO3 BLDV-SCNC: 32.5 MMOL/L (ref 23–29)
HCT VFR BLD AUTO: 36 % (ref 36–48)
HGB BLD-MCNC: 11.5 G/DL (ref 12–16)
LYMPHOCYTES # BLD: 0.6 K/UL (ref 1–5.1)
LYMPHOCYTES NFR BLD: 10.7 %
MAGNESIUM SERPL-MCNC: 1.5 MG/DL (ref 1.8–2.4)
MCH RBC QN AUTO: 26 PG (ref 26–34)
MCHC RBC AUTO-ENTMCNC: 31.9 G/DL (ref 31–36)
MCV RBC AUTO: 81.6 FL (ref 80–100)
METHGB MFR BLDV: 0 %
METHGB MFR BLDV: 0.2 %
MONOCYTES # BLD: 0.1 K/UL (ref 0–1.3)
MONOCYTES NFR BLD: 1.5 %
NEUTROPHILS # BLD: 4.7 K/UL (ref 1.7–7.7)
NEUTROPHILS NFR BLD: 87.3 %
O2 CT VFR BLDV CALC: 18 VOL %
O2 CT VFR BLDV CALC: 18 VOL %
O2 THERAPY: ABNORMAL
O2 THERAPY: ABNORMAL
PCO2 BLDV: 36.8 MMHG (ref 40–50)
PCO2 BLDV: 44.5 MMHG (ref 40–50)
PH BLDV: 7.48 [PH] (ref 7.35–7.45)
PH BLDV: 7.54 [PH] (ref 7.35–7.45)
PLATELET # BLD AUTO: 113 K/UL (ref 135–450)
PMV BLD AUTO: 7.2 FL (ref 5–10.5)
PO2 BLDV: 195.4 MMHG (ref 25–40)
PO2 BLDV: 218 MMHG (ref 25–40)
POTASSIUM SERPL-SCNC: 3.3 MMOL/L (ref 3.5–5.1)
PROCALCITONIN SERPL IA-MCNC: 0.04 NG/ML (ref 0–0.15)
PROT SERPL-MCNC: 6.6 G/DL (ref 6.4–8.2)
RBC # BLD AUTO: 4.41 M/UL (ref 4–5.2)
SAO2 % BLDV: 100 %
SAO2 % BLDV: 100 %
SODIUM SERPL-SCNC: 135 MMOL/L (ref 136–145)
TROPONIN, HIGH SENSITIVITY: 15 NG/L (ref 0–14)
WBC # BLD AUTO: 5.4 K/UL (ref 4–11)

## 2024-09-03 PROCEDURE — 85025 COMPLETE CBC W/AUTO DIFF WBC: CPT

## 2024-09-03 PROCEDURE — 97535 SELF CARE MNGMENT TRAINING: CPT

## 2024-09-03 PROCEDURE — 94010 BREATHING CAPACITY TEST: CPT

## 2024-09-03 PROCEDURE — 6370000000 HC RX 637 (ALT 250 FOR IP): Performed by: INTERNAL MEDICINE

## 2024-09-03 PROCEDURE — 94667 MNPJ CHEST WALL 1ST: CPT

## 2024-09-03 PROCEDURE — 99223 1ST HOSP IP/OBS HIGH 75: CPT | Performed by: INTERNAL MEDICINE

## 2024-09-03 PROCEDURE — 93306 TTE W/DOPPLER COMPLETE: CPT

## 2024-09-03 PROCEDURE — 6370000000 HC RX 637 (ALT 250 FOR IP): Performed by: NURSE PRACTITIONER

## 2024-09-03 PROCEDURE — 36415 COLL VENOUS BLD VENIPUNCTURE: CPT

## 2024-09-03 PROCEDURE — 94669 MECHANICAL CHEST WALL OSCILL: CPT

## 2024-09-03 PROCEDURE — 80053 COMPREHEN METABOLIC PANEL: CPT

## 2024-09-03 PROCEDURE — 84484 ASSAY OF TROPONIN QUANT: CPT

## 2024-09-03 PROCEDURE — 87641 MR-STAPH DNA AMP PROBE: CPT

## 2024-09-03 PROCEDURE — 97162 PT EVAL MOD COMPLEX 30 MIN: CPT

## 2024-09-03 PROCEDURE — 6360000002 HC RX W HCPCS: Performed by: INTERNAL MEDICINE

## 2024-09-03 PROCEDURE — 94640 AIRWAY INHALATION TREATMENT: CPT

## 2024-09-03 PROCEDURE — 2700000000 HC OXYGEN THERAPY PER DAY

## 2024-09-03 PROCEDURE — 97530 THERAPEUTIC ACTIVITIES: CPT

## 2024-09-03 PROCEDURE — 82803 BLOOD GASES ANY COMBINATION: CPT

## 2024-09-03 PROCEDURE — 93306 TTE W/DOPPLER COMPLETE: CPT | Performed by: INTERNAL MEDICINE

## 2024-09-03 PROCEDURE — 83735 ASSAY OF MAGNESIUM: CPT

## 2024-09-03 PROCEDURE — 97166 OT EVAL MOD COMPLEX 45 MIN: CPT

## 2024-09-03 PROCEDURE — 2580000003 HC RX 258: Performed by: INTERNAL MEDICINE

## 2024-09-03 PROCEDURE — 97116 GAIT TRAINING THERAPY: CPT

## 2024-09-03 PROCEDURE — 1200000000 HC SEMI PRIVATE

## 2024-09-03 PROCEDURE — 99232 SBSQ HOSP IP/OBS MODERATE 35: CPT | Performed by: INTERNAL MEDICINE

## 2024-09-03 PROCEDURE — 94761 N-INVAS EAR/PLS OXIMETRY MLT: CPT

## 2024-09-03 RX ORDER — ALBUTEROL SULFATE 0.83 MG/ML
2.5 SOLUTION RESPIRATORY (INHALATION) EVERY 4 HOURS PRN
Status: DISCONTINUED | OUTPATIENT
Start: 2024-09-03 | End: 2024-09-05 | Stop reason: HOSPADM

## 2024-09-03 RX ORDER — DIVALPROEX SODIUM 500 MG/1
500 TABLET, EXTENDED RELEASE ORAL NIGHTLY
Status: DISCONTINUED | OUTPATIENT
Start: 2024-09-03 | End: 2024-09-05 | Stop reason: HOSPADM

## 2024-09-03 RX ORDER — IPRATROPIUM BROMIDE AND ALBUTEROL SULFATE 2.5; .5 MG/3ML; MG/3ML
1 SOLUTION RESPIRATORY (INHALATION)
Status: DISCONTINUED | OUTPATIENT
Start: 2024-09-03 | End: 2024-09-03

## 2024-09-03 RX ORDER — CALCIUM CARBONATE 500 MG/1
500 TABLET, CHEWABLE ORAL 3 TIMES DAILY PRN
Status: DISCONTINUED | OUTPATIENT
Start: 2024-09-03 | End: 2024-09-05 | Stop reason: HOSPADM

## 2024-09-03 RX ORDER — MAGNESIUM SULFATE IN WATER 40 MG/ML
2000 INJECTION, SOLUTION INTRAVENOUS ONCE
Status: COMPLETED | OUTPATIENT
Start: 2024-09-03 | End: 2024-09-03

## 2024-09-03 RX ORDER — ACETAMINOPHEN 325 MG/1
650 TABLET ORAL EVERY 6 HOURS PRN
Status: DISCONTINUED | OUTPATIENT
Start: 2024-09-03 | End: 2024-09-05 | Stop reason: HOSPADM

## 2024-09-03 RX ORDER — DIVALPROEX SODIUM 250 MG/1
250 TABLET, EXTENDED RELEASE ORAL DAILY
Status: DISCONTINUED | OUTPATIENT
Start: 2024-09-03 | End: 2024-09-05 | Stop reason: HOSPADM

## 2024-09-03 RX ORDER — PANTOPRAZOLE SODIUM 40 MG/1
40 TABLET, DELAYED RELEASE ORAL
Status: DISCONTINUED | OUTPATIENT
Start: 2024-09-03 | End: 2024-09-05 | Stop reason: HOSPADM

## 2024-09-03 RX ORDER — ENOXAPARIN SODIUM 100 MG/ML
40 INJECTION SUBCUTANEOUS DAILY
Status: DISCONTINUED | OUTPATIENT
Start: 2024-09-03 | End: 2024-09-05 | Stop reason: HOSPADM

## 2024-09-03 RX ORDER — BUTALBITAL, ACETAMINOPHEN AND CAFFEINE 50; 325; 40 MG/1; MG/1; MG/1
1 TABLET ORAL DAILY PRN
Status: DISCONTINUED | OUTPATIENT
Start: 2024-09-03 | End: 2024-09-05 | Stop reason: HOSPADM

## 2024-09-03 RX ORDER — POLYETHYLENE GLYCOL 3350 17 G/17G
17 POWDER, FOR SOLUTION ORAL DAILY PRN
Status: DISCONTINUED | OUTPATIENT
Start: 2024-09-03 | End: 2024-09-05 | Stop reason: HOSPADM

## 2024-09-03 RX ORDER — QUETIAPINE FUMARATE 200 MG/1
200 TABLET, FILM COATED ORAL NIGHTLY
Status: DISCONTINUED | OUTPATIENT
Start: 2024-09-03 | End: 2024-09-05

## 2024-09-03 RX ORDER — ALBUTEROL SULFATE 0.83 MG/ML
2.5 SOLUTION RESPIRATORY (INHALATION)
Status: DISCONTINUED | OUTPATIENT
Start: 2024-09-03 | End: 2024-09-05 | Stop reason: HOSPADM

## 2024-09-03 RX ORDER — ATORVASTATIN CALCIUM 40 MG/1
40 TABLET, FILM COATED ORAL DAILY
Status: DISCONTINUED | OUTPATIENT
Start: 2024-09-03 | End: 2024-09-05 | Stop reason: HOSPADM

## 2024-09-03 RX ORDER — GUAIFENESIN 600 MG/1
600 TABLET, EXTENDED RELEASE ORAL 2 TIMES DAILY
Status: DISCONTINUED | OUTPATIENT
Start: 2024-09-03 | End: 2024-09-05 | Stop reason: HOSPADM

## 2024-09-03 RX ORDER — NICOTINE 21 MG/24HR
1 PATCH, TRANSDERMAL 24 HOURS TRANSDERMAL DAILY PRN
Status: DISCONTINUED | OUTPATIENT
Start: 2024-09-03 | End: 2024-09-05 | Stop reason: HOSPADM

## 2024-09-03 RX ORDER — LANOLIN ALCOHOL/MO/W.PET/CERES
3 CREAM (GRAM) TOPICAL NIGHTLY PRN
Status: DISCONTINUED | OUTPATIENT
Start: 2024-09-03 | End: 2024-09-05 | Stop reason: HOSPADM

## 2024-09-03 RX ORDER — ACETAMINOPHEN 650 MG/1
650 SUPPOSITORY RECTAL EVERY 6 HOURS PRN
Status: DISCONTINUED | OUTPATIENT
Start: 2024-09-03 | End: 2024-09-05 | Stop reason: HOSPADM

## 2024-09-03 RX ORDER — POTASSIUM CHLORIDE 1500 MG/1
40 TABLET, EXTENDED RELEASE ORAL ONCE
Status: COMPLETED | OUTPATIENT
Start: 2024-09-03 | End: 2024-09-03

## 2024-09-03 RX ORDER — ONDANSETRON 2 MG/ML
4 INJECTION INTRAMUSCULAR; INTRAVENOUS EVERY 6 HOURS PRN
Status: DISCONTINUED | OUTPATIENT
Start: 2024-09-03 | End: 2024-09-05 | Stop reason: HOSPADM

## 2024-09-03 RX ORDER — FUROSEMIDE 10 MG/ML
40 INJECTION INTRAMUSCULAR; INTRAVENOUS 2 TIMES DAILY
Status: DISCONTINUED | OUTPATIENT
Start: 2024-09-03 | End: 2024-09-04

## 2024-09-03 RX ORDER — PREDNISONE 20 MG/1
40 TABLET ORAL DAILY
Status: DISCONTINUED | OUTPATIENT
Start: 2024-09-03 | End: 2024-09-05 | Stop reason: HOSPADM

## 2024-09-03 RX ORDER — DULOXETIN HYDROCHLORIDE 60 MG/1
60 CAPSULE, DELAYED RELEASE ORAL DAILY
Status: DISCONTINUED | OUTPATIENT
Start: 2024-09-03 | End: 2024-09-05 | Stop reason: HOSPADM

## 2024-09-03 RX ORDER — IPRATROPIUM BROMIDE AND ALBUTEROL SULFATE 2.5; .5 MG/3ML; MG/3ML
1 SOLUTION RESPIRATORY (INHALATION)
Status: DISCONTINUED | OUTPATIENT
Start: 2024-09-03 | End: 2024-09-05 | Stop reason: HOSPADM

## 2024-09-03 RX ORDER — LOSARTAN POTASSIUM 100 MG/1
100 TABLET ORAL DAILY
Status: DISCONTINUED | OUTPATIENT
Start: 2024-09-03 | End: 2024-09-05 | Stop reason: HOSPADM

## 2024-09-03 RX ORDER — BUTALBITAL, ACETAMINOPHEN AND CAFFEINE 50; 325; 40 MG/1; MG/1; MG/1
1 TABLET ORAL DAILY
COMMUNITY

## 2024-09-03 RX ORDER — HYDROCODONE BITARTRATE AND ACETAMINOPHEN 5; 325 MG/1; MG/1
1 TABLET ORAL DAILY PRN
Status: DISCONTINUED | OUTPATIENT
Start: 2024-09-03 | End: 2024-09-05 | Stop reason: HOSPADM

## 2024-09-03 RX ORDER — PROMETHAZINE HYDROCHLORIDE 25 MG/1
12.5 TABLET ORAL EVERY 6 HOURS PRN
Status: DISCONTINUED | OUTPATIENT
Start: 2024-09-03 | End: 2024-09-05 | Stop reason: HOSPADM

## 2024-09-03 RX ORDER — CARVEDILOL 25 MG/1
25 TABLET ORAL 2 TIMES DAILY WITH MEALS
Status: DISCONTINUED | OUTPATIENT
Start: 2024-09-03 | End: 2024-09-05 | Stop reason: HOSPADM

## 2024-09-03 RX ORDER — ASPIRIN 81 MG/1
81 TABLET ORAL DAILY
Status: DISCONTINUED | OUTPATIENT
Start: 2024-09-03 | End: 2024-09-05 | Stop reason: HOSPADM

## 2024-09-03 RX ADMIN — HYDROCODONE BITARTRATE AND ACETAMINOPHEN 1 TABLET: 5; 325 TABLET ORAL at 16:06

## 2024-09-03 RX ADMIN — MAGNESIUM SULFATE HEPTAHYDRATE 2000 MG: 40 INJECTION, SOLUTION INTRAVENOUS at 06:41

## 2024-09-03 RX ADMIN — ENOXAPARIN SODIUM 40 MG: 100 INJECTION SUBCUTANEOUS at 08:05

## 2024-09-03 RX ADMIN — TIOTROPIUM BROMIDE INHALATION SPRAY 2 PUFF: 3.12 SPRAY, METERED RESPIRATORY (INHALATION) at 07:24

## 2024-09-03 RX ADMIN — IPRATROPIUM BROMIDE AND ALBUTEROL SULFATE 1 DOSE: 2.5; .5 SOLUTION RESPIRATORY (INHALATION) at 11:16

## 2024-09-03 RX ADMIN — LOSARTAN POTASSIUM 100 MG: 100 TABLET, FILM COATED ORAL at 08:02

## 2024-09-03 RX ADMIN — CALCIUM CARBONATE 500 MG: 500 TABLET, CHEWABLE ORAL at 10:41

## 2024-09-03 RX ADMIN — DIVALPROEX SODIUM 500 MG: 500 TABLET, FILM COATED, EXTENDED RELEASE ORAL at 20:02

## 2024-09-03 RX ADMIN — PREDNISONE 40 MG: 20 TABLET ORAL at 08:02

## 2024-09-03 RX ADMIN — DIVALPROEX SODIUM 250 MG: 500 TABLET, FILM COATED, EXTENDED RELEASE ORAL at 08:24

## 2024-09-03 RX ADMIN — GUAIFENESIN 600 MG: 600 TABLET, EXTENDED RELEASE ORAL at 20:02

## 2024-09-03 RX ADMIN — QUETIAPINE FUMARATE 200 MG: 200 TABLET ORAL at 20:01

## 2024-09-03 RX ADMIN — ATORVASTATIN CALCIUM 40 MG: 40 TABLET, FILM COATED ORAL at 08:04

## 2024-09-03 RX ADMIN — IPRATROPIUM BROMIDE AND ALBUTEROL SULFATE 1 DOSE: 2.5; .5 SOLUTION RESPIRATORY (INHALATION) at 07:24

## 2024-09-03 RX ADMIN — DIVALPROEX SODIUM 500 MG: 500 TABLET, FILM COATED, EXTENDED RELEASE ORAL at 01:21

## 2024-09-03 RX ADMIN — PANTOPRAZOLE SODIUM 40 MG: 40 TABLET, DELAYED RELEASE ORAL at 05:08

## 2024-09-03 RX ADMIN — BUTALBITAL, ACETAMINOPHEN AND CAFFEINE 1 TABLET: 325; 50; 40 TABLET ORAL at 10:40

## 2024-09-03 RX ADMIN — QUETIAPINE FUMARATE 300 MG: 200 TABLET ORAL at 01:21

## 2024-09-03 RX ADMIN — POTASSIUM CHLORIDE 40 MEQ: 1500 TABLET, EXTENDED RELEASE ORAL at 06:38

## 2024-09-03 RX ADMIN — CARVEDILOL 25 MG: 25 TABLET, FILM COATED ORAL at 08:04

## 2024-09-03 RX ADMIN — FUROSEMIDE 40 MG: 10 INJECTION, SOLUTION INTRAMUSCULAR; INTRAVENOUS at 17:38

## 2024-09-03 RX ADMIN — IPRATROPIUM BROMIDE AND ALBUTEROL SULFATE 1 DOSE: 2.5; .5 SOLUTION RESPIRATORY (INHALATION) at 15:24

## 2024-09-03 RX ADMIN — AZITHROMYCIN DIHYDRATE 500 MG: 500 INJECTION, POWDER, LYOPHILIZED, FOR SOLUTION INTRAVENOUS at 20:12

## 2024-09-03 RX ADMIN — FUROSEMIDE 40 MG: 10 INJECTION, SOLUTION INTRAMUSCULAR; INTRAVENOUS at 08:05

## 2024-09-03 RX ADMIN — CARVEDILOL 25 MG: 25 TABLET, FILM COATED ORAL at 17:38

## 2024-09-03 RX ADMIN — ASPIRIN 81 MG: 81 TABLET, COATED ORAL at 08:02

## 2024-09-03 RX ADMIN — IPRATROPIUM BROMIDE AND ALBUTEROL SULFATE 1 DOSE: 2.5; .5 SOLUTION RESPIRATORY (INHALATION) at 18:40

## 2024-09-03 RX ADMIN — DULOXETINE HYDROCHLORIDE 60 MG: 60 CAPSULE, DELAYED RELEASE ORAL at 08:04

## 2024-09-03 RX ADMIN — GUAIFENESIN 600 MG: 600 TABLET, EXTENDED RELEASE ORAL at 13:39

## 2024-09-03 ASSESSMENT — COPD QUESTIONNAIRES
QUESTION8_ENERGYLEVEL: 3
QUESTION1_COUGHFREQUENCY: 2
CAT_TOTALSCORE: 18
QUESTION3_CHESTTIGHTNESS: 1
QUESTION2_CHESTPHLEGM: 3
QUESTION7_SLEEPQUALITY: 2
QUESTION4_WALKINCLINE: 2
QUESTION6_LEAVINGHOUSE: 3
QUESTION5_HOMEACTIVITIES: 2

## 2024-09-03 ASSESSMENT — PAIN DESCRIPTION - ORIENTATION
ORIENTATION: MID
ORIENTATION: MID

## 2024-09-03 ASSESSMENT — PAIN DESCRIPTION - PAIN TYPE
TYPE: CHRONIC PAIN
TYPE: CHRONIC PAIN

## 2024-09-03 ASSESSMENT — PAIN DESCRIPTION - LOCATION
LOCATION: HEAD
LOCATION: HEAD

## 2024-09-03 ASSESSMENT — PAIN SCALES - GENERAL
PAINLEVEL_OUTOF10: 0
PAINLEVEL_OUTOF10: 0
PAINLEVEL_OUTOF10: 9
PAINLEVEL_OUTOF10: 9

## 2024-09-03 ASSESSMENT — PAIN DESCRIPTION - DESCRIPTORS
DESCRIPTORS: ACHING
DESCRIPTORS: ACHING

## 2024-09-03 ASSESSMENT — PAIN DESCRIPTION - ONSET
ONSET: AWAKENED FROM SLEEP
ONSET: ON-GOING

## 2024-09-03 ASSESSMENT — PAIN DESCRIPTION - FREQUENCY
FREQUENCY: INTERMITTENT
FREQUENCY: INTERMITTENT

## 2024-09-03 NOTE — PROGRESS NOTES
Pt unsure about what medications she takes. Someone will provide a list tomorrow. House md aware of elevated BP and will place order.

## 2024-09-03 NOTE — PROGRESS NOTES
Patient complaining of headache, refuses tylenol, states she takes Norco or Fioricet neither of on her medication list. Spoke with [facility] who stated patient was prescribed [facility] on 8/26/24, and is on Fioricet for headaches both were added to her medication list and Dr. Lew notified. Patient working with PT/OT at this time.

## 2024-09-03 NOTE — PROGRESS NOTES
Inpatient Physical Therapy Evaluation & Treatment    Unit: [facility]  Date:  9/3/2024  Patient Name:    Najma Fitzptarick  Admitting diagnosis:  COPD exacerbation (HCC) [J44.1]  Acute respiratory failure with hypoxia (HCC) [J96.01]  Acute on chronic respiratory failure with hypoxia (HCC) [J96.21]  Cardiomyopathy, unspecified type (HCC) [I42.9]  Acute on chronic congestive heart failure, unspecified heart failure type (HCC) [I50.9]  Admit Date:  9/2/2024  Precautions/Restrictions/WB Status/ Lines/ Wounds/ Oxygen: Fall risk, Bed/chair alarm, Lines (IV, Supplemental O2 (2L), and external catheter), Telemetry, Continuous pulse oximetry, and SCDs when in bed , no vision in R eye per patient     RN addressing fluid restriction order, dx CHF      Pt seen for cotreatment this date due to patient safety, patient endurance, complexity of condition, and acute illness/injury    Treatment Time:  957-1100  Treatment Number:  1   Timed Code Treatment Minutes: 53 minutes  Total Treatment Minutes:  63  minutes    Patient Stated Goals for Therapy: \" No goals stated \"          Discharge Recommendations: Home with PRN assistance, Home with 24hr supervision, and Home PT  DME needs for discharge: Needs Met       Therapy recommendation for EMS Transport: can transport by wheelchair    Therapy recommendations for staff:   Assist of 1 CGA for ambulation with use of rolling walker (RW) and gait belt to/from chair  to/from bathroom  within room    History of Present Illness:   per Dr Tracy H&P 9/2/24:  \"73 y.o. female who presented to [facility] with past medical history of bipolar, depression, emphysema presented to the ED with chief complaint of worsening shortness of breath past  Patient reported that shortness of breath has been going on for past 1 week reported progressively gotten worse she reports that she does drink water and adds intermittently salt to her food.  Patient also reports that been having worsening lower extremity swelling

## 2024-09-03 NOTE — PROGRESS NOTES
Vbg shows significant change in vbg po2. Clinical called and rt called. Recommended by rt for redraw.

## 2024-09-03 NOTE — PROGRESS NOTES
Inpatient Occupational Therapy Evaluation and Treatment    Unit: [facility]  Date:  9/3/2024  Patient Name:    Najma Fitzpatrick  Admitting diagnosis:  COPD exacerbation (HCC) [J44.1]  Acute respiratory failure with hypoxia (HCC) [J96.01]  Acute on chronic respiratory failure with hypoxia (HCC) [J96.21]  Cardiomyopathy, unspecified type (HCC) [I42.9]  Acute on chronic congestive heart failure, unspecified heart failure type (HCC) [I50.9]  Admit Date:  9/2/2024  Precautions/Restrictions/WB Status/ Lines/ Wounds/ Oxygen: Fall risk, Bed/chair alarm, Lines (IV, Supplemental O2 (2L), and external catheter), Telemetry, Continuous pulse oximetry, and SCDs when in bed , no vision in R eye per patient    RN addressing fluid restriction order, dx CHF    Pt seen for cotreatment this date due to limited functional status information    Treatment Time:  213-2395  Treatment Number:  1  Timed Code Treatment Minutes: 57 minutes  Total Treatment Minutes:  67  minutes    Patient Goals for Therapy: \"go home, maybe quit smoking\"          Discharge Recommendations: Home with 24/7 assist  and Home OT  DME needs for discharge:  consider reacher, sock aid       Therapy recommendations for staff:   Assist of 1 for ambulation with use of rolling walker (RW) and gait belt to/from chair  to/from bathroom    History of Present Illness: per Dr Tracy H&P 9/2/24:  \"73 y.o. female who presented to [facility] with past medical history of bipolar, depression, emphysema presented to the ED with chief complaint of worsening shortness of breath past  Patient reported that shortness of breath has been going on for past 1 week reported progressively gotten worse she reports that she does drink water and adds intermittently salt to her food.  Patient also reports that been having worsening lower extremity swelling in addition to having cough phlegm production with the phlegm converting to yellow in color.  Patient reports that she does have a history of

## 2024-09-03 NOTE — PROGRESS NOTES
Progress Note    Admit Date:  9/2/2024    73 y.o. female who presented to [facility] with past medical history of bipolar, depression, emphysema presented to the ED with chief complaint of worsening shortness of breath.  Admitted for COPD exacerbation.  Pulmonology consulted.     Subjective:  Ms. Fitzpatrick seen up in bed, feels some better but still with hypoxia needing 3 l  Continue to smoke 1.5 pack per day       Objective:   Patient Vitals for the past 4 hrs:   BP Temp Temp src Pulse Resp SpO2   09/03/24 0815 -- -- -- -- -- (!) 87 %   09/03/24 0812 -- -- -- -- -- 92 %   09/03/24 0745 (!) 152/75 98 °F (36.7 °C) Oral 86 18 94 %   09/03/24 0724 -- -- -- 81 16 94 %   09/03/24 0645 -- -- -- -- -- 94 %   09/03/24 0500 133/77 98.5 °F (36.9 °C) Oral 80 18 93 %          Intake/Output Summary (Last 24 hours) at 9/3/2024 0833  Last data filed at 9/3/2024 0818  Gross per 24 hour   Intake --   Output 400 ml   Net -400 ml       Physical Exam:        General: elderly female up  in bed  Awake, alert and oriented. Appears to be not in any distress  Mucous Membranes:  Pink , anicteric  Neck: No JVD, no carotid bruit, no thyromegaly  Chest:  diminished shena with scattered wheeze and minimal crackles  Cardiovascular:  RRR S1S2 heard, no murmurs or gallops  Abdomen:  Soft, undistended, non tender, no organomegaly, BS present  Extremities: trace edema to LE . Distal pulses well felt  Neurological : grossly normal        Data:  CBC:   Recent Labs     09/02/24 1929 09/03/24  0528   WBC 6.6 5.4   HGB 11.5* 11.5*   HCT 35.5* 36.0   MCV 81.5 81.6   * 113*     BMP:   Recent Labs     09/02/24 2022 09/03/24  0528    135*   K 3.7 3.3*   CL 95* 93*   CO2 34* 33*   BUN 7 10   CREATININE <0.5* <0.5*     LIVER PROFILE:   Recent Labs     09/02/24 2022 09/03/24  0528   AST 9* 8*   ALT <5* <5*   BILITOT 0.3 <0.2   ALKPHOS 71 73     PT/INR: No results for input(s): \"PROTIME\", \"INR\" in the last 72 hours.    CULTURES  Results

## 2024-09-03 NOTE — DISCHARGE INSTRUCTIONS
Your information:  Name: Najma Fitzpatrick  : 1950    Your instructions:    Follow instructions below.    What to do after you leave the hospital:    Recommended diet:  low sodium diet    Recommended activity: activity as tolerated        The following personal items were collected during your admission and were returned to you:    Belongings  Dental Appliances: Lowers  Vision - Corrective Lenses: Eyeglasses  Hearing Aid: Bilateral hearing aids  Clothing: Footwear, Pants, Shirt  Jewelry: None  Body Piercings Removed: No  Electronic Devices: None  Weapons (Notify Protective Services/Security): None  Home Medications: None  Valuables Given To: Other (Comment) (none)  Provide Name(s) of Who Valuable(s) Were Given To: none    Information obtained by:  By signing below, I understand that if any problems occur once I leave the hospital I am to contact MD.  I understand and acknowledge receipt of the instructions indicated above.     Heart Failure Resources:  Heart Failure Interactive Workbook:  Go to https://BitX.[employer].Com/publication/?q=399538 for a Free Heart Failure Interactive Workbook provided by The American [employer] Association. This interactive workbook will provide information on Healthier Living with Heart Failure. Please copy and paste link into search bar. Use your mouse to scroll through the pages.    HF [place] teddy:   Heart Failure Free smart phone teddy available for iPhone and Android download. Use your phone to track sodium intake, fluid intake, symptoms, and weight.     Low Sodium Diet / Recipes:  Go to www.[employer].org website for “renal” diet which is Low Sodium! [employer] is a dialysis company, but this website offers free seasonal cookbooks. Each quarter, they will release 25-30 new recipes with a breakdown of calories, sodium, and glucose. You can also go to [email or web address]/recipes website for free recipes.     Discharge Instruction Video:  Scan the QR code below with your camera and click

## 2024-09-03 NOTE — PROGRESS NOTES
- Abnormal; Notable for the following components:    Chloride 95 (*)     CO2 34 (*)     Glucose 124 (*)     Creatinine <0.5 (*)     Total Protein 6.3 (*)     ALT <5 (*)     AST 9 (*)     All other components within normal limits   BRAIN NATRIURETIC PEPTIDE - Abnormal; Notable for the following components:    Pro-BNP 1,430 (*)     All other components within normal limits   TROPONIN - Abnormal; Notable for the following components:    Troponin, High Sensitivity 16 (*)     All other components within normal limits     Critical values: no     Abnormal Assessment Findings:     Background  History:   Past Medical History:   Diagnosis Date    Abnormal ECG 12/14/2012    Anemia     Arthritis     Back pain, chronic 3/13/2013    Bipolar disorder ([facility])     Bronchitis chronic     CHF (congestive heart failure) ([facility]) 9/30/2016    Chronic back pain     Chronic neck pain     Clostridium difficile infection 3/29/12, 5/22/12    positive stool DNA probe    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    Coronary artery disease involving native coronary artery of native heart with angina pectoris ([facility]) 3/8/2016    Depression     Emphysema of lung ([facility])     Fibromyalgia     hyperlipidemia 8/11/2011    Hypertension     Kidney stone     Liver disease     Lung disease     MDD (major depressive disorder) 4/4/2012    Mood disorder ([facility]) 3/13/2013    Movement disorder     Neck pain, chronic 3/13/2013    Opiate misuse     Personality disorder ([facility])     Pneumonia     Polypharmacy 2/16/2013       Assessment    Vitals/MEWS:    Level of Consciousness: Alert (0)   Vitals:    09/02/24 1909 09/02/24 2002 09/02/24 2200 09/02/24 2230   BP:  (!) 180/157 (!) 192/88 (!) 209/92   Pulse:  69 72 76   Resp:       Temp:       TempSrc:       SpO2: 95% 97%  93%   Weight:       Height:         FiO2 (%):   O2 Flow Rate: O2 Device: Nasal cannula O2 Flow Rate (L/min): 2 L/min  Cardiac Rhythm:    Pain Assessment:  [x] Verbal [] Tejada Stephenson Scale  Pain Scale: Pain  Assessment  Pain Assessment: 0-10  Pain Level: 10  Pain Location: Head  Pain Orientation: Mid  Pain Descriptors: Aching  Pain Type: Acute pain  Pain Frequency: Continuous  Last documented pain score (0-10 scale) Pain Level: 10  Last documented pain medication administered:   Mental Status: oriented and alert  Orientation Level:    NIH Score:    C-SSRS: Risk of Suicide: No Risk  Bedside swallow:    Early Branch Coma Scale (GCS): Early Branch Coma Scale  Eye Opening: Spontaneous  Best Verbal Response: Oriented  Best Motor Response: Obeys commands  Radha Coma Scale Score: 15  Active LDA's:   Peripheral IV 09/02/24 Right Antecubital (Active)                 PO Status: Regular  Pertinent or High Risk Medications/Drips: no   If Yes, please provide details:   Pending Blood Product Administration: no       You may also review the ED PT Care Timeline found under the Summary Nursing Index tab.    Recommendation    Pending orders   Plan for Discharge (if known):   Additional Comments:    If any further questions, please call Sending RN at     Electronically signed by: Electronically signed by Kevin Trivedi RN on 9/2/2024 at 11:41 PM

## 2024-09-03 NOTE — PROGRESS NOTES
RT Inhaler-Nebulizer Bronchodilator Protocol Note    There is a bronchodilator order in the chart from a provider indicating to follow the RT Bronchodilator Protocol and there is an “Initiate RT Inhaler-Nebulizer Bronchodilator Protocol” order as well (see protocol at bottom of note).    CXR Findings:  No results found.    The findings from the last RT Protocol Assessment were as follows:   History Pulmonary Disease: (P) Chronic pulmonary disease  Respiratory Pattern: (P) Dyspnea on exertion or RR 21-25 bpm  Breath Sounds: (P) Intermittent or unilateral wheezes  Cough: (P) Strong, spontaneous, non-productive  Indication for Bronchodilator Therapy: (P) Decreased or absent breath sounds, Wheezing associated with pulm disorder  Bronchodilator Assessment Score: (P) 8    Aerosolized bronchodilator medication orders have been revised according to the RT Inhaler-Nebulizer Bronchodilator Protocol below.    Respiratory Therapist to perform RT Therapy Protocol Assessment initially then follow the protocol.  Repeat RT Therapy Protocol Assessment PRN for score 0-3 or on second treatment, BID, and PRN for scores above 3.    No Indications - adjust the frequency to every 6 hours PRN wheezing or bronchospasm, if no treatments needed after 48 hours then discontinue using Per Protocol order mode.     If indication present, adjust the RT bronchodilator orders based on the Bronchodilator Assessment Score as indicated below.  Use Inhaler orders unless patient has one or more of the following: on home nebulizer, not able to hold breath for 10 seconds, is not alert and oriented, cannot activate and use MDI correctly, or respiratory rate 25 breaths per minute or more, then use the equivalent nebulizer order(s) with same Frequency and PRN reasons based on the score.  If a patient is on this medication at home then do not decrease Frequency below that used at home.    0-3 - enter or revise RT bronchodilator order(s) to equivalent RT

## 2024-09-03 NOTE — H&P
Hospital Medicine History & Physical      PCP: Geena Sotomayor, APRN - CNP    Date of Admission: 9/2/2024    Date of Service: Pt seen/examined on 9/2/2024    Pt seen/examined face to face on and admitted as inpatient with expected LOS to be two days but can change depending on diagnostic work up and treatment response.     Chief Complaint:    Chief Complaint   Patient presents with    Shortness of Breath     Pt c/o SOB x 3 days; Hx of COPD; 1 duoneb and 125mg Solu-Medrol given in route             ASSESSMENT AND PLAN:    Active Hospital Problems    Diagnosis Date Noted    Acute respiratory failure with hypoxia (HCC) [J96.01] 09/02/2024     Acute hypoxic respiratory failure:  Responding to nasal cannula currently  Wean as tolerated next    Acute COPD exacerbation:  Increased frequency and character of phlegm, shortness of breath  Solu-Medrol, azithromycin, DuoNebs  Pulmonary consulted, appreciated    Acute CHF exacerbation  Strict I's and O's, Daily weights  Lasix 40 IV twice daily  Continued home medications  Heart failure protocol set ordered  Echo pending  Continue to check response     Essential Hypertension: Reviewed patient's medications and plan is to continue home medication  Bipolar: Continue medication  Hyperlipidemia: Controlled on home Statin. Outpatient PCP follow up post-discharge  GERD: Continue home medication        .Due to the above diagnosis makes the patient higher risk for morbidity and mortality requiring testing and treatment      Discussion with the primary ER physician in regards to symptoms, history, physical exam, diagnosis and treatment, collaborative decision was to admit the patient.    Diet: NPO except meds ordered    DVT Prophylaxis: lovenox    Dispo:   Expected LOS of two days      History Of Present Illness:      73 y.o. female who presented to [facility] with past medical history of bipolar, depression, emphysema presented to the ED with chief complaint of worsening

## 2024-09-03 NOTE — PROGRESS NOTES
Patient resting quietly, No complains of pain or discomfort. SpO2 92% on 2 l/m, call bell and bedside table within reach. Bed alarm on.

## 2024-09-03 NOTE — FLOWSHEET NOTE
09/03/24 0812   Oxygen Therapy   SpO2 92 %   O2 Device Nasal cannula   O2 Flow Rate (L/min) 1 L/min     SpO2 92% on 1 l/m, patient is eating breakfast. Will continue to monitor.

## 2024-09-03 NOTE — FLOWSHEET NOTE
09/03/24 0015   Vital Signs   Temp 98.2 °F (36.8 °C)   Temp Source Oral   Pulse 80   Heart Rate Source Monitor   Respirations 20   BP (!) 154/75   MAP (Calculated) 101   BP Location Left upper arm   BP Method Automatic   Patient Position Semi fowlers   Oxygen Therapy   SpO2 96 %   O2 Device Nasal cannula   O2 Flow Rate (L/min) 2 L/min   Height and Weight   Height 1.626 m (5' 4\")   Weight - Scale 75.9 kg (167 lb 6.4 oz)   Weight Method Bed scale   BSA (Calculated - sq m) 1.85 sq meters   BMI (Calculated) 28.8       Patient arrived from ED via stretcher. Vss. Chlorhexadine bath given. purwick placed. Gown changed

## 2024-09-03 NOTE — CARE COORDINATION
Case Management Assessment  Initial Evaluation    Date/Time of Evaluation: 9/3/2024 4:17 PM  Assessment Completed by: Diane Varela RN    If patient is discharged prior to next notation, then this note serves as note for discharge by case management.    Patient Name: Najma Fitzpatrick                   YOB: 1950  Diagnosis: COPD exacerbation (HCC) [J44.1]  Acute respiratory failure with hypoxia (HCC) [J96.01]  Acute on chronic respiratory failure with hypoxia (HCC) [J96.21]  Cardiomyopathy, unspecified type (HCC) [I42.9]  Acute on chronic congestive heart failure, unspecified heart failure type (HCC) [I50.9]                   Date / Time: 9/2/2024  6:48 PM    Patient Admission Status: Inpatient   Readmission Risk (Low < 19, Mod (19-27), High > 27): Readmission Risk Score: 14.7    Current PCP: Geena Sotomayor APRN - CNP  PCP verified by CM? Yes (JUSTO Sotomayor)    Chart Reviewed: Yes      History Provided by: Patient  Patient Orientation: Alert and Oriented    Patient Cognition: Alert    Hospitalization in the last 30 days (Readmission):  No    If yes, Readmission Assessment in CM Navigator will be completed.    Advance Directives:      Code Status: Full Code   Patient's Primary Decision Maker is: Legal Next of Kin    Primary Decision Maker: Brent Fitzpatrick - Spouse - [phone]    Secondary Decision Maker: Margarita Fitzpatrick - Child - [phone]    Secondary Decision Maker: Maricel Esquivel - Child - [phone]    Discharge Planning:    Patient lives with: Spouse/Significant Other, Children Type of Home: House  Primary Care Giver: Self  Patient Support Systems include: Spouse/Significant Other, Children   Current Financial resources:    Current community resources: None  Current services prior to admission: Durable Medical Equipment            Current DME: Walker            Type of Home Care services:  None    ADLS  Prior functional level: Independent in ADLs/IADLs  Current functional level: Independent

## 2024-09-03 NOTE — PROGRESS NOTES
09/03/24 0700   RT Protocol   History Pulmonary Disease 2   Respiratory pattern 2   Breath sounds 2   Cough 3   Indications for Bronchodilator Therapy Wheezing associated with pulm disorder   Bronchodilator Assessment Score 9

## 2024-09-03 NOTE — FLOWSHEET NOTE
09/03/24 0745   Vital Signs   Temp 98 °F (36.7 °C)   Temp Source Oral   Pulse 86   Heart Rate Source Monitor   Respirations 18   BP (!) 152/75   MAP (Calculated) 101   Pain Assessment   Pain Assessment None - Denies Pain   Care Plan - Pain Goals   Verbalizes/displays adequate comfort level or baseline comfort level Encourage patient to monitor pain and request assistance;Assess pain using appropriate pain scale;Administer analgesics based on type and severity of pain and evaluate response;Implement non-pharmacological measures as appropriate and evaluate response;Consider cultural and social influences on pain and pain management;Notify Licensed Independent Practitioner if interventions unsuccessful or patient reports new pain   Opioid-Induced Sedation   POSS Score 1   Oxygen Therapy   SpO2 94 %   O2 Device Nasal cannula   O2 Flow Rate (L/min) 2 L/min     Patient A/O. Denies complains of. SpO2 94 % on 2 l/m, Oxygenation decreased to 1 l/m, will continue to monitor. Patient with continuous pulse ox on right index finger. Skin pale warm and dry. Lung sounds wheezing few crackles, non productive wet cough noted. Lasix given as ordered. Magnesium infusing as ordered. Bed in low position, call bell and bedside table within reach. Bed alarm on.

## 2024-09-03 NOTE — PROGRESS NOTES
4 Eyes Skin Assessment     NAME:  Najma Fitzpatrick  YOB: 1950  MEDICAL RECORD NUMBER:  [id number]    The patient is being assessed for  Admission    I agree that at least one RN has performed a thorough Head to Toe Skin Assessment on the patient. ALL assessment sites listed below have been assessed.      Areas assessed by both nurses:    Head, Face, Ears, Shoulders, Back, Chest, Arms, Elbows, Hands, Sacrum. Buttock, Coccyx, Ischium, Legs. Feet and Heels, and Under Medical Devices         Scattered brusing. Redness on toes-blanchable.  Does the Patient have a Wound? No noted wound(s)       Emmanuel Prevention initiated by RN: No  Wound Care Orders initiated by RN: No    Pressure Injury (Stage 3,4, Unstageable, DTI, NWPT, and Complex wounds) if present, place Wound referral order by RN under : No    New Ostomies, if present place, Ostomy referral order under : No     Nurse 1 eSignature: Electronically signed by Eduarda Machado RN on 9/3/24 at 1:22 AM EDT    **SHARE this note so that the co-signing nurse can place an eSignature**    Nurse 2 eSignature: Electronically signed by Lorraine Edgar RN on 9/3/24 at 2:18 AM EDT

## 2024-09-03 NOTE — PROGRESS NOTES
Hand off report given to  ROMAIN Murray.   Patient is stable showing no signs of distress and has no current needs at this time.   Call light is in reach and bed is in lowest position.    Care is transferred at this time.

## 2024-09-03 NOTE — CONSULTS
Pulmonary & Critical Care Consultation Note    Patient is being seen at the request of Cheko Tracy DO  for a consultation for COPD exacerbation    HISTORY OF PRESENT ILLNESS:   73 years old with history of COPD presented with shortness of breath for 3 days.  Duration for 1 week.  Worse with exertion better with resting. Cough with yellow sputum production.  Hypoxemic in ED. no home O2.  Unfortunately continues to smoke 2 packs/day.  Compliant with her inhaled bronchodilators at home.    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    Coronary artery disease involving native coronary artery of native heart with angina pectoris ([facility]) 3/8/2016    Depression     Emphysema of lung ([facility])     Fibromyalgia     hyperlipidemia 8/11/2011    Hypertension     Kidney stone     Liver disease     Lung disease     MDD (major depressive disorder) 4/4/2012    Mood disorder ([facility]) 3/13/2013    Movement disorder     Neck pain, chronic 3/13/2013    Opiate misuse     Personality disorder ([facility])     Pneumonia     Polypharmacy 2/16/2013     PAST SURGICAL HISTORY:  Past Surgical History:   Procedure Laterality Date    COLONOSCOPY  1/19/12    DIAGNOSTIC CARDIAC CATH LAB PROCEDURE  Feb, 2007    Normal cor angio Feb, 2007    HERNIA REPAIR  07/11/2019    robotic ventral hernia repair with mesh    HERNIA REPAIR N/A 7/11/2019    ROBOTIC VENTRAL HERNIA REPAIR WITH MESH performed by Yuriy Rider MD at [facility] OR    HYSTERECTOMY, TOTAL ABDOMINAL (CERVIX REMOVED)      KIDNEY STONE SURGERY         FAMILY HISTORY:  family history includes Arthritis in her father and mother; Breast  pulse 86, temperature 98 °F (36.7 °C), temperature source Oral, resp. rate 18, height 1.626 m (5' 4\"), weight 75.9 kg (167 lb 6.4 oz), SpO2 94%, not currently breastfeeding.' on RA  Gen: No distress.  Ill-appearing  Eyes: PERRL. No sclera icterus. No conjunctival injection.   ENT: No discharge. Pharynx clear.   Neck: Trachea midline. No obvious mass.    Resp: + Accessory muscle use.  Few crackles.  Bilateral wheezes. No rhonchi. No dullness on percussion.  CV: Regular rate. Regular rhythm. No murmur or rub.  Mild LE edema.   GI: Non-tender. Non-distended. No hernia.   Skin: Warm and dry. No nodule on exposed extremities.   Lymph: No cervical LAD. No supraclavicular LAD.   M/S: No cyanosis. No joint deformity. No clubbing.   Neuro: Awake. Alert. Moves all four extremities.   Psych: Oriented x 3. No anxiety.     LABS:  CBC:   Recent Labs     09/02/24 1929 09/03/24 0528   WBC 6.6 5.4   HGB 11.5* 11.5*   HCT 35.5* 36.0   MCV 81.5 81.6   * 113*     BMP:   Recent Labs     09/02/24 2022 09/03/24 0528    135*   K 3.7 3.3*   CL 95* 93*   CO2 34* 33*   BUN 7 10   CREATININE <0.5* <0.5*     LIVER PROFILE:   Recent Labs     09/02/24 2022 09/03/24 0528   AST 9* 8*   ALT <5* <5*   BILITOT 0.3 <0.2   ALKPHOS 71 73     PT/INR: No results for input(s): \"PROTIME\", \"INR\" in the last 72 hours.  APTT: No results for input(s): \"APTT\" in the last 72 hours.  UA:No results for input(s): \"NITRITE\", \"COLORU\", \"PHUR\", \"LABCAST\", \"WBCUA\", \"RBCUA\", \"MUCUS\", \"TRICHOMONAS\", \"YEAST\", \"BACTERIA\", \"CLARITYU\", \"SPECGRAV\", \"LEUKOCYTESUR\", \"UROBILINOGEN\", \"BILIRUBINUR\", \"BLOODU\", \"GLUCOSEU\", \"AMORPHOUS\" in the last 72 hours.    Invalid input(s): \"KETONESU\"  No results for input(s): \"PHART\", \"MHB3OKV\", \"PO2ART\" in the last 72 hours.    Microbiology:  9/2 BC    Imaging:  CT chest 9/2 imaging was reviewed by me and showed   No evidence of pulmonary embolism or acute aortic syndrome.   Small left and tiny right pleural effusions.  Mild

## 2024-09-04 LAB
ALBUMIN SERPL-MCNC: 3.6 G/DL (ref 3.4–5)
ALBUMIN/GLOB SERPL: 1.5 {RATIO} (ref 1.1–2.2)
ALP SERPL-CCNC: 67 U/L (ref 40–129)
ALT SERPL-CCNC: <5 U/L (ref 10–40)
ANION GAP SERPL CALCULATED.3IONS-SCNC: 8 MMOL/L (ref 3–16)
AST SERPL-CCNC: 9 U/L (ref 15–37)
BASOPHILS # BLD: 0 K/UL (ref 0–0.2)
BASOPHILS NFR BLD: 0.4 %
BILIRUB SERPL-MCNC: <0.2 MG/DL (ref 0–1)
BUN SERPL-MCNC: 17 MG/DL (ref 7–20)
CALCIUM SERPL-MCNC: 8.4 MG/DL (ref 8.3–10.6)
CHLORIDE SERPL-SCNC: 92 MMOL/L (ref 99–110)
CO2 SERPL-SCNC: 33 MMOL/L (ref 21–32)
CREAT SERPL-MCNC: <0.5 MG/DL (ref 0.6–1.2)
DEPRECATED RDW RBC AUTO: 16.6 % (ref 12.4–15.4)
EOSINOPHIL # BLD: 0.2 K/UL (ref 0–0.6)
EOSINOPHIL NFR BLD: 1.7 %
GFR SERPLBLD CREATININE-BSD FMLA CKD-EPI: >90 ML/MIN/{1.73_M2}
GLUCOSE SERPL-MCNC: 103 MG/DL (ref 70–99)
HCT VFR BLD AUTO: 34.9 % (ref 36–48)
HGB BLD-MCNC: 11.1 G/DL (ref 12–16)
LYMPHOCYTES # BLD: 1.9 K/UL (ref 1–5.1)
LYMPHOCYTES NFR BLD: 18.9 %
MAGNESIUM SERPL-MCNC: 2.2 MG/DL (ref 1.8–2.4)
MCH RBC QN AUTO: 26 PG (ref 26–34)
MCHC RBC AUTO-ENTMCNC: 31.7 G/DL (ref 31–36)
MCV RBC AUTO: 82.1 FL (ref 80–100)
MONOCYTES # BLD: 0.9 K/UL (ref 0–1.3)
MONOCYTES NFR BLD: 8.4 %
MRSA DNA SPEC QL NAA+PROBE: NORMAL
NEUTROPHILS # BLD: 7.3 K/UL (ref 1.7–7.7)
NEUTROPHILS NFR BLD: 70.6 %
PLATELET # BLD AUTO: 137 K/UL (ref 135–450)
PMV BLD AUTO: 7 FL (ref 5–10.5)
POTASSIUM SERPL-SCNC: 3.3 MMOL/L (ref 3.5–5.1)
PROT SERPL-MCNC: 6 G/DL (ref 6.4–8.2)
RBC # BLD AUTO: 4.25 M/UL (ref 4–5.2)
SODIUM SERPL-SCNC: 133 MMOL/L (ref 136–145)
WBC # BLD AUTO: 10.3 K/UL (ref 4–11)

## 2024-09-04 PROCEDURE — 2700000000 HC OXYGEN THERAPY PER DAY

## 2024-09-04 PROCEDURE — 1200000000 HC SEMI PRIVATE

## 2024-09-04 PROCEDURE — 6370000000 HC RX 637 (ALT 250 FOR IP): Performed by: INTERNAL MEDICINE

## 2024-09-04 PROCEDURE — 94761 N-INVAS EAR/PLS OXIMETRY MLT: CPT

## 2024-09-04 PROCEDURE — 94640 AIRWAY INHALATION TREATMENT: CPT

## 2024-09-04 PROCEDURE — 6370000000 HC RX 637 (ALT 250 FOR IP): Performed by: NURSE PRACTITIONER

## 2024-09-04 PROCEDURE — 83735 ASSAY OF MAGNESIUM: CPT

## 2024-09-04 PROCEDURE — 2580000003 HC RX 258: Performed by: INTERNAL MEDICINE

## 2024-09-04 PROCEDURE — 94669 MECHANICAL CHEST WALL OSCILL: CPT

## 2024-09-04 PROCEDURE — 99232 SBSQ HOSP IP/OBS MODERATE 35: CPT | Performed by: INTERNAL MEDICINE

## 2024-09-04 PROCEDURE — 97530 THERAPEUTIC ACTIVITIES: CPT

## 2024-09-04 PROCEDURE — 36415 COLL VENOUS BLD VENIPUNCTURE: CPT

## 2024-09-04 PROCEDURE — 99233 SBSQ HOSP IP/OBS HIGH 50: CPT | Performed by: INTERNAL MEDICINE

## 2024-09-04 PROCEDURE — 85025 COMPLETE CBC W/AUTO DIFF WBC: CPT

## 2024-09-04 PROCEDURE — 80053 COMPREHEN METABOLIC PANEL: CPT

## 2024-09-04 PROCEDURE — 6360000002 HC RX W HCPCS: Performed by: INTERNAL MEDICINE

## 2024-09-04 RX ORDER — POTASSIUM CHLORIDE 1500 MG/1
40 TABLET, EXTENDED RELEASE ORAL ONCE
Status: COMPLETED | OUTPATIENT
Start: 2024-09-04 | End: 2024-09-04

## 2024-09-04 RX ORDER — FUROSEMIDE 40 MG
40 TABLET ORAL DAILY
Status: DISCONTINUED | OUTPATIENT
Start: 2024-09-05 | End: 2024-09-05 | Stop reason: HOSPADM

## 2024-09-04 RX ADMIN — AZITHROMYCIN DIHYDRATE 500 MG: 500 INJECTION, POWDER, LYOPHILIZED, FOR SOLUTION INTRAVENOUS at 20:33

## 2024-09-04 RX ADMIN — DIVALPROEX SODIUM 500 MG: 500 TABLET, FILM COATED, EXTENDED RELEASE ORAL at 20:40

## 2024-09-04 RX ADMIN — ASPIRIN 81 MG: 81 TABLET, COATED ORAL at 08:18

## 2024-09-04 RX ADMIN — FUROSEMIDE 40 MG: 10 INJECTION, SOLUTION INTRAMUSCULAR; INTRAVENOUS at 08:19

## 2024-09-04 RX ADMIN — PREDNISONE 40 MG: 20 TABLET ORAL at 08:18

## 2024-09-04 RX ADMIN — LOSARTAN POTASSIUM 100 MG: 100 TABLET, FILM COATED ORAL at 08:18

## 2024-09-04 RX ADMIN — IPRATROPIUM BROMIDE AND ALBUTEROL SULFATE 1 DOSE: 2.5; .5 SOLUTION RESPIRATORY (INHALATION) at 15:11

## 2024-09-04 RX ADMIN — IPRATROPIUM BROMIDE AND ALBUTEROL SULFATE 1 DOSE: 2.5; .5 SOLUTION RESPIRATORY (INHALATION) at 07:20

## 2024-09-04 RX ADMIN — PANTOPRAZOLE SODIUM 40 MG: 40 TABLET, DELAYED RELEASE ORAL at 05:28

## 2024-09-04 RX ADMIN — ATORVASTATIN CALCIUM 40 MG: 40 TABLET, FILM COATED ORAL at 08:18

## 2024-09-04 RX ADMIN — POTASSIUM CHLORIDE 40 MEQ: 1500 TABLET, EXTENDED RELEASE ORAL at 08:18

## 2024-09-04 RX ADMIN — DIVALPROEX SODIUM 250 MG: 500 TABLET, FILM COATED, EXTENDED RELEASE ORAL at 08:30

## 2024-09-04 RX ADMIN — CARVEDILOL 25 MG: 25 TABLET, FILM COATED ORAL at 16:29

## 2024-09-04 RX ADMIN — HYDROCODONE BITARTRATE AND ACETAMINOPHEN 1 TABLET: 5; 325 TABLET ORAL at 06:52

## 2024-09-04 RX ADMIN — QUETIAPINE FUMARATE 200 MG: 200 TABLET ORAL at 20:40

## 2024-09-04 RX ADMIN — DULOXETINE HYDROCHLORIDE 60 MG: 60 CAPSULE, DELAYED RELEASE ORAL at 08:18

## 2024-09-04 RX ADMIN — GUAIFENESIN 600 MG: 600 TABLET, EXTENDED RELEASE ORAL at 20:40

## 2024-09-04 RX ADMIN — GUAIFENESIN 600 MG: 600 TABLET, EXTENDED RELEASE ORAL at 08:18

## 2024-09-04 RX ADMIN — IPRATROPIUM BROMIDE AND ALBUTEROL SULFATE 1 DOSE: 2.5; .5 SOLUTION RESPIRATORY (INHALATION) at 10:59

## 2024-09-04 RX ADMIN — TIOTROPIUM BROMIDE INHALATION SPRAY 2 PUFF: 3.12 SPRAY, METERED RESPIRATORY (INHALATION) at 07:20

## 2024-09-04 RX ADMIN — CARVEDILOL 25 MG: 25 TABLET, FILM COATED ORAL at 08:18

## 2024-09-04 RX ADMIN — IPRATROPIUM BROMIDE AND ALBUTEROL SULFATE 1 DOSE: 2.5; .5 SOLUTION RESPIRATORY (INHALATION) at 20:17

## 2024-09-04 RX ADMIN — BUTALBITAL, ACETAMINOPHEN AND CAFFEINE 1 TABLET: 325; 50; 40 TABLET ORAL at 16:29

## 2024-09-04 RX ADMIN — ENOXAPARIN SODIUM 40 MG: 100 INJECTION SUBCUTANEOUS at 08:19

## 2024-09-04 ASSESSMENT — PAIN SCALES - GENERAL
PAINLEVEL_OUTOF10: 7
PAINLEVEL_OUTOF10: 8

## 2024-09-04 ASSESSMENT — PAIN DESCRIPTION - FREQUENCY: FREQUENCY: INTERMITTENT

## 2024-09-04 ASSESSMENT — PAIN DESCRIPTION - DESCRIPTORS
DESCRIPTORS: ACHING
DESCRIPTORS: ACHING

## 2024-09-04 ASSESSMENT — PAIN DESCRIPTION - LOCATION
LOCATION: HEAD
LOCATION: HEAD

## 2024-09-04 ASSESSMENT — PAIN DESCRIPTION - ORIENTATION
ORIENTATION: INNER
ORIENTATION: MID

## 2024-09-04 ASSESSMENT — PAIN DESCRIPTION - ONSET: ONSET: ON-GOING

## 2024-09-04 ASSESSMENT — PAIN DESCRIPTION - PAIN TYPE: TYPE: CHRONIC PAIN

## 2024-09-04 NOTE — PLAN OF CARE
Problem: Discharge Planning  Goal: Discharge to home or other facility with appropriate resources  Outcome: Progressing  Flowsheets (Taken 9/4/2024 0813)  Discharge to home or other facility with appropriate resources:   Identify barriers to discharge with patient and caregiver   Arrange for needed discharge resources and transportation as appropriate   Identify discharge learning needs (meds, wound care, etc)   Arrange for interpreters to assist at discharge as needed   Refer to discharge planning if patient needs post-hospital services based on physician order or complex needs related to functional status, cognitive ability or social support system     Problem: Pain  Goal: Verbalizes/displays adequate comfort level or baseline comfort level  Outcome: Progressing  Flowsheets (Taken 9/4/2024 0800)  Verbalizes/displays adequate comfort level or baseline comfort level:   Encourage patient to monitor pain and request assistance   Assess pain using appropriate pain scale   Administer analgesics based on type and severity of pain and evaluate response   Implement non-pharmacological measures as appropriate and evaluate response   Consider cultural and social influences on pain and pain management   Notify Licensed Independent Practitioner if interventions unsuccessful or patient reports new pain     Problem: Safety - Adult  Goal: Free from fall injury  Outcome: Progressing     Problem: Respiratory - Adult  Goal: Achieves optimal ventilation and oxygenation  Outcome: Progressing  Flowsheets (Taken 9/4/2024 0813)  Achieves optimal ventilation and oxygenation:   Assess for changes in respiratory status   Assess for changes in mentation and behavior   Position to facilitate oxygenation and minimize respiratory effort   Oxygen supplementation based on oxygen saturation or arterial blood gases   Initiate smoking cessation protocol as indicated   Encourage broncho-pulmonary hygiene including cough, deep breathe, incentive

## 2024-09-04 NOTE — PROGRESS NOTES
HEART FAILURE CARE PLAN:    Comorbidities Reviewed: Yes   Patient has a past medical history of Abnormal ECG, Anemia, Arthritis, Back pain, chronic, Bipolar disorder ([facility]), Bronchitis chronic, CHF (congestive heart failure) ([facility]), Chronic back pain, Chronic neck pain, Clostridium difficile infection, 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[facility]), Coronary artery disease involving native coronary artery of native heart with angina pectoris ([facility]), Depression, Emphysema of lung ([facility]), Fibromyalgia, hyperlipidemia, Hypertension, Kidney stone, Liver disease, Lung disease, MDD (major depressive disorder), Mood disorder ([facility]), Movement disorder, Neck pain, chronic, Opiate misuse, Personality disorder ([facility]), Pneumonia, and Polypharmacy.     Weights Reviewed: Yes   Admission weight: 77.6 kg (171 lb)   Wt Readings from Last 3 Encounters:   09/04/24 77 kg (169 lb 12.1 oz)   04/12/24 78.1 kg (172 lb 1.6 oz)   04/07/24 79 kg (174 lb 1.6 oz)     Intake & Output Reviewed: Yes     Intake/Output Summary (Last 24 hours) at 9/4/2024 1141  Last data filed at 9/4/2024 0812  Gross per 24 hour   Intake 710 ml   Output 300 ml   Net 410 ml       ECHOCARDIOGRAM Reviewed: Yes   Patient's Ejection Fraction (EF) is greater than 40%     Medications Reviewed: Yes   SCHEDULED HOSPITAL MEDICATIONS:   [START ON 9/5/2024] furosemide  40 mg Oral Daily    enoxaparin  40 mg SubCUTAneous Daily    predniSONE  40 mg Oral Daily    aspirin  81 mg Oral Daily    atorvastatin  40 mg Oral Daily    carvedilol  25 mg Oral BID WC    divalproex  500 mg Oral Nightly    divalproex  250 mg Oral Daily    losartan  100 mg Oral Daily    pantoprazole  40 mg Oral QAM AC    tiotropium  2 puff Inhalation Daily RT    DULoxetine  60 mg Oral Daily    azithromycin  500 mg IntraVENous Q24H    ipratropium 0.5 mg-albuterol 2.5 mg  1 Dose Inhalation 4x Daily RT    QUEtiapine  200 mg Oral Nightly    guaiFENesin  600 mg Oral BID     HOME MEDICATIONS:  Prior to Admission medications    Medication Sig  edema prior to discharge.     Electronically signed by Terri Jose RN on 9/4/2024 at 11:41 AM

## 2024-09-04 NOTE — PROGRESS NOTES
09/04/24 0700   RT Protocol   History Pulmonary Disease 2   Respiratory pattern 2   Breath sounds 6   Cough 1   Bronchodilator Assessment Score 11

## 2024-09-04 NOTE — PROGRESS NOTES
Inpatient Occupational Therapy Treatment    Unit: 2 [place]  Date:  9/4/2024  Patient Name:    Najma Fitzpatrick  Admitting diagnosis:  COPD exacerbation (HCC) [J44.1]  Acute respiratory failure with hypoxia (HCC) [J96.01]  Acute on chronic respiratory failure with hypoxia (HCC) [J96.21]  Cardiomyopathy, unspecified type (HCC) [I42.9]  Acute on chronic congestive heart failure, unspecified heart failure type (HCC) [I50.9]  Admit Date:  9/2/2024  Precautions/Restrictions/WB Status/ Lines/ Wounds/ Oxygen: Fall risk, Bed/chair alarm, Lines (IV, Supplemental O2 (2L), and external catheter), Telemetry, Continuous pulse oximetry, and SCDs when in bed , no vision in R eye per patient    RN addressing fluid restriction order, dx CHF    Pt seen for cotreatment this date due to limited functional status information    Treatment Time:  15:40-15:53  Treatment Number:  2  Timed Code Treatment Minutes: 13 minutes  Total Treatment Minutes:  13 minutes    Patient Goals for Therapy: \"go home, maybe quit smoking\"          Discharge Recommendations: Home with PRN assist and Home OT  DME needs for discharge:  consider reacher, sock aid       Therapy recommendations for staff:   Assist of 1 for ambulation with use of rolling walker (RW) and gait belt to/from chair  to/from bathroom    History of Present Illness: per Dr Tracy H&P 9/2/24:  \"73 y.o. female who presented to [facility] with past medical history of bipolar, depression, emphysema presented to the ED with chief complaint of worsening shortness of breath past  Patient reported that shortness of breath has been going on for past 1 week reported progressively gotten worse she reports that she does drink water and adds intermittently salt to her food.  Patient also reports that been having worsening lower extremity swelling in addition to having cough phlegm production with the phlegm converting to yellow in color.  Patient reports that she does have a history of emphysema although

## 2024-09-04 NOTE — CARE COORDINATION
Reviewed chart and met with pt at bedside. Discussed HHC again and pt cont to declines. Denies needs at dc. Will cont to follow for poss Home O2 at dc.

## 2024-09-04 NOTE — PROGRESS NOTES
Shift assessment completed (see flowsheet).  Patient is alert and oriented X4. On 2LPM NC, on tele. PIV  right AC.  On fluid restriction 2L.  Patient denies any pain at this pain. Without signs of respiratory distress. Bed in lowest position. Call light within reach..

## 2024-09-04 NOTE — FLOWSHEET NOTE
09/04/24 1312   Vital Signs   Temp 98 °F (36.7 °C)   Temp Source Oral   Pulse 72   Heart Rate Source Monitor   Respirations 18   /70   MAP (Calculated) 93   Pain Assessment   Pain Assessment None - Denies Pain   Care Plan - Pain Goals   Verbalizes/displays adequate comfort level or baseline comfort level Encourage patient to monitor pain and request assistance;Assess pain using appropriate pain scale;Administer analgesics based on type and severity of pain and evaluate response;Implement non-pharmacological measures as appropriate and evaluate response;Consider cultural and social influences on pain and pain management;Notify Licensed Independent Practitioner if interventions unsuccessful or patient reports new pain   Opioid-Induced Sedation   POSS Score 1   Oxygen Therapy   SpO2 91 %   O2 Device Nasal cannula   O2 Flow Rate (L/min) 2 L/min     Patient up in chair. Voices no complains of. Still requires 2 l/m of oxygen. No complains of headache today. BLE edema has improved. Chair alarm on. Call bell and bedside table within reach. Will continue to monitor.

## 2024-09-04 NOTE — FLOWSHEET NOTE
09/04/24 0800   Vital Signs   Temp 98.4 °F (36.9 °C)   Temp Source Oral   Pulse 83   Heart Rate Source Monitor   Respirations 18   BP (!) 145/72   MAP (Calculated) 96   Pain Assessment   Pain Assessment None - Denies Pain   Care Plan - Pain Goals   Verbalizes/displays adequate comfort level or baseline comfort level Encourage patient to monitor pain and request assistance;Assess pain using appropriate pain scale;Administer analgesics based on type and severity of pain and evaluate response;Implement non-pharmacological measures as appropriate and evaluate response;Consider cultural and social influences on pain and pain management;Notify Licensed Independent Practitioner if interventions unsuccessful or patient reports new pain   Opioid-Induced Sedation   POSS Score 1   Oxygen Therapy   SpO2 90 %   O2 Device Nasal cannula   O2 Flow Rate (L/min) 2 L/min     VSS, sitting up on side of bed feeding self breakfast. Voiding clear yellow urine. Wheezes remain, non productive cough, no crackles noted, rhonchi noted. BLE with 1+ edema, Lasix given as ordered. Call bell and bedside table within reach. Bed alarm on. Will continue to monitor.

## 2024-09-04 NOTE — PROGRESS NOTES
Chest port has no blood return. Notified the phlebotomist  to draw blood sample from the patient.  On room air, without signs of respiratory distress. Denies any pain or discomfort. For ECHO today and wound care consult. Bed in lowest position, call light within reach.

## 2024-09-04 NOTE — PROGRESS NOTES
ventricular systolic function with a visually estimated EF of 60 - 65%. Left ventricle size is normal. Mildly increased wall thickness. Normal wall motion. Grade I diastolic dysfunction with normal LAP.    Right Ventricle: Right ventricle size is normal. Normal systolic function. TAPSE is 3.0 cm. RV Peak S' is 20 cm/s.    Tricuspid Valve: Unable to assess RVSP due to inadequate or insignificant tricuspid regurgitation.      Assessment/Plan:    #Acute hypoxic respiratory failure  #COPD exacerbation  - imaging with no acute changes   -pulmonology consulted  -Zithromax D#3  -Duonebs scheduled; albuterol prn  -IV Solu-medrol--> PO Prednisone  -continue Spiriva  -add acapella   -on 2 L O2.  Wean as tolerated. .     #Acute on chronic diastolic  CHF  _echo with normal EF and grade 1 DD  -daily weights; I&O's  -IV Lasix 40 mg BID given with good diuresis  - change home 20 mg lasix to 40 mg   - low salt diet    #Hypertension  -BP elevated. Continue Losartan, Coreg    #CAD  -on aspirin, statin, BB    #Bipolar disorder  -continue home medication regimen: Depakote, Cymbalta, Seroquel    #Hyperlipidemia  -on Atorvastatin    #GERD  -on PPI    Smoking cessation discussed with pt    DVT Prophylaxis: Lovenox  Diet: ADULT DIET; Regular; 2000 ml  Code Status: Full Code    Cheikh Lew MD, 9/4/2024 7:24 AM'

## 2024-09-04 NOTE — PROGRESS NOTES
Patient provided a COPD Educational Folder that includes the following materials:     [x]  [employer] Booklet: Managing your COPD  [x]  ALA: Getting the Most Out of Medication Delivery Devices  [x]  ALA: My COPD Action Plan  [x]  Better Breathers [facility]: [facility]   [x]  Smoking Cessation Classes  [x]  Outpatient Spiritual Care Services  [x]  Magnet: Signs of COPD    PATIENT/CAREGIVER TEACHING:   Level of patient/caregiver understanding able to:   [x] Verbalize understanding   [] Demonstrate understanding       [] Teach back        [] Needs reinforcement     []  Other:     Electronically signed by Terri Jose RN on 9/4/2024 at 8:27 AM

## 2024-09-04 NOTE — PLAN OF CARE
HEART FAILURE CARE PLAN:    Comorbidities Reviewed: Yes   Patient has a past medical history of Abnormal ECG, Anemia, Arthritis, Back pain, chronic, Bipolar disorder ([facility]), Bronchitis chronic, CHF (congestive heart failure) ([facility]), Chronic back pain, Chronic neck pain, Clostridium difficile infection, 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[facility]), Coronary artery disease involving native coronary artery of native heart with angina pectoris ([facility]), Depression, Emphysema of lung ([facility]), Fibromyalgia, hyperlipidemia, Hypertension, Kidney stone, Liver disease, Lung disease, MDD (major depressive disorder), Mood disorder ([facility]), Movement disorder, Neck pain, chronic, Opiate misuse, Personality disorder ([facility]), Pneumonia, and Polypharmacy.     Weights Reviewed: Yes   Admission weight: 77.6 kg (171 lb)   Wt Readings from Last 3 Encounters:   09/03/24 75.8 kg (167 lb)   04/12/24 78.1 kg (172 lb 1.6 oz)   04/07/24 79 kg (174 lb 1.6 oz)     Intake & Output Reviewed: Yes     Intake/Output Summary (Last 24 hours) at 9/3/2024 2127  Last data filed at 9/3/2024 2000  Gross per 24 hour   Intake 650 ml   Output 400 ml   Net 250 ml       ECHOCARDIOGRAM Reviewed: Yes   Patient's Ejection Fraction (EF) is greater than 40%     Medications Reviewed: Yes   SCHEDULED HOSPITAL MEDICATIONS:   enoxaparin  40 mg SubCUTAneous Daily    predniSONE  40 mg Oral Daily    furosemide  40 mg IntraVENous BID    aspirin  81 mg Oral Daily    atorvastatin  40 mg Oral Daily    carvedilol  25 mg Oral BID WC    divalproex  500 mg Oral Nightly    divalproex  250 mg Oral Daily    losartan  100 mg Oral Daily    pantoprazole  40 mg Oral QAM AC    tiotropium  2 puff Inhalation Daily RT    DULoxetine  60 mg Oral Daily    azithromycin  500 mg IntraVENous Q24H    ipratropium 0.5 mg-albuterol 2.5 mg  1 Dose Inhalation 4x Daily RT    QUEtiapine  200 mg Oral Nightly    guaiFENesin  600 mg Oral BID     HOME MEDICATIONS:  Prior to Admission medications    Medication Sig Start Date End Date  signed by Christiane Suarez RN on 9/3/2024 at 9:27 PM

## 2024-09-04 NOTE — PROGRESS NOTES
Bedside Mobility Assessment Tool (BMAT):     Assessment Level 1- Sit and Shake    1. From a semi-reclined position, ask patient to sit up and rotate to a seated position at the side of the bed. Can use the bedrail.    2. Ask patient to reach out and grab your hand and shake making sure patient reaches across his/her midline.   Pass- Patient is able to come to a seated position, maintain core strength. Maintains seated balance while reaching across midline. Move on to Assessment Level 2.     Assessment Level 2- Stretch and Point   1. With patient in seated position at the side of the bed, have patient place both feet on the floor (or stool) with knees no higher than hips.    2. Ask patient to stretch one leg and straighten the knee, then bend the ankle/flex and point the toes. If appropriate, repeat with the other leg.   Pass- Patient is able to demonstrate appropriate quad strength on intended weight bearing limb(s). Move onto Assessment Level 3.     Assessment Level 3- Stand   1. Ask patient to elevate off the bed or chair (seated to standing) using an assistive device (cane, bedrail).    2. Patient should be able to raise buttocks off be and hold for a count of five. May repeat once.   Fail- Patient unable to demonstrate standing stability. Patient is MOBILITY LEVEL 3.     Assessment Level 4- Walk   1. Ask patient to march in place at bedside.    2. Then ask patient to advance step and return each foot. Some medical conditions may render a patient from stepping backwards, use your best clinical judgement.   Fail- Patient not able to complete tasks OR requires use of assistive device. Patient is MOBILITY LEVEL 3.       Mobility Level- 3

## 2024-09-04 NOTE — PROGRESS NOTES
Took pt to room air from 2L nc, pt saturation dropped to 84%. Placed back to 2L and recovered quickly to 91%.

## 2024-09-04 NOTE — PROGRESS NOTES
Pulmonary Progress Note    CC: COPD exacerbation    Subjective:   2 L O2-no home O2  Less shortness of breath        Intake/Output Summary (Last 24 hours) at 9/4/2024 0808  Last data filed at 9/4/2024 0400  Gross per 24 hour   Intake 700 ml   Output 600 ml   Net 100 ml       Exam:   BP (!) 155/71   Pulse 79   Temp 98.7 °F (37.1 °C) (Oral)   Resp 18   Ht 1.626 m (5' 4\")   Wt 75.8 kg (167 lb)   SpO2 94%   BMI 28.67 kg/m²  on 2 L  Gen: No distress.  Ill-appearing  Eyes: PERRL. No sclera icterus. No conjunctival injection.   ENT: No discharge. Pharynx clear.   Neck: Trachea midline. No obvious mass.    Resp: Mild accessory muscle use.  Few crackles.  Bilateral wheezes. No rhonchi. No dullness on percussion.  CV: Regular rate. Regular rhythm. No murmur or rub.  Mild LE edema.   GI: Non-tender. Non-distended. No hernia.   Skin: Warm and dry. No nodule on exposed extremities.   Lymph: No cervical LAD. No supraclavicular LAD.   M/S: No cyanosis. No joint deformity. No clubbing.   Neuro: Awake. Alert. Moves all four extremities.   Psych: Oriented x 3. No anxiety.     Scheduled Meds:   potassium chloride  40 mEq Oral Once    enoxaparin  40 mg SubCUTAneous Daily    predniSONE  40 mg Oral Daily    furosemide  40 mg IntraVENous BID    aspirin  81 mg Oral Daily    atorvastatin  40 mg Oral Daily    carvedilol  25 mg Oral BID WC    divalproex  500 mg Oral Nightly    divalproex  250 mg Oral Daily    losartan  100 mg Oral Daily    pantoprazole  40 mg Oral QAM AC    tiotropium  2 puff Inhalation Daily RT    DULoxetine  60 mg Oral Daily    azithromycin  500 mg IntraVENous Q24H    ipratropium 0.5 mg-albuterol 2.5 mg  1 Dose Inhalation 4x Daily RT    QUEtiapine  200 mg Oral Nightly    guaiFENesin  600 mg Oral BID     Continuous Infusions:    PRN Meds:  promethazine **OR** ondansetron, melatonin, nicotine, polyethylene glycol, albuterol, acetaminophen **OR** acetaminophen, albuterol, butalbital-acetaminophen-caffeine,

## 2024-09-04 NOTE — PLAN OF CARE
Problem: Pain  Goal: Verbalizes/displays adequate comfort level or baseline comfort level  Outcome: Progressing  Flowsheets  Problem: Safety - Adult  Goal: Free from fall injury  Outcome: Progressing     Problem: Respiratory - Adult  Goal: Achieves optimal ventilation and oxygenation  Outcome: Progressing  Flowsheets (Taken 9/3/2024 0751 by Terri Moseley RN)  Achieves optimal ventilation and oxygenation:   Assess for changes in respiratory status   Assess for changes in mentation and behavior   Position to facilitate oxygenation and minimize respiratory effort   Oxygen supplementation based on oxygen saturation or arterial blood gases   Initiate smoking cessation protocol as indicated   Encourage broncho-pulmonary hygiene including cough, deep breathe, incentive spirometry   Assess the need for suctioning and aspirate as needed   Assess and instruct to report shortness of breath or any respiratory difficulty   Respiratory therapy support as indicated     Problem: Chronic Conditions and Co-morbidities  Goal: Patient's chronic conditions and co-morbidity symptoms are monitored and maintained or improved  Outcome: Progressing  Goal: Absence of cardiac dysrhythmias or at baseline  Outcome: Progressing     Problem: Skin/Tissue Integrity  Goal: Absence of new skin breakdown  Description: 1.  Monitor for areas of redness and/or skin breakdown  2.  Assess vascular access sites hourly  3.  Every 4-6 hours minimum:  Change oxygen saturation probe site  4.  Every 4-6 hours:  If on nasal continuous positive airway pressure, respiratory therapy assess nares and determine need for appliance change or resting period.  Outcome: Progressing

## 2024-09-04 NOTE — PROGRESS NOTES
O2 Sat at rest on room air is 88 %.  O2 Sat with activity on room air is 87 %.  O2 Sat at rest with oxygen @  2 lpm is 91%.  O2 Sat with activity with oxygen @ 2 lpm is 91%.

## 2024-09-04 NOTE — CONSULTS
[facility]   HEART FAILURE PROGRAM    Provided the Patient with Heart Failure education on: signs/symptoms to monitor, medications, daily weights, low sodium diet, 2000 ml fluid restriction, and activity. Reviewed HF Zones (green/yellow/red) and importance of reporting yellow symptoms on Magnet provided. Reinforced the importance of daily weights and to report weight gain of 3 lbs in one day and 5 lbs in one week to Provider. Provided patient with a paper copy weight log to keep track of daily weights.    Education:     EDUCATIONAL MATERIALS PROVIDED:    [x]  [employer]: A Patient Education Guide Living with Heart Failure Booklet  [x]  Heart Failure Zones Self-Check Plan  [x]  Weight and Heart Failure Zone Log  [x]  AHA: HF and Your Ejection Fraction Explained  [x]  Sleep Disorders and Your Heart  [x]  AHA: HF [place] Aids Patients at Home  [x]  Magnet: Signs of Heart Failure    PATIENT/CAREGIVER TEACHING:   Level of patient/caregiver understanding able to:   [x] Verbalize understanding   [] Demonstrate understanding       [] Teach back        [] Needs reinforcement     []  Other:      TEACHING TIME:  10 minutes       Recommendations:     [x]  Encourage to call Heart Failure Resource Line [phone] with any questions or concerns.  [x]  Encourage follow-up appointment compliance.   [x]  Emphasize daily weights: Instruct patient to call the MD if weight gain of 3 lbs in 1 day or 5 lbs in a week.   [x]  Review sodium restriction diet. Encourage patient to not add table salt and avoid foods high in sodium.   [x]  Educate further on fluid restriction of 48 oz - 64 oz with labeled pitcher during inpatient admission.  [x]  Continue to educate on signs & symptoms of Heart Failure.  []  Other:            Electronically signed by Electronically signed by GLORIA Urban RN on 9/4/2024 at 2:28 PM

## 2024-09-04 NOTE — PROGRESS NOTES
Patient remains alert and oriented X4. On 2LPM, denies any discomfort or pain at this time. Bed in lowest position. Call light within reach.

## 2024-09-05 VITALS
SYSTOLIC BLOOD PRESSURE: 173 MMHG | OXYGEN SATURATION: 95 % | HEIGHT: 64 IN | WEIGHT: 169.75 LBS | BODY MASS INDEX: 28.98 KG/M2 | HEART RATE: 82 BPM | TEMPERATURE: 98.2 F | DIASTOLIC BLOOD PRESSURE: 73 MMHG | RESPIRATION RATE: 18 BRPM

## 2024-09-05 LAB
ALBUMIN SERPL-MCNC: 3.3 G/DL (ref 3.4–5)
ALBUMIN/GLOB SERPL: 1.2 {RATIO} (ref 1.1–2.2)
ALP SERPL-CCNC: 63 U/L (ref 40–129)
ALT SERPL-CCNC: <5 U/L (ref 10–40)
ANION GAP SERPL CALCULATED.3IONS-SCNC: 8 MMOL/L (ref 3–16)
AST SERPL-CCNC: 10 U/L (ref 15–37)
BASOPHILS # BLD: 0 K/UL (ref 0–0.2)
BASOPHILS NFR BLD: 0.5 %
BILIRUB SERPL-MCNC: <0.2 MG/DL (ref 0–1)
BUN SERPL-MCNC: 13 MG/DL (ref 7–20)
CALCIUM SERPL-MCNC: 8.1 MG/DL (ref 8.3–10.6)
CHLORIDE SERPL-SCNC: 96 MMOL/L (ref 99–110)
CO2 SERPL-SCNC: 31 MMOL/L (ref 21–32)
CREAT SERPL-MCNC: <0.5 MG/DL (ref 0.6–1.2)
DEPRECATED RDW RBC AUTO: 16.7 % (ref 12.4–15.4)
EOSINOPHIL # BLD: 0.3 K/UL (ref 0–0.6)
EOSINOPHIL NFR BLD: 3.1 %
GFR SERPLBLD CREATININE-BSD FMLA CKD-EPI: >90 ML/MIN/{1.73_M2}
GLUCOSE SERPL-MCNC: 94 MG/DL (ref 70–99)
HCT VFR BLD AUTO: 31.7 % (ref 36–48)
HGB BLD-MCNC: 10.3 G/DL (ref 12–16)
LYMPHOCYTES # BLD: 1.9 K/UL (ref 1–5.1)
LYMPHOCYTES NFR BLD: 23.1 %
MAGNESIUM SERPL-MCNC: 2 MG/DL (ref 1.8–2.4)
MCH RBC QN AUTO: 26.6 PG (ref 26–34)
MCHC RBC AUTO-ENTMCNC: 32.6 G/DL (ref 31–36)
MCV RBC AUTO: 81.8 FL (ref 80–100)
MONOCYTES # BLD: 0.8 K/UL (ref 0–1.3)
MONOCYTES NFR BLD: 9.6 %
NEUTROPHILS # BLD: 5.3 K/UL (ref 1.7–7.7)
NEUTROPHILS NFR BLD: 63.7 %
PLATELET # BLD AUTO: 112 K/UL (ref 135–450)
PMV BLD AUTO: 7.2 FL (ref 5–10.5)
POTASSIUM SERPL-SCNC: 3.8 MMOL/L (ref 3.5–5.1)
PROT SERPL-MCNC: 6.1 G/DL (ref 6.4–8.2)
RBC # BLD AUTO: 3.88 M/UL (ref 4–5.2)
SODIUM SERPL-SCNC: 135 MMOL/L (ref 136–145)
WBC # BLD AUTO: 8.4 K/UL (ref 4–11)

## 2024-09-05 PROCEDURE — 6370000000 HC RX 637 (ALT 250 FOR IP): Performed by: INTERNAL MEDICINE

## 2024-09-05 PROCEDURE — 94761 N-INVAS EAR/PLS OXIMETRY MLT: CPT

## 2024-09-05 PROCEDURE — 2700000000 HC OXYGEN THERAPY PER DAY

## 2024-09-05 PROCEDURE — 6370000000 HC RX 637 (ALT 250 FOR IP): Performed by: NURSE PRACTITIONER

## 2024-09-05 PROCEDURE — 83735 ASSAY OF MAGNESIUM: CPT

## 2024-09-05 PROCEDURE — 6360000002 HC RX W HCPCS: Performed by: INTERNAL MEDICINE

## 2024-09-05 PROCEDURE — 94640 AIRWAY INHALATION TREATMENT: CPT

## 2024-09-05 PROCEDURE — 99238 HOSP IP/OBS DSCHRG MGMT 30/<: CPT | Performed by: INTERNAL MEDICINE

## 2024-09-05 PROCEDURE — 80053 COMPREHEN METABOLIC PANEL: CPT

## 2024-09-05 PROCEDURE — 36415 COLL VENOUS BLD VENIPUNCTURE: CPT

## 2024-09-05 PROCEDURE — 85025 COMPLETE CBC W/AUTO DIFF WBC: CPT

## 2024-09-05 PROCEDURE — 94669 MECHANICAL CHEST WALL OSCILL: CPT

## 2024-09-05 PROCEDURE — 99233 SBSQ HOSP IP/OBS HIGH 50: CPT | Performed by: INTERNAL MEDICINE

## 2024-09-05 RX ORDER — QUETIAPINE FUMARATE 200 MG/1
400 TABLET, FILM COATED ORAL NIGHTLY
Status: DISCONTINUED | OUTPATIENT
Start: 2024-09-05 | End: 2024-09-05 | Stop reason: HOSPADM

## 2024-09-05 RX ORDER — QUETIAPINE FUMARATE 25 MG/1
25 TABLET, FILM COATED ORAL 2 TIMES DAILY
Status: DISCONTINUED | OUTPATIENT
Start: 2024-09-05 | End: 2024-09-05 | Stop reason: HOSPADM

## 2024-09-05 RX ORDER — AZITHROMYCIN 250 MG/1
250 TABLET, FILM COATED ORAL DAILY
Qty: 2 TABLET | Refills: 0 | Status: SHIPPED | OUTPATIENT
Start: 2024-09-05 | End: 2024-09-07

## 2024-09-05 RX ORDER — QUETIAPINE FUMARATE 300 MG/1
300 TABLET, FILM COATED ORAL NIGHTLY
COMMUNITY

## 2024-09-05 RX ORDER — PREDNISONE 10 MG/1
TABLET ORAL
Qty: 18 TABLET | Refills: 0 | Status: SHIPPED | OUTPATIENT
Start: 2024-09-05

## 2024-09-05 RX ORDER — QUETIAPINE FUMARATE 100 MG/1
100 TABLET, FILM COATED ORAL NIGHTLY
COMMUNITY

## 2024-09-05 RX ORDER — AZITHROMYCIN 250 MG/1
500 TABLET, FILM COATED ORAL
Status: DISCONTINUED | OUTPATIENT
Start: 2024-09-05 | End: 2024-09-05 | Stop reason: HOSPADM

## 2024-09-05 RX ORDER — FUROSEMIDE 20 MG
20 TABLET ORAL 2 TIMES DAILY
Qty: 60 TABLET | Refills: 3
Start: 2024-09-05

## 2024-09-05 RX ORDER — NICOTINE 21 MG/24HR
1 PATCH, TRANSDERMAL 24 HOURS TRANSDERMAL DAILY
Qty: 15 PATCH | Refills: 0 | Status: SHIPPED | OUTPATIENT
Start: 2024-09-05 | End: 2024-09-20

## 2024-09-05 RX ADMIN — CARVEDILOL 25 MG: 25 TABLET, FILM COATED ORAL at 08:11

## 2024-09-05 RX ADMIN — DULOXETINE HYDROCHLORIDE 60 MG: 60 CAPSULE, DELAYED RELEASE ORAL at 08:11

## 2024-09-05 RX ADMIN — IPRATROPIUM BROMIDE AND ALBUTEROL SULFATE 1 DOSE: 2.5; .5 SOLUTION RESPIRATORY (INHALATION) at 15:17

## 2024-09-05 RX ADMIN — IPRATROPIUM BROMIDE AND ALBUTEROL SULFATE 1 DOSE: 2.5; .5 SOLUTION RESPIRATORY (INHALATION) at 07:28

## 2024-09-05 RX ADMIN — GUAIFENESIN 600 MG: 600 TABLET, EXTENDED RELEASE ORAL at 08:11

## 2024-09-05 RX ADMIN — ASPIRIN 81 MG: 81 TABLET, COATED ORAL at 08:11

## 2024-09-05 RX ADMIN — FUROSEMIDE 40 MG: 40 TABLET ORAL at 08:11

## 2024-09-05 RX ADMIN — PREDNISONE 40 MG: 20 TABLET ORAL at 08:11

## 2024-09-05 RX ADMIN — ENOXAPARIN SODIUM 40 MG: 100 INJECTION SUBCUTANEOUS at 08:11

## 2024-09-05 RX ADMIN — LOSARTAN POTASSIUM 100 MG: 100 TABLET, FILM COATED ORAL at 08:11

## 2024-09-05 RX ADMIN — ATORVASTATIN CALCIUM 40 MG: 40 TABLET, FILM COATED ORAL at 08:11

## 2024-09-05 RX ADMIN — PANTOPRAZOLE SODIUM 40 MG: 40 TABLET, DELAYED RELEASE ORAL at 06:43

## 2024-09-05 RX ADMIN — BUTALBITAL, ACETAMINOPHEN AND CAFFEINE 1 TABLET: 325; 50; 40 TABLET ORAL at 08:11

## 2024-09-05 RX ADMIN — TIOTROPIUM BROMIDE INHALATION SPRAY 2 PUFF: 3.12 SPRAY, METERED RESPIRATORY (INHALATION) at 07:28

## 2024-09-05 RX ADMIN — DIVALPROEX SODIUM 250 MG: 500 TABLET, FILM COATED, EXTENDED RELEASE ORAL at 08:24

## 2024-09-05 RX ADMIN — IPRATROPIUM BROMIDE AND ALBUTEROL SULFATE 1 DOSE: 2.5; .5 SOLUTION RESPIRATORY (INHALATION) at 11:10

## 2024-09-05 ASSESSMENT — PAIN DESCRIPTION - FREQUENCY: FREQUENCY: INTERMITTENT

## 2024-09-05 ASSESSMENT — PAIN DESCRIPTION - PAIN TYPE: TYPE: ACUTE PAIN

## 2024-09-05 ASSESSMENT — PAIN DESCRIPTION - DESCRIPTORS: DESCRIPTORS: ACHING

## 2024-09-05 ASSESSMENT — PAIN DESCRIPTION - LOCATION: LOCATION: HEAD

## 2024-09-05 ASSESSMENT — PAIN SCALES - GENERAL: PAINLEVEL_OUTOF10: 6

## 2024-09-05 ASSESSMENT — PAIN DESCRIPTION - ONSET: ONSET: ON-GOING

## 2024-09-05 ASSESSMENT — PAIN - FUNCTIONAL ASSESSMENT: PAIN_FUNCTIONAL_ASSESSMENT: PREVENTS OR INTERFERES SOME ACTIVE ACTIVITIES AND ADLS

## 2024-09-05 NOTE — PROGRESS NOTES
BP (!) 180/75   Pulse 85   Temp 98.4 °F (36.9 °C) (Oral)   Resp 20   Ht 1.626 m (5' 4\")   Wt 77 kg (169 lb 12.1 oz)   SpO2 93%   BMI 29.14 kg/m²     Assessment complete. Meds passed. Pt denies needs at this time. Patient seems coherent today. Patient does not recall getting confused overnight. Completely oriented, pleasant, recalls my name from earlier this morning. Walking to bathroom with walker        Bedside Mobility Assessment Tool (BMAT):     Assessment Level 1- Sit and Shake    1. From a semi-reclined position, ask patient to sit up and rotate to a seated position at the side of the bed. Can use the bedrail.    2. Ask patient to reach out and grab your hand and shake making sure patient reaches across his/her midline.   Pass- Patient is able to come to a seated position, maintain core strength. Maintains seated balance while reaching across midline. Move on to Assessment Level 2.     Assessment Level 2- Stretch and Point   1. With patient in seated position at the side of the bed, have patient place both feet on the floor (or stool) with knees no higher than hips.    2. Ask patient to stretch one leg and straighten the knee, then bend the ankle/flex and point the toes. If appropriate, repeat with the other leg.   Pass- Patient is able to demonstrate appropriate quad strength on intended weight bearing limb(s). Move onto Assessment Level 3.     Assessment Level 3- Stand   1. Ask patient to elevate off the bed or chair (seated to standing) using an assistive device (cane, bedrail).    2. Patient should be able to raise buttocks off be and hold for a count of five. May repeat once.   Pass- Patient maintains standing stability for at least 5 seconds, proceed to assessment level 4.    Assessment Level 4- Walk   1. Ask patient to march in place at bedside.    2. Then ask patient to advance step and return each foot. Some medical conditions may render a patient from stepping backwards, use your best clinical  midline unable to flush even after interventions

## 2024-09-05 NOTE — PROGRESS NOTES
Progress Note    Admit Date:  9/2/2024    73 y.o. female who presented to [facility] with past medical history of bipolar, depression, emphysema presented to the ED with chief complaint of worsening shortness of breath.  Admitted for COPD exacerbation.  Pulmonology consulted.     Became aggressive and agitated overnight needing temp restraints and now off     Subjective:    Ms. Fitzpatrick seen up in bed, feels great and wishes to go home  Still needing o2  No sputum    Continue to smoke 1.5 pack per day       Objective:   Patient Vitals for the past 4 hrs:   BP Temp Temp src Pulse Resp SpO2   09/05/24 0730 -- -- -- 84 18 93 %   09/05/24 0410 (!) 161/78 98 °F (36.7 °C) Oral 68 18 94 %          Intake/Output Summary (Last 24 hours) at 9/5/2024 0739  Last data filed at 9/5/2024 0410  Gross per 24 hour   Intake 882 ml   Output 750 ml   Net 132 ml       Physical Exam:        General: elderly female up  in bed  Awake, alert and oriented. Appears to be not in any distress  Mucous Membranes:  Pink , anicteric  Neck: No JVD, no carotid bruit, no thyromegaly  Chest:  improved air entry  shena with improving wheeze   Cardiovascular:  RRR S1S2 heard, no murmurs or gallops  Abdomen:  Soft, undistended, non tender, no organomegaly, BS present  Extremities: trace edema to LE resolved . Distal pulses well felt  Neurological : grossly normal        Data:  CBC:   Recent Labs     09/03/24 0528 09/04/24 0508 09/05/24 0521   WBC 5.4 10.3 8.4   HGB 11.5* 11.1* 10.3*   HCT 36.0 34.9* 31.7*   MCV 81.6 82.1 81.8   * 137 112*     BMP:   Recent Labs     09/03/24 0528 09/04/24 0508 09/05/24 0521   * 133* 135*   K 3.3* 3.3* 3.8   CL 93* 92* 96*   CO2 33* 33* 31   BUN 10 17 13   CREATININE <0.5* <0.5* <0.5*     LIVER PROFILE:   Recent Labs     09/03/24 0528 09/04/24 0508 09/05/24 0521   AST 8* 9* 10*   ALT <5* <5* <5*   BILITOT <0.2 <0.2 <0.2   ALKPHOS 73 67 63     PT/INR: No results for input(s): \"PROTIME\", \"INR\" in the last  72 hours.    CULTURES  Results       Procedure Component Value Units Date/Time    MRSA DNA Probe, Nasal [[id number]] Collected: 09/03/24 0118    Order Status: Completed Specimen: Nares Updated: 09/04/24 0053     MRSA SCREEN RT-PCR --     Negative  MRSA DNA not detected.  Normal Range: Not detected      Narrative:      ORDER#: [id number]                          ORDERED BY: RENY RAHMAN  SOURCE: Nares                              COLLECTED:  09/03/24 01:18  ANTIBIOTICS AT JULIA.:                      RECEIVED :  09/03/24 01:21    Blood Culture 1 [[id number]] Collected: 09/02/24 1919    Order Status: Completed Specimen: Blood Updated: 09/03/24 2015     Blood Culture, Routine No Growth to date.  Any change in status will be called.    Narrative:      ORDER#: [id number]                          ORDERED BY: AYSE WAN  SOURCE: Blood left piv                     COLLECTED:  09/02/24 19:19  ANTIBIOTICS AT JULIA.:                      RECEIVED :  09/03/24 01:26  If child <=2 yrs old please draw pediatric bottle.~Blood Culture 1    Blood Culture 2 [[id number]] Collected: 09/02/24 1919    Order Status: Completed Specimen: Blood Updated: 09/04/24 0215     Culture, Blood 2 No Growth to date.  Any change in status will be called.    Narrative:      ORDER#: [id number]                          ORDERED BY: AYSE WAN  SOURCE: Blood No site given                COLLECTED:  09/02/24 19:19  ANTIBIOTICS AT JULIA.:                      RECEIVED :  09/03/24 01:26  If child <=2 yrs old please draw pediatric bottle.~Blood Culture #2              RADIOLOGY  CT CHEST PULMONARY EMBOLISM W CONTRAST   Final Result   No evidence of pulmonary embolism or acute aortic syndrome.      Small left and tiny right pleural effusions.  Mild interstitial prominence of   the lung apices.  Findings may represent fluid overload/CHF.         XR CHEST (2 VW)   Final Result   Hypoventilatory changes in the lung bases. No acute cardiopulmonary process.

## 2024-09-05 NOTE — PLAN OF CARE
Problem: Discharge Planning  Goal: Discharge to home or other facility with appropriate resources  Outcome: Completed     Problem: Pain  Goal: Verbalizes/displays adequate comfort level or baseline comfort level  Outcome: Completed     Problem: Safety - Adult  Goal: Free from fall injury  Outcome: Completed     Problem: Respiratory - Adult  Goal: Achieves optimal ventilation and oxygenation  Outcome: Completed     Problem: Chronic Conditions and Co-morbidities  Goal: Patient's chronic conditions and co-morbidity symptoms are monitored and maintained or improved  9/5/2024 1507 by Vimal Barnes RN  Outcome: Completed  9/5/2024 1103 by Vimal Barnes RN  Outcome: Progressing     Problem: Cardiovascular - Adult  Goal: Maintains optimal cardiac output and hemodynamic stability  Outcome: Completed  Goal: Absence of cardiac dysrhythmias or at baseline  Outcome: Completed     Problem: Skin/Tissue Integrity  Goal: Absence of new skin breakdown  Description: 1.  Monitor for areas of redness and/or skin breakdown  2.  Assess vascular access sites hourly  3.  Every 4-6 hours minimum:  Change oxygen saturation probe site  4.  Every 4-6 hours:  If on nasal continuous positive airway pressure, respiratory therapy assess nares and determine need for appliance change or resting period.  9/5/2024 1507 by Vimal Barnes RN  Outcome: Completed  9/5/2024 1103 by Vimal Barnes RN  Outcome: Progressing     Problem: Safety - Medical Restraint  Goal: Remains free of injury from restraints (Restraint for Interference with Medical Device)  Description: INTERVENTIONS:  1. Determine that other, less restrictive measures have been tried or would not be effective before applying the restraint  2. Evaluate the patient's condition at the time of restraint application  3. Inform patient/family regarding the reason for restraint  4. Q2H: Monitor safety, psychosocial status, comfort, nutrition and hydration  9/5/2024 1507 by Molly

## 2024-09-05 NOTE — DISCHARGE INSTR - COC
Continuity of Care Form    Patient Name: Najma Fitzpatrick   :  1950  MRN:  [id number]    Admit date:  2024  Discharge date:  ***    Code Status Order: Full Code   Advance Directives:   Advance Care Flowsheet Documentation             Admitting Physician:  Cheko Tracy DO  PCP: Geena Sotomayor, APRN - CNP    Discharging Nurse: ***  Discharging Hospital Unit/Room#: 0207/0207-01  Discharging Unit Phone Number: ***    Emergency Contact:   Extended Emergency Contact Information  Primary Emergency Contact: Brent Fitzpatrick  Address: [address] [place] [address]  Home Phone: [phone]  Mobile Phone: [phone]  Relation: Spouse  Secondary Emergency Contact: Margarita Fitzpatrick  Address: [address]           [id number]           [place] [id number]  Home Phone: [phone]  Relation: Child    Past Surgical History:  Past Surgical History:   Procedure Laterality Date    COLONOSCOPY  13    DIAGNOSTIC CARDIAC CATH LAB PROCEDURE  2007    Normal cor angio 2007    HERNIA REPAIR  2019    robotic ventral hernia repair with mesh    HERNIA REPAIR N/A 2019    ROBOTIC VENTRAL HERNIA REPAIR WITH MESH performed by Yuriy Rider MD at [facility]    HYSTERECTOMY, TOTAL ABDOMINAL (CERVIX REMOVED)      KIDNEY STONE SURGERY         Immunization History:   Immunization History   Administered Date(s) Administered    COVID-19, MODERNA BLUE border, Primary or Immunocompromised, (age 12y+), IM, 100 mcg/0.5mL 2021, 2021, 11/15/2021, 2022    COVID-19, MODERNA Bivalent, (age 12y+), IM, 50 mcg/0.5 mL 10/13/2022, 2023    COVID-19, PFIZER, (age 12y+), IM, 30mcg/0.3mL 10/09/2023    Influenza Vaccine, unspecified formulation 2018    Influenza Virus Vaccine 10/01/2012, 2013, 2015    Influenza Whole 10/28/2010    Influenza, FLUARIX, FLULAVAL, FLUZONE (age 6 mo+) and AFLURIA, (age 3  SIGNATURE:  {Esignature:945139801}    CASE MANAGEMENT/SOCIAL WORK SECTION    Inpatient Status Date: ***    Readmission Risk Assessment Score:  Readmission Risk              Risk of Unplanned Readmission:  42           Discharging to Facility/ Agency   Name:   Address:  Phone:  Fax:    Dialysis Facility (if applicable)   Name:  Address:  Dialysis Schedule:  Phone:  Fax:    / signature: {Esignature:515831050}    PHYSICIAN SECTION    Prognosis: {Prognosis:1706669992}    Condition at Discharge: { Patient Condition:764382192}    Rehab Potential (if transferring to Rehab): {Prognosis:6752522198}    Recommended Labs or Other Treatments After Discharge: ***    Physician Certification: I certify the above information and transfer of Najma Fitzpatrick  is necessary for the continuing treatment of the diagnosis listed and that she requires {Admit to Appropriate Level of Care:39845} for {GREATER/LESS:918016091} 30 days.     Update Admission H&P: {CHP DME Changes in HandP:658586194}    PHYSICIAN SIGNATURE:  {Esignature:028999908}

## 2024-09-05 NOTE — PROGRESS NOTES
See PM shift assess and vitals.  Pleasant; denies needs/concerns.  Spo2 91% on 2 L pnc; sats drop with speech; denies feeling sob at this time.  Call light in reach

## 2024-09-05 NOTE — CARE COORDINATION
DISCHARGE ORDER  Date/Time 2024 4:40 PM  Completed by: Diane Varela RN, Case Management    Patient Name: Najma Fitzpatrick      : 1950  Admitting Diagnosis: COPD exacerbation (HCC) [J44.1]  Acute respiratory failure with hypoxia (HCC) [J96.01]  Acute on chronic respiratory failure with hypoxia (HCC) [J96.21]  Cardiomyopathy, unspecified type (HCC) [I42.9]  Acute on chronic congestive heart failure, unspecified heart failure type (HCC) [I50.9]      Admit order Date and Status:24 inpt  (verify MD's last order for status of admission)      Noted discharge order.   If applicable PT/OT recommendation at Discharge:  Home with PRN assistance, Home with 24hr supervision, and Home PT   DME recommendation by PT/OT: Needs Met   Confirmed discharge plan  home: Yes  with whom patient  If pt confirmed DC plan does family need to be contacted by CM No  Discharge Plan: Order for dc noted. Spoke with pt who cont plan for home. Discussed HHC and pt declines. Discussed need for home O2 and pt is agreeable and chooses [facility]. Spoke with Ayaz at [facility] who will arrange for home O2. Denies other needs. Chart reviewed and no other dc needs identified.     Date of Last IMM Given:     Reviewed chart.  Role of discharge planner explained and patient verbalized understanding. Discharge order is noted.    Has Home O2 in place on admit:  No  Informed of need to bring portable home O2 tank on day of discharge for nursing to connect prior to leaving:   Not Indicated  Verbalized agreement/Understanding:   Not Indicated  Pt is being d/c'd to home today. Pt's O2 sats are 95% on 2 L NC.    Discharge timeout done with nsg, CM and pt. All discharge needs and concerns nsg, CM and pt addressed.
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
## 2024-09-05 NOTE — PLAN OF CARE
HEART FAILURE CARE PLAN:    Comorbidities Reviewed: Yes   Patient has a past medical history of Abnormal ECG, Anemia, Arthritis, Back pain, chronic, Bipolar disorder (Formerly [facility]), Bronchitis chronic, CHF (congestive heart failure) (Formerly [facility]), Chronic back pain, Chronic neck pain, Clostridium difficile infection, 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Formerly [facility]), Coronary artery disease involving native coronary artery of native heart with angina pectoris (Formerly [facility]), Depression, Emphysema of lung (Formerly [facility]), Fibromyalgia, hyperlipidemia, Hypertension, Kidney stone, Liver disease, Lung disease, MDD (major depressive disorder), Mood disorder (Formerly [facility]), Movement disorder, Neck pain, chronic, Opiate misuse, Personality disorder (Formerly [facility]), Pneumonia, and Polypharmacy.     Weights Reviewed: Yes   Admission weight: 77.6 kg (171 lb)   Wt Readings from Last 3 Encounters:   09/04/24 77 kg (169 lb 12.1 oz)   04/12/24 78.1 kg (172 lb 1.6 oz)   04/07/24 79 kg (174 lb 1.6 oz)     Intake & Output Reviewed: Yes     Intake/Output Summary (Last 24 hours) at 9/5/2024 0762  Last data filed at 9/5/2024 0410  Gross per 24 hour   Intake 882 ml   Output 750 ml   Net 132 ml       ECHOCARDIOGRAM Reviewed: Yes   Patient's Ejection Fraction (EF) is greater than 40%     Medications Reviewed: Yes   SCHEDULED HOSPITAL MEDICATIONS:   furosemide  40 mg Oral Daily    enoxaparin  40 mg SubCUTAneous Daily    predniSONE  40 mg Oral Daily    aspirin  81 mg Oral Daily    atorvastatin  40 mg Oral Daily    carvedilol  25 mg Oral BID WC    divalproex  500 mg Oral Nightly    divalproex  250 mg Oral Daily    losartan  100 mg Oral Daily    pantoprazole  40 mg Oral QAM AC    tiotropium  2 puff Inhalation Daily RT    DULoxetine  60 mg Oral Daily    azithromycin  500 mg IntraVENous Q24H    ipratropium 0.5 mg-albuterol 2.5 mg  1 Dose Inhalation 4x Daily RT    QUEtiapine  200 mg Oral Nightly    guaiFENesin  600 mg Oral BID     HOME MEDICATIONS:  Prior to Admission medications    Medication Sig Start Date End Date

## 2024-09-05 NOTE — PROGRESS NOTES
09/05/24 0700   RT Protocol   History Pulmonary Disease 2   Respiratory pattern 2   Breath sounds 4   Cough 1   Bronchodilator Assessment Score 9

## 2024-09-05 NOTE — PROGRESS NOTES
O2 Sat at rest on room air is 88 %.  O2 Sat with activity on room air is 83 %.  O2 Sat at rest with oxygen @  2 lpm is 95%.  O2 Sat with activity with oxygen @ 2 lpm is 90%.

## 2024-09-05 NOTE — PROGRESS NOTES
Dc/d restraints.  Patient calm and alert this morning.  States she does not recall last nights episode of confusion/agitation.  Tele showing SR; call light in reach

## 2024-09-05 NOTE — PROGRESS NOTES
Pulmonary Progress Note    CC: COPD exacerbation    Subjective:   2 L O2-no home O2  Slowly improving        Intake/Output Summary (Last 24 hours) at 9/5/2024 0768  Last data filed at 9/5/2024 0410  Gross per 24 hour   Intake 882 ml   Output 750 ml   Net 132 ml       Exam:   BP (!) 161/78   Pulse 68   Temp 98 °F (36.7 °C) (Oral)   Resp 18   Ht 1.626 m (5' 4\")   Wt 77 kg (169 lb 12.1 oz)   SpO2 94%   BMI 29.14 kg/m²  on 2 L  Gen: No distress.  Ill-appearing  Eyes: PERRL. No sclera icterus. No conjunctival injection.   ENT: No discharge. Pharynx clear.   Neck: Trachea midline. No obvious mass.    Resp: Mild accessory muscle use.  Few crackles.  Scattered wheezes. No rhonchi. No dullness on percussion.  CV: Regular rate. Regular rhythm. No murmur or rub.  Mild LE edema.   GI: Non-tender. Non-distended. No hernia.   Skin: Warm and dry. No nodule on exposed extremities.   Lymph: No cervical LAD. No supraclavicular LAD.   M/S: No cyanosis. No joint deformity. No clubbing.   Neuro: Awake. Alert. Moves all four extremities.   Psych: Oriented x 3. No anxiety.     Scheduled Meds:   furosemide  40 mg Oral Daily    enoxaparin  40 mg SubCUTAneous Daily    predniSONE  40 mg Oral Daily    aspirin  81 mg Oral Daily    atorvastatin  40 mg Oral Daily    carvedilol  25 mg Oral BID WC    divalproex  500 mg Oral Nightly    divalproex  250 mg Oral Daily    losartan  100 mg Oral Daily    pantoprazole  40 mg Oral QAM AC    tiotropium  2 puff Inhalation Daily RT    DULoxetine  60 mg Oral Daily    azithromycin  500 mg IntraVENous Q24H    ipratropium 0.5 mg-albuterol 2.5 mg  1 Dose Inhalation 4x Daily RT    QUEtiapine  200 mg Oral Nightly    guaiFENesin  600 mg Oral BID     Continuous Infusions:    PRN Meds:  promethazine **OR** ondansetron, melatonin, nicotine, polyethylene glycol, albuterol, acetaminophen **OR** acetaminophen, albuterol, butalbital-acetaminophen-caffeine, HYDROcodone-acetaminophen, calcium carbonate,

## 2024-09-05 NOTE — PLAN OF CARE
Problem: Chronic Conditions and Co-morbidities  Goal: Patient's chronic conditions and co-morbidity symptoms are monitored and maintained or improved  Outcome: Progressing     Problem: Skin/Tissue Integrity  Goal: Absence of new skin breakdown  Description: 1.  Monitor for areas of redness and/or skin breakdown  2.  Assess vascular access sites hourly  3.  Every 4-6 hours minimum:  Change oxygen saturation probe site  4.  Every 4-6 hours:  If on nasal continuous positive airway pressure, respiratory therapy assess nares and determine need for appliance change or resting period.  Outcome: Progressing     Problem: Safety - Medical Restraint  Goal: Remains free of injury from restraints (Restraint for Interference with Medical Device)  Description: INTERVENTIONS:  1. Determine that other, less restrictive measures have been tried or would not be effective before applying the restraint  2. Evaluate the patient's condition at the time of restraint application  3. Inform patient/family regarding the reason for restraint  4. Q2H: Monitor safety, psychosocial status, comfort, nutrition and hydration  Outcome: Progressing  Flowsheets  Taken 9/5/2024 0430 by Felicia Holbrook RN  Remains free of injury from restraints (restraint for interference with medical device): Every 2 hours: Monitor safety, psychosocial status, comfort, nutrition and hydration  Taken 9/5/2024 0230 by Felicia Holbrook RN  Remains free of injury from restraints (restraint for interference with medical device): Every 2 hours: Monitor safety, psychosocial status, comfort, nutrition and hydration  Taken 9/5/2024 0030 by Felicia Holbrook RN  Remains free of injury from restraints (restraint for interference with medical device): Every 2 hours: Monitor safety, psychosocial status, comfort, nutrition and hydration  Taken 9/4/2024 2239 by Felicia Holbrook RN  Remains free of injury from restraints (restraint for interference with medical device): Every 2

## 2024-09-05 NOTE — DISCHARGE SUMMARY
Name:  Najma Fitzpatrick  Room:  0207/0207-01  MRN:    [id number]    Discharge Summary      This discharge summary is in conjunction with a complete physical exam done on the day of discharge.    Attending Physician: Dr. Lew  Discharging Physician: Dr. Lew      Admit: 9/2/2024  Discharge:  9/5/2024    HPI:    73 y.o. female who presented to [facility] with past medical history of bipolar, depression, emphysema presented to the ED with chief complaint of worsening shortness of breath past  Patient reported that shortness of breath has been going on for past 1 week reported progressively gotten worse she reports that she does drink water and adds intermittently salt to her food.  Patient also reports that been having worsening lower extremity swelling in addition to having cough phlegm production with the phlegm converting to yellow in color.  Patient reports that she does have a history of emphysema although has never had a PFT done before.  In the ED patient was noted to be hypoxic     Diagnoses this Admission and Hospital Course     #Acute hypoxic respiratory failure  #COPD exacerbation  - imaging with no acute changes   -pulmonology consulted  -Zithromax given for 3 days   -Duonebs scheduled; albuterol prn  -IV Solu-medrol--> PO Prednisone now  - improved since arrival but still needing o2  - likely require o2 and pt was fine with it   - avoid smoking      #Acute on chronic diastolic  CHF  -echo with normal EF and grade 1 DD  -daily weights; I&O's  -IV Lasix 40 mg BID given with good diuresis  - changed home 20 mg lasix to 40 mg   - low salt diet     #Hypertension  -BP elevated. Continued Losartan, Coreg     #CAD  -on aspirin, statin, BB     #Bipolar disorder  -continued home medication regimen: Depakote, Cymbalta, Seroquel     #Hyperlipidemia  -on Atorvastatin     #GERD  -on PPI     Smoking cessation discussed with pt     DVT Prophylaxis: Lovenox    Procedures (Please Review Full Report for  Details)  N/A    Consults    Pulmonology       Physical Exam at Discharge:    BP (!) 180/75   Pulse 79   Temp 98.4 °F (36.9 °C) (Oral)   Resp 18   Ht 1.626 m (5' 4\")   Wt 77 kg (169 lb 12.1 oz)   SpO2 97%   BMI 29.14 kg/m²     See today's progress note    CBC:   Recent Labs     09/03/24 0528 09/04/24 0508 09/05/24 0521   WBC 5.4 10.3 8.4   HGB 11.5* 11.1* 10.3*   HCT 36.0 34.9* 31.7*   MCV 81.6 82.1 81.8   * 137 112*     BMP:   Recent Labs     09/03/24 0528 09/04/24 0508 09/05/24 0521   * 133* 135*   K 3.3* 3.3* 3.8   CL 93* 92* 96*   CO2 33* 33* 31   BUN 10 17 13   CREATININE <0.5* <0.5* <0.5*     LIVER PROFILE:   Recent Labs     09/03/24 0528 09/04/24 0508 09/05/24 0521   AST 8* 9* 10*   ALT <5* <5* <5*   BILITOT <0.2 <0.2 <0.2   ALKPHOS 73 67 63       U/A:    Lab Results   Component Value Date/Time    NITRITE neg 03/02/2013 12:59 AM    COLORU DARK YELLOW 04/01/2024 11:40 PM    WBCUA 3-5 04/01/2024 11:40 PM    RBCUA 0-2 04/01/2024 11:40 PM    MUCUS 4+ 04/01/2024 11:40 PM    BACTERIA 1+ 04/01/2024 11:40 PM    CLARITYU Clear 04/01/2024 11:40 PM    SPECGRAV 1.020 03/02/2013 12:59 AM    LEUKOCYTESUR Negative 04/01/2024 11:40 PM    BLOODU Negative 04/01/2024 11:40 PM    GLUCOSEU Negative 04/01/2024 11:40 PM    GLUCOSEU NEGATIVE 06/03/2012 07:19 PM    AMORPHOUS Rare 04/01/2024 11:40 PM       ABG    Lab Results   Component Value Date/Time    PYZ9GIY 25.8 11/01/2020 06:00 AM    BEART -0.5 11/01/2020 06:00 AM    Z3RPKZRC 93.5 11/01/2020 06:00 AM    PHART 7.339 11/01/2020 06:00 AM    NQW2UUY 49.0 11/01/2020 06:00 AM    PO2ART 72.3 11/01/2020 06:00 AM    MJU3WBH 27.3 11/01/2020 06:00 AM         CULTURES  Results       Procedure Component Value Units Date/Time    MRSA DNA Probe, Nasal [[id number]] Collected: 09/03/24 0118    Order Status: Completed Specimen: Nares Updated: 09/04/24 0053     MRSA SCREEN RT-PCR --     Negative  MRSA DNA not detected.  Normal Range: Not detected      Narrative:

## 2024-09-06 LAB — BACTERIA BLD CULT: NORMAL

## 2024-09-07 LAB — BACTERIA BLD CULT ORG #2: NORMAL

## 2024-09-09 ENCOUNTER — TELEPHONE (OUTPATIENT)
Dept: PULMONOLOGY | Age: 74
End: 2024-09-09

## 2024-09-09 DIAGNOSIS — J96.12 CHRONIC RESPIRATORY FAILURE WITH HYPERCAPNIA (HCC): Primary | ICD-10-CM

## 2024-10-01 ENCOUNTER — HOSPITAL ENCOUNTER (EMERGENCY)
Age: 74
Discharge: HOME | End: 2024-10-02
Attending: EMERGENCY MEDICINE
Payer: COMMERCIAL

## 2024-10-01 ENCOUNTER — APPOINTMENT (OUTPATIENT)
Dept: GENERAL RADIOLOGY | Age: 74
End: 2024-10-01
Payer: COMMERCIAL

## 2024-10-01 DIAGNOSIS — J96.11 CHRONIC RESPIRATORY FAILURE WITH HYPOXIA: ICD-10-CM

## 2024-10-01 DIAGNOSIS — R53.83 OTHER FATIGUE: ICD-10-CM

## 2024-10-01 DIAGNOSIS — I50.9 ACUTE ON CHRONIC CONGESTIVE HEART FAILURE, UNSPECIFIED HEART FAILURE TYPE (HCC): ICD-10-CM

## 2024-10-01 DIAGNOSIS — D69.6 THROMBOCYTOPENIA (HCC): ICD-10-CM

## 2024-10-01 DIAGNOSIS — R06.02 SHORTNESS OF BREATH: Primary | ICD-10-CM

## 2024-10-01 DIAGNOSIS — R05.1 ACUTE COUGH: ICD-10-CM

## 2024-10-01 DIAGNOSIS — R03.0 ELEVATED BLOOD PRESSURE READING: ICD-10-CM

## 2024-10-01 PROCEDURE — 99285 EMERGENCY DEPT VISIT HI MDM: CPT

## 2024-10-01 RX ORDER — IPRATROPIUM BROMIDE AND ALBUTEROL SULFATE 2.5; .5 MG/3ML; MG/3ML
1 SOLUTION RESPIRATORY (INHALATION) ONCE
Status: COMPLETED | OUTPATIENT
Start: 2024-10-01 | End: 2024-10-02

## 2024-10-01 RX ORDER — ACETAMINOPHEN 325 MG/1
650 TABLET ORAL ONCE
Status: COMPLETED | OUTPATIENT
Start: 2024-10-01 | End: 2024-10-02

## 2024-10-01 ASSESSMENT — ENCOUNTER SYMPTOMS
CHEST TIGHTNESS: 0
COUGH: 1
DIARRHEA: 0
SHORTNESS OF BREATH: 1
VOMITING: 0
ABDOMINAL PAIN: 0

## 2024-10-01 ASSESSMENT — PAIN - FUNCTIONAL ASSESSMENT: PAIN_FUNCTIONAL_ASSESSMENT: NONE - DENIES PAIN

## 2024-10-02 ENCOUNTER — APPOINTMENT (OUTPATIENT)
Dept: GENERAL RADIOLOGY | Age: 74
End: 2024-10-02
Payer: COMMERCIAL

## 2024-10-02 VITALS
RESPIRATION RATE: 18 BRPM | OXYGEN SATURATION: 91 % | HEART RATE: 71 BPM | BODY MASS INDEX: 30.36 KG/M2 | SYSTOLIC BLOOD PRESSURE: 176 MMHG | TEMPERATURE: 98.6 F | WEIGHT: 165 LBS | DIASTOLIC BLOOD PRESSURE: 71 MMHG | HEIGHT: 62 IN

## 2024-10-02 LAB
ALBUMIN SERPL-MCNC: 3.9 G/DL (ref 3.4–5)
ALBUMIN/GLOB SERPL: 1.4 {RATIO} (ref 1.1–2.2)
ALP SERPL-CCNC: 52 U/L (ref 40–129)
ALT SERPL-CCNC: 11 U/L (ref 10–40)
ANION GAP SERPL CALCULATED.3IONS-SCNC: 7 MMOL/L (ref 3–16)
ANISOCYTOSIS BLD QL SMEAR: ABNORMAL
AST SERPL-CCNC: 11 U/L (ref 15–37)
BASE EXCESS BLDV CALC-SCNC: 10.9 MMOL/L (ref -3–3)
BASOPHILS # BLD: 0 K/UL (ref 0–0.2)
BASOPHILS NFR BLD: 0 %
BILIRUB SERPL-MCNC: 0.3 MG/DL (ref 0–1)
BUN SERPL-MCNC: 9 MG/DL (ref 7–20)
CALCIUM SERPL-MCNC: 8.6 MG/DL (ref 8.3–10.6)
CHLORIDE SERPL-SCNC: 89 MMOL/L (ref 99–110)
CO2 BLDV-SCNC: 40 MMOL/L
CO2 SERPL-SCNC: 40 MMOL/L (ref 21–32)
COHGB MFR BLDV: 1.5 % (ref 0–1.5)
CREAT SERPL-MCNC: <0.5 MG/DL (ref 0.6–1.2)
DEPRECATED RDW RBC AUTO: 17.7 % (ref 12.4–15.4)
EKG ATRIAL RATE: 73 BPM
EKG DIAGNOSIS: NORMAL
EKG P AXIS: 73 DEGREES
EKG P-R INTERVAL: 160 MS
EKG Q-T INTERVAL: 408 MS
EKG QRS DURATION: 100 MS
EKG QTC CALCULATION (BAZETT): 449 MS
EKG R AXIS: 2 DEGREES
EKG T AXIS: 51 DEGREES
EKG VENTRICULAR RATE: 73 BPM
EOSINOPHIL # BLD: 0.1 K/UL (ref 0–0.6)
EOSINOPHIL NFR BLD: 1 %
FLUAV RNA RESP QL NAA+PROBE: NOT DETECTED
FLUBV RNA RESP QL NAA+PROBE: NOT DETECTED
GFR SERPLBLD CREATININE-BSD FMLA CKD-EPI: >90 ML/MIN/{1.73_M2}
GLUCOSE SERPL-MCNC: 104 MG/DL (ref 70–99)
HCO3 BLDV-SCNC: 38.2 MMOL/L (ref 23–29)
HCT VFR BLD AUTO: 33.2 % (ref 36–48)
HGB BLD-MCNC: 11 G/DL (ref 12–16)
LYMPHOCYTES # BLD: 1.5 K/UL (ref 1–5.1)
LYMPHOCYTES NFR BLD: 19 %
MAGNESIUM SERPL-MCNC: 1.9 MG/DL (ref 1.8–2.4)
MCH RBC QN AUTO: 27.2 PG (ref 26–34)
MCHC RBC AUTO-ENTMCNC: 33 G/DL (ref 31–36)
MCV RBC AUTO: 82.2 FL (ref 80–100)
METHGB MFR BLDV: 0.3 %
MONOCYTES # BLD: 0.5 K/UL (ref 0–1.3)
MONOCYTES NFR BLD: 6 %
NEUTROPHILS # BLD: 5.9 K/UL (ref 1.7–7.7)
NEUTROPHILS NFR BLD: 74 %
NRBC BLD-RTO: 1 /100 WBC
NT-PROBNP SERPL-MCNC: 853 PG/ML (ref 0–124)
O2 CT VFR BLDV CALC: 11 VOL %
O2 THERAPY: ABNORMAL
OVALOCYTES BLD QL SMEAR: ABNORMAL
PCO2 BLDV: 65.3 MMHG (ref 40–50)
PH BLDV: 7.38 [PH] (ref 7.35–7.45)
PLATELET # BLD AUTO: 115 K/UL (ref 135–450)
PMV BLD AUTO: 7.3 FL (ref 5–10.5)
PO2 BLDV: 38.4 MMHG (ref 25–40)
POIKILOCYTOSIS BLD QL SMEAR: ABNORMAL
POTASSIUM SERPL-SCNC: 3.8 MMOL/L (ref 3.5–5.1)
PROT SERPL-MCNC: 6.6 G/DL (ref 6.4–8.2)
RBC # BLD AUTO: 4.03 M/UL (ref 4–5.2)
SAO2 % BLDV: 70 %
SARS-COV-2 RNA RESP QL NAA+PROBE: NOT DETECTED
SLIDE REVIEW: ABNORMAL
SODIUM SERPL-SCNC: 136 MMOL/L (ref 136–145)
TROPONIN, HIGH SENSITIVITY: 16 NG/L (ref 0–14)
TROPONIN, HIGH SENSITIVITY: 16 NG/L (ref 0–14)
WBC # BLD AUTO: 8 K/UL (ref 4–11)

## 2024-10-02 PROCEDURE — 71045 X-RAY EXAM CHEST 1 VIEW: CPT

## 2024-10-02 PROCEDURE — 85025 COMPLETE CBC W/AUTO DIFF WBC: CPT

## 2024-10-02 PROCEDURE — 6370000000 HC RX 637 (ALT 250 FOR IP): Performed by: EMERGENCY MEDICINE

## 2024-10-02 PROCEDURE — 6360000002 HC RX W HCPCS: Performed by: EMERGENCY MEDICINE

## 2024-10-02 PROCEDURE — 96375 TX/PRO/DX INJ NEW DRUG ADDON: CPT

## 2024-10-02 PROCEDURE — 83735 ASSAY OF MAGNESIUM: CPT

## 2024-10-02 PROCEDURE — 93010 ELECTROCARDIOGRAM REPORT: CPT | Performed by: STUDENT IN AN ORGANIZED HEALTH CARE EDUCATION/TRAINING PROGRAM

## 2024-10-02 PROCEDURE — 36415 COLL VENOUS BLD VENIPUNCTURE: CPT

## 2024-10-02 PROCEDURE — 96374 THER/PROPH/DIAG INJ IV PUSH: CPT

## 2024-10-02 PROCEDURE — 80053 COMPREHEN METABOLIC PANEL: CPT

## 2024-10-02 PROCEDURE — 93005 ELECTROCARDIOGRAM TRACING: CPT | Performed by: EMERGENCY MEDICINE

## 2024-10-02 PROCEDURE — 83880 ASSAY OF NATRIURETIC PEPTIDE: CPT

## 2024-10-02 PROCEDURE — 84484 ASSAY OF TROPONIN QUANT: CPT

## 2024-10-02 PROCEDURE — 82803 BLOOD GASES ANY COMBINATION: CPT

## 2024-10-02 PROCEDURE — 87636 SARSCOV2 & INF A&B AMP PRB: CPT

## 2024-10-02 RX ORDER — FUROSEMIDE 10 MG/ML
40 INJECTION INTRAMUSCULAR; INTRAVENOUS ONCE
Status: COMPLETED | OUTPATIENT
Start: 2024-10-02 | End: 2024-10-02

## 2024-10-02 RX ORDER — DEXAMETHASONE SODIUM PHOSPHATE 10 MG/ML
8 INJECTION, SOLUTION INTRAMUSCULAR; INTRAVENOUS ONCE
Status: COMPLETED | OUTPATIENT
Start: 2024-10-02 | End: 2024-10-02

## 2024-10-02 RX ORDER — LOSARTAN POTASSIUM 50 MG/1
50 TABLET ORAL ONCE
Status: COMPLETED | OUTPATIENT
Start: 2024-10-02 | End: 2024-10-02

## 2024-10-02 RX ADMIN — IPRATROPIUM BROMIDE AND ALBUTEROL SULFATE 1 DOSE: 2.5; .5 SOLUTION RESPIRATORY (INHALATION) at 01:44

## 2024-10-02 RX ADMIN — ACETAMINOPHEN 650 MG: 325 TABLET ORAL at 01:44

## 2024-10-02 RX ADMIN — LOSARTAN POTASSIUM 50 MG: 50 TABLET, FILM COATED ORAL at 01:44

## 2024-10-02 RX ADMIN — FUROSEMIDE 40 MG: 10 INJECTION, SOLUTION INTRAMUSCULAR; INTRAVENOUS at 02:16

## 2024-10-02 RX ADMIN — DEXAMETHASONE SODIUM PHOSPHATE 8 MG: 10 INJECTION, SOLUTION INTRAMUSCULAR; INTRAVENOUS at 03:02

## 2024-10-02 ASSESSMENT — PAIN - FUNCTIONAL ASSESSMENT: PAIN_FUNCTIONAL_ASSESSMENT: ACTIVITIES ARE NOT PREVENTED

## 2024-10-02 ASSESSMENT — PAIN DESCRIPTION - DESCRIPTORS: DESCRIPTORS: ACHING

## 2024-10-02 ASSESSMENT — PAIN DESCRIPTION - LOCATION: LOCATION: HEAD

## 2024-10-02 ASSESSMENT — PAIN SCALES - GENERAL: PAINLEVEL_OUTOF10: 6

## 2024-10-02 NOTE — DISCHARGE INSTRUCTIONS
Follow-up with your primary doctor in 2 to 3 days for repeat evaluation.    Return to the emergency department for any worsening shortness of breath with increasing fatigue, new onset chest pain, high fever, trouble walking, or any other concerns over the next 6 to 24 hours.    Take your medications as prescribed.    Have your family keep a close eye on you since your carbon dioxide was slightly elevated and if you become more fatigued or short of breath over the next 12 to 24 hours, call 911 to return to the emergency department like we discussed.

## 2024-10-02 NOTE — ED NOTES
Pt on 2 L at baseline. Pt ambulated to bathroom on home O2. Pt able to  shower and ambulate back to bed with no SOB. Oxygen 94% upon return to bed.

## 2024-10-04 ENCOUNTER — TELEPHONE (OUTPATIENT)
Dept: PULMONOLOGY | Age: 74
End: 2024-10-04

## 2024-10-04 DIAGNOSIS — J96.12 CHRONIC RESPIRATORY FAILURE WITH HYPERCAPNIA: Primary | ICD-10-CM

## 2024-10-04 NOTE — TELEPHONE ENCOUNTER
Patient cancelled appointment on 10/8 with Dr. Bain for follow-up CXR.    Reason: has bedbugs at home and can not come in for an appt.    Patient did not reschedule appointment.    She is not sure when she will be able to come in.   She is unable to do a VV.     She did recently get a CXR in the ED on 10/1.

## 2024-10-07 NOTE — TELEPHONE ENCOUNTER
Pt has bed bugs can not do an in person visit and is unable to do a vv can we do a phone visit or schedule at next available

## 2024-10-07 NOTE — TELEPHONE ENCOUNTER
Patient called in wanting to reschedule appt for 10/8/24. Pt cannot come in, pt is unable to do a vv. Please contact as to when and where appt can be placed.

## 2024-10-08 ENCOUNTER — SCHEDULED TELEPHONE ENCOUNTER (OUTPATIENT)
Dept: PULMONOLOGY | Age: 74
End: 2024-10-08
Payer: COMMERCIAL

## 2024-10-08 DIAGNOSIS — R09.02 HYPOXIA: ICD-10-CM

## 2024-10-08 DIAGNOSIS — R93.89 ABNORMAL CT OF THE CHEST: ICD-10-CM

## 2024-10-08 DIAGNOSIS — J44.9 CHRONIC OBSTRUCTIVE PULMONARY DISEASE, UNSPECIFIED COPD TYPE (HCC): Primary | ICD-10-CM

## 2024-10-08 PROCEDURE — 99443 PR PHYS/QHP TELEPHONE EVALUATION 21-30 MIN: CPT | Performed by: INTERNAL MEDICINE

## 2024-10-08 RX ORDER — FLUTICASONE FUROATE, UMECLIDINIUM BROMIDE AND VILANTEROL TRIFENATATE 200; 62.5; 25 UG/1; UG/1; UG/1
1 POWDER RESPIRATORY (INHALATION) DAILY
Qty: 1 EACH | Refills: 3 | Status: SHIPPED | OUTPATIENT
Start: 2024-10-08

## 2024-10-08 RX ORDER — ALBUTEROL SULFATE 0.63 MG/3ML
1 SOLUTION RESPIRATORY (INHALATION) EVERY 6 HOURS PRN
Qty: 270 ML | Refills: 3 | Status: SHIPPED | OUTPATIENT
Start: 2024-10-08

## 2024-10-08 RX ORDER — ALBUTEROL SULFATE 90 UG/1
2 INHALANT RESPIRATORY (INHALATION) EVERY 4 HOURS PRN
Qty: 36 G | Refills: 5 | Status: SHIPPED | OUTPATIENT
Start: 2024-10-08 | End: 2025-10-08

## 2024-10-08 NOTE — PROGRESS NOTES
90-92%.    Smoking cessation counseling.   Follow up in 3 months or sooner if needed                   Najma Fitzpatrick is a 73 y.o. female evaluated via telephone on 10/8/2024.      Consent:  She and/or health care decision maker is aware that that she may receive a bill for this telephone service, depending on her insurance coverage, and has provided verbal consent to proceed: Yes       Documentation:  I communicated with the patient and/or health care decision maker about: See above   Details of this discussion including any medical advice provided: See above       I Affirm this is a Patient Initiated Episode with an Established Patient who has not had a related appointment within my department in the past 7 days or scheduled within the next 24 hours.    Total Time: 21-30  minutes     Note: not billable if this call serves to triage the patient into an appointment for the relevant concern      Fer Bain MD

## 2024-10-09 NOTE — TELEPHONE ENCOUNTER
Pt returned call   Had phone visit with Dr. Bain 10/08/2024  Called to schedule testing Pt wanted to wait until she got her bed bug situation taken care of before scheduling.   Pt wanted a oxy mask sent for her O2   Faxed order

## 2024-10-10 NOTE — TELEPHONE ENCOUNTER
Patient wanted to know when order was sent. Informed patient that orders were faxed. Advised her to call DME to get her mask.

## 2024-10-14 NOTE — TELEPHONE ENCOUNTER
Patient called in stating that she uses [facility] for her Oxygen. Faxed face mask order for oxygen via [employer] to [place]

## 2024-10-29 ENCOUNTER — APPOINTMENT (OUTPATIENT)
Dept: GENERAL RADIOLOGY | Age: 74
End: 2024-10-29
Payer: COMMERCIAL

## 2024-10-29 ENCOUNTER — APPOINTMENT (OUTPATIENT)
Dept: CT IMAGING | Age: 74
End: 2024-10-29
Payer: COMMERCIAL

## 2024-10-29 ENCOUNTER — HOSPITAL ENCOUNTER (INPATIENT)
Age: 74
LOS: 4 days | Discharge: HOME | End: 2024-11-02
Attending: EMERGENCY MEDICINE | Admitting: INTERNAL MEDICINE
Payer: COMMERCIAL

## 2024-10-29 DIAGNOSIS — J44.1 COPD EXACERBATION (HCC): Primary | ICD-10-CM

## 2024-10-29 DIAGNOSIS — J18.9 COMMUNITY ACQUIRED PNEUMONIA, UNSPECIFIED LATERALITY: ICD-10-CM

## 2024-10-29 DIAGNOSIS — R45.851 SUICIDAL IDEATION: ICD-10-CM

## 2024-10-29 LAB
ALBUMIN SERPL-MCNC: 3.7 G/DL (ref 3.4–5)
ALBUMIN/GLOB SERPL: 1.5 {RATIO} (ref 1.1–2.2)
ALP SERPL-CCNC: 75 U/L (ref 40–129)
ALT SERPL-CCNC: <5 U/L (ref 10–40)
AMPHETAMINES UR QL SCN>1000 NG/ML: ABNORMAL
ANION GAP SERPL CALCULATED.3IONS-SCNC: 10 MMOL/L (ref 3–16)
APAP SERPL-MCNC: 6 UG/ML (ref 10–30)
AST SERPL-CCNC: 18 U/L (ref 15–37)
BARBITURATES UR QL SCN>200 NG/ML: POSITIVE
BASOPHILS # BLD: 0 K/UL (ref 0–0.2)
BASOPHILS NFR BLD: 0.6 %
BENZODIAZ UR QL SCN>200 NG/ML: ABNORMAL
BILIRUB SERPL-MCNC: 0.3 MG/DL (ref 0–1)
BILIRUB UR QL STRIP.AUTO: NEGATIVE
BUN SERPL-MCNC: 4 MG/DL (ref 7–20)
CALCIUM SERPL-MCNC: 9.1 MG/DL (ref 8.3–10.6)
CANNABINOIDS UR QL SCN>50 NG/ML: ABNORMAL
CHLORIDE SERPL-SCNC: 94 MMOL/L (ref 99–110)
CLARITY UR: CLEAR
CO2 SERPL-SCNC: 35 MMOL/L (ref 21–32)
COCAINE UR QL SCN: ABNORMAL
COLOR UR: YELLOW
CREAT SERPL-MCNC: 0.5 MG/DL (ref 0.6–1.2)
DEPRECATED RDW RBC AUTO: 17.2 % (ref 12.4–15.4)
DRUG SCREEN COMMENT UR-IMP: ABNORMAL
EOSINOPHIL # BLD: 0.3 K/UL (ref 0–0.6)
EOSINOPHIL NFR BLD: 5.7 %
ETHANOLAMINE SERPL-MCNC: NORMAL MG/DL (ref 0–0.08)
FENTANYL SCREEN, URINE: ABNORMAL
FLUAV RNA RESP QL NAA+PROBE: NOT DETECTED
FLUBV RNA RESP QL NAA+PROBE: NOT DETECTED
GFR SERPLBLD CREATININE-BSD FMLA CKD-EPI: >90 ML/MIN/{1.73_M2}
GLUCOSE SERPL-MCNC: 99 MG/DL (ref 70–99)
GLUCOSE UR STRIP.AUTO-MCNC: NEGATIVE MG/DL
HCT VFR BLD AUTO: 33.8 % (ref 36–48)
HGB BLD-MCNC: 10.9 G/DL (ref 12–16)
HGB UR QL STRIP.AUTO: NEGATIVE
KETONES UR STRIP.AUTO-MCNC: NEGATIVE MG/DL
LACTATE BLDV-SCNC: 1.2 MMOL/L (ref 0.4–2)
LEUKOCYTE ESTERASE UR QL STRIP.AUTO: NEGATIVE
LIPASE SERPL-CCNC: 11 U/L (ref 13–60)
LYMPHOCYTES # BLD: 1.1 K/UL (ref 1–5.1)
LYMPHOCYTES NFR BLD: 19.5 %
MAGNESIUM SERPL-MCNC: 1.78 MG/DL (ref 1.8–2.4)
MCH RBC QN AUTO: 27.3 PG (ref 26–34)
MCHC RBC AUTO-ENTMCNC: 32.3 G/DL (ref 31–36)
MCV RBC AUTO: 84.6 FL (ref 80–100)
METHADONE UR QL SCN>300 NG/ML: ABNORMAL
MONOCYTES # BLD: 0.5 K/UL (ref 0–1.3)
MONOCYTES NFR BLD: 9.6 %
NEUTROPHILS # BLD: 3.6 K/UL (ref 1.7–7.7)
NEUTROPHILS NFR BLD: 64.6 %
NITRITE UR QL STRIP.AUTO: NEGATIVE
NT-PROBNP SERPL-MCNC: 886 PG/ML (ref 0–124)
OPIATES UR QL SCN>300 NG/ML: ABNORMAL
OXYCODONE UR QL SCN: ABNORMAL
PCP UR QL SCN>25 NG/ML: ABNORMAL
PH UR STRIP.AUTO: 7 [PH] (ref 5–8)
PH UR STRIP: 7 [PH]
PLATELET # BLD AUTO: 200 K/UL (ref 135–450)
PMV BLD AUTO: 6.9 FL (ref 5–10.5)
POTASSIUM SERPL-SCNC: 3.4 MMOL/L (ref 3.5–5.1)
PROT SERPL-MCNC: 6.2 G/DL (ref 6.4–8.2)
PROT UR STRIP.AUTO-MCNC: NEGATIVE MG/DL
RBC # BLD AUTO: 4 M/UL (ref 4–5.2)
SALICYLATES SERPL-MCNC: 0.5 MG/DL (ref 15–30)
SARS-COV-2 RNA RESP QL NAA+PROBE: NOT DETECTED
SODIUM SERPL-SCNC: 139 MMOL/L (ref 136–145)
SP GR UR STRIP.AUTO: 1.01 (ref 1–1.03)
TROPONIN, HIGH SENSITIVITY: 17 NG/L (ref 0–14)
TROPONIN, HIGH SENSITIVITY: 19 NG/L (ref 0–14)
UA COMPLETE W REFLEX CULTURE PNL UR: NORMAL
UA DIPSTICK W REFLEX MICRO PNL UR: NORMAL
URN SPEC COLLECT METH UR: NORMAL
UROBILINOGEN UR STRIP-ACNC: 1 E.U./DL
WBC # BLD AUTO: 5.5 K/UL (ref 4–11)

## 2024-10-29 PROCEDURE — 85025 COMPLETE CBC W/AUTO DIFF WBC: CPT

## 2024-10-29 PROCEDURE — 87636 SARSCOV2 & INF A&B AMP PRB: CPT

## 2024-10-29 PROCEDURE — 83605 ASSAY OF LACTIC ACID: CPT

## 2024-10-29 PROCEDURE — 1200000000 HC SEMI PRIVATE

## 2024-10-29 PROCEDURE — 96368 THER/DIAG CONCURRENT INF: CPT

## 2024-10-29 PROCEDURE — 6360000004 HC RX CONTRAST MEDICATION

## 2024-10-29 PROCEDURE — 70450 CT HEAD/BRAIN W/O DYE: CPT

## 2024-10-29 PROCEDURE — 80307 DRUG TEST PRSMV CHEM ANLYZR: CPT

## 2024-10-29 PROCEDURE — 96375 TX/PRO/DX INJ NEW DRUG ADDON: CPT

## 2024-10-29 PROCEDURE — 80053 COMPREHEN METABOLIC PANEL: CPT

## 2024-10-29 PROCEDURE — 93005 ELECTROCARDIOGRAM TRACING: CPT

## 2024-10-29 PROCEDURE — 99285 EMERGENCY DEPT VISIT HI MDM: CPT

## 2024-10-29 PROCEDURE — 6360000002 HC RX W HCPCS

## 2024-10-29 PROCEDURE — 84484 ASSAY OF TROPONIN QUANT: CPT

## 2024-10-29 PROCEDURE — 83880 ASSAY OF NATRIURETIC PEPTIDE: CPT

## 2024-10-29 PROCEDURE — 36415 COLL VENOUS BLD VENIPUNCTURE: CPT

## 2024-10-29 PROCEDURE — 72125 CT NECK SPINE W/O DYE: CPT

## 2024-10-29 PROCEDURE — 82077 ASSAY SPEC XCP UR&BREATH IA: CPT

## 2024-10-29 PROCEDURE — 2580000003 HC RX 258

## 2024-10-29 PROCEDURE — 80143 DRUG ASSAY ACETAMINOPHEN: CPT

## 2024-10-29 PROCEDURE — 83690 ASSAY OF LIPASE: CPT

## 2024-10-29 PROCEDURE — 83735 ASSAY OF MAGNESIUM: CPT

## 2024-10-29 PROCEDURE — 71045 X-RAY EXAM CHEST 1 VIEW: CPT

## 2024-10-29 PROCEDURE — 81003 URINALYSIS AUTO W/O SCOPE: CPT

## 2024-10-29 PROCEDURE — 74177 CT ABD & PELVIS W/CONTRAST: CPT

## 2024-10-29 PROCEDURE — 80179 DRUG ASSAY SALICYLATE: CPT

## 2024-10-29 PROCEDURE — 6370000000 HC RX 637 (ALT 250 FOR IP)

## 2024-10-29 PROCEDURE — 6370000000 HC RX 637 (ALT 250 FOR IP): Performed by: INTERNAL MEDICINE

## 2024-10-29 PROCEDURE — 96365 THER/PROPH/DIAG IV INF INIT: CPT

## 2024-10-29 RX ORDER — IPRATROPIUM BROMIDE AND ALBUTEROL SULFATE 2.5; .5 MG/3ML; MG/3ML
1 SOLUTION RESPIRATORY (INHALATION) ONCE
Status: COMPLETED | OUTPATIENT
Start: 2024-10-29 | End: 2024-10-29

## 2024-10-29 RX ORDER — IOPAMIDOL 755 MG/ML
75 INJECTION, SOLUTION INTRAVASCULAR
Status: COMPLETED | OUTPATIENT
Start: 2024-10-29 | End: 2024-10-29

## 2024-10-29 RX ADMIN — QUETIAPINE FUMARATE 300 MG: 100 TABLET ORAL at 23:06

## 2024-10-29 RX ADMIN — IPRATROPIUM BROMIDE AND ALBUTEROL SULFATE 1 DOSE: .5; 2.5 SOLUTION RESPIRATORY (INHALATION) at 18:37

## 2024-10-29 RX ADMIN — METHYLPREDNISOLONE SODIUM SUCCINATE 125 MG: 125 INJECTION INTRAMUSCULAR; INTRAVENOUS at 18:38

## 2024-10-29 RX ADMIN — SODIUM CHLORIDE 1000 MG: 900 INJECTION INTRAVENOUS at 20:25

## 2024-10-29 RX ADMIN — IOPAMIDOL 75 ML: 755 INJECTION, SOLUTION INTRAVENOUS at 18:44

## 2024-10-29 RX ADMIN — AZITHROMYCIN MONOHYDRATE 500 MG: 500 INJECTION, POWDER, LYOPHILIZED, FOR SOLUTION INTRAVENOUS at 21:06

## 2024-10-29 ASSESSMENT — PAIN SCALES - GENERAL: PAINLEVEL_OUTOF10: 0

## 2024-10-29 NOTE — ED PROVIDER NOTES
In addition to the advanced practice provider, I personally saw Najma Fitzpatrick and performed a substantive portion of the visit including all aspects of the medical decision making.    Medical Decision Making  This is a 73 year old female presenting to the ED with suicidal ideation. Uses 3L of O2 at baseline at home. Complains of worsening shortness of breath. Chest xray shows moderate left pleural effusion and left basilar consolidation which could represent pneumonia. Patient started on IV antibiotics to cover for CAP and will be medically admitted. She will require psychiatric evaluation once medically cleared during this current admission for pneumonia.        EKG  Normal sinus rhythm with no acute ST elevations. Ventricular rate of ~80 bpm. QRS duration of 88. Qtc of 454.    SEP-1  Is this patient to be included in the SEP-1 Core Measure due to severe sepsis or septic shock?   No     Exclusion criteria - the patient is NOT to be included for SEP-1 Core Measure due to:  2+ SIRS criteria are not met    Screenings     [place] Coma Scale  Eye Opening: Spontaneous  Best Verbal Response: Confused  Best Motor Response: Obeys commands  [place] Coma Scale Score: 14             Patient Referrals:  Marley Hsu APRN - NP  [address] [place] [address] [address]  [address]  [facility] [address]  [phone]    Follow up on 11/8/2024  Appointment 11/8/24 @ 2:45 PM.      Discharge Medications:  Current Discharge Medication List          FINAL IMPRESSION  1. COPD exacerbation (HCC)    2. Community acquired pneumonia, unspecified laterality    3. Suicidal ideation        Blood pressure (!) 156/93, pulse 88, temperature 98.4 °F (36.9 °C), temperature source Axillary, resp. rate 18, height 1.549 m (5' 1\"), weight 79.4 kg (175 lb), SpO2 93%, not currently breastfeeding.     I personally saw the patient and made/approved the management plan and take responsibility for the patient management.    For further details of Najma Fitzpatrick's emergency department

## 2024-10-29 NOTE — ED PROVIDER NOTES
[facility] ED  EMERGENCY DEPARTMENT ENCOUNTER        Pt Name: Najma Fitzpatrick  MRN: [id number]  Birthdate 1950  Date of evaluation: 10/29/2024  Provider: CEE Ceja  PCP: Geena Sotomayor APRN - CNP  Note Started: 4:07 PM EDT 10/29/24       I have seen and evaluated this patient with my supervising physician Raleigh Felix DO.      CHIEF COMPLAINT       Chief Complaint   Patient presents with    Psychiatric Evaluation     Not feeling well.  Doesn't want to keep going on.  Tired of living.  Fell about 3 days ago.  Stomach has hurt since.  Sharp pains.        HISTORY OF PRESENT ILLNESS: 1 or more Elements     History From: Patient    Limitations to history : None    Social Determinants Significantly Affecting Health : None    Chief Complaint: Psychiatric evaluation and shortness of breath    Najma Fitzpatrick is a 73 y.o. female with a history of bipolar disorder who presents to the emergency department for a psychiatric evaluation.  Patient states that she is tired of dealing with her COPD and that she does not want to go on any longer.  States that she is tired of living.  States that she had a plan to take a bunch of different medications.  These thoughts started yesterday.  Also admits to seeing shadows and states that it scares her that she may continue seeing these. States that she does take them as prescribed.  She does follow-up with [facility].    Patient also complains of increased shortness of breath.  She does wear 2 L nasal cannula at home.  It is associated chest pain.  States that she fell recently about 3 days ago where she hit her head and stomach.  Admits to cough denies loss of consciousness or blood thinners.  Denies neck pain.  Denies extremity weakness. Denies fever, chills, nausea, or vomiting.     Nursing Notes were all reviewed and agreed with or any disagreements were addressed in the HPI.    REVIEW OF SYSTEMS :      Review of Systems    Positives and Pertinent

## 2024-10-30 LAB
ANION GAP SERPL CALCULATED.3IONS-SCNC: 11 MMOL/L (ref 3–16)
ANISOCYTOSIS BLD QL SMEAR: ABNORMAL
BASOPHILS # BLD: 0 K/UL (ref 0–0.2)
BASOPHILS NFR BLD: 0 %
BUN SERPL-MCNC: 6 MG/DL (ref 7–20)
CALCIUM SERPL-MCNC: 8.8 MG/DL (ref 8.3–10.6)
CHLORIDE SERPL-SCNC: 93 MMOL/L (ref 99–110)
CO2 SERPL-SCNC: 34 MMOL/L (ref 21–32)
CREAT SERPL-MCNC: 0.4 MG/DL (ref 0.6–1.2)
DEPRECATED RDW RBC AUTO: 17.5 % (ref 12.4–15.4)
EKG ATRIAL RATE: 77 BPM
EKG DIAGNOSIS: NORMAL
EKG P AXIS: 58 DEGREES
EKG P-R INTERVAL: 156 MS
EKG Q-T INTERVAL: 402 MS
EKG QRS DURATION: 88 MS
EKG QTC CALCULATION (BAZETT): 454 MS
EKG R AXIS: 12 DEGREES
EKG T AXIS: 37 DEGREES
EKG VENTRICULAR RATE: 77 BPM
EOSINOPHIL # BLD: 0.1 K/UL (ref 0–0.6)
EOSINOPHIL NFR BLD: 1 %
GFR SERPLBLD CREATININE-BSD FMLA CKD-EPI: >90 ML/MIN/{1.73_M2}
GLUCOSE SERPL-MCNC: 122 MG/DL (ref 70–99)
HCT VFR BLD AUTO: 33.6 % (ref 36–48)
HGB BLD-MCNC: 11.1 G/DL (ref 12–16)
LYMPHOCYTES # BLD: 0.3 K/UL (ref 1–5.1)
LYMPHOCYTES NFR BLD: 5 %
MAGNESIUM SERPL-MCNC: 1.73 MG/DL (ref 1.8–2.4)
MCH RBC QN AUTO: 27.7 PG (ref 26–34)
MCHC RBC AUTO-ENTMCNC: 33 G/DL (ref 31–36)
MCV RBC AUTO: 83.8 FL (ref 80–100)
MONOCYTES # BLD: 0.3 K/UL (ref 0–1.3)
MONOCYTES NFR BLD: 4 %
MRSA DNA SPEC QL NAA+PROBE: NORMAL
MYELOCYTES NFR BLD MANUAL: 1 %
NEUTROPHILS # BLD: 5.9 K/UL (ref 1.7–7.7)
NEUTROPHILS NFR BLD: 88 %
NEUTS BAND NFR BLD MANUAL: 1 % (ref 0–7)
PLATELET # BLD AUTO: 218 K/UL (ref 135–450)
PLATELET BLD QL SMEAR: ADEQUATE
PMV BLD AUTO: 7.1 FL (ref 5–10.5)
POLYCHROMASIA BLD QL SMEAR: ABNORMAL
POTASSIUM SERPL-SCNC: 3.4 MMOL/L (ref 3.5–5.1)
RBC # BLD AUTO: 4.01 M/UL (ref 4–5.2)
SLIDE REVIEW: ABNORMAL
SODIUM SERPL-SCNC: 138 MMOL/L (ref 136–145)
WBC # BLD AUTO: 6.5 K/UL (ref 4–11)

## 2024-10-30 PROCEDURE — 87449 NOS EACH ORGANISM AG IA: CPT

## 2024-10-30 PROCEDURE — 80048 BASIC METABOLIC PNL TOTAL CA: CPT

## 2024-10-30 PROCEDURE — 2580000003 HC RX 258: Performed by: INTERNAL MEDICINE

## 2024-10-30 PROCEDURE — 99223 1ST HOSP IP/OBS HIGH 75: CPT | Performed by: INTERNAL MEDICINE

## 2024-10-30 PROCEDURE — 6360000002 HC RX W HCPCS: Performed by: INTERNAL MEDICINE

## 2024-10-30 PROCEDURE — 94761 N-INVAS EAR/PLS OXIMETRY MLT: CPT

## 2024-10-30 PROCEDURE — 6370000000 HC RX 637 (ALT 250 FOR IP): Performed by: NURSE PRACTITIONER

## 2024-10-30 PROCEDURE — 2700000000 HC OXYGEN THERAPY PER DAY

## 2024-10-30 PROCEDURE — 85025 COMPLETE CBC W/AUTO DIFF WBC: CPT

## 2024-10-30 PROCEDURE — 93010 ELECTROCARDIOGRAM REPORT: CPT | Performed by: INTERNAL MEDICINE

## 2024-10-30 PROCEDURE — 87040 BLOOD CULTURE FOR BACTERIA: CPT

## 2024-10-30 PROCEDURE — 99232 SBSQ HOSP IP/OBS MODERATE 35: CPT | Performed by: NURSE PRACTITIONER

## 2024-10-30 PROCEDURE — 6370000000 HC RX 637 (ALT 250 FOR IP): Performed by: INTERNAL MEDICINE

## 2024-10-30 PROCEDURE — 1200000000 HC SEMI PRIVATE

## 2024-10-30 PROCEDURE — 36415 COLL VENOUS BLD VENIPUNCTURE: CPT

## 2024-10-30 PROCEDURE — 2580000003 HC RX 258: Performed by: NURSE PRACTITIONER

## 2024-10-30 PROCEDURE — 83735 ASSAY OF MAGNESIUM: CPT

## 2024-10-30 PROCEDURE — 99223 1ST HOSP IP/OBS HIGH 75: CPT

## 2024-10-30 PROCEDURE — 6360000002 HC RX W HCPCS: Performed by: NURSE PRACTITIONER

## 2024-10-30 PROCEDURE — 87641 MR-STAPH DNA AMP PROBE: CPT

## 2024-10-30 PROCEDURE — 94640 AIRWAY INHALATION TREATMENT: CPT

## 2024-10-30 RX ORDER — ATORVASTATIN CALCIUM 40 MG/1
40 TABLET, FILM COATED ORAL DAILY
Status: DISCONTINUED | OUTPATIENT
Start: 2024-10-30 | End: 2024-11-02 | Stop reason: HOSPADM

## 2024-10-30 RX ORDER — QUETIAPINE FUMARATE 25 MG/1
25 TABLET, FILM COATED ORAL 2 TIMES DAILY PRN
Status: DISCONTINUED | OUTPATIENT
Start: 2024-10-30 | End: 2024-10-31

## 2024-10-30 RX ORDER — VANCOMYCIN 1.5 G/300ML
1500 INJECTION, SOLUTION INTRAVENOUS ONCE
Status: COMPLETED | OUTPATIENT
Start: 2024-10-30 | End: 2024-10-30

## 2024-10-30 RX ORDER — ACETAMINOPHEN 650 MG/1
650 SUPPOSITORY RECTAL EVERY 6 HOURS PRN
Status: DISCONTINUED | OUTPATIENT
Start: 2024-10-30 | End: 2024-11-02 | Stop reason: HOSPADM

## 2024-10-30 RX ORDER — TOPIRAMATE 25 MG/1
25 TABLET, FILM COATED ORAL DAILY
COMMUNITY

## 2024-10-30 RX ORDER — ACETAMINOPHEN 325 MG/1
650 TABLET ORAL EVERY 6 HOURS PRN
Status: DISCONTINUED | OUTPATIENT
Start: 2024-10-30 | End: 2024-11-02 | Stop reason: HOSPADM

## 2024-10-30 RX ORDER — ONDANSETRON 4 MG/1
4 TABLET, ORALLY DISINTEGRATING ORAL EVERY 8 HOURS PRN
Status: DISCONTINUED | OUTPATIENT
Start: 2024-10-30 | End: 2024-11-02 | Stop reason: HOSPADM

## 2024-10-30 RX ORDER — HYDRALAZINE HYDROCHLORIDE 20 MG/ML
20 INJECTION INTRAMUSCULAR; INTRAVENOUS EVERY 8 HOURS PRN
Status: DISCONTINUED | OUTPATIENT
Start: 2024-10-30 | End: 2024-10-31

## 2024-10-30 RX ORDER — TOPIRAMATE 25 MG/1
25 TABLET, FILM COATED ORAL DAILY
Status: DISCONTINUED | OUTPATIENT
Start: 2024-10-30 | End: 2024-11-02 | Stop reason: HOSPADM

## 2024-10-30 RX ORDER — DIVALPROEX SODIUM 250 MG/1
250 TABLET, FILM COATED, EXTENDED RELEASE ORAL EVERY MORNING
COMMUNITY

## 2024-10-30 RX ORDER — DULOXETIN HYDROCHLORIDE 60 MG/1
60 CAPSULE, DELAYED RELEASE ORAL DAILY
COMMUNITY

## 2024-10-30 RX ORDER — PANTOPRAZOLE SODIUM 40 MG/1
40 TABLET, DELAYED RELEASE ORAL DAILY
COMMUNITY

## 2024-10-30 RX ORDER — CARVEDILOL 25 MG/1
25 TABLET ORAL 2 TIMES DAILY WITH MEALS
COMMUNITY

## 2024-10-30 RX ORDER — SODIUM CHLORIDE 0.9 % (FLUSH) 0.9 %
5-40 SYRINGE (ML) INJECTION EVERY 12 HOURS SCHEDULED
Status: DISCONTINUED | OUTPATIENT
Start: 2024-10-30 | End: 2024-11-02 | Stop reason: HOSPADM

## 2024-10-30 RX ORDER — LOSARTAN POTASSIUM 100 MG/1
100 TABLET ORAL DAILY
Status: DISCONTINUED | OUTPATIENT
Start: 2024-10-30 | End: 2024-10-30

## 2024-10-30 RX ORDER — ONDANSETRON 2 MG/ML
4 INJECTION INTRAMUSCULAR; INTRAVENOUS EVERY 6 HOURS PRN
Status: DISCONTINUED | OUTPATIENT
Start: 2024-10-30 | End: 2024-11-02 | Stop reason: HOSPADM

## 2024-10-30 RX ORDER — PREDNISONE 20 MG/1
40 TABLET ORAL DAILY
Status: DISCONTINUED | OUTPATIENT
Start: 2024-10-30 | End: 2024-11-02 | Stop reason: HOSPADM

## 2024-10-30 RX ORDER — GUAIFENESIN 600 MG/1
600 TABLET, EXTENDED RELEASE ORAL 2 TIMES DAILY
Status: DISCONTINUED | OUTPATIENT
Start: 2024-10-30 | End: 2024-11-02 | Stop reason: HOSPADM

## 2024-10-30 RX ORDER — DIVALPROEX SODIUM 500 MG/1
500 TABLET, FILM COATED, EXTENDED RELEASE ORAL
COMMUNITY

## 2024-10-30 RX ORDER — ASPIRIN 81 MG/1
81 TABLET, CHEWABLE ORAL DAILY
COMMUNITY

## 2024-10-30 RX ORDER — DULOXETIN HYDROCHLORIDE 60 MG/1
60 CAPSULE, DELAYED RELEASE ORAL DAILY
Status: DISCONTINUED | OUTPATIENT
Start: 2024-10-30 | End: 2024-11-02 | Stop reason: HOSPADM

## 2024-10-30 RX ORDER — DIVALPROEX SODIUM 500 MG/1
500 TABLET, FILM COATED, EXTENDED RELEASE ORAL NIGHTLY
Status: DISCONTINUED | OUTPATIENT
Start: 2024-10-30 | End: 2024-10-30

## 2024-10-30 RX ORDER — QUETIAPINE FUMARATE 100 MG/1
100 TABLET, FILM COATED ORAL NIGHTLY
Status: DISCONTINUED | OUTPATIENT
Start: 2024-10-30 | End: 2024-10-30 | Stop reason: ALTCHOICE

## 2024-10-30 RX ORDER — MAGNESIUM SULFATE 1 G/100ML
1000 INJECTION INTRAVENOUS ONCE
Status: COMPLETED | OUTPATIENT
Start: 2024-10-30 | End: 2024-10-30

## 2024-10-30 RX ORDER — ATORVASTATIN CALCIUM 40 MG/1
40 TABLET, FILM COATED ORAL DAILY
COMMUNITY

## 2024-10-30 RX ORDER — DIVALPROEX SODIUM 250 MG/1
250 TABLET, FILM COATED, EXTENDED RELEASE ORAL EVERY MORNING
Status: DISCONTINUED | OUTPATIENT
Start: 2024-10-31 | End: 2024-11-02 | Stop reason: HOSPADM

## 2024-10-30 RX ORDER — ALBUTEROL SULFATE 0.83 MG/ML
0.63 SOLUTION RESPIRATORY (INHALATION) EVERY 6 HOURS PRN
Status: DISCONTINUED | OUTPATIENT
Start: 2024-10-30 | End: 2024-11-02 | Stop reason: HOSPADM

## 2024-10-30 RX ORDER — PANTOPRAZOLE SODIUM 40 MG/1
40 TABLET, DELAYED RELEASE ORAL DAILY
Status: DISCONTINUED | OUTPATIENT
Start: 2024-10-30 | End: 2024-11-02 | Stop reason: HOSPADM

## 2024-10-30 RX ORDER — IPRATROPIUM BROMIDE AND ALBUTEROL SULFATE 2.5; .5 MG/3ML; MG/3ML
1 SOLUTION RESPIRATORY (INHALATION)
Status: DISCONTINUED | OUTPATIENT
Start: 2024-10-30 | End: 2024-10-30

## 2024-10-30 RX ORDER — NICOTINE 21 MG/24HR
1 PATCH, TRANSDERMAL 24 HOURS TRANSDERMAL EVERY 24 HOURS
COMMUNITY

## 2024-10-30 RX ORDER — VANCOMYCIN 1 G/200ML
1000 INJECTION, SOLUTION INTRAVENOUS EVERY 12 HOURS
Status: DISCONTINUED | OUTPATIENT
Start: 2024-10-30 | End: 2024-10-31

## 2024-10-30 RX ORDER — POLYETHYLENE GLYCOL 3350 17 G/17G
17 POWDER, FOR SOLUTION ORAL DAILY PRN
Status: DISCONTINUED | OUTPATIENT
Start: 2024-10-30 | End: 2024-11-02 | Stop reason: HOSPADM

## 2024-10-30 RX ORDER — QUETIAPINE FUMARATE 25 MG/1
25 TABLET, FILM COATED ORAL 2 TIMES DAILY
Status: DISCONTINUED | OUTPATIENT
Start: 2024-10-30 | End: 2024-10-30

## 2024-10-30 RX ORDER — POTASSIUM CHLORIDE 1500 MG/1
40 TABLET, EXTENDED RELEASE ORAL PRN
Status: DISCONTINUED | OUTPATIENT
Start: 2024-10-30 | End: 2024-11-02 | Stop reason: HOSPADM

## 2024-10-30 RX ORDER — BUDESONIDE AND FORMOTEROL FUMARATE DIHYDRATE 160; 4.5 UG/1; UG/1
2 AEROSOL RESPIRATORY (INHALATION)
Status: DISCONTINUED | OUTPATIENT
Start: 2024-10-30 | End: 2024-11-02 | Stop reason: HOSPADM

## 2024-10-30 RX ORDER — POTASSIUM CHLORIDE 7.45 MG/ML
10 INJECTION INTRAVENOUS PRN
Status: DISCONTINUED | OUTPATIENT
Start: 2024-10-30 | End: 2024-11-02 | Stop reason: HOSPADM

## 2024-10-30 RX ORDER — BUTALBITAL, ACETAMINOPHEN, CAFFEINE AND CODEINE PHOSPHATE 300; 50; 40; 30 MG/1; MG/1; MG/1; MG/1
1 CAPSULE ORAL EVERY 4 HOURS PRN
COMMUNITY

## 2024-10-30 RX ORDER — IPRATROPIUM BROMIDE AND ALBUTEROL SULFATE 2.5; .5 MG/3ML; MG/3ML
1 SOLUTION RESPIRATORY (INHALATION)
Status: DISCONTINUED | OUTPATIENT
Start: 2024-10-30 | End: 2024-11-02 | Stop reason: HOSPADM

## 2024-10-30 RX ORDER — DIVALPROEX SODIUM 250 MG/1
250 TABLET, FILM COATED, EXTENDED RELEASE ORAL DAILY
Status: DISCONTINUED | OUTPATIENT
Start: 2024-10-30 | End: 2024-10-30 | Stop reason: ALTCHOICE

## 2024-10-30 RX ORDER — HYDROCODONE BITARTRATE AND ACETAMINOPHEN 5; 325 MG/1; MG/1
1 TABLET ORAL EVERY 6 HOURS PRN
Status: ON HOLD | COMMUNITY
End: 2024-11-02 | Stop reason: HOSPADM

## 2024-10-30 RX ORDER — SODIUM CHLORIDE 0.9 % (FLUSH) 0.9 %
5-40 SYRINGE (ML) INJECTION PRN
Status: DISCONTINUED | OUTPATIENT
Start: 2024-10-30 | End: 2024-11-02 | Stop reason: HOSPADM

## 2024-10-30 RX ORDER — ASPIRIN 81 MG/1
81 TABLET, CHEWABLE ORAL DAILY
Status: DISCONTINUED | OUTPATIENT
Start: 2024-10-30 | End: 2024-11-02 | Stop reason: HOSPADM

## 2024-10-30 RX ORDER — BUTALBITAL, ACETAMINOPHEN AND CAFFEINE 50; 325; 40 MG/1; MG/1; MG/1
1 TABLET ORAL EVERY 4 HOURS PRN
Status: DISCONTINUED | OUTPATIENT
Start: 2024-10-30 | End: 2024-11-02 | Stop reason: HOSPADM

## 2024-10-30 RX ORDER — DIVALPROEX SODIUM 250 MG/1
250 TABLET, FILM COATED, EXTENDED RELEASE ORAL EVERY MORNING
Status: DISCONTINUED | OUTPATIENT
Start: 2024-10-30 | End: 2024-10-30

## 2024-10-30 RX ORDER — ALBUTEROL SULFATE 0.63 MG/3ML
1 SOLUTION RESPIRATORY (INHALATION) EVERY 6 HOURS PRN
COMMUNITY

## 2024-10-30 RX ORDER — DIVALPROEX SODIUM 500 MG/1
500 TABLET, FILM COATED, EXTENDED RELEASE ORAL
Status: DISCONTINUED | OUTPATIENT
Start: 2024-10-30 | End: 2024-11-02 | Stop reason: HOSPADM

## 2024-10-30 RX ORDER — IPRATROPIUM BROMIDE AND ALBUTEROL SULFATE 2.5; .5 MG/3ML; MG/3ML
1 SOLUTION RESPIRATORY (INHALATION) EVERY 4 HOURS PRN
Status: DISCONTINUED | OUTPATIENT
Start: 2024-10-30 | End: 2024-11-02 | Stop reason: HOSPADM

## 2024-10-30 RX ORDER — SODIUM CHLORIDE 9 MG/ML
INJECTION, SOLUTION INTRAVENOUS PRN
Status: DISCONTINUED | OUTPATIENT
Start: 2024-10-30 | End: 2024-11-02 | Stop reason: HOSPADM

## 2024-10-30 RX ORDER — ALBUTEROL SULFATE 90 UG/1
2 INHALANT RESPIRATORY (INHALATION) EVERY 4 HOURS PRN
COMMUNITY

## 2024-10-30 RX ORDER — CARVEDILOL 25 MG/1
25 TABLET ORAL 2 TIMES DAILY WITH MEALS
Status: DISCONTINUED | OUTPATIENT
Start: 2024-10-30 | End: 2024-11-02 | Stop reason: HOSPADM

## 2024-10-30 RX ORDER — QUETIAPINE FUMARATE 200 MG/1
400 TABLET, FILM COATED ORAL NIGHTLY
Status: DISCONTINUED | OUTPATIENT
Start: 2024-10-30 | End: 2024-11-02 | Stop reason: HOSPADM

## 2024-10-30 RX ORDER — ENOXAPARIN SODIUM 100 MG/ML
40 INJECTION SUBCUTANEOUS DAILY
Status: DISCONTINUED | OUTPATIENT
Start: 2024-10-30 | End: 2024-11-02 | Stop reason: HOSPADM

## 2024-10-30 RX ORDER — NICOTINE 21 MG/24HR
1 PATCH, TRANSDERMAL 24 HOURS TRANSDERMAL DAILY
Status: DISCONTINUED | OUTPATIENT
Start: 2024-10-30 | End: 2024-10-30

## 2024-10-30 RX ORDER — FLUTICASONE FUROATE, UMECLIDINIUM BROMIDE AND VILANTEROL TRIFENATATE 200; 62.5; 25 UG/1; UG/1; UG/1
1 POWDER RESPIRATORY (INHALATION) DAILY
COMMUNITY

## 2024-10-30 RX ADMIN — DIVALPROEX SODIUM 500 MG: 500 TABLET, FILM COATED, EXTENDED RELEASE ORAL at 20:53

## 2024-10-30 RX ADMIN — IPRATROPIUM BROMIDE AND ALBUTEROL SULFATE 1 DOSE: 2.5; .5 SOLUTION RESPIRATORY (INHALATION) at 07:30

## 2024-10-30 RX ADMIN — QUETIAPINE FUMARATE 400 MG: 200 TABLET ORAL at 20:52

## 2024-10-30 RX ADMIN — PREDNISONE 40 MG: 20 TABLET ORAL at 08:07

## 2024-10-30 RX ADMIN — GUAIFENESIN 600 MG: 600 TABLET, EXTENDED RELEASE ORAL at 02:54

## 2024-10-30 RX ADMIN — CARVEDILOL 25 MG: 25 TABLET, FILM COATED ORAL at 16:46

## 2024-10-30 RX ADMIN — HYDRALAZINE HYDROCHLORIDE 20 MG: 20 INJECTION INTRAMUSCULAR; INTRAVENOUS at 18:24

## 2024-10-30 RX ADMIN — BUDESONIDE AND FORMOTEROL FUMARATE DIHYDRATE 2 PUFF: 160; 4.5 AEROSOL RESPIRATORY (INHALATION) at 19:12

## 2024-10-30 RX ADMIN — HYDRALAZINE HYDROCHLORIDE 20 MG: 20 INJECTION INTRAMUSCULAR; INTRAVENOUS at 02:54

## 2024-10-30 RX ADMIN — PANTOPRAZOLE SODIUM 40 MG: 40 TABLET, DELAYED RELEASE ORAL at 15:05

## 2024-10-30 RX ADMIN — VANCOMYCIN 1500 MG: 1.5 INJECTION, SOLUTION INTRAVENOUS at 11:39

## 2024-10-30 RX ADMIN — IPRATROPIUM BROMIDE AND ALBUTEROL SULFATE 1 DOSE: 2.5; .5 SOLUTION RESPIRATORY (INHALATION) at 15:41

## 2024-10-30 RX ADMIN — DIVALPROEX SODIUM 250 MG: 250 TABLET, FILM COATED, EXTENDED RELEASE ORAL at 08:26

## 2024-10-30 RX ADMIN — ENOXAPARIN SODIUM 40 MG: 100 INJECTION SUBCUTANEOUS at 08:06

## 2024-10-30 RX ADMIN — ASPIRIN 81 MG: 81 TABLET, CHEWABLE ORAL at 15:05

## 2024-10-30 RX ADMIN — GUAIFENESIN 600 MG: 600 TABLET, EXTENDED RELEASE ORAL at 08:07

## 2024-10-30 RX ADMIN — TOPIRAMATE 25 MG: 25 TABLET, FILM COATED ORAL at 15:05

## 2024-10-30 RX ADMIN — TIOTROPIUM BROMIDE INHALATION SPRAY 2 PUFF: 3.12 SPRAY, METERED RESPIRATORY (INHALATION) at 07:31

## 2024-10-30 RX ADMIN — QUETIAPINE FUMARATE 25 MG: 25 TABLET ORAL at 08:07

## 2024-10-30 RX ADMIN — VANCOMYCIN 1000 MG: 1 INJECTION, SOLUTION INTRAVENOUS at 22:47

## 2024-10-30 RX ADMIN — CARVEDILOL 25 MG: 25 TABLET, FILM COATED ORAL at 11:30

## 2024-10-30 RX ADMIN — SODIUM CHLORIDE, PRESERVATIVE FREE 10 ML: 5 INJECTION INTRAVENOUS at 20:52

## 2024-10-30 RX ADMIN — IPRATROPIUM BROMIDE AND ALBUTEROL SULFATE 1 DOSE: 2.5; .5 SOLUTION RESPIRATORY (INHALATION) at 19:11

## 2024-10-30 RX ADMIN — HYDRALAZINE HYDROCHLORIDE 20 MG: 20 INJECTION INTRAMUSCULAR; INTRAVENOUS at 08:07

## 2024-10-30 RX ADMIN — DULOXETINE HYDROCHLORIDE 60 MG: 60 CAPSULE, DELAYED RELEASE ORAL at 08:07

## 2024-10-30 RX ADMIN — GUAIFENESIN 600 MG: 600 TABLET, EXTENDED RELEASE ORAL at 20:53

## 2024-10-30 RX ADMIN — ACETAMINOPHEN 650 MG: 325 TABLET ORAL at 07:05

## 2024-10-30 RX ADMIN — ATORVASTATIN CALCIUM 40 MG: 40 TABLET, FILM COATED ORAL at 15:05

## 2024-10-30 RX ADMIN — SODIUM CHLORIDE, PRESERVATIVE FREE 10 ML: 5 INJECTION INTRAVENOUS at 08:11

## 2024-10-30 RX ADMIN — CEFEPIME 2000 MG: 2 INJECTION, POWDER, FOR SOLUTION INTRAVENOUS at 11:01

## 2024-10-30 RX ADMIN — AZITHROMYCIN DIHYDRATE 500 MG: 500 INJECTION, POWDER, LYOPHILIZED, FOR SOLUTION INTRAVENOUS at 20:54

## 2024-10-30 RX ADMIN — CEFEPIME 2000 MG: 2 INJECTION, POWDER, FOR SOLUTION INTRAVENOUS at 18:16

## 2024-10-30 RX ADMIN — MAGNESIUM SULFATE IN DEXTROSE 1000 MG: 10 INJECTION, SOLUTION INTRAVENOUS at 11:36

## 2024-10-30 RX ADMIN — ACETAMINOPHEN 650 MG: 325 TABLET ORAL at 20:56

## 2024-10-30 RX ADMIN — IPRATROPIUM BROMIDE AND ALBUTEROL SULFATE 1 DOSE: 2.5; .5 SOLUTION RESPIRATORY (INHALATION) at 11:07

## 2024-10-30 ASSESSMENT — PAIN SCALES - GENERAL
PAINLEVEL_OUTOF10: 6
PAINLEVEL_OUTOF10: 0
PAINLEVEL_OUTOF10: 0
PAINLEVEL_OUTOF10: 10

## 2024-10-30 ASSESSMENT — PAIN DESCRIPTION - LOCATION
LOCATION: HEAD;NECK
LOCATION: HEAD

## 2024-10-30 ASSESSMENT — PAIN DESCRIPTION - DESCRIPTORS
DESCRIPTORS: ACHING
DESCRIPTORS: ACHING

## 2024-10-30 ASSESSMENT — PAIN DESCRIPTION - ORIENTATION: ORIENTATION: MID

## 2024-10-30 ASSESSMENT — PAIN - FUNCTIONAL ASSESSMENT: PAIN_FUNCTIONAL_ASSESSMENT: PREVENTS OR INTERFERES SOME ACTIVE ACTIVITIES AND ADLS

## 2024-10-30 NOTE — CONSULTS
2/16/2013     PAST SURGICAL HISTORY:  Past Surgical History:   Procedure Laterality Date    COLONOSCOPY  1/19/12    DIAGNOSTIC CARDIAC CATH LAB PROCEDURE  Feb, 2007    Normal cor angio Feb, 2007    HERNIA REPAIR  07/11/2019    robotic ventral hernia repair with mesh    HERNIA REPAIR N/A 7/11/2019    ROBOTIC VENTRAL HERNIA REPAIR WITH MESH [place]ider [place] [facility]    HYSTERECTOMY, TOTAL ABDOMINAL (CERVIX REMOVED)      KIDNEY STONE SURGERY         FAMILY HISTORY:  family history includes Arthritis in her father and mother; Breast Cancer in her maternal aunt; Depression in her mother; Diabetes in her father; Emphysema in her mother; Heart Disease in her father, mother, and sister; Heart Failure in her father; High Blood Pressure in her brother, father, and mother; Kidney Disease in her daughter; Stroke in her father; Substance Abuse in her father.    SOCIAL HISTORY:   reports that she has been smoking cigarettes. She has a 117.5 pack-year smoking history. She has never used smokeless tobacco.    Scheduled Meds:   DULoxetine  60 mg Oral Daily    tiotropium  2 puff Inhalation Daily RT    sodium chloride flush  5-40 mL IntraVENous 2 times per day    enoxaparin  40 mg SubCUTAneous Daily    predniSONE  40 mg Oral Daily    guaiFENesin  600 mg Oral BID    divalproex  250 mg Oral QAM    And    divalproex  500 mg Oral Nightly    QUEtiapine  400 mg Oral Nightly    ipratropium 0.5 mg-albuterol 2.5 mg  1 Dose Inhalation 4x Daily RT    cefepime  2,000 mg IntraVENous Q8H    vancomycin  1,500 mg IntraVENous Once    vancomycin  1,000 mg IntraVENous Q12H    carvedilol  25 mg Oral BID WC     Continuous Infusions:   sodium chloride       PRN Meds:  sodium chloride flush, sodium chloride, ondansetron **OR** ondansetron, polyethylene glycol, acetaminophen **OR** acetaminophen, hydrALAZINE, ipratropium 0.5 mg-albuterol 2.5 mg, potassium chloride **OR** potassium alternative oral replacement **OR** potassium chloride,  QUEtiapine    ALLERGIES:  Patient is allergic to dicyclomine, ibuprofen, sumatriptan, tape [adhesive tape], tizanidine, and motrin [ibuprofen micronized].    REVIEW OF SYSTEMS:  Constitutional: Negative for fever  HENT: Negative for sore throat  Eyes: Negative for redness   Respiratory: + dyspnea, cough  Cardiovascular: Negative for chest pain  Gastrointestinal: Negative for vomiting, diarrhea   Genitourinary: Negative for hematuria   Musculoskeletal: Negative for arthralgias   Skin: Negative for rash  Neurological: Negative for syncope  Hematological: Negative for adenopathy  Psychiatric/Behavorial: Negative for anxiety    PHYSICAL EXAM:  Vitals:    10/30/24 1130   BP: (!) 176/75   Pulse: 95   Resp:    Temp:    SpO2:      Gen: No distress.   Eyes: PERRL. No sclera icterus. No conjunctival injection.   ENT: No discharge. Pharynx clear.   Neck: Trachea midline. No obvious mass.    Resp: Rhonchi bilaterally  CV: Regular rate. Regular rhythm. No murmur or rub. No edema. Peripheral pulses are 2+.  Capillary refill is less than 3 seconds.  GI: Non-tender. Non-distended. No hernia.   Skin: Warm and dry. No nodule on exposed extremities.   Lymph: No cervical LAD. No supraclavicular LAD.   M/S: No cyanosis. No joint deformity. No clubbing.   Neuro: Awake. Alert. Moves all four extremities.   Psych: Oriented x 3. No anxiety.     LABS:  CBC:   Recent Labs     10/29/24  1713 10/30/24  0542   WBC 5.5 6.5   HGB 10.9* 11.1*   HCT 33.8* 33.6*   MCV 84.6 83.8    218     BMP:   Recent Labs     10/29/24  1713 10/30/24  0542    138   K 3.4* 3.4*   CL 94* 93*   CO2 35* 34*   BUN 4* 6*   CREATININE 0.5* 0.4*     LIVER PROFILE:   Recent Labs     10/29/24  1713   AST 18   ALT <5*   LIPASE 11.0*   BILITOT 0.3   ALKPHOS 75     PT/INR: No results for input(s): \"PROTIME\", \"INR\" in the last 72 hours.  APTT: No results for input(s): \"APTT\" in the last 72 hours.  UA:  Recent Labs     10/29/24  1715   COLORU Yellow   PHUR 7.0  7.0

## 2024-10-30 NOTE — PROGRESS NOTES
RT Inhaler-Nebulizer Bronchodilator Protocol Note    There is a bronchodilator order in the chart from a provider indicating to follow the RT Bronchodilator Protocol and there is an “Initiate RT Inhaler-Nebulizer Bronchodilator Protocol” order as well (see protocol at bottom of note).    CXR Findings:  XR CHEST PORTABLE    Result Date: 10/29/2024  1. Moderate left pleural effusion and left basal consolidation that could represent atelectasis or pneumonia. 2. Mild cardiomegaly and pulmonary vascular congestion without evidence of interstitial edema. 3. Ill-defined opacity at the right lung base that could be related overlying soft tissues and appears similar to the prior study.  Atelectasis or pneumonia in this area cannot be excluded.       The findings from the last RT Protocol Assessment were as follows:   History Pulmonary Disease: Chronic pulmonary disease  Respiratory Pattern: Dyspnea on exertion or RR 21-25 bpm  Breath Sounds: Inspiratory and expiratory or bilateral wheezing and/or rhonchi  Cough: Strong, spontaneous, non-productive  Indication for Bronchodilator Therapy: Wheezing associated with pulm disorder  Bronchodilator Assessment Score: 10    Aerosolized bronchodilator medication orders have been revised according to the RT Inhaler-Nebulizer Bronchodilator Protocol below.    Respiratory Therapist to perform RT Therapy Protocol Assessment initially then follow the protocol.  Repeat RT Therapy Protocol Assessment PRN for score 0-3 or on second treatment, BID, and PRN for scores above 3.    No Indications - adjust the frequency to every 6 hours PRN wheezing or bronchospasm, if no treatments needed after 48 hours then discontinue using Per Protocol order mode.     If indication present, adjust the RT bronchodilator orders based on the Bronchodilator Assessment Score as indicated below.  Use Inhaler orders unless patient has one or more of the following: on home nebulizer, not able to hold breath for 10  seconds, is not alert and oriented, cannot activate and use MDI correctly, or respiratory rate 25 breaths per minute or more, then use the equivalent nebulizer order(s) with same Frequency and PRN reasons based on the score.  If a patient is on this medication at home then do not decrease Frequency below that used at home.    0-3 - enter or revise RT bronchodilator order(s) to equivalent RT Bronchodilator order with Frequency of every 4 hours PRN for wheezing or increased work of breathing using Per Protocol order mode.        4-6 - enter or revise RT Bronchodilator order(s) to two equivalent RT bronchodilator orders with one order with BID Frequency and one order with Frequency of every 4 hours PRN wheezing or increased work of breathing using Per Protocol order mode.        7-10 - enter or revise RT Bronchodilator order(s) to two equivalent RT bronchodilator orders with one order with TID Frequency and one order with Frequency of every 4 hours PRN wheezing or increased work of breathing using Per Protocol order mode.       11-13 - enter or revise RT Bronchodilator order(s) to one equivalent RT bronchodilator order with QID Frequency and an Albuterol order with Frequency of every 4 hours PRN wheezing or increased work of breathing using Per Protocol order mode.      Greater than 13 - enter or revise RT Bronchodilator order(s) to one equivalent RT bronchodilator order with every 4 hours Frequency and an Albuterol order with Frequency of every 2 hours PRN wheezing or increased work of breathing using Per Protocol order mode.     PATIENT WILL BENEFIT FROM TREATMENTS.     Electronically signed by Teresa French RCP on 10/30/2024 at 2:58 AM

## 2024-10-30 NOTE — CONSULTS
[facility]   Pharmacy Pharmacokinetic Monitoring Service - Vancomycin     Najma Fitzpatrick is a 73 y.o. female starting on vancomycin therapy for nosocomial pneumonia x 7 days. Pharmacy consulted by Awa Cleary CNP, for monitoring and adjustment.    Target Concentration: Goal AUC/MARIO 400-600 mg*hr/L    Additional Antimicrobials: Cefepime    Pertinent Laboratory Values:   Wt Readings from Last 1 Encounters:   10/29/24 52.2 kg (115 lb)     Temp Readings from Last 1 Encounters:   10/30/24 99.9 °F (37.7 °C) (Oral)     Estimated Creatinine Clearance: 96 mL/min (A) (based on SCr of 0.4 mg/dL (L)).  Recent Labs     10/29/24  1713 10/30/24  0542   CREATININE 0.5* 0.4*   BUN 4* 6*   WBC 5.5 6.5     Procalcitonin:     Pertinent Cultures:  Culture Date Source Results        MRSA Nasal Swab: was ordered by provider, awaiting results.    Plan:  Dosing recommendations based on Bayesian software  Start vancomycin 1500 mg x 1, then 1000 mg every 12 hours.  Anticipated AUC of 498 and trough concentration of 14.2 at steady state  Renal labs as indicated   Vancomycin concentration ordered for 10/31 @ 1000.   Pharmacy will continue to monitor patient and adjust therapy as indicated    Thank you for the consult,  Loki Reid RPH  10/30/2024 10:08 AM

## 2024-10-30 NOTE — PROGRESS NOTES
RT Inhaler-Nebulizer Bronchodilator Protocol Note    There is a bronchodilator order in the chart from a provider indicating to follow the RT Bronchodilator Protocol and there is an “Initiate RT Inhaler-Nebulizer Bronchodilator Protocol” order as well (see protocol at bottom of note).    CXR Findings:  XR CHEST PORTABLE    Result Date: 10/29/2024  1. Moderate left pleural effusion and left basal consolidation that could represent atelectasis or pneumonia. 2. Mild cardiomegaly and pulmonary vascular congestion without evidence of interstitial edema. 3. Ill-defined opacity at the right lung base that could be related overlying soft tissues and appears similar to the prior study.  Atelectasis or pneumonia in this area cannot be excluded.       The findings from the last RT Protocol Assessment were as follows:   History Pulmonary Disease: Chronic pulmonary disease  Respiratory Pattern: Dyspnea on exertion or RR 21-25 bpm  Breath Sounds: Inspiratory and expiratory or bilateral wheezing and/or rhonchi  Cough: Strong, spontaneous, non-productive  Indication for Bronchodilator Therapy: Wheezing associated with pulm disorder  Bronchodilator Assessment Score: 10    Aerosolized bronchodilator medication orders have been revised according to the RT Inhaler-Nebulizer Bronchodilator Protocol below.    Respiratory Therapist to perform RT Therapy Protocol Assessment initially then follow the protocol.  Repeat RT Therapy Protocol Assessment PRN for score 0-3 or on second treatment, BID, and PRN for scores above 3.    No Indications - adjust the frequency to every 6 hours PRN wheezing or bronchospasm, if no treatments needed after 48 hours then discontinue using Per Protocol order mode.     If indication present, adjust the RT bronchodilator orders based on the Bronchodilator Assessment Score as indicated below.  Use Inhaler orders unless patient has one or more of the following: on home nebulizer, not able to hold breath for 10

## 2024-10-30 NOTE — PROGRESS NOTES
RT Inhaler-Nebulizer Bronchodilator Protocol Note    There is a bronchodilator order in the chart from a provider indicating to follow the RT Bronchodilator Protocol and there is an “Initiate RT Inhaler-Nebulizer Bronchodilator Protocol” order as well (see protocol at bottom of note).    CXR Findings:  XR CHEST PORTABLE    Result Date: 10/29/2024  1. Moderate left pleural effusion and left basal consolidation that could represent atelectasis or pneumonia. 2. Mild cardiomegaly and pulmonary vascular congestion without evidence of interstitial edema. 3. Ill-defined opacity at the right lung base that could be related overlying soft tissues and appears similar to the prior study.  Atelectasis or pneumonia in this area cannot be excluded.       The findings from the last RT Protocol Assessment were as follows:   History Pulmonary Disease: Chronic pulmonary disease  Respiratory Pattern: Dyspnea on exertion or RR 21-25 bpm  Breath Sounds: Slightly diminished and/or crackles  Cough: Strong, spontaneous, non-productive  Indication for Bronchodilator Therapy: Decreased or absent breath sounds  Bronchodilator Assessment Score: 6    Aerosolized bronchodilator medication orders have been revised according to the RT Inhaler-Nebulizer Bronchodilator Protocol below.    Respiratory Therapist to perform RT Therapy Protocol Assessment initially then follow the protocol.  Repeat RT Therapy Protocol Assessment PRN for score 0-3 or on second treatment, BID, and PRN for scores above 3.    No Indications - adjust the frequency to every 6 hours PRN wheezing or bronchospasm, if no treatments needed after 48 hours then discontinue using Per Protocol order mode.     If indication present, adjust the RT bronchodilator orders based on the Bronchodilator Assessment Score as indicated below.  Use Inhaler orders unless patient has one or more of the following: on home nebulizer, not able to hold breath for 10 seconds, is not alert and oriented,  cannot activate and use MDI correctly, or respiratory rate 25 breaths per minute or more, then use the equivalent nebulizer order(s) with same Frequency and PRN reasons based on the score.  If a patient is on this medication at home then do not decrease Frequency below that used at home.    0-3 - enter or revise RT bronchodilator order(s) to equivalent RT Bronchodilator order with Frequency of every 4 hours PRN for wheezing or increased work of breathing using Per Protocol order mode.        4-6 - enter or revise RT Bronchodilator order(s) to two equivalent RT bronchodilator orders with one order with BID Frequency and one order with Frequency of every 4 hours PRN wheezing or increased work of breathing using Per Protocol order mode.        7-10 - enter or revise RT Bronchodilator order(s) to two equivalent RT bronchodilator orders with one order with TID Frequency and one order with Frequency of every 4 hours PRN wheezing or increased work of breathing using Per Protocol order mode.       11-13 - enter or revise RT Bronchodilator order(s) to one equivalent RT bronchodilator order with QID Frequency and an Albuterol order with Frequency of every 4 hours PRN wheezing or increased work of breathing using Per Protocol order mode.      Greater than 13 - enter or revise RT Bronchodilator order(s) to one equivalent RT bronchodilator order with every 4 hours Frequency and an Albuterol order with Frequency of every 2 hours PRN wheezing or increased work of breathing using Per Protocol order mode.     RT to enter RT Home Evaluation for COPD & MDI Assessment order using Per Protocol order mode.    Electronically signed by Yoli Mcgregor on 10/30/2024 at 7:34 AM

## 2024-10-30 NOTE — PROGRESS NOTES
Progress Note    Admit Date:  10/29/2024    Admitted for SI  Increasing shortness of breath  Falls and generalized weakness    Subjective:  Ms. Fitzpatrick is resting in bed.  Stated she is feeling better today.  Shortness of breath has improved and no other complaints at this time. She is a poor historian with her medication. Nursing to verify. +headache which is chronic.    Objective:   Patient Vitals for the past 4 hrs:   BP Temp Temp src Pulse Resp SpO2   10/30/24 0842 (!) 142/67 -- -- -- -- --   10/30/24 0807 (!) 177/90 -- -- -- -- --   10/30/24 0731 -- -- -- 91 18 91 %   10/30/24 0728 (!) 177/90 99.9 °F (37.7 °C) Oral 95 18 91 %          Intake/Output Summary (Last 24 hours) at 10/30/2024 0931  Last data filed at 10/30/2024 0812  Gross per 24 hour   Intake 120 ml   Output 0 ml   Net 120 ml       Physical Exam:    Gen: No distress. Alert. Appears chronically ill, older than stated age, pleasant elderly female  Eyes: PERRL. No sclera icterus. No conjunctival injection.   ENT: No discharge. Pharynx clear.   Neck: No JVD.  Trachea midline.  Resp: No accessory muscle use. Diminished throughout, scattered expiratory wheezes and rhonchi. No crackles.   CV: Regular rate. Regular rhythm. No murmur.  No rub. No edema.   Capillary Refill: Brisk,< 3 seconds   Peripheral Pulses: +2 palpable, equal bilaterally   GI: Non-tender. Non-distended.  Normal bowel sounds.  Skin: Warm and dry. No nodule on exposed extremities. No rash on exposed extremities. Scattered ecchymosis BUE  M/S: No cyanosis. No joint deformity. + nail clubbing.   Neuro: Awake. Grossly nonfocal    Psych: Oriented x 3. No anxiety or agitation.      Scheduled Meds:   DULoxetine  60 mg Oral Daily    QUEtiapine  25 mg Oral BID    tiotropium  2 puff Inhalation Daily RT    sodium chloride flush  5-40 mL IntraVENous 2 times per day    enoxaparin  40 mg SubCUTAneous Daily    predniSONE  40 mg Oral Daily    guaiFENesin  600 mg Oral BID    divalproex  250 mg Oral QAM

## 2024-10-30 NOTE — PROGRESS NOTES
Resting in bed awake. No complaints or needs at present time. Alert but confused to time and situation. Bed alarm in place and turned on. Sitter at bedside. Pt states that she is not suicidal at present time. Call light in reach.    Bedside Mobility Assessment Tool (BMAT):     Assessment Level 1- Sit and Shake    1. From a semi-reclined position, ask patient to sit up and rotate to a seated position at the side of the bed. Can use the bedrail.    2. Ask patient to reach out and grab your hand and shake making sure patient reaches across his/her midline.   Pass- Patient is able to come to a seated position, maintain core strength. Maintains seated balance while reaching across midline. Move on to Assessment Level 2.     Assessment Level 2- Stretch and Point   1. With patient in seated position at the side of the bed, have patient place both feet on the floor (or stool) with knees no higher than hips.    2. Ask patient to stretch one leg and straighten the knee, then bend the ankle/flex and point the toes. If appropriate, repeat with the other leg.   Pass- Patient is able to demonstrate appropriate quad strength on intended weight bearing limb(s). Move onto Assessment Level 3.     Assessment Level 3- Stand   1. Ask patient to elevate off the bed or chair (seated to standing) using an assistive device (cane, bedrail).    2. Patient should be able to raise buttocks off be and hold for a count of five. May repeat once.   Fail- Patient unable to demonstrate standing stability. Patient is MOBILITY LEVEL 3.     Assessment Level 4- Walk   1. Ask patient to march in place at bedside.    2. Then ask patient to advance step and return each foot. Some medical conditions may render a patient from stepping backwards, use your best clinical judgement.   Fail- Patient not able to complete tasks OR requires use of assistive device. Patient is MOBILITY LEVEL 3.       Mobility Level- 3

## 2024-10-30 NOTE — ED NOTES
Report received from payton Almaguer is asleep on trolley, with O2 inhalation via nasal cannula at 4 LPM, with IV access on the left hand, with a sitter for a SI

## 2024-10-30 NOTE — PROGRESS NOTES
Pharmacy Note - Extended Infusion Beta-Lactam Adjustment    Cefepime 2000mg Q12h for treatment of Hospital acquired pneumonia. Per [facility] Extended Infusion Beta-Lactam Policy, cefepime will be changed to 2000mg load followed by 2000mg Q8h extended infusion    Estimated Creatinine Clearance: Estimated Creatinine Clearance: 96 mL/min (A) (based on SCr of 0.4 mg/dL (L)).    BMI: Body mass index is 21.73 kg/m².    Please call with any questions.    Thank you,    Eduard Baker, [facility]

## 2024-10-30 NOTE — PLAN OF CARE
Problem: Respiratory - Adult  Goal: Achieves optimal ventilation and oxygenation  Outcome: Progressing  Flowsheets (Taken 10/30/2024 9586)  Achieves optimal ventilation and oxygenation:   Assess for changes in respiratory status   Assess for changes in mentation and behavior   Position to facilitate oxygenation and minimize respiratory effort   Oxygen supplementation based on oxygen saturation or arterial blood gases   Initiate smoking cessation protocol as indicated   Encourage broncho-pulmonary hygiene including cough, deep breathe, incentive spirometry   Assess the need for suctioning and aspirate as needed   Assess and instruct to report shortness of breath or any respiratory difficulty   Respiratory therapy support as indicated     Problem: Self Harm/Suicidality  Goal: Will have no self-injury during hospital stay  Description: INTERVENTIONS:  1.  Ensure constant observer at bedside with Q15M safety checks  2.  Maintain a safe environment  3.  Secure patient belongings  4.  Ensure family/visitors adhere to safety recommendations  5.  Ensure safety tray has been added to patient's diet order  6.  Every shift and PRN: Re-assess suicidal risk via Frequent Screener

## 2024-10-30 NOTE — PROGRESS NOTES
HEART FAILURE CARE PLAN:    Comorbidities Reviewed: Yes   Patient has a past medical history of Abnormal ECG, Anemia, Arthritis, Back pain, chronic, Bipolar disorder ([facility]), Bronchitis chronic, CHF (congestive heart failure) ([facility]), Chronic back pain, Chronic neck pain, Clostridium difficile infection, 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[facility]), Coronary artery disease involving native coronary artery of native heart with angina pectoris ([facility]), Depression, Emphysema of lung ([facility]), Fibromyalgia, hyperlipidemia, Hypertension, Kidney stone, Liver disease, Lung disease, MDD (major depressive disorder), Mood disorder ([facility]), Movement disorder, Neck pain, chronic, Opiate misuse, Personality disorder ([facility]), Pneumonia, and Polypharmacy.     Weights Reviewed: Yes   Admission weight: 52.2 kg (115 lb)   Wt Readings from Last 3 Encounters:   10/29/24 52.2 kg (115 lb)   10/01/24 74.8 kg (165 lb)   09/04/24 77 kg (169 lb 12.1 oz)     Intake & Output Reviewed: Yes     Intake/Output Summary (Last 24 hours) at 10/30/2024 0947  Last data filed at 10/30/2024 0812  Gross per 24 hour   Intake 120 ml   Output 0 ml   Net 120 ml       ECHOCARDIOGRAM Reviewed: Yes   Patient's Ejection Fraction (EF) is greater than 40%     Medications Reviewed: Yes   SCHEDULED HOSPITAL MEDICATIONS:   DULoxetine  60 mg Oral Daily    QUEtiapine  25 mg Oral BID    tiotropium  2 puff Inhalation Daily RT    sodium chloride flush  5-40 mL IntraVENous 2 times per day    enoxaparin  40 mg SubCUTAneous Daily    predniSONE  40 mg Oral Daily    guaiFENesin  600 mg Oral BID    divalproex  250 mg Oral QAM    And    divalproex  500 mg Oral Nightly    QUEtiapine  400 mg Oral Nightly    ipratropium 0.5 mg-albuterol 2.5 mg  1 Dose Inhalation 4x Daily RT    cefepime  2,000 mg IntraVENous Once    Followed by    cefepime  2,000 mg IntraVENous Q8H    QUEtiapine  300 mg Oral Nightly     HOME MEDICATIONS:  Prior to Admission medications    Medication Sig Start Date End Date Taking? Authorizing  Provider   QUEtiapine (SEROQUEL) 300 MG tablet Take 1 tablet by mouth nightly 9/5/24:  100 mg tablet + 300 mg tablet to equal 400 mg at bedtime.  25 mg twice daily for anxiety.    Vida Camarillo MD   QUEtiapine (SEROQUEL) 100 MG tablet Take 1 tablet by mouth nightly 9/5/24:  100 mg tablet + 300 mg tablet to equal 400 mg at bedtime.  25 mg twice daily for anxiety.    Vida Camarillo MD   divalproex (DEPAKOTE ER) 250 MG extended release tablet Take 1-2 tablets by mouth in the morning and at bedtime 250 AM, 500 HS    Vida Camarillo MD   DULoxetine (CYMBALTA) 60 MG extended release capsule Take 1 capsule by mouth daily    Vida Camarillo MD   QUEtiapine (SEROQUEL) 25 MG tablet Take 1 tablet by mouth 2 times daily 9/5/24:  25 mg twice daily for anxiety;  400 mg at bedtime.    Vida Camarillo MD      Diet Reviewed: Yes   ADULT DIET; Regular; Safety Tray; Safety Tray (Disposables)    Goal of Care Reviewed: Yes   Patient and/or Family's stated Goal of Care this Admission: Reduce shortness of breath, increase activity tolerance, better understand heart failure and disease management, be more comfortable, and reduce lower extremity edema prior to discharge.     Electronically signed by Charlee Vasques RN on 10/30/2024 at 9:47 AM

## 2024-10-30 NOTE — PROGRESS NOTES
Bed alarm going off, patient standing next to bed. Pt stating \"I'm trying to get on the floor.\" Reoriented patient. Placed tele sitter for safety. Bed alarm back on.  at bed side. Call light within reach.

## 2024-10-30 NOTE — H&P
Hospital Medicine History & Physical      Patient Name: Najma Fitzpatrick    : 1950    PCP: Geena Sotomayor, APRN - CNP    Date of Service:  Patient seen and examined on     Chief Complaint: Psychiatric evaluation    History Of Present Illness:    Najma Fitzpatrick is a 73 y.o. female with a PMH of COPD, hypertension, hyperlipidemia, GERD, bipolar disorder, chronic back pain, who presented to ED with complaint of psychiatric evaluation.    According to report patient was brought to the ED due to concerns for suicidal ideation with further psychiatric evaluation.  Of note patient has a known history of COPD on 3 L NC at home.  Endorses worsening shortness of breath with frequent falls.    Vital signs, blood pressure 186/76, pulse 75 temperature 98.7 respiration 22 saturating 94% on 3 L  labs showed potassium of 3.4 bicarb of 35 magnesium 1.78 proBNP of 886.  Troponin 19/17, LFT stable.  Urine tox screen shows positive barbiturates.  Acetaminophen and salicylate negative.  WBC 5.5 hemoglobin 10.9.  Urinalysis not indicative of UTI.  EKG shows normal sinus rhythm with no acute ST or T wave change    CT head shows no acute intracranial abnormality.  CT cervical spine shows no acute fracture in the cervical spine.  Moderate level spondylosis seen.  Chest x-ray shows moderate left pleural effusion and left bibasilar consolidation concerning for atelectasis or pneumonia.  Mild cardiomegaly pulmonary vascular congestion without evidence of interstitial edema.  CT abdomen and pelvis shows mild bladder mucosal thickening, nonobstructing right renal calculi.  In the ED patient received ceftriaxone and Zithromax, DuoNeb, 125 mg of Solu-Medrol.  Blood culture x 2 sent    Past Medical History:    Patient has a past medical history of Abnormal ECG, Anemia, Arthritis, Back pain, chronic, Bipolar disorder (HCC), Bronchitis chronic, CHF (congestive heart failure) (HCC), Chronic back pain, Chronic neck pain,  Clostridium difficile infection, 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HCC), Coronary artery disease involving native coronary artery of native heart with angina pectoris (HCC), Depression, Emphysema of lung (HCC), Fibromyalgia, hyperlipidemia, Hypertension, Kidney stone, Liver disease, Lung disease, MDD (major depressive disorder), Mood disorder (HCC), Movement disorder, Neck pain, chronic, Opiate misuse, Personality disorder (HCC), Pneumonia, and Polypharmacy.    Past Surgical History:    Patient has a past surgical history that includes Hysterectomy, total abdominal; Kidney stone surgery; Diagnostic Cardiac Cath Lab Procedure (Feb, 2007); Colonoscopy (1/19/12); hernia repair (07/11/2019); and hernia repair (N/A, 7/11/2019).    Medications Prior to Admission:      Prior to Admission medications    Medication Sig Start Date End Date Taking? Authorizing Provider   fluticasone-umeclidin-vilant (TRELEGY ELLIPTA) 200-62.5-25 MCG/ACT AEPB inhaler Inhale 1 puff into the lungs daily  Patient not taking: Reported on 10/29/2024 10/8/24   Fer Bain MD   albuterol sulfate HFA (PROVENTIL HFA) 108 (90 Base) MCG/ACT inhaler Inhale 2 puffs into the lungs every 4 hours as needed for Wheezing  Patient not taking: Reported on 10/29/2024 10/8/24 10/8/25  Fer Bain MD   albuterol (ACCUNEB) 0.63 MG/3ML nebulizer solution Take 3 mLs by nebulization every 6 hours as needed for Wheezing  Patient not taking: Reported on 10/29/2024 10/8/24   Fer Bain MD   QUEtiapine (SEROQUEL) 300 MG tablet Take 1 tablet by mouth nightly 9/5/24:  100 mg tablet + 300 mg tablet to equal 400 mg at bedtime.  25 mg twice daily for anxiety.    ProviderVida MD   QUEtiapine (SEROQUEL) 100 MG tablet Take 1 tablet by mouth nightly 9/5/24:  100 mg tablet + 300 mg tablet to equal 400 mg at bedtime.  25 mg twice daily for anxiety.    ProviderVida MD   furosemide (LASIX) 20 MG tablet Take 1 tablet by mouth 2 times daily  Patient not taking: Reported

## 2024-10-30 NOTE — PROGRESS NOTES
Consult has been called to Dr. millan on 10/30/24. Spoke with kimberley. 11:28 AM    [facility]  10/30/2024

## 2024-10-30 NOTE — PROGRESS NOTES
Consult has been called to Dr. barnett on 10/30/24. Spoke with [facility] . 7:23 AM    [facility]  10/30/2024

## 2024-10-30 NOTE — CARE COORDINATION
Case Management Assessment  Initial Evaluation    Date/Time of Evaluation: 10/30/2024 4:08 PM  Assessment Completed by: Diane Varela RN    If patient is discharged prior to next notation, then this note serves as note for discharge by case management.    Patient Name: Najma Fitzpatrick                   YOB: 1950  Diagnosis: Suicidal ideation [R45.851]  COPD exacerbation (HCC) [J44.1]  Community acquired pneumonia, unspecified laterality [J18.9]                   Date / Time: 10/29/2024  3:59 PM    Patient Admission Status: Inpatient   Readmission Risk (Low < 19, Mod (19-27), High > 27): Readmission Risk Score: 21.8    Current PCP: Geena Sotomayor APRN - CNP  PCP verified by CM? Yes (Bran)    Chart Reviewed: Yes      History Provided by: Patient  Patient Orientation: Alert and Oriented    Patient Cognition: Alert    Hospitalization in the last 30 days (Readmission):  No    If yes, Readmission Assessment in CM Navigator will be completed.    Advance Directives:      Code Status: Full Code   Patient's Primary Decision Maker is: Legal Next of Kin    Primary Decision Maker: NanetteBrent yu E - Spouse - [phone]    Secondary Decision Maker: NanetteMargarita - Child - [phone]    Secondary Decision Maker: Maricel Esquivel  Child - [phone]    Discharge Planning:    Patient lives with: Spouse/Significant Other Type of Home: House  Primary Care Giver: Spouse  Patient Support Systems include: Spouse/Significant Other, Family Members   Current Financial resources: Other (Comment) ([place])  Current community resources: None  Current services prior to admission: Durable Medical Equipment            Current DME: Oxygen Therapy (Comment), Walker, Cane ([facility] home O2 2LNC continuous)            Type of Home Care services:  None    ADLS  Prior functional level: Independent in ADLs/IADLs  Current functional level: Independent in ADLs/IADLs    PT AM-PAC:   /24  OT AM-PAC:   /24    Family can

## 2024-10-31 LAB
AMMONIA PLAS-SCNC: 43 UMOL/L (ref 11–51)
ANION GAP SERPL CALCULATED.3IONS-SCNC: 11 MMOL/L (ref 3–16)
BASOPHILS # BLD: 0 K/UL (ref 0–0.2)
BASOPHILS NFR BLD: 0.2 %
BUN SERPL-MCNC: 13 MG/DL (ref 7–20)
CALCIUM SERPL-MCNC: 8.7 MG/DL (ref 8.3–10.6)
CHLORIDE SERPL-SCNC: 93 MMOL/L (ref 99–110)
CO2 SERPL-SCNC: 27 MMOL/L (ref 21–32)
CREAT SERPL-MCNC: 0.6 MG/DL (ref 0.6–1.2)
DEPRECATED RDW RBC AUTO: 18.3 % (ref 12.4–15.4)
EOSINOPHIL # BLD: 0 K/UL (ref 0–0.6)
EOSINOPHIL NFR BLD: 0 %
GFR SERPLBLD CREATININE-BSD FMLA CKD-EPI: >90 ML/MIN/{1.73_M2}
GLUCOSE SERPL-MCNC: 107 MG/DL (ref 70–99)
HCT VFR BLD AUTO: 34.4 % (ref 36–48)
HGB BLD-MCNC: 10.8 G/DL (ref 12–16)
LEGIONELLA AG UR QL: NORMAL
LYMPHOCYTES # BLD: 1.3 K/UL (ref 1–5.1)
LYMPHOCYTES NFR BLD: 11.9 %
MCH RBC QN AUTO: 27.4 PG (ref 26–34)
MCHC RBC AUTO-ENTMCNC: 31.5 G/DL (ref 31–36)
MCV RBC AUTO: 87.1 FL (ref 80–100)
MONOCYTES # BLD: 0.6 K/UL (ref 0–1.3)
MONOCYTES NFR BLD: 5.8 %
NEUTROPHILS # BLD: 8.8 K/UL (ref 1.7–7.7)
NEUTROPHILS NFR BLD: 82.1 %
PLATELET # BLD AUTO: 196 K/UL (ref 135–450)
PMV BLD AUTO: 6.9 FL (ref 5–10.5)
POTASSIUM SERPL-SCNC: 3.7 MMOL/L (ref 3.5–5.1)
RBC # BLD AUTO: 3.95 M/UL (ref 4–5.2)
S PNEUM AG UR QL: NORMAL
SODIUM SERPL-SCNC: 131 MMOL/L (ref 136–145)
VANCOMYCIN TROUGH SERPL-MCNC: 11.8 UG/ML (ref 10–20)
WBC # BLD AUTO: 10.7 K/UL (ref 4–11)

## 2024-10-31 PROCEDURE — 6370000000 HC RX 637 (ALT 250 FOR IP): Performed by: INTERNAL MEDICINE

## 2024-10-31 PROCEDURE — 97530 THERAPEUTIC ACTIVITIES: CPT

## 2024-10-31 PROCEDURE — 82140 ASSAY OF AMMONIA: CPT

## 2024-10-31 PROCEDURE — 6360000002 HC RX W HCPCS: Performed by: NURSE PRACTITIONER

## 2024-10-31 PROCEDURE — 97166 OT EVAL MOD COMPLEX 45 MIN: CPT

## 2024-10-31 PROCEDURE — 6370000000 HC RX 637 (ALT 250 FOR IP): Performed by: NURSE PRACTITIONER

## 2024-10-31 PROCEDURE — 99232 SBSQ HOSP IP/OBS MODERATE 35: CPT | Performed by: INTERNAL MEDICINE

## 2024-10-31 PROCEDURE — 99233 SBSQ HOSP IP/OBS HIGH 50: CPT | Performed by: INTERNAL MEDICINE

## 2024-10-31 PROCEDURE — 94640 AIRWAY INHALATION TREATMENT: CPT

## 2024-10-31 PROCEDURE — 6360000002 HC RX W HCPCS: Performed by: INTERNAL MEDICINE

## 2024-10-31 PROCEDURE — 85025 COMPLETE CBC W/AUTO DIFF WBC: CPT

## 2024-10-31 PROCEDURE — 80202 ASSAY OF VANCOMYCIN: CPT

## 2024-10-31 PROCEDURE — 2580000003 HC RX 258: Performed by: INTERNAL MEDICINE

## 2024-10-31 PROCEDURE — 36415 COLL VENOUS BLD VENIPUNCTURE: CPT

## 2024-10-31 PROCEDURE — 80048 BASIC METABOLIC PNL TOTAL CA: CPT

## 2024-10-31 PROCEDURE — 2580000003 HC RX 258: Performed by: NURSE PRACTITIONER

## 2024-10-31 PROCEDURE — 1200000000 HC SEMI PRIVATE

## 2024-10-31 PROCEDURE — 2700000000 HC OXYGEN THERAPY PER DAY

## 2024-10-31 PROCEDURE — 94761 N-INVAS EAR/PLS OXIMETRY MLT: CPT

## 2024-10-31 PROCEDURE — 97161 PT EVAL LOW COMPLEX 20 MIN: CPT

## 2024-10-31 PROCEDURE — 97535 SELF CARE MNGMENT TRAINING: CPT

## 2024-10-31 RX ORDER — HYDRALAZINE HYDROCHLORIDE 25 MG/1
25 TABLET, FILM COATED ORAL EVERY 8 HOURS SCHEDULED
Status: DISCONTINUED | OUTPATIENT
Start: 2024-10-31 | End: 2024-11-02 | Stop reason: HOSPADM

## 2024-10-31 RX ORDER — HYDRALAZINE HYDROCHLORIDE 20 MG/ML
20 INJECTION INTRAMUSCULAR; INTRAVENOUS EVERY 4 HOURS PRN
Status: DISCONTINUED | OUTPATIENT
Start: 2024-10-31 | End: 2024-10-31

## 2024-10-31 RX ORDER — LOSARTAN POTASSIUM 25 MG/1
25 TABLET ORAL DAILY
Status: DISCONTINUED | OUTPATIENT
Start: 2024-10-31 | End: 2024-10-31

## 2024-10-31 RX ORDER — OLANZAPINE 10 MG/2ML
2.5 INJECTION, POWDER, FOR SOLUTION INTRAMUSCULAR 2 TIMES DAILY PRN
Status: DISCONTINUED | OUTPATIENT
Start: 2024-10-31 | End: 2024-11-02 | Stop reason: HOSPADM

## 2024-10-31 RX ORDER — HYDRALAZINE HYDROCHLORIDE 20 MG/ML
20 INJECTION INTRAMUSCULAR; INTRAVENOUS EVERY 6 HOURS PRN
Status: DISCONTINUED | OUTPATIENT
Start: 2024-10-31 | End: 2024-11-02 | Stop reason: HOSPADM

## 2024-10-31 RX ORDER — LOSARTAN POTASSIUM 50 MG/1
50 TABLET ORAL DAILY
Status: DISCONTINUED | OUTPATIENT
Start: 2024-11-01 | End: 2024-11-02 | Stop reason: HOSPADM

## 2024-10-31 RX ORDER — LOSARTAN POTASSIUM 25 MG/1
25 TABLET ORAL ONCE
Status: COMPLETED | OUTPATIENT
Start: 2024-10-31 | End: 2024-10-31

## 2024-10-31 RX ORDER — NICOTINE 21 MG/24HR
1 PATCH, TRANSDERMAL 24 HOURS TRANSDERMAL DAILY
Status: DISCONTINUED | OUTPATIENT
Start: 2024-10-31 | End: 2024-11-02 | Stop reason: HOSPADM

## 2024-10-31 RX ORDER — OLANZAPINE 5 MG/1
2.5 TABLET ORAL 2 TIMES DAILY PRN
Status: DISCONTINUED | OUTPATIENT
Start: 2024-10-31 | End: 2024-10-31

## 2024-10-31 RX ORDER — QUETIAPINE FUMARATE 25 MG/1
25 TABLET, FILM COATED ORAL 2 TIMES DAILY PRN
Status: DISCONTINUED | OUTPATIENT
Start: 2024-11-01 | End: 2024-11-02 | Stop reason: HOSPADM

## 2024-10-31 RX ADMIN — ASPIRIN 81 MG: 81 TABLET, CHEWABLE ORAL at 08:06

## 2024-10-31 RX ADMIN — BUDESONIDE AND FORMOTEROL FUMARATE DIHYDRATE 2 PUFF: 160; 4.5 AEROSOL RESPIRATORY (INHALATION) at 07:45

## 2024-10-31 RX ADMIN — CARVEDILOL 25 MG: 25 TABLET, FILM COATED ORAL at 08:06

## 2024-10-31 RX ADMIN — IPRATROPIUM BROMIDE AND ALBUTEROL SULFATE 1 DOSE: 2.5; .5 SOLUTION RESPIRATORY (INHALATION) at 11:11

## 2024-10-31 RX ADMIN — CARVEDILOL 25 MG: 25 TABLET, FILM COATED ORAL at 18:25

## 2024-10-31 RX ADMIN — HYDRALAZINE HYDROCHLORIDE 20 MG: 20 INJECTION INTRAMUSCULAR; INTRAVENOUS at 04:28

## 2024-10-31 RX ADMIN — TIOTROPIUM BROMIDE INHALATION SPRAY 2 PUFF: 3.12 SPRAY, METERED RESPIRATORY (INHALATION) at 07:46

## 2024-10-31 RX ADMIN — ACETAMINOPHEN 650 MG: 325 TABLET ORAL at 13:06

## 2024-10-31 RX ADMIN — LOSARTAN POTASSIUM 25 MG: 25 TABLET, FILM COATED ORAL at 13:06

## 2024-10-31 RX ADMIN — LOSARTAN POTASSIUM 25 MG: 25 TABLET, FILM COATED ORAL at 15:08

## 2024-10-31 RX ADMIN — HYDRALAZINE HYDROCHLORIDE 25 MG: 25 TABLET ORAL at 21:05

## 2024-10-31 RX ADMIN — HYDRALAZINE HYDROCHLORIDE 20 MG: 20 INJECTION INTRAMUSCULAR; INTRAVENOUS at 14:45

## 2024-10-31 RX ADMIN — AZITHROMYCIN DIHYDRATE 500 MG: 500 INJECTION, POWDER, LYOPHILIZED, FOR SOLUTION INTRAVENOUS at 22:43

## 2024-10-31 RX ADMIN — VANCOMYCIN 1000 MG: 1 INJECTION, SOLUTION INTRAVENOUS at 10:57

## 2024-10-31 RX ADMIN — CEFEPIME 2000 MG: 2 INJECTION, POWDER, FOR SOLUTION INTRAVENOUS at 02:03

## 2024-10-31 RX ADMIN — OLANZAPINE 2.5 MG: 5 TABLET, FILM COATED ORAL at 11:53

## 2024-10-31 RX ADMIN — CEFEPIME 2000 MG: 2 INJECTION, POWDER, FOR SOLUTION INTRAVENOUS at 18:27

## 2024-10-31 RX ADMIN — IPRATROPIUM BROMIDE AND ALBUTEROL SULFATE 1 DOSE: 2.5; .5 SOLUTION RESPIRATORY (INHALATION) at 07:46

## 2024-10-31 RX ADMIN — IPRATROPIUM BROMIDE AND ALBUTEROL SULFATE 1 DOSE: 2.5; .5 SOLUTION RESPIRATORY (INHALATION) at 21:21

## 2024-10-31 RX ADMIN — PANTOPRAZOLE SODIUM 40 MG: 40 TABLET, DELAYED RELEASE ORAL at 08:05

## 2024-10-31 RX ADMIN — DULOXETINE HYDROCHLORIDE 60 MG: 60 CAPSULE, DELAYED RELEASE ORAL at 08:06

## 2024-10-31 RX ADMIN — IPRATROPIUM BROMIDE AND ALBUTEROL SULFATE 1 DOSE: 2.5; .5 SOLUTION RESPIRATORY (INHALATION) at 15:09

## 2024-10-31 RX ADMIN — QUETIAPINE FUMARATE 400 MG: 200 TABLET ORAL at 21:04

## 2024-10-31 RX ADMIN — CEFEPIME 2000 MG: 2 INJECTION, POWDER, FOR SOLUTION INTRAVENOUS at 10:30

## 2024-10-31 RX ADMIN — BUDESONIDE AND FORMOTEROL FUMARATE DIHYDRATE 2 PUFF: 160; 4.5 AEROSOL RESPIRATORY (INHALATION) at 21:21

## 2024-10-31 RX ADMIN — ATORVASTATIN CALCIUM 40 MG: 40 TABLET, FILM COATED ORAL at 08:06

## 2024-10-31 RX ADMIN — ACETAMINOPHEN 650 MG: 325 TABLET ORAL at 04:28

## 2024-10-31 RX ADMIN — TOPIRAMATE 25 MG: 25 TABLET, FILM COATED ORAL at 08:05

## 2024-10-31 RX ADMIN — GUAIFENESIN 600 MG: 600 TABLET, EXTENDED RELEASE ORAL at 21:05

## 2024-10-31 RX ADMIN — DIVALPROEX SODIUM 250 MG: 250 TABLET, FILM COATED, EXTENDED RELEASE ORAL at 08:12

## 2024-10-31 RX ADMIN — ENOXAPARIN SODIUM 40 MG: 100 INJECTION SUBCUTANEOUS at 08:06

## 2024-10-31 RX ADMIN — PREDNISONE 40 MG: 20 TABLET ORAL at 08:06

## 2024-10-31 RX ADMIN — DIVALPROEX SODIUM 500 MG: 500 TABLET, FILM COATED, EXTENDED RELEASE ORAL at 21:04

## 2024-10-31 RX ADMIN — GUAIFENESIN 600 MG: 600 TABLET, EXTENDED RELEASE ORAL at 08:06

## 2024-10-31 RX ADMIN — SODIUM CHLORIDE, PRESERVATIVE FREE 10 ML: 5 INJECTION INTRAVENOUS at 10:31

## 2024-10-31 ASSESSMENT — PAIN DESCRIPTION - LOCATION
LOCATION: HEAD

## 2024-10-31 ASSESSMENT — PAIN DESCRIPTION - DESCRIPTORS
DESCRIPTORS: ACHING

## 2024-10-31 ASSESSMENT — PAIN - FUNCTIONAL ASSESSMENT
PAIN_FUNCTIONAL_ASSESSMENT: PREVENTS OR INTERFERES SOME ACTIVE ACTIVITIES AND ADLS
PAIN_FUNCTIONAL_ASSESSMENT: PREVENTS OR INTERFERES SOME ACTIVE ACTIVITIES AND ADLS
PAIN_FUNCTIONAL_ASSESSMENT: ACTIVITIES ARE NOT PREVENTED
PAIN_FUNCTIONAL_ASSESSMENT: PREVENTS OR INTERFERES SOME ACTIVE ACTIVITIES AND ADLS

## 2024-10-31 ASSESSMENT — PAIN DESCRIPTION - FREQUENCY
FREQUENCY: INTERMITTENT

## 2024-10-31 ASSESSMENT — PAIN SCALES - GENERAL
PAINLEVEL_OUTOF10: 1
PAINLEVEL_OUTOF10: 0
PAINLEVEL_OUTOF10: 1
PAINLEVEL_OUTOF10: 2
PAINLEVEL_OUTOF10: 9
PAINLEVEL_OUTOF10: 9
PAINLEVEL_OUTOF10: 1

## 2024-10-31 ASSESSMENT — PAIN DESCRIPTION - PAIN TYPE
TYPE: ACUTE PAIN

## 2024-10-31 ASSESSMENT — PAIN DESCRIPTION - ORIENTATION
ORIENTATION: MID

## 2024-10-31 ASSESSMENT — PAIN DESCRIPTION - ONSET
ONSET: PROGRESSIVE
ONSET: ON-GOING
ONSET: GRADUAL
ONSET: GRADUAL

## 2024-10-31 ASSESSMENT — PAIN DESCRIPTION - DIRECTION: RADIATING_TOWARDS: NO

## 2024-10-31 NOTE — PROGRESS NOTES
Inpatient Physical Therapy Evaluation & Treatment    Unit: [facility]  Date:  10/31/2024  Patient Name:    Najma Fitzpatrick  Admitting diagnosis:  Suicidal ideation [R45.851]  COPD exacerbation (HCC) [J44.1]  Community acquired pneumonia, unspecified laterality [J18.9]  Admit Date:  10/29/2024  Precautions/Restrictions/WB Status/ Lines/ Wounds/ Oxygen: Fall risk, Bed/chair alarm, Lines (IV, Supplemental O2 (3L), and external catheter), and Telesitter      Pt seen for cotreatment this date due to patient safety, patient endurance, and limited functional status information    Treatment Time:  0904-0942  Treatment Number:  1   Timed Code Treatment Minutes: 28 minutes  Total Treatment Minutes:  38  minutes    Patient Stated Goals for Therapy: none stated          Discharge Recommendations: Home with   24 hr assistance  DME needs for discharge: Needs Met       Therapy recommendation for EMS Transport: can transport by wheelchair    Therapy recommendations for staff:   Assist of 1 for ambulation with use of Rollator and gait belt to/from chair  to/from bathroom    History of Present Illness:   Per Ema Pham 10/29/24:  \"73 y.o. female with a history of bipolar disorder who presents to the emergency department for a psychiatric evaluation.  Patient states that she is tired of dealing with her COPD and that she does not want to go on any longer.  States that she is tired of living.  States that she had a plan to take a bunch of different medications.  These thoughts started yesterday.  Also admits to seeing shadows and states that it scares her that she may continue seeing these. States that she does take them as prescribed.  She does follow-up with [facility].     Patient also complains of increased shortness of breath.  She does wear 2 L nasal cannula at home.  It is associated chest pain.  States that she fell recently about 3 days ago where she hit her head and stomach.  Admits to cough denies loss of consciousness or blood  physical therapy Evaluation & Treatment. Pt demonstrated decreased Activity tolerance as well as decreased independence with Ambulation and Transfers. Today's treatment consisted of bed mobility, transfers, and ambulation in order to address the above impairments and functional limitations. Pt required SBA for all functional mobility; impulsive with ambulation using Rolator. VC to slow down for line management to prevent tripping over O2 line. Continued skilled physical therapy is necessary to address the above impairments, in order to help the patient make a full return to PLOF without complaints of pain, difficulty or compensation      Recommending Home 24 hr assist and with home PT upon discharge as patient functioning close to baseline level and would benefit from continued therapy services    Goals :   To be met in 3 visits:  1). Independent with LE Ex x 10 reps  2). Sit to/from stand: Supervision  3). Bed to chair: Supervision  4). CHF goal: N/A    To be met in 6 visits:  1).  Supine to/from sit: Independent  2).  Sit to/from stand: Independent  3).  Bed to chair: Independent  4).  Gait: Ambulate 150 ft.  with Supervision and use of gait belt and Rollator  5).  Tolerate B LE exercises 3 sets of 10-15 reps    Rehabilitation Potential: Good  Strengths for achieving goals include:   Pt motivated, PLOF, Family Support, and Pt cooperative   Barriers to achieving goals include:    Complexity of condition    Plan    To be seen 2-3 x/ week while in acute care setting for therapeutic exercises, bed mobility, functional transfers, and gait training.    Signature: Thao Edwards, PT     If patient discharges from this facility prior to next visit, this note will serve as the Discharge Summary.    CHF Education  N/A

## 2024-10-31 NOTE — PROGRESS NOTES
[facility]                                 Pulmonary/Critical care  Consult /Progress Note :                                                                  CC : Worsening shortness of breath  Patient is being seen at the request of Awa Cleary for a consultation for acute COPD exacerbation  Subjective   Up to chair  Doing much better  Denies any Fever or chills  Denies any CP      PHYSICAL EXAM:  Vitals:    10/31/24 0747   BP:    Pulse: 65   Resp: 18   Temp:    SpO2: 97%     Gen: No distress.   Eyes: PERRL. No sclera icterus. No conjunctival injection.   ENT: No discharge. Pharynx clear.   Neck: Trachea midline. No obvious mass.    Resp: Rhonchi bilaterally  CV: Regular rate. Regular rhythm. No murmur or rub. No edema. Peripheral pulses are 2+.  Capillary refill is less than 3 seconds.  GI: Non-tender. Non-distended. No hernia.   Skin: Warm and dry. No nodule on exposed extremities.   Lymph: No cervical LAD. No supraclavicular LAD.   M/S: No cyanosis. No joint deformity. No clubbing.   Neuro: Awake. Alert. Moves all four extremities.   Psych: Oriented x 3. No anxiety.     LABS:  CBC:   Recent Labs     10/29/24  1713 10/30/24  0542   WBC 5.5 6.5   HGB 10.9* 11.1*   HCT 33.8* 33.6*   MCV 84.6 83.8    218     BMP:   Recent Labs     10/29/24  1713 10/30/24  0542    138   K 3.4* 3.4*   CL 94* 93*   CO2 35* 34*   BUN 4* 6*   CREATININE 0.5* 0.4*     LIVER PROFILE:   Recent Labs     10/29/24  1713   AST 18   ALT <5*   LIPASE 11.0*   BILITOT 0.3   ALKPHOS 75     PT/INR: No results for input(s): \"PROTIME\", \"INR\" in the last 72 hours.  APTT: No results for input(s): \"APTT\" in the last 72 hours.  UA:  Recent Labs     10/29/24  1713   COLORU Yellow   PHUR 7.0  7.0   CLARITYU Clear   LEUKOCYTESUR Negative   UROBILINOGEN 1.0   BILIRUBINUR Negative   BLOODU Negative   GLUCOSEU Negative       Microbiology:  Send pneumonia panel  and sputum culture    Imaging:  Chest imaging was reviewed by me and showed   CT scan reviewed by me and has bilateral effusions slightly in the left more than the right  ASSESSMENT:  Acute COPD exacerbation\    COPD  Moderate hypoxia  Bilateral pleural effusion with possible atelectasis versus pneumonia  History of hypercapnic respiratory failure  Active smoker      PLAN:  *- O2 goal 92-95   *-IV steroids ,wean To PO when feel better ,and taper over 7 days   *-IV abx Rocephin and Azithromycin Day 1, changed to cefepime by IM will keep for today  *-check viral panel /pneumonia panel pending  Negative legionella and strep   *- Sputum culture ,sending sample as she has it in cup pending   *- procalcitonin pending   Up to chair   *-BD  ICS if wheezing   Incentive spirmetery and flutter valve   Avoid fluid over load   Reviewed CT scan chest and abdomen still with mild effusion  Check o2 at ambulation before discharge and if sat less than 88 ,call for O2 at home   May needed [facility] down the road ,will assess as OP   I spent 4-6 minutes counseling patient regarding smoking and the risk of Lung cancer and COPD and respiratory failure   He was [facility] ,given instuction

## 2024-10-31 NOTE — PROGRESS NOTES
HEART FAILURE CARE PLAN:    Comorbidities Reviewed: Yes   Patient has a past medical history of Abnormal ECG, Anemia, Arthritis, Back pain, chronic, Bipolar disorder ([facility]), Bronchitis chronic, CHF (congestive heart failure) ([facility]), Chronic back pain, Chronic neck pain, Clostridium difficile infection, 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[facility]), Coronary artery disease involving native coronary artery of native heart with angina pectoris ([facility]), Depression, Emphysema of lung ([facility]), Fibromyalgia, hyperlipidemia, Hypertension, Kidney stone, Liver disease, Lung disease, MDD (major depressive disorder), Mood disorder ([facility]), Movement disorder, Neck pain, chronic, Opiate misuse, Personality disorder ([facility]), Pneumonia, and Polypharmacy.     Weights Reviewed: Yes   Admission weight: 52.2 kg (115 lb)   Wt Readings from Last 3 Encounters:   10/31/24 79 kg (174 lb 1.6 oz)   10/01/24 74.8 kg (165 lb)   09/04/24 77 kg (169 lb 12.1 oz)     Intake & Output Reviewed: Yes     Intake/Output Summary (Last 24 hours) at 10/31/2024 1041  Last data filed at 10/31/2024 0529  Gross per 24 hour   Intake 270 ml   Output 2950 ml   Net -2680 ml       ECHOCARDIOGRAM Reviewed: Yes   Patient's Ejection Fraction (EF) is greater than 40%     Medications Reviewed: Yes   SCHEDULED HOSPITAL MEDICATIONS:   DULoxetine  60 mg Oral Daily    sodium chloride flush  5-40 mL IntraVENous 2 times per day    enoxaparin  40 mg SubCUTAneous Daily    predniSONE  40 mg Oral Daily    guaiFENesin  600 mg Oral BID    QUEtiapine  400 mg Oral Nightly    ipratropium 0.5 mg-albuterol 2.5 mg  1 Dose Inhalation 4x Daily RT    cefepime  2,000 mg IntraVENous Q8H    vancomycin  1,000 mg IntraVENous Q12H    carvedilol  25 mg Oral BID WC    azithromycin  500 mg IntraVENous Q24H    aspirin  81 mg Oral Daily    atorvastatin  40 mg Oral Daily    budesonide-formoterol  2 puff Inhalation BID RT    And    tiotropium  2 puff Inhalation Daily RT    pantoprazole  40 mg Oral Daily    topiramate  25 mg

## 2024-10-31 NOTE — PLAN OF CARE
and/or belongings  3. Encourage verbalization of thoughts and concerns in a socially appropriate manner  4. Utilize positive, consistent limit setting strategies supporting safety of patient, staff and others  5. Encourage participation in the decision making process about the behavioral management agreement  6. If a visitor's behavior poses a threat to safety call refer to organization policy.  7. Initiate consult with , Psychosocial CNS, Spiritual Care as appropriate  Outcome: Progressing     Problem: Depression/Self Harm  Goal: Effect of psychiatric condition will be minimized and patient will be protected from self harm  Description: INTERVENTIONS:  1. Assess impact of patient's symptoms on level of functioning, self care needs and offer support as indicated  2. Assess patient/family knowledge of depression, impact on illness and need for teaching  3. Provide emotional support, presence and reassurance  4. Assess for possible suicidal thoughts or ideation. If patient expresses suicidal thoughts or statements do not leave alone, initiate Suicide Precautions, move to a room close to the nursing station and obtain sitter  5. Initiate consults as appropriate with Mental Health Professional, Spiritual Care, Psychosocial CNS, and consider a recommendation to the LIP for a Psychiatric Consultation  Outcome: Progressing     Problem: Self Harm/Suicidality  Goal: Will have no self-injury during hospital stay  Description: INTERVENTIONS:  1.  Ensure constant observer at bedside with Q15M safety checks  2.  Maintain a safe environment  3.  Secure patient belongings  4.  Ensure family/visitors adhere to safety recommendations  5.  Ensure safety tray has been added to patient's diet order  6.  Every shift and PRN: Re-assess suicidal risk via Frequent Screener    Outcome: Progressing     Problem: Skin/Tissue Integrity  Goal: Absence of new skin breakdown  Description: 1.  Monitor for areas of redness and/or skin

## 2024-10-31 NOTE — PROGRESS NOTES
Patient provided a COPD Educational Folder that includes the following materials:     [x]  [employer] Booklet: Managing your COPD  [x]  ALA: Getting the Most Out of Medication Delivery Devices  [x]  ALA: My COPD Action Plan  [x]  Better Breathers [facility]: [facility]   [x]  Smoking Cessation Classes  [x]  Outpatient Spiritual Care Services  [x]  Magnet: Signs of COPD    PATIENT/CAREGIVER TEACHING:   Level of patient/caregiver understanding able to:   [x] Verbalize understanding   [] Demonstrate understanding       [] Teach back        [] Needs reinforcement     []  Other:     Electronically signed by Katt Acevedo RN on 10/31/2024 at 1:47 PM

## 2024-10-31 NOTE — PROGRESS NOTES
Pt BP elevated at 190/88. PRN hydralazine given per order see MAR. Physician notified of elevated BP.

## 2024-10-31 NOTE — PROGRESS NOTES
Order received for kristin, given to pt. SEE MAR. Pt lying in bed resting, pt appears more comfortable

## 2024-10-31 NOTE — PROGRESS NOTES
Inpatient Occupational Therapy Evaluation & Treatment    Unit: [facility]  Date:  10/31/2024  Patient Name:    Najma Fitzpatrick  Admitting diagnosis:  Suicidal ideation [R45.851]  COPD exacerbation (HCC) [J44.1]  Community acquired pneumonia, unspecified laterality [J18.9]  Admit Date:  10/29/2024  Precautions/Restrictions/WB Status/ Lines/ Wounds/ Oxygen: Fall risk, Bed/chair alarm, and Lines (IV, Supplemental O2 (3L), and external catheter)    Pt seen for cotreatment this date due to patient safety, staff recommendation, and limited functional status information    Treatment Time:  958-704  Treatment Number:  1  Timed Code Treatment Minutes: 25 minutes  Total Treatment Minutes:  35  minutes    Patient Goals for Therapy: \"go home\"          Discharge Recommendations: Home with 24/7 assist and Home OT  DME needs for discharge: Needs Met       Therapy recommendations for staff:   Assist of 1 for ambulation with use of Rollator and gait belt to/from chair  to/from bathroom  within room    History of Present Illness: per Dr Sage H&P 10/29/24:  \"Najma Fitzpatrick is a 73 y.o. female with a PMH of COPD, hypertension, hyperlipidemia, GERD, bipolar disorder, chronic back pain, who presented to ED with complaint of psychiatric evaluation.     According to report patient was brought to the ED due to concerns for suicidal ideation with further psychiatric evaluation.  Of note patient has a known history of COPD on 3 L NC at home.  Endorses worsening shortness of breath with frequent falls.     Vital signs, blood pressure 186/76, pulse 75 temperature 98.7 respiration 22 saturating 94% on 3 L  labs showed potassium of 3.4 bicarb of 35 magnesium 1.78 proBNP of 886.  Troponin 19/17, LFT stable.  Urine tox screen shows positive barbiturates.  Acetaminophen and salicylate negative.  WBC 5.5 hemoglobin 10.9.  Urinalysis not indicative of UTI.  EKG shows normal sinus rhythm with no acute ST or T wave change     CT head shows no acute  gabapentin (per patient, unreliable historian)  Home Health Services:                       None    AM-PAC Score:       Subjective:  Patient lying reclined in bed with no family present.   Pt agreeable to this OT session.     Cognition:    A&O Person and Place - slowed responses, cues required  Able to follow 1 step commands  Struggles with word finding and sometimes provides nonsense answers or repeats the number \"12\"    Pain:   Yes  Location: shoulders and head  Rating: severe /10  Pain Medicine Status: Received pain med prior to tx    Activity Tolerance:   Pt completed therapy session with fatigue     BP (mmHg) HR (bpm) SpO2 (%) on 3L Comments   Supine at rest 143/88 86 95%    Seated at EOB       Standing       End of session         Objective:  Does this pt have an acute or acute on chronic diagnosis of CHF? No    Upper Extremity ROM:    AROM WFL: Yes    Upper Extremity Strength:    BUE strength impaired but not formally assessed w/ MMT    Upper Extremity Sensation:    WFL    Balance:  Sitting:    SBA  Standing:    SBA and with rollator and gait belt    Bed Mobility:   Supine to Sit:    SBA, HOB elevated , and With increased time  Sit to Supine:   Not Tested  Rolling:   Not Tested  Scooting in sitting: SBA  Scooting in supine:  Not Tested    Transfers:    Sit to stand:    SBA and With increased time  Stand to sit:    SBA and With cues for hand placement  Bed to chair:     SBA and with rollator and gait belt, assist to manage lines  Bed/ chair to standard toilet:  SBA and With cues for hand placement, use of gait belt  Bed/chair to BSC:   Not Tested  Functional Mobility:   SBA and with rollator and gait belt, assist to manage lines    ADLs:  Dressing:      UE:   Not Tested  LE:    Mod A doff Depends and don pullup style incontinence product    Eating:   SBA beverage management, tremulous    Toileting:  SBA standing pericare    Grooming/hygiene: SBA wash hands at sink, safety cues throughout    Positioning Needs:

## 2024-10-31 NOTE — PROGRESS NOTES
Pt repeatedly getting out of chair without assitance. Pt confused and talking nonsensical to herslef. Pt high falls raik safety parameters in place see chart. Obtained order for zyrpexa for PRN agitation. Administered see MAR. Due to agitation as evdenced by calling out, getting out of bed and increased BP due to anxiety. Emotional support offered and frequent checks on pt. Offered washcloths to fold and talking both of which are ineefective

## 2024-10-31 NOTE — PROGRESS NOTES
Pt resting in bed, pt cont to have confusion to situation and time. Pt becomes easily overwhelmed and agitated when asking questions. Emotional support offered. Pt BP was elevated coreg given this morning. Will cont to monitor. Pt cont to have anxiety believe this is causing her elevated BP reading as well. Cont to provide emotional support. Pt cont to attempt to get out of bed so visual sitter monitoring is continued due to this. Fall band in place. Bed alrm in place. Pt close to nurses station, and frequent rounding provided. Call bell table and phone all within reach. No other needs at present

## 2024-10-31 NOTE — PROGRESS NOTES
Progress Note    Admit Date:  10/29/2024    Admitted for SI  Increasing shortness of breath  Falls and generalized weakness    Subjective:  Ms. Fitzpatrick is resting in bed. She has been agitated and didn't take her seroquel, got IM zyprexa.     Objective:   Patient Vitals for the past 4 hrs:   BP Temp Temp src Pulse Resp SpO2   10/31/24 1430 (!) 190/87 97.9 °F (36.6 °C) Oral 83 18 97 %   10/31/24 1300 (!) 176/93 -- -- 97 -- --   10/31/24 1140 (!) 187/72 -- -- 97 18 97 %   10/31/24 1111 -- -- -- 76 16 97 %          Intake/Output Summary (Last 24 hours) at 10/31/2024 1452  Last data filed at 10/31/2024 0529  Gross per 24 hour   Intake 270 ml   Output 1750 ml   Net -1480 ml       Physical Exam:    Gen: No distress. Alert. Appears chronically ill, older than stated age, pleasant elderly female but  mildly agitated  Eyes: PERRL. No sclera icterus. No conjunctival injection.   ENT: No discharge. Pharynx clear.   Neck: No JVD.  Trachea midline.  Resp: No accessory muscle use. Diminished throughout, scattered expiratory wheezes and rhonchi. No crackles.   CV: Regular rate. Regular rhythm. No murmur.  No rub. No edema.   Capillary Refill: Brisk,< 3 seconds   Peripheral Pulses: +2 palpable, equal bilaterally   GI: Non-tender. Non-distended.  Normal bowel sounds.  Skin: Warm and dry. No nodule on exposed extremities. No rash on exposed extremities. Scattered ecchymosis BUE  M/S: No cyanosis. No joint deformity. + nail clubbing.   Neuro: Awake. Grossly nonfocal    Psych: Oriented x 3. No anxiety or agitation.      Scheduled Meds:   nicotine  1 patch TransDERmal Daily    hydrALAZINE  25 mg Oral 3 times per day    [START ON 11/1/2024] losartan  50 mg Oral Daily    losartan  25 mg Oral Once    DULoxetine  60 mg Oral Daily    sodium chloride flush  5-40 mL IntraVENous 2 times per day    enoxaparin  40 mg SubCUTAneous Daily    predniSONE  40 mg Oral Daily    guaiFENesin  600 mg Oral BID    QUEtiapine  400 mg Oral Nightly    ipratropium  Continue statin - nursing to verify    GERD  - was on PPI , previous admission  - nursing to verify     Tobacco Dependence  -Recommended cessation  - nicotine patch ordered      Chronic Headache  - nursing to verify home regimen     Tremor LUE     Note above makes patient higher risk for morbidity and mortality requiring testing and treatment.      DVT Prophylaxis: Lovenox   Diet: ADULT DIET; Regular; Low Sodium (2 gm); 2000 ml  Code Status: Full Code      Saumya Arizmendi, DO

## 2024-10-31 NOTE — PROGRESS NOTES
RT Inhaler-Nebulizer Bronchodilator Protocol Note    There is a bronchodilator order in the chart from a provider indicating to follow the RT Bronchodilator Protocol and there is an “Initiate RT Inhaler-Nebulizer Bronchodilator Protocol” order as well (see protocol at bottom of note).    CXR Findings:  XR CHEST PORTABLE    Result Date: 10/29/2024  1. Moderate left pleural effusion and left basal consolidation that could represent atelectasis or pneumonia. 2. Mild cardiomegaly and pulmonary vascular congestion without evidence of interstitial edema. 3. Ill-defined opacity at the right lung base that could be related overlying soft tissues and appears similar to the prior study.  Atelectasis or pneumonia in this area cannot be excluded.       The findings from the last RT Protocol Assessment were as follows:   History Pulmonary Disease: (P) Chronic pulmonary disease  Respiratory Pattern: (P) Dyspnea on exertion or RR 21-25 bpm  Breath Sounds: (P) Inspiratory and expiratory or bilateral wheezing and/or rhonchi  Cough: Strong, spontaneous, non-productive  Indication for Bronchodilator Therapy: Wheezing associated with pulm disorder  Bronchodilator Assessment Score: 10    Aerosolized bronchodilator medication orders have been revised according to the RT Inhaler-Nebulizer Bronchodilator Protocol below.    Respiratory Therapist to perform RT Therapy Protocol Assessment initially then follow the protocol.  Repeat RT Therapy Protocol Assessment PRN for score 0-3 or on second treatment, BID, and PRN for scores above 3.    No Indications - adjust the frequency to every 6 hours PRN wheezing or bronchospasm, if no treatments needed after 48 hours then discontinue using Per Protocol order mode.     If indication present, adjust the RT bronchodilator orders based on the Bronchodilator Assessment Score as indicated below.  Use Inhaler orders unless patient has one or more of the following: on home nebulizer, not able to hold breath

## 2024-10-31 NOTE — PROGRESS NOTES
(headache). Max Daily Amount: 6 capsules   Yes Vida Camarillo MD   QUEtiapine (SEROQUEL) 300 MG tablet Take 1 tablet by mouth nightly Plus 100 mg to equal 400 mg at bedtime.    Vida Camarillo MD   QUEtiapine (SEROQUEL) 100 MG tablet Take 1 tablet by mouth nightly 9/5/24:  100 mg tablet + 300 mg tablet to equal 400 mg at bedtime.  25 mg twice daily for anxiety.    Vida Camarillo MD   QUEtiapine (SEROQUEL) 25 MG tablet Take 1 tablet by mouth 2 times daily as needed for Other (anxiety)    Vida Camarillo MD      Diet Reviewed: Yes   ADULT DIET; Regular; Low Sodium (2 gm); 2000 ml    Goal of Care Reviewed: Yes   Patient and/or Family's stated Goal of Care this Admission: Reduce shortness of breath, increase activity tolerance, better understand heart failure and disease management, be more comfortable, and reduce lower extremity edema prior to discharge.     Electronically signed by Kristal Mark RN on 10/31/2024 at 12:30 AM

## 2024-10-31 NOTE — PROGRESS NOTES
Shift assessment done,patient is confused but redirectable.  She is on oxygen via [place] at 3l/min.  She has a telesitter in the room for safety.  She is on a regular diet.  She has an external catheter for urine output.  Bed is at its lowest level,call light within reach,telesitter in room,will continue to monitor patient.

## 2024-10-31 NOTE — PROGRESS NOTES
Pt BP rtemains elevateed. Notified physician in regards to this. Physician aware. Pt repeatedly attempting to get out of bed. Tele sitter in place. Hourly rounding and pt closet to nurses station for safety

## 2024-10-31 NOTE — PROGRESS NOTES
Pt. Seems to be deteriorating since this morning. Discussed with RN. RN agreed. RN states that they have reached out to Doctor.

## 2024-11-01 LAB
ANION GAP SERPL CALCULATED.3IONS-SCNC: 7 MMOL/L (ref 3–16)
BUN SERPL-MCNC: 12 MG/DL (ref 7–20)
CALCIUM SERPL-MCNC: 7.7 MG/DL (ref 8.3–10.6)
CHLORIDE SERPL-SCNC: 104 MMOL/L (ref 99–110)
CO2 SERPL-SCNC: 22 MMOL/L (ref 21–32)
CREAT SERPL-MCNC: 0.5 MG/DL (ref 0.6–1.2)
GFR SERPLBLD CREATININE-BSD FMLA CKD-EPI: >90 ML/MIN/{1.73_M2}
GLUCOSE SERPL-MCNC: 81 MG/DL (ref 70–99)
POTASSIUM SERPL-SCNC: 3 MMOL/L (ref 3.5–5.1)
POTASSIUM SERPL-SCNC: 4.2 MMOL/L (ref 3.5–5.1)
SODIUM SERPL-SCNC: 133 MMOL/L (ref 136–145)

## 2024-11-01 PROCEDURE — 6370000000 HC RX 637 (ALT 250 FOR IP): Performed by: NURSE PRACTITIONER

## 2024-11-01 PROCEDURE — 6370000000 HC RX 637 (ALT 250 FOR IP): Performed by: INTERNAL MEDICINE

## 2024-11-01 PROCEDURE — 6360000002 HC RX W HCPCS: Performed by: INTERNAL MEDICINE

## 2024-11-01 PROCEDURE — 94640 AIRWAY INHALATION TREATMENT: CPT

## 2024-11-01 PROCEDURE — 99232 SBSQ HOSP IP/OBS MODERATE 35: CPT | Performed by: INTERNAL MEDICINE

## 2024-11-01 PROCEDURE — 6360000002 HC RX W HCPCS: Performed by: NURSE PRACTITIONER

## 2024-11-01 PROCEDURE — 36415 COLL VENOUS BLD VENIPUNCTURE: CPT

## 2024-11-01 PROCEDURE — 2580000003 HC RX 258: Performed by: INTERNAL MEDICINE

## 2024-11-01 PROCEDURE — 2580000003 HC RX 258: Performed by: NURSE PRACTITIONER

## 2024-11-01 PROCEDURE — 80048 BASIC METABOLIC PNL TOTAL CA: CPT

## 2024-11-01 PROCEDURE — 1200000000 HC SEMI PRIVATE

## 2024-11-01 PROCEDURE — 2700000000 HC OXYGEN THERAPY PER DAY

## 2024-11-01 PROCEDURE — 94010 BREATHING CAPACITY TEST: CPT

## 2024-11-01 PROCEDURE — 94761 N-INVAS EAR/PLS OXIMETRY MLT: CPT

## 2024-11-01 PROCEDURE — 99233 SBSQ HOSP IP/OBS HIGH 50: CPT | Performed by: INTERNAL MEDICINE

## 2024-11-01 PROCEDURE — 84132 ASSAY OF SERUM POTASSIUM: CPT

## 2024-11-01 RX ORDER — AZITHROMYCIN 250 MG/1
500 TABLET, FILM COATED ORAL
Status: DISCONTINUED | OUTPATIENT
Start: 2024-11-01 | End: 2024-11-02 | Stop reason: HOSPADM

## 2024-11-01 RX ORDER — LIDOCAINE 4 G/G
1 PATCH TOPICAL DAILY
Status: DISCONTINUED | OUTPATIENT
Start: 2024-11-02 | End: 2024-11-02 | Stop reason: HOSPADM

## 2024-11-01 RX ORDER — POTASSIUM CHLORIDE 7.45 MG/ML
10 INJECTION INTRAVENOUS
Status: DISCONTINUED | OUTPATIENT
Start: 2024-11-01 | End: 2024-11-01

## 2024-11-01 RX ADMIN — ENOXAPARIN SODIUM 40 MG: 100 INJECTION SUBCUTANEOUS at 08:52

## 2024-11-01 RX ADMIN — PREDNISONE 40 MG: 20 TABLET ORAL at 08:51

## 2024-11-01 RX ADMIN — POTASSIUM CHLORIDE: 2 INJECTION, SOLUTION, CONCENTRATE INTRAVENOUS at 09:13

## 2024-11-01 RX ADMIN — DIVALPROEX SODIUM 500 MG: 500 TABLET, FILM COATED, EXTENDED RELEASE ORAL at 22:35

## 2024-11-01 RX ADMIN — POTASSIUM CHLORIDE: 2 INJECTION, SOLUTION, CONCENTRATE INTRAVENOUS at 12:50

## 2024-11-01 RX ADMIN — IPRATROPIUM BROMIDE AND ALBUTEROL SULFATE 1 DOSE: 2.5; .5 SOLUTION RESPIRATORY (INHALATION) at 11:29

## 2024-11-01 RX ADMIN — POTASSIUM CHLORIDE: 2 INJECTION, SOLUTION, CONCENTRATE INTRAVENOUS at 15:57

## 2024-11-01 RX ADMIN — POTASSIUM CHLORIDE: 2 INJECTION, SOLUTION, CONCENTRATE INTRAVENOUS at 10:55

## 2024-11-01 RX ADMIN — IPRATROPIUM BROMIDE AND ALBUTEROL SULFATE 1 DOSE: 2.5; .5 SOLUTION RESPIRATORY (INHALATION) at 15:06

## 2024-11-01 RX ADMIN — IPRATROPIUM BROMIDE AND ALBUTEROL SULFATE 1 DOSE: 2.5; .5 SOLUTION RESPIRATORY (INHALATION) at 19:45

## 2024-11-01 RX ADMIN — IPRATROPIUM BROMIDE AND ALBUTEROL SULFATE 1 DOSE: 2.5; .5 SOLUTION RESPIRATORY (INHALATION) at 08:17

## 2024-11-01 RX ADMIN — SODIUM CHLORIDE, PRESERVATIVE FREE 10 ML: 5 INJECTION INTRAVENOUS at 23:43

## 2024-11-01 RX ADMIN — GUAIFENESIN 600 MG: 600 TABLET, EXTENDED RELEASE ORAL at 22:35

## 2024-11-01 RX ADMIN — SODIUM CHLORIDE, PRESERVATIVE FREE 10 ML: 5 INJECTION INTRAVENOUS at 08:54

## 2024-11-01 RX ADMIN — TOPIRAMATE 25 MG: 25 TABLET, FILM COATED ORAL at 08:51

## 2024-11-01 RX ADMIN — BUDESONIDE AND FORMOTEROL FUMARATE DIHYDRATE 2 PUFF: 160; 4.5 AEROSOL RESPIRATORY (INHALATION) at 19:45

## 2024-11-01 RX ADMIN — CARVEDILOL 25 MG: 25 TABLET, FILM COATED ORAL at 18:19

## 2024-11-01 RX ADMIN — DULOXETINE HYDROCHLORIDE 60 MG: 60 CAPSULE, DELAYED RELEASE ORAL at 08:51

## 2024-11-01 RX ADMIN — ASPIRIN 81 MG: 81 TABLET, CHEWABLE ORAL at 08:52

## 2024-11-01 RX ADMIN — ATORVASTATIN CALCIUM 40 MG: 40 TABLET, FILM COATED ORAL at 08:51

## 2024-11-01 RX ADMIN — DIVALPROEX SODIUM 250 MG: 250 TABLET, FILM COATED, EXTENDED RELEASE ORAL at 08:58

## 2024-11-01 RX ADMIN — AZITHROMYCIN 500 MG: 250 TABLET, FILM COATED ORAL at 22:35

## 2024-11-01 RX ADMIN — BUDESONIDE AND FORMOTEROL FUMARATE DIHYDRATE 2 PUFF: 160; 4.5 AEROSOL RESPIRATORY (INHALATION) at 08:17

## 2024-11-01 RX ADMIN — GUAIFENESIN 600 MG: 600 TABLET, EXTENDED RELEASE ORAL at 08:51

## 2024-11-01 RX ADMIN — CARVEDILOL 25 MG: 25 TABLET, FILM COATED ORAL at 08:51

## 2024-11-01 RX ADMIN — HYDRALAZINE HYDROCHLORIDE 25 MG: 25 TABLET ORAL at 22:35

## 2024-11-01 RX ADMIN — LOSARTAN POTASSIUM 50 MG: 50 TABLET, FILM COATED ORAL at 08:52

## 2024-11-01 RX ADMIN — CEFEPIME 2000 MG: 2 INJECTION, POWDER, FOR SOLUTION INTRAVENOUS at 04:20

## 2024-11-01 RX ADMIN — ACETAMINOPHEN 650 MG: 325 TABLET ORAL at 19:20

## 2024-11-01 RX ADMIN — QUETIAPINE FUMARATE 400 MG: 200 TABLET ORAL at 22:36

## 2024-11-01 RX ADMIN — POTASSIUM CHLORIDE: 2 INJECTION, SOLUTION, CONCENTRATE INTRAVENOUS at 14:23

## 2024-11-01 RX ADMIN — POTASSIUM CHLORIDE: 2 INJECTION, SOLUTION, CONCENTRATE INTRAVENOUS at 17:24

## 2024-11-01 RX ADMIN — HYDRALAZINE HYDROCHLORIDE 25 MG: 25 TABLET ORAL at 04:28

## 2024-11-01 RX ADMIN — TIOTROPIUM BROMIDE INHALATION SPRAY 2 PUFF: 3.12 SPRAY, METERED RESPIRATORY (INHALATION) at 08:17

## 2024-11-01 RX ADMIN — PANTOPRAZOLE SODIUM 40 MG: 40 TABLET, DELAYED RELEASE ORAL at 08:51

## 2024-11-01 RX ADMIN — CEFEPIME 2000 MG: 2 INJECTION, POWDER, FOR SOLUTION INTRAVENOUS at 18:15

## 2024-11-01 RX ADMIN — CEFEPIME 2000 MG: 2 INJECTION, POWDER, FOR SOLUTION INTRAVENOUS at 11:01

## 2024-11-01 RX ADMIN — HYDRALAZINE HYDROCHLORIDE 25 MG: 25 TABLET ORAL at 14:34

## 2024-11-01 ASSESSMENT — PAIN DESCRIPTION - PAIN TYPE
TYPE: ACUTE PAIN
TYPE: ACUTE PAIN

## 2024-11-01 ASSESSMENT — COPD QUESTIONNAIRES
QUESTION6_LEAVINGHOUSE: 2
QUESTION3_CHESTTIGHTNESS: 2
QUESTION1_COUGHFREQUENCY: 2
QUESTION7_SLEEPQUALITY: 0
QUESTION8_ENERGYLEVEL: 1
QUESTION2_CHESTPHLEGM: 2
QUESTION5_HOMEACTIVITIES: 4
QUESTION4_WALKINCLINE: 4
CAT_TOTALSCORE: 17

## 2024-11-01 ASSESSMENT — PAIN DESCRIPTION - ONSET
ONSET: ON-GOING
ONSET: GRADUAL

## 2024-11-01 ASSESSMENT — PAIN DESCRIPTION - LOCATION
LOCATION: HEAD
LOCATION: BACK

## 2024-11-01 ASSESSMENT — PAIN DESCRIPTION - DESCRIPTORS
DESCRIPTORS: ACHING
DESCRIPTORS: DISCOMFORT;ACHING

## 2024-11-01 ASSESSMENT — PAIN SCALES - GENERAL
PAINLEVEL_OUTOF10: 10
PAINLEVEL_OUTOF10: 4

## 2024-11-01 ASSESSMENT — PAIN DESCRIPTION - FREQUENCY
FREQUENCY: INTERMITTENT
FREQUENCY: INTERMITTENT

## 2024-11-01 ASSESSMENT — PAIN DESCRIPTION - ORIENTATION
ORIENTATION: MID
ORIENTATION: MID

## 2024-11-01 NOTE — PROGRESS NOTES
[facility]                                 Pulmonary/Critical care  Consult /Progress Note :                                                                  CC : Worsening shortness of breath  Patient is being seen at the request of Awa Cleary for a consultation for acute COPD exacerbation  Subjective   Doing much better  No chest Pain  SOB has improved  Denies any Fever or chills  Denies any CP      PHYSICAL EXAM:  Vitals:    10/31/24 0747   BP:    Pulse: 65   Resp: 18   Temp:    SpO2: 97%     Gen: No distress.   Eyes: PERRL. No sclera icterus. No conjunctival injection.   ENT: No discharge. Pharynx clear.   Neck: Trachea midline. No obvious mass.    Resp: Rhonchi bilaterally  CV: Regular rate. Regular rhythm. No murmur or rub. No edema. Peripheral pulses are 2+.  Capillary refill is less than 3 seconds.  GI: Non-tender. Non-distended. No hernia.   Skin: Warm and dry. No nodule on exposed extremities.   Lymph: No cervical LAD. No supraclavicular LAD.   M/S: No cyanosis. No joint deformity. No clubbing.   Neuro: Awake. Alert. Moves all four extremities.   Psych: Oriented x 3. No anxiety.     LABS:  CBC:   Recent Labs     10/29/24  1713 10/30/24  0542 10/31/24  1857   WBC 5.5 6.5 10.7   HGB 10.9* 11.1* 10.8*   HCT 33.8* 33.6* 34.4*   MCV 84.6 83.8 87.1    218 196     BMP:   Recent Labs     10/30/24  0542 10/31/24  1857 11/01/24  0534    131* 133*   K 3.4* 3.7 3.0*   CL 93* 93* 104   CO2 34* 27 22   BUN 6* 13 12   CREATININE 0.4* 0.6 0.5*     LIVER PROFILE:   Recent Labs     10/29/24  1713   AST 18   ALT <5*   LIPASE 11.0*   BILITOT 0.3   ALKPHOS 75     PT/INR: No results for input(s): \"PROTIME\", \"INR\" in the last 72 hours.  APTT: No results for input(s): \"APTT\" in the last 72 hours.  UA:  Recent Labs     10/29/24  1713   COLORU Yellow   PHUR 7.0  7.0   CLARITYU Clear   LEUKOCYTESUR Negative   UROBILINOGEN 1.0

## 2024-11-01 NOTE — PROGRESS NOTES
Pt sitting up in chair. Abx and K running. Pt tolerating well. Call bell and needs within reach. Chair alarm in place. Telesitter remains. Needs at this time

## 2024-11-01 NOTE — PROGRESS NOTES
11/01/24 1900   RT Protocol   History Pulmonary Disease 2   Respiratory pattern 2   Breath sounds 4   Cough 0   Indications for Bronchodilator Therapy Wheezing associated with pulm disorder   Bronchodilator Assessment Score 8     RT Inhaler-Nebulizer Bronchodilator Protocol Note    There is a bronchodilator order in the chart from a provider indicating to follow the RT Bronchodilator Protocol and there is an “Initiate RT Inhaler-Nebulizer Bronchodilator Protocol” order as well (see protocol at bottom of note).    CXR Findings:  No results found.    The findings from the last RT Protocol Assessment were as follows:   History Pulmonary Disease: Chronic pulmonary disease  Respiratory Pattern: Dyspnea on exertion or RR 21-25 bpm  Breath Sounds: Intermittent or unilateral wheezes  Cough: Strong, spontaneous, non-productive  Indication for Bronchodilator Therapy: Wheezing associated with pulm disorder  Bronchodilator Assessment Score: 8    Aerosolized bronchodilator medication orders have been revised according to the RT Inhaler-Nebulizer Bronchodilator Protocol below.    Respiratory Therapist to perform RT Therapy Protocol Assessment initially then follow the protocol.  Repeat RT Therapy Protocol Assessment PRN for score 0-3 or on second treatment, BID, and PRN for scores above 3.    No Indications - adjust the frequency to every 6 hours PRN wheezing or bronchospasm, if no treatments needed after 48 hours then discontinue using Per Protocol order mode.     If indication present, adjust the RT bronchodilator orders based on the Bronchodilator Assessment Score as indicated below.  Use Inhaler orders unless patient has one or more of the following: on home nebulizer, not able to hold breath for 10 seconds, is not alert and oriented, cannot activate and use MDI correctly, or respiratory rate 25 breaths per minute or more, then use the equivalent nebulizer order(s) with same Frequency and PRN reasons based on the score.

## 2024-11-01 NOTE — PROGRESS NOTES
IM Progress Note    Admit Date:  10/29/2024    Admitted for SI- seen  and cleared by psych   Increasing shortness of breath  Falls and generalized weakness    Subjective:  Ms. Fitzpatrick is  sitting up in chair.   Confused. Not conversing. No distress     was agitated and didn't take her seroquel, got IM zyprexa.     Objective:   Patient Vitals for the past 4 hrs:   BP Temp Temp src Pulse Resp SpO2   11/01/24 1506 -- -- -- -- -- 96 %   11/01/24 1430 (!) 162/98 97.8 °F (36.6 °C) Oral 80 18 96 %          Intake/Output Summary (Last 24 hours) at 11/1/2024 1608  Last data filed at 11/1/2024 0518  Gross per 24 hour   Intake 220 ml   Output 200 ml   Net 20 ml       Physical Exam:    Gen: Awake, not oriented, confused. No distress    Appears chronically ill, older than stated age, pleasant elderly female  Eyes: PERRL. No sclera icterus. No conjunctival injection.   ENT: No discharge. Pharynx clear.   Neck: No JVD.  Trachea midline.  Resp: No accessory muscle use. Diminished throughout, scattered expiratory wheezes and rhonchi. No crackles.   CV: Regular rate. Regular rhythm. No murmur.  No rub. No edema.   Capillary Refill: Brisk,< 3 seconds   Peripheral Pulses: +2 palpable, equal bilaterally   GI: Non-tender. Non-distended.  Normal bowel sounds.  Skin: Warm and dry. No nodule on exposed extremities. No rash on exposed extremities. Scattered ecchymosis BUE  M/S: No cyanosis. No joint deformity. + nail clubbing.   Neuro: Awake. Grossly nonfocal    Psych: Oriented x 3. No anxiety or agitation.      Scheduled Meds:   azithromycin  500 mg Oral QHS    nicotine  1 patch TransDERmal Daily    hydrALAZINE  25 mg Oral 3 times per day    losartan  50 mg Oral Daily    DULoxetine  60 mg Oral Daily    sodium chloride flush  5-40 mL IntraVENous 2 times per day    enoxaparin  40 mg SubCUTAneous Daily    predniSONE  40 mg Oral Daily    guaiFENesin  600 mg Oral BID    QUEtiapine  400 mg Oral Nightly    ipratropium 0.5 mg-albuterol 2.5 mg  1

## 2024-11-01 NOTE — PROGRESS NOTES
RT Inhaler-Nebulizer Bronchodilator Protocol Note    There is a bronchodilator order in the chart from a provider indicating to follow the RT Bronchodilator Protocol and there is an “Initiate RT Inhaler-Nebulizer Bronchodilator Protocol” order as well (see protocol at bottom of note).    CXR Findings:  No results found.    The findings from the last RT Protocol Assessment were as follows:   History Pulmonary Disease: Chronic pulmonary disease  Respiratory Pattern: Dyspnea on exertion or RR 21-25 bpm  Breath Sounds: Intermittent or unilateral wheezes  Cough: Strong, spontaneous, non-productive  Indication for Bronchodilator Therapy: Wheezing associated with pulm disorder  Bronchodilator Assessment Score: 8    Aerosolized bronchodilator medication orders have been revised according to the RT Inhaler-Nebulizer Bronchodilator Protocol below.    Respiratory Therapist to perform RT Therapy Protocol Assessment initially then follow the protocol.  Repeat RT Therapy Protocol Assessment PRN for score 0-3 or on second treatment, BID, and PRN for scores above 3.    No Indications - adjust the frequency to every 6 hours PRN wheezing or bronchospasm, if no treatments needed after 48 hours then discontinue using Per Protocol order mode.     If indication present, adjust the RT bronchodilator orders based on the Bronchodilator Assessment Score as indicated below.  Use Inhaler orders unless patient has one or more of the following: on home nebulizer, not able to hold breath for 10 seconds, is not alert and oriented, cannot activate and use MDI correctly, or respiratory rate 25 breaths per minute or more, then use the equivalent nebulizer order(s) with same Frequency and PRN reasons based on the score.  If a patient is on this medication at home then do not decrease Frequency below that used at home.    0-3 - enter or revise RT bronchodilator order(s) to equivalent RT Bronchodilator order with Frequency of every 4 hours PRN for  wheezing or increased work of breathing using Per Protocol order mode.        4-6 - enter or revise RT Bronchodilator order(s) to two equivalent RT bronchodilator orders with one order with BID Frequency and one order with Frequency of every 4 hours PRN wheezing or increased work of breathing using Per Protocol order mode.        7-10 - enter or revise RT Bronchodilator order(s) to two equivalent RT bronchodilator orders with one order with TID Frequency and one order with Frequency of every 4 hours PRN wheezing or increased work of breathing using Per Protocol order mode.       11-13 - enter or revise RT Bronchodilator order(s) to one equivalent RT bronchodilator order with QID Frequency and an Albuterol order with Frequency of every 4 hours PRN wheezing or increased work of breathing using Per Protocol order mode.      Greater than 13 - enter or revise RT Bronchodilator order(s) to one equivalent RT bronchodilator order with every 4 hours Frequency and an Albuterol order with Frequency of every 2 hours PRN wheezing or increased work of breathing using Per Protocol order mode.       Electronically signed by Char Vogel RCP on 11/1/2024 at 8:21 AM

## 2024-11-01 NOTE — PROGRESS NOTES
hours PRN for wheezing or increased work of breathing using Per Protocol order mode.        4-6 - enter or revise RT Bronchodilator order(s) to two equivalent RT bronchodilator orders with one order with BID Frequency and one order with Frequency of every 4 hours PRN wheezing or increased work of breathing using Per Protocol order mode.        7-10 - enter or revise RT Bronchodilator order(s) to two equivalent RT bronchodilator orders with one order with TID Frequency and one order with Frequency of every 4 hours PRN wheezing or increased work of breathing using Per Protocol order mode.       11-13 - enter or revise RT Bronchodilator order(s) to one equivalent RT bronchodilator order with QID Frequency and an Albuterol order with Frequency of every 4 hours PRN wheezing or increased work of breathing using Per Protocol order mode.      Greater than 13 - enter or revise RT Bronchodilator order(s) to one equivalent RT bronchodilator order with every 4 hours Frequency and an Albuterol order with Frequency of every 2 hours PRN wheezing or increased work of breathing using Per Protocol order mode.       Electronically signed by Macknezie Stephens RCP on 10/31/2024 at 9:24 PM

## 2024-11-01 NOTE — PROGRESS NOTES
Pt sitting up in recliner. Call bell and needs within reach. Daughter called and given update. No questions at this time. VS obtained and hydralazine given per order. Pt has no needs at present. Chair alarm on and in place. Tele sitter remains in place

## 2024-11-01 NOTE — PROGRESS NOTES
HEART FAILURE CARE PLAN:    Comorbidities Reviewed: Yes   Patient has a past medical history of Abnormal ECG, Anemia, Arthritis, Back pain, chronic, Bipolar disorder ([facility]), Bronchitis chronic, CHF (congestive heart failure) ([facility]), Chronic back pain, Chronic neck pain, Clostridium difficile infection, 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[facility]), Coronary artery disease involving native coronary artery of native heart with angina pectoris ([facility]), Depression, Emphysema of lung ([facility]), Fibromyalgia, hyperlipidemia, Hypertension, Kidney stone, Liver disease, Lung disease, MDD (major depressive disorder), Mood disorder ([facility]), Movement disorder, Neck pain, chronic, Opiate misuse, Personality disorder ([facility]), Pneumonia, and Polypharmacy.     Weights Reviewed: Yes   Admission weight: 52.2 kg (115 lb)   Wt Readings from Last 3 Encounters:   11/01/24 79.4 kg (175 lb)   10/01/24 74.8 kg (165 lb)   09/04/24 77 kg (169 lb 12.1 oz)     Intake & Output Reviewed: Yes     Intake/Output Summary (Last 24 hours) at 11/1/2024 1128  Last data filed at 11/1/2024 0518  Gross per 24 hour   Intake 220 ml   Output 200 ml   Net 20 ml       ECHOCARDIOGRAM Reviewed: Yes   Patient's Ejection Fraction (EF) is greater than 40%     Medications Reviewed: Yes   SCHEDULED HOSPITAL MEDICATIONS:   potassium chloride 10 mEq in sodium chloride 0.9 % 100 mL IVPB   IntraVENous Q1H    azithromycin  500 mg Oral QHS    nicotine  1 patch TransDERmal Daily    hydrALAZINE  25 mg Oral 3 times per day    losartan  50 mg Oral Daily    DULoxetine  60 mg Oral Daily    sodium chloride flush  5-40 mL IntraVENous 2 times per day    enoxaparin  40 mg SubCUTAneous Daily    predniSONE  40 mg Oral Daily    guaiFENesin  600 mg Oral BID    QUEtiapine  400 mg Oral Nightly    ipratropium 0.5 mg-albuterol 2.5 mg  1 Dose Inhalation 4x Daily RT    cefepime  2,000 mg IntraVENous Q8H    carvedilol  25 mg Oral BID WC    aspirin  81 mg Oral Daily    atorvastatin  40 mg Oral Daily

## 2024-11-01 NOTE — CARE COORDINATION
Reviewed chart and met with pt who is sleeping. Spoke with spouse via phone to verify dc plan for home Spouse confirms plan is for pt to return home and he and family do provide 24/7 care for pt. Spouse states pt is sl confused at baseline. Discussed HHC and spouse receptive if needed. Will cont to follow.

## 2024-11-01 NOTE — FLOWSHEET NOTE
10/31/24 2000   Vital Signs   Temp 97.4 °F (36.3 °C)   Temp Source Oral   Pulse 78   Heart Rate Source Monitor   Respirations 16   BP (!) 140/80   MAP (Calculated) 100   BP Location Right upper arm   BP Method Automatic   Patient Position Semi fowlers   Pain Assessment   Pain Assessment None - Denies Pain     SHIFT ASSESSMENT DONE   Pt confused at baseline  On tele sitter  Vitals sf  Rx given per MAR  Call light within reach  Bed in lowest position

## 2024-11-01 NOTE — PLAN OF CARE
Problem: Chronic Conditions and Co-morbidities  Goal: Patient's chronic conditions and co-morbidity symptoms are monitored and maintained or improved  10/31/2024 2326 by Amadeo Dooley RN  Outcome: Progressing  10/31/2024 0927 by Estela Vega, RN  Outcome: Progressing  Flowsheets (Taken 10/31/2024 0727)  Care Plan - Patient's Chronic Conditions and Co-Morbidity Symptoms are Monitored and Maintained or Improved:   Monitor and assess patient's chronic conditions and comorbid symptoms for stability, deterioration, or improvement   Collaborate with multidisciplinary team to address chronic and comorbid conditions and prevent exacerbation or deterioration   Update acute care plan with appropriate goals if chronic or comorbid symptoms are exacerbated and prevent overall improvement and discharge     Problem: Discharge Planning  Goal: Discharge to home or other facility with appropriate resources  10/31/2024 2326 by Amadeo Dooley RN  Outcome: Progressing  10/31/2024 0927 by Estela Vega RN  Outcome: Progressing  Flowsheets (Taken 10/31/2024 0727)  Discharge to home or other facility with appropriate resources:   Identify barriers to discharge with patient and caregiver   Arrange for needed discharge resources and transportation as appropriate   Identify discharge learning needs (meds, wound care, etc)   Refer to discharge planning if patient needs post-hospital services based on physician order or complex needs related to functional status, cognitive ability or social support system     Problem: Pain  Goal: Verbalizes/displays adequate comfort level or baseline comfort level  10/31/2024 2326 by Amadeo Dooley RN  Outcome: Progressing  10/31/2024 0927 by Estela Vega, RN  Outcome: Progressing     Problem: Safety - Adult  Goal: Free from fall injury  10/31/2024 2326 by Amadeo Dooley RN  Outcome: Progressing  10/31/2024 0927 by Estela Vega, RN  Outcome: Progressing     Problem:  stimuli, including noise as appropriate  5. Monitor and intervene to maintain adequate nutrition, hydration, elimination, sleep and activity  6. If unable to ensure safety without constant attention obtain sitter and review sitter guidelines with assigned personnel  7. Initiate Psychosocial CNS and Spiritual Care consult, as indicated  10/31/2024 2326 by Amadeo Dooley, RN  Outcome: Progressing  10/31/2024 0927 by Estela Vega, RN  Outcome: Progressing  Flowsheets (Taken 10/31/2024 0727)  Effect of thought disturbance (confusion, delirium, dementia, or psychosis) are managed with adequate functional status:   Assess for contributors to thought disturbance, including medications, impaired vision or hearing, underlying metabolic abnormalities, dehydration, psychiatric diagnoses, notify LIP   [place] high risk fall precautions, as indicated   Decrease environmental stimuli, including noise as appropriate   Monitor and intervene to maintain adequate nutrition, hydration, elimination, sleep and activity     Problem: Behavior  Goal: Pt/Family maintain appropriate behavior and adhere to behavioral management agreement, if implemented  Description: INTERVENTIONS:  1. Assess patient/family's coping skills and  non-compliant behavior (including use of illegal substances)  2. Notify security of behavior or suspected illegal substances which indicate the need for search of the family and/or belongings  3. Encourage verbalization of thoughts and concerns in a socially appropriate manner  4. Utilize positive, consistent limit setting strategies supporting safety of patient, staff and others  5. Encourage participation in the decision making process about the behavioral management agreement  6. If a visitor's behavior poses a threat to safety call refer to organization policy.  7. Initiate consult with , Psychosocial CNS, Spiritual Care as appropriate  10/31/2024 2326 by Amadeo Dooley RN  Outcome:

## 2024-11-01 NOTE — PROGRESS NOTES
Pt resting in bed. Abx complete. Pt potassium at 3.0. Per order admininstered IV potassium per protocol. Currently infusing. Pt toelrating well. Call bell within reach as well as all needs. Bed alarm on as well as tele sitter. Pt continues to alert to self and place only. Pt muttering to self nonsensically. Pt needs frequent reminders to stay in bed and to not pull of blankets and gown. No other needs at present.

## 2024-11-02 VITALS
RESPIRATION RATE: 18 BRPM | SYSTOLIC BLOOD PRESSURE: 156 MMHG | HEIGHT: 61 IN | WEIGHT: 175 LBS | TEMPERATURE: 99 F | HEART RATE: 75 BPM | OXYGEN SATURATION: 94 % | DIASTOLIC BLOOD PRESSURE: 60 MMHG | BODY MASS INDEX: 33.04 KG/M2

## 2024-11-02 LAB
ANION GAP SERPL CALCULATED.3IONS-SCNC: 9 MMOL/L (ref 3–16)
BUN SERPL-MCNC: 17 MG/DL (ref 7–20)
CALCIUM SERPL-MCNC: 7.9 MG/DL (ref 8.3–10.6)
CHLORIDE SERPL-SCNC: 103 MMOL/L (ref 99–110)
CO2 SERPL-SCNC: 21 MMOL/L (ref 21–32)
CREAT SERPL-MCNC: 0.5 MG/DL (ref 0.6–1.2)
GFR SERPLBLD CREATININE-BSD FMLA CKD-EPI: >90 ML/MIN/{1.73_M2}
GLUCOSE SERPL-MCNC: 87 MG/DL (ref 70–99)
POTASSIUM SERPL-SCNC: 3.6 MMOL/L (ref 3.5–5.1)
SODIUM SERPL-SCNC: 133 MMOL/L (ref 136–145)

## 2024-11-02 PROCEDURE — 97110 THERAPEUTIC EXERCISES: CPT

## 2024-11-02 PROCEDURE — 6370000000 HC RX 637 (ALT 250 FOR IP): Performed by: NURSE PRACTITIONER

## 2024-11-02 PROCEDURE — 6370000000 HC RX 637 (ALT 250 FOR IP): Performed by: INTERNAL MEDICINE

## 2024-11-02 PROCEDURE — 2580000003 HC RX 258: Performed by: NURSE PRACTITIONER

## 2024-11-02 PROCEDURE — 36415 COLL VENOUS BLD VENIPUNCTURE: CPT

## 2024-11-02 PROCEDURE — 2700000000 HC OXYGEN THERAPY PER DAY

## 2024-11-02 PROCEDURE — 94640 AIRWAY INHALATION TREATMENT: CPT

## 2024-11-02 PROCEDURE — 99232 SBSQ HOSP IP/OBS MODERATE 35: CPT | Performed by: INTERNAL MEDICINE

## 2024-11-02 PROCEDURE — 6360000002 HC RX W HCPCS: Performed by: INTERNAL MEDICINE

## 2024-11-02 PROCEDURE — 97530 THERAPEUTIC ACTIVITIES: CPT

## 2024-11-02 PROCEDURE — 97535 SELF CARE MNGMENT TRAINING: CPT

## 2024-11-02 PROCEDURE — 2580000003 HC RX 258: Performed by: INTERNAL MEDICINE

## 2024-11-02 PROCEDURE — 6360000002 HC RX W HCPCS: Performed by: NURSE PRACTITIONER

## 2024-11-02 PROCEDURE — 80048 BASIC METABOLIC PNL TOTAL CA: CPT

## 2024-11-02 PROCEDURE — 94761 N-INVAS EAR/PLS OXIMETRY MLT: CPT

## 2024-11-02 RX ORDER — GUAIFENESIN 600 MG/1
600 TABLET, EXTENDED RELEASE ORAL 2 TIMES DAILY
Qty: 60 TABLET | Refills: 2 | Status: SHIPPED | OUTPATIENT
Start: 2024-11-02

## 2024-11-02 RX ORDER — PREDNISONE 20 MG/1
40 TABLET ORAL DAILY
Qty: 2 TABLET | Refills: 0 | Status: SHIPPED | OUTPATIENT
Start: 2024-11-03 | End: 2024-11-04

## 2024-11-02 RX ORDER — AZITHROMYCIN 500 MG/1
500 TABLET, FILM COATED ORAL
Qty: 2 TABLET | Refills: 0 | Status: SHIPPED | OUTPATIENT
Start: 2024-11-02 | End: 2024-11-04

## 2024-11-02 RX ORDER — LOSARTAN POTASSIUM 50 MG/1
50 TABLET ORAL DAILY
Qty: 30 TABLET | Refills: 3 | Status: SHIPPED | OUTPATIENT
Start: 2024-11-03

## 2024-11-02 RX ORDER — CEFUROXIME AXETIL 500 MG/1
500 TABLET ORAL 2 TIMES DAILY
Qty: 10 TABLET | Refills: 0 | Status: SHIPPED | OUTPATIENT
Start: 2024-11-02 | End: 2024-11-07

## 2024-11-02 RX ORDER — HYDRALAZINE HYDROCHLORIDE 25 MG/1
25 TABLET, FILM COATED ORAL EVERY 8 HOURS SCHEDULED
Qty: 90 TABLET | Refills: 3 | Status: SHIPPED | OUTPATIENT
Start: 2024-11-02

## 2024-11-02 RX ORDER — LIDOCAINE 4 G/G
1 PATCH TOPICAL DAILY
Qty: 30 EACH | Refills: 1 | Status: SHIPPED | OUTPATIENT
Start: 2024-11-03

## 2024-11-02 RX ADMIN — PREDNISONE 40 MG: 20 TABLET ORAL at 09:32

## 2024-11-02 RX ADMIN — ENOXAPARIN SODIUM 40 MG: 100 INJECTION SUBCUTANEOUS at 09:32

## 2024-11-02 RX ADMIN — CARVEDILOL 25 MG: 25 TABLET, FILM COATED ORAL at 09:35

## 2024-11-02 RX ADMIN — HYDRALAZINE HYDROCHLORIDE 25 MG: 25 TABLET ORAL at 06:38

## 2024-11-02 RX ADMIN — TOPIRAMATE 25 MG: 25 TABLET, FILM COATED ORAL at 09:33

## 2024-11-02 RX ADMIN — SODIUM CHLORIDE, PRESERVATIVE FREE 10 ML: 5 INJECTION INTRAVENOUS at 09:37

## 2024-11-02 RX ADMIN — BUDESONIDE AND FORMOTEROL FUMARATE DIHYDRATE 2 PUFF: 160; 4.5 AEROSOL RESPIRATORY (INHALATION) at 07:16

## 2024-11-02 RX ADMIN — IPRATROPIUM BROMIDE AND ALBUTEROL SULFATE 1 DOSE: 2.5; .5 SOLUTION RESPIRATORY (INHALATION) at 11:07

## 2024-11-02 RX ADMIN — DULOXETINE HYDROCHLORIDE 60 MG: 60 CAPSULE, DELAYED RELEASE ORAL at 09:33

## 2024-11-02 RX ADMIN — LOSARTAN POTASSIUM 50 MG: 50 TABLET, FILM COATED ORAL at 09:33

## 2024-11-02 RX ADMIN — TIOTROPIUM BROMIDE INHALATION SPRAY 2 PUFF: 3.12 SPRAY, METERED RESPIRATORY (INHALATION) at 07:15

## 2024-11-02 RX ADMIN — ATORVASTATIN CALCIUM 40 MG: 40 TABLET, FILM COATED ORAL at 09:33

## 2024-11-02 RX ADMIN — IPRATROPIUM BROMIDE AND ALBUTEROL SULFATE 1 DOSE: 2.5; .5 SOLUTION RESPIRATORY (INHALATION) at 07:11

## 2024-11-02 RX ADMIN — PANTOPRAZOLE SODIUM 40 MG: 40 TABLET, DELAYED RELEASE ORAL at 09:33

## 2024-11-02 RX ADMIN — ASPIRIN 81 MG: 81 TABLET, CHEWABLE ORAL at 09:33

## 2024-11-02 RX ADMIN — DIVALPROEX SODIUM 250 MG: 250 TABLET, FILM COATED, EXTENDED RELEASE ORAL at 09:37

## 2024-11-02 RX ADMIN — CEFEPIME 2000 MG: 2 INJECTION, POWDER, FOR SOLUTION INTRAVENOUS at 04:23

## 2024-11-02 RX ADMIN — GUAIFENESIN 600 MG: 600 TABLET, EXTENDED RELEASE ORAL at 09:33

## 2024-11-02 RX ADMIN — CEFEPIME 2000 MG: 2 INJECTION, POWDER, FOR SOLUTION INTRAVENOUS at 12:19

## 2024-11-02 RX ADMIN — IPRATROPIUM BROMIDE AND ALBUTEROL SULFATE 1 DOSE: 2.5; .5 SOLUTION RESPIRATORY (INHALATION) at 14:57

## 2024-11-02 ASSESSMENT — PAIN DESCRIPTION - LOCATION: LOCATION: BACK

## 2024-11-02 ASSESSMENT — PAIN DESCRIPTION - ONSET: ONSET: ON-GOING

## 2024-11-02 ASSESSMENT — PAIN DESCRIPTION - PAIN TYPE: TYPE: ACUTE PAIN

## 2024-11-02 ASSESSMENT — PAIN DESCRIPTION - DESCRIPTORS: DESCRIPTORS: DISCOMFORT;PRESSURE;STABBING

## 2024-11-02 ASSESSMENT — PAIN SCALES - GENERAL: PAINLEVEL_OUTOF10: 10

## 2024-11-02 ASSESSMENT — PAIN - FUNCTIONAL ASSESSMENT: PAIN_FUNCTIONAL_ASSESSMENT: ACTIVITIES ARE NOT PREVENTED

## 2024-11-02 ASSESSMENT — PAIN DESCRIPTION - ORIENTATION: ORIENTATION: MID

## 2024-11-02 ASSESSMENT — PAIN DESCRIPTION - FREQUENCY: FREQUENCY: CONTINUOUS

## 2024-11-02 NOTE — CARE COORDINATION
CASE MANAGEMENT DISCHARGE SUMMARY      Discharge to: home     IMM given: 11/2/2024      Transportation:      Family/car:        Confirmed discharge plan with:     Patient: yes     Family: yes - called      RN name: Cynthia HOYOS    Note: Discharging nurse to complete ADENIKE, reconcile AVS, and place final copy with patient's discharge packet. RN to ensure that written prescriptions for  Level II medications are sent with patient to the facility as per protocol.

## 2024-11-02 NOTE — PROGRESS NOTES
uncontrolled  - home meds need verified appears she has been on Coreg- will resume as recently filled  - PRN hydralazine given   - monitor blood pressure   - Previously on ARB, resumed at lower dose  - BP still high, increased Losartan and added hydralazine  -Losartan and hydralazine prescription given on discharge    Generalized weakness  Falls  - CT as above  - PT/OT consulted    CT with mild bladder mucosal thickening with perinephric stranding around right kidney  - pt denies any urinary symptoms  - urinalysis unremarkable  - denies any pain  - monitor     Diastolic CHF  - echo as above  - appears compensated   - unsure if taking lasix at home  - nursing to verify  - daily weights, low sodium diet, strict I/O    CAD  - will continue aspirin, statin and BB- once nursing to verify   - denies chest pain     HLD  - Continue statin - nursing to verify    GERD  - was on PPI , previous admission  - nursing to verify     Tobacco Dependence  -Recommended cessation  - nicotine patch ordered      Chronic Headache  - nursing to verify home regimen     Tremor LUE     Note above makes patient higher risk for morbidity and mortality requiring testing and treatment.      DVT Prophylaxis: Lovenox   Diet: ADULT DIET; Regular; Low Sodium (2 gm); 2000 ml  Code Status: Full Code       DC planning for home with [facility]   Patient is stable no more agitation.   Pleasant awake alert and oriented to person   No distress.  Oxygen saturation stable.  On 2L  She can be discharged home.     D/C home with [facility].  Total time 45 minutes. > 50%  dominated by counseling and coordination of care.    Brad Cheung MD    11/2/2024

## 2024-11-02 NOTE — PROGRESS NOTES
RT Inhaler-Nebulizer Bronchodilator Protocol Note    There is a bronchodilator order in the chart from a provider indicating to follow the RT Bronchodilator Protocol and there is an “Initiate RT Inhaler-Nebulizer Bronchodilator Protocol” order as well (see protocol at bottom of note).    CXR Findings:  No results found.    The findings from the last RT Protocol Assessment were as follows:   History Pulmonary Disease: (P) Chronic pulmonary disease  Respiratory Pattern: (P) Dyspnea on exertion or RR 21-25 bpm  Breath Sounds: (P) Intermittent or unilateral wheezes  Cough: (P) Strong, spontaneous, non-productive  Indication for Bronchodilator Therapy: (P) Wheezing associated with pulm disorder  Bronchodilator Assessment Score: (P) 8    Aerosolized bronchodilator medication orders have been revised according to the RT Inhaler-Nebulizer Bronchodilator Protocol below.    Respiratory Therapist to perform RT Therapy Protocol Assessment initially then follow the protocol.  Repeat RT Therapy Protocol Assessment PRN for score 0-3 or on second treatment, BID, and PRN for scores above 3.    No Indications - adjust the frequency to every 6 hours PRN wheezing or bronchospasm, if no treatments needed after 48 hours then discontinue using Per Protocol order mode.     If indication present, adjust the RT bronchodilator orders based on the Bronchodilator Assessment Score as indicated below.  Use Inhaler orders unless patient has one or more of the following: on home nebulizer, not able to hold breath for 10 seconds, is not alert and oriented, cannot activate and use MDI correctly, or respiratory rate 25 breaths per minute or more, then use the equivalent nebulizer order(s) with same Frequency and PRN reasons based on the score.  If a patient is on this medication at home then do not decrease Frequency below that used at home.    0-3 - enter or revise RT bronchodilator order(s) to equivalent RT Bronchodilator order with Frequency of

## 2024-11-02 NOTE — PROGRESS NOTES
Transferred care from ROMAIN Marie. Face to face bedside report received, no need voiced at this time.

## 2024-11-02 NOTE — PLAN OF CARE
Problem: Chronic Conditions and Co-morbidities  Goal: Patient's chronic conditions and co-morbidity symptoms are monitored and maintained or improved  11/2/2024 1002 by Cynthia Flynn RN  Outcome: Progressing     Problem: Discharge Planning  Goal: Discharge to home or other facility with appropriate resources  11/2/2024 1002 by Cynthia Flynn RN  Outcome: Progressing     Problem: Pain  Goal: Verbalizes/displays adequate comfort level or baseline comfort level  11/2/2024 1002 by Cynthia Flynn RN  Outcome: Progressing  Flowsheets (Taken 11/2/2024 0245 by Sarai Slater RN)  Verbalizes/displays adequate comfort level or baseline comfort level: Assess pain using appropriate pain scale     Problem: Safety - Adult  Goal: Free from fall injury  Outcome: Progressing  Flowsheets (Taken 11/2/2024 0009 by Sarai Slater RN)  Free From Fall Injury: Instruct family/caregiver on patient safety     Problem: Respiratory - Adult  Goal: Achieves optimal ventilation and oxygenation  Outcome: Progressing  Flowsheets (Taken 11/1/2024 2221 by Sarai Slater RN)  Achieves optimal ventilation and oxygenation: Assess for changes in mentation and behavior     Problem: Infection - Adult  Goal: Absence of infection at discharge  Outcome: Progressing  Flowsheets  Taken 11/2/2024 0009 by Sarai Slater RN  Absence of infection at discharge: Monitor lab/diagnostic results  Taken 11/1/2024 2221 by Sarai Slater RN  Absence of infection at discharge: Assess and monitor for signs and symptoms of infection     Problem: Confusion  Goal: Confusion, delirium, dementia, or psychosis is improved or at baseline  Description: INTERVENTIONS:  1. Assess for possible contributors to thought disturbance, including medications, impaired vision or hearing, underlying metabolic abnormalities, dehydration, psychiatric diagnoses, and notify attending LIP  2. [place] high risk fall precautions, as indicated  3. Provide frequent short contacts to provide

## 2024-11-02 NOTE — PROGRESS NOTES
Transferred care to ROMAIN Marie. Face to face bedside report given, no need voiced at this time.

## 2024-11-02 NOTE — PROGRESS NOTES
Pt's  arrived. Pt's  was given written and verbal discharge instructions. Pt's  indicated understanding of home medication and care instructions. Prescriptions provided to pt via preferred pharmacy. Pt's belongings packed. IV removed by prior RN Cynthia. Transport placed at this time. No needs voiced.

## 2024-11-02 NOTE — PROGRESS NOTES
[facility]                                 Pulmonary/Critical care  Consult /Progress Note :                                                                  CC : Worsening shortness of breath  Patient is being seen at the request of Awa Cleary for a consultation for acute COPD exacerbation  Subjective   Doing much better  No chest Pain  SOB has improved  Denies any Fever or chills  Denies any CP      PHYSICAL EXAM:  Vitals:    10/31/24 0747   BP:    Pulse: 65   Resp: 18   Temp:    SpO2: 97%     Gen: No distress.   Eyes: PERRL. No sclera icterus. No conjunctival injection.   ENT: No discharge. Pharynx clear.   Neck: Trachea midline. No obvious mass.    Resp: Rhonchi bilaterally  CV: Regular rate. Regular rhythm. No murmur or rub. No edema. Peripheral pulses are 2+.  Capillary refill is less than 3 seconds.  GI: Non-tender. Non-distended. No hernia.   Skin: Warm and dry. No nodule on exposed extremities.   Lymph: No cervical LAD. No supraclavicular LAD.   M/S: No cyanosis. No joint deformity. No clubbing.   Neuro: Awake. Alert. Moves all four extremities.   Psych: Oriented x 3. No anxiety.     LABS:  CBC:   Recent Labs     10/31/24  1857   WBC 10.7   HGB 10.8*   HCT 34.4*   MCV 87.1        BMP:   Recent Labs     10/31/24  1857 11/01/24  0534 11/01/24  2110 11/02/24  0603   * 133*  --  133*   K 3.7 3.0* 4.2 3.6   CL 93* 104  --  103   CO2 27 22  --  21   BUN 13 12  --  17   CREATININE 0.6 0.5*  --  0.5*     LIVER PROFILE:   No results for input(s): \"AST\", \"ALT\", \"LIPASE\", \"AMYLASE\", \"BILIDIR\", \"BILITOT\", \"ALKPHOS\" in the last 72 hours.    Invalid input(s): \"ALB\"    PT/INR: No results for input(s): \"PROTIME\", \"INR\" in the last 72 hours.  APTT: No results for input(s): \"APTT\" in the last 72 hours.  UA:  No results for input(s): \"NITRITE\", \"COLORU\", \"PHUR\", \"LABCAST\", \"WBCUA\", \"RBCUA\", \"MUCUS\", \"TRICHOMONAS\", \"YEAST\",

## 2024-11-02 NOTE — PLAN OF CARE
Problem: Chronic Conditions and Co-morbidities  Goal: Patient's chronic conditions and co-morbidity symptoms are monitored and maintained or improved  Outcome: Progressing  Flowsheets (Taken 11/1/2024 2221)  Care Plan - Patient's Chronic Conditions and Co-Morbidity Symptoms are Monitored and Maintained or Improved: Monitor and assess patient's chronic conditions and comorbid symptoms for stability, deterioration, or improvement     Problem: Discharge Planning  Goal: Discharge to home or other facility with appropriate resources  Outcome: Progressing  Flowsheets (Taken 11/1/2024 2221)  Discharge to home or other facility with appropriate resources: Identify barriers to discharge with patient and caregiver     Problem: Pain  Goal: Verbalizes/displays adequate comfort level or baseline comfort level  Outcome: Progressing  Flowsheets (Taken 11/1/2024 2215)  Verbalizes/displays adequate comfort level or baseline comfort level: Encourage patient to monitor pain and request assistance

## 2024-11-02 NOTE — PROGRESS NOTES
Inpatient Occupational Therapy Treatment    Unit: 2 [place]  Date:  11/2/2024  Patient Name:    Najma Fitzpatrick  Admitting diagnosis:  Suicidal ideation [R45.851]  COPD exacerbation (HCC) [J44.1]  Community acquired pneumonia, unspecified laterality [J18.9]  Admit Date:  10/29/2024  Precautions/Restrictions/WB Status/ Lines/ Wounds/ Oxygen: Fall risk, Bed/chair alarm, and Lines (IV, Supplemental O2 (3L), and external catheter), Telesitter    Treatment Time:  09:55-10:35  Treatment Number:  2  Timed Code Treatment Minutes: 40 minutes  Total Treatment Minutes: 40 minutes    Patient Goals for Therapy: \"to go home\"          Discharge Recommendations:  Home with 24/7 assistance and HHOT  DME needs for discharge: Needs Met       Therapy recommendations for staff:   Stand by assist for ambulation with use of Rollator and gait belt to/from chair  to/from bathroom  within room    History of Present Illness: per Dr Sage H&P 10/29/24:  \"Najma Fitzpatrick is a 73 y.o. female with a PMH of COPD, hypertension, hyperlipidemia, GERD, bipolar disorder, chronic back pain, who presented to ED with complaint of psychiatric evaluation.     According to report patient was brought to the ED due to concerns for suicidal ideation with further psychiatric evaluation.  Of note patient has a known history of COPD on 3 L NC at home.  Endorses worsening shortness of breath with frequent falls.     Vital signs, blood pressure 186/76, pulse 75 temperature 98.7 respiration 22 saturating 94% on 3 L  labs showed potassium of 3.4 bicarb of 35 magnesium 1.78 proBNP of 886.  Troponin 19/17, LFT stable.  Urine tox screen shows positive barbiturates.  Acetaminophen and salicylate negative.  WBC 5.5 hemoglobin 10.9.  Urinalysis not indicative of UTI.  EKG shows normal sinus rhythm with no acute ST or T wave change     CT head shows no acute intracranial abnormality.  CT cervical spine shows no acute fracture in the cervical spine.  Moderate level spondylosis seen.

## 2024-11-02 NOTE — DISCHARGE INSTR - COC
Continuity of Care Form    Patient Name: Najma Fitzpatrick   :  1950  MRN:  [id number]    Admit date:  10/29/2024  Discharge date:  ***    Code Status Order: Full Code   Advance Directives:   Advance Care Flowsheet Documentation             Admitting Physician:  Wong Sage MD  PCP: Geena Sotomayor, APRN - CNP    Discharging Nurse: ***  Discharging Hospital Unit/Room#: 0207/0207-02  Discharging Unit Phone Number: ***    Emergency Contact:   Extended Emergency Contact Information  Primary Emergency Contact: Brent Fitzpatrick  Address: [address] [place] [address]  Home Phone: [phone]  Mobile Phone: [phone]  Relation: Spouse  Secondary Emergency Contact: Margarita Fitzpatrick  Address: [address]           [phone]           [place] [id number] [facility]  Home Phone: [phone]  Relation: Child    Past Surgical History:  Past Surgical History:   Procedure Laterality Date    COLONOSCOPY  13    DIAGNOSTIC CARDIAC CATH LAB PROCEDURE  2007    Normal cor angio 2007    HERNIA REPAIR  2019    robotic ventral hernia repair with mesh    HERNIA REPAIR N/A 2019    ROBOTIC VENTRAL HERNIA REPAIR WITH MESH performed by Yuriy Rider MD at [facility] OR    HYSTERECTOMY, TOTAL ABDOMINAL (CERVIX REMOVED)      KIDNEY STONE SURGERY         Immunization History:   Immunization History   Administered Date(s) Administered    COVID-19, MODERNA BLUE border, Primary or Immunocompromised, (age 12y+), IM, 100 mcg/0.5mL 2021, 2021, 11/15/2021, 2022    COVID-19, MODERNA Bivalent, (age 12y+), IM, 50 mcg/0.5 mL 10/13/2022, 2023    COVID-19, PFIZER, (age 12y+), IM, 30mcg/0.3mL 10/09/2023    Influenza Vaccine, unspecified formulation 2018    Influenza Virus Vaccine 10/01/2012, 2013, 2015    Influenza Whole 10/28/2010    Influenza, FLUARIX, FLULAVAL, FLUZONE (age 6 mo+) and AFLURIA, (age

## 2024-11-02 NOTE — PROGRESS NOTES
HEART FAILURE CARE PLAN:    Comorbidities Reviewed: Yes   Patient has a past medical history of Abnormal ECG, Anemia, Arthritis, Back pain, chronic, Bipolar disorder ([facility]), Bronchitis chronic, CHF (congestive heart failure) ([facility]), Chronic back pain, Chronic neck pain, Clostridium difficile infection, 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[facility]), Coronary artery disease involving native coronary artery of native heart with angina pectoris ([facility]), Depression, Emphysema of lung ([facility]), Fibromyalgia, hyperlipidemia, Hypertension, Kidney stone, Liver disease, Lung disease, MDD (major depressive disorder), Mood disorder ([facility]), Movement disorder, Neck pain, chronic, Opiate misuse, Personality disorder ([facility]), Pneumonia, and Polypharmacy.     Weights Reviewed: Yes   Admission weight: 52.2 kg (115 lb)   Wt Readings from Last 3 Encounters:   11/01/24 79.4 kg (175 lb)   10/01/24 74.8 kg (165 lb)   09/04/24 77 kg (169 lb 12.1 oz)     Intake & Output Reviewed: Yes     Intake/Output Summary (Last 24 hours) at 11/2/2024 0301  Last data filed at 11/1/2024 1815  Gross per 24 hour   Intake --   Output 1100 ml   Net -1100 ml       ECHOCARDIOGRAM Reviewed: Yes   Patient's Ejection Fraction (EF) is greater than 40%     Medications Reviewed: Yes   SCHEDULED HOSPITAL MEDICATIONS:   azithromycin  500 mg Oral QHS    lidocaine  1 patch TransDERmal Daily    nicotine  1 patch TransDERmal Daily    hydrALAZINE  25 mg Oral 3 times per day    losartan  50 mg Oral Daily    DULoxetine  60 mg Oral Daily    sodium chloride flush  5-40 mL IntraVENous 2 times per day    enoxaparin  40 mg SubCUTAneous Daily    predniSONE  40 mg Oral Daily    guaiFENesin  600 mg Oral BID    QUEtiapine  400 mg Oral Nightly    ipratropium 0.5 mg-albuterol 2.5 mg  1 Dose Inhalation 4x Daily RT    cefepime  2,000 mg IntraVENous Q8H    carvedilol  25 mg Oral BID WC    aspirin  81 mg Oral Daily    atorvastatin  40 mg Oral Daily    budesonide-formoterol  2 puff Inhalation BID RT    And

## 2024-11-02 NOTE — PLAN OF CARE
Problem: Chronic Conditions and Co-morbidities  Goal: Patient's chronic conditions and co-morbidity symptoms are monitored and maintained or improved  11/2/2024 1413 by Cynthia Flynn RN  Outcome: Adequate for Discharge     Problem: Discharge Planning  Goal: Discharge to home or other facility with appropriate resources  11/2/2024 1413 by Cynthia Flynn RN  Outcome: Adequate for Discharge     Problem: Pain  Goal: Verbalizes/displays adequate comfort level or baseline comfort level  11/2/2024 1413 by Cynthia Flynn RN  Outcome: Adequate for Discharge     Problem: Safety - Adult  Goal: Free from fall injury  11/2/2024 1413 by Cynthia Flynn RN  Outcome: Adequate for Discharge     Problem: Respiratory - Adult  Goal: Achieves optimal ventilation and oxygenation  11/2/2024 1413 by Cynthia Flynn RN  Outcome: Adequate for Discharge     Problem: Infection - Adult  Goal: Absence of infection at discharge  11/2/2024 1413 by Cynthia Flynn RN  Outcome: Adequate for Discharge     Problem: Confusion  Goal: Confusion, delirium, dementia, or psychosis is improved or at baseline  Description: INTERVENTIONS:  1. Assess for possible contributors to thought disturbance, including medications, impaired vision or hearing, underlying metabolic abnormalities, dehydration, psychiatric diagnoses, and notify attending LIP  2. [place] high risk fall precautions, as indicated  3. Provide frequent short contacts to provide reality reorientation, refocusing and direction  4. Decrease environmental stimuli, including noise as appropriate  5. Monitor and intervene to maintain adequate nutrition, hydration, elimination, sleep and activity  6. If unable to ensure safety without constant attention obtain sitter and review sitter guidelines with assigned personnel  7. Initiate Psychosocial CNS and Spiritual Care consult, as indicated  11/2/2024 1413 by Cynthia Flynn RN  Outcome: Adequate for Discharge     Problem: Behavior  Goal: Pt/Family maintain

## 2024-11-02 NOTE — DISCHARGE INSTRUCTIONS
Your information:  Name: Najma Fitzpatrick  : 1950    Your instructions:    Follow up with family doctor in1 week.    What to do after you leave the hospital:    Recommended diet: cardiac diet    Recommended activity: activity as tolerated        The following personal items were collected during your admission and were returned to you:    Belongings  Dental Appliances: Uppers  Vision - Corrective Lenses: None  Hearing Aid: None  Clothing: Footwear, Pants, Shirt  Jewelry: None  Electronic Devices: None  Weapons (Notify Protective Services/Security): None  Home Medications: None  Valuables Given To: Patient  Provide Name(s) of Who Valuable(s) Were Given To: na    Information obtained by:  By signing below, I understand that if any problems occur once I leave the hospital I am to contact family doctor.  I understand and acknowledge receipt of the instructions indicated above.

## 2024-11-03 LAB
BACTERIA BLD CULT ORG #2: NORMAL
BACTERIA BLD CULT: NORMAL

## 2024-11-09 NOTE — DISCHARGE SUMMARY
10/29/2024    ALT <5 (L) 10/29/2024    LABGLOM >90 11/02/2024    GFRAA >60 11/05/2021    AGRATIO 1.5 10/29/2024    GLOB 2.5 08/12/2021       CULTURES  Results       Procedure Component Value Units Date/Time    Strep Pneumoniae Antigen [[id number]] Collected: 10/30/24 1400    Order Status: Completed Specimen: Urine, clean catch Updated: 10/31/24 0925     STREP PNEUMONIAE ANTIGEN, URINE --     Presumptive Negative  Presumptive negative suggests no current or recent  pneumococcal infection. Infection due to Strep pneumoniae  cannot be ruled out since the antigen present in the sample  may be below the detection limit of the test.  Normal Range:Presumptive Negative      Narrative:      ORDER#: [id number]                          ORDERED BY: SURI ASH  SOURCE: Urine Clean Catch                  COLLECTED:  10/30/24 14:00  ANTIBIOTICS AT JULIA.:                      RECEIVED :  10/30/24 14:13    Legionella antigen, urine [[id number]] Collected: 10/30/24 1400    Order Status: Completed Specimen: Urine, clean catch Updated: 10/31/24 0921     L. pneumophila Serogp 1 Ur Ag --     Presumptive Negative  No Legionella pneumophila serogroup 1 antigens detected.  A negative result does not exclude infection with  Legionella pneumophila serogroup 1 nor does it rule out  other microbial-caused respiratory infections or  disease caused by other serogroups of  Legionella pneumophila.  Normal Range: Presumptive Negative      Narrative:      ORDER#: [id number]                          ORDERED BY: SURI ASH  SOURCE: Urine Clean Catch                  COLLECTED:  10/30/24 14:00  ANTIBIOTICS AT JULIA.:                      RECEIVED :  10/30/24 14:13    MRSA DNA Probe, Nasal [[id number]] Collected: 10/30/24 1151    Order Status: Completed Specimen: Nose from Nares Updated: 10/30/24 2316     MRSA SCREEN RT-PCR --     Negative  MRSA DNA not detected.  Normal Range: Not detected      Narrative:      ORDER#: [id number]

## 2024-12-29 ENCOUNTER — APPOINTMENT (OUTPATIENT)
Dept: GENERAL RADIOLOGY | Age: 74
DRG: 190 | End: 2024-12-29
Payer: COMMERCIAL

## 2024-12-29 ENCOUNTER — HOSPITAL ENCOUNTER (INPATIENT)
Age: 74
LOS: 2 days | Discharge: HOME | DRG: 190 | End: 2024-12-31
Attending: EMERGENCY MEDICINE | Admitting: INTERNAL MEDICINE
Payer: COMMERCIAL

## 2024-12-29 DIAGNOSIS — E83.42 HYPOMAGNESEMIA: ICD-10-CM

## 2024-12-29 DIAGNOSIS — G43.819 OTHER MIGRAINE WITHOUT STATUS MIGRAINOSUS, INTRACTABLE: ICD-10-CM

## 2024-12-29 DIAGNOSIS — J44.1 COPD EXACERBATION (HCC): Primary | ICD-10-CM

## 2024-12-29 LAB
ALBUMIN SERPL-MCNC: 3.2 G/DL (ref 3.4–5)
ALBUMIN/GLOB SERPL: 1.5 {RATIO} (ref 1.1–2.2)
ALP SERPL-CCNC: 66 U/L (ref 40–129)
ALT SERPL-CCNC: 10 U/L (ref 10–40)
ANION GAP SERPL CALCULATED.3IONS-SCNC: 8 MMOL/L (ref 3–16)
AST SERPL-CCNC: 12 U/L (ref 15–37)
BASE EXCESS BLDV CALC-SCNC: 2.1 MMOL/L (ref -3–3)
BASOPHILS # BLD: 0 K/UL (ref 0–0.2)
BASOPHILS # BLD: 0 K/UL (ref 0–0.2)
BASOPHILS NFR BLD: 0.3 %
BASOPHILS NFR BLD: 0.4 %
BILIRUB SERPL-MCNC: 0.3 MG/DL (ref 0–1)
BUN SERPL-MCNC: 6 MG/DL (ref 7–20)
CALCIUM SERPL-MCNC: 8 MG/DL (ref 8.3–10.6)
CHLORIDE SERPL-SCNC: 97 MMOL/L (ref 99–110)
CO2 BLDV-SCNC: 30 MMOL/L
CO2 SERPL-SCNC: 30 MMOL/L (ref 21–32)
COHGB MFR BLDV: 1.3 % (ref 0–1.5)
CREAT SERPL-MCNC: 0.5 MG/DL (ref 0.6–1.2)
DEPRECATED RDW RBC AUTO: 15.5 % (ref 12.4–15.4)
DEPRECATED RDW RBC AUTO: 15.7 % (ref 12.4–15.4)
EOSINOPHIL # BLD: 0.2 K/UL (ref 0–0.6)
EOSINOPHIL # BLD: 0.3 K/UL (ref 0–0.6)
EOSINOPHIL NFR BLD: 1.8 %
EOSINOPHIL NFR BLD: 3.8 %
FLUAV RNA RESP QL NAA+PROBE: NOT DETECTED
FLUBV RNA RESP QL NAA+PROBE: NOT DETECTED
GFR SERPLBLD CREATININE-BSD FMLA CKD-EPI: >90 ML/MIN/{1.73_M2}
GLUCOSE SERPL-MCNC: 104 MG/DL (ref 70–99)
HCO3 BLDV-SCNC: 28 MMOL/L (ref 23–29)
HCT VFR BLD AUTO: 27.3 % (ref 36–48)
HCT VFR BLD AUTO: 28.3 % (ref 36–48)
HGB BLD-MCNC: 8.9 G/DL (ref 12–16)
HGB BLD-MCNC: 9.4 G/DL (ref 12–16)
LYMPHOCYTES # BLD: 0.5 K/UL (ref 1–5.1)
LYMPHOCYTES # BLD: 0.8 K/UL (ref 1–5.1)
LYMPHOCYTES NFR BLD: 11.6 %
LYMPHOCYTES NFR BLD: 5.5 %
MAGNESIUM SERPL-MCNC: 1.52 MG/DL (ref 1.8–2.4)
MCH RBC QN AUTO: 27.3 PG (ref 26–34)
MCH RBC QN AUTO: 27.4 PG (ref 26–34)
MCHC RBC AUTO-ENTMCNC: 32.5 G/DL (ref 31–36)
MCHC RBC AUTO-ENTMCNC: 33.3 G/DL (ref 31–36)
MCV RBC AUTO: 82.5 FL (ref 80–100)
MCV RBC AUTO: 83.9 FL (ref 80–100)
METHGB MFR BLDV: 0.3 %
MONOCYTES # BLD: 0.3 K/UL (ref 0–1.3)
MONOCYTES # BLD: 0.7 K/UL (ref 0–1.3)
MONOCYTES NFR BLD: 3.5 %
MONOCYTES NFR BLD: 9.9 %
NEUTROPHILS # BLD: 5.4 K/UL (ref 1.7–7.7)
NEUTROPHILS # BLD: 7.9 K/UL (ref 1.7–7.7)
NEUTROPHILS NFR BLD: 74.3 %
NEUTROPHILS NFR BLD: 88.9 %
O2 CT VFR BLDV CALC: 12 VOL %
O2 THERAPY: ABNORMAL
PCO2 BLDV: 50.5 MMHG (ref 40–50)
PH BLDV: 7.36 [PH] (ref 7.35–7.45)
PLATELET # BLD AUTO: 131 K/UL (ref 135–450)
PLATELET # BLD AUTO: 134 K/UL (ref 135–450)
PMV BLD AUTO: 7.6 FL (ref 5–10.5)
PMV BLD AUTO: 7.9 FL (ref 5–10.5)
PO2 BLDV: 55 MMHG (ref 25–40)
POTASSIUM SERPL-SCNC: 3.5 MMOL/L (ref 3.5–5.1)
PROT SERPL-MCNC: 5.3 G/DL (ref 6.4–8.2)
RBC # BLD AUTO: 3.25 M/UL (ref 4–5.2)
RBC # BLD AUTO: 3.43 M/UL (ref 4–5.2)
SAO2 % BLDV: 87 %
SARS-COV-2 RNA RESP QL NAA+PROBE: NOT DETECTED
SODIUM SERPL-SCNC: 135 MMOL/L (ref 136–145)
WBC # BLD AUTO: 7.2 K/UL (ref 4–11)
WBC # BLD AUTO: 8.9 K/UL (ref 4–11)

## 2024-12-29 PROCEDURE — 2580000003 HC RX 258: Performed by: EMERGENCY MEDICINE

## 2024-12-29 PROCEDURE — 94640 AIRWAY INHALATION TREATMENT: CPT

## 2024-12-29 PROCEDURE — 87636 SARSCOV2 & INF A&B AMP PRB: CPT

## 2024-12-29 PROCEDURE — 36415 COLL VENOUS BLD VENIPUNCTURE: CPT

## 2024-12-29 PROCEDURE — 2500000003 HC RX 250 WO HCPCS: Performed by: NURSE PRACTITIONER

## 2024-12-29 PROCEDURE — 6360000002 HC RX W HCPCS: Performed by: NURSE PRACTITIONER

## 2024-12-29 PROCEDURE — 6370000000 HC RX 637 (ALT 250 FOR IP): Performed by: NURSE PRACTITIONER

## 2024-12-29 PROCEDURE — 2700000000 HC OXYGEN THERAPY PER DAY

## 2024-12-29 PROCEDURE — 6360000002 HC RX W HCPCS: Performed by: EMERGENCY MEDICINE

## 2024-12-29 PROCEDURE — 80053 COMPREHEN METABOLIC PANEL: CPT

## 2024-12-29 PROCEDURE — 99223 1ST HOSP IP/OBS HIGH 75: CPT | Performed by: NURSE PRACTITIONER

## 2024-12-29 PROCEDURE — 1200000000 HC SEMI PRIVATE

## 2024-12-29 PROCEDURE — 96365 THER/PROPH/DIAG IV INF INIT: CPT

## 2024-12-29 PROCEDURE — 85025 COMPLETE CBC W/AUTO DIFF WBC: CPT

## 2024-12-29 PROCEDURE — 96368 THER/DIAG CONCURRENT INF: CPT

## 2024-12-29 PROCEDURE — 99285 EMERGENCY DEPT VISIT HI MDM: CPT

## 2024-12-29 PROCEDURE — 94761 N-INVAS EAR/PLS OXIMETRY MLT: CPT

## 2024-12-29 PROCEDURE — 71045 X-RAY EXAM CHEST 1 VIEW: CPT

## 2024-12-29 PROCEDURE — 6370000000 HC RX 637 (ALT 250 FOR IP): Performed by: EMERGENCY MEDICINE

## 2024-12-29 PROCEDURE — 83735 ASSAY OF MAGNESIUM: CPT

## 2024-12-29 PROCEDURE — 82803 BLOOD GASES ANY COMBINATION: CPT

## 2024-12-29 RX ORDER — ACETAMINOPHEN 325 MG/1
650 TABLET ORAL EVERY 6 HOURS PRN
Status: DISCONTINUED | OUTPATIENT
Start: 2024-12-29 | End: 2024-12-31 | Stop reason: HOSPADM

## 2024-12-29 RX ORDER — TRAMADOL HYDROCHLORIDE 50 MG/1
50 TABLET ORAL ONCE
Status: COMPLETED | OUTPATIENT
Start: 2024-12-29 | End: 2024-12-29

## 2024-12-29 RX ORDER — SODIUM CHLORIDE 0.9 % (FLUSH) 0.9 %
5-40 SYRINGE (ML) INJECTION PRN
Status: DISCONTINUED | OUTPATIENT
Start: 2024-12-29 | End: 2024-12-31 | Stop reason: HOSPADM

## 2024-12-29 RX ORDER — LIDOCAINE 4 G/G
1 PATCH TOPICAL DAILY
Status: DISCONTINUED | OUTPATIENT
Start: 2024-12-29 | End: 2024-12-31 | Stop reason: HOSPADM

## 2024-12-29 RX ORDER — ATORVASTATIN CALCIUM 40 MG/1
40 TABLET, FILM COATED ORAL DAILY
Status: DISCONTINUED | OUTPATIENT
Start: 2024-12-29 | End: 2024-12-31 | Stop reason: HOSPADM

## 2024-12-29 RX ORDER — BUDESONIDE AND FORMOTEROL FUMARATE DIHYDRATE 160; 4.5 UG/1; UG/1
2 AEROSOL RESPIRATORY (INHALATION)
Status: DISCONTINUED | OUTPATIENT
Start: 2024-12-29 | End: 2024-12-31 | Stop reason: HOSPADM

## 2024-12-29 RX ORDER — ACETAMINOPHEN 650 MG/1
650 SUPPOSITORY RECTAL EVERY 6 HOURS PRN
Status: DISCONTINUED | OUTPATIENT
Start: 2024-12-29 | End: 2024-12-31 | Stop reason: HOSPADM

## 2024-12-29 RX ORDER — BUTALBITAL, ACETAMINOPHEN AND CAFFEINE 50; 325; 40 MG/1; MG/1; MG/1
1 TABLET ORAL EVERY 4 HOURS PRN
Status: DISCONTINUED | OUTPATIENT
Start: 2024-12-29 | End: 2024-12-31 | Stop reason: HOSPADM

## 2024-12-29 RX ORDER — DULOXETIN HYDROCHLORIDE 60 MG/1
60 CAPSULE, DELAYED RELEASE ORAL DAILY
Status: DISCONTINUED | OUTPATIENT
Start: 2024-12-29 | End: 2024-12-31 | Stop reason: HOSPADM

## 2024-12-29 RX ORDER — SODIUM CHLORIDE 9 MG/ML
INJECTION, SOLUTION INTRAVENOUS PRN
Status: DISCONTINUED | OUTPATIENT
Start: 2024-12-29 | End: 2024-12-31 | Stop reason: HOSPADM

## 2024-12-29 RX ORDER — LOSARTAN POTASSIUM 50 MG/1
50 TABLET ORAL DAILY
Status: DISCONTINUED | OUTPATIENT
Start: 2024-12-29 | End: 2024-12-31 | Stop reason: HOSPADM

## 2024-12-29 RX ORDER — IPRATROPIUM BROMIDE AND ALBUTEROL SULFATE 2.5; .5 MG/3ML; MG/3ML
2 SOLUTION RESPIRATORY (INHALATION) ONCE
Status: COMPLETED | OUTPATIENT
Start: 2024-12-29 | End: 2024-12-29

## 2024-12-29 RX ORDER — ASPIRIN 81 MG/1
81 TABLET, CHEWABLE ORAL DAILY
Status: DISCONTINUED | OUTPATIENT
Start: 2024-12-29 | End: 2024-12-31 | Stop reason: HOSPADM

## 2024-12-29 RX ORDER — ALBUTEROL SULFATE 0.83 MG/ML
2.5 SOLUTION RESPIRATORY (INHALATION)
Status: DISCONTINUED | OUTPATIENT
Start: 2024-12-29 | End: 2024-12-31 | Stop reason: HOSPADM

## 2024-12-29 RX ORDER — GUAIFENESIN 600 MG/1
600 TABLET, EXTENDED RELEASE ORAL 2 TIMES DAILY
Status: DISCONTINUED | OUTPATIENT
Start: 2024-12-29 | End: 2024-12-31 | Stop reason: HOSPADM

## 2024-12-29 RX ORDER — MAGNESIUM SULFATE IN WATER 40 MG/ML
2000 INJECTION, SOLUTION INTRAVENOUS ONCE
Status: COMPLETED | OUTPATIENT
Start: 2024-12-29 | End: 2024-12-29

## 2024-12-29 RX ORDER — NICOTINE 21 MG/24HR
1 PATCH, TRANSDERMAL 24 HOURS TRANSDERMAL EVERY 24 HOURS
Status: DISCONTINUED | OUTPATIENT
Start: 2024-12-29 | End: 2024-12-31 | Stop reason: HOSPADM

## 2024-12-29 RX ORDER — ENOXAPARIN SODIUM 100 MG/ML
40 INJECTION SUBCUTANEOUS DAILY
Status: DISCONTINUED | OUTPATIENT
Start: 2024-12-29 | End: 2024-12-31 | Stop reason: HOSPADM

## 2024-12-29 RX ORDER — POLYETHYLENE GLYCOL 3350 17 G/17G
17 POWDER, FOR SOLUTION ORAL DAILY PRN
Status: DISCONTINUED | OUTPATIENT
Start: 2024-12-29 | End: 2024-12-31 | Stop reason: HOSPADM

## 2024-12-29 RX ORDER — PANTOPRAZOLE SODIUM 40 MG/1
40 TABLET, DELAYED RELEASE ORAL DAILY
Status: DISCONTINUED | OUTPATIENT
Start: 2024-12-29 | End: 2024-12-31 | Stop reason: HOSPADM

## 2024-12-29 RX ORDER — ONDANSETRON 4 MG/1
4 TABLET, ORALLY DISINTEGRATING ORAL EVERY 8 HOURS PRN
Status: DISCONTINUED | OUTPATIENT
Start: 2024-12-29 | End: 2024-12-31 | Stop reason: HOSPADM

## 2024-12-29 RX ORDER — CARVEDILOL 25 MG/1
25 TABLET ORAL 2 TIMES DAILY WITH MEALS
Status: DISCONTINUED | OUTPATIENT
Start: 2024-12-29 | End: 2024-12-31 | Stop reason: HOSPADM

## 2024-12-29 RX ORDER — QUETIAPINE FUMARATE 25 MG/1
25 TABLET, FILM COATED ORAL 2 TIMES DAILY PRN
Status: DISCONTINUED | OUTPATIENT
Start: 2024-12-29 | End: 2024-12-31 | Stop reason: HOSPADM

## 2024-12-29 RX ORDER — QUETIAPINE FUMARATE 100 MG/1
100 TABLET, FILM COATED ORAL NIGHTLY
Status: DISCONTINUED | OUTPATIENT
Start: 2024-12-29 | End: 2024-12-31 | Stop reason: HOSPADM

## 2024-12-29 RX ORDER — TOPIRAMATE 25 MG/1
25 TABLET, FILM COATED ORAL DAILY
Status: DISCONTINUED | OUTPATIENT
Start: 2024-12-29 | End: 2024-12-31 | Stop reason: HOSPADM

## 2024-12-29 RX ORDER — ONDANSETRON 2 MG/ML
4 INJECTION INTRAMUSCULAR; INTRAVENOUS EVERY 6 HOURS PRN
Status: DISCONTINUED | OUTPATIENT
Start: 2024-12-29 | End: 2024-12-31 | Stop reason: HOSPADM

## 2024-12-29 RX ORDER — SODIUM CHLORIDE 0.9 % (FLUSH) 0.9 %
5-40 SYRINGE (ML) INJECTION EVERY 12 HOURS SCHEDULED
Status: DISCONTINUED | OUTPATIENT
Start: 2024-12-29 | End: 2024-12-31 | Stop reason: HOSPADM

## 2024-12-29 RX ORDER — AZITHROMYCIN 250 MG/1
500 TABLET, FILM COATED ORAL DAILY
Status: COMPLETED | OUTPATIENT
Start: 2024-12-30 | End: 2024-12-31

## 2024-12-29 RX ORDER — IPRATROPIUM BROMIDE AND ALBUTEROL SULFATE 2.5; .5 MG/3ML; MG/3ML
3 SOLUTION RESPIRATORY (INHALATION) ONCE
Status: COMPLETED | OUTPATIENT
Start: 2024-12-29 | End: 2024-12-29

## 2024-12-29 RX ORDER — DIVALPROEX SODIUM 500 MG/1
500 TABLET, FILM COATED, EXTENDED RELEASE ORAL
Status: DISCONTINUED | OUTPATIENT
Start: 2024-12-29 | End: 2024-12-31 | Stop reason: HOSPADM

## 2024-12-29 RX ORDER — HYDRALAZINE HYDROCHLORIDE 25 MG/1
25 TABLET, FILM COATED ORAL EVERY 8 HOURS SCHEDULED
Status: DISCONTINUED | OUTPATIENT
Start: 2024-12-29 | End: 2024-12-31 | Stop reason: HOSPADM

## 2024-12-29 RX ORDER — PREDNISONE 20 MG/1
40 TABLET ORAL DAILY
Status: DISCONTINUED | OUTPATIENT
Start: 2024-12-31 | End: 2024-12-31 | Stop reason: HOSPADM

## 2024-12-29 RX ORDER — DIVALPROEX SODIUM 250 MG/1
250 TABLET, FILM COATED, EXTENDED RELEASE ORAL EVERY MORNING
Status: DISCONTINUED | OUTPATIENT
Start: 2024-12-29 | End: 2024-12-31 | Stop reason: HOSPADM

## 2024-12-29 RX ORDER — BUTALBITAL, ACETAMINOPHEN AND CAFFEINE 50; 325; 40 MG/1; MG/1; MG/1
1 TABLET ORAL ONCE
Status: COMPLETED | OUTPATIENT
Start: 2024-12-29 | End: 2024-12-29

## 2024-12-29 RX ADMIN — GUAIFENESIN 600 MG: 600 TABLET, EXTENDED RELEASE ORAL at 12:53

## 2024-12-29 RX ADMIN — ALBUTEROL SULFATE 2.5 MG: 2.5 SOLUTION RESPIRATORY (INHALATION) at 16:02

## 2024-12-29 RX ADMIN — CARVEDILOL 25 MG: 25 TABLET, FILM COATED ORAL at 18:18

## 2024-12-29 RX ADMIN — TOPIRAMATE 25 MG: 25 TABLET, FILM COATED ORAL at 12:54

## 2024-12-29 RX ADMIN — HYDRALAZINE HYDROCHLORIDE 25 MG: 25 TABLET ORAL at 21:53

## 2024-12-29 RX ADMIN — HYDRALAZINE HYDROCHLORIDE 25 MG: 25 TABLET ORAL at 15:06

## 2024-12-29 RX ADMIN — AZITHROMYCIN MONOHYDRATE 500 MG: 500 INJECTION, POWDER, LYOPHILIZED, FOR SOLUTION INTRAVENOUS at 10:52

## 2024-12-29 RX ADMIN — BUTALBITAL, ACETAMINOPHEN, AND CAFFEINE 1 TABLET: 50; 325; 40 TABLET ORAL at 12:50

## 2024-12-29 RX ADMIN — GUAIFENESIN 600 MG: 600 TABLET, EXTENDED RELEASE ORAL at 21:53

## 2024-12-29 RX ADMIN — MAGNESIUM SULFATE HEPTAHYDRATE 2000 MG: 40 INJECTION, SOLUTION INTRAVENOUS at 09:55

## 2024-12-29 RX ADMIN — ACETAMINOPHEN 650 MG: 325 TABLET ORAL at 18:19

## 2024-12-29 RX ADMIN — ENOXAPARIN SODIUM 40 MG: 100 INJECTION SUBCUTANEOUS at 12:58

## 2024-12-29 RX ADMIN — IPRATROPIUM BROMIDE AND ALBUTEROL SULFATE 3 DOSE: 2.5; .5 SOLUTION RESPIRATORY (INHALATION) at 08:38

## 2024-12-29 RX ADMIN — ALBUTEROL SULFATE 2.5 MG: 2.5 SOLUTION RESPIRATORY (INHALATION) at 21:28

## 2024-12-29 RX ADMIN — CARVEDILOL 25 MG: 25 TABLET, FILM COATED ORAL at 12:52

## 2024-12-29 RX ADMIN — WATER 40 MG: 1 INJECTION INTRAMUSCULAR; INTRAVENOUS; SUBCUTANEOUS at 23:01

## 2024-12-29 RX ADMIN — ATORVASTATIN CALCIUM 40 MG: 40 TABLET, FILM COATED ORAL at 12:53

## 2024-12-29 RX ADMIN — LOSARTAN POTASSIUM 50 MG: 50 TABLET, FILM COATED ORAL at 12:53

## 2024-12-29 RX ADMIN — QUETIAPINE FUMARATE 100 MG: 100 TABLET ORAL at 21:57

## 2024-12-29 RX ADMIN — QUETIAPINE FUMARATE 300 MG: 200 TABLET ORAL at 21:55

## 2024-12-29 RX ADMIN — DIVALPROEX SODIUM 250 MG: 250 TABLET, FILM COATED, EXTENDED RELEASE ORAL at 12:54

## 2024-12-29 RX ADMIN — DULOXETINE HYDROCHLORIDE 60 MG: 60 CAPSULE, DELAYED RELEASE ORAL at 12:52

## 2024-12-29 RX ADMIN — IPRATROPIUM BROMIDE AND ALBUTEROL SULFATE 2 DOSE: 2.5; .5 SOLUTION RESPIRATORY (INHALATION) at 10:53

## 2024-12-29 RX ADMIN — TRAMADOL HYDROCHLORIDE 50 MG: 50 TABLET ORAL at 15:05

## 2024-12-29 RX ADMIN — SODIUM CHLORIDE, PRESERVATIVE FREE 10 ML: 5 INJECTION INTRAVENOUS at 12:55

## 2024-12-29 RX ADMIN — SODIUM CHLORIDE, PRESERVATIVE FREE 10 ML: 5 INJECTION INTRAVENOUS at 21:54

## 2024-12-29 RX ADMIN — DIVALPROEX SODIUM 500 MG: 500 TABLET, FILM COATED, EXTENDED RELEASE ORAL at 21:52

## 2024-12-29 RX ADMIN — WATER 40 MG: 1 INJECTION INTRAMUSCULAR; INTRAVENOUS; SUBCUTANEOUS at 12:56

## 2024-12-29 RX ADMIN — PANTOPRAZOLE SODIUM 40 MG: 40 TABLET, DELAYED RELEASE ORAL at 12:54

## 2024-12-29 RX ADMIN — WATER 40 MG: 1 INJECTION INTRAMUSCULAR; INTRAVENOUS; SUBCUTANEOUS at 18:18

## 2024-12-29 RX ADMIN — BUDESONIDE AND FORMOTEROL FUMARATE DIHYDRATE 2 PUFF: 160; 4.5 AEROSOL RESPIRATORY (INHALATION) at 21:28

## 2024-12-29 RX ADMIN — ASPIRIN 81 MG: 81 TABLET, CHEWABLE ORAL at 12:54

## 2024-12-29 RX ADMIN — BUTALBITAL, ACETAMINOPHEN, AND CAFFEINE 1 TABLET: 50; 325; 40 TABLET ORAL at 21:52

## 2024-12-29 ASSESSMENT — PAIN DESCRIPTION - FREQUENCY: FREQUENCY: INTERMITTENT

## 2024-12-29 ASSESSMENT — LIFESTYLE VARIABLES
HOW OFTEN DO YOU HAVE A DRINK CONTAINING ALCOHOL: NEVER
HOW MANY STANDARD DRINKS CONTAINING ALCOHOL DO YOU HAVE ON A TYPICAL DAY: PATIENT DOES NOT DRINK
HOW OFTEN DO YOU HAVE A DRINK CONTAINING ALCOHOL: NEVER
HOW MANY STANDARD DRINKS CONTAINING ALCOHOL DO YOU HAVE ON A TYPICAL DAY: PATIENT DOES NOT DRINK

## 2024-12-29 ASSESSMENT — PAIN SCALES - GENERAL
PAINLEVEL_OUTOF10: 10
PAINLEVEL_OUTOF10: 10
PAINLEVEL_OUTOF10: 0
PAINLEVEL_OUTOF10: 8
PAINLEVEL_OUTOF10: 0
PAINLEVEL_OUTOF10: 10
PAINLEVEL_OUTOF10: 7
PAINLEVEL_OUTOF10: 10

## 2024-12-29 ASSESSMENT — PAIN DESCRIPTION - PAIN TYPE
TYPE: ACUTE PAIN
TYPE: ACUTE PAIN

## 2024-12-29 ASSESSMENT — PAIN DESCRIPTION - ORIENTATION
ORIENTATION: ANTERIOR
ORIENTATION: ANTERIOR

## 2024-12-29 ASSESSMENT — PAIN DESCRIPTION - LOCATION
LOCATION: HEAD

## 2024-12-29 ASSESSMENT — PAIN DESCRIPTION - DESCRIPTORS
DESCRIPTORS: POUNDING
DESCRIPTORS: POUNDING

## 2024-12-29 ASSESSMENT — PAIN DESCRIPTION - ONSET: ONSET: GRADUAL

## 2024-12-29 ASSESSMENT — PAIN - FUNCTIONAL ASSESSMENT
PAIN_FUNCTIONAL_ASSESSMENT: ACTIVITIES ARE NOT PREVENTED
PAIN_FUNCTIONAL_ASSESSMENT: ACTIVITIES ARE NOT PREVENTED
PAIN_FUNCTIONAL_ASSESSMENT: NONE - DENIES PAIN

## 2024-12-29 NOTE — ED NOTES
[facility] Medication Reconciliation Status          [] COMPLETE       Medication history has been reviewed and obtained from the following source(s):       [] patient/family verbal report             [] patient/family provided written list       [] external pharmacy   [] external facility list          [x] IN PROGRESS       Medication reconciliation marked in progress at this time due to:       [] patient/family poor historian      [x] waiting arrival of family to clarify       [] waiting for accurate list        [] external pharmacy needs called      * Follow up is needed.          [] UNABLE TO ASSESS       Medication reconciliation is incomplete and unable to assess at this time due to:       [] critical patient condition   [] patient is unresponsive        [] no family available                       [] unknown pharmacy       [] anonymous patient          * Follow up is needed.      [] Pharmacy consult placed for medication reconciliation assistance.   Additional comments:  Patient is unaware of what medications she is taking.  should be on his way here soon and should know more about what medications she is taking.

## 2024-12-29 NOTE — ED NOTES
Patient still complaining of 10/10 headache. Perfect Serve sent to hospitalist requesting additional pain medication.

## 2024-12-29 NOTE — ED PROVIDER NOTES
radiology confirms the interpretation:  Chest x-ray with chronic bilateral pleural parenchymal disease, chronic      Medications administered. (Dose and Route):  DuoNeb 3 ampoules inhaled, magnesium 2 g IV      Sepsis:  Is this patient to be included in the SEP-1 Core Measure due to severe sepsis or septic shock?   No     Exclusion criteria - the patient is NOT to be included for SEP-1 Core Measure due to:  Infection is not suspected           ED COURSE/MDM:  Patient seen and evaluated. Here the patient is afebrile with pretension.    Old records reviewed:   Reviewed her admission to this hospital last month.  She has recurrent admissions.  Note from last month says she is on 3 L of oxygen at baseline.  Today she tells me she is normally on 2 L.    Diagnoses affirmatively addressed, consideration of tests, treatments & admission, including shared decision making:    Here the patient does have diminished air movement throughout, end expiratory wheezes.  Mild distress.  I gave her 3 DuoNebs here.  Chest x-ray appears stable.  COVID and flu are negative.  Lab work appears stable.  Magnesium is low so I have replaced this.  I will reassess but likely anticipate admission.  Baseline is poor to begin with and she does not have much reserve.    On reassessment after the 3 DuoNebs here she still has diffuse expiratory wheezing and states she is not feeling better.  I will order additional DuoNebs and will plan to admit the patient.  Of note on her initial CBC her hemoglobin was 8.9 which is lower than her baseline which is usually around 10-11.  I repeated the CBC however and it went to 9.4 so there may have been some delusional component.  She is not reporting any source of bleeding.    Consultation summary: Discussed with Dr. Arizmendi, hospitalist for admission who accepts.    Social determinants of health: None      Labs and imaging reviewed and results discussed with patient. Patient was reassessed as noted above . Plan of

## 2024-12-29 NOTE — ED NOTES
Respiratory called and notified of patient's hold status. Respiratory will give her her inhalers this evening since it is too late in the day at this time.

## 2024-12-29 NOTE — H&P
performed by Yuriy Rider MD at [facility]    HYSTERECTOMY, TOTAL ABDOMINAL (CERVIX REMOVED)      KIDNEY STONE SURGERY         Medications Prior to Admission:   Prior to Admission medications    Medication Sig Start Date End Date Taking? Authorizing Provider   guaiFENesin (MUCINEX) 600 MG extended release tablet Take 1 tablet by mouth 2 times daily 11/2/24   Brad Cheung MD   lidocaine 4 % external patch Place 1 patch onto the skin daily Apply patch to back.  Patch may remain in place for up to 12 hours in any 24 hour period. 11/3/24   Brad Cheung MD   hydrALAZINE (APRESOLINE) 25 MG tablet Take 1 tablet by mouth every 8 hours 11/2/24   Brad Cheung MD   losartan (COZAAR) 50 MG tablet Take 1 tablet by mouth daily 11/3/24   Brad Cheung MD   carvedilol (COREG) 25 MG tablet Take 1 tablet by mouth 2 times daily (with meals)    Vida Camarillo MD   DULoxetine (CYMBALTA) 60 MG extended release capsule Take 1 capsule by mouth daily    Vida Camarillo MD   divalproex (DEPAKOTE ER) 500 MG extended release tablet Take 1 tablet by mouth nightly    Vida Camarillo MD   divalproex (DEPAKOTE ER) 250 MG extended release tablet Take 1 tablet by mouth every morning    Vida Camarillo MD   aspirin 81 MG chewable tablet Take 1 tablet by mouth daily    Vida Camarillo MD   atorvastatin (LIPITOR) 40 MG tablet Take 1 tablet by mouth daily    Vida Camarillo MD   pantoprazole (PROTONIX) 40 MG tablet Take 1 tablet by mouth daily    Vida Camarillo MD   albuterol sulfate HFA (VENTOLIN HFA) 108 (90 Base) MCG/ACT inhaler Inhale 2 puffs into the lungs every 4 hours as needed for Wheezing    Vida Camarillo MD   nicotine (NICODERM CQ) 21 MG/24HR Place 1 patch onto the skin every 24 hours    Vida Camarillo MD   topiramate (TOPAMAX) 25 MG tablet Take 1 tablet by mouth daily    Vida Camarillo MD   fluticasone-umeclidin-vilant (TRELEGY ELLIPTA) 200-62.5-25 MCG/ACT  Admission Reconciliation is Completed  Discharge Reconciliation is Not Complete Admission Reconciliation is Completed  Discharge Reconciliation is Completed

## 2024-12-29 NOTE — ED NOTES
Accidentally marked off hospitalist with urologist doctor name. Hospital;ist has not officially accepted yet.

## 2024-12-29 NOTE — FLOWSHEET NOTE
Patient admitted to room 212-1 from ER. Patient oriented to room, call light, bed rails, phone, lights, and bathroom. Patient instructed about the schedule of the day including: vital sign frequency, lab draws, possible tests, frequency of MD and staff rounds, daily weights, I &O's, and prescribed diet. Bed alarm on. Telemetry box and continuous pulse ox in place, patient aware of placement and reason. Bed locked, in lowest position, side rails up 2/4, call light within reach.      Patient is able to demonstrate the ability to move from a reclining position to an upright position within the recliner.      Bedside Mobility Assessment Tool (BMAT):     Assessment Level 1- Sit and Shake    1. From a semi-reclined position, ask patient to sit up and rotate to a seated position at the side of the bed. Can use the bedrail.    2. Ask patient to reach out and grab your hand and shake making sure patient reaches across his/her midline.   Pass- Patient is able to come to a seated position, maintain core strength. Maintains seated balance while reaching across midline. Move on to Assessment Level 2.     Assessment Level 2- Stretch and Point   1. With patient in seated position at the side of the bed, have patient place both feet on the floor (or stool) with knees no higher than hips.    2. Ask patient to stretch one leg and straighten the knee, then bend the ankle/flex and point the toes. If appropriate, repeat with the other leg.   Pass- Patient is able to demonstrate appropriate quad strength on intended weight bearing limb(s). Move onto Assessment Level 3.     Assessment Level 3- Stand   1. Ask patient to elevate off the bed or chair (seated to standing) using an assistive device (cane, bedrail).    2. Patient should be able to raise buttocks off be and hold for a count of five. May repeat once.   Pass- Patient maintains standing stability for at least 5 seconds, proceed to assessment level 4.    Assessment Level 4-

## 2024-12-30 PROBLEM — E83.42 HYPOMAGNESEMIA: Status: ACTIVE | Noted: 2024-12-30

## 2024-12-30 PROBLEM — E78.5 HYPERLIPIDEMIA: Status: ACTIVE | Noted: 2024-12-30

## 2024-12-30 LAB
ANION GAP SERPL CALCULATED.3IONS-SCNC: 10 MMOL/L (ref 3–16)
BASOPHILS # BLD: 0 K/UL (ref 0–0.2)
BASOPHILS NFR BLD: 0.1 %
BUN SERPL-MCNC: 8 MG/DL (ref 7–20)
CALCIUM SERPL-MCNC: 8.3 MG/DL (ref 8.3–10.6)
CHLORIDE SERPL-SCNC: 94 MMOL/L (ref 99–110)
CO2 SERPL-SCNC: 28 MMOL/L (ref 21–32)
CREAT SERPL-MCNC: 0.5 MG/DL (ref 0.6–1.2)
DEPRECATED RDW RBC AUTO: 15.8 % (ref 12.4–15.4)
EOSINOPHIL # BLD: 0 K/UL (ref 0–0.6)
EOSINOPHIL NFR BLD: 0 %
GFR SERPLBLD CREATININE-BSD FMLA CKD-EPI: >90 ML/MIN/{1.73_M2}
GLUCOSE SERPL-MCNC: 142 MG/DL (ref 70–99)
HCT VFR BLD AUTO: 26.8 % (ref 36–48)
HGB BLD-MCNC: 8.9 G/DL (ref 12–16)
LYMPHOCYTES # BLD: 0.5 K/UL (ref 1–5.1)
LYMPHOCYTES NFR BLD: 6.3 %
MAGNESIUM SERPL-MCNC: 1.9 MG/DL (ref 1.8–2.4)
MCH RBC QN AUTO: 27.2 PG (ref 26–34)
MCHC RBC AUTO-ENTMCNC: 33.3 G/DL (ref 31–36)
MCV RBC AUTO: 81.6 FL (ref 80–100)
MONOCYTES # BLD: 0.3 K/UL (ref 0–1.3)
MONOCYTES NFR BLD: 3.1 %
NEUTROPHILS # BLD: 7.8 K/UL (ref 1.7–7.7)
NEUTROPHILS NFR BLD: 90.5 %
PLATELET # BLD AUTO: 127 K/UL (ref 135–450)
PMV BLD AUTO: 8.1 FL (ref 5–10.5)
POTASSIUM SERPL-SCNC: 3.7 MMOL/L (ref 3.5–5.1)
RBC # BLD AUTO: 3.28 M/UL (ref 4–5.2)
SODIUM SERPL-SCNC: 132 MMOL/L (ref 136–145)
WBC # BLD AUTO: 8.6 K/UL (ref 4–11)

## 2024-12-30 PROCEDURE — 99232 SBSQ HOSP IP/OBS MODERATE 35: CPT | Performed by: INTERNAL MEDICINE

## 2024-12-30 PROCEDURE — 94761 N-INVAS EAR/PLS OXIMETRY MLT: CPT

## 2024-12-30 PROCEDURE — 2700000000 HC OXYGEN THERAPY PER DAY

## 2024-12-30 PROCEDURE — 6370000000 HC RX 637 (ALT 250 FOR IP): Performed by: NURSE PRACTITIONER

## 2024-12-30 PROCEDURE — 85025 COMPLETE CBC W/AUTO DIFF WBC: CPT

## 2024-12-30 PROCEDURE — 36415 COLL VENOUS BLD VENIPUNCTURE: CPT

## 2024-12-30 PROCEDURE — 6360000002 HC RX W HCPCS: Performed by: NURSE PRACTITIONER

## 2024-12-30 PROCEDURE — 80048 BASIC METABOLIC PNL TOTAL CA: CPT

## 2024-12-30 PROCEDURE — 2500000003 HC RX 250 WO HCPCS: Performed by: NURSE PRACTITIONER

## 2024-12-30 PROCEDURE — 83735 ASSAY OF MAGNESIUM: CPT

## 2024-12-30 PROCEDURE — 1200000000 HC SEMI PRIVATE

## 2024-12-30 PROCEDURE — 94640 AIRWAY INHALATION TREATMENT: CPT

## 2024-12-30 RX ORDER — QUETIAPINE FUMARATE 200 MG/1
400 TABLET, FILM COATED ORAL NIGHTLY
COMMUNITY

## 2024-12-30 RX ORDER — UMECLIDINIUM 62.5 UG/1
1 AEROSOL, POWDER ORAL DAILY
COMMUNITY

## 2024-12-30 RX ORDER — FAMOTIDINE 20 MG/1
20 TABLET, FILM COATED ORAL 2 TIMES DAILY
Status: ON HOLD | COMMUNITY
End: 2024-12-31 | Stop reason: HOSPADM

## 2024-12-30 RX ORDER — BUSPIRONE HYDROCHLORIDE 5 MG/1
7.5 TABLET ORAL 3 TIMES DAILY
COMMUNITY

## 2024-12-30 RX ADMIN — SODIUM CHLORIDE, PRESERVATIVE FREE 10 ML: 5 INJECTION INTRAVENOUS at 09:32

## 2024-12-30 RX ADMIN — TIOTROPIUM BROMIDE INHALATION SPRAY 2 PUFF: 3.12 SPRAY, METERED RESPIRATORY (INHALATION) at 07:14

## 2024-12-30 RX ADMIN — DIVALPROEX SODIUM 500 MG: 500 TABLET, FILM COATED, EXTENDED RELEASE ORAL at 21:30

## 2024-12-30 RX ADMIN — ALBUTEROL SULFATE 2.5 MG: 2.5 SOLUTION RESPIRATORY (INHALATION) at 07:06

## 2024-12-30 RX ADMIN — WATER 40 MG: 1 INJECTION INTRAMUSCULAR; INTRAVENOUS; SUBCUTANEOUS at 05:27

## 2024-12-30 RX ADMIN — HYDRALAZINE HYDROCHLORIDE 25 MG: 25 TABLET ORAL at 05:27

## 2024-12-30 RX ADMIN — GUAIFENESIN 600 MG: 600 TABLET, EXTENDED RELEASE ORAL at 21:30

## 2024-12-30 RX ADMIN — ALBUTEROL SULFATE 2.5 MG: 2.5 SOLUTION RESPIRATORY (INHALATION) at 11:21

## 2024-12-30 RX ADMIN — CARVEDILOL 25 MG: 25 TABLET, FILM COATED ORAL at 16:23

## 2024-12-30 RX ADMIN — QUETIAPINE FUMARATE 300 MG: 200 TABLET ORAL at 21:31

## 2024-12-30 RX ADMIN — HYDRALAZINE HYDROCHLORIDE 25 MG: 25 TABLET ORAL at 21:30

## 2024-12-30 RX ADMIN — BUTALBITAL, ACETAMINOPHEN, AND CAFFEINE 1 TABLET: 50; 325; 40 TABLET ORAL at 15:21

## 2024-12-30 RX ADMIN — DULOXETINE HYDROCHLORIDE 60 MG: 60 CAPSULE, DELAYED RELEASE ORAL at 09:26

## 2024-12-30 RX ADMIN — SODIUM CHLORIDE, PRESERVATIVE FREE 10 ML: 5 INJECTION INTRAVENOUS at 21:30

## 2024-12-30 RX ADMIN — AZITHROMYCIN 500 MG: 250 TABLET, FILM COATED ORAL at 09:26

## 2024-12-30 RX ADMIN — TOPIRAMATE 25 MG: 25 TABLET, FILM COATED ORAL at 09:26

## 2024-12-30 RX ADMIN — BUDESONIDE AND FORMOTEROL FUMARATE DIHYDRATE 2 PUFF: 160; 4.5 AEROSOL RESPIRATORY (INHALATION) at 18:59

## 2024-12-30 RX ADMIN — ASPIRIN 81 MG: 81 TABLET, CHEWABLE ORAL at 09:26

## 2024-12-30 RX ADMIN — GUAIFENESIN 600 MG: 600 TABLET, EXTENDED RELEASE ORAL at 09:26

## 2024-12-30 RX ADMIN — ATORVASTATIN CALCIUM 40 MG: 40 TABLET, FILM COATED ORAL at 09:26

## 2024-12-30 RX ADMIN — HYDRALAZINE HYDROCHLORIDE 25 MG: 25 TABLET ORAL at 13:37

## 2024-12-30 RX ADMIN — ENOXAPARIN SODIUM 40 MG: 100 INJECTION SUBCUTANEOUS at 09:27

## 2024-12-30 RX ADMIN — LOSARTAN POTASSIUM 50 MG: 50 TABLET, FILM COATED ORAL at 09:27

## 2024-12-30 RX ADMIN — CARVEDILOL 25 MG: 25 TABLET, FILM COATED ORAL at 09:27

## 2024-12-30 RX ADMIN — ALBUTEROL SULFATE 2.5 MG: 2.5 SOLUTION RESPIRATORY (INHALATION) at 18:59

## 2024-12-30 RX ADMIN — BUDESONIDE AND FORMOTEROL FUMARATE DIHYDRATE 2 PUFF: 160; 4.5 AEROSOL RESPIRATORY (INHALATION) at 07:15

## 2024-12-30 RX ADMIN — WATER 40 MG: 1 INJECTION INTRAMUSCULAR; INTRAVENOUS; SUBCUTANEOUS at 16:24

## 2024-12-30 RX ADMIN — QUETIAPINE FUMARATE 100 MG: 100 TABLET ORAL at 21:30

## 2024-12-30 RX ADMIN — WATER 40 MG: 1 INJECTION INTRAMUSCULAR; INTRAVENOUS; SUBCUTANEOUS at 23:37

## 2024-12-30 RX ADMIN — DIVALPROEX SODIUM 250 MG: 250 TABLET, FILM COATED, EXTENDED RELEASE ORAL at 10:12

## 2024-12-30 RX ADMIN — WATER 40 MG: 1 INJECTION INTRAMUSCULAR; INTRAVENOUS; SUBCUTANEOUS at 12:02

## 2024-12-30 RX ADMIN — PANTOPRAZOLE SODIUM 40 MG: 40 TABLET, DELAYED RELEASE ORAL at 09:26

## 2024-12-30 RX ADMIN — ALBUTEROL SULFATE 2.5 MG: 2.5 SOLUTION RESPIRATORY (INHALATION) at 14:59

## 2024-12-30 ASSESSMENT — PAIN DESCRIPTION - ONSET: ONSET: ON-GOING

## 2024-12-30 ASSESSMENT — PAIN SCALES - GENERAL: PAINLEVEL_OUTOF10: 9

## 2024-12-30 ASSESSMENT — PAIN DESCRIPTION - LOCATION: LOCATION: HEAD

## 2024-12-30 ASSESSMENT — PAIN DESCRIPTION - PAIN TYPE: TYPE: ACUTE PAIN

## 2024-12-30 ASSESSMENT — PAIN DESCRIPTION - DESCRIPTORS: DESCRIPTORS: ACHING

## 2024-12-30 ASSESSMENT — PAIN - FUNCTIONAL ASSESSMENT: PAIN_FUNCTIONAL_ASSESSMENT: ACTIVITIES ARE NOT PREVENTED

## 2024-12-30 ASSESSMENT — PAIN DESCRIPTION - FREQUENCY: FREQUENCY: CONTINUOUS

## 2024-12-30 NOTE — FLOWSHEET NOTE
12/29/24 2132   Vital Signs   Temp 98.3 °F (36.8 °C)   Temp Source Oral   Pulse 76   Heart Rate Source Monitor   Respirations 24   BP (!) 179/81   MAP (Calculated) 114   BP Location Left upper arm   BP Method Automatic   Patient Position Semi fowlers   Opioid-Induced Sedation   POSS Score 1   RASS   Serrato Agitation Sedation Scale (RASS) +1   Oxygen Therapy   SpO2 96 %   Pulse Oximetry Type Intermittent   O2 Device Nasal cannula   O2 Flow Rate (L/min) 3 L/min   Patient Observation   Patient Observations spo2 90% on 3l n/c     Shift assessment complete.  Patient is alert and oriented.  Vital signs are stable, BP is elevated, blood pressure meds given, will recheck.  Respirations are even and easy, no signs or symptoms of distress.  Pt denies any needs at this time.  Call light within reach.

## 2024-12-30 NOTE — CARE COORDINATION
Case Management Assessment  Initial Evaluation    Date/Time of Evaluation: 12/30/2024 9:57 AM  Assessment Completed by: Thao Walton    If patient is discharged prior to next notation, then this note serves as note for discharge by case management.    Patient Name: Najma Fitzpatrick                   YOB: 1950  Diagnosis: Hypomagnesemia [E83.42]  COPD exacerbation (HCC) [J44.1]                   Date / Time: 12/29/2024  7:58 AM    Patient Admission Status: Inpatient   Readmission Risk (Low < 19, Mod (19-27), High > 27): Readmission Risk Score: 31.6    Current PCP: Geena Sotomayor, BALJIT - CNP  PCP verified by CM? Yes    Chart Reviewed: Yes      History Provided by: Patient, Medical Record  Patient Orientation: Alert and Oriented    Patient Cognition: Alert    Hospitalization in the last 30 days (Readmission):  No    If yes, Readmission Assessment in CM Navigator will be completed.    Advance Directives:      Code Status: Full Code   Patient's Primary Decision Maker is: Legal Next of Kin    Primary Decision Maker: Brent Fitzpatrick - Spouse - [phone]    Secondary Decision Maker: Margarita Fitzpatrick - Child - [phone]    Secondary Decision Maker: Maricel Esquivel - Child - [phone]    Discharge Planning:    Patient lives with: Spouse/Significant Other, Children, Family Members Type of Home: Trailer/Mobile Home  Primary Care Giver: Self  Patient Support Systems include: Spouse/Significant Other, Children, Family Members   Current Financial resources: Medicare  Current community resources: None  Current services prior to admission: Durable Medical Equipment, Oxygen Therapy            Current DME: Cane, Walker, Oxygen Therapy (Comment) (2L O2 via [employer].)            Type of Home Care services:  None    ADLS  Prior functional level: Independent in ADLs/IADLs  Current functional level: Independent in ADLs/IADLs    PT AM-PAC:   /24  OT AM-PAC:   /24    Family can provide assistance at DC: Yes  Would

## 2024-12-31 VITALS
BODY MASS INDEX: 30.56 KG/M2 | SYSTOLIC BLOOD PRESSURE: 168 MMHG | HEIGHT: 64 IN | TEMPERATURE: 98.2 F | RESPIRATION RATE: 18 BRPM | OXYGEN SATURATION: 92 % | DIASTOLIC BLOOD PRESSURE: 96 MMHG | HEART RATE: 78 BPM | WEIGHT: 179 LBS

## 2024-12-31 LAB
ANION GAP SERPL CALCULATED.3IONS-SCNC: 8 MMOL/L (ref 3–16)
BASOPHILS # BLD: 0 K/UL (ref 0–0.2)
BASOPHILS NFR BLD: 0.2 %
BUN SERPL-MCNC: 12 MG/DL (ref 7–20)
CALCIUM SERPL-MCNC: 8.2 MG/DL (ref 8.3–10.6)
CHLORIDE SERPL-SCNC: 93 MMOL/L (ref 99–110)
CO2 SERPL-SCNC: 29 MMOL/L (ref 21–32)
CREAT SERPL-MCNC: 0.5 MG/DL (ref 0.6–1.2)
DEPRECATED RDW RBC AUTO: 16.2 % (ref 12.4–15.4)
EOSINOPHIL # BLD: 0 K/UL (ref 0–0.6)
EOSINOPHIL NFR BLD: 0 %
GFR SERPLBLD CREATININE-BSD FMLA CKD-EPI: >90 ML/MIN/{1.73_M2}
GLUCOSE SERPL-MCNC: 122 MG/DL (ref 70–99)
HCT VFR BLD AUTO: 26.6 % (ref 36–48)
HGB BLD-MCNC: 8.7 G/DL (ref 12–16)
LYMPHOCYTES # BLD: 0.5 K/UL (ref 1–5.1)
LYMPHOCYTES NFR BLD: 6.5 %
MCH RBC QN AUTO: 27.3 PG (ref 26–34)
MCHC RBC AUTO-ENTMCNC: 32.8 G/DL (ref 31–36)
MCV RBC AUTO: 83 FL (ref 80–100)
MONOCYTES # BLD: 0.3 K/UL (ref 0–1.3)
MONOCYTES NFR BLD: 4.3 %
NEUTROPHILS # BLD: 7 K/UL (ref 1.7–7.7)
NEUTROPHILS NFR BLD: 89 %
PLATELET # BLD AUTO: 131 K/UL (ref 135–450)
PMV BLD AUTO: 7.8 FL (ref 5–10.5)
POTASSIUM SERPL-SCNC: 3.8 MMOL/L (ref 3.5–5.1)
RBC # BLD AUTO: 3.21 M/UL (ref 4–5.2)
SODIUM SERPL-SCNC: 130 MMOL/L (ref 136–145)
WBC # BLD AUTO: 7.9 K/UL (ref 4–11)

## 2024-12-31 PROCEDURE — 85025 COMPLETE CBC W/AUTO DIFF WBC: CPT

## 2024-12-31 PROCEDURE — 6370000000 HC RX 637 (ALT 250 FOR IP): Performed by: NURSE PRACTITIONER

## 2024-12-31 PROCEDURE — 36415 COLL VENOUS BLD VENIPUNCTURE: CPT

## 2024-12-31 PROCEDURE — 80048 BASIC METABOLIC PNL TOTAL CA: CPT

## 2024-12-31 PROCEDURE — 99238 HOSP IP/OBS DSCHRG MGMT 30/<: CPT | Performed by: INTERNAL MEDICINE

## 2024-12-31 PROCEDURE — 2500000003 HC RX 250 WO HCPCS: Performed by: NURSE PRACTITIONER

## 2024-12-31 PROCEDURE — 6360000002 HC RX W HCPCS: Performed by: NURSE PRACTITIONER

## 2024-12-31 PROCEDURE — 94640 AIRWAY INHALATION TREATMENT: CPT

## 2024-12-31 PROCEDURE — 2700000000 HC OXYGEN THERAPY PER DAY

## 2024-12-31 PROCEDURE — 94761 N-INVAS EAR/PLS OXIMETRY MLT: CPT

## 2024-12-31 RX ORDER — PREDNISONE 20 MG/1
40 TABLET ORAL DAILY
Qty: 6 TABLET | Refills: 0 | Status: SHIPPED | OUTPATIENT
Start: 2024-12-31 | End: 2025-01-03

## 2024-12-31 RX ORDER — BUTALBITAL, ACETAMINOPHEN AND CAFFEINE 50; 325; 40 MG/1; MG/1; MG/1
1 TABLET ORAL EVERY 6 HOURS PRN
Qty: 30 TABLET | Refills: 0 | Status: SHIPPED | OUTPATIENT
Start: 2024-12-31

## 2024-12-31 RX ADMIN — DULOXETINE HYDROCHLORIDE 60 MG: 60 CAPSULE, DELAYED RELEASE ORAL at 08:52

## 2024-12-31 RX ADMIN — TIOTROPIUM BROMIDE INHALATION SPRAY 2 PUFF: 3.12 SPRAY, METERED RESPIRATORY (INHALATION) at 07:25

## 2024-12-31 RX ADMIN — WATER 40 MG: 1 INJECTION INTRAMUSCULAR; INTRAVENOUS; SUBCUTANEOUS at 05:58

## 2024-12-31 RX ADMIN — BUTALBITAL, ACETAMINOPHEN, AND CAFFEINE 1 TABLET: 50; 325; 40 TABLET ORAL at 00:27

## 2024-12-31 RX ADMIN — TOPIRAMATE 25 MG: 25 TABLET, FILM COATED ORAL at 08:52

## 2024-12-31 RX ADMIN — ALBUTEROL SULFATE 2.5 MG: 2.5 SOLUTION RESPIRATORY (INHALATION) at 15:58

## 2024-12-31 RX ADMIN — SODIUM CHLORIDE, PRESERVATIVE FREE 10 ML: 5 INJECTION INTRAVENOUS at 08:53

## 2024-12-31 RX ADMIN — PREDNISONE 40 MG: 20 TABLET ORAL at 11:15

## 2024-12-31 RX ADMIN — DIVALPROEX SODIUM 250 MG: 250 TABLET, FILM COATED, EXTENDED RELEASE ORAL at 08:51

## 2024-12-31 RX ADMIN — GUAIFENESIN 600 MG: 600 TABLET, EXTENDED RELEASE ORAL at 08:52

## 2024-12-31 RX ADMIN — HYDRALAZINE HYDROCHLORIDE 25 MG: 25 TABLET ORAL at 05:58

## 2024-12-31 RX ADMIN — ALBUTEROL SULFATE 2.5 MG: 2.5 SOLUTION RESPIRATORY (INHALATION) at 11:13

## 2024-12-31 RX ADMIN — CARVEDILOL 25 MG: 25 TABLET, FILM COATED ORAL at 08:52

## 2024-12-31 RX ADMIN — LOSARTAN POTASSIUM 50 MG: 50 TABLET, FILM COATED ORAL at 08:52

## 2024-12-31 RX ADMIN — ASPIRIN 81 MG: 81 TABLET, CHEWABLE ORAL at 08:52

## 2024-12-31 RX ADMIN — ALBUTEROL SULFATE 2.5 MG: 2.5 SOLUTION RESPIRATORY (INHALATION) at 07:21

## 2024-12-31 RX ADMIN — ENOXAPARIN SODIUM 40 MG: 100 INJECTION SUBCUTANEOUS at 08:51

## 2024-12-31 RX ADMIN — AZITHROMYCIN 500 MG: 250 TABLET, FILM COATED ORAL at 08:52

## 2024-12-31 RX ADMIN — BUTALBITAL, ACETAMINOPHEN, AND CAFFEINE 1 TABLET: 50; 325; 40 TABLET ORAL at 10:06

## 2024-12-31 RX ADMIN — QUETIAPINE FUMARATE 25 MG: 25 TABLET ORAL at 12:44

## 2024-12-31 RX ADMIN — BUDESONIDE AND FORMOTEROL FUMARATE DIHYDRATE 2 PUFF: 160; 4.5 AEROSOL RESPIRATORY (INHALATION) at 07:27

## 2024-12-31 RX ADMIN — PANTOPRAZOLE SODIUM 40 MG: 40 TABLET, DELAYED RELEASE ORAL at 08:52

## 2024-12-31 RX ADMIN — HYDRALAZINE HYDROCHLORIDE 25 MG: 25 TABLET ORAL at 14:21

## 2024-12-31 RX ADMIN — ATORVASTATIN CALCIUM 40 MG: 40 TABLET, FILM COATED ORAL at 08:52

## 2024-12-31 ASSESSMENT — PAIN DESCRIPTION - LOCATION: LOCATION: HEAD

## 2024-12-31 ASSESSMENT — PAIN SCALES - GENERAL
PAINLEVEL_OUTOF10: 0
PAINLEVEL_OUTOF10: 0
PAINLEVEL_OUTOF10: 9

## 2024-12-31 ASSESSMENT — PAIN - FUNCTIONAL ASSESSMENT: PAIN_FUNCTIONAL_ASSESSMENT: ACTIVITIES ARE NOT PREVENTED

## 2024-12-31 ASSESSMENT — PAIN DESCRIPTION - DESCRIPTORS: DESCRIPTORS: ACHING;THROBBING

## 2024-12-31 NOTE — DISCHARGE INSTRUCTIONS
Your information:  Name: Najma Fitzpatrick  : 1950    Your instructions:    Follow up with family doctor in 1 week.    What to do after you leave the hospital:    Recommended diet: regular diet    Recommended activity: activity as tolerated        The following personal items were collected during your admission and were returned to you:    Belongings  Dental Appliances: Uppers, Lowers  Vision - Corrective Lenses: None  Hearing Aid: None  Clothing: Jacket/Coat, Undergarments, Footwear, Socks, Shirt  Jewelry: None  Body Piercings Removed: N/A  Electronic Devices: None  Weapons (Notify Protective Services/Security): None  Other Valuables: Other (Comment) (portable O2 concentrator)  Home Medications: None  Valuables Given To: Patient  Provide Name(s) of Who Valuable(s) Were Given To: Najma Fitzpatrick (self)  Responsible person(s) in the waiting room: n/a  Patient approves for provider to speak to responsible person post operatively: Yes    Information obtained by:  By signing below, I understand that if any problems occur once I leave the hospital I am to contact family doctor.  I understand and acknowledge receipt of the instructions indicated above.

## 2024-12-31 NOTE — CARE COORDINATION
DISCHARGE ORDER  Date/Time 2024 12:03 PM  Completed by: Diane Varela RN, Case Management    Patient Name: Najma Fitzpatrick      : 1950  Admitting Diagnosis: Hypomagnesemia [E83.42]  COPD exacerbation (HCC) [J44.1]      Admit order Date and Status:24 Obsi  (verify MD's last order for status of admission)      Noted discharge order.   If applicable PT/OT recommendation at Discharge: N/A  DME recommendation by PT/OT:N/A  Confirmed discharge plan  : Yes  with whom patient  If pt confirmed DC plan does family need to be contacted by CM No   Discharge Plan: Order for dc noted. Spoke with pt who states plan remains fo rreturn home with family. Discussed HHC and pt declines. Noted pt has home O2 prior to admit. Chart reviewed and no dc needs identified.    Date of Last IMM Given: N/A OBS status    Reviewed chart.  Role of discharge planner explained and patient verbalized understanding. Discharge order is noted.    Has Home O2 in place on admit:  Yes  Informed of need to bring portable home O2 tank on day of discharge for nursing to connect prior to leaving:   Yes  Verbalized agreement/Understanding:   Yes  Pt is being d/c'd to home today. Pt's O2 sats are 93% on 2L NC.    Discharge timeout done with nsg, CM and pt. All discharge needs and concerns addressed.

## 2024-12-31 NOTE — FLOWSHEET NOTE
12/30/24 2123   Vital Signs   Temp 98.1 °F (36.7 °C)   Temp Source Oral   Pulse 80   Heart Rate Source Monitor   Respirations 18   BP (!) 189/73   MAP (Calculated) 112   BP Location Left upper arm   BP Method Automatic   Patient Position High fowlers   Pain Assessment   Pain Assessment None - Denies Pain   Opioid-Induced Sedation   POSS Score 1   RASS   Serrato Agitation Sedation Scale (RASS) 0   Oxygen Therapy   SpO2 93 %   O2 Device Nasal cannula   O2 Flow Rate (L/min) 2 L/min     Shift assessment complete.  Patient is alert and oriented.  Vital signs are stable, BP elevated/BP medication given, will reassess.  Respirations are even and easy, no signs or symptoms of distress.  Pt denies any needs at this time.  Call light within reach.

## 2024-12-31 NOTE — PROGRESS NOTES
12/29/24 2100   RT Protocol   History Pulmonary Disease 2   Respiratory pattern 2   Breath sounds 2   Cough 0   Indications for Bronchodilator Therapy Decreased or absent breath sounds   Bronchodilator Assessment Score 6     RT Inhaler-Nebulizer Bronchodilator Protocol Note    There is a bronchodilator order in the chart from a provider indicating to follow the RT Bronchodilator Protocol and there is an “Initiate RT Inhaler-Nebulizer Bronchodilator Protocol” order as well (see protocol at bottom of note).    CXR Findings:  XR CHEST PORTABLE    Result Date: 12/29/2024  Bilateral pleuroparenchymal disease, left greater than right, similar to prior       The findings from the last RT Protocol Assessment were as follows:   History Pulmonary Disease: Chronic pulmonary disease  Respiratory Pattern: Dyspnea on exertion or RR 21-25 bpm  Breath Sounds: Slightly diminished and/or crackles  Cough: Strong, spontaneous, non-productive  Indication for Bronchodilator Therapy: Decreased or absent breath sounds  Bronchodilator Assessment Score: 6    Aerosolized bronchodilator medication orders have been revised according to the RT Inhaler-Nebulizer Bronchodilator Protocol below.    Respiratory Therapist to perform RT Therapy Protocol Assessment initially then follow the protocol.  Repeat RT Therapy Protocol Assessment PRN for score 0-3 or on second treatment, BID, and PRN for scores above 3.    No Indications - adjust the frequency to every 6 hours PRN wheezing or bronchospasm, if no treatments needed after 48 hours then discontinue using Per Protocol order mode.     If indication present, adjust the RT bronchodilator orders based on the Bronchodilator Assessment Score as indicated below.  Use Inhaler orders unless patient has one or more of the following: on home nebulizer, not able to hold breath for 10 seconds, is not alert and oriented, cannot activate and use MDI correctly, or respiratory rate 25 breaths per minute or more, 
   12/31/24 1606   Encounter Summary   Encounter Overview/Reason Spiritual/Emotional Needs   Service Provided For Patient and family together   Referral/Consult From Nurse   Support System Spouse   Last Encounter  12/31/24  ( provided prayer, active, empathetic listening, prayer card, scripture note, Outpatient Spiritual Care brochure and Spiritual Health contact card/LC)   Complexity of Encounter Low   Begin Time 1558   End Time  1609   Total Time Calculated 11 min   Spiritual/Emotional needs   Type Spiritual Support;Emotional Distress   Grief, Loss, and Adjustments   Type Adjustment to illness;Life Adjustments   Assessment/Intervention/Outcome   Assessment Calm;Coping;Hopeful;Peaceful   Intervention Active listening;Discussed belief system/Confucianism practices/cat;Discussed illness injury and it’s impact;Explored/Affirmed feelings, thoughts, concerns;Explored Coping Skills/Resources;Prayer (assurance of)/Accident;Sustaining Presence/Ministry of presence   Outcome Comfort;Connection/Belonging;Coping;Encouraged;Engaged in conversation;Expressed Gratitude   Plan and Referrals   Plan/Referrals Referred to Outpatient   (Najma desires outpatient spiritual/emotional support.  provided outpatient spiritual care brochure.)   Does the patient have a [facility] PCP? Yes    to follow up after discharge? Yes      Rosalia Diaz EdD, JULIAN COELHO ([facility])    Thank you for consulting Spiritual Health    If you would like a 's presence for emotional, spiritual, grief or comfort care,   please dial \"[phone]\" and ask for the  on-call to be paged.    For help with Advanced Care Planning, Power of  for Healthcare or Living Will forms, you may also call us directly:    [phone] Casey  [phone]) Kathy  [phone]) Outpatient    Spiritual Health  [facility]    
 here, discharge instructions completed. Waiting on meds to beds  
4 Eyes Skin Assessment     NAME:  Najma Fitzpatrick  YOB: 1950  MEDICAL RECORD NUMBER:  [id number]    The patient is being assessed for  Admission    I agree that at least one RN has performed a thorough Head to Toe Skin Assessment on the patient. ALL assessment sites listed below have been assessed.      Areas assessed by both nurses: Scattered bruising to BUE & BLE.    Head, Face, Ears, Shoulders, Back, Chest, Arms, Elbows, Hands, Sacrum. Buttock, Coccyx, Ischium, Legs. Feet and Heels, and Under Medical Devices         Does the Patient have a Wound? No noted wound(s)       Emmanuel Prevention initiated by RN: No  Wound Care Orders initiated by RN: No    Pressure Injury (Stage 3,4, Unstageable, DTI, NWPT, and Complex wounds) if present, place Wound referral order by RN under : No    New Ostomies, if present place, Ostomy referral order under : No     Nurse 1 eSignature: Electronically signed by Idania Dubon RN on 12/29/24 at 5:43 PM EST    **SHARE this note so that the co-signing nurse can place an eSignature**    Nurse 2 eSignature: Electronically signed by Vimal Barnes RN on 12/29/24 at 5:45 PM EST    
Bedside Mobility Assessment Tool (BMAT):     Assessment Level 1- Sit and Shake    1. From a semi-reclined position, ask patient to sit up and rotate to a seated position at the side of the bed. Can use the bedrail.    2. Ask patient to reach out and grab your hand and shake making sure patient reaches across his/her midline.   Pass- Patient is able to come to a seated position, maintain core strength. Maintains seated balance while reaching across midline. Move on to Assessment Level 2.     Assessment Level 2- Stretch and Point   1. With patient in seated position at the side of the bed, have patient place both feet on the floor (or stool) with knees no higher than hips.    2. Ask patient to stretch one leg and straighten the knee, then bend the ankle/flex and point the toes. If appropriate, repeat with the other leg.   Pass- Patient is able to demonstrate appropriate quad strength on intended weight bearing limb(s). Move onto Assessment Level 3.     Assessment Level 3- Stand   1. Ask patient to elevate off the bed or chair (seated to standing) using an assistive device (cane, bedrail).    2. Patient should be able to raise buttocks off be and hold for a count of five. May repeat once.   Pass- Patient maintains standing stability for at least 5 seconds, proceed to assessment level 4.    Assessment Level 4- Walk   1. Ask patient to march in place at bedside.    2. Then ask patient to advance step and return each foot. Some medical conditions may render a patient from stepping backwards, use your best clinical judgement.   Pass- Patient demonstrates balance while shifting weight and ability to step, takes independent steps, does not use assistive device patient is MOBILITY LEVEL 4.      Mobility Level- 4   
Bedside Mobility Assessment Tool (BMAT):     Assessment Level 1- Sit and Shake    1. From a semi-reclined position, ask patient to sit up and rotate to a seated position at the side of the bed. Can use the bedrail.    2. Ask patient to reach out and grab your hand and shake making sure patient reaches across his/her midline.   Pass- Patient is able to come to a seated position, maintain core strength. Maintains seated balance while reaching across midline. Move on to Assessment Level 2.     Assessment Level 2- Stretch and Point   1. With patient in seated position at the side of the bed, have patient place both feet on the floor (or stool) with knees no higher than hips.    2. Ask patient to stretch one leg and straighten the knee, then bend the ankle/flex and point the toes. If appropriate, repeat with the other leg.   Pass- Patient is able to demonstrate appropriate quad strength on intended weight bearing limb(s). Move onto Assessment Level 3.     Assessment Level 3- Stand   1. Ask patient to elevate off the bed or chair (seated to standing) using an assistive device (cane, bedrail).    2. Patient should be able to raise buttocks off be and hold for a count of five. May repeat once.   Pass- Patient maintains standing stability for at least 5 seconds, proceed to assessment level 4.    Assessment Level 4- Walk   1. Ask patient to march in place at bedside.    2. Then ask patient to advance step and return each foot. Some medical conditions may render a patient from stepping backwards, use your best clinical judgement.   Pass- Patient demonstrates balance while shifting weight and ability to step, takes independent steps, does not use assistive device patient is MOBILITY LEVEL 4.      Mobility Level- 4   
Fioricet po for c/o headache rated 9.  
IM Progress Note    Admit Date:  12/29/2024    Admitted for COPD exacerbation     Subjective:  Ms. Fitzpatrick is feeling better. Wanting to go home tomorrow .   Less SOB.   Was requiring O2 3L-. Weaned back to 2 L     Objective:   Patient Vitals for the past 4 hrs:   BP Temp Temp src Pulse Resp SpO2   12/31/24 1000 133/75 -- -- -- -- --   12/31/24 0800 (!) 193/90 98.4 °F (36.9 °C) Oral 87 18 92 %   12/31/24 0721 -- -- -- 88 18 95 %          Intake/Output Summary (Last 24 hours) at 12/31/2024 1113  Last data filed at 12/31/2024 0943  Gross per 24 hour   Intake 1160 ml   Output 600 ml   Net 560 ml       Physical Exam:    Gen: No distress. Alert.   Eyes: PERRL. No sclera icterus. No conjunctival injection.   ENT: No discharge. Pharynx clear.   Neck: No JVD.  Trachea midline.  Resp: No accessory muscle use. No crackles. No wheezes. Mild  rhonchi.   CV: Regular rate. Regular rhythm. No murmur.  No rub. No edema.   Capillary Refill: Brisk,< 3 seconds   Peripheral Pulses: +2 palpable, equal bilaterally   GI: Non-tender. Non-distended.  Normal bowel sounds.  Skin: Warm and dry. No nodule on exposed extremities. No rash on exposed extremities.   M/S: No cyanosis. No joint deformity. No clubbing.   Neuro: Awake. Grossly nonfocal    Psych: Oriented x 3. No anxiety or agitation.      Scheduled Meds:   aspirin  81 mg Oral Daily    atorvastatin  40 mg Oral Daily    carvedilol  25 mg Oral BID WC    divalproex  250 mg Oral QAM    divalproex  500 mg Oral QHS    DULoxetine  60 mg Oral Daily    budesonide-formoterol  2 puff Inhalation BID RT    And    tiotropium  2 puff Inhalation Daily RT    guaiFENesin  600 mg Oral BID    hydrALAZINE  25 mg Oral 3 times per day    lidocaine  1 patch TransDERmal Daily    losartan  50 mg Oral Daily    nicotine  1 patch TransDERmal Q24H    pantoprazole  40 mg Oral Daily    topiramate  25 mg Oral Daily    QUEtiapine  300 mg Oral Nightly    QUEtiapine  100 mg Oral Nightly    sodium chloride flush  5-40 mL 
Patient provided a COPD Educational Folder that includes the following materials:     [x]  [employer] Booklet: Managing your COPD  [x]  ALA: Getting the Most Out of Medication Delivery Devices  [x]  ALA: My COPD Action Plan  [x]  Better Breathers Club: [facility]   [x]  Smoking Cessation Classes  [x]  Outpatient Spiritual Care Services  [x]  Magnet: Signs of COPD    PATIENT/CAREGIVER TEACHING:   Level of patient/caregiver understanding able to:   [x] Verbalize understanding   [] Demonstrate understanding       [] Teach back        [] Needs reinforcement     [x]  Other: Patient visually impaired, voiced understanding.    Electronically signed by Idania Dubon RN on 12/29/2024 at 6:48 PM   
Pt awake situated up in bed for breakfast. Pt has a complaint wants to talk to management. Message relayed.  
Pt awake up in chair per pt request.  
Pt set off bed alarm and was trying to get out of bed.  She is very anxious and was confused.  RN reoriented her and explained that she is safe and needs to use her call light.  
RT Inhaler-Nebulizer Bronchodilator Protocol Note    There is a bronchodilator order in the chart from a provider indicating to follow the RT Bronchodilator Protocol and there is an “Initiate RT Inhaler-Nebulizer Bronchodilator Protocol” order as well (see protocol at bottom of note).    CXR Findings:  XR CHEST PORTABLE    Result Date: 12/29/2024  Bilateral pleuroparenchymal disease, left greater than right, similar to prior       The findings from the last RT Protocol Assessment were as follows:   History Pulmonary Disease: (P) Chronic pulmonary disease  Respiratory Pattern: (P) Dyspnea on exertion or RR 21-25 bpm  Breath Sounds: (P) Slightly diminished and/or crackles  Cough: (P) Strong, spontaneous, non-productive  Indication for Bronchodilator Therapy: (P) Decreased or absent breath sounds  Bronchodilator Assessment Score: (P) 6    Aerosolized bronchodilator medication orders have been revised according to the RT Inhaler-Nebulizer Bronchodilator Protocol below.    Respiratory Therapist to perform RT Therapy Protocol Assessment initially then follow the protocol.  Repeat RT Therapy Protocol Assessment PRN for score 0-3 or on second treatment, BID, and PRN for scores above 3.    No Indications - adjust the frequency to every 6 hours PRN wheezing or bronchospasm, if no treatments needed after 48 hours then discontinue using Per Protocol order mode.     If indication present, adjust the RT bronchodilator orders based on the Bronchodilator Assessment Score as indicated below.  Use Inhaler orders unless patient has one or more of the following: on home nebulizer, not able to hold breath for 10 seconds, is not alert and oriented, cannot activate and use MDI correctly, or respiratory rate 25 breaths per minute or more, then use the equivalent nebulizer order(s) with same Frequency and PRN reasons based on the score.  If a patient is on this medication at home then do not decrease Frequency below that used at 
RT Inhaler-Nebulizer Bronchodilator Protocol Note    There is a bronchodilator order in the chart from a provider indicating to follow the RT Bronchodilator Protocol and there is an “Initiate RT Inhaler-Nebulizer Bronchodilator Protocol” order as well (see protocol at bottom of note).    CXR Findings:  XR CHEST PORTABLE    Result Date: 12/29/2024  Bilateral pleuroparenchymal disease, left greater than right, similar to prior       The findings from the last RT Protocol Assessment were as follows:   History Pulmonary Disease: Chronic pulmonary disease  Respiratory Pattern: Dyspnea on exertion or RR 21-25 bpm  Breath Sounds: Slightly diminished and/or crackles  Cough: Strong, spontaneous, non-productive  Indication for Bronchodilator Therapy: Decreased or absent breath sounds  Bronchodilator Assessment Score: 6    Aerosolized bronchodilator medication orders have been revised according to the RT Inhaler-Nebulizer Bronchodilator Protocol below.    Respiratory Therapist to perform RT Therapy Protocol Assessment initially then follow the protocol.  Repeat RT Therapy Protocol Assessment PRN for score 0-3 or on second treatment, BID, and PRN for scores above 3.    No Indications - adjust the frequency to every 6 hours PRN wheezing or bronchospasm, if no treatments needed after 48 hours then discontinue using Per Protocol order mode.     If indication present, adjust the RT bronchodilator orders based on the Bronchodilator Assessment Score as indicated below.  Use Inhaler orders unless patient has one or more of the following: on home nebulizer, not able to hold breath for 10 seconds, is not alert and oriented, cannot activate and use MDI correctly, or respiratory rate 25 breaths per minute or more, then use the equivalent nebulizer order(s) with same Frequency and PRN reasons based on the score.  If a patient is on this medication at home then do not decrease Frequency below that used at home.    0-3 - enter or revise RT 
Report received from Cynthia.  
Seroquel po for c/o anxiety,lights dimmed.  
Shift report given to Cynthia face to face. Patient is stable. All end of shift needs have been met. No further assistance needed at this time.    
Shift report given to Cynthia face to face. Patient is stable. All end of shift needs have been met. No further assistance needed at this time.    
bronchodilator order(s) to equivalent RT Bronchodilator order with Frequency of every 4 hours PRN for wheezing or increased work of breathing using Per Protocol order mode.        4-6 - enter or revise RT Bronchodilator order(s) to two equivalent RT bronchodilator orders with one order with BID Frequency and one order with Frequency of every 4 hours PRN wheezing or increased work of breathing using Per Protocol order mode.        7-10 - enter or revise RT Bronchodilator order(s) to two equivalent RT bronchodilator orders with one order with TID Frequency and one order with Frequency of every 4 hours PRN wheezing or increased work of breathing using Per Protocol order mode.       11-13 - enter or revise RT Bronchodilator order(s) to one equivalent RT bronchodilator order with QID Frequency and an Albuterol order with Frequency of every 4 hours PRN wheezing or increased work of breathing using Per Protocol order mode.      Greater than 13 - enter or revise RT Bronchodilator order(s) to one equivalent RT bronchodilator order with every 4 hours Frequency and an Albuterol order with Frequency of every 2 hours PRN wheezing or increased work of breathing using Per Protocol order mode.       Electronically signed by Mario Toledo RCP on 12/30/2024 at 7:02 PM  
home.    0-3 - enter or revise RT bronchodilator order(s) to equivalent RT Bronchodilator order with Frequency of every 4 hours PRN for wheezing or increased work of breathing using Per Protocol order mode.        4-6 - enter or revise RT Bronchodilator order(s) to two equivalent RT bronchodilator orders with one order with BID Frequency and one order with Frequency of every 4 hours PRN wheezing or increased work of breathing using Per Protocol order mode.        7-10 - enter or revise RT Bronchodilator order(s) to two equivalent RT bronchodilator orders with one order with TID Frequency and one order with Frequency of every 4 hours PRN wheezing or increased work of breathing using Per Protocol order mode.       11-13 - enter or revise RT Bronchodilator order(s) to one equivalent RT bronchodilator order with QID Frequency and an Albuterol order with Frequency of every 4 hours PRN wheezing or increased work of breathing using Per Protocol order mode.      Greater than 13 - enter or revise RT Bronchodilator order(s) to one equivalent RT bronchodilator order with every 4 hours Frequency and an Albuterol order with Frequency of every 2 hours PRN wheezing or increased work of breathing using Per Protocol order mode.     RT to enter RT Home Evaluation for COPD & MDI Assessment order using Per Protocol order mode.    Electronically signed by Shay Amin RCP on 12/30/2024 at 7:23 AM  
sodium chloride flush  5-40 mL IntraVENous 2 times per day    enoxaparin  40 mg SubCUTAneous Daily    azithromycin  500 mg Oral Daily    albuterol  2.5 mg Nebulization 4x Daily RT    methylPREDNISolone  40 mg IntraVENous Q6H    Followed by    [START ON 12/31/2024] predniSONE  40 mg Oral Daily       Continuous Infusions:   sodium chloride         PRN Meds:  QUEtiapine, sodium chloride flush, sodium chloride, ondansetron **OR** ondansetron, polyethylene glycol, acetaminophen **OR** acetaminophen, butalbital-acetaminophen-caffeine      Data:  CBC:   Recent Labs     12/29/24  0811 12/29/24  0959 12/30/24  0517   WBC 7.2 8.9 8.6   HGB 8.9* 9.4* 8.9*   HCT 27.3* 28.3* 26.8*   MCV 83.9 82.5 81.6   * 134* 127*     BMP:   Recent Labs     12/29/24  0811 12/30/24  0517   * 132*   K 3.5 3.7   CL 97* 94*   CO2 30 28   BUN 6* 8   CREATININE 0.5* 0.5*     LIVER PROFILE:   Recent Labs     12/29/24  0811   AST 12*   ALT 10   BILITOT 0.3   ALKPHOS 66     PT/INR: No results for input(s): \"PROTIME\", \"INR\" in the last 72 hours.    CULTURES    Results       Procedure Component Value Units Date/Time    COVID-19 & Influenza Combo [[id number]] Collected: 12/29/24 0811    Order Status: Completed Specimen: Nasopharyngeal Swab Updated: 12/29/24 0836     SARS-CoV-2 RNA, RT PCR NOT DETECTED     Comment: Not Detected results do not preclude SARS-CoV-2 infection and  should not be used as the sole basis for patient management  decisions.  Results must be combined with clinical observations,  patient history, and epidemiological information.  Testing was performed using DEEPAK JOAN SARS-CoV-2 and Influenza A/B  nucleic acid assay. This test is a multiplex Real-Time Reverse  Transcriptase Polymerase Chain Reaction (RT-PCR)-based in vitro  diagnostic test intended for the qualitative detection of nucleic  acids from SARS-CoV-2, influenza A, and influenza B in nasopharyngeal  and nasal swab specimens for use under the FDA’s Emergency

## 2024-12-31 NOTE — PLAN OF CARE
Problem: Safety - Adult  Goal: Free from fall injury  12/29/2024 1846 by Idania Dubon RN  Outcome: Progressing  12/29/2024 1800 by Idania Dubon RN  Outcome: Progressing     Problem: Chronic Conditions and Co-morbidities  Goal: Patient's chronic conditions and co-morbidity symptoms are monitored and maintained or improved  12/29/2024 1846 by Idania Dubon RN  Outcome: Progressing  12/29/2024 1800 by Idania Dubon RN  Outcome: Progressing     Problem: Discharge Planning  Goal: Discharge to home or other facility with appropriate resources  12/29/2024 1846 by Idania Dubon RN  Outcome: Progressing  12/29/2024 1800 by Idania Dubon RN  Outcome: Progressing     Problem: Pain  Goal: Verbalizes/displays adequate comfort level or baseline comfort level  12/29/2024 1846 by Idania Dubon RN  Outcome: Progressing  12/29/2024 1800 by Idania Dubon RN  Outcome: Progressing  Flowsheets (Taken 12/29/2024 1650)  Verbalizes/displays adequate comfort level or baseline comfort level:   Encourage patient to monitor pain and request assistance   Assess pain using appropriate pain scale   Administer analgesics based on type and severity of pain and evaluate response   Implement non-pharmacological measures as appropriate and evaluate response     Problem: Neurosensory - Adult  Goal: Achieves maximal functionality and self care  12/29/2024 1846 by Idania Dubon RN  Outcome: Progressing  12/29/2024 1800 by Idania Dubon RN  Outcome: Progressing     Problem: Respiratory - Adult  Goal: Achieves optimal ventilation and oxygenation  12/29/2024 1846 by Idania Dubon RN  Outcome: Progressing  12/29/2024 1800 by Idania Dubon RN  Outcome: Progressing     Problem: Cardiovascular - Adult  Goal: Maintains optimal cardiac output and hemodynamic stability  12/29/2024 1846 by Idania Dubon RN  Outcome: Progressing  12/29/2024 1800 by Idania Dubon RN  Outcome: Progressing  Goal: Absence of 
  Problem: Safety - Adult  Goal: Free from fall injury  12/29/2024 2308 by Tom Mckinney RN  Outcome: Progressing  12/29/2024 1846 by Idania Dubon RN  Outcome: Progressing  12/29/2024 1800 by Idania Dubon RN  Outcome: Progressing     Problem: Chronic Conditions and Co-morbidities  Goal: Patient's chronic conditions and co-morbidity symptoms are monitored and maintained or improved  12/29/2024 2308 by Tom Mckinney RN  Outcome: Progressing  12/29/2024 1846 by Idania Dubon RN  Outcome: Progressing  12/29/2024 1800 by Idania Dubon RN  Outcome: Progressing     Problem: Discharge Planning  Goal: Discharge to home or other facility with appropriate resources  12/29/2024 2308 by Tom Mckineny RN  Outcome: Progressing  12/29/2024 1846 by Idania Dubon RN  Outcome: Progressing  12/29/2024 1800 by Idania Dubon RN  Outcome: Progressing     Problem: Pain  Goal: Verbalizes/displays adequate comfort level or baseline comfort level  12/29/2024 2308 by Tom Mckinney RN  Outcome: Progressing  12/29/2024 1846 by Idania Dubon RN  Outcome: Progressing  12/29/2024 1800 by Idania Dubon RN  Outcome: Progressing  Flowsheets (Taken 12/29/2024 1650)  Verbalizes/displays adequate comfort level or baseline comfort level:   Encourage patient to monitor pain and request assistance   Assess pain using appropriate pain scale   Administer analgesics based on type and severity of pain and evaluate response   Implement non-pharmacological measures as appropriate and evaluate response     Problem: Neurosensory - Adult  Goal: Achieves maximal functionality and self care  12/29/2024 2308 by Tom Mckinney RN  Outcome: Progressing  12/29/2024 1846 by Idania Dubon RN  Outcome: Progressing  12/29/2024 1800 by Idania Dubon RN  Outcome: Progressing     Problem: Respiratory - Adult  Goal: Achieves optimal ventilation and oxygenation  12/29/2024 2308 by Tom Mckinney RN  Outcome: Progressing  12/29/2024 
  Problem: Safety - Adult  Goal: Free from fall injury  12/30/2024 1026 by Cynthia Flynn RN  Outcome: Progressing     Problem: Chronic Conditions and Co-morbidities  Goal: Patient's chronic conditions and co-morbidity symptoms are monitored and maintained or improved  12/30/2024 1026 by Cynthia Flynn RN  Outcome: Progressing     Problem: Discharge Planning  Goal: Discharge to home or other facility with appropriate resources  12/30/2024 1026 by Cynthia Flynn RN  Outcome: Progressing     Problem: Pain  Goal: Verbalizes/displays adequate comfort level or baseline comfort level  12/30/2024 1026 by Cynthia Flynn RN  Outcome: Progressing     Problem: Neurosensory - Adult  Goal: Achieves maximal functionality and self care  12/30/2024 1026 by Cynthia Flynn RN  Outcome: Progressing     Problem: Respiratory - Adult  Goal: Achieves optimal ventilation and oxygenation  12/30/2024 1026 by Cynthia Flynn RN  Outcome: Progressing     Problem: Cardiovascular - Adult  Goal: Maintains optimal cardiac output and hemodynamic stability  12/30/2024 1026 by Cynthia Flynn RN  Outcome: Progressing     Problem: Cardiovascular - Adult  Goal: Absence of cardiac dysrhythmias or at baseline  12/30/2024 1026 by Cynthia Flynn RN  Outcome: Progressing     Problem: Skin/Tissue Integrity - Adult  Goal: Skin integrity remains intact  12/30/2024 1026 by Cynthia Flynn RN  Outcome: Progressing     Problem: Musculoskeletal - Adult  Goal: Return mobility to safest level of function  12/30/2024 1026 by Cynthia Flynn RN  Outcome: Progressing     Problem: Musculoskeletal - Adult  Goal: Return ADL status to a safe level of function  12/30/2024 1026 by Cynthia Flynn RN  Outcome: Progressing     Problem: Genitourinary - Adult  Goal: Absence of urinary retention  12/30/2024 1026 by Cynthia Flynn RN  Outcome: Progressing     Problem: Skin/Tissue Integrity  Goal: Absence of new skin breakdown  Description: 1.  Monitor for areas of redness and/or skin breakdown  2. 
  Problem: Safety - Adult  Goal: Free from fall injury  12/30/2024 1550 by Jesenia Gross RN  Outcome: Progressing  Flowsheets (Taken 12/30/2024 1549)  Free From Fall Injury: Instruct family/caregiver on patient safety     Problem: Chronic Conditions and Co-morbidities  Goal: Patient's chronic conditions and co-morbidity symptoms are monitored and maintained or improved  12/30/2024 1550 by Jesenia Gross RN  Outcome: Progressing     Problem: Discharge Planning  Goal: Discharge to home or other facility with appropriate resources  12/30/2024 1550 by Jesenia Gross RN  Outcome: Progressing     Problem: Musculoskeletal - Adult  Goal: Return mobility to safest level of function  12/30/2024 1550 by Jesenia Gross RN  Outcome: Progressing     Problem: Genitourinary - Adult  Goal: Absence of urinary retention  12/30/2024 1550 by Jesenia Gross RN  Outcome: Progressing     Problem: Skin/Tissue Integrity  Goal: Absence of new skin breakdown  Description: 1.  Monitor for areas of redness and/or skin breakdown  2.  Assess vascular access sites hourly  3.  Every 4-6 hours minimum:  Change oxygen saturation probe site  4.  Every 4-6 hours:  If on nasal continuous positive airway pressure, respiratory therapy assess nares and determine need for appliance change or resting period.  12/30/2024 1550 by Jesenia Gross RN  Outcome: Progressing     Problem: ABCDS Injury Assessment  Goal: Absence of physical injury  Outcome: Progressing     Continue with plan of care.   
  Problem: Safety - Adult  Goal: Free from fall injury  12/31/2024 1123 by Cynthia Flynn RN  Outcome: Adequate for Discharge     Problem: Chronic Conditions and Co-morbidities  Goal: Patient's chronic conditions and co-morbidity symptoms are monitored and maintained or improved  12/31/2024 1123 by Cynthia Flynn RN  Outcome: Adequate for Discharge     Problem: Discharge Planning  Goal: Discharge to home or other facility with appropriate resources  12/31/2024 1123 by Cynthia Flynn RN  Outcome: Adequate for Discharge     Problem: Pain  Goal: Verbalizes/displays adequate comfort level or baseline comfort level  12/31/2024 1123 by Cynthia Flynn RN  Outcome: Adequate for Discharge     Problem: Neurosensory - Adult  Goal: Achieves maximal functionality and self care  12/31/2024 1123 by Cynthia Flynn RN  Outcome: Adequate for Discharge     Problem: Respiratory - Adult  Goal: Achieves optimal ventilation and oxygenation  12/31/2024 1123 by Cynthia Flynn RN  Outcome: Adequate for Discharge     Problem: Cardiovascular - Adult  Goal: Maintains optimal cardiac output and hemodynamic stability  12/31/2024 1123 by Cynthia Flynn RN  Outcome: Adequate for Discharge     Problem: Skin/Tissue Integrity - Adult  Goal: Skin integrity remains intact  12/31/2024 1123 by Cynthia Flynn RN  Outcome: Adequate for Discharge     Problem: Musculoskeletal - Adult  Goal: Return mobility to safest level of function  12/31/2024 1123 by Cynthia Flynn RN  Outcome: Adequate for Discharge     Problem: Musculoskeletal - Adult  Goal: Return ADL status to a safe level of function  12/31/2024 1123 by Cynthia Flynn RN  Outcome: Adequate for Discharge     Problem: Genitourinary - Adult  Goal: Absence of urinary retention  12/31/2024 1123 by Cynthia Flynn RN  Outcome: Adequate for Discharge     Problem: Skin/Tissue Integrity  Goal: Absence of new skin breakdown  Description: 1.  Monitor for areas of redness and/or skin breakdown  2.  Assess vascular access 
  Problem: Safety - Adult  Goal: Free from fall injury  Outcome: Progressing     Problem: Chronic Conditions and Co-morbidities  Goal: Patient's chronic conditions and co-morbidity symptoms are monitored and maintained or improved  Outcome: Progressing     Problem: Discharge Planning  Goal: Discharge to home or other facility with appropriate resources  Outcome: Progressing     Problem: Pain  Goal: Verbalizes/displays adequate comfort level or baseline comfort level  Outcome: Progressing  Flowsheets (Taken 12/29/2024 8190)  Verbalizes/displays adequate comfort level or baseline comfort level:   Encourage patient to monitor pain and request assistance   Assess pain using appropriate pain scale   Administer analgesics based on type and severity of pain and evaluate response   Implement non-pharmacological measures as appropriate and evaluate response     Problem: Neurosensory - Adult  Goal: Achieves maximal functionality and self care  Outcome: Progressing     Problem: Respiratory - Adult  Goal: Achieves optimal ventilation and oxygenation  Outcome: Progressing     Problem: Cardiovascular - Adult  Goal: Maintains optimal cardiac output and hemodynamic stability  Outcome: Progressing  Goal: Absence of cardiac dysrhythmias or at baseline  Outcome: Progressing     Problem: Skin/Tissue Integrity - Adult  Goal: Skin integrity remains intact  Outcome: Progressing     Problem: Musculoskeletal - Adult  Goal: Return mobility to safest level of function  Outcome: Progressing  Goal: Return ADL status to a safe level of function  Outcome: Progressing     Problem: Genitourinary - Adult  Goal: Absence of urinary retention  Outcome: Progressing     
ROMAIN Marie  Outcome: Progressing     Problem: ABCDS Injury Assessment  Goal: Absence of physical injury  12/30/2024 2317 by Tom Mckinney RN  Outcome: Progressing  12/30/2024 1550 by Jesenia Gross RN  Outcome: Progressing

## 2024-12-31 NOTE — DISCHARGE SUMMARY
Name:  Najma Fitzpatrick  Room:  0212/0212-01  MRN:    [id number]    Discharge Summary      This discharge summary is in conjunction with a complete physical exam done on the day of discharge.    Discharging Physician: Dr. Cheung       Admit: 12/29/2024  Discharge:  12/31/24     HPI taken from admission H&P:    74 y.o. female who presented to [facility]  with C/O SOB and wheezing.  Patient reported she started feeling short of breath last night she got a breathing treatment and IV Solu-Medrol reports feeling better but not back to baseline.       PMHx significant for COPD, chronic respiratory failure is O2 dependent on 2 L at baseline, anemia, arthritis, chronic back pain, bipolar disorder, congestive heart failure, history of sedative abuse, fibromyalgia, hyperlipidemia, hypertension.       ED course: Patient presented afebrile with normal respiratory status and pulse on the hypertensive side 160s to 180s requiring 4 L per nasal cannula her baseline is at 2 L per nasal cannula.  Her labs were obtained in the ED her chemistries resulted in normal BUN and creatinine she did show some significant hypomagnesia him of 1.52 and was replaced CBC was obtained she has a normal white count stable hemoglobin and hematocrit at 9.4 and 28 she was negative for influenza and COVID.  VBG's were obtained and seem to be at baseline chest x-ray showed no acute processes she was treated with IV steroids and breathing treatments.     She was admitted under hospital medicine service for further evaluation and treatment     On exam pt is seen in the ED waiting bed placement, she is sitting up and awake in NAD, has + [place] with exertion and conversation. Asking for food and her Fioricet for headache. No fevers of chills no chest pain.           Diagnoses this Admission and Hospital Course   Acute on chronic respiratory failure in the setting of COPD exacerbation  -Present on arrival with wheezing and increased workload of breathing and

## 2025-01-09 ENCOUNTER — TELEPHONE (OUTPATIENT)
Age: 75
End: 2025-01-09

## 2025-01-09 ENCOUNTER — HOSPITAL ENCOUNTER (INPATIENT)
Age: 75
LOS: 1 days | Discharge: HOME | DRG: 190 | End: 2025-01-11
Attending: STUDENT IN AN ORGANIZED HEALTH CARE EDUCATION/TRAINING PROGRAM | Admitting: INTERNAL MEDICINE
Payer: COMMERCIAL

## 2025-01-09 ENCOUNTER — APPOINTMENT (OUTPATIENT)
Dept: GENERAL RADIOLOGY | Age: 75
DRG: 190 | End: 2025-01-09
Payer: COMMERCIAL

## 2025-01-09 ENCOUNTER — APPOINTMENT (OUTPATIENT)
Dept: CT IMAGING | Age: 75
DRG: 190 | End: 2025-01-09
Payer: COMMERCIAL

## 2025-01-09 DIAGNOSIS — J44.9 CHRONIC OBSTRUCTIVE PULMONARY DISEASE, UNSPECIFIED COPD TYPE (HCC): ICD-10-CM

## 2025-01-09 DIAGNOSIS — M25.512 ACUTE PAIN OF LEFT SHOULDER: ICD-10-CM

## 2025-01-09 DIAGNOSIS — J90 PLEURAL EFFUSION: Primary | ICD-10-CM

## 2025-01-09 DIAGNOSIS — R06.00 DYSPNEA, UNSPECIFIED TYPE: ICD-10-CM

## 2025-01-09 DIAGNOSIS — I50.9 CONGESTIVE HEART FAILURE, UNSPECIFIED HF CHRONICITY, UNSPECIFIED HEART FAILURE TYPE (HCC): ICD-10-CM

## 2025-01-09 LAB
ALBUMIN SERPL-MCNC: 3.6 G/DL (ref 3.4–5)
ALBUMIN/GLOB SERPL: 1.4 {RATIO} (ref 1.1–2.2)
ALP SERPL-CCNC: 95 U/L (ref 40–129)
ALT SERPL-CCNC: 11 U/L (ref 10–40)
ANION GAP SERPL CALCULATED.3IONS-SCNC: 8 MMOL/L (ref 3–16)
AST SERPL-CCNC: 15 U/L (ref 15–37)
BASE EXCESS BLDV CALC-SCNC: 3.3 MMOL/L (ref -3–3)
BASOPHILS # BLD: 0 K/UL (ref 0–0.2)
BASOPHILS NFR BLD: 0.5 %
BILIRUB SERPL-MCNC: 0.4 MG/DL (ref 0–1)
BUN SERPL-MCNC: 5 MG/DL (ref 7–20)
CALCIUM SERPL-MCNC: 8.4 MG/DL (ref 8.3–10.6)
CHLORIDE SERPL-SCNC: 93 MMOL/L (ref 99–110)
CO2 BLDV-SCNC: 30 MMOL/L
CO2 SERPL-SCNC: 33 MMOL/L (ref 21–32)
COHGB MFR BLDV: 2.5 % (ref 0–1.5)
CREAT SERPL-MCNC: 0.4 MG/DL (ref 0.6–1.2)
DEPRECATED RDW RBC AUTO: 16.1 % (ref 12.4–15.4)
EOSINOPHIL # BLD: 0.6 K/UL (ref 0–0.6)
EOSINOPHIL NFR BLD: 6.1 %
FLUAV RNA RESP QL NAA+PROBE: NOT DETECTED
FLUBV RNA RESP QL NAA+PROBE: NOT DETECTED
GFR SERPLBLD CREATININE-BSD FMLA CKD-EPI: >90 ML/MIN/{1.73_M2}
GLUCOSE SERPL-MCNC: 119 MG/DL (ref 70–99)
HCO3 BLDV-SCNC: 28.9 MMOL/L (ref 23–29)
HCT VFR BLD AUTO: 31.5 % (ref 36–48)
HGB BLD-MCNC: 10.2 G/DL (ref 12–16)
LACTATE BLDV-SCNC: 0.5 MMOL/L (ref 0.4–2)
LYMPHOCYTES # BLD: 0.8 K/UL (ref 1–5.1)
LYMPHOCYTES NFR BLD: 8.1 %
MCH RBC QN AUTO: 26.7 PG (ref 26–34)
MCHC RBC AUTO-ENTMCNC: 32.3 G/DL (ref 31–36)
MCV RBC AUTO: 82.6 FL (ref 80–100)
METHGB MFR BLDV: 0 %
MONOCYTES # BLD: 0.8 K/UL (ref 0–1.3)
MONOCYTES NFR BLD: 8.7 %
NEUTROPHILS # BLD: 7.3 K/UL (ref 1.7–7.7)
NEUTROPHILS NFR BLD: 76.6 %
NT-PROBNP SERPL-MCNC: 1217 PG/ML (ref 0–124)
O2 CT VFR BLDV CALC: 13 VOL %
O2 THERAPY: ABNORMAL
PCO2 BLDV: 48.8 MMHG (ref 40–50)
PH BLDV: 7.39 [PH] (ref 7.35–7.45)
PLATELET # BLD AUTO: 209 K/UL (ref 135–450)
PMV BLD AUTO: 6.7 FL (ref 5–10.5)
PO2 BLDV: 52.8 MMHG (ref 25–40)
POTASSIUM SERPL-SCNC: 3.6 MMOL/L (ref 3.5–5.1)
PROCALCITONIN SERPL IA-MCNC: <0.02 NG/ML (ref 0–0.15)
PROT SERPL-MCNC: 6.2 G/DL (ref 6.4–8.2)
RBC # BLD AUTO: 3.81 M/UL (ref 4–5.2)
SAO2 % BLDV: 87 %
SARS-COV-2 RNA RESP QL NAA+PROBE: NOT DETECTED
SODIUM SERPL-SCNC: 134 MMOL/L (ref 136–145)
TROPONIN, HIGH SENSITIVITY: 23 NG/L (ref 0–14)
TROPONIN, HIGH SENSITIVITY: 24 NG/L (ref 0–14)
WBC # BLD AUTO: 9.5 K/UL (ref 4–11)

## 2025-01-09 PROCEDURE — 83880 ASSAY OF NATRIURETIC PEPTIDE: CPT

## 2025-01-09 PROCEDURE — 84484 ASSAY OF TROPONIN QUANT: CPT

## 2025-01-09 PROCEDURE — 83605 ASSAY OF LACTIC ACID: CPT

## 2025-01-09 PROCEDURE — 6360000002 HC RX W HCPCS: Performed by: STUDENT IN AN ORGANIZED HEALTH CARE EDUCATION/TRAINING PROGRAM

## 2025-01-09 PROCEDURE — 2500000003 HC RX 250 WO HCPCS: Performed by: STUDENT IN AN ORGANIZED HEALTH CARE EDUCATION/TRAINING PROGRAM

## 2025-01-09 PROCEDURE — 87636 SARSCOV2 & INF A&B AMP PRB: CPT

## 2025-01-09 PROCEDURE — 87040 BLOOD CULTURE FOR BACTERIA: CPT

## 2025-01-09 PROCEDURE — 96365 THER/PROPH/DIAG IV INF INIT: CPT

## 2025-01-09 PROCEDURE — 93005 ELECTROCARDIOGRAM TRACING: CPT | Performed by: PHYSICIAN ASSISTANT

## 2025-01-09 PROCEDURE — 84145 PROCALCITONIN (PCT): CPT

## 2025-01-09 PROCEDURE — 85025 COMPLETE CBC W/AUTO DIFF WBC: CPT

## 2025-01-09 PROCEDURE — 71045 X-RAY EXAM CHEST 1 VIEW: CPT

## 2025-01-09 PROCEDURE — 6370000000 HC RX 637 (ALT 250 FOR IP): Performed by: PHYSICIAN ASSISTANT

## 2025-01-09 PROCEDURE — 2580000003 HC RX 258: Performed by: STUDENT IN AN ORGANIZED HEALTH CARE EDUCATION/TRAINING PROGRAM

## 2025-01-09 PROCEDURE — 80053 COMPREHEN METABOLIC PANEL: CPT

## 2025-01-09 PROCEDURE — 96375 TX/PRO/DX INJ NEW DRUG ADDON: CPT

## 2025-01-09 PROCEDURE — 99285 EMERGENCY DEPT VISIT HI MDM: CPT

## 2025-01-09 PROCEDURE — 71250 CT THORAX DX C-: CPT

## 2025-01-09 PROCEDURE — 6370000000 HC RX 637 (ALT 250 FOR IP): Performed by: STUDENT IN AN ORGANIZED HEALTH CARE EDUCATION/TRAINING PROGRAM

## 2025-01-09 PROCEDURE — 82803 BLOOD GASES ANY COMBINATION: CPT

## 2025-01-09 RX ORDER — VANCOMYCIN 1.5 G/300ML
1500 INJECTION, SOLUTION INTRAVENOUS ONCE
Status: COMPLETED | OUTPATIENT
Start: 2025-01-09 | End: 2025-01-10

## 2025-01-09 RX ORDER — HYDRALAZINE HYDROCHLORIDE 25 MG/1
25 TABLET, FILM COATED ORAL ONCE
Status: COMPLETED | OUTPATIENT
Start: 2025-01-09 | End: 2025-01-09

## 2025-01-09 RX ORDER — IPRATROPIUM BROMIDE AND ALBUTEROL SULFATE 2.5; .5 MG/3ML; MG/3ML
1 SOLUTION RESPIRATORY (INHALATION) ONCE
Status: COMPLETED | OUTPATIENT
Start: 2025-01-09 | End: 2025-01-09

## 2025-01-09 RX ADMIN — METHYLPREDNISOLONE SODIUM SUCCINATE 125 MG: 125 INJECTION INTRAMUSCULAR; INTRAVENOUS at 22:12

## 2025-01-09 RX ADMIN — IPRATROPIUM BROMIDE AND ALBUTEROL SULFATE 1 DOSE: 2.5; .5 SOLUTION RESPIRATORY (INHALATION) at 22:12

## 2025-01-09 RX ADMIN — VANCOMYCIN 1500 MG: 1.5 INJECTION, SOLUTION INTRAVENOUS at 23:06

## 2025-01-09 RX ADMIN — HYDRALAZINE HYDROCHLORIDE 25 MG: 25 TABLET ORAL at 18:00

## 2025-01-09 RX ADMIN — CEFEPIME 2000 MG: 2 INJECTION, POWDER, FOR SOLUTION INTRAVENOUS at 22:11

## 2025-01-09 ASSESSMENT — PAIN DESCRIPTION - LOCATION: LOCATION: BACK

## 2025-01-09 ASSESSMENT — PAIN SCALES - GENERAL: PAINLEVEL_OUTOF10: 7

## 2025-01-09 ASSESSMENT — PAIN - FUNCTIONAL ASSESSMENT: PAIN_FUNCTIONAL_ASSESSMENT: 0-10

## 2025-01-09 NOTE — TELEPHONE ENCOUNTER
Inquired if pt was interested in being enrolled in outpatient  teleservices program. Pt declined enrollment.

## 2025-01-10 ENCOUNTER — APPOINTMENT (OUTPATIENT)
Dept: GENERAL RADIOLOGY | Age: 75
DRG: 190 | End: 2025-01-10
Payer: COMMERCIAL

## 2025-01-10 ENCOUNTER — APPOINTMENT (OUTPATIENT)
Dept: ULTRASOUND IMAGING | Age: 75
DRG: 190 | End: 2025-01-10
Payer: COMMERCIAL

## 2025-01-10 PROBLEM — I50.9 HEART FAILURE (HCC): Status: ACTIVE | Noted: 2025-01-10

## 2025-01-10 PROBLEM — I50.33 ACUTE ON CHRONIC DIASTOLIC CHF (CONGESTIVE HEART FAILURE) (HCC): Status: ACTIVE | Noted: 2025-01-10

## 2025-01-10 PROBLEM — J90 PLEURAL EFFUSION: Status: ACTIVE | Noted: 2025-01-10

## 2025-01-10 PROBLEM — R91.1 LUNG NODULE: Status: ACTIVE | Noted: 2025-01-10

## 2025-01-10 PROBLEM — I16.0 HYPERTENSIVE URGENCY: Status: ACTIVE | Noted: 2025-01-10

## 2025-01-10 LAB
ANION GAP SERPL CALCULATED.3IONS-SCNC: 10 MMOL/L (ref 3–16)
APPEARANCE FLUID: NORMAL
BACTERIA BLD CULT ORG #2: NORMAL
BASE EXCESS BLDV CALC-SCNC: 4.5 MMOL/L (ref -3–3)
BASOPHILS # BLD: 0 K/UL (ref 0–0.2)
BASOPHILS NFR BLD: 0.3 %
BASOPHILS NFR FLD MANUAL: 1 %
BDY FLUID QUALITY: NORMAL
BUN SERPL-MCNC: 6 MG/DL (ref 7–20)
CALCIUM SERPL-MCNC: 8.4 MG/DL (ref 8.3–10.6)
CELL COUNT FLUID TYPE: NORMAL
CHLORIDE SERPL-SCNC: 92 MMOL/L (ref 99–110)
CO2 BLDV-SCNC: 31 MMOL/L
CO2 SERPL-SCNC: 31 MMOL/L (ref 21–32)
COHGB MFR BLDV: 2.6 % (ref 0–1.5)
COLOR FLUID: NORMAL
CREAT SERPL-MCNC: 0.4 MG/DL (ref 0.6–1.2)
DEPRECATED RDW RBC AUTO: 16.2 % (ref 12.4–15.4)
EKG ATRIAL RATE: 83 BPM
EKG DIAGNOSIS: NORMAL
EKG P AXIS: 79 DEGREES
EKG P-R INTERVAL: 144 MS
EKG Q-T INTERVAL: 384 MS
EKG QRS DURATION: 86 MS
EKG QTC CALCULATION (BAZETT): 451 MS
EKG R AXIS: 9 DEGREES
EKG T AXIS: 34 DEGREES
EKG VENTRICULAR RATE: 83 BPM
EOSINOPHIL # BLD: 0 K/UL (ref 0–0.6)
EOSINOPHIL NFR BLD: 0.4 %
EOSINOPHIL NFR FLD MANUAL: 28 %
FERRITIN SERPL IA-MCNC: 42.9 NG/ML (ref 15–150)
GFR SERPLBLD CREATININE-BSD FMLA CKD-EPI: >90 ML/MIN/{1.73_M2}
GLUCOSE SERPL-MCNC: 149 MG/DL (ref 70–99)
HCO3 BLDV-SCNC: 29.2 MMOL/L (ref 23–29)
HCT VFR BLD AUTO: 31.3 % (ref 36–48)
HGB BLD-MCNC: 10.2 G/DL (ref 12–16)
INR PPP: 1.02 (ref 0.85–1.15)
IRON SATN MFR SERPL: 12 % (ref 15–50)
IRON SERPL-MCNC: 38 UG/DL (ref 37–145)
LDH FLD L TO P-CCNC: 138 U/L
LDH SERPL L TO P-CCNC: 217 U/L (ref 100–190)
LYMPHOCYTES # BLD: 0.4 K/UL (ref 1–5.1)
LYMPHOCYTES NFR BLD: 6.1 %
LYMPHOCYTES NFR FLD: 29 %
MACROPHAGES # FLD: 12 %
MAGNESIUM SERPL-MCNC: 1.72 MG/DL (ref 1.8–2.4)
MCH RBC QN AUTO: 26.7 PG (ref 26–34)
MCHC RBC AUTO-ENTMCNC: 32.6 G/DL (ref 31–36)
MCV RBC AUTO: 82.1 FL (ref 80–100)
MESOTHL CELL NFR FLD: 2 %
METHGB MFR BLDV: 0.1 %
MONOCYTES # BLD: 0.1 K/UL (ref 0–1.3)
MONOCYTES NFR BLD: 2.2 %
MRSA DNA SPEC QL NAA+PROBE: NORMAL
NEUTROPHIL, FLUID: 28 %
NEUTROPHILS # BLD: 5.8 K/UL (ref 1.7–7.7)
NEUTROPHILS NFR BLD: 91 %
NUC CELL # FLD: 1012 /CUMM
O2 CT VFR BLDV CALC: 12 VOL %
O2 THERAPY: ABNORMAL
PCO2 BLDV: 44.4 MMHG (ref 40–50)
PH BLDV: 7.44 [PH] (ref 7.35–7.45)
PLATELET # BLD AUTO: 186 K/UL (ref 135–450)
PMV BLD AUTO: 6.8 FL (ref 5–10.5)
PO2 BLDV: 44.9 MMHG (ref 25–40)
POTASSIUM SERPL-SCNC: 3.4 MMOL/L (ref 3.5–5.1)
PROT FLD-MCNC: 2.4 G/DL
PROT SERPL-MCNC: 6.8 G/DL (ref 6.4–8.2)
PROTHROMBIN TIME: 13.6 SEC (ref 11.9–14.9)
RBC # BLD AUTO: 3.81 M/UL (ref 4–5.2)
RBC FLUID: 1100 /CUMM
SAO2 % BLDV: 82 %
SODIUM SERPL-SCNC: 133 MMOL/L (ref 136–145)
SPECIMEN SOURCE FLD: NORMAL
TIBC SERPL-MCNC: 312 UG/DL (ref 260–445)
TOTAL CELLS COUNTED FLD: 100
WBC # BLD AUTO: 6.4 K/UL (ref 4–11)

## 2025-01-10 PROCEDURE — 2500000003 HC RX 250 WO HCPCS: Performed by: INTERNAL MEDICINE

## 2025-01-10 PROCEDURE — 83735 ASSAY OF MAGNESIUM: CPT

## 2025-01-10 PROCEDURE — 82803 BLOOD GASES ANY COMBINATION: CPT

## 2025-01-10 PROCEDURE — 6370000000 HC RX 637 (ALT 250 FOR IP): Performed by: INTERNAL MEDICINE

## 2025-01-10 PROCEDURE — 84155 ASSAY OF PROTEIN SERUM: CPT

## 2025-01-10 PROCEDURE — 2580000003 HC RX 258: Performed by: INTERNAL MEDICINE

## 2025-01-10 PROCEDURE — 83540 ASSAY OF IRON: CPT

## 2025-01-10 PROCEDURE — 85025 COMPLETE CBC W/AUTO DIFF WBC: CPT

## 2025-01-10 PROCEDURE — 88185 FLOWCYTOMETRY/TC ADD-ON: CPT

## 2025-01-10 PROCEDURE — 32555 ASPIRATE PLEURA W/ IMAGING: CPT

## 2025-01-10 PROCEDURE — 0W9B3ZZ DRAINAGE OF LEFT PLEURAL CAVITY, PERCUTANEOUS APPROACH: ICD-10-PCS | Performed by: INTERNAL MEDICINE

## 2025-01-10 PROCEDURE — 88305 TISSUE EXAM BY PATHOLOGIST: CPT

## 2025-01-10 PROCEDURE — 6370000000 HC RX 637 (ALT 250 FOR IP)

## 2025-01-10 PROCEDURE — 6360000002 HC RX W HCPCS: Performed by: INTERNAL MEDICINE

## 2025-01-10 PROCEDURE — 88184 FLOWCYTOMETRY/ TC 1 MARKER: CPT

## 2025-01-10 PROCEDURE — 94640 AIRWAY INHALATION TREATMENT: CPT

## 2025-01-10 PROCEDURE — 87070 CULTURE OTHR SPECIMN AEROBIC: CPT

## 2025-01-10 PROCEDURE — 82728 ASSAY OF FERRITIN: CPT

## 2025-01-10 PROCEDURE — 83550 IRON BINDING TEST: CPT

## 2025-01-10 PROCEDURE — 2700000000 HC OXYGEN THERAPY PER DAY

## 2025-01-10 PROCEDURE — 36415 COLL VENOUS BLD VENIPUNCTURE: CPT

## 2025-01-10 PROCEDURE — 85610 PROTHROMBIN TIME: CPT

## 2025-01-10 PROCEDURE — 87641 MR-STAPH DNA AMP PROBE: CPT

## 2025-01-10 PROCEDURE — 6360000002 HC RX W HCPCS

## 2025-01-10 PROCEDURE — 93010 ELECTROCARDIOGRAM REPORT: CPT | Performed by: INTERNAL MEDICINE

## 2025-01-10 PROCEDURE — 71045 X-RAY EXAM CHEST 1 VIEW: CPT

## 2025-01-10 PROCEDURE — 99233 SBSQ HOSP IP/OBS HIGH 50: CPT

## 2025-01-10 PROCEDURE — 99223 1ST HOSP IP/OBS HIGH 75: CPT | Performed by: INTERNAL MEDICINE

## 2025-01-10 PROCEDURE — 80048 BASIC METABOLIC PNL TOTAL CA: CPT

## 2025-01-10 PROCEDURE — 89051 BODY FLUID CELL COUNT: CPT

## 2025-01-10 PROCEDURE — 83615 LACTATE (LD) (LDH) ENZYME: CPT

## 2025-01-10 PROCEDURE — 88112 CYTOPATH CELL ENHANCE TECH: CPT

## 2025-01-10 PROCEDURE — 84157 ASSAY OF PROTEIN OTHER: CPT

## 2025-01-10 PROCEDURE — 87205 SMEAR GRAM STAIN: CPT

## 2025-01-10 PROCEDURE — 2060000000 HC ICU INTERMEDIATE R&B

## 2025-01-10 PROCEDURE — 94761 N-INVAS EAR/PLS OXIMETRY MLT: CPT

## 2025-01-10 RX ORDER — DIVALPROEX SODIUM 250 MG/1
250 TABLET, FILM COATED, EXTENDED RELEASE ORAL EVERY MORNING
Status: DISCONTINUED | OUTPATIENT
Start: 2025-01-10 | End: 2025-01-11 | Stop reason: HOSPADM

## 2025-01-10 RX ORDER — POTASSIUM CHLORIDE 1500 MG/1
40 TABLET, EXTENDED RELEASE ORAL PRN
Status: DISCONTINUED | OUTPATIENT
Start: 2025-01-10 | End: 2025-01-11 | Stop reason: HOSPADM

## 2025-01-10 RX ORDER — SODIUM CHLORIDE 0.9 % (FLUSH) 0.9 %
5-40 SYRINGE (ML) INJECTION EVERY 12 HOURS SCHEDULED
Status: DISCONTINUED | OUTPATIENT
Start: 2025-01-10 | End: 2025-01-11 | Stop reason: HOSPADM

## 2025-01-10 RX ORDER — BUSPIRONE HYDROCHLORIDE 7.5 MG/1
7.5 TABLET ORAL 3 TIMES DAILY
Status: DISCONTINUED | OUTPATIENT
Start: 2025-01-10 | End: 2025-01-11 | Stop reason: HOSPADM

## 2025-01-10 RX ORDER — ACETAMINOPHEN 650 MG/1
650 SUPPOSITORY RECTAL EVERY 6 HOURS PRN
Status: DISCONTINUED | OUTPATIENT
Start: 2025-01-10 | End: 2025-01-11 | Stop reason: HOSPADM

## 2025-01-10 RX ORDER — VANCOMYCIN 1.5 G/300ML
1500 INJECTION, SOLUTION INTRAVENOUS EVERY 12 HOURS
Status: DISCONTINUED | OUTPATIENT
Start: 2025-01-10 | End: 2025-01-10

## 2025-01-10 RX ORDER — ENOXAPARIN SODIUM 100 MG/ML
40 INJECTION SUBCUTANEOUS DAILY
Status: DISCONTINUED | OUTPATIENT
Start: 2025-01-10 | End: 2025-01-10 | Stop reason: SDUPTHER

## 2025-01-10 RX ORDER — ONDANSETRON 4 MG/1
4 TABLET, ORALLY DISINTEGRATING ORAL EVERY 8 HOURS PRN
Status: DISCONTINUED | OUTPATIENT
Start: 2025-01-10 | End: 2025-01-10 | Stop reason: SDUPTHER

## 2025-01-10 RX ORDER — LOSARTAN POTASSIUM 50 MG/1
50 TABLET ORAL ONCE
Status: COMPLETED | OUTPATIENT
Start: 2025-01-10 | End: 2025-01-10

## 2025-01-10 RX ORDER — ALBUTEROL SULFATE 0.83 MG/ML
0.63 SOLUTION RESPIRATORY (INHALATION) EVERY 6 HOURS PRN
Status: DISCONTINUED | OUTPATIENT
Start: 2025-01-10 | End: 2025-01-11 | Stop reason: HOSPADM

## 2025-01-10 RX ORDER — HYDRALAZINE HYDROCHLORIDE 25 MG/1
25 TABLET, FILM COATED ORAL EVERY 8 HOURS SCHEDULED
Status: DISCONTINUED | OUTPATIENT
Start: 2025-01-10 | End: 2025-01-10

## 2025-01-10 RX ORDER — ONDANSETRON 2 MG/ML
4 INJECTION INTRAMUSCULAR; INTRAVENOUS EVERY 6 HOURS PRN
Status: DISCONTINUED | OUTPATIENT
Start: 2025-01-10 | End: 2025-01-11 | Stop reason: HOSPADM

## 2025-01-10 RX ORDER — POTASSIUM CHLORIDE 1500 MG/1
20 TABLET, EXTENDED RELEASE ORAL ONCE
Status: COMPLETED | OUTPATIENT
Start: 2025-01-10 | End: 2025-01-10

## 2025-01-10 RX ORDER — MAGNESIUM SULFATE IN WATER 40 MG/ML
2000 INJECTION, SOLUTION INTRAVENOUS PRN
Status: DISCONTINUED | OUTPATIENT
Start: 2025-01-10 | End: 2025-01-11 | Stop reason: HOSPADM

## 2025-01-10 RX ORDER — LEVOFLOXACIN 750 MG/1
750 TABLET, FILM COATED ORAL DAILY
Status: DISCONTINUED | OUTPATIENT
Start: 2025-01-11 | End: 2025-01-11 | Stop reason: HOSPADM

## 2025-01-10 RX ORDER — CARVEDILOL 25 MG/1
25 TABLET ORAL 2 TIMES DAILY WITH MEALS
Status: DISCONTINUED | OUTPATIENT
Start: 2025-01-10 | End: 2025-01-11 | Stop reason: HOSPADM

## 2025-01-10 RX ORDER — MAGNESIUM SULFATE 1 G/100ML
1000 INJECTION INTRAVENOUS ONCE
Status: COMPLETED | OUTPATIENT
Start: 2025-01-10 | End: 2025-01-10

## 2025-01-10 RX ORDER — SODIUM CHLORIDE 0.9 % (FLUSH) 0.9 %
5-40 SYRINGE (ML) INJECTION PRN
Status: DISCONTINUED | OUTPATIENT
Start: 2025-01-10 | End: 2025-01-11 | Stop reason: HOSPADM

## 2025-01-10 RX ORDER — HYDRALAZINE HYDROCHLORIDE 25 MG/1
25 TABLET, FILM COATED ORAL EVERY 8 HOURS SCHEDULED
Status: DISCONTINUED | OUTPATIENT
Start: 2025-01-10 | End: 2025-01-11 | Stop reason: HOSPADM

## 2025-01-10 RX ORDER — LOSARTAN POTASSIUM 50 MG/1
50 TABLET ORAL DAILY
Status: DISCONTINUED | OUTPATIENT
Start: 2025-01-10 | End: 2025-01-10

## 2025-01-10 RX ORDER — POLYETHYLENE GLYCOL 3350 17 G/17G
17 POWDER, FOR SOLUTION ORAL DAILY PRN
Status: DISCONTINUED | OUTPATIENT
Start: 2025-01-10 | End: 2025-01-11 | Stop reason: HOSPADM

## 2025-01-10 RX ORDER — ENOXAPARIN SODIUM 100 MG/ML
40 INJECTION SUBCUTANEOUS DAILY
Status: DISCONTINUED | OUTPATIENT
Start: 2025-01-10 | End: 2025-01-11 | Stop reason: HOSPADM

## 2025-01-10 RX ORDER — SODIUM CHLORIDE 9 MG/ML
INJECTION, SOLUTION INTRAVENOUS PRN
Status: DISCONTINUED | OUTPATIENT
Start: 2025-01-10 | End: 2025-01-11 | Stop reason: HOSPADM

## 2025-01-10 RX ORDER — ACETAMINOPHEN 325 MG/1
650 TABLET ORAL EVERY 6 HOURS PRN
Status: DISCONTINUED | OUTPATIENT
Start: 2025-01-10 | End: 2025-01-11 | Stop reason: HOSPADM

## 2025-01-10 RX ORDER — LOSARTAN POTASSIUM 100 MG/1
100 TABLET ORAL DAILY
Status: DISCONTINUED | OUTPATIENT
Start: 2025-01-11 | End: 2025-01-11 | Stop reason: HOSPADM

## 2025-01-10 RX ORDER — NICOTINE 21 MG/24HR
1 PATCH, TRANSDERMAL 24 HOURS TRANSDERMAL EVERY 24 HOURS
Status: DISCONTINUED | OUTPATIENT
Start: 2025-01-10 | End: 2025-01-11 | Stop reason: HOSPADM

## 2025-01-10 RX ORDER — HYDRALAZINE HYDROCHLORIDE 50 MG/1
50 TABLET, FILM COATED ORAL EVERY 8 HOURS SCHEDULED
Status: DISCONTINUED | OUTPATIENT
Start: 2025-01-10 | End: 2025-01-10

## 2025-01-10 RX ORDER — DULOXETIN HYDROCHLORIDE 60 MG/1
60 CAPSULE, DELAYED RELEASE ORAL DAILY
Status: DISCONTINUED | OUTPATIENT
Start: 2025-01-10 | End: 2025-01-11 | Stop reason: HOSPADM

## 2025-01-10 RX ORDER — ONDANSETRON 2 MG/ML
4 INJECTION INTRAMUSCULAR; INTRAVENOUS EVERY 6 HOURS PRN
Status: DISCONTINUED | OUTPATIENT
Start: 2025-01-10 | End: 2025-01-10 | Stop reason: SDUPTHER

## 2025-01-10 RX ORDER — PREDNISONE 20 MG/1
40 TABLET ORAL DAILY
Status: DISCONTINUED | OUTPATIENT
Start: 2025-01-11 | End: 2025-01-11 | Stop reason: HOSPADM

## 2025-01-10 RX ORDER — DIVALPROEX SODIUM 500 MG/1
500 TABLET, FILM COATED, EXTENDED RELEASE ORAL
Status: DISCONTINUED | OUTPATIENT
Start: 2025-01-10 | End: 2025-01-11 | Stop reason: HOSPADM

## 2025-01-10 RX ORDER — ASPIRIN 81 MG/1
81 TABLET, CHEWABLE ORAL DAILY
Status: DISCONTINUED | OUTPATIENT
Start: 2025-01-10 | End: 2025-01-11 | Stop reason: HOSPADM

## 2025-01-10 RX ORDER — ATORVASTATIN CALCIUM 40 MG/1
40 TABLET, FILM COATED ORAL DAILY
Status: DISCONTINUED | OUTPATIENT
Start: 2025-01-10 | End: 2025-01-11 | Stop reason: HOSPADM

## 2025-01-10 RX ORDER — FUROSEMIDE 10 MG/ML
40 INJECTION INTRAMUSCULAR; INTRAVENOUS DAILY
Status: DISCONTINUED | OUTPATIENT
Start: 2025-01-10 | End: 2025-01-11 | Stop reason: HOSPADM

## 2025-01-10 RX ORDER — POTASSIUM CHLORIDE 7.45 MG/ML
10 INJECTION INTRAVENOUS PRN
Status: DISCONTINUED | OUTPATIENT
Start: 2025-01-10 | End: 2025-01-11 | Stop reason: HOSPADM

## 2025-01-10 RX ORDER — PANTOPRAZOLE SODIUM 40 MG/1
40 TABLET, DELAYED RELEASE ORAL DAILY
Status: DISCONTINUED | OUTPATIENT
Start: 2025-01-10 | End: 2025-01-11 | Stop reason: HOSPADM

## 2025-01-10 RX ORDER — ONDANSETRON 4 MG/1
4 TABLET, ORALLY DISINTEGRATING ORAL EVERY 8 HOURS PRN
Status: DISCONTINUED | OUTPATIENT
Start: 2025-01-10 | End: 2025-01-11 | Stop reason: HOSPADM

## 2025-01-10 RX ORDER — IPRATROPIUM BROMIDE AND ALBUTEROL SULFATE 2.5; .5 MG/3ML; MG/3ML
1 SOLUTION RESPIRATORY (INHALATION)
Status: DISCONTINUED | OUTPATIENT
Start: 2025-01-10 | End: 2025-01-10

## 2025-01-10 RX ORDER — QUETIAPINE FUMARATE 200 MG/1
400 TABLET, FILM COATED ORAL NIGHTLY
Status: DISCONTINUED | OUTPATIENT
Start: 2025-01-10 | End: 2025-01-11 | Stop reason: HOSPADM

## 2025-01-10 RX ORDER — OXYCODONE HYDROCHLORIDE 5 MG/1
5 TABLET ORAL EVERY 6 HOURS PRN
Status: DISCONTINUED | OUTPATIENT
Start: 2025-01-10 | End: 2025-01-11 | Stop reason: HOSPADM

## 2025-01-10 RX ORDER — GUAIFENESIN 600 MG/1
600 TABLET, EXTENDED RELEASE ORAL 2 TIMES DAILY
Status: DISCONTINUED | OUTPATIENT
Start: 2025-01-10 | End: 2025-01-11 | Stop reason: HOSPADM

## 2025-01-10 RX ORDER — TOPIRAMATE 25 MG/1
25 TABLET, FILM COATED ORAL DAILY
Status: DISCONTINUED | OUTPATIENT
Start: 2025-01-10 | End: 2025-01-11 | Stop reason: HOSPADM

## 2025-01-10 RX ORDER — IPRATROPIUM BROMIDE AND ALBUTEROL SULFATE 2.5; .5 MG/3ML; MG/3ML
1 SOLUTION RESPIRATORY (INHALATION)
Status: DISCONTINUED | OUTPATIENT
Start: 2025-01-10 | End: 2025-01-11 | Stop reason: HOSPADM

## 2025-01-10 RX ADMIN — GUAIFENESIN 600 MG: 600 TABLET, EXTENDED RELEASE ORAL at 21:32

## 2025-01-10 RX ADMIN — BUSPIRONE HYDROCHLORIDE 7.5 MG: 7.5 TABLET ORAL at 08:17

## 2025-01-10 RX ADMIN — BUSPIRONE HYDROCHLORIDE 7.5 MG: 7.5 TABLET ORAL at 16:25

## 2025-01-10 RX ADMIN — DULOXETINE HYDROCHLORIDE 60 MG: 60 CAPSULE, DELAYED RELEASE ORAL at 08:16

## 2025-01-10 RX ADMIN — HYDRALAZINE HYDROCHLORIDE 25 MG: 25 TABLET ORAL at 21:31

## 2025-01-10 RX ADMIN — QUETIAPINE FUMARATE 400 MG: 200 TABLET ORAL at 21:32

## 2025-01-10 RX ADMIN — OXYCODONE 5 MG: 5 TABLET ORAL at 23:58

## 2025-01-10 RX ADMIN — MAGNESIUM SULFATE IN DEXTROSE 1000 MG: 10 INJECTION, SOLUTION INTRAVENOUS at 10:19

## 2025-01-10 RX ADMIN — QUETIAPINE FUMARATE 400 MG: 200 TABLET ORAL at 02:29

## 2025-01-10 RX ADMIN — OXYCODONE 5 MG: 5 TABLET ORAL at 16:31

## 2025-01-10 RX ADMIN — CEFEPIME 2000 MG: 2 INJECTION, POWDER, FOR SOLUTION INTRAVENOUS at 16:28

## 2025-01-10 RX ADMIN — ACETAMINOPHEN 650 MG: 325 TABLET ORAL at 08:22

## 2025-01-10 RX ADMIN — LOSARTAN POTASSIUM 50 MG: 50 TABLET, FILM COATED ORAL at 16:25

## 2025-01-10 RX ADMIN — ASPIRIN 81 MG: 81 TABLET, CHEWABLE ORAL at 08:16

## 2025-01-10 RX ADMIN — IPRATROPIUM BROMIDE AND ALBUTEROL SULFATE 1 DOSE: 2.5; .5 SOLUTION RESPIRATORY (INHALATION) at 07:14

## 2025-01-10 RX ADMIN — GUAIFENESIN 600 MG: 600 TABLET, EXTENDED RELEASE ORAL at 08:16

## 2025-01-10 RX ADMIN — CARVEDILOL 25 MG: 25 TABLET, FILM COATED ORAL at 16:26

## 2025-01-10 RX ADMIN — IPRATROPIUM BROMIDE AND ALBUTEROL SULFATE 1 DOSE: 2.5; .5 SOLUTION RESPIRATORY (INHALATION) at 15:26

## 2025-01-10 RX ADMIN — ATORVASTATIN CALCIUM 40 MG: 40 TABLET, FILM COATED ORAL at 08:16

## 2025-01-10 RX ADMIN — GUAIFENESIN 600 MG: 600 TABLET, EXTENDED RELEASE ORAL at 02:29

## 2025-01-10 RX ADMIN — FUROSEMIDE 40 MG: 10 INJECTION, SOLUTION INTRAMUSCULAR; INTRAVENOUS at 16:35

## 2025-01-10 RX ADMIN — DIVALPROEX SODIUM 500 MG: 500 TABLET, FILM COATED, EXTENDED RELEASE ORAL at 21:32

## 2025-01-10 RX ADMIN — VANCOMYCIN 1500 MG: 1.5 INJECTION, SOLUTION INTRAVENOUS at 10:52

## 2025-01-10 RX ADMIN — IPRATROPIUM BROMIDE AND ALBUTEROL SULFATE 1 DOSE: 2.5; .5 SOLUTION RESPIRATORY (INHALATION) at 19:44

## 2025-01-10 RX ADMIN — POTASSIUM CHLORIDE 20 MEQ: 1500 TABLET, EXTENDED RELEASE ORAL at 10:15

## 2025-01-10 RX ADMIN — LOSARTAN POTASSIUM 50 MG: 50 TABLET, FILM COATED ORAL at 08:17

## 2025-01-10 RX ADMIN — DIVALPROEX SODIUM 500 MG: 500 TABLET, FILM COATED, EXTENDED RELEASE ORAL at 02:29

## 2025-01-10 RX ADMIN — PANTOPRAZOLE SODIUM 40 MG: 40 TABLET, DELAYED RELEASE ORAL at 08:17

## 2025-01-10 RX ADMIN — HYDRALAZINE HYDROCHLORIDE 25 MG: 25 TABLET ORAL at 05:08

## 2025-01-10 RX ADMIN — TOPIRAMATE 25 MG: 25 TABLET, FILM COATED ORAL at 08:16

## 2025-01-10 RX ADMIN — IPRATROPIUM BROMIDE AND ALBUTEROL SULFATE 1 DOSE: 2.5; .5 SOLUTION RESPIRATORY (INHALATION) at 11:45

## 2025-01-10 RX ADMIN — CEFEPIME 2000 MG: 2 INJECTION, POWDER, FOR SOLUTION INTRAVENOUS at 08:24

## 2025-01-10 RX ADMIN — BUSPIRONE HYDROCHLORIDE 7.5 MG: 7.5 TABLET ORAL at 21:31

## 2025-01-10 RX ADMIN — CARVEDILOL 25 MG: 25 TABLET, FILM COATED ORAL at 08:17

## 2025-01-10 RX ADMIN — Medication 10 ML: at 08:17

## 2025-01-10 RX ADMIN — TIOTROPIUM BROMIDE INHALATION SPRAY 2 PUFF: 3.12 SPRAY, METERED RESPIRATORY (INHALATION) at 07:14

## 2025-01-10 RX ADMIN — DIVALPROEX SODIUM 250 MG: 250 TABLET, EXTENDED RELEASE ORAL at 08:16

## 2025-01-10 ASSESSMENT — PAIN DESCRIPTION - ONSET
ONSET: PROGRESSIVE
ONSET: PROGRESSIVE
ONSET: SUDDEN

## 2025-01-10 ASSESSMENT — PAIN DESCRIPTION - DESCRIPTORS
DESCRIPTORS: ACHING;SHARP
DESCRIPTORS: ACHING;DISCOMFORT;OTHER (COMMENT)
DESCRIPTORS: ACHING;DISCOMFORT
DESCRIPTORS: ACHING

## 2025-01-10 ASSESSMENT — PAIN DESCRIPTION - FREQUENCY
FREQUENCY: OTHER (COMMENT)
FREQUENCY: INTERMITTENT
FREQUENCY: INTERMITTENT

## 2025-01-10 ASSESSMENT — LIFESTYLE VARIABLES
HOW MANY STANDARD DRINKS CONTAINING ALCOHOL DO YOU HAVE ON A TYPICAL DAY: PATIENT DOES NOT DRINK
HOW OFTEN DO YOU HAVE A DRINK CONTAINING ALCOHOL: NEVER

## 2025-01-10 ASSESSMENT — PAIN SCALES - GENERAL
PAINLEVEL_OUTOF10: 6
PAINLEVEL_OUTOF10: 8
PAINLEVEL_OUTOF10: 10
PAINLEVEL_OUTOF10: 9
PAINLEVEL_OUTOF10: 10

## 2025-01-10 ASSESSMENT — PAIN DESCRIPTION - LOCATION
LOCATION: SHOULDER
LOCATION: CHEST
LOCATION: SHOULDER
LOCATION: CHEST
LOCATION: HEAD

## 2025-01-10 ASSESSMENT — PAIN DESCRIPTION - DIRECTION: RADIATING_TOWARDS: BACK

## 2025-01-10 ASSESSMENT — PAIN DESCRIPTION - ORIENTATION
ORIENTATION: LEFT

## 2025-01-10 ASSESSMENT — PAIN - FUNCTIONAL ASSESSMENT
PAIN_FUNCTIONAL_ASSESSMENT: PREVENTS OR INTERFERES SOME ACTIVE ACTIVITIES AND ADLS

## 2025-01-10 ASSESSMENT — PAIN DESCRIPTION - PAIN TYPE
TYPE: ACUTE PAIN

## 2025-01-10 NOTE — PROGRESS NOTES
Pt arrived for image guided Thoracentesis. Dr. Cole explained the procedure including the risk and benefits of the procedure. All questions answered. Pt verbalizes understanding of the procedure and states no more questions. Consent confirmed. Vital signs stable. Labs, allergies, medications, and code status reviewed. No contraindications noted. Time out completed prior to procedure.    Vital Signs  Vitals:    01/10/25 1145   BP:    Pulse:    Resp:    Temp:    SpO2: 94%    (vital signs in table format)      Allergies  Dicyclomine, Ibuprofen, Sumatriptan, Tape [adhesive tape], Tizanidine, and Motrin [ibuprofen micronized] (allergies)    Labs  Lab Results   Component Value Date    INR 1.02 01/10/2025    PROTIME 13.6 01/10/2025     Lab Results   Component Value Date    CREATININE 0.4 (L) 01/10/2025    BUN 6 (L) 01/10/2025     (L) 01/10/2025    K 3.4 (L) 01/10/2025    CL 92 (L) 01/10/2025    CO2 31 01/10/2025     Lab Results   Component Value Date    WBC 6.4 01/10/2025    HGB 10.2 (L) 01/10/2025    HCT 31.3 (L) 01/10/2025    MCV 82.1 01/10/2025     01/10/2025

## 2025-01-10 NOTE — CONSULTS
[facility]   CONSULTATION  [phone]        Reason for Consultation/Chief Complaint: \"I have been having SOB.\"  Cardiology consulted for CHF per Chu Back MD  Last patient seen last by Dr. SWAPNIL Gonzalez on 9/19/2022    History of Present Illness:    Najma Fitzpatrick is a 74 y.o. patient who presented to [facility] 1/9/2025 with c/o SOB.  PMH anemia, chronic diastolic CHF, noncompliance, HLD, HTN, Liver dz, COPD, opiate misuse. Prior testing: LHC 11/7/14 noted normal coronaries. Anayeli-Nuc 9/29/20 neg for ischemia; EF=>75%. Echo 9/3/24 EF=60-65%; Grade I DD. D  Presented from home via EMS with c/o SOB.  She was discharged 12/31 for COPD exacerbation.  She wears 2LNC at baseline.  She received two doses of albuterol at home and a dose of duoneb en route. Reports SOB at rest and exertion for months but worse last 2 weeks. Also with cough and clear phelgm. Reports brief, occasonal, random, dull mid-CP. Admission Testing:  EKG noted SR with premature supraventricular complexes; 83 bpm. CXR noted Small left pleural effusion with hazy left basilar ASD. Chest CT mod to large size left pleural effusion and small moderate pleural effusion with bibasilar ASD; 1.4x1.3 cm lobulated noduleRUL, suspicious for malignancy.  LABS: , K 3.6, 3.4; Mg+ 1.72; BUN/Cr 5/0.4, BNP 1,217, ALT 11, AST 15, H/H 10.2/31.5 and Lizabeth 23 and 24. Note markedly hypertensive 220's systolic on arrival. Received hydralazine, cefepime, Solu-Medrol, duo nebulizer and vancomycin. Patient with no c/o palpitations, dizziness, edema, or orthopnea/PND. I have been asked to provide consultation regarding further management and testing.    Past Medical History:   has a past medical history of Abnormal ECG, Anemia, Arthritis, Back pain, chronic, Bipolar disorder (HCC), Bronchitis chronic, CHF (congestive heart failure) ([facility]), Chronic back pain, Chronic neck pain, Clostridium difficile infection, 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[facility]), Coronary artery disease  external patch Place 1 patch onto the skin daily Apply patch to back.  Patch may remain in place for up to 12 hours in any 24 hour period. 11/3/24  Yes Brad Cheung MD   hydrALAZINE (APRESOLINE) 25 MG tablet Take 1 tablet by mouth every 8 hours 11/2/24  Yes Brad Cheung MD   losartan (COZAAR) 50 MG tablet Take 1 tablet by mouth daily 11/3/24  Yes Brad Cheung MD   carvedilol (COREG) 25 MG tablet Take 1 tablet by mouth 2 times daily (with meals)   Yes Vida Camarillo MD   DULoxetine (CYMBALTA) 60 MG extended release capsule Take 1 capsule by mouth daily   Yes Vida Camarillo MD   divalproex (DEPAKOTE ER) 500 MG extended release tablet Take 1 tablet by mouth nightly   Yes Vida Camarillo MD   divalproex (DEPAKOTE ER) 250 MG extended release tablet Take 1 tablet by mouth every morning   Yes Vida Camarillo MD   aspirin 81 MG chewable tablet Take 1 tablet by mouth daily   Yes ProviderVida MD   atorvastatin (LIPITOR) 40 MG tablet Take 1 tablet by mouth daily   Yes ProviderVida MD   pantoprazole (PROTONIX) 40 MG tablet Take 1 tablet by mouth daily   Yes ProviderVida MD   albuterol sulfate HFA (VENTOLIN HFA) 108 (90 Base) MCG/ACT inhaler Inhale 2 puffs into the lungs every 4 hours as needed for Wheezing   Yes ProviderVida MD   nicotine (NICODERM CQ) 21 MG/24HR Place 1 patch onto the skin every 24 hours   Yes Vida Camarillo MD   topiramate (TOPAMAX) 25 MG tablet Take 1 tablet by mouth daily   Yes ProviderVida MD   albuterol (ACCUNEB) 0.63 MG/3ML nebulizer solution Take 3 mLs by nebulization every 6 hours as needed for Wheezing   Yes ProviderVida MD   butalbital-acetaminophen-caffeine (FIORICET, ESGIC) -40 MG per tablet Take 1 tablet by mouth every 6 hours as needed for Headaches Max Daily Amount: 4 tablets  Patient not taking: Reported on 1/10/2025 12/31/24   Brad Cheung MD        Allergies:  Dicyclomine, Ibuprofen,

## 2025-01-10 NOTE — PROGRESS NOTES
Patient admitted to room 322 from ED. Patient oriented to room, call light, bed rails, phone, lights and bathroom. Patient instructed about the schedule of the day including: vital sign frequency, lab draws, possible tests, frequency of MD and staff rounds, daily weights, I &O's and prescribed diet. Yes Telemetry box in place, patient aware of placement and reason. Bed locked, in lowest position, side rails up 2/4, call light within reach.        Recliner Assessment  Patient is not able to demonstrated the ability to move from a reclining position to an upright position within the recliner. however patient is alert, oriented and able to provide informed consent       4 Eyes Skin Assessment     NAME:  Najma Fitzpatrick  YOB: 1950  MEDICAL RECORD NUMBER:  [id number]    The patient is being assessed for  Admission    I agree that at least one RN has performed a thorough Head to Toe Skin Assessment on the patient. ALL assessment sites listed below have been assessed.      Areas assessed by both nurses:    Head, Face, Ears, Shoulders, Back, Chest, Arms, Elbows, Hands, Sacrum. Buttock, Coccyx, Ischium, Legs. Feet and Heels, and Under Medical Devices     Scattered bruises        Does the Patient have a Wound? No noted wound(s)       Emmanuel Prevention initiated by RN: No  Wound Care Orders initiated by RN: No    Pressure Injury (Stage 3,4, Unstageable, DTI, NWPT, and Complex wounds) if present, place Wound referral order by RN under : No    New Ostomies, if present place, Ostomy referral order under : No     Nurse 1 eSignature: Electronically signed by Alvaro Alfredo RN on 1/10/25 at 3:02 AM EST    **SHARE this note so that the co-signing nurse can place an eSignature**    Nurse 2 eSignature: {Esignature:[id number]}

## 2025-01-10 NOTE — H&P
[facility] Medicine History & Physical      Patient Name: Najma Fitzpatrick    : 1950    PCP: Geena Sotomayor, APRN - CNP    Date of Service:  Patient seen and examined on 25     Chief Complaint: Shortness of breath    History Of Present Illness:    Najma Fitzpatrick is a 74 y.o. female with a PMH of COPD, CHF, hypertension, hyperlipidemia, anxiety depression, GERD, CAD, who presented to ED with complaint of shortness of breath    Of note patient was recently admitted from  to  for acute on chronic respiratory failure due to COPD exacerbation.  Presents to the ED with progressive worsening shortness of breath over the last few days.  Vitals show blood pressure 194/98 pulse of 81 temperature 98.3 respiration 20 saturating 98%.    Sodium 134 chloride 93 proBNP 1317 troponin 23/24.  WBC 9.5 hemoglobin 10.2.  VBG stable  Chest x-ray shows small left pleural effusion CT chest shows moderate to large size left pleural effusion with small moderate right pleural effusion presence of a 1.4 x 1.3 lobulated nodules.  Medial portion of the right upper lobe suspicious for malignancy.  In the ED patient received cefepime 2 g vancomycin 1.5 mg, methylprednisolone 125 mg and DuoNeb.    Past Medical History:    Patient has a past medical history of Abnormal ECG, Anemia, Arthritis, Back pain, chronic, Bipolar disorder ([facility]), Bronchitis chronic, CHF (congestive heart failure) ([facility]), Chronic back pain, Chronic neck pain, Clostridium difficile infection, 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[facility]), Coronary artery disease involving native coronary artery of native heart with angina pectoris ([facility]), Depression, Emphysema of lung ([facility]), Fibromyalgia, hyperlipidemia, Hypertension, Kidney stone, Liver disease, Lung disease, MDD (major depressive disorder), Mood disorder (HCC), Movement disorder, Neck pain, chronic, Opiate misuse, Personality disorder ([facility]), Pneumonia, and Polypharmacy.    Past Surgical History:    Patient

## 2025-01-10 NOTE — PROGRESS NOTES
Attempted to visit with Pt for loneliness and social isolation screen. Pt wanting rest. Offered to come back at a later time, which Pt was amenable to.    Dwight Blanco

## 2025-01-10 NOTE — DISCHARGE INSTRUCTIONS
Please follow-up with your primary care provider for repeat lab tests this week to check your kidneys and electrolytes with the new medications.       Heart Failure Resources:  Heart Failure Interactive Workbook:  Go to https://P[employer]llo.sonarDesign/publication/?v=541602 for a Free Heart Failure Interactive Workbook provided by The American Heart Association. This interactive workbook will provide information on Healthier Living with Heart Failure. Please copy and paste link into search bar. Use your mouse to scroll through the pages.    HF [place] teddy:   Heart Failure Free smart phone teddy available for iPhone and Android download. Use your phone to track sodium intake, fluid intake, symptoms, and weight.     Low Sodium Diet / Recipes:  Go to www.[employer] website for “renal” diet which is Low Sodium! [employer] is a dialysis company, but this website offers free seasonal cookbooks. Each quarter, they will release 25-30 new recipes with a breakdown of calories, sodium, and glucose. You can also go to www.BoxCast/recipes website for free recipes.     Discharge Instruction Video:  Scan the QR code below with your camera and click the canva.com link to open the video and watch educational information on Heart Failure and Medications from one of our nurses.   https://www.Passado/design/DAFZnsH_JRk/0DjnosrEDMZrbVSxjQ3tls/edit    Home Exercise Program:   Identification of Green/Yellow/Red zones:  You should be able to identify when you feel good (green zone), if you have 1-2 symptoms of HF (yellow zone), or if you are in need of medical attention (red zone).  In your CHF education folder you were provided a “stop light tool” to outline this information.     We want to you to rate your exertion levels:    Our therapy team has discussed means of identification with you such as the \"Kat scale.\"  The Kat rating scale ranges from 6 to 20, where 6 means \"no exertion at all\" and 20 means \"maximal exertion.\" The goal

## 2025-01-10 NOTE — ED PROVIDER NOTES
I independently performed a history and physical on Najma Fitzpatrick.     I have discussed the case with the AZAR/resident at 2100 and approve / take responsibility for the initial management plan and anticipated disposition as documented below.     In summary the patient presents with progressive shortness of breath on background of COPD and chronic hypoxic respiratory failure wearing 2 L nasal cannula oxygen at all times and recently admitted last month for a COPD exacerbation.  Patient states she has been progressively short of breath since that time.  On my assessment the patient is afebrile hemodynamically stable.  She is hypertensive.  She is on her baseline oxygen supplementation and doing well.  Her breath sounds are notable for decreased breath sounds on the left otherwise little in the way of wheezing.  She has no tripoding retractions or other markers of respiratory distress.  Her abdomen is soft nontender nondistended extremities are warm and well-perfused the patient's workup is notable for a large pleural effusion on the left hemithorax which is likely contributing to her shortness of breath.  The remainder of her workup is generally noncontributory.  Certainly a pneumonic effusion could be on the differential and as she was recently hospitalized she is started on broad-spectrum antibiotics for this concern.  Her lactate is benign as is her procalcitonin and she has no leukocytosis or fever.  Consideration could also be given to a malignant effusion or similar process.  In the ED she has no indications for emergent tube thoracostomy.  She will be admitted in stable condition for further consideration of her illness and intervention.    I interpreted the following studies:  Sinus rhythm at a rate of 83 with PACs.  Normal axis and intervals.  No evidence of acute ischemia.  EKG does not appear significantly changed from prior dated October 29, 2024.    I personally discussed the patient's management with the

## 2025-01-10 NOTE — CARE COORDINATION
Case Management Assessment  Initial Evaluation    Date/Time of Evaluation: 1/10/2025 7:59 AM  Assessment Completed by: Sheila Black RN    If patient is discharged prior to next notation, then this note serves as note for discharge by case management.    Patient Name: Najma Fitzpatrick                   YOB: 1950  Diagnosis: Pleural effusion [J90]  Heart failure (HCC) [I50.9]  Dyspnea, unspecified type [R06.00]  Congestive heart failure, unspecified HF chronicity, unspecified heart failure type (HCC) [I50.9]                   Date / Time: 1/9/2025  5:12 PM    Patient Admission Status: Inpatient   Readmission Risk (Low < 19, Mod (19-27), High > 27): Readmission Risk Score: 30.3    Current PCP: Geena Sotomayor APRN - CNP  PCP verified by ? Yes    Chart Reviewed: Yes      History Provided by: Patient, Medical Record  Patient Orientation: Alert and Oriented    Patient Cognition: Alert    Hospitalization in the last 30 days (Readmission):  Yes    If yes, Readmission Assessment in  Navigator will be completed.    Advance Directives:      Code Status: Full Code   Patient's Primary Decision Maker is: Legal Next of Kin    Primary Decision Maker: Brent Fitzpatrick - Spouse - [phone]    Secondary Decision Maker: Margarita Fitzpatrick - Child - [phone]    Secondary Decision Maker: Maricel Esquivel - Child - [phone]    Discharge Planning:    Patient lives with: Spouse/Significant Other, Children Type of Home: Trailer/Mobile Home  Primary Care Giver: Self  Patient Support Systems include: Spouse/Significant Other, Children, Family Members   Current Financial resources: Medicare  Current community resources: None  Current services prior to admission: Oxygen Therapy, Durable Medical Equipment ([facility])            Current DME: Walker            Type of Home Care services:  None    ADLS  Prior functional level: Independent in ADLs/IADLs  Current functional level: Independent in ADLs/IADLs    PT AM-PAC:   /24  OT  AM-PAC:   /24    Family can provide assistance at DC: Yes  Would you like Case Management to discuss the discharge plan with any other family members/significant others, and if so, who? Yes (spouse)  Plans to Return to Present Housing: Yes  Other Identified Issues/Barriers to RETURNING to current housing: na  Potential Assistance needed at discharge: N/A            Potential DME:    Patient expects to discharge to: Trailer/mobile home  Plan for transportation at discharge:      Financial    Payor: MEDIGOLD / Plan: MEDIGOLD / Product Type: *No Product type* /     Does insurance require precert for SNF: No    Potential assistance Purchasing Medications: No  Meds-to-Beds request:        [facility] [id number] - [place], [place] - [address] - P [phone] - F [phone]  [address] [facility]  [address]  [facility] [address]  Phone: [phone] Fax: [phone]      Notes:    Factors facilitating achievement of predicted outcomes: Family support, Cooperative, and Pleasant    Barriers to discharge: pleural effusion    Additional Case Management Notes: met with pt at bedside. Pt stated that she lives at home with her , daughter, son, and 2 grandkids. Pt stated that she uses a cane and rollator to ambulate. Pt stated that she uses 2L O2 via [facility] (3L O2 current). Pt stated she has no prior home health services. Discussed DC plan with pt. Pt stated that she plans to DC back home and a family member will transport. Pt stated that her portable O2 tank is at the hospital for DC. Likely NN    The Plan for Transition of Care is related to the following treatment goals of Pleural effusion [J90]  Heart failure (HCC) [I50.9]  Dyspnea, unspecified type [R06.00]  Congestive heart failure, unspecified HF chronicity, unspecified heart failure type (HCC) [I50.9]    IF APPLICABLE: The Patient and/or patient representative Najma and her family were provided with a choice of provider and agrees with the discharge plan. [place]

## 2025-01-10 NOTE — ED PROVIDER NOTES
coronary artery of native heart with angina pectoris (HCC) 3/8/2016    Depression     Emphysema of lung (HCC)     Fibromyalgia     hyperlipidemia 8/11/2011    Hypertension     Kidney stone     Liver disease     Lung disease     MDD (major depressive disorder) 4/4/2012    Mood disorder (HCC) 3/13/2013    Movement disorder     Neck pain, chronic 3/13/2013    Opiate misuse     Personality disorder (HCC)     Pneumonia     Polypharmacy 2/16/2013     Past Surgical History:   Procedure Laterality Date    COLONOSCOPY  1/19/12    DIAGNOSTIC CARDIAC CATH LAB PROCEDURE  Feb, 2007    Normal cor angio Feb, 2007    HERNIA REPAIR  07/11/2019    robotic ventral hernia repair with mesh    HERNIA REPAIR N/A 7/11/2019    ROBOTIC VENTRAL HERNIA REPAIR WITH MESH performed by Yuriy Rider MD at [facility] OR    HYSTERECTOMY, TOTAL ABDOMINAL (CERVIX REMOVED)      KIDNEY STONE SURGERY       Family History   Problem Relation Age of Onset    Emphysema Mother     Heart Disease Mother     Arthritis Mother     Depression Mother     High Blood Pressure Mother     Heart Failure Father     Heart Disease Father     Arthritis Father     Diabetes Father     High Blood Pressure Father     Stroke Father     Substance Abuse Father     High Blood Pressure Brother     Heart Disease Sister     Kidney Disease Daughter     Breast Cancer Maternal Aunt      Social History     Socioeconomic History    Marital status:      Spouse name: Not on file    Number of children: Not on file    Years of education: Not on file    Highest education level: Not on file   Occupational History    Not on file   Tobacco Use    Smoking status: Every Day     Current packs/day: 2.50     Average packs/day: 2.5 packs/day for 47.0 years (117.5 ttl pk-yrs)     Types: Cigarettes    Smokeless tobacco: Never   Vaping Use    Vaping status: Never Used   Substance and Sexual Activity    Alcohol use: No    Drug use: Not Currently    Sexual activity: Yes     Partners: Male   Other  IMPRESSION:  1. Pleural effusion    2. Dyspnea, unspecified type    3. Congestive heart failure, unspecified HF chronicity, unspecified heart failure type (HCC)        BP (!) 227/94   Pulse 80   Temp 98 °F (36.7 °C)   Resp 20   Ht 1.626 m (5' 4\")   Wt 81.2 kg (179 lb)   SpO2 96%   BMI 30.73 kg/m²    DISPOSITION  Patient was admitted to the hospital in stable condition.          Ben Gay PA-C  01/10/25 0051

## 2025-01-10 NOTE — PROGRESS NOTES
RT Inhaler-Nebulizer Bronchodilator Protocol Note    There is a bronchodilator order in the chart from a provider indicating to follow the RT Bronchodilator Protocol and there is an “Initiate RT Inhaler-Nebulizer Bronchodilator Protocol” order as well (see protocol at bottom of note).    CXR Findings:  XR CHEST PORTABLE    Result Date: 1/9/2025  Small left pleural effusion with hazy left basilar airspace disease which could represent a combination of atelectasis and/or pneumonia       The findings from the last RT Protocol Assessment were as follows:   History Pulmonary Disease: Chronic pulmonary disease  Respiratory Pattern: Dyspnea on exertion or RR 21-25 bpm  Breath Sounds: Slightly diminished and/or crackles  Cough: Strong, spontaneous, non-productive  Indication for Bronchodilator Therapy: On home bronchodilators  Bronchodilator Assessment Score: 6    Aerosolized bronchodilator medication orders have been revised according to the RT Inhaler-Nebulizer Bronchodilator Protocol below.    Respiratory Therapist to perform RT Therapy Protocol Assessment initially then follow the protocol.  Repeat RT Therapy Protocol Assessment PRN for score 0-3 or on second treatment, BID, and PRN for scores above 3.    No Indications - adjust the frequency to every 6 hours PRN wheezing or bronchospasm, if no treatments needed after 48 hours then discontinue using Per Protocol order mode.     If indication present, adjust the RT bronchodilator orders based on the Bronchodilator Assessment Score as indicated below.  Use Inhaler orders unless patient has one or more of the following: on home nebulizer, not able to hold breath for 10 seconds, is not alert and oriented, cannot activate and use MDI correctly, or respiratory rate 25 breaths per minute or more, then use the equivalent nebulizer order(s) with same Frequency and PRN reasons based on the score.  If a patient is on this medication at home then do not decrease Frequency below

## 2025-01-10 NOTE — FLOWSHEET NOTE
01/10/25 1615   Vital Signs   Temp 98.2 °F (36.8 °C)   Temp Source Oral   Pulse 91   Heart Rate Source Monitor   Respirations 22   BP (!) 184/78   MAP (Calculated) 113   BP Location Right upper arm   BP Method Automatic   Patient Position Semi fowlers   Pain Assessment   Pain Assessment 0-10   Pain Level 10   Patient's Stated Pain Goal 0 - No pain   Pain Location Chest  (post thora no CP cardiac related)   Pain Orientation Left   Pain Descriptors Aching;Discomfort;Other (Comment)  (\"terrible terrible pain\")   Functional Pain Assessment Prevents or interferes some active activities and ADLs   Pain Type Acute pain   Pain Radiating Towards back   Pain Frequency Other (Comment)   Pain Onset Sudden   Non-Pharmaceutical Pain Intervention(s) Repositioned  (see orders/note)   Oxygen Therapy   SpO2 95 %   Pulse Oximeter Device Mode Intermittent   Pulse Oximeter Device Location Finger   O2 Device Nasal cannula   O2 Flow Rate (L/min) 3 L/min       Patient with complaints of left sided rib/chest pain progressing to shoulder. PS IM. Portable CXR ordered and in progress. See other notes/orders.

## 2025-01-10 NOTE — PROGRESS NOTES
[facility]   COPD PROGRAM      NAME:  Najma Fitzpatrick  AGE: 74 y.o.   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    Coronary artery disease involving native coronary artery of native heart with angina pectoris ([facility]) 3/8/2016    Depression     Emphysema of lung ([facility])     Fibromyalgia     hyperlipidemia 2011    Hypertension     Kidney stone     Liver disease     Lung disease     MDD (major depressive disorder) 2012    Mood disorder ([facility]) 3/13/2013    Movement disorder     Neck pain, chronic 3/13/2013    Opiate misuse     Personality disorder ([facility])     Pneumonia     Polypharmacy 2013     HOME MEDICATIONS:  Prior to Admission medications    Medication Sig Start Date End Date Taking? Authorizing Provider   umeclidinium bromide (INCRUSE ELLIPTA) 62.5 MCG/ACT inhaler Inhale 1 puff into the lungs daily   Yes Provider, MD Vida   busPIRone (BUSPAR) 5 MG tablet Take 1.5 tablets by mouth 3 times daily   Yes Provider, MD Vida   QUEtiapine (SEROQUEL) 200 MG tablet Take 2 tablets by mouth at bedtime   Yes Provider, Historical, MD   guaiFENesin (MUCINEX) 600 MG extended release tablet Take 1 tablet by mouth 2 times daily 24  Yes Brad Cheung MD   lidocaine 4 % external patch Place 1 patch onto the skin daily Apply patch to back.  Patch may remain in place for up to 12 hours in any 24 hour period. 11/3/24  Yes Brad Cheung MD   hydrALAZINE (APRESOLINE) 25 MG tablet Take 1 tablet by mouth every 8 hours  11/2/24  Yes Brad Cheung MD   losartan (COZAAR) 50 MG tablet Take 1 tablet by mouth daily 11/3/24  Yes Brad Cheung MD   carvedilol (COREG) 25 MG tablet Take 1 tablet by mouth 2 times daily (with meals)   Yes Vida Camarillo MD   DULoxetine (CYMBALTA) 60 MG extended release capsule Take 1 capsule by mouth daily   Yes Vida Camarillo MD   divalproex (DEPAKOTE ER) 500 MG extended release tablet Take 1 tablet by mouth nightly   Yes Vida Camarillo MD   divalproex (DEPAKOTE ER) 250 MG extended release tablet Take 1 tablet by mouth every morning   Yes Vida Camarillo MD   aspirin 81 MG chewable tablet Take 1 tablet by mouth daily   Yes Vida Camarillo MD   atorvastatin (LIPITOR) 40 MG tablet Take 1 tablet by mouth daily   Yes Vida Camarillo MD   pantoprazole (PROTONIX) 40 MG tablet Take 1 tablet by mouth daily   Yes Vida Camarillo MD   albuterol sulfate HFA (VENTOLIN HFA) 108 (90 Base) MCG/ACT inhaler Inhale 2 puffs into the lungs every 4 hours as needed for Wheezing   Yes ProviderVida MD   nicotine (NICODERM CQ) 21 MG/24HR Place 1 patch onto the skin every 24 hours   Yes ProviderVida MD   topiramate (TOPAMAX) 25 MG tablet Take 1 tablet by mouth daily   Yes Vida Camarillo MD   albuterol (ACCUNEB) 0.63 MG/3ML nebulizer solution Take 3 mLs by nebulization every 6 hours as needed for Wheezing   Yes ProviderVida MD   butalbital-acetaminophen-caffeine (FIORICET, ESGIC) -40 MG per tablet Take 1 tablet by mouth every 6 hours as needed for Headaches Max Daily Amount: 4 tablets  Patient not taking: Reported on 1/10/2025 12/31/24   Brad Cheung MD      Objective:     ADMISSION DIAGNOSIS:   Pleural effusion [J90]  Heart failure (HCC) [I50.9]  Dyspnea, unspecified type [R06.00]  Congestive heart failure, unspecified HF chronicity, unspecified heart failure type (HCC) [I50.9]    CXR Findings:  XR CHEST PORTABLE    Result Date:

## 2025-01-10 NOTE — PROGRESS NOTES
the right upper   lobe, suspicious for malignancy.  CT PET imaging or tissue sampling   recommended   RECOMMENDATIONS:      CT PET imaging, or tissue sampling of the right upper lobe nodule recommended         XR CHEST PORTABLE   Final Result   Small left pleural effusion with hazy left basilar airspace disease which   could represent a combination of atelectasis and/or pneumonia         US THORACENTESIS Which side should the procedure be performed? Left    (Results Pending)     Echocardiogram from 9/3/24  Image quality is adequate.    Left Ventricle: Normal left ventricular systolic function with a visually estimated EF of 60 - 65%. Left ventricle size is normal. Mildly increased wall thickness. Normal wall motion. Grade I diastolic dysfunction with normal LAP.    Right Ventricle: Right ventricle size is normal. Normal systolic function. TAPSE is 3.0 cm. RV Peak S' is 20 cm/s.    Tricuspid Valve: Unable to assess RVSP due to inadequate or insignificant tricuspid regurgitation.    Assessment/Plan:  #Bilateral pleural effusions with possible compressive atelectasis and possible pneumonia  -could be malignant vs CHF in nature  -CT scan as above  -thoracentesis with fluid analysis ordered   -IV vancomycin and cefepime for HCAP coverage-gram pos/neg/MRSA   -sputum culture pending   -duonebs  -IV diuresis per cardiology   -pulmonology and cardiology consulted     #Right upper lobe lobulated nodule  -suspicious for malignancy   -not noted on previous CT PE 9/2/2024  -pulmonology consulted    #COPD  -duonebs  -sputum culture pending   -on abx as above  -given IV solumedrol in ED, further steroids per pulmonology     #Acute on chronic respiratory failure   -baseline 2 L O2, requiring 3 L  -wean as tolerated   -2/2 to above   -VBG pending     #HTN urgency  -on hydralazine, losartan, coreg   -Bp initially 224/82 on arrival   -has improved today 150/74    #Hypomagnesemia   #Hypokalemia  -replace    #CAD  -on ASA, statin, BB      #Chronic diastolic HF -possibly acute component   -last echo as above   -monitor intake and output, daily weights  -cardiology consulted       #HLD  -statin     #Anxiety/depression  #Bipolar disorder   #Personality disorder   -on depakote, cymbalta, buspar, seroquel     #Chronic headaches   -on topamax     #Chronic normocytic anemia   -Hgb stable   -monitor CBC     DVT Prophylaxis: Lovenox   Diet: ADULT DIET; Regular; No Added Salt (3-4 gm)  Code Status: Full Code    CEE Wasserman  01/10/25  9:47 AM

## 2025-01-10 NOTE — CONSULTS
[facility]   HEART FAILURE PROGRAM      NAME:  Najma Fitzpatrick  AGE: 74 y.o.   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    Coronary artery disease involving native coronary artery of native heart with angina pectoris ([facility]) 3/8/2016    Depression     Emphysema of lung ([facility])     Fibromyalgia     hyperlipidemia 2011    Hypertension     Kidney stone     Liver disease     Lung disease     MDD (major depressive disorder) 2012    Mood disorder ([facility]) 3/13/2013    Movement disorder     Neck pain, chronic 3/13/2013    Opiate misuse     Personality disorder ([facility])     Pneumonia     Polypharmacy 2013     HOME MEDICATIONS:  Prior to Admission medications    Medication Sig Start Date End Date Taking? Authorizing Provider   umeclidinium bromide (INCRUSE ELLIPTA) 62.5 MCG/ACT inhaler Inhale 1 puff into the lungs daily   Yes Provider, MD Vida   busPIRone (BUSPAR) 5 MG tablet Take 1.5 tablets by mouth 3 times daily   Yes Provider, MD Vida   QUEtiapine (SEROQUEL) 200 MG tablet Take 2 tablets by mouth at bedtime   Yes Provider, MD Vida   guaiFENesin (MUCINEX) 600 MG extended release tablet Take 1 tablet by mouth 2 times daily 24  Yes Brad Cheung MD   lidocaine 4 % external patch Place 1 patch onto the skin daily Apply patch to back.  Patch may remain in place for up to 12 hours in any 24 hour period. 11/3/24  Yes Brad Cheung MD   hydrALAZINE (APRESOLINE) 25 MG tablet Take 1 tablet by mouth every 8

## 2025-01-10 NOTE — PROGRESS NOTES
Consent obtained for thoracentesis. Patient stickers for fluid collection and consent placed in soft chart.

## 2025-01-10 NOTE — PROGRESS NOTES
01/10/25 1000   RT Protocol   History Pulmonary Disease 2   Respiratory pattern 2   Breath sounds 2   Cough 0   Indications for Bronchodilator Therapy On home bronchodilators   Bronchodilator Assessment Score 6

## 2025-01-10 NOTE — CONSULTS
Pharmacy Note  Vancomycin Consult    Najma Fitzpatrick is a 74 y.o. female started on Vancomycin for pneumonia; consult received from CEE Emanuel to manage therapy. Also receiving the following antibiotics: cefepime.    Allergies:  Dicyclomine, Ibuprofen, Sumatriptan, Tape [adhesive tape], Tizanidine, and Motrin [ibuprofen micronized]       Recent Labs     01/09/25  1752 01/10/25  0435   CREATININE 0.4* 0.4*       Recent Labs     01/09/25  1752 01/10/25  0435   WBC 9.5 6.4       Estimated Creatinine Clearance: 126 mL/min (A) (based on SCr of 0.4 mg/dL (L)).      Intake/Output Summary (Last 24 hours) at 1/10/2025 0949  Last data filed at 1/10/2025 0939  Gross per 24 hour   Intake 540 ml   Output 2350 ml   Net -1810 ml       Wt Readings from Last 1 Encounters:   01/10/25 80.1 kg (176 lb 9.6 oz)         Body mass index is 31.28 kg/m².    Loading dose (critically ill or in ICU, require dialysis or renal replacement therapy): Vancomycin 25 mg/kg IVPB x 1 (maximum 2500 mg).  Maintenance dose: 10-20 mg/kg (maximum: 2000 mg/dose and 4500 mg/day) starting at the next dosing interval determined by renal function  Pulse dose: fluctuating renal function, SAMREEN, ESRD  CRRT: 7.5-10 mg/kg q12h   Goal Vancomycin trough: 15-20 mcg/mL   Goal Vancomycin AUC: 400-600     Assessment/Plan:  Will initiate Vancomycin  1500 mg IV every 12 hours. Calculated Vancomycin AUC = 554 mg/L*h with an estimated steady-state vancomycin trough = 16.1 mcg/mL. Vancomycin level ordered for 1/11 @ 1000. Timing of trough level will be determined based on culture results, renal function, and clinical response.     Thank you for the consult.

## 2025-01-10 NOTE — PLAN OF CARE
Problem: Chronic Conditions and Co-morbidities  Goal: Patient's chronic conditions and co-morbidity symptoms are monitored and maintained or improved  Outcome: Progressing  Flowsheets (Taken 1/10/2025 0826)  Care Plan - Patient's Chronic Conditions and Co-Morbidity Symptoms are Monitored and Maintained or Improved: Monitor and assess patient's chronic conditions and comorbid symptoms for stability, deterioration, or improvement     Problem: Discharge Planning  Goal: Discharge to home or other facility with appropriate resources  Outcome: Progressing  Flowsheets  Taken 1/10/2025 0826 by Toma Zuñiga, RN  Discharge to home or other facility with appropriate resources: Identify barriers to discharge with patient and caregiver  Taken 1/10/2025 0136 by Alvaro Alfredo, ROMAIN  Discharge to home or other facility with appropriate resources: Identify barriers to discharge with patient and caregiver

## 2025-01-10 NOTE — CONSULTS
Reason for referral and CC: SOB    HISTORY OF PRESENT ILLNESS: 73 yo female with a h/o COPD and CHF presented with shortness of breath.  She has had progressive cough and wheeze the last several days.  She denies swelling.  She was found to have a left greater right pleural effusion which was also present on her prior scans is probably not helpful for her current symptoms.  She was also found to have a pulmonary nodule.      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    Coronary artery disease involving native coronary artery of native heart with angina pectoris ([facility]) 3/8/2016    Depression     Emphysema of lung ([facility])     Fibromyalgia     hyperlipidemia 8/11/2011    Hypertension     Kidney stone     Liver disease     Lung disease     MDD (major depressive disorder) 4/4/2012    Mood disorder ([facility]) 3/13/2013    Movement disorder     Neck pain, chronic 3/13/2013    Opiate misuse     Personality disorder ([facility])     Pneumonia     Polypharmacy 2/16/2013     Past Surgical History:   Procedure Laterality Date    COLONOSCOPY  1/19/12    DIAGNOSTIC CARDIAC CATH LAB PROCEDURE  Feb, 2007    Normal cor angio Feb, 2007    HERNIA REPAIR  07/11/2019    robotic ventral hernia repair with mesh    HERNIA REPAIR N/A 7/11/2019    ROBOTIC VENTRAL HERNIA REPAIR WITH MESH performed by Yuriy Rider [place] [facility] [place]    HYSTERECTOMY, TOTAL ABDOMINAL (CERVIX REMOVED)      KIDNEY STONE SURGERY       Family History  family history includes Arthritis in her father and mother; Breast Cancer in her maternal aunt; Depression in her mother; Diabetes in her father; Emphysema in her mother; Heart Disease    Genitourinary: Negative for hematuria   Musculoskeletal: Negative for arthralgias   Skin: Negative for rash  Neurological: Negative for syncope  Hematological: Negative for adenopathy  Psychiatric/Behavorial: Negative for anxiety    PHYSICAL EXAM: BP (!) 184/78   Pulse 91   Temp 98.2 °F (36.8 °C) (Oral)   Resp 22   Ht 1.6 m (5' 3\")   Wt 80.1 kg (176 lb 9.6 oz)   SpO2 95%   BMI 31.28 kg/m²  on 3.5L  Constitutional:  No acute distress.   Eyes: PERRL. Conjunctivae anicteric.   ENT: Normal nose. Normal tongue.    Neck:  Trachea is midline.   Respiratory: No accessory muscle usage.  Decreased breath sounds. + wheezes. No rales. No Rhonchi.  Cardiovascular: Normal S1S2. No digit clubbing. No digit cyanosis. No LE edema.    Gastrointestinal: No mass palpated. No tenderness palpated.   Skin: No rash on the exposed extremities.   Psychiatric: No anxiety or Agitation. Alert and Oriented to person, place and time.    CBC:   Recent Labs     01/09/25  1752 01/10/25  0435   WBC 9.5 6.4   HGB 10.2* 10.2*   HCT 31.5* 31.3*   MCV 82.6 82.1    186     BMP:   Recent Labs     01/09/25  1752 01/10/25  0435   * 133*   K 3.6 3.4*   CL 93* 92*   CO2 33* 31   BUN 5* 6*   CREATININE 0.4* 0.4*        Recent Labs     01/09/25  1752   AST 15   ALT 11   BILITOT 0.4   ALKPHOS 95     Recent Labs     01/10/25  0807   PROTIME 13.6   INR 1.02     No results for input(s): \"NITRITE\", \"COLORU\", \"PHUR\", \"LABCAST\", \"WBCUA\", \"RBCUA\", \"MUCUS\", \"TRICHOMONAS\", \"YEAST\", \"BACTERIA\", \"CLARITYU\", \"SPECGRAV\", \"LEUKOCYTESUR\", \"UROBILINOGEN\", \"BILIRUBINUR\", \"BLOODU\", \"GLUCOSEU\", \"AMORPHOUS\" in the last 72 hours.    Invalid input(s): \"KETONESU\"  No results for input(s): \"PHART\", \"FVE2SCR\", \"PO2ART\" in the last 72 hours.  Procal 0.02    1/9/25 chest ct:  Lungs/pleura: A moderate to large size left pleural effusion, and small  moderate size right pleural effusion is present, with extensive compressive  atelectasis and or consolidation involving

## 2025-01-10 NOTE — ED NOTES
Blood culture set #1 drawn from left AC with initial blood work. Bottle tops scrubbed with alcohol pads.  Site prepped with Prevantics swab prior to venipuncture.  Waste tube drawn prior to collection of specimen.      Blood culture set #2 drawn from right hand with butterfly.  Bottle tops scrubbed with alcohol pads.  Site prepped with Prevantics swab prior to venipuncture.  Waste tube drawn prior to collection of specimen.  Pt is a difficult stick and the vein infiltrated while filling blue tube. Only able to get blue tube for second culture.

## 2025-01-10 NOTE — PROGRESS NOTES
Image guided Thoracentesis completed.  600 mL of yellow brown colored withdrawn.  Pt tolerated procedure without any signs or symptoms of distress. Vital signs stable.     DISCHARGED:  ADMITTED: Pt transferred back to room 322. Report called to nurse.     SPECIMEN SENT:  Yes    Vital Signs  Vitals:    01/10/25 1245   BP:    Pulse:    Resp:    Temp:    SpO2: 100%    (vital signs in table format)

## 2025-01-11 VITALS
OXYGEN SATURATION: 92 % | BODY MASS INDEX: 30.01 KG/M2 | RESPIRATION RATE: 17 BRPM | HEART RATE: 71 BPM | WEIGHT: 169.38 LBS | HEIGHT: 63 IN | SYSTOLIC BLOOD PRESSURE: 124 MMHG | DIASTOLIC BLOOD PRESSURE: 65 MMHG | TEMPERATURE: 98.3 F

## 2025-01-11 LAB
ALBUMIN SERPL-MCNC: 3 G/DL (ref 3.4–5)
ALP SERPL-CCNC: 73 U/L (ref 40–129)
ALT SERPL-CCNC: 7 U/L (ref 10–40)
ANION GAP SERPL CALCULATED.3IONS-SCNC: 7 MMOL/L (ref 3–16)
AST SERPL-CCNC: 13 U/L (ref 15–37)
BACTERIA BLD CULT: NORMAL
BILIRUB DIRECT SERPL-MCNC: <0.1 MG/DL (ref 0–0.3)
BILIRUB INDIRECT SERPL-MCNC: ABNORMAL MG/DL (ref 0–1)
BILIRUB SERPL-MCNC: <0.2 MG/DL (ref 0–1)
BUN SERPL-MCNC: 12 MG/DL (ref 7–20)
CALCIUM SERPL-MCNC: 8 MG/DL (ref 8.3–10.6)
CHLORIDE SERPL-SCNC: 92 MMOL/L (ref 99–110)
CHOLEST SERPL-MCNC: 158 MG/DL (ref 0–199)
CO2 SERPL-SCNC: 29 MMOL/L (ref 21–32)
CREAT SERPL-MCNC: 0.6 MG/DL (ref 0.6–1.2)
GFR SERPLBLD CREATININE-BSD FMLA CKD-EPI: >90 ML/MIN/{1.73_M2}
GLUCOSE SERPL-MCNC: 96 MG/DL (ref 70–99)
HDLC SERPL-MCNC: 70 MG/DL (ref 40–60)
LDLC SERPL CALC-MCNC: 72 MG/DL
MAGNESIUM SERPL-MCNC: 1.78 MG/DL (ref 1.8–2.4)
POTASSIUM SERPL-SCNC: 3.1 MMOL/L (ref 3.5–5.1)
PROT SERPL-MCNC: 5.2 G/DL (ref 6.4–8.2)
SODIUM SERPL-SCNC: 128 MMOL/L (ref 136–145)
TRIGL SERPL-MCNC: 82 MG/DL (ref 0–150)
VLDLC SERPL CALC-MCNC: 16 MG/DL

## 2025-01-11 PROCEDURE — 6370000000 HC RX 637 (ALT 250 FOR IP): Performed by: INTERNAL MEDICINE

## 2025-01-11 PROCEDURE — 2500000003 HC RX 250 WO HCPCS: Performed by: INTERNAL MEDICINE

## 2025-01-11 PROCEDURE — 6360000002 HC RX W HCPCS: Performed by: INTERNAL MEDICINE

## 2025-01-11 PROCEDURE — 80061 LIPID PANEL: CPT

## 2025-01-11 PROCEDURE — 80048 BASIC METABOLIC PNL TOTAL CA: CPT

## 2025-01-11 PROCEDURE — 99233 SBSQ HOSP IP/OBS HIGH 50: CPT | Performed by: INTERNAL MEDICINE

## 2025-01-11 PROCEDURE — 83735 ASSAY OF MAGNESIUM: CPT

## 2025-01-11 PROCEDURE — 6370000000 HC RX 637 (ALT 250 FOR IP)

## 2025-01-11 PROCEDURE — 6360000002 HC RX W HCPCS

## 2025-01-11 PROCEDURE — 99238 HOSP IP/OBS DSCHRG MGMT 30/<: CPT | Performed by: INTERNAL MEDICINE

## 2025-01-11 PROCEDURE — 36415 COLL VENOUS BLD VENIPUNCTURE: CPT

## 2025-01-11 PROCEDURE — 94761 N-INVAS EAR/PLS OXIMETRY MLT: CPT

## 2025-01-11 PROCEDURE — 2700000000 HC OXYGEN THERAPY PER DAY

## 2025-01-11 PROCEDURE — 94640 AIRWAY INHALATION TREATMENT: CPT

## 2025-01-11 PROCEDURE — 80076 HEPATIC FUNCTION PANEL: CPT

## 2025-01-11 RX ORDER — PREDNISONE 10 MG/1
TABLET ORAL
Qty: 10 TABLET | Refills: 0 | Status: SHIPPED | OUTPATIENT
Start: 2025-01-12 | End: 2025-01-17

## 2025-01-11 RX ORDER — FUROSEMIDE 40 MG/1
40 TABLET ORAL DAILY
Qty: 60 TABLET | Refills: 0 | Status: SHIPPED | OUTPATIENT
Start: 2025-01-11

## 2025-01-11 RX ORDER — POTASSIUM CHLORIDE 1500 MG/1
20 TABLET, EXTENDED RELEASE ORAL DAILY
Qty: 5 TABLET | Refills: 0 | Status: SHIPPED | OUTPATIENT
Start: 2025-01-12 | End: 2025-01-17

## 2025-01-11 RX ORDER — LEVOFLOXACIN 750 MG/1
750 TABLET, FILM COATED ORAL DAILY
Qty: 4 TABLET | Refills: 0 | Status: SHIPPED | OUTPATIENT
Start: 2025-01-12 | End: 2025-01-16

## 2025-01-11 RX ORDER — OXYCODONE HYDROCHLORIDE 5 MG/1
5 TABLET ORAL EVERY 6 HOURS PRN
Qty: 12 TABLET | Refills: 0 | Status: SHIPPED | OUTPATIENT
Start: 2025-01-11 | End: 2025-01-14

## 2025-01-11 RX ORDER — LOSARTAN POTASSIUM 100 MG/1
100 TABLET ORAL DAILY
Qty: 30 TABLET | Refills: 1 | Status: SHIPPED | OUTPATIENT
Start: 2025-01-12

## 2025-01-11 RX ADMIN — GUAIFENESIN 600 MG: 600 TABLET, EXTENDED RELEASE ORAL at 08:31

## 2025-01-11 RX ADMIN — IPRATROPIUM BROMIDE AND ALBUTEROL SULFATE 1 DOSE: 2.5; .5 SOLUTION RESPIRATORY (INHALATION) at 11:09

## 2025-01-11 RX ADMIN — HYDRALAZINE HYDROCHLORIDE 25 MG: 25 TABLET ORAL at 14:25

## 2025-01-11 RX ADMIN — OXYCODONE 5 MG: 5 TABLET ORAL at 15:17

## 2025-01-11 RX ADMIN — DULOXETINE HYDROCHLORIDE 60 MG: 60 CAPSULE, DELAYED RELEASE ORAL at 08:31

## 2025-01-11 RX ADMIN — LOSARTAN POTASSIUM 100 MG: 100 TABLET, FILM COATED ORAL at 08:31

## 2025-01-11 RX ADMIN — IPRATROPIUM BROMIDE AND ALBUTEROL SULFATE 1 DOSE: 2.5; .5 SOLUTION RESPIRATORY (INHALATION) at 07:14

## 2025-01-11 RX ADMIN — HYDRALAZINE HYDROCHLORIDE 25 MG: 25 TABLET ORAL at 05:16

## 2025-01-11 RX ADMIN — OXYCODONE 5 MG: 5 TABLET ORAL at 08:34

## 2025-01-11 RX ADMIN — BUSPIRONE HYDROCHLORIDE 7.5 MG: 7.5 TABLET ORAL at 14:25

## 2025-01-11 RX ADMIN — ASPIRIN 81 MG: 81 TABLET, CHEWABLE ORAL at 08:31

## 2025-01-11 RX ADMIN — LEVOFLOXACIN 750 MG: 750 TABLET, FILM COATED ORAL at 08:31

## 2025-01-11 RX ADMIN — ATORVASTATIN CALCIUM 40 MG: 40 TABLET, FILM COATED ORAL at 08:31

## 2025-01-11 RX ADMIN — ENOXAPARIN SODIUM 40 MG: 100 INJECTION SUBCUTANEOUS at 08:31

## 2025-01-11 RX ADMIN — FUROSEMIDE 40 MG: 10 INJECTION, SOLUTION INTRAMUSCULAR; INTRAVENOUS at 08:31

## 2025-01-11 RX ADMIN — MAGNESIUM SULFATE HEPTAHYDRATE 2000 MG: 40 INJECTION, SOLUTION INTRAVENOUS at 08:48

## 2025-01-11 RX ADMIN — PREDNISONE 40 MG: 20 TABLET ORAL at 08:31

## 2025-01-11 RX ADMIN — BUSPIRONE HYDROCHLORIDE 7.5 MG: 7.5 TABLET ORAL at 08:31

## 2025-01-11 RX ADMIN — PANTOPRAZOLE SODIUM 40 MG: 40 TABLET, DELAYED RELEASE ORAL at 08:31

## 2025-01-11 RX ADMIN — POTASSIUM CHLORIDE 40 MEQ: 1500 TABLET, EXTENDED RELEASE ORAL at 06:43

## 2025-01-11 RX ADMIN — CARVEDILOL 25 MG: 25 TABLET, FILM COATED ORAL at 08:31

## 2025-01-11 RX ADMIN — DIVALPROEX SODIUM 250 MG: 250 TABLET, EXTENDED RELEASE ORAL at 08:31

## 2025-01-11 RX ADMIN — TOPIRAMATE 25 MG: 25 TABLET, FILM COATED ORAL at 08:31

## 2025-01-11 RX ADMIN — Medication 10 ML: at 08:32

## 2025-01-11 ASSESSMENT — PAIN SCALES - GENERAL
PAINLEVEL_OUTOF10: 7
PAINLEVEL_OUTOF10: 8

## 2025-01-11 ASSESSMENT — PAIN DESCRIPTION - LOCATION: LOCATION: SHOULDER

## 2025-01-11 NOTE — FLOWSHEET NOTE
01/11/25 0830   Vital Signs   Temp 98.2 °F (36.8 °C)   Temp Source Oral   Pulse 88   Heart Rate Source Monitor   Respirations 16   /63   MAP (Calculated) 83   BP Location Right upper arm   BP Method Automatic   Patient Position Semi fowlers   Pain Assessment   Pain Assessment 0-10   Pain Level 8   Pain Location Shoulder   Oxygen Therapy   SpO2 94 %   O2 Device Nasal cannula   O2 Flow Rate (L/min) 2 L/min     Vitals and assessment completed. No s/s of distress. Medications given per MAR. Patient complaining of pain near thoracentesis site, prn pain medication given. Patient sat up to eat breakfast. Potassium replaced on previous shift. No further needs at this time.     Jeanna Santos RN

## 2025-01-11 NOTE — PROGRESS NOTES
RT Inhaler-Nebulizer Bronchodilator Protocol Note    There is a bronchodilator order in the chart from a provider indicating to follow the RT Bronchodilator Protocol and there is an “Initiate RT Inhaler-Nebulizer Bronchodilator Protocol” order as well (see protocol at bottom of note).    CXR Findings:  XR CHEST PORTABLE    Result Date: 1/10/2025  Cardiomegaly with developing pulmonary vascular congestion and small left-sided pleural effusion.     XR CHEST PORTABLE    Result Date: 1/9/2025  Small left pleural effusion with hazy left basilar airspace disease which could represent a combination of atelectasis and/or pneumonia       The findings from the last RT Protocol Assessment were as follows:   History Pulmonary Disease: (P) Chronic pulmonary disease  Respiratory Pattern: (P) Dyspnea on exertion or RR 21-25 bpm  Breath Sounds: (P) Intermittent or unilateral wheezes  Cough: (P) Strong, spontaneous, non-productive  Indication for Bronchodilator Therapy: (P) On home bronchodilators  Bronchodilator Assessment Score: (P) 8    Aerosolized bronchodilator medication orders have been revised according to the RT Inhaler-Nebulizer Bronchodilator Protocol below.    Respiratory Therapist to perform RT Therapy Protocol Assessment initially then follow the protocol.  Repeat RT Therapy Protocol Assessment PRN for score 0-3 or on second treatment, BID, and PRN for scores above 3.    No Indications - adjust the frequency to every 6 hours PRN wheezing or bronchospasm, if no treatments needed after 48 hours then discontinue using Per Protocol order mode.     If indication present, adjust the RT bronchodilator orders based on the Bronchodilator Assessment Score as indicated below.  Use Inhaler orders unless patient has one or more of the following: on home nebulizer, not able to hold breath for 10 seconds, is not alert and oriented, cannot activate and use MDI correctly, or respiratory rate 25 breaths per minute or more, then use the  equivalent nebulizer order(s) with same Frequency and PRN reasons based on the score.  If a patient is on this medication at home then do not decrease Frequency below that used at home.    0-3 - enter or revise RT bronchodilator order(s) to equivalent RT Bronchodilator order with Frequency of every 4 hours PRN for wheezing or increased work of breathing using Per Protocol order mode.        4-6 - enter or revise RT Bronchodilator order(s) to two equivalent RT bronchodilator orders with one order with BID Frequency and one order with Frequency of every 4 hours PRN wheezing or increased work of breathing using Per Protocol order mode.        7-10 - enter or revise RT Bronchodilator order(s) to two equivalent RT bronchodilator orders with one order with TID Frequency and one order with Frequency of every 4 hours PRN wheezing or increased work of breathing using Per Protocol order mode.       11-13 - enter or revise RT Bronchodilator order(s) to one equivalent RT bronchodilator order with QID Frequency and an Albuterol order with Frequency of every 4 hours PRN wheezing or increased work of breathing using Per Protocol order mode.      Greater than 13 - enter or revise RT Bronchodilator order(s) to one equivalent RT bronchodilator order with every 4 hours Frequency and an Albuterol order with Frequency of every 2 hours PRN wheezing or increased work of breathing using Per Protocol order mode.         Electronically signed by Rafy Mckeon RCP on 1/11/2025 at 7:16 AM

## 2025-01-11 NOTE — DISCHARGE SUMMARY
A lobulated 1.4 x 1.3 cm nodule is present within the superior medial portion of the right upper lobe.  Images from the prior CT PA from September 2024 and July 2023 will not load for comparison. Nodule is not described in the prior report. Upper Abdomen: Images through the upper abdomen demonstrate a subtle nodular contour to the liver margin, suggesting cirrhosis. Soft Tissues/Bones: Multilevel degenerative changes are present throughout the spine.  No acute, or aggressive osseous abnormality is present.     1. Moderate to large size left pleural effusion, and small moderate size right pleural effusion, with bibasilar airspace disease, left greater than right, and may represent a combination of compressive atelectasis and pneumonia. 2. 1.4 x 1.3 cm lobulated nodule superior medial portion of the right upper lobe, suspicious for malignancy.  CT PET imaging or tissue sampling recommended RECOMMENDATIONS: CT PET imaging, or tissue sampling of the right upper lobe nodule recommended     XR CHEST PORTABLE    Result Date: 1/9/2025  EXAMINATION: ONE XRAY VIEW OF THE CHEST 1/9/2025 5:55 pm COMPARISON: None. HISTORY: ORDERING SYSTEM PROVIDED HISTORY: SOB TECHNOLOGIST PROVIDED HISTORY: Reason for exam:->SOB FINDINGS: Small left pleural effusion with hazy left basilar airspace disease.  Right lung is clear.  No pneumothorax.  Normal cardiomediastinal silhouette     Small left pleural effusion with hazy left basilar airspace disease which could represent a combination of atelectasis and/or pneumonia     XR CHEST PORTABLE    Result Date: 12/29/2024  EXAMINATION: ONE XRAY VIEW OF THE CHEST 12/29/2024 8:15 am COMPARISON: 10/29/2024 HISTORY: ORDERING SYSTEM PROVIDED HISTORY: sob TECHNOLOGIST PROVIDED HISTORY: Reason for exam:->sob Reason for Exam: SOB FINDINGS: Lung volumes are low.  Patient is rotated Scattered opacities are seen throughout the lungs bilaterally, left greater than right, with superimposed left-sided pleural  effusion.     Bilateral pleuroparenchymal disease, left greater than right, similar to prior       The patient was seen and examined on day of discharge and this discharge summary is in conjunction with any daily progress note from day of discharge.Time Spent on discharge is less than 30 minutes in the examination, evaluation, counseling and review of medications and discharge plan.          Signed:    Saumya Arizmendi DO   1/11/2025      Thank you Geena Sotomayor APRN - CNP for the opportunity to be involved in this patient's care. If you have any questions or concerns please feel free to contact [facility] at [facility].

## 2025-01-11 NOTE — PROGRESS NOTES
Pulmonary Progress Note  CC: Pleural effusion    Subjective:  feels much better      EXAM: /65   Pulse 71   Temp 98.3 °F (36.8 °C) (Oral)   Resp 17   Ht 1.6 m (5' 3\")   Wt 76.8 kg (169 lb 6 oz)   SpO2 92%   BMI 30.00 kg/m²  on 2L  Constitutional:  No acute distress.   Eyes: PERRL. Conjunctivae anicteric.   ENT: Normal nose. Normal tongue.    Neck:  Trachea is midline.   Respiratory: No accessory muscle usage.  No wheezes. No rales. No Rhonchi.  Cardiovascular: Normal S1S2. No digit clubbing. No digit cyanosis. No LE edema.   Psychiatric: No anxiety or Agitation. Alert and Oriented to person, place and time.    Scheduled Meds:   sodium chloride flush  5-40 mL IntraVENous 2 times per day    enoxaparin  40 mg SubCUTAneous Daily    aspirin  81 mg Oral Daily    atorvastatin  40 mg Oral Daily    busPIRone  7.5 mg Oral TID    carvedilol  25 mg Oral BID WC    divalproex  250 mg Oral QAM    divalproex  500 mg Oral QHS    DULoxetine  60 mg Oral Daily    guaiFENesin  600 mg Oral BID    nicotine  1 patch TransDERmal Q24H    pantoprazole  40 mg Oral Daily    QUEtiapine  400 mg Oral Nightly    topiramate  25 mg Oral Daily    ipratropium 0.5 mg-albuterol 2.5 mg  1 Dose Inhalation 4x Daily RT    furosemide  40 mg IntraVENous Daily    hydrALAZINE  25 mg Oral 3 times per day    losartan  100 mg Oral Daily    levoFLOXacin  750 mg Oral Daily    predniSONE  40 mg Oral Daily     Continuous Infusions:   sodium chloride       PRN Meds:  sodium chloride flush, sodium chloride, ondansetron **OR** ondansetron, polyethylene glycol, acetaminophen **OR** acetaminophen, albuterol, potassium chloride **OR** potassium alternative oral replacement **OR** potassium chloride, magnesium sulfate, oxyCODONE    Labs:  CBC:   Recent Labs     01/09/25  1752 01/10/25  0435   WBC 9.5 6.4   HGB 10.2* 10.2*   HCT 31.5* 31.3*   MCV 82.6 82.1    186     BMP:   Recent Labs     01/09/25  1752 01/10/25  0435 01/11/25  0500   * 133* 128*   K  3.6 3.4* 3.1*   CL 93* 92* 92*   CO2 33* 31 29   BUN 5* 6* 12   CREATININE 0.4* 0.4* 0.6       Procal 0.02     1/9/25 chest ct:  Lungs/pleura: A moderate to large size left pleural effusion, and small  moderate size right pleural effusion is present, with extensive compressive  atelectasis and or consolidation involving the left lower lobe.  Compressive  atelectasis is present involving the right lower lobe.  Calcified granuloma  is present within the right middle lobe.  Mild prominence of the interlobar  septa is demonstrated, suggesting interstitial edema.  Apical fibrotic  changes are present bilaterally.  A lobulated 1.4 x 1.3 cm nodule is present  within the superior medial portion of the right upper lobe.  Images from the  prior CT PA from September 2024 and July 2023 will not load for comparison.  Nodule is not described in the prior report.     Upper Abdomen: Images through the upper abdomen demonstrate a subtle nodular  contour to the liver margin, suggesting cirrhosis.     Soft Tissues/Bones: Multilevel degenerative changes are present throughout  the spine.  No acute, or aggressive osseous abnormality is present.     IMPRESSION:  1. Moderate to large size left pleural effusion, and small moderate size right pleural effusion, with bibasilar airspace disease, left greater than right, and may represent a combination of compressive atelectasis and  pneumonia.  2. 1.4 x 1.3 cm lobulated nodule superior medial portion of the right upper lobe, suspicious for malignancy.  CT PET imaging or tissue sampling recommended  RECOMMENDATIONS:  CT PET imaging, or tissue sampling of the right upper lobe nodule recommended           Chest imaging was reviewed by me and showed CXR 1/10/25  Improved aeration of the left lung status post thoracentesis with no evidence  of pneumothorax.        ASSESSMENT:  COPD exacerbation  Acute on chronic diastolic CHF   L left greater than right pleural effusions.  Left thoracentesis total

## 2025-01-11 NOTE — PROGRESS NOTES
[facility]   Progress Note  Cardiology      HPI: Seeing Ms. Fitzpatrick today for f/u HTN urgency and acute on chronic diastolic CHF. She feels \"great\" and wants to go home. No complaints. Tele NSR 72bpm.     Physical Examination:    Vitals:    01/11/25 0355   BP: 132/76   Pulse: 86   Resp: 16   Temp: 98.5 °F (36.9 °C)   SpO2: 95%          Constitutional and General Appearance: NAD   Respiratory:  Normal excursion and expansion without use of accessory muscles  Resp Auscultation: Normal breath sounds without dullness  Cardiovascular:  The apical impulses not displaced  Heart tones are crisp and normal  Cervical veins are not engorged  The carotid upstroke is normal in amplitude and contour without delay or bruit  Normal S1S2, No S3, No Murmur  Peripheral pulses are symmetrical and full  There is no clubbing, cyanosis of the extremities.  No edema  Pedal Pulses: 2+ and equal   Abdomen:  No masses or tenderness  Liver/Spleen: No Abnormalities Noted  Neurological/Psychiatric:  Alert and oriented in all spheres  Moves all extremities well  No abnormalities of mood, affect, memory, mentation, or behavior are noted  Skin: warm and dry      Lab Results   Component Value Date    WBC 6.4 01/10/2025    HGB 10.2 (L) 01/10/2025    HCT 31.3 (L) 01/10/2025    MCV 82.1 01/10/2025     01/10/2025     Lab Results   Component Value Date    CREATININE 0.6 01/11/2025    BUN 12 01/11/2025     (L) 01/11/2025    K 3.1 (L) 01/11/2025    CL 92 (L) 01/11/2025    CO2 29 01/11/2025     Lab Results   Component Value Date    INR 1.02 01/10/2025    PROTIME 13.6 01/10/2025        Assessment: Najma Fitzpatrick is a 74 y.o. patient who presented to [facility] 1/9/2025 with c/o SOB.  PMH anemia, chronic diastolic CHF, noncompliance, HLD, HTN, Liver dz, COPD, opiate misuse. Prior testing: [facility] 11/7/14 noted normal coronaries. Anayeli-Nuc 9/29/20 neg for ischemia; EF=>75%. Echo 9/3/24 EF=60-65%; Grade I DD.              Presented from home via EMS  with c/o SOB.  She was discharged 12/31 for COPD exacerbation.  She wears 2LNC at baseline.  She received two doses of albuterol at home and a dose of duoneb en route. Reports SOB at rest and exertion for months but worse last 2 weeks. Also with cough and clear phelgm. Reports brief, occasonal, random, dull mid-CP. Admission Testing:  EKG noted SR with premature supraventricular complexes; 83 bpm. CXR noted Small left pleural effusion with hazy left basilar ASD. Chest CT mod to large size left pleural effusion and small moderate pleural effusion with bibasilar ASD; 1.4x1.3 cm lobulated nodule RUL, suspicious for malignancy.  LABS: , K 3.6, 3.4; Mg+ 1.72; BUN/Cr 5/0.4, BNP 1,217, ALT 11, AST 15, H/H 10.2/31.5 and Liazbeth 23 and 24. Note markedly hypertensive 220's systolic on arrival. Received hydralazine, cefepime, Solu-Medrol, duo nebulizer and vancomycin.               Diagnosis HTN urgency, acute on chronic diastolic CHF, pleural effusions, RUL nodule, and COPD.      Recs:  Switch from IV lasix to 40mg po daily on d/c. Weight down 7# to 169# today. Net negative 2.1L.  S/P left thoracentesis 1/10. Note 600mL clear yellow fluid removed. Await labs  Pulmonary consulted and follow their recs.  BP much improved. Continue higher dose [id number] qd, new hydralazine 25 TID, coreg 25 BID, lipitor 40 qd, baby asa qd.   Replete lytes properly  No need for cardiac testing at this time.      Note HTN urgency and acute on chronic diastolic CHF increase her morbidity and mortality requiring med tx.    OK for d/c from cardiology when IM doc feels ready. Will make f/u OV.    Signing off     Patient Active Problem List   Diagnosis    Other chronic pain    COPD (chronic obstructive pulmonary disease) (HCC)    Suicidal ideation    Discitis    Diarrhea    Discitis of cervicothoracic region    Bipolar disorder (HCC)    Ataxia    Wernicke encephalopathy syndrome    Acute exacerbation of chronic obstructive pulmonary disease (COPD)

## 2025-01-11 NOTE — PROGRESS NOTES
Bedside report and transfer of care given to ROMAIN Tirado. Pt currently resting in bed with the call light within reach. Pt denies any other care needs at this time. Pt stable at this time.

## 2025-01-11 NOTE — FLOWSHEET NOTE
01/10/25 2104   Vital Signs   Temp 97.5 °F (36.4 °C)   Temp Source Oral   Pulse 82   Heart Rate Source Monitor   Respirations 20   /60   MAP (Calculated) 86   BP Location Right upper arm   BP Method Automatic   Patient Position Semi fowlers   Oxygen Therapy   SpO2 95 %   O2 Device Nasal cannula   O2 Flow Rate (L/min) 2 L/min     Pt resting in bed. IV infusing as ordered. Bed alarm on, call light within reach. Will continue to monitor.

## 2025-01-11 NOTE — PLAN OF CARE
Problem: Safety - Adult  Goal: Free from fall injury  1/10/2025 2341 by Vandana Castano, RN  Outcome: Progressing  1/10/2025 1112 by Toma Zuñiga RN  Outcome: Progressing     Problem: Chronic Conditions and Co-morbidities  Goal: Patient's chronic conditions and co-morbidity symptoms are monitored and maintained or improved  1/10/2025 2341 by Vandana Castano, RN  Outcome: Progressing  Flowsheets (Taken 1/10/2025 2127)  Care Plan - Patient's Chronic Conditions and Co-Morbidity Symptoms are Monitored and Maintained or Improved: Monitor and assess patient's chronic conditions and comorbid symptoms for stability, deterioration, or improvement  1/10/2025 1112 by Toma Zuñiga, RN  Outcome: Progressing  Flowsheets (Taken 1/10/2025 0826)  Care Plan - Patient's Chronic Conditions and Co-Morbidity Symptoms are Monitored and Maintained or Improved: Monitor and assess patient's chronic conditions and comorbid symptoms for stability, deterioration, or improvement     Problem: Discharge Planning  Goal: Discharge to home or other facility with appropriate resources  1/10/2025 2341 by Vandana Castano RN  Outcome: Progressing  Flowsheets (Taken 1/10/2025 2127)  Discharge to home or other facility with appropriate resources: Identify barriers to discharge with patient and caregiver  1/10/2025 1112 by Toma Zuñiga, RN  Outcome: Progressing  Flowsheets  Taken 1/10/2025 0826 by Toma Zuñiga, RN  Discharge to home or other facility with appropriate resources: Identify barriers to discharge with patient and caregiver  Taken 1/10/2025 0136 by Alvaro Alfredo, ROMAIN  Discharge to home or other facility with appropriate resources: Identify barriers to discharge with patient and caregiver     Problem: Pain  Goal: Verbalizes/displays adequate comfort level or baseline comfort level  1/10/2025 2341 by Vandana Castano, RN  Outcome: Progressing  1/10/2025 1112 by Toma Zuñiga,

## 2025-01-11 NOTE — PLAN OF CARE
Problem: Safety - Adult  Goal: Free from fall injury  1/11/2025 1524 by Jeanna Santos RN  Outcome: Adequate for Discharge  1/11/2025 1027 by Jeanna Santos RN  Outcome: Progressing     Problem: Chronic Conditions and Co-morbidities  Goal: Patient's chronic conditions and co-morbidity symptoms are monitored and maintained or improved  1/11/2025 1524 by Jeanna Santos RN  Outcome: Adequate for Discharge  1/11/2025 1027 by Jeanna Santos RN  Outcome: Progressing  Flowsheets (Taken 1/11/2025 0842)  Care Plan - Patient's Chronic Conditions and Co-Morbidity Symptoms are Monitored and Maintained or Improved: Monitor and assess patient's chronic conditions and comorbid symptoms for stability, deterioration, or improvement     Problem: Discharge Planning  Goal: Discharge to home or other facility with appropriate resources  1/11/2025 1524 by Jeanna Santos RN  Outcome: Adequate for Discharge  1/11/2025 1027 by Jeanna Santos RN  Outcome: Progressing  Flowsheets (Taken 1/11/2025 0842)  Discharge to home or other facility with appropriate resources:   Identify barriers to discharge with patient and caregiver   Arrange for needed discharge resources and transportation as appropriate   Identify discharge learning needs (meds, wound care, etc)     Problem: Pain  Goal: Verbalizes/displays adequate comfort level or baseline comfort level  1/11/2025 1524 by Jeanna Santos RN  Outcome: Adequate for Discharge  1/11/2025 1027 by Jeanna Santos RN  Outcome: Progressing  Flowsheets  Taken 1/11/2025 0355 by Vandana Castano, RN  Verbalizes/displays adequate comfort level or baseline comfort level: Encourage patient to monitor pain and request assistance  Taken 1/10/2025 2345 by Vandana Castano, RN  Verbalizes/displays adequate comfort level or baseline comfort level: Encourage patient to monitor pain and request assistance     Problem: ABCDS Injury Assessment  Goal: Absence of physical injury  1/11/2025 1524 by  Jeanna Santos, RN  Outcome: Adequate for Discharge  1/11/2025 1027 by Jeanna Santos, RN  Outcome: Progressing

## 2025-01-11 NOTE — FLOWSHEET NOTE
01/11/25 0355   Vital Signs   Temp 98.5 °F (36.9 °C)   Temp Source Oral   Pulse 86   Heart Rate Source Monitor   Respirations 16   /76   MAP (Calculated) 95   BP Location Right upper arm   BP Method Automatic   Patient Position Semi fowlers   Pain Assessment   Pain Assessment 0-10   Care Plan - Pain Goals   Verbalizes/displays adequate comfort level or baseline comfort level Encourage patient to monitor pain and request assistance   Oxygen Therapy   SpO2 95 %   Pulse Oximetry Type Intermittent   Pulse Oximeter Device Mode Intermittent   Pulse Oximeter Device Location Left;Finger   O2 Device Nasal cannula   O2 Flow Rate (L/min) 2 L/min   Oxygen Therapy Supplemental oxygen

## 2025-01-11 NOTE — CARE COORDINATION
1:40 PM  GUILLE observed DC order. Pt will be DC back home and will be transported by family with O2.   No further CM needs.   Pt's O2 is 92% on 2L NC.     Last IMM: 1/10    GUILLE informed RN pt is ready for DC from CM standpoint.     Electronically signed by Thao Walton on 1/11/2025 at 1:42 PM

## 2025-01-11 NOTE — PROGRESS NOTES
RT Inhaler-Nebulizer Bronchodilator Protocol Note    There is a bronchodilator order in the chart from a provider indicating to follow the RT Bronchodilator Protocol and there is an “Initiate RT Inhaler-Nebulizer Bronchodilator Protocol” order as well (see protocol at bottom of note).    CXR Findings:  XR CHEST PORTABLE    Result Date: 1/10/2025  Cardiomegaly with developing pulmonary vascular congestion and small left-sided pleural effusion.     XR CHEST PORTABLE    Result Date: 1/9/2025  Small left pleural effusion with hazy left basilar airspace disease which could represent a combination of atelectasis and/or pneumonia       The findings from the last RT Protocol Assessment were as follows:   History Pulmonary Disease: Chronic pulmonary disease  Respiratory Pattern: Dyspnea on exertion or RR 21-25 bpm  Breath Sounds: Slightly diminished and/or crackles  Cough: Strong, spontaneous, non-productive  Indication for Bronchodilator Therapy: On home bronchodilators  Bronchodilator Assessment Score: 6    Aerosolized bronchodilator medication orders have been revised according to the RT Inhaler-Nebulizer Bronchodilator Protocol below.    Respiratory Therapist to perform RT Therapy Protocol Assessment initially then follow the protocol.  Repeat RT Therapy Protocol Assessment PRN for score 0-3 or on second treatment, BID, and PRN for scores above 3.    No Indications - adjust the frequency to every 6 hours PRN wheezing or bronchospasm, if no treatments needed after 48 hours then discontinue using Per Protocol order mode.     If indication present, adjust the RT bronchodilator orders based on the Bronchodilator Assessment Score as indicated below.  Use Inhaler orders unless patient has one or more of the following: on home nebulizer, not able to hold breath for 10 seconds, is not alert and oriented, cannot activate and use MDI correctly, or respiratory rate 25 breaths per minute or more, then use the equivalent nebulizer

## 2025-01-11 NOTE — PROGRESS NOTES
Patient educated on discharge instructions as well as new medications use, dosage, administration and possible side effects.  Patient verified knowledge. IV removed without difficulty and dry dressing in place. Telemetry monitor removed and returned to [facility]. Pt left facility in stable condition to Home with all of their personal belongings.      Jeanna Santos RN

## 2025-01-11 NOTE — PROGRESS NOTES
Progress Note    Admit Date:  1/9/2025    Dyspnea   Bilateral pleural effusions   Lobulated right pulmonary nodule    Planning for thoracentesis done 1/10    Subjective:  Ms. Fitzpatrick has some shoulder pain but overall she feels better    Objective:   Patient Vitals for the past 4 hrs:   BP Temp Temp src Pulse Resp SpO2   01/11/25 0830 122/63 98.2 °F (36.8 °C) Oral 88 16 94 %   01/11/25 0714 -- -- -- 89 18 96 %          Intake/Output Summary (Last 24 hours) at 1/11/2025 1106  Last data filed at 1/11/2025 1003  Gross per 24 hour   Intake 997.99 ml   Output 1600 ml   Net -602.01 ml       Physical Exam:    Gen: No distress. Alert. +chronically ill appearing female   Eyes: PERRL. No sclera icterus. No conjunctival injection.   Neck: No JVD.  Trachea midline.  Resp: +mild accessory muscle use. No crackles. +Diminished breath sounds with trace wheezing   CV: Regular rate. Regular rhythm. No murmur.  No rub. No edema.   Peripheral Pulses: +2 palpable, equal bilaterally   GI: Non-tender. Non-distended.  Normal bowel sounds.  Skin: Warm and dry. No nodule on exposed extremities. No rash on exposed extremities.   M/S: No cyanosis. No joint deformity. No clubbing.   Neuro: Awake. Grossly nonfocal  +chronic blindness bilaterally   Psych: Oriented x 3. No anxiety or agitation.         Medications:  sodium chloride flush, 5-40 mL, 2 times per day  enoxaparin, 40 mg, Daily  aspirin, 81 mg, Daily  atorvastatin, 40 mg, Daily  busPIRone, 7.5 mg, TID  carvedilol, 25 mg, BID WC  divalproex, 250 mg, QAM  divalproex, 500 mg, QHS  DULoxetine, 60 mg, Daily  guaiFENesin, 600 mg, BID  nicotine, 1 patch, Q24H  pantoprazole, 40 mg, Daily  QUEtiapine, 400 mg, Nightly  topiramate, 25 mg, Daily  ipratropium 0.5 mg-albuterol 2.5 mg, 1 Dose, 4x Daily RT  furosemide, 40 mg, Daily  hydrALAZINE, 25 mg, 3 times per day  losartan, 100 mg, Daily  levoFLOXacin, 750 mg, Daily  predniSONE, 40 mg, Daily      PRN Medications:  sodium chloride flush, 5-40 mL,  PRN  sodium chloride, , PRN  ondansetron, 4 mg, Q8H PRN   Or  ondansetron, 4 mg, Q6H PRN  polyethylene glycol, 17 g, Daily PRN  acetaminophen, 650 mg, Q6H PRN   Or  acetaminophen, 650 mg, Q6H PRN  albuterol, 0.63 mg, Q6H PRN  potassium chloride, 40 mEq, PRN   Or  potassium alternative oral replacement, 40 mEq, PRN   Or  potassium chloride, 10 mEq, PRN  magnesium sulfate, 2,000 mg, PRN  oxyCODONE, 5 mg, Q6H PRN          Data:  CBC:   Recent Labs     01/09/25  1752 01/10/25  0435   WBC 9.5 6.4   HGB 10.2* 10.2*   HCT 31.5* 31.3*   MCV 82.6 82.1    186     BMP:   Recent Labs     01/09/25  1752 01/10/25  0435 01/11/25  0500   * 133* 128*   K 3.6 3.4* 3.1*   CL 93* 92* 92*   CO2 33* 31 29   BUN 5* 6* 12   CREATININE 0.4* 0.4* 0.6     LIVER PROFILE:   Recent Labs     01/09/25 1752 01/11/25  0500   AST 15 13*   ALT 11 7*   BILIDIR  --  <0.1   BILITOT 0.4 <0.2   ALKPHOS 95 73     PT/INR:   Recent Labs     01/10/25  0807   PROTIME 13.6   INR 1.02       CULTURES  Results       Procedure Component Value Units Date/Time    Culture, Body Fluid (with Gram Stain) [[id number]] Collected: 01/10/25 1245    Order Status: Completed Specimen: Body Fluid from Pleural Updated: 01/11/25 0953     Body Fluid Culture, Sterile No growth to date     Gram Stain Result Cytospin performed,no quantitation  WBC's  No Epithelial Cells seen  No organisms seen      Narrative:      ORDER#: [id number]                          ORDERED BY: CULLEN WAGNER  SOURCE: Pleural                            COLLECTED:  01/10/25 12:45  ANTIBIOTICS AT JULIA.:                      RECEIVED :  01/10/25 12:56    MRSA DNA Probe, Nasal [[id number]] Collected: 01/10/25 1155    Order Status: Completed Specimen: Nares Updated: 01/10/25 2322     MRSA SCREEN RT-PCR --     Negative  MRSA DNA not detected.  Normal Range: Not detected      Narrative:      ORDER#: [id number]                          ORDERED BY: CATALINA JOSÉ  SOURCE: Nares

## 2025-01-11 NOTE — PLAN OF CARE
Problem: Safety - Adult  Goal: Free from fall injury  1/11/2025 1027 by Jeanna Santos RN  Outcome: Progressing  1/10/2025 2341 by Vandana Castano RN  Outcome: Progressing     Problem: Chronic Conditions and Co-morbidities  Goal: Patient's chronic conditions and co-morbidity symptoms are monitored and maintained or improved  1/11/2025 1027 by Jeanna Santos RN  Outcome: Progressing  Flowsheets (Taken 1/11/2025 0842)  Care Plan - Patient's Chronic Conditions and Co-Morbidity Symptoms are Monitored and Maintained or Improved: Monitor and assess patient's chronic conditions and comorbid symptoms for stability, deterioration, or improvement  1/10/2025 2341 by Vandana Castano RN  Outcome: Progressing  Flowsheets (Taken 1/10/2025 2127)  Care Plan - Patient's Chronic Conditions and Co-Morbidity Symptoms are Monitored and Maintained or Improved: Monitor and assess patient's chronic conditions and comorbid symptoms for stability, deterioration, or improvement     Problem: Discharge Planning  Goal: Discharge to home or other facility with appropriate resources  1/11/2025 1027 by Jeanna Santos RN  Outcome: Progressing  Flowsheets (Taken 1/11/2025 0842)  Discharge to home or other facility with appropriate resources:   Identify barriers to discharge with patient and caregiver   Arrange for needed discharge resources and transportation as appropriate   Identify discharge learning needs (meds, wound care, etc)  1/10/2025 2341 by Vandana Castano, RN  Outcome: Progressing  Flowsheets (Taken 1/10/2025 2127)  Discharge to home or other facility with appropriate resources: Identify barriers to discharge with patient and caregiver     Problem: Pain  Goal: Verbalizes/displays adequate comfort level or baseline comfort level  1/11/2025 1027 by Jeanna Santos RN  Outcome: Progressing  Flowsheets  Taken 1/11/2025 0355 by Vandana Castano RN  Verbalizes/displays adequate comfort level or baseline comfort

## 2025-01-11 NOTE — FLOWSHEET NOTE
01/11/25 1230   Vital Signs   Temp 98.3 °F (36.8 °C)   Temp Source Oral   Pulse 71   Heart Rate Source Monitor   Respirations 17   /67   MAP (Calculated) 84   BP Location Right upper arm   BP Method Automatic   Patient Position High fowlers   Oxygen Therapy   SpO2 92 %   O2 Device Nasal cannula   O2 Flow Rate (L/min) 2 L/min     Vitals and reassessment completed. No significant changes. Patient set up for lunch. No further needs at this time.     Jeanna Santos RN

## 2025-01-12 LAB
BACTERIA FLD AEROBE CULT: NORMAL
GRAM STN SPEC: NORMAL

## 2025-01-13 ENCOUNTER — TELEPHONE (OUTPATIENT)
Dept: PULMONOLOGY | Age: 75
End: 2025-01-13

## 2025-01-13 LAB
BACTERIA BLD CULT ORG #2: NORMAL
BACTERIA FLD AEROBE CULT: NORMAL
GRAM STN SPEC: NORMAL

## 2025-01-13 RX ORDER — POTASSIUM CHLORIDE 1500 MG/1
20 TABLET, EXTENDED RELEASE ORAL DAILY
Qty: 5 TABLET | OUTPATIENT
Start: 2025-01-13

## 2025-01-13 NOTE — TELEPHONE ENCOUNTER
Scheduled for phone visit   Pt does not have video access   Pt has bed bugs can not come into office

## 2025-01-13 NOTE — TELEPHONE ENCOUNTER
Fer Bain MD Major, Stephen, MD; P [facility] Support  See me the week of 1/20

## 2025-01-14 LAB — BACTERIA BLD CULT: NORMAL

## 2025-01-21 ENCOUNTER — HOSPITAL ENCOUNTER (EMERGENCY)
Age: 75
Discharge: HOME | End: 2025-01-22
Attending: STUDENT IN AN ORGANIZED HEALTH CARE EDUCATION/TRAINING PROGRAM
Payer: COMMERCIAL

## 2025-01-21 ENCOUNTER — SCHEDULED TELEPHONE ENCOUNTER (OUTPATIENT)
Dept: PULMONOLOGY | Age: 75
End: 2025-01-21
Payer: COMMERCIAL

## 2025-01-21 DIAGNOSIS — R41.82 ALTERED MENTAL STATUS, UNSPECIFIED ALTERED MENTAL STATUS TYPE: ICD-10-CM

## 2025-01-21 DIAGNOSIS — R09.02 HYPOXIA: ICD-10-CM

## 2025-01-21 DIAGNOSIS — E87.1 HYPONATREMIA: ICD-10-CM

## 2025-01-21 DIAGNOSIS — J44.1 COPD EXACERBATION (HCC): Primary | ICD-10-CM

## 2025-01-21 DIAGNOSIS — J90 PLEURAL EFFUSION: ICD-10-CM

## 2025-01-21 DIAGNOSIS — R06.00 DYSPNEA, UNSPECIFIED TYPE: ICD-10-CM

## 2025-01-21 DIAGNOSIS — J44.9 CHRONIC OBSTRUCTIVE PULMONARY DISEASE, UNSPECIFIED COPD TYPE (HCC): Primary | ICD-10-CM

## 2025-01-21 DIAGNOSIS — R91.1 PULMONARY NODULE: ICD-10-CM

## 2025-01-21 PROCEDURE — 1159F MED LIST DOCD IN RCRD: CPT | Performed by: INTERNAL MEDICINE

## 2025-01-21 PROCEDURE — 99214 OFFICE O/P EST MOD 30 MIN: CPT | Performed by: INTERNAL MEDICINE

## 2025-01-21 PROCEDURE — 99285 EMERGENCY DEPT VISIT HI MDM: CPT

## 2025-01-21 RX ORDER — IPRATROPIUM BROMIDE AND ALBUTEROL SULFATE 2.5; .5 MG/3ML; MG/3ML
1 SOLUTION RESPIRATORY (INHALATION) ONCE
Status: COMPLETED | OUTPATIENT
Start: 2025-01-22 | End: 2025-01-22

## 2025-01-21 NOTE — PROGRESS NOTES
Pt is blind and needs her daughter to help her schedule.   Pt will have daughter call me once she gets in.   Pft 6 min walk ordered   Pet scan order pending

## 2025-01-21 NOTE — PROGRESS NOTES
[facility]                                                                  CHIEF COMPLAINT: COPD        HPI:   Recent hospitalization, CT showed nodular and pleural effusion.  Postthoracentesis.  Breathing is good-overall much better.  Cough with clear sputum  No hemoptysis   Compliant with inhaled bronchodilators.   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    Coronary artery disease involving native coronary artery of native heart with angina pectoris ([facility]) 3/8/2016    Depression     Emphysema of lung ([facility])     Fibromyalgia     hyperlipidemia 8/11/2011    Hypertension     Kidney stone     Liver disease     Lung disease     MDD (major depressive disorder) 4/4/2012    Mood disorder ([facility]) 3/13/2013    Movement disorder     Neck pain, chronic 3/13/2013    Opiate misuse     Personality disorder ([facility])     Pneumonia     Polypharmacy 2/16/2013       Past Surgical History:        Procedure Laterality Date    COLONOSCOPY  1/19/12    DIAGNOSTIC CARDIAC CATH LAB PROCEDURE  Feb, 2007    Normal cor angio Feb, 2007    HERNIA REPAIR  07/11/2019    robotic ventral hernia repair with mesh    HERNIA REPAIR N/A 7/11/2019    ROBOTIC VENTRAL HERNIA REPAIR WITH MESH performed by Yuriy Rider MD at [facility]    HYSTERECTOMY, TOTAL ABDOMINAL (CERVIX REMOVED)      KIDNEY STONE SURGERY         Allergies:  is allergic to dicyclomine, ibuprofen, sumatriptan, tape [adhesive tape], tizanidine, and motrin

## 2025-01-22 ENCOUNTER — APPOINTMENT (OUTPATIENT)
Dept: GENERAL RADIOLOGY | Age: 75
End: 2025-01-22
Payer: COMMERCIAL

## 2025-01-22 ENCOUNTER — APPOINTMENT (OUTPATIENT)
Dept: CT IMAGING | Age: 75
End: 2025-01-22
Payer: COMMERCIAL

## 2025-01-22 VITALS
SYSTOLIC BLOOD PRESSURE: 159 MMHG | BODY MASS INDEX: 32.1 KG/M2 | TEMPERATURE: 98.4 F | WEIGHT: 170 LBS | RESPIRATION RATE: 19 BRPM | HEART RATE: 92 BPM | OXYGEN SATURATION: 95 % | DIASTOLIC BLOOD PRESSURE: 74 MMHG | HEIGHT: 61 IN

## 2025-01-22 LAB
ALBUMIN SERPL-MCNC: 3.2 G/DL (ref 3.4–5)
ALBUMIN/GLOB SERPL: 1.3 {RATIO} (ref 1.1–2.2)
ALP SERPL-CCNC: 79 U/L (ref 40–129)
ALT SERPL-CCNC: 8 U/L (ref 10–40)
AMPHETAMINES UR QL SCN>1000 NG/ML: ABNORMAL
ANION GAP SERPL CALCULATED.3IONS-SCNC: 4 MMOL/L (ref 3–16)
ANION GAP SERPL CALCULATED.3IONS-SCNC: 5 MMOL/L (ref 3–16)
ANION GAP SERPL CALCULATED.3IONS-SCNC: 8 MMOL/L (ref 3–16)
AST SERPL-CCNC: 21 U/L (ref 15–37)
BARBITURATES UR QL SCN>200 NG/ML: ABNORMAL
BASE EXCESS BLDV CALC-SCNC: 5.2 MMOL/L (ref -3–3)
BASE EXCESS BLDV CALC-SCNC: 5.5 MMOL/L (ref -3–3)
BASOPHILS # BLD: 0 K/UL (ref 0–0.2)
BASOPHILS NFR BLD: 0.5 %
BENZODIAZ UR QL SCN>200 NG/ML: ABNORMAL
BILIRUB SERPL-MCNC: <0.2 MG/DL (ref 0–1)
BILIRUB UR QL STRIP.AUTO: NEGATIVE
BUN SERPL-MCNC: 10 MG/DL (ref 7–20)
BUN SERPL-MCNC: 10 MG/DL (ref 7–20)
BUN SERPL-MCNC: 9 MG/DL (ref 7–20)
CALCIUM SERPL-MCNC: 8.3 MG/DL (ref 8.3–10.6)
CALCIUM SERPL-MCNC: 8.5 MG/DL (ref 8.3–10.6)
CALCIUM SERPL-MCNC: 8.5 MG/DL (ref 8.3–10.6)
CANNABINOIDS UR QL SCN>50 NG/ML: ABNORMAL
CHLORIDE SERPL-SCNC: 84 MMOL/L (ref 99–110)
CHLORIDE SERPL-SCNC: 85 MMOL/L (ref 99–110)
CHLORIDE SERPL-SCNC: 90 MMOL/L (ref 99–110)
CLARITY UR: CLEAR
CO2 BLDV-SCNC: 33 MMOL/L
CO2 BLDV-SCNC: 34 MMOL/L
CO2 SERPL-SCNC: 30 MMOL/L (ref 21–32)
CO2 SERPL-SCNC: 32 MMOL/L (ref 21–32)
CO2 SERPL-SCNC: 32 MMOL/L (ref 21–32)
COCAINE UR QL SCN: ABNORMAL
COHGB MFR BLDV: 2 % (ref 0–1.5)
COHGB MFR BLDV: 3.5 % (ref 0–1.5)
COLOR UR: YELLOW
CREAT SERPL-MCNC: 0.6 MG/DL (ref 0.6–1.2)
CREAT SERPL-MCNC: 0.7 MG/DL (ref 0.6–1.2)
CREAT SERPL-MCNC: 0.8 MG/DL (ref 0.6–1.2)
DEPRECATED RDW RBC AUTO: 16.1 % (ref 12.4–15.4)
DRUG SCREEN COMMENT UR-IMP: ABNORMAL
EKG ATRIAL RATE: 63 BPM
EKG DIAGNOSIS: NORMAL
EKG P AXIS: 16 DEGREES
EKG P-R INTERVAL: 166 MS
EKG Q-T INTERVAL: 422 MS
EKG QRS DURATION: 84 MS
EKG QTC CALCULATION (BAZETT): 431 MS
EKG R AXIS: 7 DEGREES
EKG T AXIS: 37 DEGREES
EKG VENTRICULAR RATE: 63 BPM
EOSINOPHIL # BLD: 0.6 K/UL (ref 0–0.6)
EOSINOPHIL NFR BLD: 9.9 %
ETHANOLAMINE SERPL-MCNC: NORMAL MG/DL (ref 0–0.08)
FENTANYL SCREEN, URINE: ABNORMAL
FLUAV RNA RESP QL NAA+PROBE: NOT DETECTED
FLUBV RNA RESP QL NAA+PROBE: NOT DETECTED
GFR SERPLBLD CREATININE-BSD FMLA CKD-EPI: 78 ML/MIN/{1.73_M2}
GFR SERPLBLD CREATININE-BSD FMLA CKD-EPI: >90 ML/MIN/{1.73_M2}
GFR SERPLBLD CREATININE-BSD FMLA CKD-EPI: >90 ML/MIN/{1.73_M2}
GLUCOSE SERPL-MCNC: 135 MG/DL (ref 70–99)
GLUCOSE SERPL-MCNC: 145 MG/DL (ref 70–99)
GLUCOSE SERPL-MCNC: 97 MG/DL (ref 70–99)
GLUCOSE UR STRIP.AUTO-MCNC: NEGATIVE MG/DL
HCO3 BLDV-SCNC: 31.6 MMOL/L (ref 23–29)
HCO3 BLDV-SCNC: 31.9 MMOL/L (ref 23–29)
HCT VFR BLD AUTO: 26.2 % (ref 36–48)
HGB BLD-MCNC: 8.4 G/DL (ref 12–16)
HGB UR QL STRIP.AUTO: NEGATIVE
KETONES UR STRIP.AUTO-MCNC: NEGATIVE MG/DL
LACTATE BLDV-SCNC: 0.9 MMOL/L (ref 0.4–2)
LEUKOCYTE ESTERASE UR QL STRIP.AUTO: NEGATIVE
LYMPHOCYTES # BLD: 1.2 K/UL (ref 1–5.1)
LYMPHOCYTES NFR BLD: 19.6 %
MCH RBC QN AUTO: 26.3 PG (ref 26–34)
MCHC RBC AUTO-ENTMCNC: 32.2 G/DL (ref 31–36)
MCV RBC AUTO: 81.9 FL (ref 80–100)
METHADONE UR QL SCN>300 NG/ML: POSITIVE
METHGB MFR BLDV: 0 %
METHGB MFR BLDV: 0.3 %
MONOCYTES # BLD: 0.8 K/UL (ref 0–1.3)
MONOCYTES NFR BLD: 12.5 %
NEUTROPHILS # BLD: 3.6 K/UL (ref 1.7–7.7)
NEUTROPHILS NFR BLD: 57.5 %
NITRITE UR QL STRIP.AUTO: NEGATIVE
NT-PROBNP SERPL-MCNC: 288 PG/ML (ref 0–124)
O2 CT VFR BLDV CALC: 13 VOL %
O2 THERAPY: ABNORMAL
O2 THERAPY: ABNORMAL
OPIATES UR QL SCN>300 NG/ML: POSITIVE
OXYCODONE UR QL SCN: ABNORMAL
PCO2 BLDV: 54.2 MMHG (ref 40–50)
PCO2 BLDV: 60 MMHG (ref 40–50)
PCP UR QL SCN>25 NG/ML: ABNORMAL
PH BLDV: 7.34 [PH] (ref 7.35–7.45)
PH BLDV: 7.38 [PH] (ref 7.35–7.45)
PH UR STRIP.AUTO: 6 [PH] (ref 5–8)
PH UR STRIP: 6 [PH]
PLATELET # BLD AUTO: 177 K/UL (ref 135–450)
PMV BLD AUTO: 7.2 FL (ref 5–10.5)
PO2 BLDV: 62 MMHG (ref 25–40)
PO2 BLDV: 73.4 MMHG (ref 25–40)
POTASSIUM SERPL-SCNC: 3.7 MMOL/L (ref 3.5–5.1)
POTASSIUM SERPL-SCNC: 4 MMOL/L (ref 3.5–5.1)
POTASSIUM SERPL-SCNC: 4.1 MMOL/L (ref 3.5–5.1)
PROT SERPL-MCNC: 5.6 G/DL (ref 6.4–8.2)
PROT UR STRIP.AUTO-MCNC: NEGATIVE MG/DL
RBC # BLD AUTO: 3.2 M/UL (ref 4–5.2)
SAO2 % BLDV: 91 %
SAO2 % BLDV: 94 %
SARS-COV-2 RNA RESP QL NAA+PROBE: NOT DETECTED
SODIUM SERPL-SCNC: 121 MMOL/L (ref 136–145)
SODIUM SERPL-SCNC: 121 MMOL/L (ref 136–145)
SODIUM SERPL-SCNC: 128 MMOL/L (ref 136–145)
SP GR UR STRIP.AUTO: 1.02 (ref 1–1.03)
TROPONIN, HIGH SENSITIVITY: 27 NG/L (ref 0–14)
TROPONIN, HIGH SENSITIVITY: 31 NG/L (ref 0–14)
UA COMPLETE W REFLEX CULTURE PNL UR: NORMAL
UA DIPSTICK W REFLEX MICRO PNL UR: NORMAL
URN SPEC COLLECT METH UR: NORMAL
UROBILINOGEN UR STRIP-ACNC: 0.2 E.U./DL
WBC # BLD AUTO: 6.3 K/UL (ref 4–11)

## 2025-01-22 PROCEDURE — 82803 BLOOD GASES ANY COMBINATION: CPT

## 2025-01-22 PROCEDURE — 81003 URINALYSIS AUTO W/O SCOPE: CPT

## 2025-01-22 PROCEDURE — 71045 X-RAY EXAM CHEST 1 VIEW: CPT

## 2025-01-22 PROCEDURE — 83880 ASSAY OF NATRIURETIC PEPTIDE: CPT

## 2025-01-22 PROCEDURE — 6360000002 HC RX W HCPCS: Performed by: STUDENT IN AN ORGANIZED HEALTH CARE EDUCATION/TRAINING PROGRAM

## 2025-01-22 PROCEDURE — 70450 CT HEAD/BRAIN W/O DYE: CPT

## 2025-01-22 PROCEDURE — 80053 COMPREHEN METABOLIC PANEL: CPT

## 2025-01-22 PROCEDURE — 82077 ASSAY SPEC XCP UR&BREATH IA: CPT

## 2025-01-22 PROCEDURE — 80307 DRUG TEST PRSMV CHEM ANLYZR: CPT

## 2025-01-22 PROCEDURE — 84484 ASSAY OF TROPONIN QUANT: CPT

## 2025-01-22 PROCEDURE — 93005 ELECTROCARDIOGRAM TRACING: CPT | Performed by: STUDENT IN AN ORGANIZED HEALTH CARE EDUCATION/TRAINING PROGRAM

## 2025-01-22 PROCEDURE — 83605 ASSAY OF LACTIC ACID: CPT

## 2025-01-22 PROCEDURE — 87636 SARSCOV2 & INF A&B AMP PRB: CPT

## 2025-01-22 PROCEDURE — 93010 ELECTROCARDIOGRAM REPORT: CPT | Performed by: INTERNAL MEDICINE

## 2025-01-22 PROCEDURE — 36415 COLL VENOUS BLD VENIPUNCTURE: CPT

## 2025-01-22 PROCEDURE — 85025 COMPLETE CBC W/AUTO DIFF WBC: CPT

## 2025-01-22 PROCEDURE — 6370000000 HC RX 637 (ALT 250 FOR IP): Performed by: STUDENT IN AN ORGANIZED HEALTH CARE EDUCATION/TRAINING PROGRAM

## 2025-01-22 PROCEDURE — 2580000003 HC RX 258: Performed by: STUDENT IN AN ORGANIZED HEALTH CARE EDUCATION/TRAINING PROGRAM

## 2025-01-22 RX ORDER — 3% SODIUM CHLORIDE 3 G/100ML
100 INJECTION, SOLUTION INTRAVENOUS ONCE
Status: COMPLETED | OUTPATIENT
Start: 2025-01-22 | End: 2025-01-22

## 2025-01-22 RX ORDER — ALBUTEROL SULFATE 0.83 MG/ML
2.5 SOLUTION RESPIRATORY (INHALATION) ONCE
Status: COMPLETED | OUTPATIENT
Start: 2025-01-22 | End: 2025-01-22

## 2025-01-22 RX ORDER — ALBUTEROL SULFATE 5 MG/ML
2.5 SOLUTION RESPIRATORY (INHALATION) ONCE
Status: DISCONTINUED | OUTPATIENT
Start: 2025-01-22 | End: 2025-01-22 | Stop reason: SDUPTHER

## 2025-01-22 RX ADMIN — IPRATROPIUM BROMIDE AND ALBUTEROL SULFATE 1 DOSE: 2.5; .5 SOLUTION RESPIRATORY (INHALATION) at 00:44

## 2025-01-22 RX ADMIN — SODIUM CHLORIDE 100 ML: 3 INJECTION, SOLUTION INTRAVENOUS at 03:16

## 2025-01-22 RX ADMIN — ALBUTEROL SULFATE 2.5 MG: 2.5 SOLUTION RESPIRATORY (INHALATION) at 03:01

## 2025-01-22 ASSESSMENT — PAIN - FUNCTIONAL ASSESSMENT: PAIN_FUNCTIONAL_ASSESSMENT: NONE - DENIES PAIN

## 2025-01-22 ASSESSMENT — PAIN SCALES - GENERAL: PAINLEVEL_OUTOF10: 0

## 2025-01-22 NOTE — ED NOTES
RN called pt spouse whom sts he is 1.5 hrs away. He is trying to get ahold of son to  pt but if not, then he will be coming to get her.

## 2025-01-22 NOTE — ED PROVIDER NOTES
[facility] EMERGENCY DEPARTMENT     EMERGENCY DEPARTMENT ENCOUNTER         Pt Name: Najma Fitzpatrick   MRN: [id number]   Birthdate 1950   Date of evaluation: 1/21/2025   Provider: Eddie Thompson MD   PCP: Geena Sotomayor APRN - CNP   Note Started: 12:24 AM EST 1/22/25       Chief Complaint     Shortness of Breath (Pt to ED from home by EMS for SOB the past couple days on pt's baseline 2L NC o2 sats in low 90s, pt has Hx COPD and CHF, family called EMS d/t pt seemed to be less responsive than normal self, pt AAOx3 not oriented to date, reoriented. , 125mg Solu-Medrol given IM to left deltoid PTA. Pt denies any other complaints.)      History of Present Illness     Najma Fitzpatrick is a 74 y.o. female who presents with shortness of breath and possible altered mental status.  Patient arrives from home via EMS.  Patient has a history of chronic hypoxic respiratory failure in the context of COPD.  She wears 2 L of nasal cannula oxygen at all times was found with oxygen saturations in the low 90s and therefore started on supplemental oxygen via EMS.  She had an adequate blood sugar but seems somewhat somnolent.  Concern for a COPD exacerbation the patient was given Solu-Medrol and a breathing treatment.  On arrival here the patient awakens to voice but has difficulty contributing history.  She confirms shortness of breath but otherwise has no other acute complaints      Review of Systems     Positives and pertinent negatives as per HPI.    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

## 2025-01-22 NOTE — ED PROVIDER NOTES
7:03 AM: I discussed the history, physical examination, laboratory and imaging studies, and treatment plan with Dr. Thompson. Najma Fitzpatrick was signed out to me in stable condition. Please see Dr. Thompson's documentation for details of their history, physical, and laboratory studies.  Upon re-examination, a summary of Najma Fitzpatrick's history, physical examination, and studies are as follows: Patient is a 74-year-old female with history of chronic respiratory failure on oxygen, presenting with concerns for shortness of breath.  Was initially seen and evaluated by Dr. Thompson and was found to have a new hyponatremia.  She was started on hypertonic saline and at the time of signout pending studies included repeat sodium level.    Labs Reviewed   CBC WITH AUTO DIFFERENTIAL - Abnormal; Notable for the following components:       Result Value    RBC 3.20 (*)     Hemoglobin 8.4 (*)     Hematocrit 26.2 (*)     RDW 16.1 (*)     All other components within normal limits   COMPREHENSIVE METABOLIC PANEL W/ REFLEX TO MG FOR LOW K - Abnormal; Notable for the following components:    Sodium 121 (*)     Chloride 84 (*)     Total Protein 5.6 (*)     Albumin 3.2 (*)     ALT 8 (*)     All other components within normal limits   BLOOD GAS, VENOUS - Abnormal; Notable for the following components:    pH, Storm 7.344 (*)     pCO2, Storm 60.0 (*)     PO2, Storm 73.4 (*)     HCO3, Venous 31.9 (*)     Base Excess, Storm 5.2 (*)     Carboxyhemoglobin 3.5 (*)     All other components within normal limits   TROPONIN - Abnormal; Notable for the following components:    Troponin, High Sensitivity 31 (*)     All other components within normal limits   TROPONIN - Abnormal; Notable for the following components:    Troponin, High Sensitivity 27 (*)     All other components within normal limits   BRAIN NATRIURETIC PEPTIDE - Abnormal; Notable for the following components:    NT Pro- (*)     All other components within normal limits   URINE DRUG SCREEN - Abnormal;

## 2025-01-22 NOTE — ED NOTES
Pt states \"I think I'm ready to get discharged\", educated pt on provider's desire to finish 3% saline order and retest blood work, pt okay to wait for these tests. States is feeling better.

## 2025-01-22 NOTE — ED NOTES
Pt requesting another breathing treatment, states previous one helped a lot and \"my body talks to me\" and would like another treatment, will alert MD.

## 2025-01-22 NOTE — ED NOTES
Discharge paperwork given to and reviewed with pt and spouse. Pt and spouse verbalized understanding and all questions answered. Pt encouraged to return if having worsening symptoms or new symptoms discussed in discharge paperwork.  Pt to follow up with PCP  IV removed with catheter intact. Pt in NAD, RR even and unlabored. Pt off unit via stretcher to spouse's truck

## 2025-01-22 NOTE — ED NOTES
Pt called out stating she was having trouble breathing and \"something's just not right I can't tell what it is\", pt repositioned and sat up in bed with some relief, MD notified and now at bedside for re-eval.

## 2025-01-28 ENCOUNTER — HOSPITAL ENCOUNTER (INPATIENT)
Age: 75
LOS: 2 days | Discharge: HOME | DRG: 191 | End: 2025-01-30
Attending: EMERGENCY MEDICINE
Payer: COMMERCIAL

## 2025-01-28 ENCOUNTER — APPOINTMENT (OUTPATIENT)
Dept: GENERAL RADIOLOGY | Age: 75
DRG: 191 | End: 2025-01-28
Payer: COMMERCIAL

## 2025-01-28 ENCOUNTER — APPOINTMENT (OUTPATIENT)
Dept: CT IMAGING | Age: 75
DRG: 191 | End: 2025-01-28
Payer: COMMERCIAL

## 2025-01-28 DIAGNOSIS — J44.1 COPD WITH ACUTE EXACERBATION (HCC): ICD-10-CM

## 2025-01-28 DIAGNOSIS — J96.01 ACUTE RESPIRATORY FAILURE WITH HYPOXIA: ICD-10-CM

## 2025-01-28 DIAGNOSIS — R53.1 GENERAL WEAKNESS: ICD-10-CM

## 2025-01-28 DIAGNOSIS — R09.02 HYPOXIA: ICD-10-CM

## 2025-01-28 DIAGNOSIS — J96.20 ACUTE ON CHRONIC RESPIRATORY FAILURE, UNSPECIFIED WHETHER WITH HYPOXIA OR HYPERCAPNIA: Primary | ICD-10-CM

## 2025-01-28 DIAGNOSIS — R62.7 FAILURE TO THRIVE IN ADULT: ICD-10-CM

## 2025-01-28 LAB
ALBUMIN SERPL-MCNC: 3.5 G/DL (ref 3.4–5)
ALBUMIN/GLOB SERPL: 1.3 {RATIO} (ref 1.1–2.2)
ALP SERPL-CCNC: 86 U/L (ref 40–129)
ALT SERPL-CCNC: 7 U/L (ref 10–40)
ANION GAP SERPL CALCULATED.3IONS-SCNC: 9 MMOL/L (ref 3–16)
AST SERPL-CCNC: 16 U/L (ref 15–37)
BASE EXCESS BLDV CALC-SCNC: 5.7 MMOL/L (ref -3–3)
BASOPHILS # BLD: 0 K/UL (ref 0–0.2)
BASOPHILS NFR BLD: 0.5 %
BILIRUB SERPL-MCNC: 0.3 MG/DL (ref 0–1)
BUN SERPL-MCNC: 5 MG/DL (ref 7–20)
CALCIUM SERPL-MCNC: 9.5 MG/DL (ref 8.3–10.6)
CHLORIDE SERPL-SCNC: 94 MMOL/L (ref 99–110)
CO2 BLDV-SCNC: 34 MMOL/L
CO2 SERPL-SCNC: 31 MMOL/L (ref 21–32)
COHGB MFR BLDV: 4.7 % (ref 0–1.5)
CREAT SERPL-MCNC: 0.6 MG/DL (ref 0.6–1.2)
DEPRECATED RDW RBC AUTO: 16.5 % (ref 12.4–15.4)
EKG ATRIAL RATE: 80 BPM
EKG ATRIAL RATE: 89 BPM
EKG DIAGNOSIS: NORMAL
EKG DIAGNOSIS: NORMAL
EKG P AXIS: 52 DEGREES
EKG P AXIS: 81 DEGREES
EKG P-R INTERVAL: 158 MS
EKG P-R INTERVAL: 158 MS
EKG Q-T INTERVAL: 364 MS
EKG Q-T INTERVAL: 372 MS
EKG QRS DURATION: 92 MS
EKG QRS DURATION: 94 MS
EKG QTC CALCULATION (BAZETT): 419 MS
EKG QTC CALCULATION (BAZETT): 452 MS
EKG R AXIS: 4 DEGREES
EKG R AXIS: 72 DEGREES
EKG T AXIS: 50 DEGREES
EKG T AXIS: 75 DEGREES
EKG VENTRICULAR RATE: 80 BPM
EKG VENTRICULAR RATE: 89 BPM
EOSINOPHIL # BLD: 0.3 K/UL (ref 0–0.6)
EOSINOPHIL NFR BLD: 4.1 %
FLUAV RNA RESP QL NAA+PROBE: NOT DETECTED
FLUBV RNA RESP QL NAA+PROBE: NOT DETECTED
GFR SERPLBLD CREATININE-BSD FMLA CKD-EPI: >90 ML/MIN/{1.73_M2}
GLUCOSE SERPL-MCNC: 107 MG/DL (ref 70–99)
HCO3 BLDV-SCNC: 32.1 MMOL/L (ref 23–29)
HCT VFR BLD AUTO: 27.8 % (ref 36–48)
HGB BLD-MCNC: 9.2 G/DL (ref 12–16)
LYMPHOCYTES # BLD: 1 K/UL (ref 1–5.1)
LYMPHOCYTES NFR BLD: 13.1 %
MCH RBC QN AUTO: 26.9 PG (ref 26–34)
MCHC RBC AUTO-ENTMCNC: 32.9 G/DL (ref 31–36)
MCV RBC AUTO: 81.8 FL (ref 80–100)
METHGB MFR BLDV: 0.1 %
MONOCYTES # BLD: 0.6 K/UL (ref 0–1.3)
MONOCYTES NFR BLD: 7.9 %
NEUTROPHILS # BLD: 5.9 K/UL (ref 1.7–7.7)
NEUTROPHILS NFR BLD: 74.4 %
NT-PROBNP SERPL-MCNC: 842 PG/ML (ref 0–124)
O2 THERAPY: ABNORMAL
PCO2 BLDV: 57.1 MMHG (ref 40–50)
PH BLDV: 7.37 [PH] (ref 7.35–7.45)
PLATELET # BLD AUTO: 186 K/UL (ref 135–450)
PMV BLD AUTO: 6.7 FL (ref 5–10.5)
PO2 BLDV: 37.5 MMHG (ref 25–40)
POTASSIUM SERPL-SCNC: 4 MMOL/L (ref 3.5–5.1)
PROCALCITONIN SERPL IA-MCNC: 0.05 NG/ML (ref 0–0.15)
PROT SERPL-MCNC: 6.1 G/DL (ref 6.4–8.2)
RBC # BLD AUTO: 3.4 M/UL (ref 4–5.2)
SAO2 % BLDV: 69 %
SARS-COV-2 RNA RESP QL NAA+PROBE: NOT DETECTED
SODIUM SERPL-SCNC: 134 MMOL/L (ref 136–145)
TROPONIN, HIGH SENSITIVITY: 26 NG/L (ref 0–14)
TROPONIN, HIGH SENSITIVITY: 27 NG/L (ref 0–14)
WBC # BLD AUTO: 7.9 K/UL (ref 4–11)

## 2025-01-28 PROCEDURE — 2060000000 HC ICU INTERMEDIATE R&B

## 2025-01-28 PROCEDURE — 6360000002 HC RX W HCPCS: Performed by: STUDENT IN AN ORGANIZED HEALTH CARE EDUCATION/TRAINING PROGRAM

## 2025-01-28 PROCEDURE — 87636 SARSCOV2 & INF A&B AMP PRB: CPT

## 2025-01-28 PROCEDURE — 2500000003 HC RX 250 WO HCPCS: Performed by: PHYSICIAN ASSISTANT

## 2025-01-28 PROCEDURE — 84145 PROCALCITONIN (PCT): CPT

## 2025-01-28 PROCEDURE — 93005 ELECTROCARDIOGRAM TRACING: CPT | Performed by: EMERGENCY MEDICINE

## 2025-01-28 PROCEDURE — 94761 N-INVAS EAR/PLS OXIMETRY MLT: CPT

## 2025-01-28 PROCEDURE — 85025 COMPLETE CBC W/AUTO DIFF WBC: CPT

## 2025-01-28 PROCEDURE — 93010 ELECTROCARDIOGRAM REPORT: CPT | Performed by: INTERNAL MEDICINE

## 2025-01-28 PROCEDURE — 82803 BLOOD GASES ANY COMBINATION: CPT

## 2025-01-28 PROCEDURE — 6360000002 HC RX W HCPCS

## 2025-01-28 PROCEDURE — 2500000003 HC RX 250 WO HCPCS

## 2025-01-28 PROCEDURE — 83880 ASSAY OF NATRIURETIC PEPTIDE: CPT

## 2025-01-28 PROCEDURE — 94640 AIRWAY INHALATION TREATMENT: CPT

## 2025-01-28 PROCEDURE — 71045 X-RAY EXAM CHEST 1 VIEW: CPT

## 2025-01-28 PROCEDURE — 6370000000 HC RX 637 (ALT 250 FOR IP)

## 2025-01-28 PROCEDURE — 80053 COMPREHEN METABOLIC PANEL: CPT

## 2025-01-28 PROCEDURE — 70450 CT HEAD/BRAIN W/O DYE: CPT

## 2025-01-28 PROCEDURE — 96375 TX/PRO/DX INJ NEW DRUG ADDON: CPT

## 2025-01-28 PROCEDURE — 99285 EMERGENCY DEPT VISIT HI MDM: CPT

## 2025-01-28 PROCEDURE — 71046 X-RAY EXAM CHEST 2 VIEWS: CPT

## 2025-01-28 PROCEDURE — 84484 ASSAY OF TROPONIN QUANT: CPT

## 2025-01-28 PROCEDURE — 2700000000 HC OXYGEN THERAPY PER DAY

## 2025-01-28 PROCEDURE — 6370000000 HC RX 637 (ALT 250 FOR IP): Performed by: PHYSICIAN ASSISTANT

## 2025-01-28 PROCEDURE — 6360000002 HC RX W HCPCS: Performed by: PHYSICIAN ASSISTANT

## 2025-01-28 PROCEDURE — 96374 THER/PROPH/DIAG INJ IV PUSH: CPT

## 2025-01-28 PROCEDURE — 6360000002 HC RX W HCPCS: Performed by: EMERGENCY MEDICINE

## 2025-01-28 PROCEDURE — 93005 ELECTROCARDIOGRAM TRACING: CPT | Performed by: STUDENT IN AN ORGANIZED HEALTH CARE EDUCATION/TRAINING PROGRAM

## 2025-01-28 RX ORDER — LABETALOL HYDROCHLORIDE 5 MG/ML
10 INJECTION, SOLUTION INTRAVENOUS EVERY 4 HOURS PRN
Status: DISCONTINUED | OUTPATIENT
Start: 2025-01-28 | End: 2025-01-30 | Stop reason: HOSPADM

## 2025-01-28 RX ORDER — ASPIRIN 81 MG/1
81 TABLET, CHEWABLE ORAL DAILY
Status: DISCONTINUED | OUTPATIENT
Start: 2025-01-28 | End: 2025-01-30 | Stop reason: HOSPADM

## 2025-01-28 RX ORDER — ACETAMINOPHEN 325 MG/1
650 TABLET ORAL ONCE
Status: COMPLETED | OUTPATIENT
Start: 2025-01-28 | End: 2025-01-28

## 2025-01-28 RX ORDER — LOSARTAN POTASSIUM 100 MG/1
100 TABLET ORAL DAILY
Status: DISCONTINUED | OUTPATIENT
Start: 2025-01-28 | End: 2025-01-30 | Stop reason: HOSPADM

## 2025-01-28 RX ORDER — DIVALPROEX SODIUM 250 MG/1
500 TABLET, FILM COATED, EXTENDED RELEASE ORAL
Status: DISCONTINUED | OUTPATIENT
Start: 2025-01-28 | End: 2025-01-30 | Stop reason: HOSPADM

## 2025-01-28 RX ORDER — FUROSEMIDE 40 MG/1
40 TABLET ORAL DAILY
Status: DISCONTINUED | OUTPATIENT
Start: 2025-01-28 | End: 2025-01-30 | Stop reason: HOSPADM

## 2025-01-28 RX ORDER — IPRATROPIUM BROMIDE AND ALBUTEROL SULFATE 2.5; .5 MG/3ML; MG/3ML
1 SOLUTION RESPIRATORY (INHALATION) ONCE
Status: DISCONTINUED | OUTPATIENT
Start: 2025-01-28 | End: 2025-01-28

## 2025-01-28 RX ORDER — HYDRALAZINE HYDROCHLORIDE 25 MG/1
25 TABLET, FILM COATED ORAL EVERY 8 HOURS SCHEDULED
Status: DISCONTINUED | OUTPATIENT
Start: 2025-01-28 | End: 2025-01-30

## 2025-01-28 RX ORDER — GUAIFENESIN 600 MG/1
600 TABLET, EXTENDED RELEASE ORAL 2 TIMES DAILY
Status: DISCONTINUED | OUTPATIENT
Start: 2025-01-28 | End: 2025-01-30 | Stop reason: HOSPADM

## 2025-01-28 RX ORDER — TOPIRAMATE 25 MG/1
25 TABLET, FILM COATED ORAL DAILY
Status: DISCONTINUED | OUTPATIENT
Start: 2025-01-28 | End: 2025-01-30 | Stop reason: HOSPADM

## 2025-01-28 RX ORDER — SODIUM CHLORIDE 0.9 % (FLUSH) 0.9 %
5-40 SYRINGE (ML) INJECTION PRN
Status: DISCONTINUED | OUTPATIENT
Start: 2025-01-28 | End: 2025-01-30 | Stop reason: HOSPADM

## 2025-01-28 RX ORDER — POLYETHYLENE GLYCOL 3350 17 G/17G
17 POWDER, FOR SOLUTION ORAL DAILY PRN
Status: DISCONTINUED | OUTPATIENT
Start: 2025-01-28 | End: 2025-01-30 | Stop reason: HOSPADM

## 2025-01-28 RX ORDER — LABETALOL HYDROCHLORIDE 5 MG/ML
10 INJECTION, SOLUTION INTRAVENOUS ONCE
Status: COMPLETED | OUTPATIENT
Start: 2025-01-28 | End: 2025-01-28

## 2025-01-28 RX ORDER — BUSPIRONE HYDROCHLORIDE 5 MG/1
7.5 TABLET ORAL 3 TIMES DAILY
Status: DISCONTINUED | OUTPATIENT
Start: 2025-01-28 | End: 2025-01-30 | Stop reason: HOSPADM

## 2025-01-28 RX ORDER — HYDRALAZINE HYDROCHLORIDE 20 MG/ML
10 INJECTION INTRAMUSCULAR; INTRAVENOUS ONCE
Status: COMPLETED | OUTPATIENT
Start: 2025-01-28 | End: 2025-01-28

## 2025-01-28 RX ORDER — ENOXAPARIN SODIUM 100 MG/ML
40 INJECTION SUBCUTANEOUS DAILY
Status: DISCONTINUED | OUTPATIENT
Start: 2025-01-28 | End: 2025-01-30 | Stop reason: HOSPADM

## 2025-01-28 RX ORDER — IPRATROPIUM BROMIDE AND ALBUTEROL SULFATE 2.5; .5 MG/3ML; MG/3ML
1 SOLUTION RESPIRATORY (INHALATION)
Status: DISCONTINUED | OUTPATIENT
Start: 2025-01-28 | End: 2025-01-30 | Stop reason: HOSPADM

## 2025-01-28 RX ORDER — PREDNISONE 20 MG/1
40 TABLET ORAL DAILY
Status: DISCONTINUED | OUTPATIENT
Start: 2025-01-31 | End: 2025-01-30 | Stop reason: HOSPADM

## 2025-01-28 RX ORDER — CARVEDILOL 25 MG/1
25 TABLET ORAL 2 TIMES DAILY WITH MEALS
Status: DISCONTINUED | OUTPATIENT
Start: 2025-01-28 | End: 2025-01-30 | Stop reason: HOSPADM

## 2025-01-28 RX ORDER — ONDANSETRON 2 MG/ML
4 INJECTION INTRAMUSCULAR; INTRAVENOUS EVERY 6 HOURS PRN
Status: DISCONTINUED | OUTPATIENT
Start: 2025-01-28 | End: 2025-01-30 | Stop reason: HOSPADM

## 2025-01-28 RX ORDER — DIVALPROEX SODIUM 250 MG/1
250 TABLET, FILM COATED, EXTENDED RELEASE ORAL EVERY MORNING
Status: DISCONTINUED | OUTPATIENT
Start: 2025-01-29 | End: 2025-01-30 | Stop reason: HOSPADM

## 2025-01-28 RX ORDER — QUETIAPINE FUMARATE 100 MG/1
400 TABLET, FILM COATED ORAL NIGHTLY
Status: DISCONTINUED | OUTPATIENT
Start: 2025-01-28 | End: 2025-01-30 | Stop reason: HOSPADM

## 2025-01-28 RX ORDER — ATORVASTATIN CALCIUM 40 MG/1
40 TABLET, FILM COATED ORAL DAILY
Status: DISCONTINUED | OUTPATIENT
Start: 2025-01-28 | End: 2025-01-30 | Stop reason: HOSPADM

## 2025-01-28 RX ORDER — ACETAMINOPHEN 650 MG/1
650 SUPPOSITORY RECTAL EVERY 6 HOURS PRN
Status: DISCONTINUED | OUTPATIENT
Start: 2025-01-28 | End: 2025-01-30 | Stop reason: HOSPADM

## 2025-01-28 RX ORDER — ACETAMINOPHEN 325 MG/1
650 TABLET ORAL EVERY 6 HOURS PRN
Status: DISCONTINUED | OUTPATIENT
Start: 2025-01-28 | End: 2025-01-30 | Stop reason: HOSPADM

## 2025-01-28 RX ORDER — SODIUM CHLORIDE 9 MG/ML
INJECTION, SOLUTION INTRAVENOUS PRN
Status: DISCONTINUED | OUTPATIENT
Start: 2025-01-28 | End: 2025-01-30 | Stop reason: HOSPADM

## 2025-01-28 RX ORDER — IPRATROPIUM BROMIDE AND ALBUTEROL SULFATE 2.5; .5 MG/3ML; MG/3ML
3 SOLUTION RESPIRATORY (INHALATION) ONCE
Status: COMPLETED | OUTPATIENT
Start: 2025-01-28 | End: 2025-01-28

## 2025-01-28 RX ORDER — PANTOPRAZOLE SODIUM 40 MG/1
40 TABLET, DELAYED RELEASE ORAL DAILY
Status: DISCONTINUED | OUTPATIENT
Start: 2025-01-28 | End: 2025-01-30 | Stop reason: HOSPADM

## 2025-01-28 RX ORDER — ONDANSETRON 4 MG/1
4 TABLET, ORALLY DISINTEGRATING ORAL EVERY 8 HOURS PRN
Status: DISCONTINUED | OUTPATIENT
Start: 2025-01-28 | End: 2025-01-30 | Stop reason: HOSPADM

## 2025-01-28 RX ORDER — SODIUM CHLORIDE 0.9 % (FLUSH) 0.9 %
5-40 SYRINGE (ML) INJECTION EVERY 12 HOURS SCHEDULED
Status: DISCONTINUED | OUTPATIENT
Start: 2025-01-28 | End: 2025-01-30 | Stop reason: HOSPADM

## 2025-01-28 RX ORDER — DULOXETIN HYDROCHLORIDE 60 MG/1
60 CAPSULE, DELAYED RELEASE ORAL DAILY
Status: DISCONTINUED | OUTPATIENT
Start: 2025-01-28 | End: 2025-01-30 | Stop reason: HOSPADM

## 2025-01-28 RX ADMIN — DULOXETINE HYDROCHLORIDE 60 MG: 60 CAPSULE, DELAYED RELEASE ORAL at 19:03

## 2025-01-28 RX ADMIN — CARVEDILOL 25 MG: 25 TABLET, FILM COATED ORAL at 17:35

## 2025-01-28 RX ADMIN — LABETALOL HYDROCHLORIDE 10 MG: 5 INJECTION INTRAVENOUS at 14:37

## 2025-01-28 RX ADMIN — GUAIFENESIN 600 MG: 600 TABLET ORAL at 17:35

## 2025-01-28 RX ADMIN — BUSPIRONE HYDROCHLORIDE 7.5 MG: 5 TABLET ORAL at 19:03

## 2025-01-28 RX ADMIN — NICARDIPINE HYDROCHLORIDE 5 MG/HR: 0.1 INJECTION, SOLUTION INTRAVENOUS at 16:17

## 2025-01-28 RX ADMIN — ATORVASTATIN CALCIUM 40 MG: 40 TABLET, FILM COATED ORAL at 17:35

## 2025-01-28 RX ADMIN — ACETAMINOPHEN 650 MG: 325 TABLET ORAL at 14:02

## 2025-01-28 RX ADMIN — WATER 40 MG: 1 INJECTION INTRAMUSCULAR; INTRAVENOUS; SUBCUTANEOUS at 18:55

## 2025-01-28 RX ADMIN — ENOXAPARIN SODIUM 40 MG: 100 INJECTION SUBCUTANEOUS at 18:55

## 2025-01-28 RX ADMIN — WATER 125 MG: 1 INJECTION INTRAMUSCULAR; INTRAVENOUS; SUBCUTANEOUS at 11:16

## 2025-01-28 RX ADMIN — PANTOPRAZOLE SODIUM 40 MG: 40 TABLET, DELAYED RELEASE ORAL at 18:55

## 2025-01-28 RX ADMIN — IPRATROPIUM BROMIDE AND ALBUTEROL SULFATE 1 DOSE: 2.5; .5 SOLUTION RESPIRATORY (INHALATION) at 15:09

## 2025-01-28 RX ADMIN — IPRATROPIUM BROMIDE AND ALBUTEROL SULFATE 1 DOSE: 2.5; .5 SOLUTION RESPIRATORY (INHALATION) at 19:46

## 2025-01-28 RX ADMIN — ACETAMINOPHEN 650 MG: 325 TABLET ORAL at 19:01

## 2025-01-28 RX ADMIN — WATER 40 MG: 1 INJECTION INTRAMUSCULAR; INTRAVENOUS; SUBCUTANEOUS at 23:21

## 2025-01-28 RX ADMIN — FUROSEMIDE 40 MG: 40 TABLET ORAL at 17:34

## 2025-01-28 RX ADMIN — Medication 10 ML: at 21:29

## 2025-01-28 RX ADMIN — HYDRALAZINE HYDROCHLORIDE 10 MG: 20 INJECTION INTRAMUSCULAR; INTRAVENOUS at 12:44

## 2025-01-28 RX ADMIN — HYDRALAZINE HYDROCHLORIDE 25 MG: 25 TABLET ORAL at 21:49

## 2025-01-28 RX ADMIN — WATER 1000 MG: 1 INJECTION INTRAMUSCULAR; INTRAVENOUS; SUBCUTANEOUS at 17:40

## 2025-01-28 RX ADMIN — LOSARTAN POTASSIUM 100 MG: 100 TABLET, FILM COATED ORAL at 19:07

## 2025-01-28 RX ADMIN — TOPIRAMATE 25 MG: 25 TABLET, FILM COATED ORAL at 19:03

## 2025-01-28 RX ADMIN — IPRATROPIUM BROMIDE AND ALBUTEROL SULFATE 3 DOSE: 2.5; .5 SOLUTION RESPIRATORY (INHALATION) at 11:04

## 2025-01-28 RX ADMIN — ASPIRIN 81 MG: 81 TABLET, CHEWABLE ORAL at 17:35

## 2025-01-28 RX ADMIN — HYDRALAZINE HYDROCHLORIDE 25 MG: 25 TABLET ORAL at 17:37

## 2025-01-28 RX ADMIN — DIVALPROEX SODIUM 500 MG: 250 TABLET, EXTENDED RELEASE ORAL at 21:28

## 2025-01-28 RX ADMIN — QUETIAPINE FUMARATE 400 MG: 100 TABLET ORAL at 21:29

## 2025-01-28 ASSESSMENT — PAIN SCALES - GENERAL
PAINLEVEL_OUTOF10: 0
PAINLEVEL_OUTOF10: 10
PAINLEVEL_OUTOF10: 10
PAINLEVEL_OUTOF10: 2
PAINLEVEL_OUTOF10: 9

## 2025-01-28 ASSESSMENT — PAIN DESCRIPTION - LOCATION: LOCATION: HEAD

## 2025-01-28 ASSESSMENT — PAIN DESCRIPTION - DESCRIPTORS: DESCRIPTORS: ACHING

## 2025-01-28 ASSESSMENT — PAIN - FUNCTIONAL ASSESSMENT: PAIN_FUNCTIONAL_ASSESSMENT: 0-10

## 2025-01-28 NOTE — ED PROVIDER NOTES
Emergency Department Attending SUPERVISORY Provider Note  Location: [facility] EMERGENCY DEPARTMENT  1/28/2025     Chief Complaint   Patient presents with    Shortness of Breath     From home, wears oxygen at home 24/7, pt lethargic,  was recently admitted to [place] [address] called for trouble breathing, medics arrived oxygen level was 97% on 2L., having chest heaviness, nausea        PATIENT ID:  Najma Fitzpatrick is a 74 y.o. 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    Coronary artery disease involving native coronary artery of native heart with angina pectoris ([facility]) 3/8/2016    Depression     Emphysema of lung ([facility])     Fibromyalgia     hyperlipidemia 8/11/2011    Hypertension     Kidney stone     Liver disease     Lung disease     MDD (major depressive disorder) 4/4/2012    Mood disorder ([facility]) 3/13/2013    Movement disorder     Neck pain, chronic 3/13/2013    Opiate misuse     Personality disorder ([facility])     Pneumonia     Polypharmacy 2/16/2013       Najma Fitzpatrick was evaluated in the Emergency Department for concerns of shortness of breath,.  Patient has a history of CAD, recent admission, and family called [facility] for shortness of breath.  She arrives emergency department today on her home oxygen, and does not necessarily seem to be in any distress, but her blood pressure is quite elevated.  She says she feels okay, just very tired.  No chest pain, back pain, lightheadedness, dizziness.  No falls.        Vitals:    01/28/25 1328   BP: (!) 190/149   Pulse: 89   Resp: 21   Temp:    SpO2: 94%        BASIC EXAM:    Focused exam revealed   General: Alert, no acute

## 2025-01-28 NOTE — ACP (ADVANCE CARE PLANNING)
Advance Care Planning     General Advance Care Planning (ACP) Conversation    Date of Conversation: 1/28/2025  Conducted with: Patient with Decision Making Capacity  Other persons present: None    Healthcare Decision Maker:   Primary Decision Maker: NanetteRomiey E - Spouse - [phone]    Secondary Decision Maker: Maricel Esquivel - Child - [phone]    Supplemental (Other) Decision Maker: Margarita Fitzpatrick - Child - [phone]     Today we documented Decision Maker(s) consistent with Legal Next of Kin hierarchy.  Content/Action Overview:  Has NO ACP documents-Information provided          Length of Voluntary ACP Conversation in minutes:  <16 minutes (Non-Billable)    ISRA Campos

## 2025-01-28 NOTE — CARE COORDINATION
Case Management Assessment  Initial Evaluation    Date/Time of Evaluation: 1/28/2025 1:45 PM  Assessment Completed by: ISRA Campos    If patient is discharged prior to next notation, then this note serves as note for discharge by case management.    Patient Name: Najma Fitzpatrick                   YOB: 1950  Diagnosis: No admission diagnoses are documented for this encounter.                   Date / Time: 1/28/2025 10:28 AM    Patient Admission Status: Emergency   Readmission Risk (Low < 19, Mod (19-27), High > 27): Readmission Risk Score: 31.8    Current PCP: Geena Sotomayor APRN - CNP  PCP verified by CM? (P) Yes    Chart Reviewed: Yes      History Provided by: (P) Patient  Patient Orientation: (P) Alert and Oriented    Patient Cognition: (P) Alert    Hospitalization in the last 30 days (Readmission):  Yes    If yes, Readmission Assessment in CM Navigator will be completed.    Advance Directives:      Code Status: Prior   Patient's Primary Decision Maker is: (P) Legal Next of Kin    Primary Decision Maker: Brent Fitzpatrick - Spouse - [phone]    Secondary Decision Maker: Maricel Esquivel - Child - [phone]    Supplemental (Other) Decision Maker: [place] - Child - [phone]    Discharge Planning:    Patient lives with: (P) Spouse/Significant Other, Children Type of Home: (P) Trailer/Mobile Home  Primary Care Giver: (P) Self  Patient Support Systems include: (P) Spouse/Significant Other, Children   Current Financial resources: (P) None  Current community resources: (P) ECF/Home Care  Current services prior to admission: (P) Durable Medical Equipment, Oxygen Therapy ([facility])            Current DME: (P) Oxygen Therapy (Comment), Walker            Type of Home Care services:  (P) None    ADLS  Prior functional level: (P) Assistance with the following:, Cooking, Housework, Shopping  Current functional level: (P) Assistance with the following:, Cooking, Housework, Shopping    PT

## 2025-01-28 NOTE — ED PROVIDER NOTES
[facility] Emergency Department    CHIEF COMPLAINT  Shortness of Breath (From home, wears oxygen at home 24/7, pt lethargic,  was recently admitted to [place], [facility] called for trouble breathing, medics arrived oxygen level was 97% on 2L., having chest heaviness, nausea)      SHARED SERVICE VISIT  I have seen and evaluated this patient with my supervising physician, Dr. Aarti Blanco.    HISTORY OF PRESENT ILLNESS  Najma Fitzpatrick is a 74 y.o. female who presents to the ED complaining of shortness of breath with chest heaviness and nausea.  She arrives to the emergency department on her home oxygen does not appear to be in any distress, however her blood pressure is quite elevated.  She says she feels okay, just very tired. Denies any headache, body ache, fevers or chills.  Denies any coughing or sneezing.  Denies any sore throat or congestion.  Denies any vision changes or dizziness. Denies any vomiting, or abdominal pain.  Denies any urinary symptoms.  Denies any diarrhea or bloody stools.  Denies any new onset back pain.  Denies any recent travel or sick contacts.    No other complaints, modifying factors or associated symptoms.     Nursing notes reviewed.   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

## 2025-01-28 NOTE — H&P
[facility] Medicine History & Physical    V 1.6    Date of Admission: 1/28/2025    Date of Service:  Pt seen/examined on 1/28/2025     [x]Admitted to Inpatient with expected LOS greater than two midnights due to medical therapy.  []Placed in Observation status.    Chief Admission Complaint:  Shortness of Breath     Presenting Admission History:      Patient is a 74 year old female with a past medical history of COPD chronically on 2L, CHF, CAD, hypertension who presents today for shortness of breath and weakness. She states that she was recently d/c from [facility] and has not felt well since. Upon my examination she is endorsing shortness of breath, severe headache and vision changes. CT head was completed in the ED with no acute changes. She does endorse smoking but denies drinking or drug use. Patient continues to complain of shortness of breath and was seen again by the ED doc who started nicardipine.     Assessment/Plan:      Current Principal Problem:  COPD with acute exacerbation (HCC)    Hypertensive Emergency   - BP unresponsive to hydralazine and labetalol in the ED   - Started on nicardipine gtt  - Resumed home medications   - Admit to ICU with tele     Severe Headache   - CT head was negative for acute process  - Secondary to above?  - Continue to monitor     Disconjugate Gaze  - Noted on CT of head   - Patient with complaints of worsening vision   - Has not been noted on previous CT   - Neurology consulted and appreciate their recommendations in advance     Shortness of Breath   - Recent COPD exacerbation, secondary to hypertensive emergency?   - Initially started on Rocephin and Azithromycin, however Procal is negative and WBC count is normal. Will discontinue   - Continue DuoNebs   - Continue IV steroids for now, will try to wean as quickly as possible given BP      Discussed management and the need for Hospitalization of the patient w/ the Emergency Department Provider: Ben Gay     CXR: I have reviewed

## 2025-01-29 ENCOUNTER — APPOINTMENT (OUTPATIENT)
Dept: MRI IMAGING | Age: 75
DRG: 191 | End: 2025-01-29
Payer: COMMERCIAL

## 2025-01-29 PROBLEM — H51.8 DYSCONJUGATE GAZE: Status: ACTIVE | Noted: 2025-01-29

## 2025-01-29 PROCEDURE — 70549 MR ANGIOGRAPH NECK W/O&W/DYE: CPT

## 2025-01-29 PROCEDURE — 99222 1ST HOSP IP/OBS MODERATE 55: CPT | Performed by: STUDENT IN AN ORGANIZED HEALTH CARE EDUCATION/TRAINING PROGRAM

## 2025-01-29 PROCEDURE — 6370000000 HC RX 637 (ALT 250 FOR IP)

## 2025-01-29 PROCEDURE — 2060000000 HC ICU INTERMEDIATE R&B

## 2025-01-29 PROCEDURE — 6370000000 HC RX 637 (ALT 250 FOR IP): Performed by: NURSE PRACTITIONER

## 2025-01-29 PROCEDURE — 6360000004 HC RX CONTRAST MEDICATION: Performed by: STUDENT IN AN ORGANIZED HEALTH CARE EDUCATION/TRAINING PROGRAM

## 2025-01-29 PROCEDURE — 70553 MRI BRAIN STEM W/O & W/DYE: CPT

## 2025-01-29 PROCEDURE — 2500000003 HC RX 250 WO HCPCS

## 2025-01-29 PROCEDURE — APPSS45 APP SPLIT SHARED TIME 31-45 MINUTES

## 2025-01-29 PROCEDURE — 94640 AIRWAY INHALATION TREATMENT: CPT

## 2025-01-29 PROCEDURE — 2700000000 HC OXYGEN THERAPY PER DAY

## 2025-01-29 PROCEDURE — 70544 MR ANGIOGRAPHY HEAD W/O DYE: CPT

## 2025-01-29 PROCEDURE — 94761 N-INVAS EAR/PLS OXIMETRY MLT: CPT

## 2025-01-29 PROCEDURE — A9579 GAD-BASE MR CONTRAST NOS,1ML: HCPCS | Performed by: STUDENT IN AN ORGANIZED HEALTH CARE EDUCATION/TRAINING PROGRAM

## 2025-01-29 PROCEDURE — 6360000002 HC RX W HCPCS

## 2025-01-29 RX ORDER — BUTALBITAL, ACETAMINOPHEN AND CAFFEINE 50; 325; 40 MG/1; MG/1; MG/1
1 TABLET ORAL ONCE
Status: COMPLETED | OUTPATIENT
Start: 2025-01-29 | End: 2025-01-29

## 2025-01-29 RX ORDER — LORAZEPAM 2 MG/ML
0.5 INJECTION INTRAMUSCULAR
Status: DISCONTINUED | OUTPATIENT
Start: 2025-01-29 | End: 2025-01-30 | Stop reason: HOSPADM

## 2025-01-29 RX ORDER — AMLODIPINE BESYLATE 5 MG/1
5 TABLET ORAL DAILY
Status: DISCONTINUED | OUTPATIENT
Start: 2025-01-30 | End: 2025-01-30 | Stop reason: HOSPADM

## 2025-01-29 RX ADMIN — BUTALBITAL, ACETAMINOPHEN AND CAFFEINE 1 TABLET: 325; 50; 40 TABLET ORAL at 20:14

## 2025-01-29 RX ADMIN — WATER 40 MG: 1 INJECTION INTRAMUSCULAR; INTRAVENOUS; SUBCUTANEOUS at 13:27

## 2025-01-29 RX ADMIN — IPRATROPIUM BROMIDE AND ALBUTEROL SULFATE 1 DOSE: 2.5; .5 SOLUTION RESPIRATORY (INHALATION) at 19:29

## 2025-01-29 RX ADMIN — CARVEDILOL 25 MG: 25 TABLET, FILM COATED ORAL at 18:04

## 2025-01-29 RX ADMIN — HYDRALAZINE HYDROCHLORIDE 25 MG: 25 TABLET ORAL at 13:27

## 2025-01-29 RX ADMIN — ACETAMINOPHEN 650 MG: 325 TABLET ORAL at 07:51

## 2025-01-29 RX ADMIN — TOPIRAMATE 25 MG: 25 TABLET, FILM COATED ORAL at 08:43

## 2025-01-29 RX ADMIN — BUSPIRONE HYDROCHLORIDE 7.5 MG: 5 TABLET ORAL at 20:14

## 2025-01-29 RX ADMIN — Medication 10 ML: at 11:59

## 2025-01-29 RX ADMIN — WATER 40 MG: 1 INJECTION INTRAMUSCULAR; INTRAVENOUS; SUBCUTANEOUS at 18:04

## 2025-01-29 RX ADMIN — HYDRALAZINE HYDROCHLORIDE 25 MG: 25 TABLET ORAL at 20:14

## 2025-01-29 RX ADMIN — WATER 40 MG: 1 INJECTION INTRAMUSCULAR; INTRAVENOUS; SUBCUTANEOUS at 23:53

## 2025-01-29 RX ADMIN — DIVALPROEX SODIUM 500 MG: 250 TABLET, EXTENDED RELEASE ORAL at 20:14

## 2025-01-29 RX ADMIN — TIOTROPIUM BROMIDE INHALATION SPRAY 2 PUFF: 3.12 SPRAY, METERED RESPIRATORY (INHALATION) at 08:50

## 2025-01-29 RX ADMIN — BUSPIRONE HYDROCHLORIDE 7.5 MG: 5 TABLET ORAL at 08:43

## 2025-01-29 RX ADMIN — BUSPIRONE HYDROCHLORIDE 7.5 MG: 5 TABLET ORAL at 13:27

## 2025-01-29 RX ADMIN — WATER 40 MG: 1 INJECTION INTRAMUSCULAR; INTRAVENOUS; SUBCUTANEOUS at 10:08

## 2025-01-29 RX ADMIN — GUAIFENESIN 600 MG: 600 TABLET ORAL at 10:02

## 2025-01-29 RX ADMIN — QUETIAPINE FUMARATE 400 MG: 100 TABLET ORAL at 20:13

## 2025-01-29 RX ADMIN — FUROSEMIDE 40 MG: 40 TABLET ORAL at 10:02

## 2025-01-29 RX ADMIN — GUAIFENESIN 600 MG: 600 TABLET ORAL at 20:14

## 2025-01-29 RX ADMIN — ATORVASTATIN CALCIUM 40 MG: 40 TABLET, FILM COATED ORAL at 10:02

## 2025-01-29 RX ADMIN — LOSARTAN POTASSIUM 100 MG: 100 TABLET, FILM COATED ORAL at 10:07

## 2025-01-29 RX ADMIN — Medication 10 ML: at 20:17

## 2025-01-29 RX ADMIN — ASPIRIN 81 MG: 81 TABLET, CHEWABLE ORAL at 10:01

## 2025-01-29 RX ADMIN — DIVALPROEX SODIUM 250 MG: 250 TABLET, EXTENDED RELEASE ORAL at 08:43

## 2025-01-29 RX ADMIN — IPRATROPIUM BROMIDE AND ALBUTEROL SULFATE 1 DOSE: 2.5; .5 SOLUTION RESPIRATORY (INHALATION) at 11:26

## 2025-01-29 RX ADMIN — PANTOPRAZOLE SODIUM 40 MG: 40 TABLET, DELAYED RELEASE ORAL at 10:01

## 2025-01-29 RX ADMIN — ENOXAPARIN SODIUM 40 MG: 100 INJECTION SUBCUTANEOUS at 10:03

## 2025-01-29 RX ADMIN — CARVEDILOL 25 MG: 25 TABLET, FILM COATED ORAL at 07:52

## 2025-01-29 RX ADMIN — ACETAMINOPHEN 650 MG: 325 TABLET ORAL at 18:04

## 2025-01-29 RX ADMIN — GADOTERIDOL 15 ML: 279.3 INJECTION, SOLUTION INTRAVENOUS at 16:15

## 2025-01-29 RX ADMIN — HYDRALAZINE HYDROCHLORIDE 25 MG: 25 TABLET ORAL at 07:51

## 2025-01-29 RX ADMIN — IPRATROPIUM BROMIDE AND ALBUTEROL SULFATE 1 DOSE: 2.5; .5 SOLUTION RESPIRATORY (INHALATION) at 08:49

## 2025-01-29 RX ADMIN — DULOXETINE HYDROCHLORIDE 60 MG: 60 CAPSULE, DELAYED RELEASE ORAL at 08:43

## 2025-01-29 ASSESSMENT — PAIN - FUNCTIONAL ASSESSMENT: PAIN_FUNCTIONAL_ASSESSMENT: ACTIVITIES ARE NOT PREVENTED

## 2025-01-29 ASSESSMENT — PAIN DESCRIPTION - ORIENTATION
ORIENTATION: RIGHT;LEFT;UPPER
ORIENTATION: MID
ORIENTATION: LOWER

## 2025-01-29 ASSESSMENT — PAIN SCALES - GENERAL
PAINLEVEL_OUTOF10: 9
PAINLEVEL_OUTOF10: 10
PAINLEVEL_OUTOF10: 0
PAINLEVEL_OUTOF10: 0
PAINLEVEL_OUTOF10: 3

## 2025-01-29 ASSESSMENT — PAIN DESCRIPTION - DESCRIPTORS
DESCRIPTORS: ACHING;DISCOMFORT
DESCRIPTORS: ACHING;POUNDING
DESCRIPTORS: ACHING

## 2025-01-29 ASSESSMENT — PAIN DESCRIPTION - LOCATION
LOCATION: BACK
LOCATION: HEAD
LOCATION: HEAD

## 2025-01-29 ASSESSMENT — PAIN DESCRIPTION - PAIN TYPE: TYPE: ACUTE PAIN

## 2025-01-29 NOTE — ED NOTES
Patient reports feeling better. Adjusted bed and gave pillows, monitors and  purewik in place. Patient ordered breakfast. Call light in lap and phone in stretcher with patient.

## 2025-01-29 NOTE — CONSULTS
Inpatient Neurology consult        [facility]      Najma Fitzpatrick  1950    Date of Service: 1/29/2025    Referring Physician: Felisa Calzada DO      Reason for the consult and CC: Hypertensive urgency with generalized weakness and severe headache and dysconjugate gaze.     HPI:   The patient is a legally-blind 74 y.o. female with a past medical history of bipolar disorder, major depression disorder, hypertension, hyperlipidemia, CHF, personality disorder CAD, and fibromyalgia originally presenting to the hospital for concerns of shortness of breath, chest heaviness, and nausea. Patient was noted to be hypertensive on arrival at 190/149. Patient reports difficulties controlling her blood pressure for the last several months along with a wobbling gait. While in the emergency department patient was noted to have a disconjugate gaze and sever headache. Patient describes headache as a pounding pressure in her forehead and behind her eyes with associated dizziness, nausea, blurred vision, light sensitivity, and sensitivity to loud sounds similar to her typical migraine pattern. Neurology has been consulted for further evaluation and management of severe headache, hypertensive urgency, and abnormal gaze.        Constitutional:   Vitals:    01/29/25 1202 01/29/25 1247 01/29/25 1327 01/29/25 1333   BP: (!) 168/77 (!) 180/76 (!) 180/76 (!) 179/71   Pulse: 88 91  91   Resp: 20 21  18   Temp:       TempSrc:       SpO2: 96% 94%  93%         I personally reviewed and updated social history, past medical history, medications, allergy, surgical history, and family history as documented in the patient's electronic health records.       ROS: 10-14 ROS reviewed with the patient/nurse/family which were unremarkable except mentioned in H&P.    General appearance:  Normal development and appear in no acute distress.   Mental Status:   Orientation to person, place, time, situation  Memory good immediate recall and

## 2025-01-29 NOTE — ED NOTES
Patient finished breakfast, updated daughter Maricel and patient on plan of care. Call light to stretcher.

## 2025-01-29 NOTE — ED NOTES
INPATIENT NEUROLOGY CONSULT  RE-73 yo F here with hypertensive urgency, weakness and severe headache, CT of head was obtained that showed non-specific disconjudate gaze with no acute abnormality. Please evaluate  [id number]-Paged  through [employer]  [id number]- see note from Alina SMILEY

## 2025-01-29 NOTE — ED NOTES
Najma Fitzpatrick is a 74 y.o. female admitted for  Principal Problem:    COPD with acute exacerbation (HCC)  Resolved Problems:    * No resolved hospital problems. *  .   Patient Home via EMS transportation with   Chief Complaint   Patient presents with    Shortness of Breath     From home, wears oxygen at home 24/7, pt lethargic,  was recently admitted to [place] [id number] called for trouble breathing, medics arrived oxygen level was 97% on 2L., having chest heaviness, nausea   .  Patient is alert and Person  Patient's baseline mobility: Baseline Mobility:    Code Status: Full Code   Cardiac Rhythm: Cardiac Rhythm: Sinus rhythm  O2 Flow Rate (L/min): 3 L/min  Is patient on baseline Oxygen: no how many Liters:   Abnormal Assessment Findings:     Isolation: None      NIH Score:    C-SSRS: Risk of Suicide: No Risk  Bedside swallow:        Active LDA's:   Peripheral IV 01/28/25 Left;Posterior Hand (Active)     Patient admitted with a ross: no If the ross is chronic was it exchanged:  Reason for ross:   Patient admitted with Central Line:  NA . PICC line placement confirmed: YES OR NO:[id number]}   Reason for Central line:   Was central line Inserted from an outside facility: no       Family/Caregiver Present no Any Concerns: no   Restraints no  Sitter no         Vitals: MEWS Score: 1    Vitals:    01/29/25 1202 01/29/25 1247 01/29/25 1327 01/29/25 1333   BP: (!) 168/77 (!) 180/76 (!) 180/76 (!) 179/71   Pulse: 88 91  91   Resp: 20 21  18   Temp:       TempSrc:       SpO2: 96% 94%  93%       Last documented pain score (0-10 scale) Pain Level: 0  Pain medication administered No.    Pertinent or High Risk Medications/Drips: No.    Pending Blood Product Administration: no    Abnormal labs:   Abnormal Labs Reviewed   CBC WITH AUTO DIFFERENTIAL - Abnormal; Notable for the following components:       Result Value    RBC 3.40 (*)     Hemoglobin 9.2 (*)     Hematocrit 27.8 (*)     RDW 16.5 (*)     All other components within normal

## 2025-01-29 NOTE — FLOWSHEET NOTE
Is mask required for her work? If not, then it is her choice if she wears a mask, no letter is needed. She does need to strictly adhere to 6 foot distance from others and wash her hands often, use hand  when in the public also, and don.t touch face or eyes. These are the more important recommendations for her safety. She would also look for other face covering, a face shield is a good option if offered at her workplace, or look in stores or online for this.   Pt went to MRI from ER to A2 room 207, no tele on pt, box retrieved from MRI, pt stated EMS dropped her on the way out of her home, pt changed, pt oriented to room, bed alarm on and in lowest position.      01/29/25 1730   Vital Signs   Temp 98.1 °F (36.7 °C)   Temp Source Oral   Pulse 96   Heart Rate Source Monitor   Respirations 22   BP (!) 156/83   MAP (Calculated) 107   MAP (mmHg) 104   BP Location Right upper arm   BP Method Automatic   Patient Position Semi fowlers

## 2025-01-30 VITALS
TEMPERATURE: 98.1 F | BODY MASS INDEX: 32.12 KG/M2 | RESPIRATION RATE: 18 BRPM | DIASTOLIC BLOOD PRESSURE: 66 MMHG | HEIGHT: 61 IN | HEART RATE: 86 BPM | OXYGEN SATURATION: 96 % | SYSTOLIC BLOOD PRESSURE: 156 MMHG

## 2025-01-30 LAB
ANION GAP SERPL CALCULATED.3IONS-SCNC: 9 MMOL/L (ref 3–16)
BUN SERPL-MCNC: 11 MG/DL (ref 7–20)
CALCIUM SERPL-MCNC: 8.7 MG/DL (ref 8.3–10.6)
CHLORIDE SERPL-SCNC: 92 MMOL/L (ref 99–110)
CO2 SERPL-SCNC: 33 MMOL/L (ref 21–32)
CREAT SERPL-MCNC: 0.7 MG/DL (ref 0.6–1.2)
DEPRECATED RDW RBC AUTO: 16.4 % (ref 12.4–15.4)
GFR SERPLBLD CREATININE-BSD FMLA CKD-EPI: >90 ML/MIN/{1.73_M2}
GLUCOSE SERPL-MCNC: 146 MG/DL (ref 70–99)
HCT VFR BLD AUTO: 26.3 % (ref 36–48)
HGB BLD-MCNC: 8.7 G/DL (ref 12–16)
MAGNESIUM SERPL-MCNC: 1.83 MG/DL (ref 1.8–2.4)
MCH RBC QN AUTO: 26.7 PG (ref 26–34)
MCHC RBC AUTO-ENTMCNC: 33.1 G/DL (ref 31–36)
MCV RBC AUTO: 80.7 FL (ref 80–100)
PLATELET # BLD AUTO: 169 K/UL (ref 135–450)
PMV BLD AUTO: 6.7 FL (ref 5–10.5)
POTASSIUM SERPL-SCNC: 3.5 MMOL/L (ref 3.5–5.1)
RBC # BLD AUTO: 3.26 M/UL (ref 4–5.2)
SODIUM SERPL-SCNC: 134 MMOL/L (ref 136–145)
WBC # BLD AUTO: 8.5 K/UL (ref 4–11)

## 2025-01-30 PROCEDURE — 83735 ASSAY OF MAGNESIUM: CPT

## 2025-01-30 PROCEDURE — 94640 AIRWAY INHALATION TREATMENT: CPT

## 2025-01-30 PROCEDURE — 6370000000 HC RX 637 (ALT 250 FOR IP)

## 2025-01-30 PROCEDURE — 85027 COMPLETE CBC AUTOMATED: CPT

## 2025-01-30 PROCEDURE — 36415 COLL VENOUS BLD VENIPUNCTURE: CPT

## 2025-01-30 PROCEDURE — 2700000000 HC OXYGEN THERAPY PER DAY

## 2025-01-30 PROCEDURE — 2500000003 HC RX 250 WO HCPCS

## 2025-01-30 PROCEDURE — 6360000002 HC RX W HCPCS

## 2025-01-30 PROCEDURE — 94761 N-INVAS EAR/PLS OXIMETRY MLT: CPT

## 2025-01-30 PROCEDURE — 80048 BASIC METABOLIC PNL TOTAL CA: CPT

## 2025-01-30 RX ORDER — HYDRALAZINE HYDROCHLORIDE 25 MG/1
25 TABLET, FILM COATED ORAL ONCE
Status: COMPLETED | OUTPATIENT
Start: 2025-01-30 | End: 2025-01-30

## 2025-01-30 RX ORDER — AMLODIPINE BESYLATE 5 MG/1
5 TABLET ORAL DAILY
Qty: 30 TABLET | Refills: 0 | Status: SHIPPED | OUTPATIENT
Start: 2025-01-31

## 2025-01-30 RX ORDER — HYDRALAZINE HYDROCHLORIDE 50 MG/1
50 TABLET, FILM COATED ORAL EVERY 8 HOURS SCHEDULED
Status: DISCONTINUED | OUTPATIENT
Start: 2025-01-30 | End: 2025-01-30 | Stop reason: HOSPADM

## 2025-01-30 RX ORDER — PREDNISONE 20 MG/1
40 TABLET ORAL DAILY
Qty: 6 TABLET | Refills: 0 | Status: SHIPPED | OUTPATIENT
Start: 2025-01-31 | End: 2025-02-03

## 2025-01-30 RX ORDER — CLONAZEPAM 0.5 MG/1
0.5 TABLET ORAL 2 TIMES DAILY PRN
Qty: 6 TABLET | Refills: 0 | Status: SHIPPED | OUTPATIENT
Start: 2025-01-30 | End: 2025-02-02

## 2025-01-30 RX ORDER — HYDRALAZINE HYDROCHLORIDE 50 MG/1
50 TABLET, FILM COATED ORAL EVERY 8 HOURS SCHEDULED
Qty: 90 TABLET | Refills: 0 | Status: SHIPPED | OUTPATIENT
Start: 2025-01-30

## 2025-01-30 RX ADMIN — CARVEDILOL 25 MG: 25 TABLET, FILM COATED ORAL at 08:27

## 2025-01-30 RX ADMIN — Medication 10 ML: at 08:28

## 2025-01-30 RX ADMIN — ACETAMINOPHEN 650 MG: 325 TABLET ORAL at 08:27

## 2025-01-30 RX ADMIN — PANTOPRAZOLE SODIUM 40 MG: 40 TABLET, DELAYED RELEASE ORAL at 08:28

## 2025-01-30 RX ADMIN — IPRATROPIUM BROMIDE AND ALBUTEROL SULFATE 1 DOSE: 2.5; .5 SOLUTION RESPIRATORY (INHALATION) at 11:17

## 2025-01-30 RX ADMIN — BUSPIRONE HYDROCHLORIDE 7.5 MG: 5 TABLET ORAL at 08:27

## 2025-01-30 RX ADMIN — DULOXETINE HYDROCHLORIDE 60 MG: 60 CAPSULE, DELAYED RELEASE ORAL at 08:27

## 2025-01-30 RX ADMIN — ENOXAPARIN SODIUM 40 MG: 100 INJECTION SUBCUTANEOUS at 08:27

## 2025-01-30 RX ADMIN — BUSPIRONE HYDROCHLORIDE 7.5 MG: 5 TABLET ORAL at 13:58

## 2025-01-30 RX ADMIN — TOPIRAMATE 25 MG: 25 TABLET, FILM COATED ORAL at 08:27

## 2025-01-30 RX ADMIN — WATER 40 MG: 1 INJECTION INTRAMUSCULAR; INTRAVENOUS; SUBCUTANEOUS at 13:59

## 2025-01-30 RX ADMIN — WATER 40 MG: 1 INJECTION INTRAMUSCULAR; INTRAVENOUS; SUBCUTANEOUS at 06:00

## 2025-01-30 RX ADMIN — HYDRALAZINE HYDROCHLORIDE 25 MG: 25 TABLET ORAL at 12:13

## 2025-01-30 RX ADMIN — ASPIRIN 81 MG: 81 TABLET, CHEWABLE ORAL at 08:27

## 2025-01-30 RX ADMIN — AMLODIPINE BESYLATE 5 MG: 5 TABLET ORAL at 08:28

## 2025-01-30 RX ADMIN — GUAIFENESIN 600 MG: 600 TABLET ORAL at 08:28

## 2025-01-30 RX ADMIN — DIVALPROEX SODIUM 250 MG: 250 TABLET, EXTENDED RELEASE ORAL at 08:27

## 2025-01-30 RX ADMIN — TIOTROPIUM BROMIDE INHALATION SPRAY 2 PUFF: 3.12 SPRAY, METERED RESPIRATORY (INHALATION) at 07:44

## 2025-01-30 RX ADMIN — HYDRALAZINE HYDROCHLORIDE 25 MG: 25 TABLET ORAL at 06:00

## 2025-01-30 RX ADMIN — HYDRALAZINE HYDROCHLORIDE 50 MG: 50 TABLET ORAL at 13:58

## 2025-01-30 RX ADMIN — FUROSEMIDE 40 MG: 40 TABLET ORAL at 08:28

## 2025-01-30 RX ADMIN — IPRATROPIUM BROMIDE AND ALBUTEROL SULFATE 1 DOSE: 2.5; .5 SOLUTION RESPIRATORY (INHALATION) at 16:35

## 2025-01-30 RX ADMIN — IPRATROPIUM BROMIDE AND ALBUTEROL SULFATE 1 DOSE: 2.5; .5 SOLUTION RESPIRATORY (INHALATION) at 07:44

## 2025-01-30 RX ADMIN — LOSARTAN POTASSIUM 100 MG: 100 TABLET, FILM COATED ORAL at 08:27

## 2025-01-30 RX ADMIN — ATORVASTATIN CALCIUM 40 MG: 40 TABLET, FILM COATED ORAL at 08:28

## 2025-01-30 ASSESSMENT — PAIN SCALES - GENERAL
PAINLEVEL_OUTOF10: 0
PAINLEVEL_OUTOF10: 3
PAINLEVEL_OUTOF10: 0

## 2025-01-30 ASSESSMENT — PAIN DESCRIPTION - LOCATION: LOCATION: HEAD

## 2025-01-30 NOTE — PLAN OF CARE
Problem: Safety - Adult  Goal: Free from fall injury  1/30/2025 1548 by Isela Taylor RN  Outcome: Adequate for Discharge  1/30/2025 0608 by Markus Lma RN  Outcome: Progressing     Problem: Chronic Conditions and Co-morbidities  Goal: Patient's chronic conditions and co-morbidity symptoms are monitored and maintained or improved  1/30/2025 1548 by Isela Taylor RN  Outcome: Adequate for Discharge  1/30/2025 0608 by Markus Lam RN  Outcome: Progressing     Problem: Discharge Planning  Goal: Discharge to home or other facility with appropriate resources  Outcome: Adequate for Discharge     Problem: Pain  Goal: Verbalizes/displays adequate comfort level or baseline comfort level  1/30/2025 1548 by Isela Taylor RN  Outcome: Adequate for Discharge  1/30/2025 0608 by Markus Lam RN  Outcome: Progressing     Problem: Nutrition Deficit:  Goal: Optimize nutritional status  Outcome: Adequate for Discharge

## 2025-01-30 NOTE — PROGRESS NOTES
01/28/25 1132   Treatment   Treatment Type HHN   $Treatment Type $Inhaled Therapy/Meds   Medications Albuterol/Ipratropium  (x3)   Pre-Tx Pulse 80   Pre-Tx Resps 20   Breath Sounds Pre-Tx BABITA Expiratory wheezes   Breath Sounds Pre-Tx LLL Expiratory wheezes   Breath Sounds Pre-Tx RUL Expiratory wheezes   Breath Sounds Pre-Tx RML Expiratory wheezes   Breath Sounds Pre-Tx RLL Expiratory wheezes   Delivery Source Mask   Position Semi-Looney's   Treatment Tolerance Well   Is patient on O2? Y   Oxygen Therapy/Pulse Ox   $Oxygen $Daily Charge   O2 Device Nasal cannula   O2 Flow Rate (L/min) 2 L/min   Pulse 80   Respirations 19   SpO2 99 %   $Pulse Oximeter $Spot check (multiple/continuous)       
   01/28/25 1459   Encounter Summary   Encounter Overview/Reason Spiritual/Emotional Needs   Service Provided For Patient   Referral/Consult From Multi-disciplinary team   Last Encounter  01/28/25  (Offered prayer in the ED.  Patient being admitted.)   Complexity of Encounter Low   Begin Time 1445   End Time  1500   Total Time Calculated 15 min   Spiritual/Emotional needs   Type Spiritual Support   Assessment/Intervention/Outcome   Assessment Calm   Intervention Active listening;Discussed illness injury and it’s impact;Nurtured Hope;Prayer (assurance of)/[place]   Outcome Engaged in conversation;Receptive   Plan and Referrals   Plan/Referrals No future visits requested  (Chaplains will support PRN.)     Patient appreciative of prayer.  Continue prn support.  
  [facility] Medicine Progress Note  V 1.6      Date of Admission: 1/28/2025    Hospital Day: 3      Chief Admission Complaint:  Shortness of Breath     Subjective:  Patient seen and examined this morning. She states that she is feeling much better compared to yesterday.     Presenting Admission History:       Patient is a 74 year old female with a past medical history of COPD chronically on 2L, CHF, CAD, hypertension who presents today for shortness of breath and weakness. She states that [place] [facility] [place] felt well since. Upon my examination she is endorsing shortness of breath, severe headache and vision changes. CT head was completed in the ED with no acute changes. She does endorse smoking but denies drinking or drug use. Patient continues to complain of shortness of breath and was seen again by the ED doc who started nicardipine.     Assessment/Plan:      Current Principal Problem:  COPD with acute exacerbation (HCC)    Hypertensive Emergency   - BP unresponsive to hydralazine and labetalol in the ED   - BP improved with resuming her home medications, per nursing patient has not been taking her medication since her d/c from [place]   - Started amlodipine for improvement with pressures     Severe Headache   - CT head was negative for acute process  - Improving   - Secondary to above?  - Continue to monitor      Disconjugate Gaze  - Noted on CT of head   - Patient with complaints of worsening vision   - Has not been noted on previous CT   - Neurology consulted - MRI of the brain without evidence of acute abnormality      Shortness of Breath   - Recent COPD exacerbation, secondary to hypertensive emergency?   - Initially started on Rocephin and Azithromycin, however Procal is negative and WBC count is normal. Will discontinue   - Continue DuoNebs   - Continue IV steroids for now, will try to wean as quickly as possible given BP    Ongoing threat to life and/or bodily function without 
  [facility] Medicine Progress Note  V 1.6      Date of Admission: 1/28/2025    Hospital Day: 2      Chief Admission Complaint:  Shortness of Breath     Subjective:  Patient seen and examined this morning. She states that she is feeling much better compared to yesterday.     Presenting Admission History:       Patient is a 74 year old female with a past medical history of COPD chronically on 2L, CHF, CAD, hypertension who presents today for shortness of breath and weakness. She states that she was recently d/c from [facility] and has not felt well since. Upon my examination she is endorsing shortness of breath, severe headache and vision changes. CT head was completed in the ED with no acute changes. She does endorse smoking but denies drinking or drug use. Patient continues to complain of shortness of breath and was seen again by the ED doc who started nicardipine.     Assessment/Plan:      Current Principal Problem:  COPD with acute exacerbation (HCC)    Hypertensive Emergency   - BP unresponsive to hydralazine and labetalol in the ED   - BP improved with resuming her home medications, per nursing patient has not been taking her medication since her d/c from [place]   - Started amlodipine for improvement with pressures     Severe Headache   - CT head was negative for acute process  - Improving   - Secondary to above?  - Continue to monitor      Disconjugate Gaze  - Noted on CT of head   - Patient with complaints of worsening vision   - Has not been noted on previous CT   - Neurology consulted - MRI of the brain without evidence of acute abnormality      Shortness of Breath   - Recent COPD exacerbation, secondary to hypertensive emergency?   - Initially started on Rocephin and Azithromycin, however Procal is negative and WBC count is normal. Will discontinue   - Continue DuoNebs   - Continue IV steroids for now, will try to wean as quickly as possible given BP    Ongoing threat to life and/or bodily function without 
4 Eyes Skin Assessment     The patient is being assess for  {Blank single:78073::\"Admission\",\"Transfer to New Unit\",\"Post-Op Surgical\",\"Cath Lab Post-Op\",\"Shift Handoff\"}    I agree that 2 RN's have performed a thorough Head to Toe Skin Assessment on the patient. ALL assessment sites listed below have been assessed.       Areas assessed by both nurses: Kenneth Velásquez RN and Shania Irving RN   [x]   Head, Face, and Ears   [x]   Shoulders, Back, and Chest  [x]   Arms, Elbows, and Hands   [x]   Coccyx, Sacrum, and Ischum- bruise right hip   [x]   Legs, Feet, and Heels        Does the Patient have Skin Breakdown?  No         Emmanuel Prevention initiated:  No   Wound Care Orders initiated:  {YES/NO:19732}      [facility] nurse consulted for Pressure Injury (Stage 3,4, Unstageable, DTI, NWPT, and Complex wounds):  No      Nurse 1 eSignature: Electronically signed by KENNETH VELÁSQUEZ RN on 1/29/25 at 5:43 PM EST    **SHARE this note so that the co-signing nurse is able to place an eSignature**    Nurse 2 eSignature: {Esignature:[id number]}            
Admission Complete  
Comprehensive Nutrition Assessment    Type and Reason for Visit:  Initial, Positive nutrition screen    Nutrition Recommendations/Plan:   Continue regular diet.  Encourage PO intakes.   Ensure once/day to better meet nutrient needs.   Monitor pertinent labs, bowel habits, weight, N/V, supplement acceptance, clinical progression.       Malnutrition Assessment:  Malnutrition Status:  At risk for malnutrition (01/30/25 1147)    Context:  Chronic Illness     Findings of the 6 clinical characteristics of malnutrition:  Energy Intake:  No decrease in energy intake  Weight Loss:  No weight loss     Body Fat Loss:  No body fat loss     Muscle Mass Loss:  Mild muscle mass loss Temples (temporalis)  Fluid Accumulation:  Mild Extremities   Strength:  Not Performed    Nutrition Assessment:    Patient seen for positive nutrition screen for weight loss and decreased appetite. Admitted for SOB, Hypertensive Emergency, Severe Headache. PMHx of COPD chronically on 2L, CHF, CAD, hypertension. Patient seen resting in chair. Currently on regular diet. Reports appetite is very good both here and PTA. Consuming % of meals per flowsheets. Pts family bringing in food form outside as needed. No issues chewing or swallowing. Denies any N/V, however is having heartburn. Favorable to Ensure protein shakes once/day. No significant weight changes noted per EMR.    Nutrition Related Findings:    Na 134, glucose 146-151, phos 5.1. Trace edema RLE/LLE. Lasix. Wound Type: None       Current Nutrition Intake & Therapies:    Average Meal Intake: %  Average Supplements Intake: None Ordered  ADULT DIET; Regular    Anthropometric Measures:  Height: 154.9 cm (5' 1\")  Ideal Body Weight (IBW): 105 lbs (48 kg)       Current Body Weight: 76.8 kg (169 lb 5 oz) (1/11/25), 161.3 % IBW. Weight Source: Bed scale  Current BMI (kg/m2): 32  Usual Body Weight: 78.7 kg (173 lb 8 oz) (4/6/24)     % Weight Change (Calculated): -2.4  Weight Adjustment For: 
Patient discharged 1/30/2025, 6:11 PM per MD order. Patient's IV removed bleeding stopped, tele box removed place in dirty bin, VVS. Discharge instructions and medication education given all questions answered, no additional questions. Patient wheeled out by staff with personal belonging and family at side. Patient taken to pharmacy, prescriptions picked up and wheeled to personal vehicle.    
carotid artery appears to be intact. The origins difficult to visualize. The aortic arch due to motion artifact. The carotid bifurcation there is tortuosity of the proximal left internal carotid artery. No definite stenosis by MRI imaging. Distal left internal carotid artery appears intact. Left vertebral artery appears to be intact. This difficult to visualize due to the small size.     1. No significant vascular abnormality in the neck. Tortuosity of the internal carotid arteries. Overall limited exam due to motion artifact. Findings could be evaluated further with CT angiography if clinically warranted. Electronically signed by Derrek Cuellar MD    MRA HEAD WO CONTRAST    Result Date: 1/29/2025  ** MR ANGIOGRAM OF THE BRAIN WITHOUT CONTRAST ** Technique: 3-D time-of-flight images and two-dimensional time-of-flight images were obtained of the brain and multiplanar reconstructions were evaluated. Volume rendered maximum intensity projection images were evaluated.  FINDINGS: The right internal carotid artery appears unremarkable with no focal stenosis or aneurysmal dilation. The right middle cerebral artery is unremarkable. The right anterior cerebral artery appears within normal limits. Small normal appearing anterior communicating artery. The left internal carotid artery is unremarkable with no focal stenosis or aneurysmal dilation. The left middle cerebral artery is unremarkable. The left anterior cerebral artery is intact throughout. The right vertebral artery is unremarkable without stenosis or aneurysmal dilation. The left vertebral artery is intact without stenosis or aneurysmal dilation. The vertebral arteries are symmetric. The basilar artery appears normal throughout with no focal stenosis or aneurysmal dilation. The posterior cerebral  arteries bilaterally are unremarkable.     1. Severely limited exam due to motion artifact. No acute intracranial vascular abnormality identified.. Electronically signed

## 2025-01-30 NOTE — CARE COORDINATION
Dr Calzada updated writer, likely discharge today pending PT/OT.  Pt from home with family, has home O2.  VERONICA Moseley     8412 Addendum:  Discharge ordered, PT/OT has not been able to work with pt.  Writer met with pt, offered HHC, pt declined, states she does not need anything new at home.   will transport and bring portable tank.  VERONICA Moseley

## 2025-01-31 ENCOUNTER — TELEPHONE (OUTPATIENT)
Dept: PULMONOLOGY | Age: 75
End: 2025-01-31

## 2025-01-31 NOTE — TELEPHONE ENCOUNTER
Called patient to get PET scan scheduled. Patient said she has to wait on her son and would like me to call her back around 11

## 2025-02-16 ENCOUNTER — TELEPHONE (OUTPATIENT)
Dept: CASE MANAGEMENT | Age: 75
End: 2025-02-16

## 2025-02-16 NOTE — TELEPHONE ENCOUNTER
Did patient reschedule PET?    Thank you,  Criss Navarrete RN  [facility] Lung Navigator  [phone]

## 2025-02-17 ENCOUNTER — APPOINTMENT (OUTPATIENT)
Dept: GENERAL RADIOLOGY | Age: 75
DRG: 187 | End: 2025-02-17
Payer: COMMERCIAL

## 2025-02-17 ENCOUNTER — HOSPITAL ENCOUNTER (INPATIENT)
Age: 75
LOS: 3 days | Discharge: HOME | DRG: 187 | End: 2025-02-20
Attending: STUDENT IN AN ORGANIZED HEALTH CARE EDUCATION/TRAINING PROGRAM | Admitting: INTERNAL MEDICINE
Payer: COMMERCIAL

## 2025-02-17 DIAGNOSIS — J90 PLEURAL EFFUSION ON LEFT: Primary | ICD-10-CM

## 2025-02-17 DIAGNOSIS — G89.29 OTHER CHRONIC PAIN: ICD-10-CM

## 2025-02-17 DIAGNOSIS — R06.02 SHORTNESS OF BREATH: ICD-10-CM

## 2025-02-17 DIAGNOSIS — J44.9 CHRONIC OBSTRUCTIVE PULMONARY DISEASE, UNSPECIFIED COPD TYPE (HCC): ICD-10-CM

## 2025-02-17 LAB
ALBUMIN SERPL-MCNC: 3.4 G/DL (ref 3.4–5)
ALBUMIN/GLOB SERPL: 1.3 {RATIO} (ref 1.1–2.2)
ALP SERPL-CCNC: 100 U/L (ref 40–129)
ALT SERPL-CCNC: 8 U/L (ref 10–40)
ANION GAP SERPL CALCULATED.3IONS-SCNC: 8 MMOL/L (ref 3–16)
AST SERPL-CCNC: 12 U/L (ref 15–37)
BASOPHILS # BLD: 0 K/UL (ref 0–0.2)
BASOPHILS NFR BLD: 0.6 %
BILIRUB SERPL-MCNC: 0.3 MG/DL (ref 0–1)
BUN SERPL-MCNC: 5 MG/DL (ref 7–20)
CALCIUM SERPL-MCNC: 8.5 MG/DL (ref 8.3–10.6)
CHLORIDE SERPL-SCNC: 92 MMOL/L (ref 99–110)
CO2 SERPL-SCNC: 31 MMOL/L (ref 21–32)
CREAT SERPL-MCNC: 0.4 MG/DL (ref 0.6–1.2)
DEPRECATED RDW RBC AUTO: 16.3 % (ref 12.4–15.4)
EKG ATRIAL RATE: 77 BPM
EKG DIAGNOSIS: NORMAL
EKG P AXIS: 70 DEGREES
EKG P-R INTERVAL: 162 MS
EKG Q-T INTERVAL: 388 MS
EKG QRS DURATION: 102 MS
EKG QTC CALCULATION (BAZETT): 439 MS
EKG R AXIS: 45 DEGREES
EKG T AXIS: 64 DEGREES
EKG VENTRICULAR RATE: 77 BPM
EOSINOPHIL # BLD: 0.2 K/UL (ref 0–0.6)
EOSINOPHIL NFR BLD: 3 %
FLUAV RNA RESP QL NAA+PROBE: NOT DETECTED
FLUBV RNA RESP QL NAA+PROBE: NOT DETECTED
GFR SERPLBLD CREATININE-BSD FMLA CKD-EPI: >90 ML/MIN/{1.73_M2}
GLUCOSE SERPL-MCNC: 99 MG/DL (ref 70–99)
HCT VFR BLD AUTO: 29.4 % (ref 36–48)
HGB BLD-MCNC: 9.4 G/DL (ref 12–16)
LYMPHOCYTES # BLD: 0.9 K/UL (ref 1–5.1)
LYMPHOCYTES NFR BLD: 11.7 %
MAGNESIUM SERPL-MCNC: 1.78 MG/DL (ref 1.8–2.4)
MCH RBC QN AUTO: 26 PG (ref 26–34)
MCHC RBC AUTO-ENTMCNC: 31.9 G/DL (ref 31–36)
MCV RBC AUTO: 81.4 FL (ref 80–100)
MONOCYTES # BLD: 0.6 K/UL (ref 0–1.3)
MONOCYTES NFR BLD: 8.2 %
NEUTROPHILS # BLD: 5.8 K/UL (ref 1.7–7.7)
NEUTROPHILS NFR BLD: 76.5 %
NT-PROBNP SERPL-MCNC: 698 PG/ML (ref 0–124)
PLATELET # BLD AUTO: 153 K/UL (ref 135–450)
PMV BLD AUTO: 7.1 FL (ref 5–10.5)
POTASSIUM SERPL-SCNC: 3.5 MMOL/L (ref 3.5–5.1)
PROT SERPL-MCNC: 6 G/DL (ref 6.4–8.2)
RBC # BLD AUTO: 3.61 M/UL (ref 4–5.2)
RSV BY PCR: NOT DETECTED
SARS-COV-2 RNA RESP QL NAA+PROBE: NOT DETECTED
SODIUM SERPL-SCNC: 131 MMOL/L (ref 136–145)
TROPONIN, HIGH SENSITIVITY: 21 NG/L (ref 0–14)
WBC # BLD AUTO: 7.6 K/UL (ref 4–11)

## 2025-02-17 PROCEDURE — 94761 N-INVAS EAR/PLS OXIMETRY MLT: CPT

## 2025-02-17 PROCEDURE — 2500000003 HC RX 250 WO HCPCS

## 2025-02-17 PROCEDURE — 96375 TX/PRO/DX INJ NEW DRUG ADDON: CPT

## 2025-02-17 PROCEDURE — 2700000000 HC OXYGEN THERAPY PER DAY

## 2025-02-17 PROCEDURE — 83880 ASSAY OF NATRIURETIC PEPTIDE: CPT

## 2025-02-17 PROCEDURE — 93005 ELECTROCARDIOGRAM TRACING: CPT | Performed by: STUDENT IN AN ORGANIZED HEALTH CARE EDUCATION/TRAINING PROGRAM

## 2025-02-17 PROCEDURE — 1200000000 HC SEMI PRIVATE

## 2025-02-17 PROCEDURE — 83735 ASSAY OF MAGNESIUM: CPT

## 2025-02-17 PROCEDURE — 71045 X-RAY EXAM CHEST 1 VIEW: CPT

## 2025-02-17 PROCEDURE — 6370000000 HC RX 637 (ALT 250 FOR IP)

## 2025-02-17 PROCEDURE — 96374 THER/PROPH/DIAG INJ IV PUSH: CPT

## 2025-02-17 PROCEDURE — 6360000002 HC RX W HCPCS

## 2025-02-17 PROCEDURE — 80053 COMPREHEN METABOLIC PANEL: CPT

## 2025-02-17 PROCEDURE — 93010 ELECTROCARDIOGRAM REPORT: CPT | Performed by: INTERNAL MEDICINE

## 2025-02-17 PROCEDURE — 85025 COMPLETE CBC W/AUTO DIFF WBC: CPT

## 2025-02-17 PROCEDURE — 84484 ASSAY OF TROPONIN QUANT: CPT

## 2025-02-17 PROCEDURE — 6370000000 HC RX 637 (ALT 250 FOR IP): Performed by: STUDENT IN AN ORGANIZED HEALTH CARE EDUCATION/TRAINING PROGRAM

## 2025-02-17 PROCEDURE — 87637 SARSCOV2&INF A&B&RSV AMP PRB: CPT

## 2025-02-17 PROCEDURE — 99285 EMERGENCY DEPT VISIT HI MDM: CPT

## 2025-02-17 RX ORDER — CARVEDILOL 6.25 MG/1
25 TABLET ORAL 2 TIMES DAILY WITH MEALS
Status: DISCONTINUED | OUTPATIENT
Start: 2025-02-18 | End: 2025-02-20 | Stop reason: HOSPADM

## 2025-02-17 RX ORDER — DULOXETIN HYDROCHLORIDE 60 MG/1
60 CAPSULE, DELAYED RELEASE ORAL DAILY
Status: DISCONTINUED | OUTPATIENT
Start: 2025-02-18 | End: 2025-02-20 | Stop reason: HOSPADM

## 2025-02-17 RX ORDER — GUAIFENESIN 600 MG/1
600 TABLET, EXTENDED RELEASE ORAL 2 TIMES DAILY
Status: DISCONTINUED | OUTPATIENT
Start: 2025-02-17 | End: 2025-02-20 | Stop reason: HOSPADM

## 2025-02-17 RX ORDER — SODIUM CHLORIDE 9 MG/ML
INJECTION, SOLUTION INTRAVENOUS PRN
Status: DISCONTINUED | OUTPATIENT
Start: 2025-02-17 | End: 2025-02-20 | Stop reason: HOSPADM

## 2025-02-17 RX ORDER — FUROSEMIDE 40 MG/1
40 TABLET ORAL DAILY
Status: DISCONTINUED | OUTPATIENT
Start: 2025-02-18 | End: 2025-02-18

## 2025-02-17 RX ORDER — FAMOTIDINE 20 MG/1
20 TABLET, FILM COATED ORAL 2 TIMES DAILY
Status: DISCONTINUED | OUTPATIENT
Start: 2025-02-17 | End: 2025-02-20 | Stop reason: HOSPADM

## 2025-02-17 RX ORDER — DIVALPROEX SODIUM 500 MG/1
500 TABLET, FILM COATED, EXTENDED RELEASE ORAL
Status: DISCONTINUED | OUTPATIENT
Start: 2025-02-17 | End: 2025-02-18

## 2025-02-17 RX ORDER — ACETAMINOPHEN 650 MG/1
650 SUPPOSITORY RECTAL EVERY 6 HOURS PRN
Status: DISCONTINUED | OUTPATIENT
Start: 2025-02-17 | End: 2025-02-20 | Stop reason: HOSPADM

## 2025-02-17 RX ORDER — ACETAMINOPHEN 500 MG
1000 TABLET ORAL
Status: COMPLETED | OUTPATIENT
Start: 2025-02-17 | End: 2025-02-17

## 2025-02-17 RX ORDER — LOSARTAN POTASSIUM 100 MG/1
100 TABLET ORAL DAILY
Status: DISCONTINUED | OUTPATIENT
Start: 2025-02-18 | End: 2025-02-20 | Stop reason: HOSPADM

## 2025-02-17 RX ORDER — ATORVASTATIN CALCIUM 40 MG/1
40 TABLET, FILM COATED ORAL DAILY
Status: DISCONTINUED | OUTPATIENT
Start: 2025-02-18 | End: 2025-02-20 | Stop reason: HOSPADM

## 2025-02-17 RX ORDER — NICOTINE 21 MG/24HR
1 PATCH, TRANSDERMAL 24 HOURS TRANSDERMAL EVERY 24 HOURS
Status: DISCONTINUED | OUTPATIENT
Start: 2025-02-17 | End: 2025-02-20 | Stop reason: HOSPADM

## 2025-02-17 RX ORDER — ENOXAPARIN SODIUM 100 MG/ML
40 INJECTION SUBCUTANEOUS DAILY
Status: DISCONTINUED | OUTPATIENT
Start: 2025-02-18 | End: 2025-02-20 | Stop reason: HOSPADM

## 2025-02-17 RX ORDER — OXCARBAZEPINE 150 MG/1
450 TABLET, FILM COATED ORAL NIGHTLY
COMMUNITY
Start: 2025-02-03

## 2025-02-17 RX ORDER — TOPIRAMATE 25 MG/1
25 TABLET, FILM COATED ORAL DAILY
Status: DISCONTINUED | OUTPATIENT
Start: 2025-02-18 | End: 2025-02-20 | Stop reason: HOSPADM

## 2025-02-17 RX ORDER — PANTOPRAZOLE SODIUM 40 MG/1
40 TABLET, DELAYED RELEASE ORAL
Status: DISCONTINUED | OUTPATIENT
Start: 2025-02-18 | End: 2025-02-20 | Stop reason: HOSPADM

## 2025-02-17 RX ORDER — GABAPENTIN 100 MG/1
100 CAPSULE ORAL 3 TIMES DAILY
COMMUNITY
Start: 2025-02-05

## 2025-02-17 RX ORDER — IPRATROPIUM BROMIDE AND ALBUTEROL SULFATE 2.5; .5 MG/3ML; MG/3ML
1 SOLUTION RESPIRATORY (INHALATION) ONCE
Status: COMPLETED | OUTPATIENT
Start: 2025-02-17 | End: 2025-02-17

## 2025-02-17 RX ORDER — LANOLIN ALCOHOL/MO/W.PET/CERES
400 CREAM (GRAM) TOPICAL DAILY
Status: DISCONTINUED | OUTPATIENT
Start: 2025-02-17 | End: 2025-02-20 | Stop reason: HOSPADM

## 2025-02-17 RX ORDER — FLUTICASONE PROPIONATE 50 MCG
1 SPRAY, SUSPENSION (ML) NASAL DAILY PRN
COMMUNITY

## 2025-02-17 RX ORDER — POLYETHYLENE GLYCOL 3350 17 G/17G
17 POWDER, FOR SOLUTION ORAL DAILY PRN
Status: DISCONTINUED | OUTPATIENT
Start: 2025-02-17 | End: 2025-02-20 | Stop reason: HOSPADM

## 2025-02-17 RX ORDER — POTASSIUM CHLORIDE 7.45 MG/ML
10 INJECTION INTRAVENOUS PRN
Status: DISCONTINUED | OUTPATIENT
Start: 2025-02-17 | End: 2025-02-20 | Stop reason: HOSPADM

## 2025-02-17 RX ORDER — ACETAMINOPHEN 325 MG/1
650 TABLET ORAL EVERY 6 HOURS PRN
Status: DISCONTINUED | OUTPATIENT
Start: 2025-02-17 | End: 2025-02-20 | Stop reason: HOSPADM

## 2025-02-17 RX ORDER — ASPIRIN 81 MG/1
81 TABLET, CHEWABLE ORAL DAILY
Status: DISCONTINUED | OUTPATIENT
Start: 2025-02-18 | End: 2025-02-20 | Stop reason: HOSPADM

## 2025-02-17 RX ORDER — QUETIAPINE FUMARATE 200 MG/1
400 TABLET, FILM COATED ORAL NIGHTLY
Status: DISCONTINUED | OUTPATIENT
Start: 2025-02-17 | End: 2025-02-20 | Stop reason: HOSPADM

## 2025-02-17 RX ORDER — FAMOTIDINE 20 MG/1
20 TABLET, FILM COATED ORAL 2 TIMES DAILY
COMMUNITY
Start: 2025-02-03

## 2025-02-17 RX ORDER — ALBUTEROL SULFATE 0.63 MG/3ML
1 SOLUTION RESPIRATORY (INHALATION) EVERY 6 HOURS PRN
Status: DISCONTINUED | OUTPATIENT
Start: 2025-02-17 | End: 2025-02-18

## 2025-02-17 RX ORDER — SODIUM CHLORIDE 0.9 % (FLUSH) 0.9 %
5-40 SYRINGE (ML) INJECTION EVERY 12 HOURS SCHEDULED
Status: DISCONTINUED | OUTPATIENT
Start: 2025-02-17 | End: 2025-02-20 | Stop reason: HOSPADM

## 2025-02-17 RX ORDER — AMLODIPINE BESYLATE 5 MG/1
5 TABLET ORAL DAILY
Status: DISCONTINUED | OUTPATIENT
Start: 2025-02-18 | End: 2025-02-20 | Stop reason: HOSPADM

## 2025-02-17 RX ORDER — SODIUM CHLORIDE 0.9 % (FLUSH) 0.9 %
5-40 SYRINGE (ML) INJECTION PRN
Status: DISCONTINUED | OUTPATIENT
Start: 2025-02-17 | End: 2025-02-20 | Stop reason: HOSPADM

## 2025-02-17 RX ORDER — HYDRALAZINE HYDROCHLORIDE 25 MG/1
50 TABLET, FILM COATED ORAL EVERY 8 HOURS SCHEDULED
Status: DISCONTINUED | OUTPATIENT
Start: 2025-02-17 | End: 2025-02-20 | Stop reason: HOSPADM

## 2025-02-17 RX ORDER — ONDANSETRON 4 MG/1
4 TABLET, ORALLY DISINTEGRATING ORAL EVERY 8 HOURS PRN
Status: DISCONTINUED | OUTPATIENT
Start: 2025-02-17 | End: 2025-02-20 | Stop reason: HOSPADM

## 2025-02-17 RX ORDER — BUSPIRONE HYDROCHLORIDE 7.5 MG/1
7.5 TABLET ORAL 3 TIMES DAILY
Status: DISCONTINUED | OUTPATIENT
Start: 2025-02-17 | End: 2025-02-20 | Stop reason: HOSPADM

## 2025-02-17 RX ORDER — FUROSEMIDE 10 MG/ML
40 INJECTION INTRAMUSCULAR; INTRAVENOUS ONCE
Status: COMPLETED | OUTPATIENT
Start: 2025-02-17 | End: 2025-02-17

## 2025-02-17 RX ORDER — POTASSIUM CHLORIDE 1500 MG/1
40 TABLET, EXTENDED RELEASE ORAL PRN
Status: DISCONTINUED | OUTPATIENT
Start: 2025-02-17 | End: 2025-02-20 | Stop reason: HOSPADM

## 2025-02-17 RX ORDER — RIMEGEPANT SULFATE 75 MG/75MG
75 TABLET, ORALLY DISINTEGRATING ORAL PRN
COMMUNITY

## 2025-02-17 RX ORDER — MAGNESIUM SULFATE IN WATER 40 MG/ML
2000 INJECTION, SOLUTION INTRAVENOUS PRN
Status: DISCONTINUED | OUTPATIENT
Start: 2025-02-17 | End: 2025-02-20 | Stop reason: HOSPADM

## 2025-02-17 RX ORDER — DIVALPROEX SODIUM 250 MG/1
250 TABLET, FILM COATED, EXTENDED RELEASE ORAL EVERY MORNING
Status: DISCONTINUED | OUTPATIENT
Start: 2025-02-18 | End: 2025-02-18

## 2025-02-17 RX ORDER — ALBUTEROL SULFATE 90 UG/1
2 INHALANT RESPIRATORY (INHALATION) EVERY 4 HOURS PRN
Status: DISCONTINUED | OUTPATIENT
Start: 2025-02-17 | End: 2025-02-18

## 2025-02-17 RX ORDER — ONDANSETRON 2 MG/ML
4 INJECTION INTRAMUSCULAR; INTRAVENOUS EVERY 6 HOURS PRN
Status: DISCONTINUED | OUTPATIENT
Start: 2025-02-17 | End: 2025-02-20 | Stop reason: HOSPADM

## 2025-02-17 RX ADMIN — IPRATROPIUM BROMIDE AND ALBUTEROL SULFATE 1 DOSE: 2.5; .5 SOLUTION RESPIRATORY (INHALATION) at 14:46

## 2025-02-17 RX ADMIN — FUROSEMIDE 40 MG: 10 INJECTION, SOLUTION INTRAMUSCULAR; INTRAVENOUS at 15:17

## 2025-02-17 RX ADMIN — WATER 125 MG: 1 INJECTION INTRAMUSCULAR; INTRAVENOUS; SUBCUTANEOUS at 14:46

## 2025-02-17 RX ADMIN — POTASSIUM BICARBONATE 20 MEQ: 782 TABLET, EFFERVESCENT ORAL at 15:17

## 2025-02-17 RX ADMIN — MAGNESIUM OXIDE 400 MG (241.3 MG MAGNESIUM) TABLET 400 MG: TABLET at 15:17

## 2025-02-17 RX ADMIN — ACETAMINOPHEN 1000 MG: 500 TABLET ORAL at 15:56

## 2025-02-17 ASSESSMENT — ENCOUNTER SYMPTOMS
DIARRHEA: 0
WHEEZING: 0
VOMITING: 0
ABDOMINAL PAIN: 0
STRIDOR: 0
SHORTNESS OF BREATH: 1
NAUSEA: 0
CHEST TIGHTNESS: 1

## 2025-02-17 ASSESSMENT — PAIN SCALES - GENERAL
PAINLEVEL_OUTOF10: 8
PAINLEVEL_OUTOF10: 10
PAINLEVEL_OUTOF10: 0

## 2025-02-17 ASSESSMENT — PAIN DESCRIPTION - ORIENTATION: ORIENTATION: ANTERIOR

## 2025-02-17 ASSESSMENT — PAIN DESCRIPTION - PAIN TYPE: TYPE: ACUTE PAIN

## 2025-02-17 ASSESSMENT — PAIN DESCRIPTION - FREQUENCY: FREQUENCY: INTERMITTENT

## 2025-02-17 ASSESSMENT — PAIN DESCRIPTION - LOCATION
LOCATION: HEAD
LOCATION: HEAD

## 2025-02-17 ASSESSMENT — PAIN - FUNCTIONAL ASSESSMENT: PAIN_FUNCTIONAL_ASSESSMENT: ACTIVITIES ARE NOT PREVENTED

## 2025-02-17 ASSESSMENT — PAIN DESCRIPTION - ONSET: ONSET: GRADUAL

## 2025-02-17 ASSESSMENT — PAIN DESCRIPTION - DESCRIPTORS
DESCRIPTORS: THROBBING
DESCRIPTORS: ACHING

## 2025-02-17 NOTE — PLAN OF CARE
Acute on chronic hypoxia.  2L at baseline, requiring 4L.  Increased size of chronic left pleural effusion on CXR, last drained 500mL 1/11.    -admit 2W.  -pulm consulted.  -s/p 40mg lasix in ED.  -further diuresis per assessing procider.   -nursing communication for home med rec placed.      Halley Levin PA-C 2/17/2025 4:00 PM

## 2025-02-17 NOTE — H&P
Hospital Medicine History & Physical      PCP: Geena Sotomayor, APRN - CNP    Date of Admission: 2/17/2025    Date of Service: Pt seen/examined on 02/17/25    Chief Complaint:    Chief Complaint   Patient presents with    Shortness of Breath     Shortness of breath x 1 day. Wears 2l home o2. Shortness of breath improved after breathing tx and o2 @ 4L         History Of Present Illness:      The patient is a 74 y.o. female with a PMHx of COPD, CHF, CAD, HTN, HLD, anxiety/depression, GERD who presents to [facility] with shortness of breath. History obtained from the patient and review of EMR. Reports she started to notice congestion about 2 days ago. Became more short of breath, reports her COPD started to act up and her lungs became tight. Reports coughing up \"gunk\", but isn't sure of the color because she cannot see well. Denies any nausea, vomiting, fevers, or chest pain. Reports diarrhea that started today and weakness.    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    Coronary artery disease involving native coronary artery of native heart with angina pectoris ([facility]) 3/8/2016    Depression     Emphysema of lung (HCC)     Fibromyalgia     hyperlipidemia 8/11/2011    Hypertension     Kidney stone     Liver disease     Lung disease     MDD (major depressive disorder) 4/4/2012    Mood disorder (HCC) 3/13/2013    Movement disorder     Neck pain, chronic 3/13/2013    Opiate misuse     Personality disorder (HCC)     Pneumonia     Polypharmacy 2/16/2013       Past Surgical History:        Procedure Laterality Date    COLONOSCOPY  1/19/12    DIAGNOSTIC CARDIAC CATH

## 2025-02-17 NOTE — ED PROVIDER NOTES
[facility] EMERGENCY DEPARTMENT  EMERGENCY DEPARTMENT ENCOUNTER        Pt Name: Najma Fitzpatrick  MRN: [id number]  Birthdate 1950  Date of evaluation: 2/17/2025  Provider: BALJIT Montoya CNP  PCP: Geena Sotomayor APRN - CNP  Note Started: 3:00 PM EST 2/17/25       I have seen and evaluated this patient with my supervising physician Carson Pablo DO.      CHIEF COMPLAINT       Chief Complaint   Patient presents with    Shortness of Breath     Shortness of breath x 1 day. Wears 2l home o2. Shortness of breath improved after breathing tx and o2 @ 4L       HISTORY OF PRESENT ILLNESS: 1 or more Elements     History From: Patient, EMR review    Chief Complaint: Shortness of breath over the past 24 to 36 hours    Najma Fitzpatrick is a 74 y.o. female with a past medical history notable for Warnicke encephalopathy, COPD, CHF with preserved ejection fraction, COPD, CAD, hypertension, STEPHEN, dementia who presents to the emergency department for evaluation of shortness of breath.  States that her symptoms started sometime yesterday.  She does wear supplemental oxygen of 2 L per nasal cannula at all times.  Titrated herself to 4 L for comfort earlier today.  No recent travel or known direct sick contacts.  States that she was admitted in the hospital last month and they had to drain some fluid off of her left lung.  No associated fevers, chills, nausea, vomiting.  Does have associated chest pressure that she states is not really painful just a heavy sensation.  Nonexertional and nonpleuritic in nature.  Intermittently productive cough for thick yellow sputum.  No lower leg edema.    Nursing Notes were all reviewed and agreed with or any disagreements were addressed in the HPI.    REVIEW OF SYSTEMS :      Review of Systems   Constitutional:  Negative for chills, fatigue and fever.   Respiratory:  Positive for chest tightness and shortness of breath. Negative for wheezing and stridor.    Cardiovascular:   Hematocrit 29.4 (*)     RDW 16.3 (*)     Lymphocytes Absolute 0.9 (*)     All other components within normal limits   COMPREHENSIVE METABOLIC PANEL - Abnormal; Notable for the following components:    Sodium 131 (*)     Chloride 92 (*)     BUN 5 (*)     Creatinine 0.4 (*)     Total Protein 6.0 (*)     ALT 8 (*)     AST 12 (*)     All other components within normal limits   MAGNESIUM - Abnormal; Notable for the following components:    Magnesium 1.78 (*)     All other components within normal limits   TROPONIN - Abnormal; Notable for the following components:    Troponin, High Sensitivity 21 (*)     All other components within normal limits   BRAIN NATRIURETIC PEPTIDE - Abnormal; Notable for the following components:    NT Pro- (*)     All other components within normal limits   COVID-19, FLU A/B, AND RSV COMBO       When ordered only abnormal lab results are displayed. All other labs were within normal range or not returned as of this dictation.    EKG: When ordered, EKG's are interpreted by the Emergency Department Physician in the absence of a cardiologist.  Please see their note for interpretation of EKG.    RADIOLOGY:   Non-plain film images such as CT, Ultrasound and MRI are read by the radiologist.       Interpretation per the Radiologist below, if available at the time of this note:    XR CHEST PORTABLE   Final Result   Increased volume of a chronic left pleural effusion with dense airspace   changes in the left lower lung zone.           XR CHEST PORTABLE    Result Date: 2/17/2025  EXAMINATION: ONE XRAY VIEW OF THE CHEST 2/17/2025 1:54 pm COMPARISON: 01/28/2025 radiograph HISTORY: ORDERING SYSTEM PROVIDED HISTORY: SOB TECHNOLOGIST PROVIDED HISTORY: Reason for exam:->SOB Reason for Exam: shortness of breath FINDINGS: The heart is enlarged.  Increased volume of a moderate left pleural effusion that was present on prior imaging.  There is associated airspace opacification in the left lower lung zone.  A  auscultation of lung sounds the patient was ordered for 125 mg IV Solu-Medrol as well as DuoNeb treatment for initial management of symptoms.    EKG is interpreted by ED attending.  Chest x-ray on radiology read shows increased volume left-sided pleural effusion.  Patient was ordered for IV push Lasix 40 mg.  Stable CBC without leukocytosis, neutropenia, thrombocytopenia.  There is a stable normocytic anemia appreciated.  No renal impairment or significant electrolyte derangements on CMP.  Potassium is lower limits of normal.  Considering patient will be receiving Lasix was given 20 mEq oral replacement.  Reflex magnesium 1.78.  Given 400 mg p.o. replacement.  Troponin at the patient's historic baseline on EMR review.  BNP improved when compared to prior.  Viral testing for COVID, flu, RSV negative.    Discussed indication for admission for pulmonology consult, management of pleural effusion.  Discussed that this could be conservative with medication versus more invasive with repeat thoracentesis.  The patient is agreeable.  Case was discussed with the hospitalist via [employer] and the patient is excepted on the service.    Disposition Considerations (tests considered but not done, Admit vs D/C, Shared Decision Making, Pt Expectation of Test or Tx.): Admit for pulmonology consult, medical management of pleural effusion    The patient tolerated their visit well.  The patient and / or the family were informed of the results of any tests, a time was given to answer questions, a plan was proposed and they agreed with plan.    I am the Primary Clinician of Record.  FINAL IMPRESSION      1. Pleural effusion on left    2. Shortness of breath    3. Chronic obstructive pulmonary disease, unspecified COPD type (HCC)          DISPOSITION/PLAN     DISPOSITION                 PATIENT REFERRED TO:  No follow-up provider specified.    DISCHARGE MEDICATIONS:  New Prescriptions    No medications on file       DISCONTINUED

## 2025-02-17 NOTE — ED NOTES
Attempted to complete med reconciliation at this time. Pt is poor historian of meds and isn't sure the last time she took them.

## 2025-02-18 ENCOUNTER — APPOINTMENT (OUTPATIENT)
Dept: GENERAL RADIOLOGY | Age: 75
DRG: 187 | End: 2025-02-18
Payer: COMMERCIAL

## 2025-02-18 ENCOUNTER — APPOINTMENT (OUTPATIENT)
Dept: ULTRASOUND IMAGING | Age: 75
DRG: 187 | End: 2025-02-18
Payer: COMMERCIAL

## 2025-02-18 LAB
ANION GAP SERPL CALCULATED.3IONS-SCNC: 9 MMOL/L (ref 3–16)
APPEARANCE FLUID: NORMAL
BASOPHILS # BLD: 0 K/UL (ref 0–0.2)
BASOPHILS NFR BLD: 0.2 %
BASOPHILS NFR FLD MANUAL: 5 %
BDY FLUID QUALITY: NORMAL
BUN SERPL-MCNC: 6 MG/DL (ref 7–20)
CALCIUM SERPL-MCNC: 8.5 MG/DL (ref 8.3–10.6)
CELL COUNT FLUID TYPE: NORMAL
CHLORIDE SERPL-SCNC: 91 MMOL/L (ref 99–110)
CO2 SERPL-SCNC: 31 MMOL/L (ref 21–32)
COLOR FLUID: NORMAL
CREAT SERPL-MCNC: 0.4 MG/DL (ref 0.6–1.2)
DEPRECATED RDW RBC AUTO: 15.9 % (ref 12.4–15.4)
EOSINOPHIL # BLD: 0 K/UL (ref 0–0.6)
EOSINOPHIL NFR BLD: 0.1 %
EOSINOPHIL NFR FLD MANUAL: 28 %
GFR SERPLBLD CREATININE-BSD FMLA CKD-EPI: >90 ML/MIN/{1.73_M2}
GLUCOSE FLD-MCNC: 139 MG/DL
GLUCOSE SERPL-MCNC: 106 MG/DL (ref 70–99)
HCT VFR BLD AUTO: 26.2 % (ref 36–48)
HGB BLD-MCNC: 8.6 G/DL (ref 12–16)
LDH FLD L TO P-CCNC: 111 U/L
LYMPHOCYTES # BLD: 0.9 K/UL (ref 1–5.1)
LYMPHOCYTES NFR BLD: 13.4 %
LYMPHOCYTES NFR FLD: 35 %
MACROPHAGES # FLD: 22 %
MAGNESIUM SERPL-MCNC: 1.71 MG/DL (ref 1.8–2.4)
MCH RBC QN AUTO: 25.9 PG (ref 26–34)
MCHC RBC AUTO-ENTMCNC: 32.7 G/DL (ref 31–36)
MCV RBC AUTO: 79.1 FL (ref 80–100)
MONOCYTES # BLD: 0.4 K/UL (ref 0–1.3)
MONOCYTES NFR BLD: 6.2 %
NEUTROPHIL, FLUID: 10 %
NEUTROPHILS # BLD: 5.3 K/UL (ref 1.7–7.7)
NEUTROPHILS NFR BLD: 80.1 %
NUC CELL # FLD: 864 /CUMM
PLATELET # BLD AUTO: 152 K/UL (ref 135–450)
PMV BLD AUTO: 7.2 FL (ref 5–10.5)
POTASSIUM SERPL-SCNC: 3.5 MMOL/L (ref 3.5–5.1)
PROT FLD-MCNC: 2.5 G/DL
RBC # BLD AUTO: 3.31 M/UL (ref 4–5.2)
RBC FLUID: 1400 /CUMM
SODIUM SERPL-SCNC: 131 MMOL/L (ref 136–145)
SPECIMEN SOURCE FLD: NORMAL
TOTAL CELLS COUNTED FLD: 100
WBC # BLD AUTO: 6.6 K/UL (ref 4–11)

## 2025-02-18 PROCEDURE — 89051 BODY FLUID CELL COUNT: CPT

## 2025-02-18 PROCEDURE — 1200000000 HC SEMI PRIVATE

## 2025-02-18 PROCEDURE — 87205 SMEAR GRAM STAIN: CPT

## 2025-02-18 PROCEDURE — 2700000000 HC OXYGEN THERAPY PER DAY

## 2025-02-18 PROCEDURE — 80048 BASIC METABOLIC PNL TOTAL CA: CPT

## 2025-02-18 PROCEDURE — 76604 US EXAM CHEST: CPT

## 2025-02-18 PROCEDURE — 6370000000 HC RX 637 (ALT 250 FOR IP)

## 2025-02-18 PROCEDURE — 82945 GLUCOSE OTHER FLUID: CPT

## 2025-02-18 PROCEDURE — 6360000002 HC RX W HCPCS: Performed by: INTERNAL MEDICINE

## 2025-02-18 PROCEDURE — 99223 1ST HOSP IP/OBS HIGH 75: CPT | Performed by: INTERNAL MEDICINE

## 2025-02-18 PROCEDURE — 94761 N-INVAS EAR/PLS OXIMETRY MLT: CPT

## 2025-02-18 PROCEDURE — 99406 BEHAV CHNG SMOKING 3-10 MIN: CPT | Performed by: INTERNAL MEDICINE

## 2025-02-18 PROCEDURE — 94640 AIRWAY INHALATION TREATMENT: CPT

## 2025-02-18 PROCEDURE — 32555 ASPIRATE PLEURA W/ IMAGING: CPT | Performed by: INTERNAL MEDICINE

## 2025-02-18 PROCEDURE — 85025 COMPLETE CBC W/AUTO DIFF WBC: CPT

## 2025-02-18 PROCEDURE — 99232 SBSQ HOSP IP/OBS MODERATE 35: CPT | Performed by: INTERNAL MEDICINE

## 2025-02-18 PROCEDURE — 83735 ASSAY OF MAGNESIUM: CPT

## 2025-02-18 PROCEDURE — 36415 COLL VENOUS BLD VENIPUNCTURE: CPT

## 2025-02-18 PROCEDURE — 2580000003 HC RX 258: Performed by: INTERNAL MEDICINE

## 2025-02-18 PROCEDURE — 6360000002 HC RX W HCPCS

## 2025-02-18 PROCEDURE — 6370000000 HC RX 637 (ALT 250 FOR IP): Performed by: INTERNAL MEDICINE

## 2025-02-18 PROCEDURE — 88112 CYTOPATH CELL ENHANCE TECH: CPT

## 2025-02-18 PROCEDURE — 87070 CULTURE OTHR SPECIMN AEROBIC: CPT

## 2025-02-18 PROCEDURE — 2500000003 HC RX 250 WO HCPCS

## 2025-02-18 PROCEDURE — 0W9B3ZZ DRAINAGE OF LEFT PLEURAL CAVITY, PERCUTANEOUS APPROACH: ICD-10-PCS | Performed by: INTERNAL MEDICINE

## 2025-02-18 PROCEDURE — 83615 LACTATE (LD) (LDH) ENZYME: CPT

## 2025-02-18 PROCEDURE — 87102 FUNGUS ISOLATION CULTURE: CPT

## 2025-02-18 PROCEDURE — 84157 ASSAY OF PROTEIN OTHER: CPT

## 2025-02-18 PROCEDURE — 88305 TISSUE EXAM BY PATHOLOGIST: CPT

## 2025-02-18 PROCEDURE — 71045 X-RAY EXAM CHEST 1 VIEW: CPT

## 2025-02-18 RX ORDER — ALBUTEROL SULFATE 0.83 MG/ML
2.5 SOLUTION RESPIRATORY (INHALATION) EVERY 6 HOURS PRN
Status: DISCONTINUED | OUTPATIENT
Start: 2025-02-18 | End: 2025-02-19

## 2025-02-18 RX ORDER — OXYCODONE AND ACETAMINOPHEN 5; 325 MG/1; MG/1
1 TABLET ORAL EVERY 4 HOURS PRN
Status: DISCONTINUED | OUTPATIENT
Start: 2025-02-18 | End: 2025-02-20 | Stop reason: HOSPADM

## 2025-02-18 RX ORDER — ALBUTEROL SULFATE 90 UG/1
2 INHALANT RESPIRATORY (INHALATION)
Status: DISCONTINUED | OUTPATIENT
Start: 2025-02-18 | End: 2025-02-19

## 2025-02-18 RX ORDER — MORPHINE SULFATE 4 MG/ML
4 INJECTION, SOLUTION INTRAMUSCULAR; INTRAVENOUS EVERY 4 HOURS PRN
Status: DISCONTINUED | OUTPATIENT
Start: 2025-02-18 | End: 2025-02-20 | Stop reason: HOSPADM

## 2025-02-18 RX ADMIN — Medication 10 ML: at 01:22

## 2025-02-18 RX ADMIN — QUETIAPINE FUMARATE 400 MG: 200 TABLET ORAL at 01:17

## 2025-02-18 RX ADMIN — ALBUTEROL SULFATE 2 PUFF: 90 AEROSOL, METERED RESPIRATORY (INHALATION) at 02:55

## 2025-02-18 RX ADMIN — FAMOTIDINE 20 MG: 20 TABLET, FILM COATED ORAL at 01:16

## 2025-02-18 RX ADMIN — GUAIFENESIN 600 MG: 600 TABLET, EXTENDED RELEASE ORAL at 08:47

## 2025-02-18 RX ADMIN — GUAIFENESIN 600 MG: 600 TABLET, EXTENDED RELEASE ORAL at 01:16

## 2025-02-18 RX ADMIN — TOPIRAMATE 25 MG: 25 TABLET, FILM COATED ORAL at 08:47

## 2025-02-18 RX ADMIN — BUSPIRONE HYDROCHLORIDE 7.5 MG: 7.5 TABLET ORAL at 21:32

## 2025-02-18 RX ADMIN — HYDRALAZINE HYDROCHLORIDE 50 MG: 25 TABLET ORAL at 15:55

## 2025-02-18 RX ADMIN — FAMOTIDINE 20 MG: 20 TABLET, FILM COATED ORAL at 21:32

## 2025-02-18 RX ADMIN — QUETIAPINE FUMARATE 400 MG: 200 TABLET ORAL at 21:32

## 2025-02-18 RX ADMIN — MAGNESIUM OXIDE 400 MG (241.3 MG MAGNESIUM) TABLET 400 MG: TABLET at 08:47

## 2025-02-18 RX ADMIN — MORPHINE SULFATE 4 MG: 4 INJECTION, SOLUTION INTRAMUSCULAR; INTRAVENOUS at 17:48

## 2025-02-18 RX ADMIN — GUAIFENESIN 600 MG: 600 TABLET, EXTENDED RELEASE ORAL at 21:32

## 2025-02-18 RX ADMIN — BUSPIRONE HYDROCHLORIDE 7.5 MG: 7.5 TABLET ORAL at 08:47

## 2025-02-18 RX ADMIN — Medication 10 ML: at 21:34

## 2025-02-18 RX ADMIN — ATORVASTATIN CALCIUM 40 MG: 40 TABLET, FILM COATED ORAL at 08:47

## 2025-02-18 RX ADMIN — PANTOPRAZOLE SODIUM 40 MG: 40 TABLET, DELAYED RELEASE ORAL at 05:52

## 2025-02-18 RX ADMIN — TIOTROPIUM BROMIDE INHALATION SPRAY 2 PUFF: 3.12 SPRAY, METERED RESPIRATORY (INHALATION) at 07:38

## 2025-02-18 RX ADMIN — OXYCODONE HYDROCHLORIDE AND ACETAMINOPHEN 1 TABLET: 5; 325 TABLET ORAL at 15:55

## 2025-02-18 RX ADMIN — HYDRALAZINE HYDROCHLORIDE 50 MG: 25 TABLET ORAL at 05:52

## 2025-02-18 RX ADMIN — HYDRALAZINE HYDROCHLORIDE 50 MG: 25 TABLET ORAL at 01:15

## 2025-02-18 RX ADMIN — DULOXETINE HYDROCHLORIDE 60 MG: 60 CAPSULE, DELAYED RELEASE ORAL at 08:47

## 2025-02-18 RX ADMIN — ALBUTEROL SULFATE 2 PUFF: 90 AEROSOL, METERED RESPIRATORY (INHALATION) at 07:38

## 2025-02-18 RX ADMIN — HYDRALAZINE HYDROCHLORIDE 50 MG: 25 TABLET ORAL at 21:32

## 2025-02-18 RX ADMIN — OXYCODONE HYDROCHLORIDE AND ACETAMINOPHEN 1 TABLET: 5; 325 TABLET ORAL at 21:32

## 2025-02-18 RX ADMIN — FAMOTIDINE 20 MG: 20 TABLET, FILM COATED ORAL at 08:47

## 2025-02-18 RX ADMIN — SODIUM CHLORIDE 1000 MG: 900 INJECTION INTRAVENOUS at 09:00

## 2025-02-18 RX ADMIN — BUSPIRONE HYDROCHLORIDE 7.5 MG: 7.5 TABLET ORAL at 01:16

## 2025-02-18 RX ADMIN — WATER 40 MG: 1 INJECTION INTRAMUSCULAR; INTRAVENOUS; SUBCUTANEOUS at 17:49

## 2025-02-18 RX ADMIN — CARVEDILOL 25 MG: 6.25 TABLET, FILM COATED ORAL at 17:49

## 2025-02-18 RX ADMIN — CARVEDILOL 25 MG: 6.25 TABLET, FILM COATED ORAL at 08:45

## 2025-02-18 RX ADMIN — WATER 40 MG: 1 INJECTION INTRAMUSCULAR; INTRAVENOUS; SUBCUTANEOUS at 05:52

## 2025-02-18 RX ADMIN — BUSPIRONE HYDROCHLORIDE 7.5 MG: 7.5 TABLET ORAL at 15:55

## 2025-02-18 RX ADMIN — LOSARTAN POTASSIUM 100 MG: 100 TABLET, FILM COATED ORAL at 08:47

## 2025-02-18 RX ADMIN — Medication 10 ML: at 08:51

## 2025-02-18 RX ADMIN — ALBUTEROL SULFATE 2.5 MG: 0.83 SOLUTION RESPIRATORY (INHALATION) at 15:40

## 2025-02-18 RX ADMIN — AMLODIPINE BESYLATE 5 MG: 5 TABLET ORAL at 08:48

## 2025-02-18 ASSESSMENT — PAIN SCALES - GENERAL
PAINLEVEL_OUTOF10: 7
PAINLEVEL_OUTOF10: 9

## 2025-02-18 ASSESSMENT — PAIN DESCRIPTION - ORIENTATION
ORIENTATION: LEFT
ORIENTATION: LEFT

## 2025-02-18 ASSESSMENT — PAIN DESCRIPTION - FREQUENCY: FREQUENCY: INTERMITTENT

## 2025-02-18 ASSESSMENT — PAIN DESCRIPTION - DESCRIPTORS
DESCRIPTORS: DISCOMFORT
DESCRIPTORS: STABBING
DESCRIPTORS: THROBBING

## 2025-02-18 ASSESSMENT — PAIN DESCRIPTION - ONSET: ONSET: GRADUAL

## 2025-02-18 ASSESSMENT — PAIN DESCRIPTION - LOCATION
LOCATION: ABDOMEN;BACK
LOCATION: HEAD
LOCATION: RIB CAGE
LOCATION: ABDOMEN
LOCATION: RIB CAGE

## 2025-02-18 ASSESSMENT — PAIN DESCRIPTION - PAIN TYPE: TYPE: ACUTE PAIN

## 2025-02-18 NOTE — PROCEDURES
PROCEDURE NOTE  Date: 2/18/2025   Name: Najma Fitzpatrick  YOB: 1950    Procedures        Ultrasound guided Thoracentesis Procedure Note      DATE:  2/18/2025    INDICTATIONS: pleural effusion found on imaging.    PRE - OPERATIVE DIAGNOSIS:  Left Pleural Effusion    POST - OPERATIVE DIAGNOSIS:  left Pleural Effusion    PERFORMED By:  MARCY YANCEY MD    ASSISTANT(S):  US Technician     CONSCENT:  Verbal consent obtained. Written consent obtained. After informed consent & appropriate time out protocol was noted & obtained. Risks/Benefits/Alternatives of the procedure were discussed including: infection, bleeding, pain, & pneumothorax.    THORACENTESIS PROCEDURE DETAILS:  Patient understanding: patient states understanding of the procedure being performed.  Time out: Immediately prior to procedure a \"time out\" was called to verify the correct.  Patient was placed in a sitting position and ultrasound was done and site of maximum fluid collection was marked.     PREPARATION: Patient was prepped and draped in the usual sterile fashion.    ANESTHESIA: Lidocaine 1% without epinephrine, amount varied for local control.     PROCEDURE NOTE: The patient was placed in the sitting position. US was then used to sarah the fluid and depth was determined.  Then the skin was prepped with Chloraprep solution and draped with sterile covers.  For the procedure we used 1% buffered lidocaine to anesthetize the skin, subcutaneous tissue and parietal pleura.     After adequate anesthesia was accomplished. The plural catheter was advanced over a guide into the pleural space. The fluid was obtained without any difficulties and minimal blood loss.        FINDINGS/SAMPLES: We removed 1000 ml of  clear pleura dark yellow l fluid. The samples was sent for analysis.       COMPLICATIONS:  None; patient tolerated the procedure well.    PLAN: Care will be taken to review the post procedure radiograph for

## 2025-02-18 NOTE — TELEPHONE ENCOUNTER
Patient scheduled for PET-needs a follow up appt to see Dr. Bain.  At this time patient is inpatient and will need called after she is released to set up appt.

## 2025-02-18 NOTE — CONSULTS
[facility]                                 Pulmonary/Critical care  Consult /Progress Note :                                                                  CC : Worsening shortness of breath  Patient is being seen at the request of Aaw Cleary for a consultation for acute COPD exacerbation    HISTORY OF PRESENT ILLNESS:     Patient is 73-year-old female with 50 pack.  Smoking history that she followed with Dr. Lentz as outpatient  She was last seen 1024    She is not 3 L of home oxygen and she uses Trelegy along with bronchodilator    She comes in with worsening shortness of breath that has been going on for the last few days associated with cough with clear to yellow sputum with mild hypoxia    She states unfortunately she continues to smoke however she is slow down    She denies any chest pain fever, she denies any hemoptysis, pulmonary was asked to see for COPD exacerbation  She was admitted on September 24 with acute exacerbation of COPD    PAST MEDICAL HISTORY:  Past Medical History:   Diagnosis Date    Abnormal ECG 12/14/2012    Anemia     Arthritis     Back pain, chronic 3/13/2013    Bipolar disorder ([facility])     Bronchitis chronic     CHF (congestive heart failure) ([facility]) 9/30/2016    Chronic back pain     Chronic neck pain     Clostridium difficile infection 3/29/12, 5/22/12    positive stool DNA probe    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

## 2025-02-18 NOTE — PROGRESS NOTES
RT Inhaler-Nebulizer Bronchodilator Protocol Note    There is a bronchodilator order in the chart from a provider indicating to follow the RT Bronchodilator Protocol and there is an “Initiate RT Inhaler-Nebulizer Bronchodilator Protocol” order as well (see protocol at bottom of note).    CXR Findings:  XR CHEST PORTABLE    Result Date: 2/17/2025  Increased volume of a chronic left pleural effusion with dense airspace changes in the left lower lung zone.       The findings from the last RT Protocol Assessment were as follows:   History Pulmonary Disease: Chronic pulmonary disease  Respiratory Pattern: Mild dyspnea at rest, irregular pattern, or RR 21-25 bpm  Breath Sounds: Intermittent or unilateral wheezes  Cough: Weak, productive  Indication for Bronchodilator Therapy: Decreased or absent breath sounds  Bronchodilator Assessment Score: 12    Aerosolized bronchodilator medication orders have been revised according to the RT Inhaler-Nebulizer Bronchodilator Protocol below.    Respiratory Therapist to perform RT Therapy Protocol Assessment initially then follow the protocol.  Repeat RT Therapy Protocol Assessment PRN for score 0-3 or on second treatment, BID, and PRN for scores above 3.    No Indications - adjust the frequency to every 6 hours PRN wheezing or bronchospasm, if no treatments needed after 48 hours then discontinue using Per Protocol order mode.     If indication present, adjust the RT bronchodilator orders based on the Bronchodilator Assessment Score as indicated below.  Use Inhaler orders unless patient has one or more of the following: on home nebulizer, not able to hold breath for 10 seconds, is not alert and oriented, cannot activate and use MDI correctly, or respiratory rate 25 breaths per minute or more, then use the equivalent nebulizer order(s) with same Frequency and PRN reasons based on the score.  If a patient is on this medication at home then do not decrease Frequency below that used at

## 2025-02-18 NOTE — PROGRESS NOTES
Progress Note    Admit Date:  2/17/2025    Admitted with shortness of breath COPD AE   Pleural effusion      Seen by pulm     Subjective:  Ms. Fitzpatrick  seen    Had left thoracentesis today .  C/o  left sided chest/back pain- meds given- helps    Sats stable     Objective:   Patient Vitals for the past 4 hrs:   BP Temp Temp src Pulse Resp SpO2   02/18/25 1538 -- -- -- 87 18 97 %   02/18/25 1436 (!) 164/71 98.4 °F (36.9 °C) Oral 88 20 96 %   02/18/25 1245 (!) 155/76 -- -- 86 18 99 %          Intake/Output Summary (Last 24 hours) at 2/18/2025 1609  Last data filed at 2/18/2025 0900  Gross per 24 hour   Intake 230 ml   Output 2200 ml   Net -1970 ml       Physical Exam:  Gen: No distress. Alert.  Appears weak and fatigued.  Awake no distress well-oriented  Eyes: PERRL. No sclera icterus. No conjunctival injection.   ENT: No discharge. Pharynx clear.   Neck: No JVD.  Trachea midline.  Resp: No accessory muscle use. . Diminished breath sounds,   good air entry bilaterally .  no crackles. No wheezes. No rhonchi.   CV: Regular rate. Regular rhythm. No murmur.  No rub. No edema.   Capillary Refill: Brisk,< 3 seconds   Peripheral Pulses: +2 palpable, equal bilaterally   GI: Non-tender. Non-distended.  Normal bowel sounds.  Skin: Warm and dry. No nodule on exposed extremities. No rash on exposed extremities.   M/S: No cyanosis. No joint deformity. No clubbing.   Neuro: Awake. Grossly nonfocal    Psych: Oriented x 3. No anxiety or agitation.      Scheduled Meds:   albuterol sulfate HFA  2 puff Inhalation 4x Daily RT    cefTRIAXone (ROCEPHIN) IV  1,000 mg IntraVENous Q24H    magnesium oxide  400 mg Oral Daily    sodium chloride flush  5-40 mL IntraVENous 2 times per day    enoxaparin  40 mg SubCUTAneous Daily    amLODIPine  5 mg Oral Daily    aspirin  81 mg Oral Daily    atorvastatin  40 mg Oral Daily    busPIRone  7.5 mg Oral TID    carvedilol  25 mg Oral BID WC    DULoxetine  60 mg Oral Daily    famotidine  20 mg Oral BID     hydrALAZINE  50 mg Oral 3 times per day    guaiFENesin  600 mg Oral BID    losartan  100 mg Oral Daily    nicotine  1 patch TransDERmal Q24H    pantoprazole  40 mg Oral QAM AC    QUEtiapine  400 mg Oral Nightly    topiramate  25 mg Oral Daily    tiotropium  2 puff Inhalation Daily RT    methylPREDNISolone  40 mg IntraVENous Q12H       Continuous Infusions:   sodium chloride         PRN Meds:  albuterol, morphine, oxyCODONE-acetaminophen, sodium chloride flush, sodium chloride, potassium chloride **OR** potassium alternative oral replacement **OR** potassium chloride, magnesium sulfate, ondansetron **OR** ondansetron, polyethylene glycol, acetaminophen **OR** acetaminophen      Data:  CBC:   Recent Labs     02/17/25  1425 02/18/25  0612   WBC 7.6 6.6   HGB 9.4* 8.6*   HCT 29.4* 26.2*   MCV 81.4 79.1*    152     BMP:   Recent Labs     02/17/25  1425 02/18/25  0612   * 131*   K 3.5 3.5   CL 92* 91*   CO2 31 31   BUN 5* 6*   CREATININE 0.4* 0.4*     LIVER PROFILE:   Recent Labs     02/17/25  1425   AST 12*   ALT 8*   BILITOT 0.3   ALKPHOS 100     PT/INR: No results for input(s): \"PROTIME\", \"INR\" in the last 72 hours.    CULTURES    Results       Procedure Component Value Units Date/Time    Culture, Body Fluid (with Gram Stain) [[id number]] Collected: 02/18/25 1134    Order Status: Sent Specimen: Body Fluid Updated: 02/18/25 1438     Gram Stain Result Cytospin performed,no quantitation  WBC's present  No Epithelial Cells seen  No organisms seen      Narrative:      ORDER#: [id number]                          ORDERED BY: MARCY YANCEY  SOURCE: Fluid                              COLLECTED:  02/18/25 11:34  ANTIBIOTICS AT JULIA.:                      RECEIVED :  02/18/25 14:36    Fungus culture [[id number]]     Order Status: Sent Specimen: Body Fluid     Gram stain [[id number]] Collected: 02/18/25 0000    Order Status: Canceled Specimen: Body Fluid     Culture, Respiratory (with Gram Stain) [[id number]]

## 2025-02-18 NOTE — CARE COORDINATION
Case Management Assessment  Initial Evaluation    Date/Time of Evaluation: 2/18/2025 8:51 AM  Assessment Completed by: Char Rosario    If patient is discharged prior to next notation, then this note serves as note for discharge by case management.    Patient Name: Najma Fitzpatrick                   YOB: 1950  Diagnosis: Shortness of breath [R06.02]  Hypoxia [R09.02]  Pleural effusion on left [J90]  Chronic obstructive pulmonary disease, unspecified COPD type (HCC) [J44.9]                   Date / Time: 2/17/2025  1:31 PM    Patient Admission Status: Inpatient   Readmission Risk (Low < 19, Mod (19-27), High > 27): Readmission Risk Score: 34.5    Current PCP: Geena Sotomayor APRN - CNP  PCP verified by CM? Yes (payam)    Chart Reviewed: Yes      History Provided by: Patient  Patient Orientation: Alert and Oriented    Patient Cognition: Alert    Hospitalization in the last 30 days (Readmission):  Yes    If yes, Readmission Assessment in  Navigator will be completed.    Advance Directives:      Code Status: Full Code   Patient's Primary Decision Maker is: Patient Declined (Legal Next of Kin Remains as Decision Maker)    Primary Decision Maker: Brent Fitzpatrick - Spouse - [phone]    Secondary Decision Maker (Active): Maricel Esquivel - Child - [phone]    Supplemental (Other) Decision Maker: NanetteMargarita yu - Child - [phone]    Discharge Planning:    Patient lives with: Spouse/Significant Other, Children Type of Home: House  Primary Care Giver: Self  Patient Support Systems include: Children, Spouse/Significant Other   Current Financial resources: Medicare  Current community resources: None  Current services prior to admission: Durable Medical Equipment, Oxygen Therapy            Current DME: Walker, Oxygen Therapy (Comment)            Type of Home Care services:  None    ADLS  Prior functional level: Assistance with the following:, Bathing, Dressing, Shopping, Mobility, Cooking,  Housework  Current functional level: Shopping, Mobility, Housework, Cooking, Bathing, Dressing, Assistance with the following:    PT AM-PAC:   /24  OT AM-PAC:   /24    Family can provide assistance at DC: Yes  Would you like Case Management to discuss the discharge plan with any other family members/significant others, and if so, who? Yes ()  Plans to Return to Present Housing: Yes  Other Identified Issues/Barriers to RETURNING to current housing: none  Potential Assistance needed at discharge: Home Care, Skilled Nursing Facility            Potential DME:    Patient expects to discharge to: House  Plan for transportation at discharge:      Financial    Payor: MEDIGOLD / Plan: MEDIGOLD / Product Type: *No Product type* /     Does insurance require precert for SNF: No    Potential assistance Purchasing Medications: No  Meds-to-Beds request: Yes      [facility] [address] [place], [place] - [address] [facility] [phone] - F [phone]  [address] [facility] [address]  [facility] [address]  Phone: [phone] Fax: [phone]    [facility], located within [facility] [id number] [place], [place] - [address] [facility] -  [phone] - F [phone]  [address] [facility] [address]  Phone: [phone] Fax: [phone]      Notes:    Factors facilitating achievement of predicted outcomes: Cooperative    Barriers to discharge: CHF, COPD    Additional Case Management Notes: Reviewed chart and met with pt at beside. From house with  and daughter, plan to return at [place].  helps with ADLs. No HC PTA. Uses walker. O2 via [employer]. No needs identified at this time. CM following.       The Plan for Transition of Care is related to the following treatment goals of Shortness of breath [R06.02]  Hypoxia [R09.02]  Pleural effusion on left [J90]  Chronic obstructive pulmonary disease, unspecified COPD type (HCC) [J44.9]    IF APPLICABLE: The Patient and/or patient representative Najma and her family were provided with a choice of

## 2025-02-18 NOTE — PLAN OF CARE
Problem: Chronic Conditions and Co-morbidities  Goal: Patient's chronic conditions and co-morbidity symptoms are monitored and maintained or improved  Outcome: Progressing     Problem: Discharge Planning  Goal: Discharge to home or other facility with appropriate resources  Outcome: Progressing  Flowsheets  Taken 2/17/2025 2223  Discharge to home or other facility with appropriate resources: Identify barriers to discharge with patient and caregiver  Taken 2/17/2025 2221  Discharge to home or other facility with appropriate resources: Identify barriers to discharge with patient and caregiver     Problem: Pain  Goal: Verbalizes/displays adequate comfort level or baseline comfort level  Outcome: Progressing

## 2025-02-18 NOTE — PROGRESS NOTES
PT sitting in bed awake, complaining of SOB. PT was repositioned, is on 2L NC and took meds. PT does not need anything further at this time.

## 2025-02-18 NOTE — PROGRESS NOTES
RT Inhaler-Nebulizer Bronchodilator Protocol Note    There is a bronchodilator order in the chart from a provider indicating to follow the RT Bronchodilator Protocol and there is an “Initiate RT Inhaler-Nebulizer Bronchodilator Protocol” order as well (see protocol at bottom of note).    CXR Findings:  XR CHEST PORTABLE    Result Date: 2/17/2025  Increased volume of a chronic left pleural effusion with dense airspace changes in the left lower lung zone.       The findings from the last RT Protocol Assessment were as follows:   History Pulmonary Disease: (P) Chronic pulmonary disease  Respiratory Pattern: (P) Dyspnea on exertion or RR 21-25 bpm  Breath Sounds: (P) Slightly diminished and/or crackles  Cough: (P) Strong, spontaneous, non-productive  Indication for Bronchodilator Therapy: (P) Decreased or absent breath sounds  Bronchodilator Assessment Score: (P) 6    Aerosolized bronchodilator medication orders have been revised according to the RT Inhaler-Nebulizer Bronchodilator Protocol below.    Respiratory Therapist to perform RT Therapy Protocol Assessment initially then follow the protocol.  Repeat RT Therapy Protocol Assessment PRN for score 0-3 or on second treatment, BID, and PRN for scores above 3.    No Indications - adjust the frequency to every 6 hours PRN wheezing or bronchospasm, if no treatments needed after 48 hours then discontinue using Per Protocol order mode.     If indication present, adjust the RT bronchodilator orders based on the Bronchodilator Assessment Score as indicated below.  Use Inhaler orders unless patient has one or more of the following: on home nebulizer, not able to hold breath for 10 seconds, is not alert and oriented, cannot activate and use MDI correctly, or respiratory rate 25 breaths per minute or more, then use the equivalent nebulizer order(s) with same Frequency and PRN reasons based on the score.  If a patient is on this medication at home then do not decrease Frequency

## 2025-02-18 NOTE — PROGRESS NOTES
Pt. From ER-10  A/Ox4, on NC @3LPM 02. Transported to W2 Room 211. VSS , weight and height taken. Pt. Assessment completed. Pt. Denies Pain. Bed Alarm on, keep in low position. Keep call light in reach. For further Monitoring needed.

## 2025-02-18 NOTE — ED PROVIDER NOTES
I independently examined and evaluated Najma Fitzpatrick.  I personally saw the patient and performed a substantive portion of the visit including all aspects of the medical decision making.    In brief, this 74-year-old female is presenting with shortness of breath.  Patient states that for having increased shortness of breath yesterday.  She wears 2 L nasal cannula chronically and had to increase to 4 L earlier today due to dyspnea.  Similar symptoms a month ago and had a thoracentesis.  Endorses chest pressure and cough productive of yellow sputum.    Focused exam revealed   General: Alert, no acute distress, patient resting comfortably   Skin: warm, intact, no pallor noted   Head: Normocephalic, atraumatic   Eye: Normal conjunctiva   Cardiac: Normal peripheral perfusion  Respiratory: No acute distress   Musculoskeletal: No deformity, full ROM.   Neurological: alert and oriented, normal sensory and motor observed.   Psychiatric: Cooperative    ED course: Labwork obtained and shows mildly elevated BNP and troponin.  Flu and COVID are negative.  Chest x-ray does show increased size of left pleural effusion.  Due to this and her increased oxygen demand, will admit for continued treatment.    All diagnostic, treatment, and disposition decisions were made by myself in conjunction with the advanced practice provider/resident.    I personally saw the patient and made/approved the management plan and take responsibility for the patient management.     For all further details of the patient's emergency department visit, please see the advanced practice provider's/resident's documentation.     Carson Pablo,   02/18/25 0031

## 2025-02-19 PROBLEM — J90 PLEURAL EFFUSION ON LEFT: Status: ACTIVE | Noted: 2025-02-19

## 2025-02-19 LAB
ANION GAP SERPL CALCULATED.3IONS-SCNC: 8 MMOL/L (ref 3–16)
BASOPHILS # BLD: 0 K/UL (ref 0–0.2)
BASOPHILS NFR BLD: 0.3 %
BUN SERPL-MCNC: 7 MG/DL (ref 7–20)
CALCIUM SERPL-MCNC: 8.4 MG/DL (ref 8.3–10.6)
CHLORIDE SERPL-SCNC: 93 MMOL/L (ref 99–110)
CO2 SERPL-SCNC: 31 MMOL/L (ref 21–32)
CREAT SERPL-MCNC: 0.5 MG/DL (ref 0.6–1.2)
DEPRECATED RDW RBC AUTO: 16.4 % (ref 12.4–15.4)
EOSINOPHIL # BLD: 0 K/UL (ref 0–0.6)
EOSINOPHIL NFR BLD: 0.1 %
ERYTHROCYTE [SEDIMENTATION RATE] IN BLOOD BY WESTERGREN METHOD: 38 MM/HR (ref 0–30)
GFR SERPLBLD CREATININE-BSD FMLA CKD-EPI: >90 ML/MIN/{1.73_M2}
GLUCOSE SERPL-MCNC: 114 MG/DL (ref 70–99)
HCT VFR BLD AUTO: 26.2 % (ref 36–48)
HGB BLD-MCNC: 8.7 G/DL (ref 12–16)
LOEFFLER MB STN SPEC: NORMAL
LYMPHOCYTES # BLD: 1 K/UL (ref 1–5.1)
LYMPHOCYTES NFR BLD: 13.5 %
MCH RBC QN AUTO: 26.1 PG (ref 26–34)
MCHC RBC AUTO-ENTMCNC: 33.1 G/DL (ref 31–36)
MCV RBC AUTO: 78.9 FL (ref 80–100)
MONOCYTES # BLD: 0.6 K/UL (ref 0–1.3)
MONOCYTES NFR BLD: 8.6 %
NEUTROPHILS # BLD: 5.7 K/UL (ref 1.7–7.7)
NEUTROPHILS NFR BLD: 77.5 %
PLATELET # BLD AUTO: 165 K/UL (ref 135–450)
PMV BLD AUTO: 7 FL (ref 5–10.5)
POTASSIUM SERPL-SCNC: 3.6 MMOL/L (ref 3.5–5.1)
RBC # BLD AUTO: 3.33 M/UL (ref 4–5.2)
SODIUM SERPL-SCNC: 132 MMOL/L (ref 136–145)
WBC # BLD AUTO: 7.3 K/UL (ref 4–11)

## 2025-02-19 PROCEDURE — 6370000000 HC RX 637 (ALT 250 FOR IP)

## 2025-02-19 PROCEDURE — 2580000003 HC RX 258: Performed by: INTERNAL MEDICINE

## 2025-02-19 PROCEDURE — 2700000000 HC OXYGEN THERAPY PER DAY

## 2025-02-19 PROCEDURE — 99233 SBSQ HOSP IP/OBS HIGH 50: CPT | Performed by: INTERNAL MEDICINE

## 2025-02-19 PROCEDURE — 6360000002 HC RX W HCPCS: Performed by: INTERNAL MEDICINE

## 2025-02-19 PROCEDURE — 80048 BASIC METABOLIC PNL TOTAL CA: CPT

## 2025-02-19 PROCEDURE — 2500000003 HC RX 250 WO HCPCS

## 2025-02-19 PROCEDURE — 85652 RBC SED RATE AUTOMATED: CPT

## 2025-02-19 PROCEDURE — 36415 COLL VENOUS BLD VENIPUNCTURE: CPT

## 2025-02-19 PROCEDURE — 6360000002 HC RX W HCPCS

## 2025-02-19 PROCEDURE — 94640 AIRWAY INHALATION TREATMENT: CPT

## 2025-02-19 PROCEDURE — 6370000000 HC RX 637 (ALT 250 FOR IP): Performed by: INTERNAL MEDICINE

## 2025-02-19 PROCEDURE — 76937 US GUIDE VASCULAR ACCESS: CPT

## 2025-02-19 PROCEDURE — 1200000000 HC SEMI PRIVATE

## 2025-02-19 PROCEDURE — 82787 IGG 1 2 3 OR 4 EACH: CPT

## 2025-02-19 PROCEDURE — 86038 ANTINUCLEAR ANTIBODIES: CPT

## 2025-02-19 PROCEDURE — 85025 COMPLETE CBC W/AUTO DIFF WBC: CPT

## 2025-02-19 PROCEDURE — 94761 N-INVAS EAR/PLS OXIMETRY MLT: CPT

## 2025-02-19 RX ORDER — ALBUTEROL SULFATE 90 UG/1
2 INHALANT RESPIRATORY (INHALATION) EVERY 4 HOURS PRN
Status: DISCONTINUED | OUTPATIENT
Start: 2025-02-19 | End: 2025-02-20 | Stop reason: HOSPADM

## 2025-02-19 RX ORDER — FUROSEMIDE 10 MG/ML
20 INJECTION INTRAMUSCULAR; INTRAVENOUS 2 TIMES DAILY
Status: DISCONTINUED | OUTPATIENT
Start: 2025-02-19 | End: 2025-02-20 | Stop reason: HOSPADM

## 2025-02-19 RX ORDER — ALBUTEROL SULFATE 0.83 MG/ML
2.5 SOLUTION RESPIRATORY (INHALATION)
Status: DISCONTINUED | OUTPATIENT
Start: 2025-02-19 | End: 2025-02-20

## 2025-02-19 RX ORDER — PREDNISONE 20 MG/1
40 TABLET ORAL DAILY
Status: DISCONTINUED | OUTPATIENT
Start: 2025-02-19 | End: 2025-02-20 | Stop reason: HOSPADM

## 2025-02-19 RX ADMIN — Medication 10 ML: at 21:34

## 2025-02-19 RX ADMIN — ATORVASTATIN CALCIUM 40 MG: 40 TABLET, FILM COATED ORAL at 08:26

## 2025-02-19 RX ADMIN — TIOTROPIUM BROMIDE INHALATION SPRAY 2 PUFF: 3.12 SPRAY, METERED RESPIRATORY (INHALATION) at 08:09

## 2025-02-19 RX ADMIN — SODIUM CHLORIDE 1000 MG: 900 INJECTION INTRAVENOUS at 08:25

## 2025-02-19 RX ADMIN — CARVEDILOL 25 MG: 6.25 TABLET, FILM COATED ORAL at 16:10

## 2025-02-19 RX ADMIN — HYDRALAZINE HYDROCHLORIDE 50 MG: 25 TABLET ORAL at 05:57

## 2025-02-19 RX ADMIN — BUSPIRONE HYDROCHLORIDE 7.5 MG: 7.5 TABLET ORAL at 14:54

## 2025-02-19 RX ADMIN — FAMOTIDINE 20 MG: 20 TABLET, FILM COATED ORAL at 21:32

## 2025-02-19 RX ADMIN — ACETAMINOPHEN 650 MG: 325 TABLET ORAL at 10:56

## 2025-02-19 RX ADMIN — ALBUTEROL SULFATE 2.5 MG: 0.83 SOLUTION RESPIRATORY (INHALATION) at 15:11

## 2025-02-19 RX ADMIN — BUSPIRONE HYDROCHLORIDE 7.5 MG: 7.5 TABLET ORAL at 08:26

## 2025-02-19 RX ADMIN — HYDRALAZINE HYDROCHLORIDE 50 MG: 25 TABLET ORAL at 14:54

## 2025-02-19 RX ADMIN — ENOXAPARIN SODIUM 40 MG: 100 INJECTION SUBCUTANEOUS at 08:25

## 2025-02-19 RX ADMIN — BUSPIRONE HYDROCHLORIDE 7.5 MG: 7.5 TABLET ORAL at 21:32

## 2025-02-19 RX ADMIN — ALBUTEROL SULFATE 2.5 MG: 0.83 SOLUTION RESPIRATORY (INHALATION) at 11:34

## 2025-02-19 RX ADMIN — GUAIFENESIN 600 MG: 600 TABLET, EXTENDED RELEASE ORAL at 21:32

## 2025-02-19 RX ADMIN — LOSARTAN POTASSIUM 100 MG: 100 TABLET, FILM COATED ORAL at 08:26

## 2025-02-19 RX ADMIN — PANTOPRAZOLE SODIUM 40 MG: 40 TABLET, DELAYED RELEASE ORAL at 05:57

## 2025-02-19 RX ADMIN — AMLODIPINE BESYLATE 5 MG: 5 TABLET ORAL at 08:26

## 2025-02-19 RX ADMIN — PREDNISONE 40 MG: 20 TABLET ORAL at 18:20

## 2025-02-19 RX ADMIN — OXYCODONE HYDROCHLORIDE AND ACETAMINOPHEN 1 TABLET: 5; 325 TABLET ORAL at 16:32

## 2025-02-19 RX ADMIN — ASPIRIN 81 MG: 81 TABLET, CHEWABLE ORAL at 08:26

## 2025-02-19 RX ADMIN — FUROSEMIDE 20 MG: 10 INJECTION, SOLUTION INTRAMUSCULAR; INTRAVENOUS at 18:20

## 2025-02-19 RX ADMIN — Medication 10 ML: at 08:37

## 2025-02-19 RX ADMIN — HYDRALAZINE HYDROCHLORIDE 50 MG: 25 TABLET ORAL at 21:32

## 2025-02-19 RX ADMIN — ALBUTEROL SULFATE 2.5 MG: 0.83 SOLUTION RESPIRATORY (INHALATION) at 08:09

## 2025-02-19 RX ADMIN — QUETIAPINE FUMARATE 400 MG: 200 TABLET ORAL at 21:32

## 2025-02-19 RX ADMIN — OXYCODONE HYDROCHLORIDE AND ACETAMINOPHEN 1 TABLET: 5; 325 TABLET ORAL at 05:56

## 2025-02-19 RX ADMIN — GUAIFENESIN 600 MG: 600 TABLET, EXTENDED RELEASE ORAL at 08:26

## 2025-02-19 RX ADMIN — DULOXETINE HYDROCHLORIDE 60 MG: 60 CAPSULE, DELAYED RELEASE ORAL at 08:26

## 2025-02-19 RX ADMIN — WATER 40 MG: 1 INJECTION INTRAMUSCULAR; INTRAVENOUS; SUBCUTANEOUS at 05:57

## 2025-02-19 RX ADMIN — MAGNESIUM OXIDE 400 MG (241.3 MG MAGNESIUM) TABLET 400 MG: TABLET at 08:26

## 2025-02-19 RX ADMIN — FAMOTIDINE 20 MG: 20 TABLET, FILM COATED ORAL at 08:26

## 2025-02-19 RX ADMIN — OXYCODONE HYDROCHLORIDE AND ACETAMINOPHEN 1 TABLET: 5; 325 TABLET ORAL at 21:32

## 2025-02-19 RX ADMIN — CARVEDILOL 25 MG: 6.25 TABLET, FILM COATED ORAL at 08:27

## 2025-02-19 RX ADMIN — TOPIRAMATE 25 MG: 25 TABLET, FILM COATED ORAL at 08:27

## 2025-02-19 ASSESSMENT — PAIN DESCRIPTION - LOCATION
LOCATION: GENERALIZED
LOCATION: GENERALIZED
LOCATION: BACK;ARM;GENERALIZED
LOCATION: GENERALIZED

## 2025-02-19 ASSESSMENT — PAIN DESCRIPTION - PAIN TYPE
TYPE: ACUTE PAIN
TYPE: ACUTE PAIN

## 2025-02-19 ASSESSMENT — PAIN DESCRIPTION - FREQUENCY
FREQUENCY: INTERMITTENT
FREQUENCY: INTERMITTENT

## 2025-02-19 ASSESSMENT — PAIN DESCRIPTION - ONSET
ONSET: ON-GOING
ONSET: GRADUAL

## 2025-02-19 ASSESSMENT — PAIN SCALES - GENERAL
PAINLEVEL_OUTOF10: 7
PAINLEVEL_OUTOF10: 9

## 2025-02-19 ASSESSMENT — PAIN DESCRIPTION - DESCRIPTORS
DESCRIPTORS: ACHING
DESCRIPTORS: ACHING
DESCRIPTORS: DISCOMFORT
DESCRIPTORS: DISCOMFORT

## 2025-02-19 NOTE — PROGRESS NOTES
Patient refused solumedrol medication. Education provided to patient, voiced understanding. Medication refused. Patient did ask for pain medication at this time. Med given see mar

## 2025-02-19 NOTE — PROGRESS NOTES
Assessment completed. Pt does not express any pain or discomfort at this time  Respirations are even & easy. No complaints voiced. Pt denies needs at this time. SR up x 2, and bed in low position. Call light is within reach.    Bedside Mobility Assessment Tool (BMAT):     Assessment Level 1- Sit and Shake    1. From a semi-reclined position, ask patient to sit up and rotate to a seated position at the side of the bed. Can use the bedrail.    2. Ask patient to reach out and grab your hand and shake making sure patient reaches across his/her midline.   Pass- Patient is able to come to a seated position, maintain core strength. Maintains seated balance while reaching across midline. Move on to Assessment Level 2.     Assessment Level 2- Stretch and Point   1. With patient in seated position at the side of the bed, have patient place both feet on the floor (or stool) with knees no higher than hips.    2. Ask patient to stretch one leg and straighten the knee, then bend the ankle/flex and point the toes. If appropriate, repeat with the other leg.   Pass- Patient is able to demonstrate appropriate quad strength on intended weight bearing limb(s). Move onto Assessment Level 3.     Assessment Level 3- Stand   1. Ask patient to elevate off the bed or chair (seated to standing) using an assistive device (cane, bedrail).    2. Patient should be able to raise buttocks off be and hold for a count of five. May repeat once.   Fail- Patient unable to demonstrate standing stability. Patient is MOBILITY LEVEL 3.     Assessment Level 4- Walk   1. Ask patient to march in place at bedside.    2. Then ask patient to advance step and return each foot. Some medical conditions may render a patient from stepping backwards, use your best clinical judgement.   Fail- Patient not able to complete tasks OR requires use of assistive device. Patient is MOBILITY LEVEL 3.       Mobility Level- 2

## 2025-02-19 NOTE — PLAN OF CARE
Problem: Chronic Conditions and Co-morbidities  Goal: Patient's chronic conditions and co-morbidity symptoms are monitored and maintained or improved  2/19/2025 1013 by Ayesha Villarreal RN  Outcome: Progressing  2/19/2025 0807 by Ayesha Villarreal RN  Outcome: Progressing  2/19/2025 0104 by Lorraine Edgar RN  Outcome: Progressing     Problem: Discharge Planning  Goal: Discharge to home or other facility with appropriate resources  2/19/2025 1013 by Ayesha Villarreal RN  Outcome: Progressing  2/19/2025 0807 by Ayesha Villarreal RN  Outcome: Progressing  2/19/2025 0104 by Lorraine Edgar RN  Outcome: Progressing     Problem: Pain  Goal: Verbalizes/displays adequate comfort level or baseline comfort level  2/19/2025 1013 by Ayesha Villarreal RN  Outcome: Progressing  2/19/2025 0807 by Ayesha Villarreal RN  Outcome: Progressing  2/19/2025 0104 by Lorraine Edgar RN  Outcome: Progressing     Problem: Safety - Adult  Goal: Free from fall injury  2/19/2025 1013 by Ayesha Villarreal RN  Outcome: Progressing  2/19/2025 0807 by Ayesha Villarreal RN  Outcome: Progressing  2/19/2025 0104 by Lorraine Edgar RN  Outcome: Progressing     Problem: ABCDS Injury Assessment  Goal: Absence of physical injury  2/19/2025 1013 by Ayesha Villarreal RN  Outcome: Progressing  2/19/2025 0807 by Ayesha Villarreal RN  Outcome: Progressing  2/19/2025 0104 by Lorraine Edgar RN  Outcome: Progressing     Problem: Skin/Tissue Integrity  Goal: Skin integrity remains intact  Description: 1.  Monitor for areas of redness and/or skin breakdown  2.  Assess vascular access sites hourly  3.  Every 4-6 hours minimum:  Change oxygen saturation probe site  4.  Every 4-6 hours:  If on nasal continuous positive airway pressure, respiratory therapy assess nares and determine need for appliance change or resting period  2/19/2025 1013 by Ayesha Villarreal RN  Outcome: Progressing  2/19/2025 0807 by Ayesha Villarreal RN  Outcome: Progressing  2/19/2025 0104 by Lorraine Edgar RN  Outcome:

## 2025-02-19 NOTE — PROGRESS NOTES
Shift assessment complete; see flow sheet.  Scheduled medications administered; See MAR.  IV Flushed without difficulty. O2 2 LPM nc in place. Pt denies pain at this time. Pt denies any needs at this time. Pt educated on use of call light and to call out with needs, verbalized understanding, bed in low locked position for pt safety

## 2025-02-19 NOTE — PROGRESS NOTES
IM Progress Note    Admit Date:  2/17/2025    Admitted with shortness of breath COPD AE   Pleural effusion      Seen by pulm .   S/P left thoracentesis on 2/18    Subjective:  Ms. Fitzpatrick seen .   SOB better.   No chest pain now    Sats stable       Objective:   Patient Vitals for the past 4 hrs:   BP Temp Temp src Pulse Resp SpO2   02/19/25 1511 -- -- -- -- -- 96 %   02/19/25 1400 (!) 150/77 97.5 °F (36.4 °C) Oral 77 17 98 %          Intake/Output Summary (Last 24 hours) at 2/19/2025 1706  Last data filed at 2/19/2025 0742  Gross per 24 hour   Intake --   Output 850 ml   Net -850 ml       Physical Exam:  Gen: Awake,  no distress,  well-oriented  Eyes: PERRL. No sclera icterus. No conjunctival injection.   ENT: No discharge. Pharynx clear.   Neck: No JVD.  Trachea midline.  Resp: No accessory muscle use. . Diminished breath sounds,   good air entry bilaterally .  no crackles. No wheezes. No rhonchi.   CV: Regular rate. Regular rhythm. No murmur.  No rub. No edema.   Capillary Refill: Brisk,< 3 seconds   Peripheral Pulses: +2 palpable, equal bilaterally   GI: Non-tender. Non-distended.  Normal bowel sounds.  Skin: Warm and dry. No nodule on exposed extremities. No rash on exposed extremities.   M/S: No cyanosis. No joint deformity. No clubbing.   Neuro: Awake. Grossly nonfocal    Psych: Oriented x 3. No anxiety or agitation.      Scheduled Meds:   albuterol  2.5 mg Nebulization 4x Daily RT    predniSONE  40 mg Oral Daily    furosemide  20 mg IntraVENous BID    cefTRIAXone (ROCEPHIN) IV  1,000 mg IntraVENous Q24H    magnesium oxide  400 mg Oral Daily    sodium chloride flush  5-40 mL IntraVENous 2 times per day    enoxaparin  40 mg SubCUTAneous Daily    amLODIPine  5 mg Oral Daily    aspirin  81 mg Oral Daily    atorvastatin  40 mg Oral Daily    busPIRone  7.5 mg Oral TID    carvedilol  25 mg Oral BID WC    DULoxetine  60 mg Oral Daily    famotidine  20 mg Oral BID    hydrALAZINE  50 mg Oral 3 times per day       Increased volume of a chronic left pleural effusion with dense airspace   changes in the left lower lung zone.            09/02/24    ECHO (TTE) COMPLETE (PRN CONTRAST/BUBBLE/STRAIN/3D) 09/03/2024  5:36 PM (Final)    Interpretation Summary    Image quality is adequate.    Left Ventricle: Normal left ventricular systolic function with a visually estimated EF of 60 - 65%. Left ventricle size is normal. Mildly increased wall thickness. Normal wall motion. Grade I diastolic dysfunction with normal LAP.    Right Ventricle: Right ventricle size is normal. Normal systolic function. TAPSE is 3.0 cm. RV Peak S' is 20 cm/s.    Tricuspid Valve: Unable to assess RVSP due to inadequate or insignificant tricuspid regurgitation.     Latest Reference Range & Units 02/18/25 11:34   Source + CELL CT/DIFF-BF  Pleural   Appearance, Fluid  Hazy   Color, Fluid  Pale Yellow   RBC, Fluid /cumm 1,400   Lymphocytes, Body Fluid % 35   Eos, Fluid % 28   Basos, Fluid % 5   Fluid Type  Pleural   Glucose, Fluid Not Established mg/dL 139.0   LD, Fluid Not Established U/L 111   Neutrophil Count, Fluid % 10   Nucl Cell, Fluid /cumm 864   Protein, Fluid Not Established g/dL 2.5   Clot Eval.  see below   Number of Cells Counted Fluid  100   Macrophages % 22           Assessment/Plan:    Recurrent left pleural effusion.  COPD with acute exacerbation.  Chronic hypoxic respiratory failure.  - seen by pulmonology .  -sats stable at  baseline, 2 L o2   - s/p thoracentesis 1/10/2025 with 600mL removed.  S/P left thoracentesis this admit on 2/18 , 1000 ml drained   - pleural studies - cultures, cytology pending. Work up for eosinophillic effusion.  Transudative- r/o CHF  -sputum cultures pending.   -continue 40mg solumedrol q12 hours.  -duonebs and albuterol.  -s/p 40mg IV Lasix in ED,  redose with IV lasix .  Rpt ECHO  -continuous pulse ox monitoring.     Chest pain  Pleuritic   Due to above  - prn morphine/ percocet ordered      Chronic diastolic CHF.

## 2025-02-19 NOTE — PROGRESS NOTES
[facility]  Ultrasound Guided Peripheral Insertion Procedure Note.     Najma Fitzpatrick   Admitted- 2/17/2025  1:31 PM  Admission diagnosis- Shortness of breath [R06.02]  Hypoxia [R09.02]  Pleural effusion on left [J90]  Chronic obstructive pulmonary disease, unspecified COPD type (HCC) [J44.9]      Attending Physician- Brad Cheung MD  Ordering Physician- Dr. Brad Cheung  Indication for Insertion: Limited Access    USG PIV placed to left AC using sterile technique. Brisk blood return noted, line flushes with ease. Patient tolerated well. Bed returned to lowest position, call light within reach. See US images below.

## 2025-02-19 NOTE — CARE COORDINATION
Reviewed chart, met with pt bedside, agreeable to SNF. Pt chooses [facility], spoke with [facility], they are not in network. Will get more choices. CM following.    1000- Next choice is [facility].    1035 - Spoke with [facility] from [facility], they are not accepting [employer] at this time. Pt updated and states she will be returning home at [place]. MD aware. Pt refusing to work with therapy.

## 2025-02-19 NOTE — PROGRESS NOTES
RT Inhaler-Nebulizer Bronchodilator Protocol Note    There is a bronchodilator order in the chart from a provider indicating to follow the RT Bronchodilator Protocol and there is an “Initiate RT Inhaler-Nebulizer Bronchodilator Protocol” order as well (see protocol at bottom of note).    CXR Findings:  XR CHEST PORTABLE    Result Date: 2/18/2025  Significant decrease in left basilar pleural effusion otherwise no significant interval change.  No postprocedural pneumothorax.     XR CHEST PORTABLE    Result Date: 2/17/2025  Increased volume of a chronic left pleural effusion with dense airspace changes in the left lower lung zone.       The findings from the last RT Protocol Assessment were as follows:   History Pulmonary Disease: Chronic pulmonary disease  Respiratory Pattern: Dyspnea on exertion or RR 21-25 bpm  Breath Sounds: Slightly diminished and/or crackles  Cough: Strong, spontaneous, non-productive  Indication for Bronchodilator Therapy: Decreased or absent breath sounds  Bronchodilator Assessment Score: 6    Aerosolized bronchodilator medication orders have been revised according to the RT Inhaler-Nebulizer Bronchodilator Protocol below.    Respiratory Therapist to perform RT Therapy Protocol Assessment initially then follow the protocol.  Repeat RT Therapy Protocol Assessment PRN for score 0-3 or on second treatment, BID, and PRN for scores above 3.    No Indications - adjust the frequency to every 6 hours PRN wheezing or bronchospasm, if no treatments needed after 48 hours then discontinue using Per Protocol order mode.     If indication present, adjust the RT bronchodilator orders based on the Bronchodilator Assessment Score as indicated below.  Use Inhaler orders unless patient has one or more of the following: on home nebulizer, not able to hold breath for 10 seconds, is not alert and oriented, cannot activate and use MDI correctly, or respiratory rate 25 breaths per minute or more, then use the

## 2025-02-19 NOTE — PLAN OF CARE
Problem: Chronic Conditions and Co-morbidities  Goal: Patient's chronic conditions and co-morbidity symptoms are monitored and maintained or improved  2/19/2025 1030 by Ayesha Villarreal RN  Outcome: Progressing  2/19/2025 1013 by Ayesha Villarreal RN  Outcome: Progressing  2/19/2025 0807 by Ayesha Villarreal RN  Outcome: Progressing  2/19/2025 0104 by Lorraine Edgar RN  Outcome: Progressing     Problem: Discharge Planning  Goal: Discharge to home or other facility with appropriate resources  2/19/2025 1030 by Ayesha Villarreal RN  Outcome: Progressing  2/19/2025 1013 by Ayesha Villarreal RN  Outcome: Progressing  2/19/2025 0807 by Ayesha Villarreal RN  Outcome: Progressing  2/19/2025 0104 by Lorraine Edgar RN  Outcome: Progressing     Problem: Pain  Goal: Verbalizes/displays adequate comfort level or baseline comfort level  2/19/2025 1030 by Ayesha Villarreal RN  Outcome: Progressing  2/19/2025 1013 by Ayesha Villarreal RN  Outcome: Progressing  2/19/2025 0807 by Ayesha Villarreal RN  Outcome: Progressing  2/19/2025 0104 by Lorraine Edgar RN  Outcome: Progressing     Problem: Safety - Adult  Goal: Free from fall injury  2/19/2025 1030 by Ayesha Villarreal RN  Outcome: Progressing  2/19/2025 1013 by Ayesha Villarreal RN  Outcome: Progressing  2/19/2025 0807 by Ayesha Villarreal RN  Outcome: Progressing  2/19/2025 0104 by Lorraine Edgar RN  Outcome: Progressing     Problem: ABCDS Injury Assessment  Goal: Absence of physical injury  2/19/2025 1030 by Ayesha Villarreal RN  Outcome: Progressing  2/19/2025 1013 by Ayesha Villarreal RN  Outcome: Progressing  2/19/2025 0807 by Ayesha Villarreal RN  Outcome: Progressing  2/19/2025 0104 by Lorraine Edgar RN  Outcome: Progressing     Problem: Skin/Tissue Integrity  Goal: Skin integrity remains intact  Description: 1.  Monitor for areas of redness and/or skin breakdown  2.  Assess vascular access sites hourly  3.  Every 4-6 hours minimum:  Change oxygen saturation probe site  4.  Every 4-6 hours:  If on

## 2025-02-19 NOTE — PROGRESS NOTES
Handoff report and transfer of care given at bedside to  Arlene RN.  Patient in stable condition, denies needs/concerns at this time.  Call light within reach.

## 2025-02-19 NOTE — PLAN OF CARE
Problem: Chronic Conditions and Co-morbidities  Goal: Patient's chronic conditions and co-morbidity symptoms are monitored and maintained or improved  2/19/2025 0807 by Ayesha Villarreal RN  Outcome: Progressing  2/19/2025 0104 by Lorraine Edgar RN  Outcome: Progressing     Problem: Discharge Planning  Goal: Discharge to home or other facility with appropriate resources  2/19/2025 0807 by Ayesha Villarreal RN  Outcome: Progressing  2/19/2025 0104 by Lorraine Edgar RN  Outcome: Progressing     Problem: Pain  Goal: Verbalizes/displays adequate comfort level or baseline comfort level  2/19/2025 0807 by Ayesha Villarreal RN  Outcome: Progressing  2/19/2025 0104 by Lorraine Edgar RN  Outcome: Progressing     Problem: Safety - Adult  Goal: Free from fall injury  2/19/2025 0807 by Ayesha Villarreal RN  Outcome: Progressing  2/19/2025 0104 by Lorraine Edgar RN  Outcome: Progressing     Problem: ABCDS Injury Assessment  Goal: Absence of physical injury  2/19/2025 0807 by Ayesha Villarreal RN  Outcome: Progressing  2/19/2025 0104 by Lorraine Edgar RN  Outcome: Progressing     Problem: Skin/Tissue Integrity  Goal: Skin integrity remains intact  Description: 1.  Monitor for areas of redness and/or skin breakdown  2.  Assess vascular access sites hourly  3.  Every 4-6 hours minimum:  Change oxygen saturation probe site  4.  Every 4-6 hours:  If on nasal continuous positive airway pressure, respiratory therapy assess nares and determine need for appliance change or resting period  2/19/2025 0807 by Ayesha Villarreal RN  Outcome: Progressing  2/19/2025 0104 by Lorraine Edgar RN  Outcome: Progressing

## 2025-02-19 NOTE — PLAN OF CARE
Problem: Chronic Conditions and Co-morbidities  Goal: Patient's chronic conditions and co-morbidity symptoms are monitored and maintained or improved  2/19/2025 0104 by Lorraine Edgar RN  Outcome: Progressing  2/18/2025 1223 by Maryuri Orourke  Outcome: Progressing     Problem: Discharge Planning  Goal: Discharge to home or other facility with appropriate resources  2/19/2025 0104 by Lorraine Edgar RN  Outcome: Progressing  2/18/2025 1223 by Maryuri Orourke  Outcome: Progressing     Problem: Pain  Goal: Verbalizes/displays adequate comfort level or baseline comfort level  2/19/2025 0104 by Lorraine Edgar RN  Outcome: Progressing  2/18/2025 1223 by Maryuri Orourke  Outcome: Progressing     Problem: Safety - Adult  Goal: Free from fall injury  2/19/2025 0104 by Lorraine Edgar RN  Outcome: Progressing  2/18/2025 1223 by Maryuri Orourke  Outcome: Progressing     Problem: ABCDS Injury Assessment  Goal: Absence of physical injury  2/19/2025 0104 by Lorraine Edgar RN  Outcome: Progressing  2/18/2025 1223 by Maryuri Orourke  Outcome: Progressing     Problem: Skin/Tissue Integrity  Goal: Skin integrity remains intact  Description: 1.  Monitor for areas of redness and/or skin breakdown  2.  Assess vascular access sites hourly  3.  Every 4-6 hours minimum:  Change oxygen saturation probe site  4.  Every 4-6 hours:  If on nasal continuous positive airway pressure, respiratory therapy assess nares and determine need for appliance change or resting period  2/19/2025 0104 by Lorraine Edgar RN  Outcome: Progressing  2/18/2025 1223 by Maryuri Orourke  Outcome: Progressing  Flowsheets  Taken 2/18/2025 0916 by Maryuri Orourke  Skin Integrity Remains Intact: Monitor for areas of redness and/or skin breakdown  Taken 2/18/2025 0132 by Estephania Suarez RN  Skin Integrity Remains Intact: Monitor for areas of redness and/or skin breakdown

## 2025-02-19 NOTE — PROGRESS NOTES
Inpatient Occupational Therapy Evaluation & Treatment    Order received and chart reviewed. Attempted evaluation this morning. Patient reports feeling upset about her insurance. Patient wanted to go to a specific nursing home for rehab but her insurance does not cover it. Patient is not interested in looking into other facilities and would prefer to just go home. Patient declined participation in therapy evaluation. OT will follow up later this date or tomorrow if appropriate.       Kely Romero, OTR/L #[id number]

## 2025-02-19 NOTE — PROGRESS NOTES
[facility]                                 Pulmonary/Critical care  Consult /Progress Note :                                                                  CC : Worsening shortness of breath  Patient is being seen at the request of Awa Cleary for a consultation for acute COPD exacerbation    Subjective   Doing better  No chest pain   SOB has improved   No fever or chills      PHYSICAL EXAM:  Vitals:    02/19/25 0815   BP: 135/79   Pulse: 88   Resp: 17   Temp: 97.9 °F (36.6 °C)   SpO2: 98%     Gen: No distress.   Eyes: PERRL. No sclera icterus. No conjunctival injection.   ENT: No discharge. Pharynx clear.   Neck: Trachea midline. No obvious mass.    Resp: Rhonchi bilaterally  CV: Regular rate. Regular rhythm. No murmur or rub. No edema. Peripheral pulses are 2+.  Capillary refill is less than 3 seconds.  GI: Non-tender. Non-distended. No hernia.   Skin: Warm and dry. No nodule on exposed extremities.   Lymph: No cervical LAD. No supraclavicular LAD.   M/S: No cyanosis. No joint deformity. No clubbing.   Neuro: Awake. Alert. Moves all four extremities.   Psych: Oriented x 3. No anxiety.     LABS:  CBC:   Recent Labs     02/17/25  1425 02/18/25  0612 02/19/25  0630   WBC 7.6 6.6 7.3   HGB 9.4* 8.6* 8.7*   HCT 29.4* 26.2* 26.2*   MCV 81.4 79.1* 78.9*    152 165     BMP:   Recent Labs     02/17/25  1425 02/18/25  0612 02/19/25  0631   * 131* 132*   K 3.5 3.5 3.6   CL 92* 91* 93*   CO2 31 31 31   BUN 5* 6* 7   CREATININE 0.4* 0.4* 0.5*     LIVER PROFILE:   Recent Labs     02/17/25  1425   AST 12*   ALT 8*   BILITOT 0.3   ALKPHOS 100     PT/INR: No results for input(s): \"PROTIME\", \"INR\" in the last 72 hours.  APTT: No results for input(s): \"APTT\" in the last 72 hours.  UA:  No results for input(s): \"NITRITE\", \"COLORU\", \"PHUR\", \"LABCAST\", \"WBCUA\", \"RBCUA\", \"MUCUS\", \"TRICHOMONAS\", \"YEAST\", \"BACTERIA\", \"CLARITYU\",

## 2025-02-20 ENCOUNTER — APPOINTMENT (OUTPATIENT)
Age: 75
DRG: 187 | End: 2025-02-20
Attending: INTERNAL MEDICINE
Payer: COMMERCIAL

## 2025-02-20 VITALS
WEIGHT: 174 LBS | DIASTOLIC BLOOD PRESSURE: 68 MMHG | SYSTOLIC BLOOD PRESSURE: 138 MMHG | BODY MASS INDEX: 32.02 KG/M2 | TEMPERATURE: 97.7 F | HEART RATE: 86 BPM | OXYGEN SATURATION: 96 % | RESPIRATION RATE: 20 BRPM | HEIGHT: 62 IN

## 2025-02-20 LAB
ANA SER QL IA: NEGATIVE
ANION GAP SERPL CALCULATED.3IONS-SCNC: 10 MMOL/L (ref 3–16)
BASOPHILS # BLD: 0 K/UL (ref 0–0.2)
BASOPHILS NFR BLD: 0.3 %
BUN SERPL-MCNC: 10 MG/DL (ref 7–20)
CALCIUM SERPL-MCNC: 8.4 MG/DL (ref 8.3–10.6)
CHLORIDE SERPL-SCNC: 93 MMOL/L (ref 99–110)
CO2 SERPL-SCNC: 28 MMOL/L (ref 21–32)
CREAT SERPL-MCNC: 0.5 MG/DL (ref 0.6–1.2)
DEPRECATED RDW RBC AUTO: 16.5 % (ref 12.4–15.4)
ECHO AO ROOT DIAM: 3.2 CM
ECHO AO ROOT INDEX: 1.78 CM/M2
ECHO BSA: 1.86 M2
ECHO IVC PROX: 1.2 CM
ECHO LA AREA 2C: 14.1 CM2
ECHO LA AREA 4C: 14.4 CM2
ECHO LA MAJOR AXIS: 5.7 CM
ECHO LA MINOR AXIS: 5.1 CM
ECHO LA VOL BP: 32 ML (ref 22–52)
ECHO LA VOL MOD A2C: 32 ML (ref 22–52)
ECHO LA VOL MOD A4C: 29 ML (ref 22–52)
ECHO LA VOL/BSA BIPLANE: 18 ML/M2 (ref 16–34)
ECHO LA VOLUME INDEX MOD A2C: 18 ML/M2 (ref 16–34)
ECHO LA VOLUME INDEX MOD A4C: 16 ML/M2 (ref 16–34)
ECHO LV E' LATERAL VELOCITY: 6.53 CM/S
ECHO LV EDV 3D: 102 ML
ECHO LV EDV A2C: 42 ML
ECHO LV EDV A4C: 49 ML
ECHO LV EDV INDEX 3D: 57 ML/M2
ECHO LV EDV INDEX A4C: 27 ML/M2
ECHO LV EDV NDEX A2C: 23 ML/M2
ECHO LV EF PHYSICIAN: 65 %
ECHO LV EJECTION FRACTION 3D: 65 %
ECHO LV EJECTION FRACTION A2C: 60 %
ECHO LV EJECTION FRACTION A4C: 67 %
ECHO LV EJECTION FRACTION BIPLANE: 63 % (ref 55–100)
ECHO LV ESV 3D: 36 ML
ECHO LV ESV A2C: 17 ML
ECHO LV ESV A4C: 17 ML
ECHO LV ESV INDEX 3D: 20 ML/M2
ECHO LV ESV INDEX A2C: 9 ML/M2
ECHO LV ESV INDEX A4C: 9 ML/M2
ECHO LV FRACTIONAL SHORTENING: 34 % (ref 28–44)
ECHO LV INTERNAL DIMENSION DIASTOLE INDEX: 2.61 CM/M2
ECHO LV INTERNAL DIMENSION DIASTOLIC: 4.7 CM (ref 3.9–5.3)
ECHO LV INTERNAL DIMENSION SYSTOLIC INDEX: 1.72 CM/M2
ECHO LV INTERNAL DIMENSION SYSTOLIC: 3.1 CM
ECHO LV IVSD: 1.1 CM (ref 0.6–0.9)
ECHO LV MASS 2D: 199.6 G (ref 67–162)
ECHO LV MASS INDEX 2D: 110.9 G/M2 (ref 43–95)
ECHO LV POSTERIOR WALL DIASTOLIC: 1.2 CM (ref 0.6–0.9)
ECHO LV RELATIVE WALL THICKNESS RATIO: 0.51
ECHO MV MAX VELOCITY: 1.6 M/S
ECHO MV MEAN GRADIENT: 4 MMHG
ECHO MV MEAN VELOCITY: 0.9 M/S
ECHO MV PEAK GRADIENT: 10 MMHG
ECHO MV VTI: 30.8 CM
ECHO RV TAPSE: 2.4 CM (ref 1.7–?)
EOSINOPHIL # BLD: 0 K/UL (ref 0–0.6)
EOSINOPHIL NFR BLD: 0 %
GFR SERPLBLD CREATININE-BSD FMLA CKD-EPI: >90 ML/MIN/{1.73_M2}
GLUCOSE SERPL-MCNC: 119 MG/DL (ref 70–99)
HCT VFR BLD AUTO: 28.5 % (ref 36–48)
HGB BLD-MCNC: 9 G/DL (ref 12–16)
LYMPHOCYTES # BLD: 0.6 K/UL (ref 1–5.1)
LYMPHOCYTES NFR BLD: 9.8 %
MCH RBC QN AUTO: 25.6 PG (ref 26–34)
MCHC RBC AUTO-ENTMCNC: 31.7 G/DL (ref 31–36)
MCV RBC AUTO: 80.9 FL (ref 80–100)
MONOCYTES # BLD: 0.4 K/UL (ref 0–1.3)
MONOCYTES NFR BLD: 6.6 %
NEUTROPHILS # BLD: 5.1 K/UL (ref 1.7–7.7)
NEUTROPHILS NFR BLD: 83.3 %
PLATELET # BLD AUTO: 158 K/UL (ref 135–450)
PMV BLD AUTO: 6.9 FL (ref 5–10.5)
POTASSIUM SERPL-SCNC: 4.3 MMOL/L (ref 3.5–5.1)
RBC # BLD AUTO: 3.53 M/UL (ref 4–5.2)
SODIUM SERPL-SCNC: 131 MMOL/L (ref 136–145)
WBC # BLD AUTO: 6.2 K/UL (ref 4–11)

## 2025-02-20 PROCEDURE — 99232 SBSQ HOSP IP/OBS MODERATE 35: CPT | Performed by: INTERNAL MEDICINE

## 2025-02-20 PROCEDURE — 6370000000 HC RX 637 (ALT 250 FOR IP): Performed by: INTERNAL MEDICINE

## 2025-02-20 PROCEDURE — 93308 TTE F-UP OR LMTD: CPT | Performed by: INTERNAL MEDICINE

## 2025-02-20 PROCEDURE — 97166 OT EVAL MOD COMPLEX 45 MIN: CPT

## 2025-02-20 PROCEDURE — 2580000003 HC RX 258: Performed by: INTERNAL MEDICINE

## 2025-02-20 PROCEDURE — 6360000002 HC RX W HCPCS: Performed by: INTERNAL MEDICINE

## 2025-02-20 PROCEDURE — 80048 BASIC METABOLIC PNL TOTAL CA: CPT

## 2025-02-20 PROCEDURE — 93325 DOPPLER ECHO COLOR FLOW MAPG: CPT | Performed by: INTERNAL MEDICINE

## 2025-02-20 PROCEDURE — 97162 PT EVAL MOD COMPLEX 30 MIN: CPT

## 2025-02-20 PROCEDURE — 85025 COMPLETE CBC W/AUTO DIFF WBC: CPT

## 2025-02-20 PROCEDURE — 6370000000 HC RX 637 (ALT 250 FOR IP)

## 2025-02-20 PROCEDURE — 99238 HOSP IP/OBS DSCHRG MGMT 30/<: CPT | Performed by: INTERNAL MEDICINE

## 2025-02-20 PROCEDURE — 97535 SELF CARE MNGMENT TRAINING: CPT

## 2025-02-20 PROCEDURE — 94640 AIRWAY INHALATION TREATMENT: CPT

## 2025-02-20 PROCEDURE — 97530 THERAPEUTIC ACTIVITIES: CPT

## 2025-02-20 PROCEDURE — 93321 DOPPLER ECHO F-UP/LMTD STD: CPT | Performed by: INTERNAL MEDICINE

## 2025-02-20 PROCEDURE — 2700000000 HC OXYGEN THERAPY PER DAY

## 2025-02-20 PROCEDURE — 94761 N-INVAS EAR/PLS OXIMETRY MLT: CPT

## 2025-02-20 PROCEDURE — 6360000002 HC RX W HCPCS

## 2025-02-20 PROCEDURE — 36415 COLL VENOUS BLD VENIPUNCTURE: CPT

## 2025-02-20 PROCEDURE — 93325 DOPPLER ECHO COLOR FLOW MAPG: CPT

## 2025-02-20 PROCEDURE — 93321 DOPPLER ECHO F-UP/LMTD STD: CPT

## 2025-02-20 RX ORDER — FUROSEMIDE 20 MG/1
20 TABLET ORAL DAILY
Qty: 30 TABLET | Refills: 0 | Status: ON HOLD | OUTPATIENT
Start: 2025-02-20 | End: 2025-02-27

## 2025-02-20 RX ORDER — CEFDINIR 300 MG/1
300 CAPSULE ORAL 2 TIMES DAILY
Qty: 10 CAPSULE | Refills: 0 | Status: SHIPPED | OUTPATIENT
Start: 2025-02-20 | End: 2025-02-27 | Stop reason: HOSPADM

## 2025-02-20 RX ORDER — HYDROCODONE BITARTRATE AND ACETAMINOPHEN 5; 325 MG/1; MG/1
1 TABLET ORAL EVERY 6 HOURS PRN
Qty: 12 TABLET | Refills: 0 | Status: SHIPPED | OUTPATIENT
Start: 2025-02-20 | End: 2025-02-23

## 2025-02-20 RX ORDER — PREDNISONE 10 MG/1
TABLET ORAL
Qty: 30 TABLET | Refills: 0 | Status: ON HOLD | OUTPATIENT
Start: 2025-02-20 | End: 2025-02-27 | Stop reason: HOSPADM

## 2025-02-20 RX ORDER — ALBUTEROL SULFATE 0.83 MG/ML
2.5 SOLUTION RESPIRATORY (INHALATION)
Status: DISCONTINUED | OUTPATIENT
Start: 2025-02-20 | End: 2025-02-20 | Stop reason: HOSPADM

## 2025-02-20 RX ADMIN — ATORVASTATIN CALCIUM 40 MG: 40 TABLET, FILM COATED ORAL at 08:31

## 2025-02-20 RX ADMIN — SODIUM CHLORIDE 1000 MG: 900 INJECTION INTRAVENOUS at 08:35

## 2025-02-20 RX ADMIN — ASPIRIN 81 MG: 81 TABLET, CHEWABLE ORAL at 08:31

## 2025-02-20 RX ADMIN — AMLODIPINE BESYLATE 5 MG: 5 TABLET ORAL at 08:31

## 2025-02-20 RX ADMIN — PREDNISONE 40 MG: 20 TABLET ORAL at 08:31

## 2025-02-20 RX ADMIN — OXYCODONE HYDROCHLORIDE AND ACETAMINOPHEN 1 TABLET: 5; 325 TABLET ORAL at 08:31

## 2025-02-20 RX ADMIN — TOPIRAMATE 25 MG: 25 TABLET, FILM COATED ORAL at 08:32

## 2025-02-20 RX ADMIN — DULOXETINE HYDROCHLORIDE 60 MG: 60 CAPSULE, DELAYED RELEASE ORAL at 08:31

## 2025-02-20 RX ADMIN — PANTOPRAZOLE SODIUM 40 MG: 40 TABLET, DELAYED RELEASE ORAL at 05:07

## 2025-02-20 RX ADMIN — ALBUTEROL SULFATE 2.5 MG: 0.83 SOLUTION RESPIRATORY (INHALATION) at 11:39

## 2025-02-20 RX ADMIN — GUAIFENESIN 600 MG: 600 TABLET, EXTENDED RELEASE ORAL at 08:31

## 2025-02-20 RX ADMIN — MAGNESIUM OXIDE 400 MG (241.3 MG MAGNESIUM) TABLET 400 MG: TABLET at 08:30

## 2025-02-20 RX ADMIN — OXYCODONE HYDROCHLORIDE AND ACETAMINOPHEN 1 TABLET: 5; 325 TABLET ORAL at 05:08

## 2025-02-20 RX ADMIN — BUSPIRONE HYDROCHLORIDE 7.5 MG: 7.5 TABLET ORAL at 13:12

## 2025-02-20 RX ADMIN — TIOTROPIUM BROMIDE INHALATION SPRAY 2 PUFF: 3.12 SPRAY, METERED RESPIRATORY (INHALATION) at 07:58

## 2025-02-20 RX ADMIN — ENOXAPARIN SODIUM 40 MG: 100 INJECTION SUBCUTANEOUS at 08:30

## 2025-02-20 RX ADMIN — CARVEDILOL 25 MG: 6.25 TABLET, FILM COATED ORAL at 08:31

## 2025-02-20 RX ADMIN — OXYCODONE HYDROCHLORIDE AND ACETAMINOPHEN 1 TABLET: 5; 325 TABLET ORAL at 13:12

## 2025-02-20 RX ADMIN — FUROSEMIDE 20 MG: 10 INJECTION, SOLUTION INTRAMUSCULAR; INTRAVENOUS at 08:32

## 2025-02-20 RX ADMIN — LOSARTAN POTASSIUM 100 MG: 100 TABLET, FILM COATED ORAL at 08:31

## 2025-02-20 RX ADMIN — BUSPIRONE HYDROCHLORIDE 7.5 MG: 7.5 TABLET ORAL at 08:32

## 2025-02-20 RX ADMIN — HYDRALAZINE HYDROCHLORIDE 50 MG: 25 TABLET ORAL at 05:07

## 2025-02-20 RX ADMIN — HYDRALAZINE HYDROCHLORIDE 50 MG: 25 TABLET ORAL at 13:12

## 2025-02-20 RX ADMIN — FAMOTIDINE 20 MG: 20 TABLET, FILM COATED ORAL at 08:31

## 2025-02-20 RX ADMIN — ALBUTEROL SULFATE 2.5 MG: 0.83 SOLUTION RESPIRATORY (INHALATION) at 07:58

## 2025-02-20 ASSESSMENT — PAIN DESCRIPTION - ORIENTATION
ORIENTATION: LEFT
ORIENTATION: LEFT

## 2025-02-20 ASSESSMENT — PAIN SCALES - GENERAL
PAINLEVEL_OUTOF10: 9
PAINLEVEL_OUTOF10: 5
PAINLEVEL_OUTOF10: 4
PAINLEVEL_OUTOF10: 9
PAINLEVEL_OUTOF10: 4

## 2025-02-20 ASSESSMENT — PAIN DESCRIPTION - FREQUENCY: FREQUENCY: INTERMITTENT

## 2025-02-20 ASSESSMENT — PAIN DESCRIPTION - ONSET: ONSET: ON-GOING

## 2025-02-20 ASSESSMENT — PAIN DESCRIPTION - DESCRIPTORS
DESCRIPTORS: JABBING;SHARP
DESCRIPTORS: JABBING;SHARP
DESCRIPTORS: DISCOMFORT

## 2025-02-20 ASSESSMENT — PAIN DESCRIPTION - LOCATION
LOCATION: ABDOMEN
LOCATION: CHEST
LOCATION: ARM;RIB CAGE

## 2025-02-20 ASSESSMENT — PAIN DESCRIPTION - PAIN TYPE: TYPE: ACUTE PAIN

## 2025-02-20 NOTE — PLAN OF CARE
Problem: Chronic Conditions and Co-morbidities  Goal: Patient's chronic conditions and co-morbidity symptoms are monitored and maintained or improved  2/20/2025 0006 by Lorraine Edgar RN  Outcome: Progressing  2/19/2025 1030 by Ayesha Villarreal RN  Outcome: Progressing  2/19/2025 1013 by Ayesha Villarreal RN  Outcome: Progressing     Problem: Discharge Planning  Goal: Discharge to home or other facility with appropriate resources  2/20/2025 0006 by Lorraine Edgar RN  Outcome: Progressing  2/19/2025 1030 by Ayesha Villarreal RN  Outcome: Progressing  2/19/2025 1013 by Ayesha Villarreal RN  Outcome: Progressing     Problem: Pain  Goal: Verbalizes/displays adequate comfort level or baseline comfort level  2/20/2025 0006 by Lorraine Edgar RN  Outcome: Progressing  2/19/2025 1030 by Ayesha Villarreal RN  Outcome: Progressing  2/19/2025 1013 by Ayesha Villarreal RN  Outcome: Progressing     Problem: Safety - Adult  Goal: Free from fall injury  2/20/2025 0006 by Lorraine Edgar RN  Outcome: Progressing  2/19/2025 1030 by Ayesha Villarreal RN  Outcome: Progressing  2/19/2025 1013 by Ayesha Villarreal RN  Outcome: Progressing     Problem: ABCDS Injury Assessment  Goal: Absence of physical injury  2/20/2025 0006 by Lorraine Edgar RN  Outcome: Progressing  2/19/2025 1030 by Ayesha Villarreal RN  Outcome: Progressing  2/19/2025 1013 by Ayesha Villarreal RN  Outcome: Progressing     Problem: Skin/Tissue Integrity  Goal: Skin integrity remains intact  Description: 1.  Monitor for areas of redness and/or skin breakdown  2.  Assess vascular access sites hourly  3.  Every 4-6 hours minimum:  Change oxygen saturation probe site  4.  Every 4-6 hours:  If on nasal continuous positive airway pressure, respiratory therapy assess nares and determine need for appliance change or resting period  2/20/2025 0006 by Lorraine Edgar RN  Outcome: Progressing  2/19/2025 1030 by Ayesha Villarreal RN  Outcome: Progressing  2/19/2025 1013 by Ayesha Villarreal RN  Outcome:

## 2025-02-20 NOTE — PLAN OF CARE
Problem: Chronic Conditions and Co-morbidities  Goal: Patient's chronic conditions and co-morbidity symptoms are monitored and maintained or improved  2/20/2025 1043 by Kelli Ruggiero RN  Outcome: Progressing  2/20/2025 0006 by Lorraine Edgar RN  Outcome: Progressing     Problem: Pain  Goal: Verbalizes/displays adequate comfort level or baseline comfort level  2/20/2025 1043 by Kelli Ruggiero RN  Outcome: Progressing  2/20/2025 0006 by Lorraine Edgar RN  Outcome: Progressing     Problem: Safety - Adult  Goal: Free from fall injury  2/20/2025 1043 by Kelli Ruggiero RN  Outcome: Progressing  2/20/2025 0006 by Lorraine Edgar RN  Outcome: Progressing

## 2025-02-20 NOTE — PROGRESS NOTES
Handoff report and transfer of care given at bedside to Kelli RN.  Patient in stable condition, denies needs/concerns at this time.  Call light within reach.

## 2025-02-20 NOTE — PROGRESS NOTES
Inpatient Physical Therapy Evaluation & Treatment    Unit: [facility]  Date:  2/20/2025  Patient Name:    Najma Fitzpatrick  Admitting diagnosis:  Shortness of breath [R06.02]  Hypoxia [R09.02]  Pleural effusion on left [J90]  Chronic obstructive pulmonary disease, unspecified COPD type (HCC) [J44.9]  Admit Date:  2/17/2025  Precautions/Restrictions/WB Status/ Lines/ Wounds/ Oxygen: Fall risk, Bed/chair alarm, Lines (IV, Supplemental O2 (2L), and external catheter), and Continuous pulse oximetry      Pt seen for cotreatment this date due to patient safety, patient endurance, and limited functional status information    Treatment Time:  [id number]  Treatment Number:  1  Timed Code Treatment Minutes: 36 minutes  Total Treatment Minutes:  46  minutes    Patient Stated Goals for Therapy: \" to go home \"          Discharge Recommendations: Home with 24 hr assistance and Home PT  DME needs for discharge: Needs Met       Therapy recommendation for EMS Transport: can transport by wheelchair    Therapy recommendations for staff:   Assist of 1 for transfers with use of rolling walker (RW) to/from BSC  to/from chair  within room    History of Present Illness:   As per the H&P by Halley Levin PA-C on 2/17/25 \"The patient is a 74 y.o. female with a PMHx of COPD, CHF, CAD, HTN, HLD, anxiety/depression, GERD who presents to [facility] with shortness of breath. History obtained from the patient and review of EMR. Reports she started to notice congestion about 2 days ago. Became more short of breath, reports her COPD started to act up and her lungs became tight. Reports coughing up \"gunk\", but isn't sure of the color because she cannot see well. Denies any nausea, vomiting, fevers, or chest pain. Reports diarrhea that started today and weakness\"    Preadmission Environment:   Pt. Lives                                              with family (, son, dtr, 2 adult grandkids)  Home environment:                            mobile   4-   Hamstring 4-   Iliopsoas 4-    Lower Extremity Sensation    WNL    Coordination  WFL    Tone  WNL    Balance  Static Sitting:  SBA  Dynamic Sitting:  SBA   Comments: Seated EOB and at bedside commode.     Static Standing: CGA  Dynamic Standing:  CGA  Comments: Standing to assist with toileting.     Posture  Seated: Forward head and neck  Standing: Forward head and neck    Bed Mobility   Supine to Sit:    SBA  Sit to Supine:   Not Tested  Rolling:   SBA  Scooting in sitting: SBA  Scooting in supine:  Not Tested   Bridging:  Not Tested  Comments: HOB elevated.     Transfer Training     Sit to stand:   CGA and With RW and gait belt  Stand to sit:   CGA and With RW and gait belt  Bed to/from BSC/Chair:  CGA and With RW and gait belt   Comments: No LOB with transfers to BSC/chair.     Gait gait completed as indicated below  Distance:      70 ft  Deviations (firm surface/linoleum):  decreased yves and decreased step length bilaterally  Assistive Device Used:    gait belt and rolling walker (RW)  Level of Assist:    CGA   Comment: Pt demonstrates good safety awareness with ambulation. Assistance for oxygen tubing management during ambulation.     Stair Training  Pt did not wish to complete stairs at this time.    Therapeutic Exercises Initiated  deferred secondary to treatment focus on functional mobility    Positioning Needs   Pt up in chair and alarm set  Call light provided and all needs within reach    Other Activities  Refer to OT notes.    Patient/Family Education   Pt educated on role of inpatient PT, POC, importance of continued activity, DC recommendations, functional transfer/mobility safety, transfer techniques, and calling for assist with mobility.    Assessment  Pt is a 74 year old female presenting today for physical therapy Evaluation and Treatment in the acute care setting. Pt demonstrates decreased strength, decreased endurance, and impaired balance, contributing to limitations during transfers,

## 2025-02-20 NOTE — PROGRESS NOTES
IV removed and discharge instructions completed. All questions were answered to patients satisfaction.

## 2025-02-20 NOTE — PLAN OF CARE
Problem: Chronic Conditions and Co-morbidities  Goal: Patient's chronic conditions and co-morbidity symptoms are monitored and maintained or improved  2/20/2025 1339 by Kelli Ruggiero RN  Outcome: Adequate for Discharge  2/20/2025 1043 by Kelli Ruggiero RN  Outcome: Progressing  2/20/2025 0006 by Lorraine Edgar RN  Outcome: Progressing     Problem: Discharge Planning  Goal: Discharge to home or other facility with appropriate resources  2/20/2025 1339 by Kelli Ruggiero RN  Outcome: Adequate for Discharge  2/20/2025 0006 by Lorraine Edgar RN  Outcome: Progressing     Problem: Pain  Goal: Verbalizes/displays adequate comfort level or baseline comfort level  2/20/2025 1339 by Kelli Ruggiero RN  Outcome: Adequate for Discharge  2/20/2025 1043 by Kelli Ruggiero RN  Outcome: Progressing  2/20/2025 0006 by Lorraine Edgar RN  Outcome: Progressing     Problem: Safety - Adult  Goal: Free from fall injury  2/20/2025 1339 by Kelli Ruggiero RN  Outcome: Adequate for Discharge  2/20/2025 1043 by Kelli Ruggiero RN  Outcome: Progressing  2/20/2025 0006 by Lorraine Edgar RN  Outcome: Progressing     Problem: ABCDS Injury Assessment  Goal: Absence of physical injury  2/20/2025 1339 by Kelli Ruggiero RN  Outcome: Adequate for Discharge  2/20/2025 0006 by Lorraine Edgar RN  Outcome: Progressing     Problem: Skin/Tissue Integrity  Goal: Skin integrity remains intact  Description: 1.  Monitor for areas of redness and/or skin breakdown  2.  Assess vascular access sites hourly  3.  Every 4-6 hours minimum:  Change oxygen saturation probe site  4.  Every 4-6 hours:  If on nasal continuous positive airway pressure, respiratory therapy assess nares and determine need for appliance change or resting period  2/20/2025 1339 by Kelli Ruggiero RN  Outcome: Adequate for Discharge  Flowsheets (Taken 2/20/2025 1045)  Skin Integrity Remains Intact: Monitor for areas of redness and/or skin breakdown  2/20/2025 0006 by Lorraine Edgar RN  Outcome: Progressing

## 2025-02-20 NOTE — PROGRESS NOTES

## 2025-02-20 NOTE — PROGRESS NOTES
Shift assessment complete. See flow sheet. Scheduled meds given. PRN given for pain. See MAR.  Patients head-toe complete, VS are logged, and active bowel sound noted in all four quadrants.     Patient 3L O2. A/O x4.     Patient sitting in bed, denies further needs at this time. Call light and bedside table are within reach. The bed is locked and is in the lowest position.     Vitals:    02/20/25 0831   BP:    Pulse:    Resp: 20   Temp:    SpO2:

## 2025-02-20 NOTE — PROGRESS NOTES
Shift assessment complete; see flow sheet.  Scheduled medications administered; See MAR.  IV flushed without difficulty.  Pt c/o generalized pain 7/10 PRN oxycodone given at this time. Pt denies any needs at this time. Pt educated on use of call light and to call out with needs, verbalized understanding, bed in low locked position for pt safety

## 2025-02-20 NOTE — CARE COORDINATION
CASE MANAGEMENT DISCHARGE SUMMARY      Discharge to: home    IMM given: 2/20/2025 -SONIDO delivered second IMM to patient. Verbal explanation provided of 4 hours to review notice. Patient voiced understanding and is agreeable to discharge.      Transportation:      Family/car: yes      Confirmed discharge plan with:     Patient: yes     Family:  yes - called      RN name: Kelli RN    Note: Discharging nurse to complete ADENIKE, reconcile AVS, and place final copy with patient's discharge packet. RN to ensure that written prescriptions for  Level II medications are sent with patient to the facility as per protocol.

## 2025-02-20 NOTE — PROGRESS NOTES
Transferred care to ROMAIN Peters. Face to face bedside report given, no need voiced at this time. Pt awake in bed. No signs of distress noted. Call light within reach. Denies needs.

## 2025-02-20 NOTE — DISCHARGE INSTRUCTIONS
Heart Failure Resources:  Heart Failure Interactive Workbook:  Go to https://[employer] Pharmacy[employer]Authy.InDemand Interpreting/publication/?c=390300 for a Free Heart Failure Interactive Workbook provided by The American [employer] Association. This interactive workbook will provide information on Healthier Living with Heart Failure. Please copy and paste link into search bar. Use your mouse to scroll through the pages.    HF [place] teddy:   Heart Failure Free smart phone teddy available for iPhone and Android download. Use your phone to track sodium intake, fluid intake, symptoms, and weight.     Low Sodium Diet / Recipes:  Go to www.[employer].FlowPlay website for “renal” diet which is Low Sodium! [employer] is a dialysis company, but this website offers free seasonal cookbooks. Each quarter, they will release 25-30 new recipes with a breakdown of calories, sodium, and glucose. You can also go to wwwScanntech/recipes website for free recipes.     Discharge Instruction Video:  Scan the QR code below with your camera and click the canva.com link to open the video and watch educational information on Heart Failure and Medications from one of our nurses.   https://www.TabletKiosk/design/DAFZnsH_JRk/8EguxnyFJBLksHMaqU2xna/edit    Home Exercise Program:   Identification of Green/Yellow/Red zones:  You should be able to identify when you feel good (green zone), if you have 1-2 symptoms of HF (yellow zone), or if you are in need of medical attention (red zone).  In your CHF education folder you were provided a “stop light tool” to outline this information.     We want to you to rate your exertion levels:    Our therapy team has discussed means of identification with you such as the \"Kat scale.\"  The Kat rating scale ranges from 6 to 20, where 6 means \"no exertion at all\" and 20 means \"maximal exertion.\" The goal is to use this to gauge how much effort it is taking for you to do your normal daily tasks.   You should be able to recognize when too much exertion is

## 2025-02-20 NOTE — DISCHARGE SUMMARY
Name:  Najma Fitzpatrick  Room:  0211/0211-01  MRN:    [id number]    Discharge Summary      This discharge summary is in conjunction with a complete physical exam done on the day of discharge.    Discharging Physician: Dr. Hurst      Admit: 2/17/2025  Discharge:  2/20/2025    HPI taken from admission H&P:    The patient is a 74 y.o. female with a PMHx of COPD, CHF, CAD, HTN, HLD, anxiety/depression, GERD who presents to [facility] with shortness of breath. History obtained from the patient and review of EMR. Reports she started to notice congestion about 2 days ago. Became more short of breath, reports her COPD started to act up and her lungs became tight. Reports coughing up \"gunk\", but isn't sure of the color because she cannot see well. Denies any nausea, vomiting, fevers, or chest pain. Reports diarrhea that started today and weakness.      Diagnoses this Admission and Hospital Course   Recurrent left pleural effusion.  COPD with acute exacerbation.  Chronic hypoxic respiratory failure.  - seen by pulmonology .  -sats stable at  baseline, 2 L o2   - s/p thoracentesis 1/10/2025 with 600mL removed.  S/P left thoracentesis this admit on 2/18 , 1000 ml drained   - pleural studies - cultures, cytology pending. Work up for eosinophillic effusion.  Transudative- r/o CHF  -sputum cultures pending.   -continue 40mg solumedrol q12 hours.  -duonebs and albuterol.  -s/p 40mg IV Lasix in ED,  redose with IV lasix .  Rpt ECHO as below   -continue lasix   -continuous pulse ox monitoring.  -continue prednisone at dc and cefdinir       Chest pain  Pleuritic   Due to above  - prn morphine/ percocet ordered      Chronic diastolic CHF.   D/W pulm- effusion appears transudative possibly related to CHF  - IV lasix   -I/Os, daily weights.      CAD.  -continue ASA, statin, BB.     Hypertension.  -continue home amlodipine, Coreg, hydralazine, losartan .   - elevated at times, monitor      Hyperlipidemia.   -continue statin.     mouth daily     nicotine 14 MG/24HR  Commonly known as: NICODERM CQ     Nurtec 75 MG Tbdp  Generic drug: rimegepant sulfate     OXcarbazepine 150 MG tablet  Commonly known as: TRILEPTAL     pantoprazole 40 MG tablet  Commonly known as: PROTONIX     QUEtiapine 200 MG tablet  Commonly known as: SEROQUEL     topiramate 25 MG tablet  Commonly known as: TOPAMAX            STOP taking these medications      clonazePAM 0.5 MG tablet  Commonly known as: KlonoPIN     divalproex 250 MG extended release tablet  Commonly known as: DEPAKOTE ER     divalproex 500 MG extended release tablet  Commonly known as: DEPAKOTE ER     guaiFENesin 600 MG extended release tablet  Commonly known as: MUCINEX     potassium chloride 20 MEQ extended release tablet  Commonly known as: KLOR-CON M               Where to Get Your Medications        These medications were sent to [facility] [address] - P [phone] - F [phone]  [address] [address]      Phone: [phone]   cefdinir 300 MG capsule  furosemide 20 MG tablet  HYDROcodone-acetaminophen 5-325 MG per tablet  predniSONE 10 MG tablet           Discharged in stable condition to home     Follow Up:  Follow up with PCP in 1 week    ANIKET ENCARNACION MD 2/27/2025 7:48 PM

## 2025-02-20 NOTE — PROGRESS NOTES
Inpatient Occupational Therapy Evaluation & Treatment    Unit: [facility]  Date:  2/20/2025  Patient Name:    Najma Fitzpatrick  Admitting diagnosis:  Shortness of breath [R06.02]  Hypoxia [R09.02]  Pleural effusion on left [J90]  Chronic obstructive pulmonary disease, unspecified COPD type (HCC) [J44.9]  Admit Date:  2/17/2025  Precautions/Restrictions/WB Status/ Lines/ Wounds/ Oxygen: Fall risk, Bed/chair alarm, Lines (IV, Supplemental O2 (2L), and external catheter), Impaired vision, and Continuous pulse oximetry    Purewick discontinued during session per patient request    Pt seen for cotreatment this date due to patient safety, patient endurance, complexity of condition, acute illness/injury, and limited functional status information    Treatment Time:  [id number]  Treatment Number:  1  Timed Code Treatment Minutes: 36 minutes  Total Treatment Minutes:  46  minutes    Patient Goals for Therapy: \"go home\"          Discharge Recommendations: Home with   24/7 assist  and Home OT  DME needs for discharge: Needs Met       Therapy recommendations for staff:   Assist of 1 for transfers with use of rolling walker (RW) and gait belt to/from BSC  to/from chair  within room    History of Present Illness: Per the H&P by Halley Levin PA-C on 2/17/25 \"The patient is a 74 y.o. female with a PMHx of COPD, CHF, CAD, HTN, HLD, anxiety/depression, GERD who presents to [facility] with shortness of breath. History obtained from the patient and review of EMR. Reports she started to notice congestion about 2 days ago. Became more short of breath, reports her COPD started to act up and her lungs became tight. Reports coughing up \"gunk\", but isn't sure of the color because she cannot see well. Denies any nausea, vomiting, fevers, or chest pain. Reports diarrhea that started today and weakness\"     Dx with acute COPD exacerbation, eosinophilic effusion, possible CHF, B pleural effusion  US guided thoracentesis completed  25    Preadmission Environment:   Pt. Lives                                              with family (, son, dtr, 2 adult grandkids)  Home environment:                            mobile home/trailer  Steps to enter first floor:                     2-4 steps to enter and Hand rail bilateral  Steps to second floor/basement:        N/A  Laundry:                                              1st floor  Bathroom:                                           tub/shower unit, grab bars in shower, shower chair , grab bars around toilet, and standard height toilet  Pt sleeps in a:                                     Flat bed  Equipment owned:                              rollator, SPC, and shower chair/bench, continuous O2 2L     Preadmission Status:  Pt. Able to drive:                                 No, family drives  Pt. Fully independent with ADLs:       No, spouse and dtr help with showers  Pt. Required assistance for:               Bathing, Cleaning, Cooking, Laundry , grocery shopping, and IADLS (med management, paying bills, etc)  Pt. independent for functional transfers and utilized Rollator for mobility in home and Rollator out in community  History of falls:                                    No  Home Health Services:                       None    AM-PAC Score: AM-PAC Inpatient Daily Activity Raw Score: 15     Subjective:  Patient lying reclined in bed with no family present.   Pt agreeable to this OT session.     Cognition:    A&O x4   Able to follow 2 step commands    Pain:   Yes  Location: L axilla/upper arm  Ratin /10-improved to 7/10 at end of session  Pain Medicine Status: Received pain med prior to tx and RN notified    Activity Tolerance:   Pt completed therapy session with fatigue    Pt Position BP (mmHg) HR (bpm) SpO2 (%) on 2L  Comments   Supine at rest 133/70 80 97     Seated at EOB      98     Standing in hallway midway through functional mobility    89  98     End of session      96

## 2025-02-20 NOTE — PROGRESS NOTES
[facility]                                 Pulmonary/Critical care  Consult /Progress Note :                                                                  CC : Worsening shortness of breath  Patient is being seen at the request of Awa Cleary for a consultation for acute COPD exacerbation    Subjective   Doing better  No chest pain   SOB has improved   No fever or chills  On 2 L       PHYSICAL EXAM:  Vitals:    02/20/25 0845   BP: (!) 146/78   Pulse:    Resp:    Temp:    SpO2:      Gen: No distress.   Eyes: PERRL. No sclera icterus. No conjunctival injection.   ENT: No discharge. Pharynx clear.   Neck: Trachea midline. No obvious mass.    Resp: Rhonchi bilaterally  CV: Regular rate. Regular rhythm. No murmur or rub. No edema. Peripheral pulses are 2+.  Capillary refill is less than 3 seconds.  GI: Non-tender. Non-distended. No hernia.   Skin: Warm and dry. No nodule on exposed extremities.   Lymph: No cervical LAD. No supraclavicular LAD.   M/S: No cyanosis. No joint deformity. No clubbing.   Neuro: Awake. Alert. Moves all four extremities.   Psych: Oriented x 3. No anxiety.     LABS:  CBC:   Recent Labs     02/18/25  0612 02/19/25  0630 02/20/25  0612   WBC 6.6 7.3 6.2   HGB 8.6* 8.7* 9.0*   HCT 26.2* 26.2* 28.5*   MCV 79.1* 78.9* 80.9    165 158     BMP:   Recent Labs     02/18/25  0612 02/19/25  0631 02/20/25  0612   * 132* 131*   K 3.5 3.6 4.3   CL 91* 93* 93*   CO2 31 31 28   BUN 6* 7 10   CREATININE 0.4* 0.5* 0.5*     LIVER PROFILE:   Recent Labs     02/17/25  1425   AST 12*   ALT 8*   BILITOT 0.3   ALKPHOS 100     PT/INR: No results for input(s): \"PROTIME\", \"INR\" in the last 72 hours.  APTT: No results for input(s): \"APTT\" in the last 72 hours.  UA:  No results for input(s): \"NITRITE\", \"COLORU\", \"PHUR\", \"LABCAST\", \"WBCUA\", \"RBCUA\", \"MUCUS\", \"TRICHOMONAS\", \"YEAST\", \"BACTERIA\", \"CLARITYU\", \"SPECGRAV\",

## 2025-02-21 LAB
BACTERIA FLD AEROBE CULT: NORMAL
GRAM STN SPEC: NORMAL

## 2025-02-21 NOTE — TELEPHONE ENCOUNTER
Called patient, she said that the doctor at the hospital took some fluid off her lungs and told her that she does not need the PET scan since he did not find any cancer. Patient stated that she does not have any transportation to come to appointments at this time. Patient would like a call in a few weeks to see where she stands with transportation  Please advise if patient needs her PET scan and how long can she wait on a follow up?

## 2025-02-22 LAB — IGG4 SER-MCNC: 7 MG/DL (ref 1–123)

## 2025-02-24 ENCOUNTER — TELEPHONE (OUTPATIENT)
Dept: PULMONOLOGY | Age: 75
End: 2025-02-24

## 2025-02-24 NOTE — TELEPHONE ENCOUNTER
Pt called stating that since she had the water drained off her lungs that she is in a lot of pain. She said the current medication is not working. She mention she really don't want to come back to the hospital and is there any options to help with the pain.

## 2025-02-24 NOTE — TELEPHONE ENCOUNTER
Spoke to patient she stated that the pain has eased up some and will go to the ED for evaluation for collapsed lung

## 2025-02-25 ENCOUNTER — HOSPITAL ENCOUNTER (INPATIENT)
Age: 75
LOS: 1 days | Discharge: HOME | DRG: 191 | End: 2025-02-27
Attending: EMERGENCY MEDICINE | Admitting: PEDIATRICS
Payer: COMMERCIAL

## 2025-02-25 ENCOUNTER — APPOINTMENT (OUTPATIENT)
Dept: GENERAL RADIOLOGY | Age: 75
DRG: 191 | End: 2025-02-25
Payer: COMMERCIAL

## 2025-02-25 DIAGNOSIS — J44.1 COPD EXACERBATION (HCC): ICD-10-CM

## 2025-02-25 DIAGNOSIS — I50.9 CONGESTIVE HEART FAILURE, UNSPECIFIED HF CHRONICITY, UNSPECIFIED HEART FAILURE TYPE (HCC): Primary | ICD-10-CM

## 2025-02-25 DIAGNOSIS — R07.9 CHEST PAIN, UNSPECIFIED TYPE: ICD-10-CM

## 2025-02-25 PROCEDURE — 85025 COMPLETE CBC W/AUTO DIFF WBC: CPT

## 2025-02-25 PROCEDURE — 83735 ASSAY OF MAGNESIUM: CPT

## 2025-02-25 PROCEDURE — 80048 BASIC METABOLIC PNL TOTAL CA: CPT

## 2025-02-25 PROCEDURE — 71045 X-RAY EXAM CHEST 1 VIEW: CPT

## 2025-02-25 PROCEDURE — 84484 ASSAY OF TROPONIN QUANT: CPT

## 2025-02-25 PROCEDURE — 82803 BLOOD GASES ANY COMBINATION: CPT

## 2025-02-25 PROCEDURE — 99285 EMERGENCY DEPT VISIT HI MDM: CPT

## 2025-02-25 PROCEDURE — 83880 ASSAY OF NATRIURETIC PEPTIDE: CPT

## 2025-02-25 PROCEDURE — 85379 FIBRIN DEGRADATION QUANT: CPT

## 2025-02-25 PROCEDURE — 93005 ELECTROCARDIOGRAM TRACING: CPT | Performed by: EMERGENCY MEDICINE

## 2025-02-25 PROCEDURE — 87636 SARSCOV2 & INF A&B AMP PRB: CPT

## 2025-02-25 ASSESSMENT — PAIN DESCRIPTION - PAIN TYPE: TYPE: ACUTE PAIN

## 2025-02-25 ASSESSMENT — PAIN DESCRIPTION - LOCATION: LOCATION: CHEST;BACK

## 2025-02-25 ASSESSMENT — PAIN DESCRIPTION - FREQUENCY: FREQUENCY: INTERMITTENT

## 2025-02-25 ASSESSMENT — PAIN DESCRIPTION - DESCRIPTORS: DESCRIPTORS: SHARP

## 2025-02-25 ASSESSMENT — PAIN DESCRIPTION - ORIENTATION: ORIENTATION: LEFT

## 2025-02-25 ASSESSMENT — PAIN - FUNCTIONAL ASSESSMENT: PAIN_FUNCTIONAL_ASSESSMENT: 0-10

## 2025-02-25 ASSESSMENT — PAIN SCALES - GENERAL: PAINLEVEL_OUTOF10: 9

## 2025-02-25 NOTE — TELEPHONE ENCOUNTER
I do not provide patient medicine, she can contact her PCP   We need to figure out the reason for the pain, needs to be seen in ER to make sure no collapse lung, or have her come to get stat CXR today

## 2025-02-25 NOTE — TELEPHONE ENCOUNTER
Spoke to patient again. Let her know what he said and medication and also about going to ER again. since she left another message on the vm. She is going to go to ER in hopes Taya will let them know she is coming.

## 2025-02-25 NOTE — TELEPHONE ENCOUNTER
Patient called in stating that she is not wanting go to the ED, but that she is asking for something for pain after the fluid being pulled off her lung. Please advise

## 2025-02-26 ENCOUNTER — APPOINTMENT (OUTPATIENT)
Dept: ULTRASOUND IMAGING | Age: 75
DRG: 191 | End: 2025-02-26
Payer: COMMERCIAL

## 2025-02-26 ENCOUNTER — APPOINTMENT (OUTPATIENT)
Dept: GENERAL RADIOLOGY | Age: 75
DRG: 191 | End: 2025-02-26
Payer: COMMERCIAL

## 2025-02-26 ENCOUNTER — APPOINTMENT (OUTPATIENT)
Dept: CT IMAGING | Age: 75
DRG: 191 | End: 2025-02-26
Payer: COMMERCIAL

## 2025-02-26 PROBLEM — J96.11 CHRONIC HYPOXEMIC RESPIRATORY FAILURE (HCC): Status: ACTIVE | Noted: 2025-02-26

## 2025-02-26 PROBLEM — R07.81 PLEURITIC CHEST PAIN: Status: ACTIVE | Noted: 2025-02-26

## 2025-02-26 LAB
AMYLASE FLD-CCNC: 29 U/L
ANION GAP SERPL CALCULATED.3IONS-SCNC: 12 MMOL/L (ref 3–16)
ANION GAP SERPL CALCULATED.3IONS-SCNC: 9 MMOL/L (ref 3–16)
APPEARANCE FLUID: CLEAR
BASE EXCESS BLDV CALC-SCNC: 4.6 MMOL/L (ref -3–3)
BASOPHILS # BLD: 0 K/UL (ref 0–0.2)
BASOPHILS NFR BLD: 0.2 %
BDY FLUID QUALITY: NORMAL
BUN SERPL-MCNC: 10 MG/DL (ref 7–20)
BUN SERPL-MCNC: 11 MG/DL (ref 7–20)
CALCIUM SERPL-MCNC: 7.8 MG/DL (ref 8.3–10.6)
CALCIUM SERPL-MCNC: 8.1 MG/DL (ref 8.3–10.6)
CELL COUNT FLUID TYPE: NORMAL
CHLORIDE SERPL-SCNC: 86 MMOL/L (ref 99–110)
CHLORIDE SERPL-SCNC: 88 MMOL/L (ref 99–110)
CHOLEST FLD-MCNC: 75 MG/DL
CO2 BLDV-SCNC: 32 MMOL/L
CO2 SERPL-SCNC: 31 MMOL/L (ref 21–32)
CO2 SERPL-SCNC: 34 MMOL/L (ref 21–32)
COHGB MFR BLDV: 3 % (ref 0–1.5)
COLOR FLUID: NORMAL
CREAT SERPL-MCNC: 0.6 MG/DL (ref 0.6–1.2)
CREAT SERPL-MCNC: 0.7 MG/DL (ref 0.6–1.2)
D-DIMER QUANTITATIVE: 1.59 UG/ML FEU (ref 0–0.6)
DEPRECATED RDW RBC AUTO: 16.1 % (ref 12.4–15.4)
EKG ATRIAL RATE: 65 BPM
EKG DIAGNOSIS: NORMAL
EKG P AXIS: 48 DEGREES
EKG P-R INTERVAL: 166 MS
EKG Q-T INTERVAL: 432 MS
EKG QRS DURATION: 102 MS
EKG QTC CALCULATION (BAZETT): 449 MS
EKG R AXIS: 18 DEGREES
EKG T AXIS: 57 DEGREES
EKG VENTRICULAR RATE: 65 BPM
EOSINOPHIL # BLD: 0 K/UL (ref 0–0.6)
EOSINOPHIL NFR BLD: 0.2 %
FLUAV RNA RESP QL NAA+PROBE: NOT DETECTED
FLUBV RNA RESP QL NAA+PROBE: NOT DETECTED
GFR SERPLBLD CREATININE-BSD FMLA CKD-EPI: >90 ML/MIN/{1.73_M2}
GFR SERPLBLD CREATININE-BSD FMLA CKD-EPI: >90 ML/MIN/{1.73_M2}
GLUCOSE FLD-MCNC: 202 MG/DL
GLUCOSE SERPL-MCNC: 152 MG/DL (ref 70–99)
GLUCOSE SERPL-MCNC: 98 MG/DL (ref 70–99)
HCO3 BLDV-SCNC: 30 MMOL/L (ref 23–29)
HCT VFR BLD AUTO: 30.2 % (ref 36–48)
HGB BLD-MCNC: 9.7 G/DL (ref 12–16)
INR PPP: 0.95 (ref 0.85–1.15)
LDH FLD L TO P-CCNC: 139 U/L
LDH SERPL L TO P-CCNC: 160 U/L (ref 100–190)
LYMPHOCYTES # BLD: 1.5 K/UL (ref 1–5.1)
LYMPHOCYTES NFR BLD: 17.9 %
LYMPHOCYTES NFR FLD: 61 %
MACROPHAGES # FLD: 11 %
MAGNESIUM SERPL-MCNC: 1.65 MG/DL (ref 1.8–2.4)
MAGNESIUM SERPL-MCNC: 1.79 MG/DL (ref 1.8–2.4)
MCH RBC QN AUTO: 25.3 PG (ref 26–34)
MCHC RBC AUTO-ENTMCNC: 32.1 G/DL (ref 31–36)
MCV RBC AUTO: 78.9 FL (ref 80–100)
METHGB MFR BLDV: 0.3 %
MONOCYTES # BLD: 0.8 K/UL (ref 0–1.3)
MONOCYTES NFR BLD: 9.2 %
NEUTROPHIL, FLUID: 28 %
NEUTROPHILS # BLD: 6.1 K/UL (ref 1.7–7.7)
NEUTROPHILS NFR BLD: 72.5 %
NT-PROBNP SERPL-MCNC: 406 PG/ML (ref 0–124)
NUC CELL # FLD: 920 /CUMM
O2 CT VFR BLDV CALC: 12 VOL %
O2 THERAPY: ABNORMAL
PCO2 BLDV: 49 MMHG (ref 40–50)
PH BLDV: 7.41 [PH] (ref 7.35–7.45)
PH FLD STRIP: 8 [PH]
PLATELET # BLD AUTO: 204 K/UL (ref 135–450)
PMV BLD AUTO: 6.8 FL (ref 5–10.5)
PO2 BLDV: 65.9 MMHG (ref 25–40)
POTASSIUM SERPL-SCNC: 3.3 MMOL/L (ref 3.5–5.1)
POTASSIUM SERPL-SCNC: 3.3 MMOL/L (ref 3.5–5.1)
PROCALCITONIN SERPL IA-MCNC: 0.04 NG/ML (ref 0–0.15)
PROT FLD-MCNC: 3 G/DL
PROT SERPL-MCNC: 6.6 G/DL (ref 6.4–8.2)
PROTHROMBIN TIME: 12.9 SEC (ref 11.9–14.9)
RBC # BLD AUTO: 3.82 M/UL (ref 4–5.2)
RBC FLUID: 2200 /CUMM
SAO2 % BLDV: 93 %
SARS-COV-2 RNA RESP QL NAA+PROBE: NOT DETECTED
SODIUM SERPL-SCNC: 128 MMOL/L (ref 136–145)
SODIUM SERPL-SCNC: 132 MMOL/L (ref 136–145)
SPECIMEN SOURCE FLD: NORMAL
TOTAL CELLS COUNTED FLD: 100
TRIGL FLD-MCNC: 11 MG/DL
TROPONIN, HIGH SENSITIVITY: 13 NG/L (ref 0–14)
TROPONIN, HIGH SENSITIVITY: 21 NG/L (ref 0–14)
WBC # BLD AUTO: 8.5 K/UL (ref 4–11)

## 2025-02-26 PROCEDURE — 88185 FLOWCYTOMETRY/TC ADD-ON: CPT

## 2025-02-26 PROCEDURE — 83986 ASSAY PH BODY FLUID NOS: CPT

## 2025-02-26 PROCEDURE — 82378 CARCINOEMBRYONIC ANTIGEN: CPT

## 2025-02-26 PROCEDURE — 84478 ASSAY OF TRIGLYCERIDES: CPT

## 2025-02-26 PROCEDURE — 87070 CULTURE OTHR SPECIMN AEROBIC: CPT

## 2025-02-26 PROCEDURE — 87015 SPECIMEN INFECT AGNT CONCNTJ: CPT

## 2025-02-26 PROCEDURE — 84155 ASSAY OF PROTEIN SERUM: CPT

## 2025-02-26 PROCEDURE — 6360000002 HC RX W HCPCS: Performed by: EMERGENCY MEDICINE

## 2025-02-26 PROCEDURE — 6370000000 HC RX 637 (ALT 250 FOR IP): Performed by: PEDIATRICS

## 2025-02-26 PROCEDURE — 88112 CYTOPATH CELL ENHANCE TECH: CPT

## 2025-02-26 PROCEDURE — 86304 IMMUNOASSAY TUMOR CA 125: CPT

## 2025-02-26 PROCEDURE — 99223 1ST HOSP IP/OBS HIGH 75: CPT | Performed by: INTERNAL MEDICINE

## 2025-02-26 PROCEDURE — 2500000003 HC RX 250 WO HCPCS: Performed by: EMERGENCY MEDICINE

## 2025-02-26 PROCEDURE — 6360000004 HC RX CONTRAST MEDICATION: Performed by: EMERGENCY MEDICINE

## 2025-02-26 PROCEDURE — 2500000003 HC RX 250 WO HCPCS: Performed by: INTERNAL MEDICINE

## 2025-02-26 PROCEDURE — 2700000000 HC OXYGEN THERAPY PER DAY

## 2025-02-26 PROCEDURE — 36415 COLL VENOUS BLD VENIPUNCTURE: CPT

## 2025-02-26 PROCEDURE — 71045 X-RAY EXAM CHEST 1 VIEW: CPT

## 2025-02-26 PROCEDURE — 94761 N-INVAS EAR/PLS OXIMETRY MLT: CPT

## 2025-02-26 PROCEDURE — 6370000000 HC RX 637 (ALT 250 FOR IP): Performed by: INTERNAL MEDICINE

## 2025-02-26 PROCEDURE — 89051 BODY FLUID CELL COUNT: CPT

## 2025-02-26 PROCEDURE — 83615 LACTATE (LD) (LDH) ENZYME: CPT

## 2025-02-26 PROCEDURE — 2500000003 HC RX 250 WO HCPCS: Performed by: PEDIATRICS

## 2025-02-26 PROCEDURE — 87116 MYCOBACTERIA CULTURE: CPT

## 2025-02-26 PROCEDURE — 84145 PROCALCITONIN (PCT): CPT

## 2025-02-26 PROCEDURE — 82150 ASSAY OF AMYLASE: CPT

## 2025-02-26 PROCEDURE — 0W9B3ZZ DRAINAGE OF LEFT PLEURAL CAVITY, PERCUTANEOUS APPROACH: ICD-10-PCS | Performed by: STUDENT IN AN ORGANIZED HEALTH CARE EDUCATION/TRAINING PROGRAM

## 2025-02-26 PROCEDURE — 88305 TISSUE EXAM BY PATHOLOGIST: CPT

## 2025-02-26 PROCEDURE — 2580000003 HC RX 258: Performed by: PEDIATRICS

## 2025-02-26 PROCEDURE — 2580000003 HC RX 258: Performed by: INTERNAL MEDICINE

## 2025-02-26 PROCEDURE — 82465 ASSAY BLD/SERUM CHOLESTEROL: CPT

## 2025-02-26 PROCEDURE — 82945 GLUCOSE OTHER FLUID: CPT

## 2025-02-26 PROCEDURE — 2060000000 HC ICU INTERMEDIATE R&B

## 2025-02-26 PROCEDURE — 6360000002 HC RX W HCPCS: Performed by: INTERNAL MEDICINE

## 2025-02-26 PROCEDURE — 87206 SMEAR FLUORESCENT/ACID STAI: CPT

## 2025-02-26 PROCEDURE — 32555 ASPIRATE PLEURA W/ IMAGING: CPT

## 2025-02-26 PROCEDURE — 86038 ANTINUCLEAR ANTIBODIES: CPT

## 2025-02-26 PROCEDURE — 6360000002 HC RX W HCPCS: Performed by: PEDIATRICS

## 2025-02-26 PROCEDURE — 84484 ASSAY OF TROPONIN QUANT: CPT

## 2025-02-26 PROCEDURE — 83735 ASSAY OF MAGNESIUM: CPT

## 2025-02-26 PROCEDURE — 84157 ASSAY OF PROTEIN OTHER: CPT

## 2025-02-26 PROCEDURE — 80048 BASIC METABOLIC PNL TOTAL CA: CPT

## 2025-02-26 PROCEDURE — 88184 FLOWCYTOMETRY/ TC 1 MARKER: CPT

## 2025-02-26 PROCEDURE — 94640 AIRWAY INHALATION TREATMENT: CPT

## 2025-02-26 PROCEDURE — 71260 CT THORAX DX C+: CPT

## 2025-02-26 PROCEDURE — 85610 PROTHROMBIN TIME: CPT

## 2025-02-26 PROCEDURE — 87205 SMEAR GRAM STAIN: CPT

## 2025-02-26 PROCEDURE — 0W9B3ZZ DRAINAGE OF LEFT PLEURAL CAVITY, PERCUTANEOUS APPROACH: ICD-10-PCS | Performed by: INTERNAL MEDICINE

## 2025-02-26 PROCEDURE — 93010 ELECTROCARDIOGRAM REPORT: CPT | Performed by: INTERNAL MEDICINE

## 2025-02-26 RX ORDER — GABAPENTIN 100 MG/1
100 CAPSULE ORAL 3 TIMES DAILY
Status: DISCONTINUED | OUTPATIENT
Start: 2025-02-26 | End: 2025-02-27 | Stop reason: HOSPADM

## 2025-02-26 RX ORDER — TOPIRAMATE 25 MG/1
25 TABLET, FILM COATED ORAL DAILY
Status: DISCONTINUED | OUTPATIENT
Start: 2025-02-26 | End: 2025-02-27 | Stop reason: HOSPADM

## 2025-02-26 RX ORDER — ATORVASTATIN CALCIUM 40 MG/1
40 TABLET, FILM COATED ORAL DAILY
Status: DISCONTINUED | OUTPATIENT
Start: 2025-02-26 | End: 2025-02-27 | Stop reason: HOSPADM

## 2025-02-26 RX ORDER — OXCARBAZEPINE 300 MG/1
450 TABLET, FILM COATED ORAL NIGHTLY
Status: DISCONTINUED | OUTPATIENT
Start: 2025-02-26 | End: 2025-02-27 | Stop reason: HOSPADM

## 2025-02-26 RX ORDER — IOPAMIDOL 755 MG/ML
85 INJECTION, SOLUTION INTRAVASCULAR
Status: COMPLETED | OUTPATIENT
Start: 2025-02-26 | End: 2025-02-26

## 2025-02-26 RX ORDER — POTASSIUM CHLORIDE 7.45 MG/ML
10 INJECTION INTRAVENOUS PRN
Status: DISCONTINUED | OUTPATIENT
Start: 2025-02-26 | End: 2025-02-27

## 2025-02-26 RX ORDER — IPRATROPIUM BROMIDE AND ALBUTEROL SULFATE 2.5; .5 MG/3ML; MG/3ML
1 SOLUTION RESPIRATORY (INHALATION) 3 TIMES DAILY
Status: DISCONTINUED | OUTPATIENT
Start: 2025-02-26 | End: 2025-02-27 | Stop reason: HOSPADM

## 2025-02-26 RX ORDER — POLYETHYLENE GLYCOL 3350 17 G/17G
17 POWDER, FOR SOLUTION ORAL DAILY PRN
Status: DISCONTINUED | OUTPATIENT
Start: 2025-02-26 | End: 2025-02-27 | Stop reason: HOSPADM

## 2025-02-26 RX ORDER — PANTOPRAZOLE SODIUM 40 MG/1
40 TABLET, DELAYED RELEASE ORAL DAILY
Status: DISCONTINUED | OUTPATIENT
Start: 2025-02-26 | End: 2025-02-27 | Stop reason: HOSPADM

## 2025-02-26 RX ORDER — MORPHINE SULFATE 2 MG/ML
2 INJECTION, SOLUTION INTRAMUSCULAR; INTRAVENOUS EVERY 4 HOURS PRN
Status: DISCONTINUED | OUTPATIENT
Start: 2025-02-26 | End: 2025-02-26

## 2025-02-26 RX ORDER — ALBUTEROL SULFATE 0.83 MG/ML
2.5 SOLUTION RESPIRATORY (INHALATION) EVERY 4 HOURS PRN
Status: DISCONTINUED | OUTPATIENT
Start: 2025-02-26 | End: 2025-02-27 | Stop reason: HOSPADM

## 2025-02-26 RX ORDER — HYDRALAZINE HYDROCHLORIDE 50 MG/1
50 TABLET, FILM COATED ORAL EVERY 8 HOURS SCHEDULED
Status: DISCONTINUED | OUTPATIENT
Start: 2025-02-26 | End: 2025-02-27 | Stop reason: HOSPADM

## 2025-02-26 RX ORDER — SODIUM CHLORIDE 9 MG/ML
INJECTION, SOLUTION INTRAVENOUS PRN
Status: DISCONTINUED | OUTPATIENT
Start: 2025-02-26 | End: 2025-02-27 | Stop reason: HOSPADM

## 2025-02-26 RX ORDER — POTASSIUM CHLORIDE 1500 MG/1
40 TABLET, EXTENDED RELEASE ORAL PRN
Status: DISCONTINUED | OUTPATIENT
Start: 2025-02-26 | End: 2025-02-27

## 2025-02-26 RX ORDER — AMLODIPINE BESYLATE 5 MG/1
5 TABLET ORAL DAILY
Status: DISCONTINUED | OUTPATIENT
Start: 2025-02-26 | End: 2025-02-27 | Stop reason: HOSPADM

## 2025-02-26 RX ORDER — SODIUM CHLORIDE 0.9 % (FLUSH) 0.9 %
5-40 SYRINGE (ML) INJECTION PRN
Status: DISCONTINUED | OUTPATIENT
Start: 2025-02-26 | End: 2025-02-27 | Stop reason: HOSPADM

## 2025-02-26 RX ORDER — MAGNESIUM SULFATE IN WATER 40 MG/ML
2000 INJECTION, SOLUTION INTRAVENOUS PRN
Status: DISCONTINUED | OUTPATIENT
Start: 2025-02-26 | End: 2025-02-27 | Stop reason: HOSPADM

## 2025-02-26 RX ORDER — ACETAMINOPHEN 650 MG/1
650 SUPPOSITORY RECTAL EVERY 6 HOURS PRN
Status: DISCONTINUED | OUTPATIENT
Start: 2025-02-26 | End: 2025-02-27 | Stop reason: HOSPADM

## 2025-02-26 RX ORDER — FLUTICASONE PROPIONATE 50 MCG
1 SPRAY, SUSPENSION (ML) NASAL DAILY PRN
Status: DISCONTINUED | OUTPATIENT
Start: 2025-02-26 | End: 2025-02-27 | Stop reason: HOSPADM

## 2025-02-26 RX ORDER — IPRATROPIUM BROMIDE AND ALBUTEROL SULFATE 2.5; .5 MG/3ML; MG/3ML
1 SOLUTION RESPIRATORY (INHALATION)
Status: DISCONTINUED | OUTPATIENT
Start: 2025-02-27 | End: 2025-02-26

## 2025-02-26 RX ORDER — FUROSEMIDE 10 MG/ML
40 INJECTION INTRAMUSCULAR; INTRAVENOUS 2 TIMES DAILY
Status: DISCONTINUED | OUTPATIENT
Start: 2025-02-26 | End: 2025-02-26

## 2025-02-26 RX ORDER — QUETIAPINE FUMARATE 25 MG/1
25 TABLET, FILM COATED ORAL 2 TIMES DAILY
Status: ON HOLD | COMMUNITY
Start: 2025-02-24 | End: 2025-02-27 | Stop reason: HOSPADM

## 2025-02-26 RX ORDER — FUROSEMIDE 10 MG/ML
40 INJECTION INTRAMUSCULAR; INTRAVENOUS ONCE
Status: COMPLETED | OUTPATIENT
Start: 2025-02-26 | End: 2025-02-26

## 2025-02-26 RX ORDER — ONDANSETRON 2 MG/ML
4 INJECTION INTRAMUSCULAR; INTRAVENOUS EVERY 6 HOURS PRN
Status: DISCONTINUED | OUTPATIENT
Start: 2025-02-26 | End: 2025-02-27 | Stop reason: HOSPADM

## 2025-02-26 RX ORDER — ACETAMINOPHEN 325 MG/1
650 TABLET ORAL EVERY 6 HOURS PRN
Status: DISCONTINUED | OUTPATIENT
Start: 2025-02-26 | End: 2025-02-27 | Stop reason: HOSPADM

## 2025-02-26 RX ORDER — LOSARTAN POTASSIUM 100 MG/1
100 TABLET ORAL DAILY
Status: DISCONTINUED | OUTPATIENT
Start: 2025-02-26 | End: 2025-02-27 | Stop reason: HOSPADM

## 2025-02-26 RX ORDER — FUROSEMIDE 10 MG/ML
20 INJECTION INTRAMUSCULAR; INTRAVENOUS 2 TIMES DAILY
Status: DISCONTINUED | OUTPATIENT
Start: 2025-02-27 | End: 2025-02-27

## 2025-02-26 RX ORDER — DULOXETIN HYDROCHLORIDE 60 MG/1
60 CAPSULE, DELAYED RELEASE ORAL DAILY
Status: DISCONTINUED | OUTPATIENT
Start: 2025-02-26 | End: 2025-02-27 | Stop reason: HOSPADM

## 2025-02-26 RX ORDER — ENOXAPARIN SODIUM 100 MG/ML
40 INJECTION SUBCUTANEOUS DAILY
Status: DISCONTINUED | OUTPATIENT
Start: 2025-02-26 | End: 2025-02-27 | Stop reason: HOSPADM

## 2025-02-26 RX ORDER — CARVEDILOL 25 MG/1
25 TABLET ORAL 2 TIMES DAILY WITH MEALS
Status: DISCONTINUED | OUTPATIENT
Start: 2025-02-26 | End: 2025-02-27 | Stop reason: HOSPADM

## 2025-02-26 RX ORDER — NICOTINE 21 MG/24HR
1 PATCH, TRANSDERMAL 24 HOURS TRANSDERMAL EVERY 24 HOURS
Status: DISCONTINUED | OUTPATIENT
Start: 2025-02-26 | End: 2025-02-27 | Stop reason: HOSPADM

## 2025-02-26 RX ORDER — ASPIRIN 81 MG/1
81 TABLET, CHEWABLE ORAL DAILY
Status: DISCONTINUED | OUTPATIENT
Start: 2025-02-26 | End: 2025-02-27 | Stop reason: HOSPADM

## 2025-02-26 RX ORDER — SODIUM CHLORIDE 0.9 % (FLUSH) 0.9 %
5-40 SYRINGE (ML) INJECTION EVERY 12 HOURS SCHEDULED
Status: DISCONTINUED | OUTPATIENT
Start: 2025-02-26 | End: 2025-02-27 | Stop reason: HOSPADM

## 2025-02-26 RX ORDER — ONDANSETRON 4 MG/1
4 TABLET, ORALLY DISINTEGRATING ORAL EVERY 8 HOURS PRN
Status: DISCONTINUED | OUTPATIENT
Start: 2025-02-26 | End: 2025-02-27 | Stop reason: HOSPADM

## 2025-02-26 RX ORDER — FAMOTIDINE 20 MG/1
20 TABLET, FILM COATED ORAL 2 TIMES DAILY
Status: DISCONTINUED | OUTPATIENT
Start: 2025-02-26 | End: 2025-02-27 | Stop reason: HOSPADM

## 2025-02-26 RX ORDER — BUSPIRONE HYDROCHLORIDE 7.5 MG/1
7.5 TABLET ORAL 3 TIMES DAILY
Status: DISCONTINUED | OUTPATIENT
Start: 2025-02-26 | End: 2025-02-27 | Stop reason: HOSPADM

## 2025-02-26 RX ORDER — GUAIFENESIN 600 MG/1
600 TABLET, EXTENDED RELEASE ORAL 2 TIMES DAILY
Status: DISCONTINUED | OUTPATIENT
Start: 2025-02-26 | End: 2025-02-27 | Stop reason: HOSPADM

## 2025-02-26 RX ORDER — IPRATROPIUM BROMIDE AND ALBUTEROL SULFATE 2.5; .5 MG/3ML; MG/3ML
1 SOLUTION RESPIRATORY (INHALATION)
Status: DISCONTINUED | OUTPATIENT
Start: 2025-02-26 | End: 2025-02-26

## 2025-02-26 RX ORDER — QUETIAPINE FUMARATE 200 MG/1
400 TABLET, FILM COATED ORAL NIGHTLY
Status: DISCONTINUED | OUTPATIENT
Start: 2025-02-26 | End: 2025-02-27 | Stop reason: HOSPADM

## 2025-02-26 RX ORDER — MORPHINE SULFATE 2 MG/ML
2 INJECTION, SOLUTION INTRAMUSCULAR; INTRAVENOUS EVERY 4 HOURS PRN
Status: DISCONTINUED | OUTPATIENT
Start: 2025-02-26 | End: 2025-02-27 | Stop reason: HOSPADM

## 2025-02-26 RX ADMIN — AMLODIPINE BESYLATE 5 MG: 5 TABLET ORAL at 09:40

## 2025-02-26 RX ADMIN — LOSARTAN POTASSIUM 100 MG: 100 TABLET, FILM COATED ORAL at 09:40

## 2025-02-26 RX ADMIN — MORPHINE SULFATE 2 MG: 2 INJECTION, SOLUTION INTRAMUSCULAR; INTRAVENOUS at 15:48

## 2025-02-26 RX ADMIN — FUROSEMIDE 40 MG: 10 INJECTION, SOLUTION INTRAMUSCULAR; INTRAVENOUS at 09:40

## 2025-02-26 RX ADMIN — CARVEDILOL 25 MG: 25 TABLET, FILM COATED ORAL at 09:40

## 2025-02-26 RX ADMIN — SODIUM CHLORIDE: 0.9 INJECTION, SOLUTION INTRAVENOUS at 18:37

## 2025-02-26 RX ADMIN — ENOXAPARIN SODIUM 40 MG: 100 INJECTION SUBCUTANEOUS at 09:41

## 2025-02-26 RX ADMIN — WATER 40 MG: 1 INJECTION INTRAMUSCULAR; INTRAVENOUS; SUBCUTANEOUS at 20:12

## 2025-02-26 RX ADMIN — SODIUM CHLORIDE, PRESERVATIVE FREE 10 ML: 5 INJECTION INTRAVENOUS at 09:35

## 2025-02-26 RX ADMIN — FUROSEMIDE 40 MG: 10 INJECTION, SOLUTION INTRAMUSCULAR; INTRAVENOUS at 05:22

## 2025-02-26 RX ADMIN — ATORVASTATIN CALCIUM 40 MG: 40 TABLET, FILM COATED ORAL at 09:40

## 2025-02-26 RX ADMIN — BUSPIRONE HYDROCHLORIDE 7.5 MG: 7.5 TABLET ORAL at 15:44

## 2025-02-26 RX ADMIN — GUAIFENESIN 600 MG: 600 TABLET, EXTENDED RELEASE ORAL at 20:12

## 2025-02-26 RX ADMIN — MORPHINE SULFATE 2 MG: 2 INJECTION, SOLUTION INTRAMUSCULAR; INTRAVENOUS at 09:36

## 2025-02-26 RX ADMIN — QUETIAPINE FUMARATE 400 MG: 200 TABLET ORAL at 20:12

## 2025-02-26 RX ADMIN — FAMOTIDINE 20 MG: 20 TABLET, FILM COATED ORAL at 20:12

## 2025-02-26 RX ADMIN — POTASSIUM CHLORIDE 40 MEQ: 1500 TABLET, EXTENDED RELEASE ORAL at 18:29

## 2025-02-26 RX ADMIN — HYDRALAZINE HYDROCHLORIDE 50 MG: 25 TABLET ORAL at 09:52

## 2025-02-26 RX ADMIN — IPRATROPIUM BROMIDE AND ALBUTEROL SULFATE 1 DOSE: .5; 2.5 SOLUTION RESPIRATORY (INHALATION) at 20:26

## 2025-02-26 RX ADMIN — BUSPIRONE HYDROCHLORIDE 7.5 MG: 7.5 TABLET ORAL at 09:40

## 2025-02-26 RX ADMIN — CARVEDILOL 25 MG: 25 TABLET, FILM COATED ORAL at 18:29

## 2025-02-26 RX ADMIN — GABAPENTIN 100 MG: 100 CAPSULE ORAL at 09:40

## 2025-02-26 RX ADMIN — METHYLPREDNISOLONE SODIUM SUCCINATE 80 MG: 125 INJECTION INTRAMUSCULAR; INTRAVENOUS at 05:22

## 2025-02-26 RX ADMIN — ACETAMINOPHEN 650 MG: 325 TABLET ORAL at 18:54

## 2025-02-26 RX ADMIN — HYDRALAZINE HYDROCHLORIDE 50 MG: 25 TABLET ORAL at 22:45

## 2025-02-26 RX ADMIN — DULOXETINE HYDROCHLORIDE 60 MG: 60 CAPSULE, DELAYED RELEASE ORAL at 09:39

## 2025-02-26 RX ADMIN — GABAPENTIN 100 MG: 100 CAPSULE ORAL at 18:28

## 2025-02-26 RX ADMIN — GABAPENTIN 100 MG: 100 CAPSULE ORAL at 20:12

## 2025-02-26 RX ADMIN — ASPIRIN 81 MG: 81 TABLET, CHEWABLE ORAL at 09:40

## 2025-02-26 RX ADMIN — BUSPIRONE HYDROCHLORIDE 7.5 MG: 7.5 TABLET ORAL at 20:11

## 2025-02-26 RX ADMIN — SODIUM CHLORIDE, PRESERVATIVE FREE 10 ML: 5 INJECTION INTRAVENOUS at 21:31

## 2025-02-26 RX ADMIN — FUROSEMIDE 40 MG: 10 INJECTION, SOLUTION INTRAMUSCULAR; INTRAVENOUS at 15:44

## 2025-02-26 RX ADMIN — TOPIRAMATE 25 MG: 25 TABLET, FILM COATED ORAL at 09:40

## 2025-02-26 RX ADMIN — IOPAMIDOL 85 ML: 755 INJECTION, SOLUTION INTRAVENOUS at 02:11

## 2025-02-26 RX ADMIN — DOXYCYCLINE 100 MG: 100 INJECTION, POWDER, LYOPHILIZED, FOR SOLUTION INTRAVENOUS at 18:40

## 2025-02-26 RX ADMIN — HYDRALAZINE HYDROCHLORIDE 50 MG: 25 TABLET ORAL at 15:44

## 2025-02-26 RX ADMIN — FAMOTIDINE 20 MG: 20 TABLET, FILM COATED ORAL at 09:40

## 2025-02-26 RX ADMIN — OXCARBAZEPINE 450 MG: 300 TABLET, FILM COATED ORAL at 20:11

## 2025-02-26 RX ADMIN — PANTOPRAZOLE SODIUM 40 MG: 40 TABLET, DELAYED RELEASE ORAL at 09:40

## 2025-02-26 ASSESSMENT — PAIN SCALES - GENERAL
PAINLEVEL_OUTOF10: 0
PAINLEVEL_OUTOF10: 0
PAINLEVEL_OUTOF10: 9
PAINLEVEL_OUTOF10: 0
PAINLEVEL_OUTOF10: 8
PAINLEVEL_OUTOF10: 8
PAINLEVEL_OUTOF10: 6

## 2025-02-26 ASSESSMENT — HEART SCORE: ECG: NORMAL

## 2025-02-26 ASSESSMENT — PAIN DESCRIPTION - LOCATION
LOCATION: BACK
LOCATION: HEAD
LOCATION: ARM;BACK

## 2025-02-26 ASSESSMENT — PAIN DESCRIPTION - ORIENTATION
ORIENTATION: LEFT
ORIENTATION: LEFT

## 2025-02-26 ASSESSMENT — PAIN DESCRIPTION - DESCRIPTORS: DESCRIPTORS: STABBING

## 2025-02-26 NOTE — CONSULTS
Pulmonary & Critical Care Consultation Note    Patient is being seen at the request of Kevin Easley MD for a consultation for recurrent pleural effusion    HISTORY OF PRESENT ILLNESS:   74 years old presented with progressive shortness of breath past few days.  Associated with cough. Chest heaviness. L sided chest pain, wore with deep breath, was not beena to take deep breath. Little cough with yellow sputum. No hemoptysis. Smoking still 1/2 ppd. Compliant with inhaled bronchodilators. Uses O2 2L at home.     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    Coronary artery disease involving native coronary artery of native heart with angina pectoris 3/8/2016    Depression     Emphysema of lung ([facility])     Fibromyalgia     hyperlipidemia 8/11/2011    Hypertension     Kidney stone     Liver disease     Lung disease     MDD (major depressive disorder) 4/4/2012    Mood disorder 3/13/2013    Movement disorder     Neck pain, chronic 3/13/2013    Opiate misuse     Personality disorder (HCC)     Pneumonia     Polypharmacy 2/16/2013     PAST SURGICAL HISTORY:  Past Surgical History:   Procedure Laterality Date    COLONOSCOPY  1/19/12    DIAGNOSTIC CARDIAC CATH LAB PROCEDURE  Feb, 2007    Normal cor angio Feb, 2007    HERNIA REPAIR  07/11/2019    robotic ventral hernia repair with mesh    HERNIA REPAIR N/A 7/11/2019    ROBOTIC VENTRAL HERNIA REPAIR WITH MESH performed by Yuriy Rider MD at [facility]    HYSTERECTOMY, TOTAL ABDOMINAL (CERVIX REMOVED)      KIDNEY STONE SURGERY         FAMILY HISTORY:  family history includes Arthritis in her father and

## 2025-02-26 NOTE — PROGRESS NOTES
02/26/25 1700   RT Protocol   History Pulmonary Disease 2   Respiratory pattern 2   Breath sounds 4   Cough 0   Indications for Bronchodilator Therapy Wheezing associated with pulm disorder;On home bronchodilators   Bronchodilator Assessment Score 8

## 2025-02-26 NOTE — ED NOTES
Assumed care from night shift RN. Patient calling out requesting blankets d/t being cold from sitting in urine saturated sheets. Pt required entire bed and linen change. Pure wick not properly placed, pt incontinent of urine. Did clean and replace all necessary items. Pt repositioned in bed. Remains on home O2, does have wet cough. Not currently productive. Not having any pain. Denies any other symptoms. States her breathing is better compared to arrival. Pure wick replaced in correct position. Pt denies other needs currently.

## 2025-02-26 NOTE — ED PROVIDER NOTES
[facility] EMERGENCY DEPARTMENT  EMERGENCY DEPARTMENT ENCOUNTER      Pt Name: Najma Fitzpatrick  MRN: [id number]  Birthdate 1950  Date of evaluation: 2/25/2025  Provider: Ledy Germain MD    CHIEF COMPLAINT       Chief Complaint   Patient presents with    Shortness of Breath     Brought in by EMS, due SOB since 2 days, which started yesterday, stated that today is worse, on arrival 2 LPM oxygen. Uses Oxygen at home at 2LPM         HISTORY OF PRESENT ILLNESS   (Location/Symptom, Timing/Onset, Context/Setting, Quality, Duration, Modifying Factors, Severity)  Note limiting factors.   Najma Fitzpatrick is a 74 y.o. female who presents to the emergency department with shortness of breath and chest pain.  Patient has had shortness of breath for the past 2 days.  She was admitted to the hospital as below she uses 2 L of oxygen at home.  She is also complaining left-sided chest pain that radiates to her back that is worse with movement.  No nausea or vomiting.  She has a history of congestive heart failure and states that she has had to have fluid drained before from her along with a needle but also is unclear about whether the needle was in an IV form or if it was in her chest wall.  Patient also has a history of COPD.  She was admitted from 2/17/2025 to 2/20/2025 with similar complaints      This patient is at risk for a communicable infection. Therefore, personal protection equipment consisting of a mask and gloves worn for the exam.     Nursing Notes were reviewed.    REVIEW OF SYSTEMS    (2-9 systems for level 4, 10 or more for level 5)     As per HPI    Except as noted above the remainder of the review of systems was reviewed and negative.       PAST MEDICAL HISTORY     Past Medical History:   Diagnosis Date    Abnormal ECG 12/14/2012    Anemia     Arthritis     Back pain, chronic 3/13/2013    Bipolar disorder (HCC)     Bronchitis chronic     CHF (congestive heart failure) (HCC) 9/30/2016    Chronic back pain      function. TAPSE is normal.    Aortic Valve: Mildly calcified cusps. No stenosis.    Mitral Valve: Mildly calcified leaflets. Trace regurgitation. No stenosis noted.    Tricuspid Valve: Mildly calcified leaflets. Trace regurgitation. Unable to assess RVSP due to inadequate or insignificant tricuspid regurgitation.    Signed by: Derrek Carr MD on 2/20/2025  1:22 PM   REASSESSMENT     ED Course as of 02/26/25 0425   Tue Feb 25, 2025   2334 EKG on my interpretation shows normal sinus rhythm at 65.  ME interval 166.  .  .  QTc 449.  There are no acute ST segment elevations or depressions present.  Incomplete right bundle branch block.  This is essentially unchanged from the prior EKG from 2/17/2025 [AM]   Wed Feb 26, 2025   0135 Flu and COVID are both negative.  D-dimer is elevated.  Will add CT chest [AM]   0345 CT per radiology report shows:  IMPRESSION:  1. No evidence of pulmonary embolism.  2. Mild pulmonary edema with moderate left and trace right pleural effusions.  3. Three-vessel coronary artery calcification.   [AM]      ED Course User Index  [AM] Ledy Germain MD         CRITICAL CARE TIME   None    CONSULTS:  None    PROCEDURES:  Unless otherwise noted below, none     Procedures        FINAL IMPRESSION      1. Congestive heart failure, unspecified HF chronicity, unspecified heart failure type (HCC)    2. COPD exacerbation (HCC)    3. Chest pain, unspecified type          DISPOSITION/PLAN   DISPOSITION Decision To Admit 02/26/2025 04:18:19 AM   DISPOSITION CONDITION Stable           PATIENT REFERRED TO:  No follow-up provider specified.    DISCHARGE MEDICATIONS:  New Prescriptions    No medications on file     Controlled Substances Monitoring:     RX Monitoring Attestation Periodic Controlled Substance Monitoring   10/18/2018   1:24 PM The Prescription Monitoring Report for this patient was reviewed today. Possible medication side effects, risk of tolerance/dependence & alternative

## 2025-02-26 NOTE — PROGRESS NOTES
Progress Note    Admit Date:  2/25/2025      Recurrent pleural effusions   Just admitted for similar   S/p thoracentesis today     Cardiology consulted    Subjective:  Ms. Fitzpatrick today resting in bed, feels so much better after thoracentesis  Still with mild shortness of breath     Objective:   Patient Vitals for the past 4 hrs:   BP Pulse Resp SpO2   02/26/25 1544 109/79 -- -- --   02/26/25 1532 (!) 109/52 72 19 93 %   02/26/25 1402 (!) 132/49 78 14 --   02/26/25 1332 (!) 131/52 78 13 --   02/26/25 1303 (!) 122/49 75 16 --          Intake/Output Summary (Last 24 hours) at 2/26/2025 1641  Last data filed at 2/26/2025 1050  Gross per 24 hour   Intake --   Output 1200 ml   Net -1200 ml       Physical Exam:    Gen: No distress. Alert. 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 No sclera icterus. No conjunctival injection.   Neck: No JVD.  Trachea midline.  Resp: No accessory muscle use. No crackles.  No rhonchi. +Diminished breath sounds with trace wheezing   CV: Regular rate. Regular rhythm. No murmur.  No rub. No edema.   Peripheral Pulses: +2 palpable, equal bilaterally   GI: Non-tender. Non-distended.  Normal bowel sounds.  Skin: Warm and dry. No nodule on exposed extremities. No rash on exposed extremities.   M/S: No cyanosis. No joint deformity. No clubbing.   Neuro: Awake. Grossly nonfocal  +blind   Psych: Oriented x 3. No anxiety or agitation.         Medications:  amLODIPine, 5 mg, Daily  aspirin, 81 mg, Daily  atorvastatin, 40 mg, Daily  busPIRone, 7.5 mg, TID  carvedilol, 25 mg, BID WC  DULoxetine, 60 mg, Daily  famotidine, 20 mg, BID  gabapentin, 100 mg, TID  hydrALAZINE, 50 mg, 3 times per day  [Held by provider] losartan, 100 mg, Daily  nicotine, 1 patch, Q24H  OXcarbazepine, 450 mg, Nightly  pantoprazole, 40 mg, Daily  QUEtiapine, 400 mg, Nightly  topiramate, 25 mg, Daily  sodium chloride flush, 5-40 mL, 2 times per day  enoxaparin, 40 mg, Daily  doxycycline (VIBRAMYCIN) IV, 100 mg, Q12H  ipratropium

## 2025-02-26 NOTE — PROGRESS NOTES
Image guided Thoracentesis completed.  350 ML of SPENCER colored withdrawn.  Pt tolerated procedure without any signs or symptoms of distress. Vital signs stable.     DISCHARGED:  ADMITTED: Pt transferred back to room 07. Report called to nurse.     SPECIMEN SENT:  Yes    Vital Signs  Vitals:    02/26/25 1402   BP: (!) 132/49   Pulse: 78   Resp: 14   Temp:    SpO2:     (vital signs in table format)

## 2025-02-26 NOTE — H&P
V2.0  History and Physical      Name:  Najma Fitzpatrick /Age/Sex: 1950  (74 y.o. female)   MRN & CSN:  [id number] & [id number] Encounter Date/Time: 2025 4:50 AM EST   Location:   PCP: Geena Sotomayor APRN - [facility]       Hospital Day: 2    Assessment and Plan:   Najma Fitzpatrick is a 74 y.o. 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    Coronary artery disease involving native coronary artery of native heart with angina pectoris 3/8/2016    Depression     Emphysema of lung (HCC)     Fibromyalgia     hyperlipidemia 2011    Hypertension     Kidney stone     Liver disease     Lung disease     MDD (major depressive disorder) 2012    Mood disorder 3/13/2013    Movement disorder     Neck pain, chronic 3/13/2013    Opiate misuse     Personality disorder (HCC)     Pneumonia     Polypharmacy 2013           who presents with Pleural effusion    Hospital Problems             Last Modified POA    * (Principal) Pleural effusion 2025 Yes         Recurrent left pleural effusion, COPD, chronic respiratory failure with hypoxia:   - furosemide 40 mg IV BID   - pulmonology consulted  (need for cardiology?)   - s/p course of cefdinir 300 mg po BID   - morphine prn chest pain (s/p prior thoracentesis)     - umeclidinium -- not formulary, HELD     Acute on chronic heart failure with preserved ejection fraction, CAD:   - echo 25 with EF 65%   - cardiology consulted      - aspirin 81 mg po daily   - atorvastatin 40 mg po daily   - carvedilol 25 mg po BID   - furosemide 20 mg po daily - HELD (started IV instead)   - daily weights   -  strict IOs     Microcytic anemia:   - seems mildly improved from prior   - monitor   - denies recent bleeding     Hyponatremia:   - likely from heart failure   - monitor     Hypomagnesemia:   - replace prn     Anxiety, depression, bipolar disorder, personality disorder:   - buspar 7.5 mg po TID daily   - seroquel 400 mg po qhs   - cymbalta 60 mg po daily   - oxcarbazepine 450 mg po qhs     HTN:   - amlodipine 5 mg po daily   - hydralazine 50 mg po q8 hrs   - losartan 100 mg po daily     Chronic headaches:   - topiramate 25 mg po daily   - rimegepant 75 mg po daily prn     Tobacco use:   - nicotine 14 mg/24 hrs   - encouraged to stop smoking      Neuropathic pain:   - gabapentin 100 mg po TID     GERD:   - famotidine 20 mg po BID  - pantoprazole 40 mg po daily      Rhinitis:   - flonase daily PRN     Legally blind:   - barrier to pt care       FULL CODE       Disposition:   Current Living situation: to home   Expected Disposition: home   Estimated D/C: 2-3 days     Diet ADULT DIET; Clear Liquid   DVT Prophylaxis [x] Lovenox, []  Heparin, [] SCDs, [] Ambulation,  [] Eliquis, [] Xarelto, [] Coumadin   Code Status Full Code   Surrogate Decision Maker/ POA   (Brent)      Personally reviewed Lab Studies and Imaging              History from:     patient    History of Present Illness:     Chief Complaint: short of breath     Najma Fitzpatrick is a 74 y.o. 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

## 2025-02-26 NOTE — PROGRESS NOTES
Pt arrived for image guided Thoracentesis. Dr. Cole explained the procedure including the risk and benefits of the procedure. All questions answered. Pt verbalizes understanding of the procedure and states no more questions. Consent confirmed. Vital signs stable. Labs, allergies, medications, and code status reviewed. No contraindications noted. Time out completed prior to procedure.    Vital Signs  Vitals:    02/26/25 1402   BP: (!) 132/49   Pulse: 78   Resp: 14   Temp:    SpO2:     (vital signs in table format)      Allergies  Dicyclomine, Ibuprofen, Sumatriptan, Tape [adhesive tape], Tizanidine, and Motrin [ibuprofen micronized] (allergies)    Labs  Lab Results   Component Value Date    INR 0.95 02/26/2025    PROTIME 12.9 02/26/2025     Lab Results   Component Value Date    CREATININE 0.6 02/25/2025    BUN 10 02/25/2025     (L) 02/25/2025    K 3.3 (L) 02/25/2025    CL 88 (L) 02/25/2025    CO2 31 02/25/2025     Lab Results   Component Value Date    WBC 8.5 02/25/2025    HGB 9.7 (L) 02/25/2025    HCT 30.2 (L) 02/25/2025    MCV 78.9 (L) 02/25/2025     02/25/2025

## 2025-02-26 NOTE — CARE COORDINATION
Case Management Assessment  Initial Evaluation    Date/Time of Evaluation: 2/26/2025 12:02 PM  Assessment Completed by: CACHORRO Miles    If patient is discharged prior to next notation, then this note serves as note for discharge by case management.    Patient Name: Najma Fitzpatrick                   YOB: 1950  Diagnosis: Pleural effusion [J90]                   Date / Time: 2/25/2025 11:13 PM    Patient Admission Status: Inpatient   Readmission Risk (Low < 19, Mod (19-27), High > 27): Readmission Risk Score: 36    Current PCP: Geena Sotomayor APRN - CNP  PCP verified by CM? Yes    Chart Reviewed: Yes      History Provided by: Medical Record  Patient Orientation: Alert and Oriented    Patient Cognition: Alert    Hospitalization in the last 30 days (Readmission):  Yes    If yes, Readmission Assessment in  Navigator will be completed.    Advance Directives:      Code Status: Full Code   Patient's Primary Decision Maker is: Legal Next of Kin    Primary Decision Maker: Brent Fitzpatrick - Spouse - [phone]    Secondary Decision Maker (Active): Maricel Esquivel - Child - [phone]    Supplemental (Other) Decision Maker: Margarita Fitzpatrick - Child - [phone]    Discharge Planning:    Patient lives with: Spouse/Significant Other, Children Type of Home: House  Primary Care Giver: Self  Patient Support Systems include: Spouse/Significant Other, Children   Current Financial resources: Medicare ([employer])  Current community resources: None  Current services prior to admission: Durable Medical Equipment, Oxygen Therapy            Current DME: Walker, Oxygen Therapy (Comment) (2L O2 via [employer])            Type of Home Care services:  None    ADLS  Prior functional level: Assistance with the following:, Bathing, Dressing, Cooking, Housework, Shopping, Mobility  Current functional level: Assistance with the following:, Bathing, Dressing, Cooking, Housework, Shopping, Mobility    PT AM-PAC:   /24  OT

## 2025-02-26 NOTE — PROGRESS NOTES
Patient admitted to room 321 from ed. Patient oriented to room, call light, bed rails, phone, lights and bathroom. Patient instructed about the schedule of the day including: vital sign frequency, lab draws, possible tests, frequency of MD and staff rounds, daily weights, I &O's and prescribed diet. #26 Telemetry box in place, patient aware of placement and reason. Bed locked, in lowest position, side rails up 2/4, call light within reach.        Recliner Assessment  Patient is able to demonstrate the ability to move from a reclining position to an upright position within the recliner.        Spouse at bedside  -  patient belongings brought up in pink bag - patient spouse to take home.

## 2025-02-27 VITALS
RESPIRATION RATE: 18 BRPM | HEART RATE: 71 BPM | DIASTOLIC BLOOD PRESSURE: 66 MMHG | HEIGHT: 62 IN | BODY MASS INDEX: 30.22 KG/M2 | SYSTOLIC BLOOD PRESSURE: 117 MMHG | TEMPERATURE: 97.3 F | OXYGEN SATURATION: 91 % | WEIGHT: 164.2 LBS

## 2025-02-27 LAB
ACID FAST STN SPEC QL: NORMAL
ANA SER QL IA: NEGATIVE
ANION GAP SERPL CALCULATED.3IONS-SCNC: 13 MMOL/L (ref 3–16)
BASOPHILS # BLD: 0 K/UL (ref 0–0.2)
BASOPHILS NFR BLD: 0.1 %
BUN SERPL-MCNC: 13 MG/DL (ref 7–20)
C3 SERPL-MCNC: 130 MG/DL (ref 90–180)
C4 SERPL-MCNC: 26.7 MG/DL (ref 10–40)
CALCIUM SERPL-MCNC: 7.9 MG/DL (ref 8.3–10.6)
CANCER AG125 SERPL-ACNC: 23.2 U/ML (ref 0–35)
CHLORIDE SERPL-SCNC: 88 MMOL/L (ref 99–110)
CK SERPL-CCNC: 44 U/L (ref 26–192)
CO2 SERPL-SCNC: 30 MMOL/L (ref 21–32)
CREAT SERPL-MCNC: 0.6 MG/DL (ref 0.6–1.2)
DEPRECATED RDW RBC AUTO: 15.8 % (ref 12.4–15.4)
EOSINOPHIL # BLD: 0 K/UL (ref 0–0.6)
EOSINOPHIL NFR BLD: 0 %
GFR SERPLBLD CREATININE-BSD FMLA CKD-EPI: >90 ML/MIN/{1.73_M2}
GLUCOSE SERPL-MCNC: 149 MG/DL (ref 70–99)
HCT VFR BLD AUTO: 26.7 % (ref 36–48)
HGB BLD-MCNC: 8.7 G/DL (ref 12–16)
LYMPHOCYTES # BLD: 0.8 K/UL (ref 1–5.1)
LYMPHOCYTES NFR BLD: 8.9 %
MAGNESIUM SERPL-MCNC: 1.65 MG/DL (ref 1.8–2.4)
MCH RBC QN AUTO: 25.6 PG (ref 26–34)
MCHC RBC AUTO-ENTMCNC: 32.6 G/DL (ref 31–36)
MCV RBC AUTO: 78.8 FL (ref 80–100)
MONOCYTES # BLD: 0.8 K/UL (ref 0–1.3)
MONOCYTES NFR BLD: 8.4 %
NEUTROPHILS # BLD: 7.4 K/UL (ref 1.7–7.7)
NEUTROPHILS NFR BLD: 82.6 %
PLATELET # BLD AUTO: 239 K/UL (ref 135–450)
PMV BLD AUTO: 6.8 FL (ref 5–10.5)
POTASSIUM SERPL-SCNC: 3.4 MMOL/L (ref 3.5–5.1)
RBC # BLD AUTO: 3.38 M/UL (ref 4–5.2)
RHEUMATOID FACT SER IA-ACNC: <10 IU/ML
SODIUM SERPL-SCNC: 131 MMOL/L (ref 136–145)
WBC # BLD AUTO: 9 K/UL (ref 4–11)

## 2025-02-27 PROCEDURE — 83735 ASSAY OF MAGNESIUM: CPT

## 2025-02-27 PROCEDURE — 97161 PT EVAL LOW COMPLEX 20 MIN: CPT

## 2025-02-27 PROCEDURE — 83516 IMMUNOASSAY NONANTIBODY: CPT

## 2025-02-27 PROCEDURE — 86200 CCP ANTIBODY: CPT

## 2025-02-27 PROCEDURE — 99238 HOSP IP/OBS DSCHRG MGMT 30/<: CPT | Performed by: INTERNAL MEDICINE

## 2025-02-27 PROCEDURE — 97530 THERAPEUTIC ACTIVITIES: CPT

## 2025-02-27 PROCEDURE — 2700000000 HC OXYGEN THERAPY PER DAY

## 2025-02-27 PROCEDURE — 97116 GAIT TRAINING THERAPY: CPT

## 2025-02-27 PROCEDURE — 2500000003 HC RX 250 WO HCPCS: Performed by: PEDIATRICS

## 2025-02-27 PROCEDURE — 94640 AIRWAY INHALATION TREATMENT: CPT

## 2025-02-27 PROCEDURE — 86431 RHEUMATOID FACTOR QUANT: CPT

## 2025-02-27 PROCEDURE — 86162 COMPLEMENT TOTAL (CH50): CPT

## 2025-02-27 PROCEDURE — 82550 ASSAY OF CK (CPK): CPT

## 2025-02-27 PROCEDURE — 6360000002 HC RX W HCPCS

## 2025-02-27 PROCEDURE — 97535 SELF CARE MNGMENT TRAINING: CPT

## 2025-02-27 PROCEDURE — 99233 SBSQ HOSP IP/OBS HIGH 50: CPT | Performed by: INTERNAL MEDICINE

## 2025-02-27 PROCEDURE — 86038 ANTINUCLEAR ANTIBODIES: CPT

## 2025-02-27 PROCEDURE — 94010 BREATHING CAPACITY TEST: CPT

## 2025-02-27 PROCEDURE — 94761 N-INVAS EAR/PLS OXIMETRY MLT: CPT

## 2025-02-27 PROCEDURE — 82085 ASSAY OF ALDOLASE: CPT

## 2025-02-27 PROCEDURE — 6360000002 HC RX W HCPCS: Performed by: PEDIATRICS

## 2025-02-27 PROCEDURE — 85025 COMPLETE CBC W/AUTO DIFF WBC: CPT

## 2025-02-27 PROCEDURE — 6370000000 HC RX 637 (ALT 250 FOR IP): Performed by: INTERNAL MEDICINE

## 2025-02-27 PROCEDURE — 97165 OT EVAL LOW COMPLEX 30 MIN: CPT

## 2025-02-27 PROCEDURE — 36415 COLL VENOUS BLD VENIPUNCTURE: CPT

## 2025-02-27 PROCEDURE — 2500000003 HC RX 250 WO HCPCS: Performed by: INTERNAL MEDICINE

## 2025-02-27 PROCEDURE — 6370000000 HC RX 637 (ALT 250 FOR IP): Performed by: PEDIATRICS

## 2025-02-27 PROCEDURE — 80048 BASIC METABOLIC PNL TOTAL CA: CPT

## 2025-02-27 PROCEDURE — 2580000003 HC RX 258: Performed by: INTERNAL MEDICINE

## 2025-02-27 PROCEDURE — 86225 DNA ANTIBODY NATIVE: CPT

## 2025-02-27 PROCEDURE — 6360000002 HC RX W HCPCS: Performed by: INTERNAL MEDICINE

## 2025-02-27 PROCEDURE — 86235 NUCLEAR ANTIGEN ANTIBODY: CPT

## 2025-02-27 PROCEDURE — 86160 COMPLEMENT ANTIGEN: CPT

## 2025-02-27 RX ORDER — PREDNISONE 10 MG/1
TABLET ORAL
Qty: 57 TABLET | Refills: 0 | Status: SHIPPED | OUTPATIENT
Start: 2025-02-28 | End: 2025-04-08

## 2025-02-27 RX ORDER — FUROSEMIDE 20 MG/1
20 TABLET ORAL DAILY
Qty: 30 TABLET | Refills: 0 | Status: SHIPPED | OUTPATIENT
Start: 2025-02-27

## 2025-02-27 RX ORDER — GUAIFENESIN 600 MG/1
600 TABLET, EXTENDED RELEASE ORAL 2 TIMES DAILY
COMMUNITY
Start: 2025-02-27

## 2025-02-27 RX ORDER — POTASSIUM CHLORIDE 1500 MG/1
20 TABLET, EXTENDED RELEASE ORAL ONCE
Status: COMPLETED | OUTPATIENT
Start: 2025-02-27 | End: 2025-02-27

## 2025-02-27 RX ORDER — DOXYCYCLINE HYCLATE 100 MG
100 TABLET ORAL 2 TIMES DAILY
Qty: 8 TABLET | Refills: 0 | Status: SHIPPED | OUTPATIENT
Start: 2025-02-27 | End: 2025-03-03

## 2025-02-27 RX ORDER — MAGNESIUM OXIDE 400 MG/1
400 TABLET ORAL DAILY
Qty: 30 TABLET | Refills: 1 | Status: SHIPPED | OUTPATIENT
Start: 2025-02-27

## 2025-02-27 RX ADMIN — BUSPIRONE HYDROCHLORIDE 7.5 MG: 7.5 TABLET ORAL at 12:26

## 2025-02-27 RX ADMIN — GABAPENTIN 100 MG: 100 CAPSULE ORAL at 09:32

## 2025-02-27 RX ADMIN — FAMOTIDINE 20 MG: 20 TABLET, FILM COATED ORAL at 09:32

## 2025-02-27 RX ADMIN — ACETAMINOPHEN 650 MG: 325 TABLET ORAL at 12:26

## 2025-02-27 RX ADMIN — CARVEDILOL 25 MG: 25 TABLET, FILM COATED ORAL at 09:32

## 2025-02-27 RX ADMIN — ENOXAPARIN SODIUM 40 MG: 100 INJECTION SUBCUTANEOUS at 09:34

## 2025-02-27 RX ADMIN — AMLODIPINE BESYLATE 5 MG: 5 TABLET ORAL at 09:32

## 2025-02-27 RX ADMIN — ATORVASTATIN CALCIUM 40 MG: 40 TABLET, FILM COATED ORAL at 09:33

## 2025-02-27 RX ADMIN — BUSPIRONE HYDROCHLORIDE 7.5 MG: 7.5 TABLET ORAL at 09:33

## 2025-02-27 RX ADMIN — IPRATROPIUM BROMIDE AND ALBUTEROL SULFATE 1 DOSE: 2.5; .5 SOLUTION RESPIRATORY (INHALATION) at 08:32

## 2025-02-27 RX ADMIN — WATER 40 MG: 1 INJECTION INTRAMUSCULAR; INTRAVENOUS; SUBCUTANEOUS at 09:33

## 2025-02-27 RX ADMIN — HYDRALAZINE HYDROCHLORIDE 50 MG: 25 TABLET ORAL at 12:26

## 2025-02-27 RX ADMIN — DOXYCYCLINE 100 MG: 100 INJECTION, POWDER, LYOPHILIZED, FOR SOLUTION INTRAVENOUS at 02:13

## 2025-02-27 RX ADMIN — PANTOPRAZOLE SODIUM 40 MG: 40 TABLET, DELAYED RELEASE ORAL at 09:33

## 2025-02-27 RX ADMIN — GUAIFENESIN 600 MG: 600 TABLET, EXTENDED RELEASE ORAL at 09:32

## 2025-02-27 RX ADMIN — ASPIRIN 81 MG: 81 TABLET, CHEWABLE ORAL at 09:32

## 2025-02-27 RX ADMIN — POTASSIUM CHLORIDE 20 MEQ: 1500 TABLET, EXTENDED RELEASE ORAL at 12:27

## 2025-02-27 RX ADMIN — IPRATROPIUM BROMIDE AND ALBUTEROL SULFATE 1 DOSE: 2.5; .5 SOLUTION RESPIRATORY (INHALATION) at 14:13

## 2025-02-27 RX ADMIN — MORPHINE SULFATE 2 MG: 2 INJECTION, SOLUTION INTRAMUSCULAR; INTRAVENOUS at 03:12

## 2025-02-27 RX ADMIN — FUROSEMIDE 20 MG: 10 INJECTION, SOLUTION INTRAMUSCULAR; INTRAVENOUS at 09:33

## 2025-02-27 RX ADMIN — TOPIRAMATE 25 MG: 25 TABLET, FILM COATED ORAL at 09:32

## 2025-02-27 RX ADMIN — DULOXETINE HYDROCHLORIDE 60 MG: 60 CAPSULE, DELAYED RELEASE ORAL at 09:32

## 2025-02-27 RX ADMIN — SODIUM CHLORIDE, PRESERVATIVE FREE 10 ML: 5 INJECTION INTRAVENOUS at 09:34

## 2025-02-27 RX ADMIN — GABAPENTIN 100 MG: 100 CAPSULE ORAL at 12:26

## 2025-02-27 ASSESSMENT — COPD QUESTIONNAIRES
QUESTION4_WALKINCLINE: 4
QUESTION6_LEAVINGHOUSE: 1
CAT_TOTALSCORE: 25
QUESTION1_COUGHFREQUENCY: 4
QUESTION7_SLEEPQUALITY: 0
QUESTION8_ENERGYLEVEL: 3
QUESTION5_HOMEACTIVITIES: 4
QUESTION3_CHESTTIGHTNESS: 4
QUESTION2_CHESTPHLEGM: 5

## 2025-02-27 ASSESSMENT — PAIN SCALES - GENERAL
PAINLEVEL_OUTOF10: 9
PAINLEVEL_OUTOF10: 3

## 2025-02-27 ASSESSMENT — PAIN DESCRIPTION - LOCATION
LOCATION: BACK
LOCATION: GENERALIZED

## 2025-02-27 ASSESSMENT — PAIN DESCRIPTION - DESCRIPTORS: DESCRIPTORS: ACHING

## 2025-02-27 NOTE — PROGRESS NOTES
End of shift report given to Georgie RN  -  care transferred.  Patient resting in bed, call light in reach.

## 2025-02-27 NOTE — PROGRESS NOTES
Inpatient Physical Therapy Evaluation & Treatment    Unit: PCU  Date:  2/27/2025  Patient Name:    Najma Fitzpatrick  Admitting diagnosis:  Pleural effusion [J90]  COPD exacerbation (HCC) [J44.1]  Chest pain, unspecified type [R07.9]  Congestive heart failure, unspecified HF chronicity, unspecified heart failure type (HCC) [I50.9]  Admit Date:  2/25/2025  Precautions/Restrictions/WB Status/ Lines/ Wounds/ Oxygen: Fall risk, Bed/chair alarm, Lines (IV, Supplemental O2 (2L), and external catheter), Impaired vision, Telemetry, Continuous pulse oximetry, and Isolation Precautions: Environmental      Pt seen for cotreatment this date due to patient safety, acute illness/injury, and limited functional status information    Treatment Time:  6963-0964  Treatment Number:  1   Timed Code Treatment Minutes: 26 minutes  Total Treatment Minutes:  36  minutes    Patient Stated Goals for Therapy: \" go home \"          Discharge Recommendations: Home with PRN assistance and Home PT  DME needs for discharge: Needs Met       Therapy recommendation for EMS Transport: can transport by wheelchair    Therapy recommendations for staff:   Stand by assist for ambulation with use of rolling walker (RW) and gait belt to/from chair  to/from bathroom  within room    History of Present Illness:   Per Dr. Easley H&P:  \"Pt is a 74 y.o. female who presents with progressively worsening shortness of breath over the past few days.     She reports having a \"weird cough\" that is both wet and dry.   But she also reports that sometimes her cough goes deep she will cough up something. She reports being nearly blind so can't see it well, but thinks it is thick and yellow.      No chest pain. No fevers.   She reports compliance with her medications.      She reports a severe left sided chest pain under her rib at the thoracentesis needle site. She reports she called to try to get pain medication as it hurt so bad she was crying, but she was told she would need    Seated at recliner chair 126/68 78 91     Seated at EOB           After ambulation     85     End of session     91 Cues for PLB; recovered within 3.5 min        Objective  Does this pt have an acute or acute on chronic diagnosis of CHF? No    Upper Extremity ROM/Strength  Please see OT evaluation.      Lower Extremity ROM / Strength   AROM WFL: Yes  ROM limitations: WFL    BLE strength WFL, but not formally assessed with MMT.     Lower Extremity Sensation    NT    Coordination  WFL    Tone  Not Tested    Balance  Static Sitting:  Independent  Dynamic Sitting:  Independent   Comments: EOB and recliner chair    Static Standing: Independent  Dynamic Standing:  SBA  Comments: RW for support    Posture  Seated: Forward head and neck  Standing: Forward head and neck    Bed Mobility   Supine to Sit:    Independent  Sit to Supine:   Independent  Rolling:   Independent  Scooting in sitting: Independent  Scooting in supine:  Not Tested   Bridging:  Not Tested  Comments:     Transfer Training     Sit to stand:   Independent  Stand to sit:   Independent  Bed to/from Chair:  Independent   Comments: No cues required    Gait gait completed as indicated below  Distance:      60 ft  Deviations (firm surface/linoleum):  decreased yves and forward flexed posture  Assistive Device Used:    gait belt and rolling walker (RW)  Level of Assist:    SBA   Comment: SBA 2/2 limited vision (legally blind with \"some\" vision on left), cues for avoiding objects in path.    Stair Training  pt deferred to attempt; \"I can already do that.\"    Therapeutic Exercises Initiated  deferred secondary to treatment focus on functional mobility    Positioning Needs   Pt reclined in chair and alarm set  Call light provided and all needs within reach  RN aware of pt position/status    Other Activities  Refer to OT note.    Patient/Family Education   Pt educated on role of inpatient PT, POC, importance of continued activity, DC recommendations, functional

## 2025-02-27 NOTE — PROGRESS NOTES
Pulmonary Progress Note    CC: Pleural effusion    Subjective:   Feels much better after thoracentesis  Wants to go home  2 L O2        Intake/Output Summary (Last 24 hours) at 2/27/2025 5704  Last data filed at 2/27/2025 0410  Gross per 24 hour   Intake 592 ml   Output 1500 ml   Net -908 ml       Exam:   BP (!) 102/55   Pulse 72   Temp 98.2 °F (36.8 °C) (Oral)   Resp 16   Ht 1.575 m (5' 2\")   Wt 74.5 kg (164 lb 3.2 oz)   SpO2 96%   BMI 30.03 kg/m²  on 2 L  Gen: No distress.  Ill-appearing  Eyes: No sclera icterus. No conjunctival injection.   Neck: Trachea midline. No obvious mass.    Resp: Mild accessory muscle use. No crackles.  Minimal wheezes. No rhonchi.  No dullness on percussion.  CV: Regular rate. Regular rhythm. No murmur or rub. No edema.  GI: Non-tender. Non-distended. No hernia.   Skin: Warm and dry. No nodule on exposed extremities.   Lymph: No cervical LAD. No supraclavicular LAD.   M/S: No cyanosis. No joint deformity. No clubbing.   Neuro: Awake. Alert. Moves all four extremities.   Psych: Oriented. No anxiety.     Scheduled Meds:   amLODIPine  5 mg Oral Daily    aspirin  81 mg Oral Daily    atorvastatin  40 mg Oral Daily    busPIRone  7.5 mg Oral TID    carvedilol  25 mg Oral BID WC    DULoxetine  60 mg Oral Daily    famotidine  20 mg Oral BID    gabapentin  100 mg Oral TID    hydrALAZINE  50 mg Oral 3 times per day    [Held by provider] losartan  100 mg Oral Daily    nicotine  1 patch TransDERmal Q24H    OXcarbazepine  450 mg Oral Nightly    pantoprazole  40 mg Oral Daily    QUEtiapine  400 mg Oral Nightly    topiramate  25 mg Oral Daily    sodium chloride flush  5-40 mL IntraVENous 2 times per day    enoxaparin  40 mg SubCUTAneous Daily    doxycycline (VIBRAMYCIN) IV  100 mg IntraVENous Q12H    methylPREDNISolone  40 mg IntraVENous Q12H    guaiFENesin  600 mg Oral BID    furosemide  20 mg IntraVENous BID    ipratropium 0.5 mg-albuterol 2.5 mg  1 Dose Inhalation TID     Continuous

## 2025-02-27 NOTE — PROGRESS NOTES
PM assessment completed. Scheduled medications given per MAR. VSS 3 liter NC, A/O x4. Patient does not appear in distress and denies any needs at this time. Call light in reach, will monitor, bed alarm on.

## 2025-02-27 NOTE — PROGRESS NOTES
Progress Note    Admit Date:  2/25/2025      Recurrent pleural effusions   Just admitted for similar   S/p thoracentesis today     Cardiology consulted    Subjective:  Ms. Fitzpatrick today resting in bed, feels so much better after thoracentesis  Still with mild shortness of breath     Objective:   Patient Vitals for the past 4 hrs:   BP Pulse   02/27/25 0530 (!) 102/55 72          Intake/Output Summary (Last 24 hours) at 2/27/2025 0815  Last data filed at 2/27/2025 0410  Gross per 24 hour   Intake 592 ml   Output 1500 ml   Net -908 ml       Physical Exam:    Gen: No distress. Alert. 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 No sclera icterus. No conjunctival injection.   Neck: No JVD.  Trachea midline.  Resp: No accessory muscle use. No crackles.  No rhonchi. +Diminished breath sounds with trace wheezing   CV: Regular rate. Regular rhythm. No murmur.  No rub. No edema.   Peripheral Pulses: +2 palpable, equal bilaterally   GI: Non-tender. Non-distended.  Normal bowel sounds.  Skin: Warm and dry. No nodule on exposed extremities. No rash on exposed extremities.   M/S: No cyanosis. No joint deformity. No clubbing.   Neuro: Awake. Grossly nonfocal  +blind   Psych: Oriented x 3. No anxiety or agitation.         Medications:  amLODIPine, 5 mg, Daily  aspirin, 81 mg, Daily  atorvastatin, 40 mg, Daily  busPIRone, 7.5 mg, TID  carvedilol, 25 mg, BID WC  DULoxetine, 60 mg, Daily  famotidine, 20 mg, BID  gabapentin, 100 mg, TID  hydrALAZINE, 50 mg, 3 times per day  [Held by provider] losartan, 100 mg, Daily  nicotine, 1 patch, Q24H  OXcarbazepine, 450 mg, Nightly  pantoprazole, 40 mg, Daily  QUEtiapine, 400 mg, Nightly  topiramate, 25 mg, Daily  sodium chloride flush, 5-40 mL, 2 times per day  enoxaparin, 40 mg, Daily  doxycycline (VIBRAMYCIN) IV, 100 mg, Q12H  methylPREDNISolone, 40 mg, Q12H  guaiFENesin, 600 mg, BID  furosemide, 20 mg, BID  ipratropium 0.5 mg-albuterol 2.5 mg, 1 Dose, TID      PRN

## 2025-02-27 NOTE — PROGRESS NOTES
RT Inhaler-Nebulizer Bronchodilator Protocol Note    There is a bronchodilator order in the chart from a provider indicating to follow the RT Bronchodilator Protocol and there is an “Initiate RT Inhaler-Nebulizer Bronchodilator Protocol” order as well (see protocol at bottom of note).    CXR Findings:  XR CHEST PORTABLE    Result Date: 2/27/2025  Similar appearance of left base opacity with small effusion.       The findings from the last RT Protocol Assessment were as follows:   History Pulmonary Disease: (P) Chronic pulmonary disease  Respiratory Pattern: (P) Dyspnea on exertion or RR 21-25 bpm  Breath Sounds: (P) Slightly diminished and/or crackles  Cough: (P) Strong, spontaneous, non-productive  Indication for Bronchodilator Therapy: (P) Decreased or absent breath sounds  Bronchodilator Assessment Score: (P) 6    Aerosolized bronchodilator medication orders have been revised according to the RT Inhaler-Nebulizer Bronchodilator Protocol below.    Respiratory Therapist to perform RT Therapy Protocol Assessment initially then follow the protocol.  Repeat RT Therapy Protocol Assessment PRN for score 0-3 or on second treatment, BID, and PRN for scores above 3.    No Indications - adjust the frequency to every 6 hours PRN wheezing or bronchospasm, if no treatments needed after 48 hours then discontinue using Per Protocol order mode.     If indication present, adjust the RT bronchodilator orders based on the Bronchodilator Assessment Score as indicated below.  Use Inhaler orders unless patient has one or more of the following: on home nebulizer, not able to hold breath for 10 seconds, is not alert and oriented, cannot activate and use MDI correctly, or respiratory rate 25 breaths per minute or more, then use the equivalent nebulizer order(s) with same Frequency and PRN reasons based on the score.  If a patient is on this medication at home then do not decrease Frequency below that used at home.    0-3 - enter or revise

## 2025-02-27 NOTE — PROGRESS NOTES
Inpatient Occupational Therapy Evaluation & Treatment    Unit: PCU  Date:  2/27/2025  Patient Name:    Najma Fitzpatrick  Admitting diagnosis:  Pleural effusion [J90]  COPD exacerbation (HCC) [J44.1]  Chest pain, unspecified type [R07.9]  Congestive heart failure, unspecified HF chronicity, unspecified heart failure type (HCC) [I50.9]  Admit Date:  2/25/2025  Precautions/Restrictions/WB Status/ Lines/ Wounds/ Oxygen: Fall risk, Bed/chair alarm, Lines (IV, Supplemental O2 (2L), and external catheter), Impaired vision, Continuous pulse oximetry, and Isolation Precautions: Environmental; pt reports she can see \"some\" out of her L eye    Pt seen for cotreatment this date due to patient safety, patient endurance, acute illness/injury, and limited functional status information    Treatment Time:  6798-7373  Treatment Number:  1  Timed Code Treatment Minutes: 26 minutes  Total Treatment Minutes:  36  minutes    Patient Goals for Therapy: \"I need to get home\"          Discharge Recommendations: Home with PRN assist and Home with initial 24/7 supervision  DME needs for discharge: Needs Met       Therapy recommendations for staff:   Stand by assist for transfers with use of rolling walker (RW) and gait belt to/from chair  to/from bathroom    History of Present Illness: Per Dr. Easley H&P:  Pt is a 74 y.o. female who presents with progressively worsening shortness of breath over the past few days.     She reports having a \"weird cough\" that is both wet and dry.   But she also reports that sometimes her cough goes deep she will cough up something. She reports being nearly blind so can't see it well, but thinks it is thick and yellow.      No chest pain. No fevers.   She reports compliance with her medications.      She reports a severe left sided chest pain under her rib at the thoracentesis needle site. She reports she called to try to get pain medication as it hurt so bad she was crying, but she was told she would need to be  recliner chair 126/68 78 91    Seated at EOB       After ambulation   85    End of session   91 Cues for PLB; recovered within 3.5 min     Objective:  Does this pt have an acute or acute on chronic diagnosis of CHF? No    Upper Extremity ROM:    AROM WFL: Yes  UE ROM limitations: N/A    Dominant Hand: Left    Upper Extremity Strength:    BUE strength impaired but not formally assessed w/ MMT    Upper Extremity Sensation:    Diminished- reports numbness in fingertips    Coordination:  WFL    Tone:  Not Tested    Balance:  Sitting:    Independent static and dynamic  Standing:    SBA dynamic    Bed Mobility:   Supine to Sit:    Independent  Sit to Supine:   Independent  Rolling:   Independent  Scooting in sitting: Independent  Scooting in supine:  Not Tested    Transfers:    Sit to stand:    Independent  Stand to sit:    Independent  Bed to chair:     Independent with RW  Bed/ chair to standard toilet:  Not Tested  Bed/chair to BSC:   Not Tested  Functional Mobility:   SBA and With RW and gait belt    ADLs:  Dressing:      UE:   Not Tested  LE:    Independent don/doff socks while seated in recliner; SBA at standing level    Bathing:    UE:  Not Tested  LE:  Not Tested    Eating:   Not Tested    Toileting:  Not Tested    Grooming/hygiene: Independent brushing hair while seated in recliner    Ther Ex / Activities Initiated:   N/A    Positioning Needs:   Pt reclined in chair and alarm set  Call light provided and all needs within reach  RN aware of pt position/status    Patient/Family Education:   Pt educated on role of inpatient OT, plan of care, importance of continued activity, DC recommendations, functional transfer/mobility safety, transfer techniques, and calling for assist with mobility    CHF Education  N/A    Assessment:  Pt seen for occupational therapy evaluation in the acute care setting this date.  Pt demonstrated decreased Activity tolerance, ADLs, Balance , and Transfers. Pt's mild deficits in balance and

## 2025-02-27 NOTE — DISCHARGE INSTRUCTIONS
Heart Failure Resources:  Heart Failure Interactive Workbook:  Go to https://[employer]/publication/?z=943232 for a Free Heart Failure Interactive Workbook provided by The American Heart Association. This interactive workbook will provide information on Healthier Living with Heart Failure. Please copy and paste link into search bar. Use your mouse to scroll through the pages.    HF [place] teddy:   Heart Failure Free smart phone teddy available for iPhone and Android download. Use your phone to track sodium intake, fluid intake, symptoms, and weight.     Low Sodium Diet / Recipes:  Go to www.[employer] website for “renal” diet which is Low Sodium! [employer] is a dialysis company, but this website offers free seasonal cookbooks. Each quarter, they will release 25-30 new recipes with a breakdown of calories, sodium, and glucose. You can also go to [employer]/recipes website for free recipes.     Discharge Instruction Video:  Scan the QR code below with your camera and click the canva.com link to open the video and watch educational information on Heart Failure and Medications from one of our nurses.   [email or web address]_[email or web address]/edit    Home Exercise Program:   Identification of Green/Yellow/Red zones:  You should be able to identify when you feel good (green zone), if you have 1-2 symptoms of HF (yellow zone), or if you are in need of medical attention (red zone).  In your CHF education folder you were provided a “stop light tool” to outline this information.     We want to you to rate your exertion levels:    Our therapy team has discussed means of identification with you such as the \"Kat scale.\"  The Kat rating scale ranges from 6 to 20, where 6 means \"no exertion at all\" and 20 means \"maximal exertion.\" The goal is to use this to gauge how much effort it is taking for you to do your normal daily tasks.   You should be able to recognize when too much exertion is

## 2025-02-27 NOTE — PROGRESS NOTES
RT Inhaler-Nebulizer Bronchodilator Protocol Note    There is a bronchodilator order in the chart from a provider indicating to follow the RT Bronchodilator Protocol and there is an “Initiate RT Inhaler-Nebulizer Bronchodilator Protocol” order as well (see protocol at bottom of note).    CXR Findings:  XR CHEST PORTABLE    Result Date: 2/26/2025  Similar appearance of left base opacity with small effusion.       The findings from the last RT Protocol Assessment were as follows:   History Pulmonary Disease: Chronic pulmonary disease  Respiratory Pattern: Dyspnea on exertion or RR 21-25 bpm  Breath Sounds: Slightly diminished and/or crackles  Cough: Strong, productive  Indication for Bronchodilator Therapy: Wheezing associated with pulm disorder  Bronchodilator Assessment Score: 7    Aerosolized bronchodilator medication orders have been revised according to the RT Inhaler-Nebulizer Bronchodilator Protocol below.    Respiratory Therapist to perform RT Therapy Protocol Assessment initially then follow the protocol.  Repeat RT Therapy Protocol Assessment PRN for score 0-3 or on second treatment, BID, and PRN for scores above 3.    No Indications - adjust the frequency to every 6 hours PRN wheezing or bronchospasm, if no treatments needed after 48 hours then discontinue using Per Protocol order mode.     If indication present, adjust the RT bronchodilator orders based on the Bronchodilator Assessment Score as indicated below.  Use Inhaler orders unless patient has one or more of the following: on home nebulizer, not able to hold breath for 10 seconds, is not alert and oriented, cannot activate and use MDI correctly, or respiratory rate 25 breaths per minute or more, then use the equivalent nebulizer order(s) with same Frequency and PRN reasons based on the score.  If a patient is on this medication at home then do not decrease Frequency below that used at home.    0-3 - enter or revise RT bronchodilator order(s) to  equivalent RT Bronchodilator order with Frequency of every 4 hours PRN for wheezing or increased work of breathing using Per Protocol order mode.        4-6 - enter or revise RT Bronchodilator order(s) to two equivalent RT bronchodilator orders with one order with BID Frequency and one order with Frequency of every 4 hours PRN wheezing or increased work of breathing using Per Protocol order mode.        7-10 - enter or revise RT Bronchodilator order(s) to two equivalent RT bronchodilator orders with one order with TID Frequency and one order with Frequency of every 4 hours PRN wheezing or increased work of breathing using Per Protocol order mode.       11-13 - enter or revise RT Bronchodilator order(s) to one equivalent RT bronchodilator order with QID Frequency and an Albuterol order with Frequency of every 4 hours PRN wheezing or increased work of breathing using Per Protocol order mode.      Greater than 13 - enter or revise RT Bronchodilator order(s) to one equivalent RT bronchodilator order with every 4 hours Frequency and an Albuterol order with Frequency of every 2 hours PRN wheezing or increased work of breathing using Per Protocol order mode.     RT to enter RT Home Evaluation for COPD & MDI Assessment order using Per Protocol order mode.    Electronically signed by Debo Sorenson RCP on 2/26/2025 at 8:31 PM

## 2025-02-27 NOTE — CONSULTS
[facility]  Palliative Medicine Consultation Note      Date Of Admission:2/25/2025  Date of consult: 02/27/25  Seen by PC in the past:  No    Recommendations:        Writer met with the pt at bedside to introduce palliative/hospice options and had a voluntary discussion related to advance care planning. Pt has had multiple readmissions since January of this year related to recurrent pleural effusions. Pt is not in the hospice mindset at this time. Pt declines palliative care and HTP program as well. Plan continues home with family. Hospice list provided to the pt if needed once home.     1. Goals of Care/Advanced Care planning/Code status: Full Code  2. Pain: managed per care team  3. SOB: denies, on 2 L O2 at baseline  4. Disposition: home with family    Reason for Consult:         [x]  Goals of Care  []  Code Status Discussion/Advanced Care Planning   []  Psychosocial/Family Support  []  Symptom Management  []  Other (Specify)    Requesting Physician: Dr. Cheikh Lew    CHIEF COMPLAINT:  Pleural effusion    History Obtained From:  electronic medical record and pt    History of Present Illness:         Najma Fitzpatrick is a 74 y.o. female with PMH of (see below list) who presented with chronic hypoxemic respiratory failure, Pleural effusion, COPD exacerbation, Chest pain,and congestive heart failure. Plan continues home with family today at [place]. Pt declines any additional support in the home at this time.    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

## 2025-02-27 NOTE — PROGRESS NOTES
Discharge instructions given and reviewed with pt. All questions answered. Son in room to get the pt.

## 2025-02-27 NOTE — DISCHARGE SUMMARY
daily      QUEtiapine (SEROQUEL) 200 MG tablet Take 2 tablets by mouth at bedtime      carvedilol (COREG) 25 MG tablet Take 1 tablet by mouth 2 times daily (with meals)      DULoxetine (CYMBALTA) 60 MG extended release capsule Take 1 capsule by mouth daily      aspirin 81 MG chewable tablet Take 1 tablet by mouth daily      atorvastatin (LIPITOR) 40 MG tablet Take 1 tablet by mouth daily      albuterol sulfate HFA (VENTOLIN HFA) 108 (90 Base) MCG/ACT inhaler Inhale 2 puffs into the lungs every 4 hours as needed for Wheezing      topiramate (TOPAMAX) 25 MG tablet Take 1 tablet by mouth daily      famotidine (PEPCID) 20 MG tablet Take 1 tablet by mouth 2 times daily      fluticasone (FLONASE) 50 MCG/ACT nasal spray 1 spray by Each Nostril route daily as needed for Rhinitis      rimegepant sulfate (NURTEC) 75 MG TBDP Take 75 mg by mouth as needed (1 tab once for onset of migraine daily prn)      umeclidinium bromide (INCRUSE ELLIPTA) 62.5 MCG/ACT inhaler Inhale 1 puff into the lungs daily      pantoprazole (PROTONIX) 40 MG tablet Take 1 tablet by mouth daily      nicotine (NICODERM CQ) 14 MG/24HR Place 1 patch onto the skin every 24 hours Not taking           Current Discharge Medication List        STOP taking these medications       cefdinir (OMNICEF) 300 MG capsule Comments:   Reason for Stopping:               Discharge ROS:  A complete review of systems was asked and negative.    Discharge Exam:    /66   Pulse 75   Temp 97.3 °F (36.3 °C) (Oral)   Resp 16   Ht 1.575 m (5' 2\")   Wt 74.5 kg (164 lb 3.2 oz)   SpO2 92%   BMI 30.03 kg/m²   General appearance:  NAD  HEENT:   Normal cephalic, atraumatic, moist mucous membranes, no oropharyngeal erythema or exudate  Neck: Supple, trachea midline, no anterior cervical or SC LAD  Heart:: Normal s1/s2, RRR, no murmurs, gallops, or rubs. No leg edema  Lungs:  Normal respiratory effort. Clear to auscultation bilaterally, no wheeze, rales or rhonchi.  Abdomen: Soft,  non-tender, non-distended, bowel sounds present, no masses  Musculoskeletal:  No clubbing, no cyanosis, or edema  Skin: No lesion or masses  Neurologic:  Neurovascularly intact without any focal sensory/motor deficits. Cranial nerves: II-XII intact, grossly non-focal.  Psychiatric:  Alert and oriented, thought content appropriate    Labs: For convenience and continuity at follow-up the following most recent labs are provided:    Lab Results   Component Value Date/Time    WBC 9.0 02/27/2025 05:36 AM    HGB 8.7 02/27/2025 05:36 AM    HCT 26.7 02/27/2025 05:36 AM    MCV 78.8 02/27/2025 05:36 AM     02/27/2025 05:36 AM     02/27/2025 05:36 AM    K 3.4 02/27/2025 05:36 AM    CL 88 02/27/2025 05:36 AM    CO2 30 02/27/2025 05:36 AM    BUN 13 02/27/2025 05:36 AM    CREATININE 0.6 02/27/2025 05:36 AM    CALCIUM 7.9 02/27/2025 05:36 AM    PHOS 5.1 02/15/2023 11:10 PM    BNP 67.9 09/13/2022 12:00 AM    ALKPHOS 100 02/17/2025 02:25 PM    ALT 8 02/17/2025 02:25 PM    AST 12 02/17/2025 02:25 PM    BILITOT 0.3 02/17/2025 02:25 PM    BILIDIR <0.1 01/11/2025 05:00 AM    TRIG 82 01/11/2025 05:00 AM     Lab Results   Component Value Date    INR 0.95 02/26/2025    INR 1.02 01/10/2025    INR 1.10 06/25/2023       Radiology:  XR CHEST PORTABLE    Result Date: 2/27/2025  EXAMINATION: ONE XRAY VIEW OF THE CHEST 2/26/2025 12:00 am COMPARISON: February 18, 2025 HISTORY: ORDERING SYSTEM PROVIDED HISTORY: Chest Pain TECHNOLOGIST PROVIDED HISTORY: Reason for exam:->Chest Pain Reason for Exam: cp FINDINGS: No lines or tubes.  Stable cardiomediastinal silhouette.  Similar appearance of the left base opacity with small effusion.  Calcified granuloma is seen in the right base.  No pulmonary edema.  No pneumothorax. No acute osseous abnormality.     Similar appearance of left base opacity with small effusion.     US THORACENTESIS Which side should the procedure be performed? Left    Result Date: 2/26/2025  PROCEDURE: ULTRASOUND GUIDED

## 2025-02-28 ENCOUNTER — TELEPHONE (OUTPATIENT)
Dept: PULMONOLOGY | Age: 75
End: 2025-02-28

## 2025-02-28 LAB
ANA SER QL IA: NEGATIVE
CCP IGG SERPL-ACNC: <0.5 U/ML (ref 0–2.9)
CEA FLD-MCNC: 3.4 NG/ML
DSDNA AB SER-ACNC: <1 IU/ML (ref 0–9)
ENA SCL70 IGG SER IA-ACNC: <0.2 AI (ref 0–0.9)
ENA SS-A AB SER IA-ACNC: <0.2 AI (ref 0–0.9)
ENA SS-B AB SER IA-ACNC: <0.2 AI (ref 0–0.9)
SOURCE BODY FLUID: NORMAL

## 2025-02-28 NOTE — TELEPHONE ENCOUNTER
See me in 1 to 2 weeks with chest x-ray  She was sent on prednisone taper, make sure she stays on 10 mg daily.

## 2025-03-01 LAB
ALDOLASE SERPL-CCNC: 4.6 U/L (ref 1.2–7.6)
BACTERIA FLD AEROBE CULT: NORMAL
COMPLEMENT ACTIVITY, TOTAL TURBIDIMETRIC: >95 U/ML (ref 38.7–89.9)
GRAM STN SPEC: NORMAL

## 2025-03-03 ENCOUNTER — APPOINTMENT (OUTPATIENT)
Dept: GENERAL RADIOLOGY | Age: 75
DRG: 204 | End: 2025-03-03
Payer: COMMERCIAL

## 2025-03-03 ENCOUNTER — HOSPITAL ENCOUNTER (INPATIENT)
Age: 75
LOS: 2 days | Discharge: HOME | DRG: 204 | End: 2025-03-05
Attending: STUDENT IN AN ORGANIZED HEALTH CARE EDUCATION/TRAINING PROGRAM | Admitting: INTERNAL MEDICINE
Payer: COMMERCIAL

## 2025-03-03 DIAGNOSIS — R07.9 CHEST PAIN, UNSPECIFIED TYPE: Primary | ICD-10-CM

## 2025-03-03 DIAGNOSIS — J44.1 COPD WITH ACUTE EXACERBATION (HCC): Primary | ICD-10-CM

## 2025-03-03 DIAGNOSIS — E87.6 HYPOKALEMIA: ICD-10-CM

## 2025-03-03 DIAGNOSIS — J44.1 COPD EXACERBATION (HCC): ICD-10-CM

## 2025-03-03 DIAGNOSIS — J90 PLEURAL EFFUSION: ICD-10-CM

## 2025-03-03 DIAGNOSIS — E87.1 HYPONATREMIA: ICD-10-CM

## 2025-03-03 PROBLEM — F39 MOOD DISORDER: Status: RESOLVED | Noted: 2019-01-09 | Resolved: 2025-03-03

## 2025-03-03 PROBLEM — E66.811 CLASS 1 OBESITY DUE TO EXCESS CALORIES WITH BODY MASS INDEX (BMI) OF 32.0 TO 32.9 IN ADULT: Status: RESOLVED | Noted: 2019-10-18 | Resolved: 2025-03-03

## 2025-03-03 PROBLEM — Z91.199 NONCOMPLIANCE: Status: RESOLVED | Noted: 2023-01-04 | Resolved: 2025-03-03

## 2025-03-03 PROBLEM — E66.09 CLASS 1 OBESITY DUE TO EXCESS CALORIES WITH BODY MASS INDEX (BMI) OF 32.0 TO 32.9 IN ADULT: Status: RESOLVED | Noted: 2019-10-18 | Resolved: 2025-03-03

## 2025-03-03 PROBLEM — Z87.19 HISTORY OF SMALL BOWEL OBSTRUCTION: Status: ACTIVE | Noted: 2025-03-03

## 2025-03-03 PROBLEM — J96.21 ACUTE ON CHRONIC RESPIRATORY FAILURE WITH HYPOXIA (HCC): Status: RESOLVED | Noted: 2020-10-31 | Resolved: 2025-03-03

## 2025-03-03 PROBLEM — Z72.0 TOBACCO USE: Status: RESOLVED | Noted: 2024-09-03 | Resolved: 2025-03-03

## 2025-03-03 PROBLEM — E44.0 MODERATE MALNUTRITION: Chronic | Status: RESOLVED | Noted: 2019-10-18 | Resolved: 2025-03-03

## 2025-03-03 PROBLEM — M47.814 SPONDYLOSIS OF THORACIC REGION WITHOUT MYELOPATHY OR RADICULOPATHY: Status: RESOLVED | Noted: 2017-07-26 | Resolved: 2025-03-03

## 2025-03-03 PROBLEM — F17.200 TOBACCO DEPENDENCE: Status: RESOLVED | Noted: 2019-01-09 | Resolved: 2025-03-03

## 2025-03-03 PROBLEM — J18.9 HCAP (HEALTHCARE-ASSOCIATED PNEUMONIA): Status: RESOLVED | Noted: 2024-04-09 | Resolved: 2025-03-03

## 2025-03-03 PROBLEM — J96.12 CHRONIC RESPIRATORY FAILURE WITH HYPERCAPNIA (HCC): Status: RESOLVED | Noted: 2024-04-11 | Resolved: 2025-03-03

## 2025-03-03 PROBLEM — Z86.59 HISTORY OF SUICIDAL IDEATION: Status: ACTIVE | Noted: 2025-03-03

## 2025-03-03 PROBLEM — D50.8 IRON DEFICIENCY ANEMIA SECONDARY TO INADEQUATE DIETARY IRON INTAKE: Status: RESOLVED | Noted: 2017-08-03 | Resolved: 2025-03-03

## 2025-03-03 PROBLEM — I50.9 HEART FAILURE (HCC): Status: RESOLVED | Noted: 2025-01-10 | Resolved: 2025-03-03

## 2025-03-03 PROBLEM — I50.32 CHRONIC HEART FAILURE WITH PRESERVED EJECTION FRACTION (HCC): Status: RESOLVED | Noted: 2023-01-04 | Resolved: 2025-03-03

## 2025-03-03 PROBLEM — S83.231A COMPLEX TEAR OF MEDIAL MENISCUS OF RIGHT KNEE AS CURRENT INJURY: Status: RESOLVED | Noted: 2020-06-16 | Resolved: 2025-03-03

## 2025-03-03 PROBLEM — E87.8 ELECTROLYTE ABNORMALITY: Status: ACTIVE | Noted: 2025-03-03

## 2025-03-03 PROBLEM — J96.01 ACUTE RESPIRATORY FAILURE WITH HYPOXIA: Status: RESOLVED | Noted: 2024-09-02 | Resolved: 2025-03-03

## 2025-03-03 PROBLEM — Z86.39: Status: ACTIVE | Noted: 2025-03-03

## 2025-03-03 PROBLEM — R09.02 HYPOXIA: Status: RESOLVED | Noted: 2024-09-03 | Resolved: 2025-03-03

## 2025-03-03 PROBLEM — H83.09 VIRAL LABYRINTHITIS: Status: RESOLVED | Noted: 2018-02-07 | Resolved: 2025-03-03

## 2025-03-03 PROBLEM — H51.8 DYSCONJUGATE GAZE: Status: RESOLVED | Noted: 2025-01-29 | Resolved: 2025-03-03

## 2025-03-03 PROBLEM — R06.89 INEFFECTIVE AIRWAY CLEARANCE: Status: RESOLVED | Noted: 2024-04-11 | Resolved: 2025-03-03

## 2025-03-03 PROBLEM — Z98.890 HISTORY OF THORACENTESIS: Status: ACTIVE | Noted: 2025-03-03

## 2025-03-03 PROBLEM — R29.90 STROKE-LIKE SYMPTOMS: Status: RESOLVED | Noted: 2023-02-16 | Resolved: 2025-03-03

## 2025-03-03 PROBLEM — J18.9 COMMUNITY ACQUIRED PNEUMONIA: Status: RESOLVED | Noted: 2023-06-25 | Resolved: 2025-03-03

## 2025-03-03 PROBLEM — R09.89 CHEST CONGESTION: Status: RESOLVED | Noted: 2024-04-11 | Resolved: 2025-03-03

## 2025-03-03 PROBLEM — F11.10 OPIATE ABUSE, EPISODIC (HCC): Status: RESOLVED | Noted: 2019-01-09 | Resolved: 2025-03-03

## 2025-03-03 PROBLEM — R91.8 PULMONARY INFILTRATES: Status: RESOLVED | Noted: 2024-04-11 | Resolved: 2025-03-03

## 2025-03-03 PROBLEM — M17.11 PRIMARY OSTEOARTHRITIS OF RIGHT KNEE: Status: RESOLVED | Noted: 2020-06-16 | Resolved: 2025-03-03

## 2025-03-03 PROBLEM — E83.42 HYPOMAGNESEMIA: Status: RESOLVED | Noted: 2024-12-30 | Resolved: 2025-03-03

## 2025-03-03 PROBLEM — R06.89 HYPERCARBIA: Status: RESOLVED | Noted: 2024-04-11 | Resolved: 2025-03-03

## 2025-03-03 PROBLEM — Z87.898 HISTORY OF HEADACHE: Status: ACTIVE | Noted: 2025-03-03

## 2025-03-03 PROBLEM — J40 TRACHEOBRONCHITIS: Status: RESOLVED | Noted: 2024-09-03 | Resolved: 2025-03-03

## 2025-03-03 PROBLEM — S83.271A COMPLEX TEAR OF LATERAL MENISCUS OF RIGHT KNEE AS CURRENT INJURY: Status: RESOLVED | Noted: 2020-06-16 | Resolved: 2025-03-03

## 2025-03-03 PROBLEM — I50.33 ACUTE ON CHRONIC DIASTOLIC CHF (CONGESTIVE HEART FAILURE) (HCC): Status: RESOLVED | Noted: 2025-01-10 | Resolved: 2025-03-03

## 2025-03-03 PROBLEM — I51.89 GRADE I DIASTOLIC DYSFUNCTION: Status: RESOLVED | Noted: 2024-04-11 | Resolved: 2025-03-03

## 2025-03-03 PROBLEM — I16.0 HYPERTENSIVE URGENCY: Status: RESOLVED | Noted: 2025-01-10 | Resolved: 2025-03-03

## 2025-03-03 PROBLEM — M54.6 PAIN IN THORACIC SPINE: Status: RESOLVED | Noted: 2017-07-26 | Resolved: 2025-03-03

## 2025-03-03 LAB
ALBUMIN SERPL-MCNC: 3.6 G/DL (ref 3.4–5)
ALBUMIN/GLOB SERPL: 1.2 {RATIO} (ref 1.1–2.2)
ALP SERPL-CCNC: 67 U/L (ref 40–129)
ALT SERPL-CCNC: 15 U/L (ref 10–40)
ANION GAP SERPL CALCULATED.3IONS-SCNC: 9 MMOL/L (ref 3–16)
ANISOCYTOSIS BLD QL SMEAR: ABNORMAL
AST SERPL-CCNC: 15 U/L (ref 15–37)
BASOPHILS # BLD: 0 K/UL (ref 0–0.2)
BASOPHILS NFR BLD: 0 %
BILIRUB SERPL-MCNC: <0.2 MG/DL (ref 0–1)
BUN SERPL-MCNC: 15 MG/DL (ref 7–20)
CALCIUM SERPL-MCNC: 8.4 MG/DL (ref 8.3–10.6)
CHLORIDE SERPL-SCNC: 83 MMOL/L (ref 99–110)
CO2 SERPL-SCNC: 34 MMOL/L (ref 21–32)
CREAT SERPL-MCNC: 0.7 MG/DL (ref 0.6–1.2)
DEPRECATED RDW RBC AUTO: 16.4 % (ref 12.4–15.4)
EKG ATRIAL RATE: 68 BPM
EKG ATRIAL RATE: 68 BPM
EKG ATRIAL RATE: 70 BPM
EKG DIAGNOSIS: NORMAL
EKG P AXIS: 33 DEGREES
EKG P AXIS: 62 DEGREES
EKG P AXIS: 94 DEGREES
EKG P-R INTERVAL: 152 MS
EKG P-R INTERVAL: 158 MS
EKG P-R INTERVAL: 160 MS
EKG Q-T INTERVAL: 404 MS
EKG Q-T INTERVAL: 420 MS
EKG Q-T INTERVAL: 424 MS
EKG QRS DURATION: 100 MS
EKG QRS DURATION: 102 MS
EKG QRS DURATION: 92 MS
EKG QTC CALCULATION (BAZETT): 436 MS
EKG QTC CALCULATION (BAZETT): 446 MS
EKG QTC CALCULATION (BAZETT): 450 MS
EKG R AXIS: -1 DEGREES
EKG R AXIS: -6 DEGREES
EKG R AXIS: -8 DEGREES
EKG T AXIS: 34 DEGREES
EKG T AXIS: 37 DEGREES
EKG T AXIS: 37 DEGREES
EKG VENTRICULAR RATE: 68 BPM
EKG VENTRICULAR RATE: 68 BPM
EKG VENTRICULAR RATE: 70 BPM
EOSINOPHIL # BLD: 0.1 K/UL (ref 0–0.6)
EOSINOPHIL NFR BLD: 1 %
FLUAV RNA RESP QL NAA+PROBE: NOT DETECTED
FLUBV RNA RESP QL NAA+PROBE: NOT DETECTED
FUNGUS SPEC CULT: NORMAL
GFR SERPLBLD CREATININE-BSD FMLA CKD-EPI: >90 ML/MIN/{1.73_M2}
GLUCOSE SERPL-MCNC: 102 MG/DL (ref 70–99)
HCT VFR BLD AUTO: 27.3 % (ref 36–48)
HGB BLD-MCNC: 8.8 G/DL (ref 12–16)
LOEFFLER MB STN SPEC: NORMAL
LYMPHOCYTES # BLD: 1 K/UL (ref 1–5.1)
LYMPHOCYTES NFR BLD: 8 %
MAGNESIUM SERPL-MCNC: 1.91 MG/DL (ref 1.8–2.4)
MCH RBC QN AUTO: 25.1 PG (ref 26–34)
MCHC RBC AUTO-ENTMCNC: 32.3 G/DL (ref 31–36)
MCV RBC AUTO: 77.8 FL (ref 80–100)
MONOCYTES # BLD: 0.7 K/UL (ref 0–1.3)
MONOCYTES NFR BLD: 6 %
NEUTROPHILS # BLD: 10.2 K/UL (ref 1.7–7.7)
NEUTROPHILS NFR BLD: 85 %
NT-PROBNP SERPL-MCNC: 589 PG/ML (ref 0–124)
OVALOCYTES BLD QL SMEAR: ABNORMAL
PATH INTERP BLD-IMP: NO
PCA IGG SER QL IF: 1.4 UNITS (ref 0–24.9)
PLATELET # BLD AUTO: 248 K/UL (ref 135–450)
PLATELET BLD QL SMEAR: ADEQUATE
PMV BLD AUTO: 6.9 FL (ref 5–10.5)
POLYCHROMASIA BLD QL SMEAR: ABNORMAL
POTASSIUM SERPL-SCNC: 3.4 MMOL/L (ref 3.5–5.1)
PROCALCITONIN SERPL IA-MCNC: 0.03 NG/ML (ref 0–0.15)
PROT SERPL-MCNC: 6.6 G/DL (ref 6.4–8.2)
RBC # BLD AUTO: 3.52 M/UL (ref 4–5.2)
SARS-COV-2 RNA RESP QL NAA+PROBE: NOT DETECTED
SLIDE REVIEW: ABNORMAL
SODIUM SERPL-SCNC: 126 MMOL/L (ref 136–145)
STOMATOCYTES BLD QL SMEAR: ABNORMAL
TROPONIN, HIGH SENSITIVITY: 22 NG/L (ref 0–14)
TROPONIN, HIGH SENSITIVITY: 22 NG/L (ref 0–14)
TROPONIN, HIGH SENSITIVITY: 29 NG/L (ref 0–14)
TROPONIN, HIGH SENSITIVITY: 32 NG/L (ref 0–14)
WBC # BLD AUTO: 12 K/UL (ref 4–11)

## 2025-03-03 PROCEDURE — 6360000002 HC RX W HCPCS: Performed by: INTERNAL MEDICINE

## 2025-03-03 PROCEDURE — 6370000000 HC RX 637 (ALT 250 FOR IP): Performed by: STUDENT IN AN ORGANIZED HEALTH CARE EDUCATION/TRAINING PROGRAM

## 2025-03-03 PROCEDURE — 83880 ASSAY OF NATRIURETIC PEPTIDE: CPT

## 2025-03-03 PROCEDURE — 6360000002 HC RX W HCPCS: Performed by: PHYSICIAN ASSISTANT

## 2025-03-03 PROCEDURE — 96365 THER/PROPH/DIAG IV INF INIT: CPT

## 2025-03-03 PROCEDURE — 2060000000 HC ICU INTERMEDIATE R&B

## 2025-03-03 PROCEDURE — 80053 COMPREHEN METABOLIC PANEL: CPT

## 2025-03-03 PROCEDURE — 99285 EMERGENCY DEPT VISIT HI MDM: CPT

## 2025-03-03 PROCEDURE — 96375 TX/PRO/DX INJ NEW DRUG ADDON: CPT

## 2025-03-03 PROCEDURE — 93010 ELECTROCARDIOGRAM REPORT: CPT | Performed by: STUDENT IN AN ORGANIZED HEALTH CARE EDUCATION/TRAINING PROGRAM

## 2025-03-03 PROCEDURE — 93005 ELECTROCARDIOGRAM TRACING: CPT | Performed by: STUDENT IN AN ORGANIZED HEALTH CARE EDUCATION/TRAINING PROGRAM

## 2025-03-03 PROCEDURE — 84484 ASSAY OF TROPONIN QUANT: CPT

## 2025-03-03 PROCEDURE — 2580000003 HC RX 258: Performed by: STUDENT IN AN ORGANIZED HEALTH CARE EDUCATION/TRAINING PROGRAM

## 2025-03-03 PROCEDURE — 93005 ELECTROCARDIOGRAM TRACING: CPT | Performed by: PHYSICIAN ASSISTANT

## 2025-03-03 PROCEDURE — 85025 COMPLETE CBC W/AUTO DIFF WBC: CPT

## 2025-03-03 PROCEDURE — 84145 PROCALCITONIN (PCT): CPT

## 2025-03-03 PROCEDURE — 83930 ASSAY OF BLOOD OSMOLALITY: CPT

## 2025-03-03 PROCEDURE — 2580000003 HC RX 258: Performed by: PHYSICIAN ASSISTANT

## 2025-03-03 PROCEDURE — 71045 X-RAY EXAM CHEST 1 VIEW: CPT

## 2025-03-03 PROCEDURE — 6370000000 HC RX 637 (ALT 250 FOR IP): Performed by: INTERNAL MEDICINE

## 2025-03-03 PROCEDURE — 83735 ASSAY OF MAGNESIUM: CPT

## 2025-03-03 PROCEDURE — 87636 SARSCOV2 & INF A&B AMP PRB: CPT

## 2025-03-03 PROCEDURE — 36415 COLL VENOUS BLD VENIPUNCTURE: CPT

## 2025-03-03 PROCEDURE — 2500000003 HC RX 250 WO HCPCS: Performed by: STUDENT IN AN ORGANIZED HEALTH CARE EDUCATION/TRAINING PROGRAM

## 2025-03-03 PROCEDURE — 6360000002 HC RX W HCPCS: Performed by: STUDENT IN AN ORGANIZED HEALTH CARE EDUCATION/TRAINING PROGRAM

## 2025-03-03 PROCEDURE — 2500000003 HC RX 250 WO HCPCS: Performed by: INTERNAL MEDICINE

## 2025-03-03 PROCEDURE — 96368 THER/DIAG CONCURRENT INF: CPT

## 2025-03-03 RX ORDER — MAGNESIUM SULFATE IN WATER 40 MG/ML
2000 INJECTION, SOLUTION INTRAVENOUS ONCE
Status: COMPLETED | OUTPATIENT
Start: 2025-03-03 | End: 2025-03-03

## 2025-03-03 RX ORDER — NICOTINE 21 MG/24HR
1 PATCH, TRANSDERMAL 24 HOURS TRANSDERMAL EVERY 24 HOURS
Status: DISCONTINUED | OUTPATIENT
Start: 2025-03-03 | End: 2025-03-05 | Stop reason: HOSPADM

## 2025-03-03 RX ORDER — OXCARBAZEPINE 300 MG/1
450 TABLET, FILM COATED ORAL NIGHTLY
Status: DISCONTINUED | OUTPATIENT
Start: 2025-03-03 | End: 2025-03-05 | Stop reason: HOSPADM

## 2025-03-03 RX ORDER — ONDANSETRON 2 MG/ML
4 INJECTION INTRAMUSCULAR; INTRAVENOUS EVERY 6 HOURS PRN
Status: DISCONTINUED | OUTPATIENT
Start: 2025-03-03 | End: 2025-03-05 | Stop reason: HOSPADM

## 2025-03-03 RX ORDER — SODIUM CHLORIDE 9 MG/ML
INJECTION, SOLUTION INTRAVENOUS PRN
Status: DISCONTINUED | OUTPATIENT
Start: 2025-03-03 | End: 2025-03-05 | Stop reason: HOSPADM

## 2025-03-03 RX ORDER — POTASSIUM CHLORIDE 1500 MG/1
40 TABLET, EXTENDED RELEASE ORAL ONCE
Status: COMPLETED | OUTPATIENT
Start: 2025-03-03 | End: 2025-03-03

## 2025-03-03 RX ORDER — ATORVASTATIN CALCIUM 40 MG/1
40 TABLET, FILM COATED ORAL DAILY
Status: DISCONTINUED | OUTPATIENT
Start: 2025-03-03 | End: 2025-03-05 | Stop reason: HOSPADM

## 2025-03-03 RX ORDER — QUETIAPINE FUMARATE 200 MG/1
400 TABLET, FILM COATED ORAL NIGHTLY
Status: DISCONTINUED | OUTPATIENT
Start: 2025-03-03 | End: 2025-03-05 | Stop reason: HOSPADM

## 2025-03-03 RX ORDER — VANCOMYCIN 1.5 G/300ML
1500 INJECTION, SOLUTION INTRAVENOUS ONCE
Status: COMPLETED | OUTPATIENT
Start: 2025-03-03 | End: 2025-03-03

## 2025-03-03 RX ORDER — NITROGLYCERIN 0.4 MG/1
0.4 TABLET SUBLINGUAL EVERY 5 MIN PRN
Status: DISCONTINUED | OUTPATIENT
Start: 2025-03-03 | End: 2025-03-05 | Stop reason: HOSPADM

## 2025-03-03 RX ORDER — ASPIRIN 81 MG/1
81 TABLET, CHEWABLE ORAL DAILY
Status: DISCONTINUED | OUTPATIENT
Start: 2025-03-04 | End: 2025-03-05 | Stop reason: HOSPADM

## 2025-03-03 RX ORDER — 0.9 % SODIUM CHLORIDE 0.9 %
500 INTRAVENOUS SOLUTION INTRAVENOUS ONCE
Status: DISCONTINUED | OUTPATIENT
Start: 2025-03-03 | End: 2025-03-03

## 2025-03-03 RX ORDER — LOSARTAN POTASSIUM 100 MG/1
100 TABLET ORAL DAILY
Status: DISCONTINUED | OUTPATIENT
Start: 2025-03-04 | End: 2025-03-05 | Stop reason: HOSPADM

## 2025-03-03 RX ORDER — POTASSIUM CHLORIDE 7.45 MG/ML
10 INJECTION INTRAVENOUS PRN
Status: DISCONTINUED | OUTPATIENT
Start: 2025-03-03 | End: 2025-03-05 | Stop reason: HOSPADM

## 2025-03-03 RX ORDER — AMLODIPINE BESYLATE 5 MG/1
5 TABLET ORAL DAILY
Status: DISCONTINUED | OUTPATIENT
Start: 2025-03-04 | End: 2025-03-03

## 2025-03-03 RX ORDER — GUAIFENESIN 600 MG/1
600 TABLET, EXTENDED RELEASE ORAL 2 TIMES DAILY
Status: DISCONTINUED | OUTPATIENT
Start: 2025-03-03 | End: 2025-03-03 | Stop reason: ALTCHOICE

## 2025-03-03 RX ORDER — TOPIRAMATE 25 MG/1
25 TABLET, FILM COATED ORAL DAILY
Status: DISCONTINUED | OUTPATIENT
Start: 2025-03-04 | End: 2025-03-05 | Stop reason: HOSPADM

## 2025-03-03 RX ORDER — ENOXAPARIN SODIUM 100 MG/ML
40 INJECTION SUBCUTANEOUS NIGHTLY
Status: DISCONTINUED | OUTPATIENT
Start: 2025-03-03 | End: 2025-03-05 | Stop reason: HOSPADM

## 2025-03-03 RX ORDER — DULOXETIN HYDROCHLORIDE 60 MG/1
60 CAPSULE, DELAYED RELEASE ORAL DAILY
Status: DISCONTINUED | OUTPATIENT
Start: 2025-03-04 | End: 2025-03-05 | Stop reason: HOSPADM

## 2025-03-03 RX ORDER — GABAPENTIN 100 MG/1
100 CAPSULE ORAL 3 TIMES DAILY
Status: DISCONTINUED | OUTPATIENT
Start: 2025-03-03 | End: 2025-03-05 | Stop reason: HOSPADM

## 2025-03-03 RX ORDER — MORPHINE SULFATE 2 MG/ML
2 INJECTION, SOLUTION INTRAMUSCULAR; INTRAVENOUS ONCE
Status: COMPLETED | OUTPATIENT
Start: 2025-03-03 | End: 2025-03-03

## 2025-03-03 RX ORDER — ACETAMINOPHEN 325 MG/1
650 TABLET ORAL EVERY 6 HOURS PRN
Status: DISCONTINUED | OUTPATIENT
Start: 2025-03-03 | End: 2025-03-05 | Stop reason: HOSPADM

## 2025-03-03 RX ORDER — FUROSEMIDE 20 MG/1
20 TABLET ORAL DAILY
Status: DISCONTINUED | OUTPATIENT
Start: 2025-03-04 | End: 2025-03-05 | Stop reason: HOSPADM

## 2025-03-03 RX ORDER — SODIUM CHLORIDE 0.9 % (FLUSH) 0.9 %
5-40 SYRINGE (ML) INJECTION PRN
Status: DISCONTINUED | OUTPATIENT
Start: 2025-03-03 | End: 2025-03-05 | Stop reason: HOSPADM

## 2025-03-03 RX ORDER — ONDANSETRON 4 MG/1
4 TABLET, ORALLY DISINTEGRATING ORAL EVERY 8 HOURS PRN
Status: DISCONTINUED | OUTPATIENT
Start: 2025-03-03 | End: 2025-03-05 | Stop reason: HOSPADM

## 2025-03-03 RX ORDER — ASPIRIN 81 MG/1
162 TABLET, CHEWABLE ORAL ONCE
Status: COMPLETED | OUTPATIENT
Start: 2025-03-03 | End: 2025-03-03

## 2025-03-03 RX ORDER — MORPHINE SULFATE 2 MG/ML
2 INJECTION, SOLUTION INTRAMUSCULAR; INTRAVENOUS EVERY 4 HOURS PRN
Status: DISCONTINUED | OUTPATIENT
Start: 2025-03-03 | End: 2025-03-05

## 2025-03-03 RX ORDER — ATORVASTATIN CALCIUM 40 MG/1
40 TABLET, FILM COATED ORAL NIGHTLY
Status: CANCELLED | OUTPATIENT
Start: 2025-03-03

## 2025-03-03 RX ORDER — PANTOPRAZOLE SODIUM 40 MG/1
40 TABLET, DELAYED RELEASE ORAL
Status: DISCONTINUED | OUTPATIENT
Start: 2025-03-04 | End: 2025-03-05 | Stop reason: HOSPADM

## 2025-03-03 RX ORDER — POLYETHYLENE GLYCOL 3350 17 G/17G
17 POWDER, FOR SOLUTION ORAL DAILY PRN
Status: DISCONTINUED | OUTPATIENT
Start: 2025-03-03 | End: 2025-03-05 | Stop reason: HOSPADM

## 2025-03-03 RX ORDER — CARVEDILOL 25 MG/1
25 TABLET ORAL 2 TIMES DAILY WITH MEALS
Status: DISCONTINUED | OUTPATIENT
Start: 2025-03-03 | End: 2025-03-05 | Stop reason: HOSPADM

## 2025-03-03 RX ORDER — IPRATROPIUM BROMIDE AND ALBUTEROL SULFATE 2.5; .5 MG/3ML; MG/3ML
2 SOLUTION RESPIRATORY (INHALATION) ONCE
Status: COMPLETED | OUTPATIENT
Start: 2025-03-03 | End: 2025-03-03

## 2025-03-03 RX ORDER — BUSPIRONE HYDROCHLORIDE 7.5 MG/1
7.5 TABLET ORAL 3 TIMES DAILY
Status: DISCONTINUED | OUTPATIENT
Start: 2025-03-03 | End: 2025-03-05 | Stop reason: HOSPADM

## 2025-03-03 RX ORDER — MAGNESIUM SULFATE IN WATER 40 MG/ML
2000 INJECTION, SOLUTION INTRAVENOUS PRN
Status: DISCONTINUED | OUTPATIENT
Start: 2025-03-03 | End: 2025-03-05 | Stop reason: HOSPADM

## 2025-03-03 RX ORDER — 0.9 % SODIUM CHLORIDE 0.9 %
1000 INTRAVENOUS SOLUTION INTRAVENOUS ONCE
Status: COMPLETED | OUTPATIENT
Start: 2025-03-03 | End: 2025-03-03

## 2025-03-03 RX ORDER — ALBUTEROL SULFATE 90 UG/1
2 INHALANT RESPIRATORY (INHALATION) EVERY 4 HOURS PRN
Status: DISCONTINUED | OUTPATIENT
Start: 2025-03-03 | End: 2025-03-05 | Stop reason: HOSPADM

## 2025-03-03 RX ORDER — FAMOTIDINE 20 MG/1
20 TABLET, FILM COATED ORAL 2 TIMES DAILY
Status: CANCELLED | OUTPATIENT
Start: 2025-03-03

## 2025-03-03 RX ORDER — SODIUM CHLORIDE 0.9 % (FLUSH) 0.9 %
5-40 SYRINGE (ML) INJECTION EVERY 12 HOURS SCHEDULED
Status: DISCONTINUED | OUTPATIENT
Start: 2025-03-03 | End: 2025-03-05 | Stop reason: HOSPADM

## 2025-03-03 RX ORDER — FLUTICASONE PROPIONATE 50 MCG
1 SPRAY, SUSPENSION (ML) NASAL DAILY PRN
Status: DISCONTINUED | OUTPATIENT
Start: 2025-03-03 | End: 2025-03-05 | Stop reason: HOSPADM

## 2025-03-03 RX ORDER — ACETAMINOPHEN 650 MG/1
650 SUPPOSITORY RECTAL EVERY 6 HOURS PRN
Status: DISCONTINUED | OUTPATIENT
Start: 2025-03-03 | End: 2025-03-05 | Stop reason: HOSPADM

## 2025-03-03 RX ORDER — LANOLIN ALCOHOL/MO/W.PET/CERES
400 CREAM (GRAM) TOPICAL DAILY
Status: DISCONTINUED | OUTPATIENT
Start: 2025-03-04 | End: 2025-03-05 | Stop reason: HOSPADM

## 2025-03-03 RX ORDER — AMLODIPINE BESYLATE 5 MG/1
10 TABLET ORAL DAILY
Status: DISCONTINUED | OUTPATIENT
Start: 2025-03-04 | End: 2025-03-05 | Stop reason: HOSPADM

## 2025-03-03 RX ORDER — HYDRALAZINE HYDROCHLORIDE 50 MG/1
50 TABLET, FILM COATED ORAL EVERY 8 HOURS SCHEDULED
Status: DISCONTINUED | OUTPATIENT
Start: 2025-03-03 | End: 2025-03-05 | Stop reason: HOSPADM

## 2025-03-03 RX ORDER — POTASSIUM CHLORIDE 1500 MG/1
40 TABLET, EXTENDED RELEASE ORAL PRN
Status: DISCONTINUED | OUTPATIENT
Start: 2025-03-03 | End: 2025-03-05 | Stop reason: HOSPADM

## 2025-03-03 RX ADMIN — SODIUM CHLORIDE 1000 MG: 9 INJECTION, SOLUTION INTRAVENOUS at 16:18

## 2025-03-03 RX ADMIN — NITROGLYCERIN 1 INCH: 20 OINTMENT TOPICAL at 22:04

## 2025-03-03 RX ADMIN — SODIUM CHLORIDE, PRESERVATIVE FREE 10 ML: 5 INJECTION INTRAVENOUS at 23:07

## 2025-03-03 RX ADMIN — QUETIAPINE FUMARATE 400 MG: 200 TABLET ORAL at 22:04

## 2025-03-03 RX ADMIN — MAGNESIUM SULFATE HEPTAHYDRATE 2000 MG: 40 INJECTION, SOLUTION INTRAVENOUS at 16:26

## 2025-03-03 RX ADMIN — METHYLPREDNISOLONE SODIUM SUCCINATE 125 MG: 125 INJECTION INTRAMUSCULAR; INTRAVENOUS at 15:41

## 2025-03-03 RX ADMIN — OXCARBAZEPINE 450 MG: 300 TABLET, FILM COATED ORAL at 22:05

## 2025-03-03 RX ADMIN — HYDROMORPHONE HYDROCHLORIDE 0.5 MG: 1 INJECTION, SOLUTION INTRAMUSCULAR; INTRAVENOUS; SUBCUTANEOUS at 22:07

## 2025-03-03 RX ADMIN — VANCOMYCIN 1500 MG: 1.5 INJECTION, SOLUTION INTRAVENOUS at 17:53

## 2025-03-03 RX ADMIN — IPRATROPIUM BROMIDE AND ALBUTEROL SULFATE 2 DOSE: 2.5; .5 SOLUTION RESPIRATORY (INHALATION) at 15:41

## 2025-03-03 RX ADMIN — POTASSIUM CHLORIDE 40 MEQ: 1500 TABLET, EXTENDED RELEASE ORAL at 23:04

## 2025-03-03 RX ADMIN — BUSPIRONE HYDROCHLORIDE 7.5 MG: 7.5 TABLET ORAL at 22:05

## 2025-03-03 RX ADMIN — ATORVASTATIN CALCIUM 40 MG: 40 TABLET, FILM COATED ORAL at 22:05

## 2025-03-03 RX ADMIN — SODIUM CHLORIDE 1000 ML: 0.9 INJECTION, SOLUTION INTRAVENOUS at 16:20

## 2025-03-03 RX ADMIN — ENOXAPARIN SODIUM 40 MG: 100 INJECTION SUBCUTANEOUS at 22:04

## 2025-03-03 RX ADMIN — POTASSIUM CHLORIDE 40 MEQ: 1500 TABLET, EXTENDED RELEASE ORAL at 16:15

## 2025-03-03 RX ADMIN — MORPHINE SULFATE 2 MG: 2 INJECTION, SOLUTION INTRAMUSCULAR; INTRAVENOUS at 16:15

## 2025-03-03 RX ADMIN — AZITHROMYCIN MONOHYDRATE 500 MG: 500 INJECTION, POWDER, LYOPHILIZED, FOR SOLUTION INTRAVENOUS at 16:51

## 2025-03-03 RX ADMIN — ASPIRIN 162 MG: 81 TABLET, CHEWABLE ORAL at 15:41

## 2025-03-03 ASSESSMENT — ENCOUNTER SYMPTOMS
GASTROINTESTINAL NEGATIVE: 1
BACK PAIN: 1
SHORTNESS OF BREATH: 1
EYES NEGATIVE: 1
ALLERGIC/IMMUNOLOGIC NEGATIVE: 1

## 2025-03-03 ASSESSMENT — LIFESTYLE VARIABLES
HOW OFTEN DO YOU HAVE A DRINK CONTAINING ALCOHOL: NEVER
HOW OFTEN DO YOU HAVE A DRINK CONTAINING ALCOHOL: NEVER
HOW MANY STANDARD DRINKS CONTAINING ALCOHOL DO YOU HAVE ON A TYPICAL DAY: PATIENT DOES NOT DRINK
HOW MANY STANDARD DRINKS CONTAINING ALCOHOL DO YOU HAVE ON A TYPICAL DAY: PATIENT DOES NOT DRINK

## 2025-03-03 ASSESSMENT — PAIN SCALES - GENERAL
PAINLEVEL_OUTOF10: 10
PAINLEVEL_OUTOF10: 6
PAINLEVEL_OUTOF10: 2
PAINLEVEL_OUTOF10: 8
PAINLEVEL_OUTOF10: 10

## 2025-03-03 ASSESSMENT — PAIN - FUNCTIONAL ASSESSMENT: PAIN_FUNCTIONAL_ASSESSMENT: 0-10

## 2025-03-03 ASSESSMENT — PAIN DESCRIPTION - DESCRIPTORS: DESCRIPTORS: DISCOMFORT

## 2025-03-03 ASSESSMENT — PAIN SCALES - WONG BAKER: WONGBAKER_NUMERICALRESPONSE: NO HURT

## 2025-03-03 ASSESSMENT — PAIN DESCRIPTION - ORIENTATION: ORIENTATION: RIGHT

## 2025-03-03 ASSESSMENT — PAIN DESCRIPTION - LOCATION
LOCATION: CHEST
LOCATION: CHEST

## 2025-03-03 NOTE — ED PROVIDER NOTES
basilar opacity that could represent   atelectasis or infection                  I independently interpreted the following studies:   EKG: Normal sinus rhythm with a ventricular rate of 68 HI interval 152, , QTc 446, physiological LAD, no clinically significant ST segment elevation or depression nonspecific T abnormalities in leads III, aVF, V2      External Documentation Reviewed: Previous patient encounter documents & history available on EMR was reviewed:   Record from: Patient seen on 2/25/2025 for congestive heart failure COPD exacerbation and admitted to the hospital..       Vitals Reviewed:    Vitals:    03/03/25 1452 03/03/25 1600 03/03/25 1615 03/03/25 1630   BP: (!) 116/50 (!) 121/56  (!) 130/55   Pulse: 68 68 68 68   Resp: 16 15 16 17   TempSrc: Oral      SpO2: 97% 96% 97% 97%   Weight: 74.4 kg (164 lb)      Height: 1.575 m (5' 2\")          MDM:   This is a 74-year-old female with past medical history of COPD on 2 L nasal cannula at her baseline, with a history of diastolic heart failure who comes in with left-sided chest pain that is exertional in nature rating to left arm.  EKG with some nonspecific T wave changes, troponin elevated more than typical baseline repeat is downtrending.  Has some questionable opacity versus persistent pleural effusions was treated empirically with Rocephin azithromycin vancomycin as she had a recent hospitalization.  If she has a leukocytosis and she had worsening cough she states.  She was given some DuoNebs and steroids did improve her wheezing that were present on auscultation.  COVID influenza negative BNP is at her baseline.  She does have hyponatremia hypokalemia and some hypokalemia consistent with some dehydration that is present as well.  She was given a liter of fluids for hydration she was admitted for further ACS rule out as her heart score is elevated at 6.      Consults:  Hospitalist for admission        Code Status:    Full code    CRITICAL CARE  I 
    1. Chest pain, unspecified type    2. Hyponatremia    3. Hypokalemia    4. COPD exacerbation (HCC)    5. Pleural effusion          DISPOSITION/PLAN     DISPOSITION Decision To Admit 03/03/2025 05:09:21 PM   DISPOSITION CONDITION Stable           PATIENT REFERRED TO:  No follow-up provider specified.    DISCHARGE MEDICATIONS:  New Prescriptions    No medications on file       DISCONTINUED MEDICATIONS:  Discontinued Medications    No medications on file              (Please note that portions of this note were completed with a voice recognition program.  Efforts were made to edit the dictations but occasionally words are mis-transcribed.)    Nikki Linares PA-C (electronically signed)       Nikki Linares PA-C  03/03/25 0491

## 2025-03-03 NOTE — PLAN OF CARE
74-year-old female, presenting with chest pain    Recent records reviewed she has recurrent transudative pleural effusion, requiring thoracentesis.  History of CHF.  On diuretics and multiple antihypertensive medications  .    Workup in the ER bedside echo suggestive of dehydration patient got IV fluids in the ED. patient got morphine for chest pain  .  Empiric antibiotics given however procalcitonin negative.  IV steroids given.    -Admit  Morphine as needed for pain  Consult pulmonology for concern for recurrent pleural effusion  Patient given IV fluids in the ED will hold off on further IV fluids but I will not reorder her antihypertensive medications or her diuretics will need to be reordered tomorrow.  I am going to hold off on further antibiotics as procalcitonin was negative

## 2025-03-03 NOTE — ED NOTES
The patient was complaining of a lot of pain and holding her chest. The patient looked as if she was in VTACH on the monitor. I went and got both providers. They laid eyes on her and placed new orders. I have placed another line in the patient and have her medications running at this time. The patient is more comfortable after the morphine. The patient is resting in bed at this time.

## 2025-03-04 LAB
ANION GAP SERPL CALCULATED.3IONS-SCNC: 7 MMOL/L (ref 3–16)
BUN SERPL-MCNC: 13 MG/DL (ref 7–20)
CALCIUM SERPL-MCNC: 8 MG/DL (ref 8.3–10.6)
CHLORIDE SERPL-SCNC: 91 MMOL/L (ref 99–110)
CHOLEST SERPL-MCNC: 208 MG/DL (ref 0–199)
CO2 SERPL-SCNC: 33 MMOL/L (ref 21–32)
CREAT SERPL-MCNC: 0.6 MG/DL (ref 0.6–1.2)
DEPRECATED RDW RBC AUTO: 16.1 % (ref 12.4–15.4)
EKG ATRIAL RATE: 69 BPM
EKG DIAGNOSIS: NORMAL
EKG P AXIS: 21 DEGREES
EKG P-R INTERVAL: 170 MS
EKG Q-T INTERVAL: 390 MS
EKG QRS DURATION: 96 MS
EKG QTC CALCULATION (BAZETT): 417 MS
EKG R AXIS: 30 DEGREES
EKG T AXIS: 33 DEGREES
EKG VENTRICULAR RATE: 69 BPM
GFR SERPLBLD CREATININE-BSD FMLA CKD-EPI: >90 ML/MIN/{1.73_M2}
GLUCOSE SERPL-MCNC: 141 MG/DL (ref 70–99)
HCT VFR BLD AUTO: 26.5 % (ref 36–48)
HDLC SERPL-MCNC: 102 MG/DL (ref 40–60)
HGB BLD-MCNC: 8.6 G/DL (ref 12–16)
LDLC SERPL CALC-MCNC: 92 MG/DL
MCH RBC QN AUTO: 25.4 PG (ref 26–34)
MCHC RBC AUTO-ENTMCNC: 32.4 G/DL (ref 31–36)
MCV RBC AUTO: 78.3 FL (ref 80–100)
OSMOLALITY SERPL: 275 MOSM/KG (ref 280–301)
PLATELET # BLD AUTO: 230 K/UL (ref 135–450)
PMV BLD AUTO: 7 FL (ref 5–10.5)
POTASSIUM SERPL-SCNC: 5.3 MMOL/L (ref 3.5–5.1)
RBC # BLD AUTO: 3.38 M/UL (ref 4–5.2)
SODIUM SERPL-SCNC: 131 MMOL/L (ref 136–145)
SODIUM UR-SCNC: 25 MMOL/L
TRIGL SERPL-MCNC: 68 MG/DL (ref 0–150)
TROPONIN, HIGH SENSITIVITY: 20 NG/L (ref 0–14)
VLDLC SERPL CALC-MCNC: 14 MG/DL
WBC # BLD AUTO: 10.4 K/UL (ref 4–11)

## 2025-03-04 PROCEDURE — 99232 SBSQ HOSP IP/OBS MODERATE 35: CPT | Performed by: INTERNAL MEDICINE

## 2025-03-04 PROCEDURE — 83935 ASSAY OF URINE OSMOLALITY: CPT

## 2025-03-04 PROCEDURE — 6370000000 HC RX 637 (ALT 250 FOR IP): Performed by: STUDENT IN AN ORGANIZED HEALTH CARE EDUCATION/TRAINING PROGRAM

## 2025-03-04 PROCEDURE — 94761 N-INVAS EAR/PLS OXIMETRY MLT: CPT

## 2025-03-04 PROCEDURE — 93005 ELECTROCARDIOGRAM TRACING: CPT | Performed by: INTERNAL MEDICINE

## 2025-03-04 PROCEDURE — 85027 COMPLETE CBC AUTOMATED: CPT

## 2025-03-04 PROCEDURE — 2060000000 HC ICU INTERMEDIATE R&B

## 2025-03-04 PROCEDURE — 84484 ASSAY OF TROPONIN QUANT: CPT

## 2025-03-04 PROCEDURE — 36415 COLL VENOUS BLD VENIPUNCTURE: CPT

## 2025-03-04 PROCEDURE — 6370000000 HC RX 637 (ALT 250 FOR IP): Performed by: INTERNAL MEDICINE

## 2025-03-04 PROCEDURE — 84300 ASSAY OF URINE SODIUM: CPT

## 2025-03-04 PROCEDURE — 2700000000 HC OXYGEN THERAPY PER DAY

## 2025-03-04 PROCEDURE — 6360000002 HC RX W HCPCS: Performed by: INTERNAL MEDICINE

## 2025-03-04 PROCEDURE — 80048 BASIC METABOLIC PNL TOTAL CA: CPT

## 2025-03-04 PROCEDURE — 93010 ELECTROCARDIOGRAM REPORT: CPT | Performed by: INTERNAL MEDICINE

## 2025-03-04 PROCEDURE — 6360000002 HC RX W HCPCS: Performed by: STUDENT IN AN ORGANIZED HEALTH CARE EDUCATION/TRAINING PROGRAM

## 2025-03-04 PROCEDURE — 2500000003 HC RX 250 WO HCPCS: Performed by: INTERNAL MEDICINE

## 2025-03-04 PROCEDURE — 80061 LIPID PANEL: CPT

## 2025-03-04 RX ORDER — PREDNISONE 10 MG/1
10 TABLET ORAL DAILY
Status: DISCONTINUED | OUTPATIENT
Start: 2025-03-05 | End: 2025-03-05 | Stop reason: HOSPADM

## 2025-03-04 RX ADMIN — HYDRALAZINE HYDROCHLORIDE 50 MG: 50 TABLET ORAL at 20:43

## 2025-03-04 RX ADMIN — CARVEDILOL 25 MG: 25 TABLET, FILM COATED ORAL at 09:49

## 2025-03-04 RX ADMIN — OXCARBAZEPINE 450 MG: 300 TABLET, FILM COATED ORAL at 20:42

## 2025-03-04 RX ADMIN — AMLODIPINE BESYLATE 10 MG: 5 TABLET ORAL at 09:50

## 2025-03-04 RX ADMIN — GABAPENTIN 100 MG: 100 CAPSULE ORAL at 10:07

## 2025-03-04 RX ADMIN — FUROSEMIDE 20 MG: 20 TABLET ORAL at 09:50

## 2025-03-04 RX ADMIN — HYDROMORPHONE HYDROCHLORIDE 0.5 MG: 1 INJECTION, SOLUTION INTRAMUSCULAR; INTRAVENOUS; SUBCUTANEOUS at 12:12

## 2025-03-04 RX ADMIN — PANTOPRAZOLE SODIUM 40 MG: 40 TABLET, DELAYED RELEASE ORAL at 05:44

## 2025-03-04 RX ADMIN — ASPIRIN 81 MG: 81 TABLET, CHEWABLE ORAL at 09:50

## 2025-03-04 RX ADMIN — DULOXETINE 60 MG: 60 CAPSULE, DELAYED RELEASE ORAL at 09:50

## 2025-03-04 RX ADMIN — QUETIAPINE FUMARATE 400 MG: 200 TABLET ORAL at 20:42

## 2025-03-04 RX ADMIN — BUSPIRONE HYDROCHLORIDE 7.5 MG: 7.5 TABLET ORAL at 09:50

## 2025-03-04 RX ADMIN — SODIUM CHLORIDE, PRESERVATIVE FREE 5 ML: 5 INJECTION INTRAVENOUS at 09:52

## 2025-03-04 RX ADMIN — HYDROMORPHONE HYDROCHLORIDE 0.5 MG: 1 INJECTION, SOLUTION INTRAMUSCULAR; INTRAVENOUS; SUBCUTANEOUS at 17:12

## 2025-03-04 RX ADMIN — CARVEDILOL 25 MG: 25 TABLET, FILM COATED ORAL at 17:10

## 2025-03-04 RX ADMIN — NITROGLYCERIN 1 INCH: 20 OINTMENT TOPICAL at 05:44

## 2025-03-04 RX ADMIN — BUSPIRONE HYDROCHLORIDE 7.5 MG: 7.5 TABLET ORAL at 17:10

## 2025-03-04 RX ADMIN — WATER 60 MG: 1 INJECTION INTRAMUSCULAR; INTRAVENOUS; SUBCUTANEOUS at 04:03

## 2025-03-04 RX ADMIN — HYDRALAZINE HYDROCHLORIDE 50 MG: 50 TABLET ORAL at 17:10

## 2025-03-04 RX ADMIN — GABAPENTIN 100 MG: 100 CAPSULE ORAL at 17:10

## 2025-03-04 RX ADMIN — TOPIRAMATE 25 MG: 25 TABLET, FILM COATED ORAL at 09:50

## 2025-03-04 RX ADMIN — ATORVASTATIN CALCIUM 40 MG: 40 TABLET, FILM COATED ORAL at 09:50

## 2025-03-04 RX ADMIN — SODIUM ZIRCONIUM CYCLOSILICATE 5 G: 5 POWDER, FOR SUSPENSION ORAL at 12:06

## 2025-03-04 RX ADMIN — GABAPENTIN 100 MG: 100 CAPSULE ORAL at 20:43

## 2025-03-04 RX ADMIN — ENOXAPARIN SODIUM 40 MG: 100 INJECTION SUBCUTANEOUS at 20:43

## 2025-03-04 RX ADMIN — MAGNESIUM OXIDE 400 MG (241.3 MG MAGNESIUM) TABLET 400 MG: TABLET at 09:50

## 2025-03-04 RX ADMIN — BUSPIRONE HYDROCHLORIDE 7.5 MG: 7.5 TABLET ORAL at 20:43

## 2025-03-04 RX ADMIN — SODIUM CHLORIDE, PRESERVATIVE FREE 10 ML: 5 INJECTION INTRAVENOUS at 20:43

## 2025-03-04 ASSESSMENT — PAIN DESCRIPTION - LOCATION
LOCATION: HEAD;CHEST;BACK
LOCATION: BACK;CHEST
LOCATION: CHEST

## 2025-03-04 ASSESSMENT — PAIN SCALES - GENERAL
PAINLEVEL_OUTOF10: 0
PAINLEVEL_OUTOF10: 4
PAINLEVEL_OUTOF10: 2
PAINLEVEL_OUTOF10: 10
PAINLEVEL_OUTOF10: 6

## 2025-03-04 ASSESSMENT — PAIN DESCRIPTION - DESCRIPTORS
DESCRIPTORS: ACHING
DESCRIPTORS: ACHING;SHARP
DESCRIPTORS: ACHING;SHARP

## 2025-03-04 ASSESSMENT — PAIN DESCRIPTION - ORIENTATION: ORIENTATION: RIGHT

## 2025-03-04 NOTE — ED NOTES
Najma Fitzpatrick is a 74 y.o. female admitted for  Principal Problem:    Chest pain  Resolved Problems:    * No resolved hospital problems. *  .   Patient Home via EMS transportation with   Chief Complaint   Patient presents with    Shortness of Breath     Pt presents to the ER with shortness of breath. The patient wears 3L at baseline and is at 97% in triage. The patient states that she had her lungs drained 3 times.    .  Patient is alert and Person, Place, Time, and Situation  Patient's baseline mobility: Baseline Mobility: Walker  Code Status: Prior   Cardiac Rhythm:    O2 Flow Rate (L/min): 3 L/min  Is patient on baseline Oxygen: yes, 3 how many Liters3:   Abnormal Assessment Findings: Pt has pleural effusion and electrolyte imbalances     Isolation: Droplet      NIH Score:    C-SSRS: Risk of Suicide: No Risk  Bedside swallow:        Active LDA's:   Peripheral IV 03/03/25 Right Antecubital (Active)       Peripheral IV 03/03/25 Left;Anterior Forearm (Active)     Patient admitted with a ross: no : ON PUREWICK ross is chronic was it exchanged:  Reason for ross:   Patient admitted with Central Line:  . PICC line placement confirmed: YES OR NO:[id number]}   Reason for Central line:   Was central line Inserted from an outside facility:        Family/Caregiver Present no Any Concerns: no   Restraints no  Sitter no         Vitals:      Vitals:    03/03/25 1700 03/03/25 1730 03/03/25 1900 03/03/25 1928   BP: (!) 125/52 115/64 (!) 133/57    Pulse: 66 67 71    Resp: 14 16 13    Temp:    98.4 °F (36.9 °C)   TempSrc:    Oral   SpO2: 99% 100%     Weight:       Height:           Last documented pain score (0-10 scale) Pain Level: 6  Pain medication administered Yes- see MAR.    Pertinent or High Risk Medications/Drips: No.    Pending Blood Product Administration:     Abnormal labs:   Abnormal Labs Reviewed   CBC WITH AUTO DIFFERENTIAL - Abnormal; Notable for the following components:       Result Value    WBC 12.0 (*)     RBC

## 2025-03-04 NOTE — CONSULTS
Pharmacy to dose vancomycin for CAP, consult received from Dr. Jackson    Will give 1500mg dose x1    If vancomycin is to be continued upon admission, please reorder the pharmacy to dose consult.    
    Family History  family history includes Arthritis in her father and mother; Breast Cancer in her maternal aunt; Depression in her mother; Diabetes in her father; Emphysema in her mother; Heart Disease in her father, mother, and sister; Heart Failure in her father; High Blood Pressure in her brother, father, and mother; Kidney Disease in her daughter; Stroke in her father; Substance Abuse in her father.    Social History:  reports that she has been smoking cigarettes. She started smoking about 14 months ago. She has a 117.8 pack-year smoking history. She has never used smokeless tobacco.   reports no history of alcohol use.    ALLERGIES:  Patient is allergic to dicyclomine, ibuprofen, sumatriptan, tape [adhesive tape], tizanidine, and motrin [ibuprofen micronized].  Continuous Infusions:   sodium chloride       Scheduled Meds:   [START ON 3/5/2025] predniSONE  30 mg Oral Daily    atorvastatin  40 mg Oral Daily    busPIRone  7.5 mg Oral TID    gabapentin  100 mg Oral TID    hydrALAZINE  50 mg Oral 3 times per day    magnesium oxide  400 mg Oral Daily    nicotine  1 patch TransDERmal Q24H    pantoprazole  40 mg Oral QAM AC    QUEtiapine  400 mg Oral Nightly    sodium chloride flush  5-40 mL IntraVENous 2 times per day    aspirin  81 mg Oral Daily    enoxaparin  40 mg SubCUTAneous Nightly    carvedilol  25 mg Oral BID WC    DULoxetine  60 mg Oral Daily    furosemide  20 mg Oral Daily    [Held by provider] losartan  100 mg Oral Daily    OXcarbazepine  450 mg Oral Nightly    topiramate  25 mg Oral Daily    amLODIPine  10 mg Oral Daily     PRN Meds:  albuterol sulfate HFA, fluticasone, sodium chloride flush, sodium chloride, potassium chloride **OR** potassium alternative oral replacement **OR** potassium chloride, magnesium sulfate, ondansetron **OR** ondansetron, acetaminophen **OR** acetaminophen, polyethylene glycol, nitroGLYCERIN, morphine, HYDROmorphone    REVIEW OF SYSTEMS:  Constitutional: Negative for

## 2025-03-04 NOTE — FLOWSHEET NOTE
03/03/25 2015   Vital Signs   Temp 97.1 °F (36.2 °C)   Temp Source Oral   Pulse 72   Heart Rate Source Monitor   Respirations 18   /68   MAP (Calculated) 86   BP Location Right upper arm   BP Method Automatic   Patient Position Semi fowlers   Level of Consciousness 0   MEWS Score 1   Oxygen Therapy   SpO2 96 %   O2 Device Nasal cannula   O2 Flow Rate (L/min) 3 L/min   Height and Weight   Height 1.575 m (5' 2\")   Weight - Scale 82.1 kg (181 lb)   Weight Method Bed scale   BSA (Calculated - sq m) 1.9 sq meters   BMI (Calculated) 33.2   Rhythm Interpretation   Cardiac Rhythm Sinus rhythm     Admission Assessment completed. Scheduled medications given per MAR, On 3L of oxygen, A&O X4, Vital Signs completed and Charted, Patient denies any further needs at this time. Call light within reach, Reminded patient to call RN if she needs anything.

## 2025-03-04 NOTE — FLOWSHEET NOTE
03/04/25 0732   Vital Signs   Temp 97.4 °F (36.3 °C)   Temp Source Axillary   Pulse 76   Heart Rate Source Monitor   Respirations 18   BP (!) 167/72   MAP (Calculated) 104   BP Location Right upper arm   BP Method Automatic   Patient Position Semi fowlers   Oxygen Therapy   SpO2 97 %   O2 Device Nasal cannula   O2 Flow Rate (L/min) 3 L/min     Shift assessment completed. See flow sheet. Medications given. Patient is A&O x4. Vitals are stable. Patient remains on 3L and denies further needs. Call light within reach.

## 2025-03-04 NOTE — DISCHARGE INSTRUCTIONS
Heart Failure Resources:  Heart Failure Interactive Workbook:  Go to https://Y-ClientsitalOpenStudy.[employer]/publication/?e=710309 for a Free Heart Failure Interactive Workbook provided by The American [employer] Association. This interactive workbook will provide information on Healthier Living with Heart Failure. Please copy and paste link into search bar. Use your mouse to scroll through the pages.    HF [place] teddy:   Heart Failure Free smart phone teddy available for iPhone and Android download. Use your phone to track sodium intake, fluid intake, symptoms, and weight.     Low Sodium Diet / Recipes:  Go to www.[employer] website for “renal” diet which is Low Sodium! [employer] is a dialysis company, but this website offers free seasonal cookbooks. Each quarter, they will release 25-30 new recipes with a breakdown of calories, sodium, and glucose. You can also go to wwwDescubre.la/recipes website for free recipes.     Discharge Instruction Video:  Scan the QR code below with your camera and click the canva.com link to open the video and watch educational information on Heart Failure and Medications from one of our nurses.   [email or web address]/design/DAFZnsH_JRk/7BfqeudIWNJfxWTagT2vat/edit    Home Exercise Program:   Identification of Green/Yellow/Red zones:  You should be able to identify when you feel good (green zone), if you have 1-2 symptoms of HF (yellow zone), or if you are in need of medical attention (red zone).  In your CHF education folder you were provided a “stop light tool” to outline this information.     We want to you to rate your exertion levels:    Our therapy team has discussed means of identification with you such as the \"Kat scale.\"  The Kat rating scale ranges from 6 to 20, where 6 means \"no exertion at all\" and 20 means \"maximal exertion.\" The goal is to use this to gauge how much effort it is taking for you to do your normal daily tasks.   You should be able to recognize when too much exertion is

## 2025-03-04 NOTE — PLAN OF CARE
Patient admitted to room 317 from ED. Patient oriented to room, call light, bed rails, phone, lights and bathroom. Patient instructed about the schedule of the day including: vital sign frequency, lab draws, possible tests, frequency of MD and staff rounds, daily weights, I &O's and prescribed diet. Telemetry box in place, patient aware of placement and reason. Bed locked, in lowest position, side rails up 2/4, call light within reach.        Recliner Assessment  Patient is able to demonstrate the ability to move from a reclining position to an upright position within the recliner.       4 Eyes Skin Assessment     NAME:  Najma Fitzpatrick  YOB: 1950  MEDICAL RECORD NUMBER:  [id number]    The patient is being assessed for  Admission    I agree that at least one RN has performed a thorough Head to Toe Skin Assessment on the patient. ALL assessment sites listed below have been assessed.      Areas assessed by both nurses:    Head, Face, Ears, Shoulders, Back, Chest, Arms, Elbows, Hands, Sacrum. Buttock, Coccyx, Ischium, Legs. Feet and Heels, and Under Medical Devices  Patient has scattered bruising on arms and stomach.        Does the Patient have a Wound? No noted wound(s)       Emmanuel Prevention initiated by RN: No  Wound Care Orders initiated by RN: No    Pressure Injury (Stage 3,4, Unstageable, DTI, NWPT, and Complex wounds) if present, place Wound referral order by RN under : No    New Ostomies, if present place, Ostomy referral order under : No     Nurse 1 eSignature: Electronically signed by Sandhya Ardon RN on 3/3/25 at 11:51 PM EST    **SHARE this note so that the co-signing nurse can place an eSignature**    Nurse 2 eSignature: Electronically signed by Estela Irving RN on 3/4/25 at 7:00 AM EST

## 2025-03-04 NOTE — ACP (ADVANCE CARE PLANNING)
Advance Care Planning     General Advance Care Planning (ACP) Conversation    Date of Conversation: 3/4/2025  Conducted with: Patient with Decision Making Capacity  Other persons present: None    Healthcare Decision Maker:   Primary Decision Maker: Brent Fitzpatrick E - Spouse - [phone]    Secondary Decision Maker (Active): Maricel Esquivel - Child - [phone]    Supplemental (Other) Decision Maker: NanetteMargarita - Child - [phone]       Content/Action Overview:  DECLINED ACP Conversation - will revisit periodically  Reviewed DNR/DNI and patient elects Full Code (Attempt Resuscitation)        Length of Voluntary ACP Conversation in minutes:  <16 minutes (Non-Billable)    Rashida Black RN

## 2025-03-04 NOTE — FLOWSHEET NOTE
03/04/25 0033   Vital Signs   Temp 97.7 °F (36.5 °C)   Temp Source Oral   Pulse 68   Heart Rate Source Monitor   Respirations 18   BP (!) 104/58   MAP (Calculated) 73   BP Location Right upper arm   BP Method Automatic   Patient Position High fowlers   MEWS Score 1   Pain Assessment   Pain Assessment 0-10   Pain Level 2   Patient's Stated Pain Goal 0 - No pain   Pain Location Chest   Pain Orientation Right   Pain Descriptors Aching   Oxygen Therapy   SpO2 95 %   Pulse Oximeter Device Mode Intermittent   O2 Device Nasal cannula   O2 Flow Rate (L/min) 3 L/min   Rhythm Interpretation   Cardiac Rhythm Sinus rhythm   Neurological   Level of Consciousness 0     Reassessment completed, No change to previous assessment

## 2025-03-04 NOTE — FLOWSHEET NOTE
03/04/25 1146   Vital Signs   Temp 97.7 °F (36.5 °C)   Temp Source Oral   Pulse 84   Heart Rate Source Monitor   Respirations 18   BP (!) 155/62   MAP (Calculated) 93   BP Location Right upper arm   BP Method Automatic   Patient Position High fowlers   Oxygen Therapy   SpO2 96 %   O2 Device Nasal cannula   O2 Flow Rate (L/min) 3 L/min     Patient remains stable and denies further needs. Call light within reach.

## 2025-03-04 NOTE — H&P
thoracentesis with no evidence of pneumothorax.     CT CHEST PULMONARY EMBOLISM W CONTRAST    Result Date: 2/26/2025  EXAMINATION: CTA OF THE CHEST 2/26/2025 1:41 am TECHNIQUE: CTA of the chest was performed after the administration of intravenous contrast.  Multiplanar reformatted images are provided for review.  MIP images are provided for review. Automated exposure control, iterative reconstruction, and/or weight based adjustment of the mA/kV was utilized to reduce the radiation dose to as low as reasonably achievable. COMPARISON: 01/09/2025 HISTORY: ORDERING SYSTEM PROVIDED HISTORY: Left chest pain TECHNOLOGIST PROVIDED HISTORY: Reason for exam:->Left chest pain Additional Contrast?->1 Reason for Exam: Left chest pain FINDINGS: Pulmonary Arteries: Pulmonary arteries are adequately opacified for evaluation.  No evidence of intraluminal filling defect to suggest pulmonary embolism.  Main pulmonary artery is normal in caliber. Mediastinum: No evidence of mediastinal lymphadenopathy.  The heart and pericardium demonstrate no acute abnormality.  There is no acute abnormality of the thoracic aorta.  Three-vessel coronary artery calcification. Lungs/pleura: Mild pulmonary edema..  Moderate left and trace right pleural effusions.  Mild emphysema.. Upper Abdomen: Limited images of the upper abdomen are unremarkable. Soft Tissues/Bones: No acute bone or soft tissue abnormality.     1. No evidence of pulmonary embolism. 2. Mild pulmonary edema with moderate left and trace right pleural effusions. 3. Three-vessel coronary artery calcification.       Electronically signed by Lois Rivas DO on 3/3/2025 at 10:21 PM

## 2025-03-04 NOTE — PLAN OF CARE
HEART FAILURE CARE PLAN:    Comorbidities Reviewed: Yes   Patient has a past medical history of Abnormal ECG, Anemia, Arthritis, Back pain, chronic, Bipolar disorder ([facility]), Bronchitis chronic, CHF (congestive heart failure) ([facility]), Chronic back pain, Chronic neck pain, Clostridium difficile infection, 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[facility]), Coronary artery disease involving native coronary artery of native heart with angina pectoris, Depression, Emphysema of lung ([facility]), Fibromyalgia, hyperlipidemia, Hypertension, Kidney stone, Liver disease, Lung disease, MDD (major depressive disorder), Mood disorder, Movement disorder, Neck pain, chronic, Opiate misuse, Personality disorder ([facility]), Pneumonia, and Polypharmacy.     Weights Reviewed: Yes   Admission weight: 74.4 kg (164 lb)   Wt Readings from Last 3 Encounters:   03/03/25 82.1 kg (181 lb)   02/27/25 74.5 kg (164 lb 3.2 oz)   02/20/25 78.9 kg (174 lb)     Intake & Output Reviewed: Yes     Intake/Output Summary (Last 24 hours) at 3/3/2025 2351  Last data filed at 3/3/2025 1648  Gross per 24 hour   Intake 96.89 ml   Output --   Net 96.89 ml       ECHOCARDIOGRAM Reviewed: Yes   Patient's Ejection Fraction (EF) is greater than 40%     Medications Reviewed: Yes   SCHEDULED HOSPITAL MEDICATIONS:   atorvastatin  40 mg Oral Daily    busPIRone  7.5 mg Oral TID    gabapentin  100 mg Oral TID    hydrALAZINE  50 mg Oral 3 times per day    [START ON 3/4/2025] magnesium oxide  400 mg Oral Daily    [START ON 3/4/2025] methylPREDNISolone  60 mg IntraVENous Q12H    nicotine  1 patch TransDERmal Q24H    [START ON 3/4/2025] pantoprazole  40 mg Oral QAM AC    QUEtiapine  400 mg Oral Nightly    sodium chloride flush  5-40 mL IntraVENous 2 times per day    [START ON 3/4/2025] aspirin  81 mg Oral Daily    nitroglycerin  1 inch Topical 4 times per day    enoxaparin  40 mg SubCUTAneous Nightly    carvedilol  25 mg Oral BID WC    [START ON 3/4/2025] DULoxetine  60 mg Oral Daily    [START ON 3/4/2025]

## 2025-03-05 VITALS
DIASTOLIC BLOOD PRESSURE: 77 MMHG | OXYGEN SATURATION: 93 % | HEART RATE: 72 BPM | TEMPERATURE: 98 F | WEIGHT: 179.38 LBS | HEIGHT: 62 IN | BODY MASS INDEX: 33.01 KG/M2 | RESPIRATION RATE: 18 BRPM | SYSTOLIC BLOOD PRESSURE: 121 MMHG

## 2025-03-05 LAB
ANION GAP SERPL CALCULATED.3IONS-SCNC: 7 MMOL/L (ref 3–16)
BUN SERPL-MCNC: 10 MG/DL (ref 7–20)
CALCIUM SERPL-MCNC: 7.8 MG/DL (ref 8.3–10.6)
CHLORIDE SERPL-SCNC: 87 MMOL/L (ref 99–110)
CO2 SERPL-SCNC: 34 MMOL/L (ref 21–32)
CREAT SERPL-MCNC: 0.5 MG/DL (ref 0.6–1.2)
GFR SERPLBLD CREATININE-BSD FMLA CKD-EPI: >90 ML/MIN/{1.73_M2}
GLUCOSE SERPL-MCNC: 81 MG/DL (ref 70–99)
OSMOLALITY UR: 464 MOSM/KG (ref 390–1070)
POTASSIUM SERPL-SCNC: 3.4 MMOL/L (ref 3.5–5.1)
SODIUM SERPL-SCNC: 128 MMOL/L (ref 136–145)

## 2025-03-05 PROCEDURE — 2700000000 HC OXYGEN THERAPY PER DAY

## 2025-03-05 PROCEDURE — 6360000002 HC RX W HCPCS: Performed by: STUDENT IN AN ORGANIZED HEALTH CARE EDUCATION/TRAINING PROGRAM

## 2025-03-05 PROCEDURE — 6370000000 HC RX 637 (ALT 250 FOR IP): Performed by: STUDENT IN AN ORGANIZED HEALTH CARE EDUCATION/TRAINING PROGRAM

## 2025-03-05 PROCEDURE — 6370000000 HC RX 637 (ALT 250 FOR IP): Performed by: INTERNAL MEDICINE

## 2025-03-05 PROCEDURE — 36415 COLL VENOUS BLD VENIPUNCTURE: CPT

## 2025-03-05 PROCEDURE — 94761 N-INVAS EAR/PLS OXIMETRY MLT: CPT

## 2025-03-05 PROCEDURE — 80048 BASIC METABOLIC PNL TOTAL CA: CPT

## 2025-03-05 RX ORDER — PREDNISONE 10 MG/1
TABLET ORAL
Qty: 1 TABLET | Refills: 0
Start: 2025-03-05

## 2025-03-05 RX ADMIN — CARVEDILOL 25 MG: 25 TABLET, FILM COATED ORAL at 09:28

## 2025-03-05 RX ADMIN — ATORVASTATIN CALCIUM 40 MG: 40 TABLET, FILM COATED ORAL at 09:28

## 2025-03-05 RX ADMIN — FUROSEMIDE 20 MG: 20 TABLET ORAL at 09:28

## 2025-03-05 RX ADMIN — BUSPIRONE HYDROCHLORIDE 7.5 MG: 7.5 TABLET ORAL at 09:28

## 2025-03-05 RX ADMIN — PREDNISONE 10 MG: 10 TABLET ORAL at 09:28

## 2025-03-05 RX ADMIN — HYDROMORPHONE HYDROCHLORIDE 0.5 MG: 1 INJECTION, SOLUTION INTRAMUSCULAR; INTRAVENOUS; SUBCUTANEOUS at 01:58

## 2025-03-05 RX ADMIN — GABAPENTIN 100 MG: 100 CAPSULE ORAL at 09:28

## 2025-03-05 RX ADMIN — AMLODIPINE BESYLATE 10 MG: 5 TABLET ORAL at 09:28

## 2025-03-05 RX ADMIN — HYDRALAZINE HYDROCHLORIDE 50 MG: 50 TABLET ORAL at 05:16

## 2025-03-05 RX ADMIN — PANTOPRAZOLE SODIUM 40 MG: 40 TABLET, DELAYED RELEASE ORAL at 05:16

## 2025-03-05 RX ADMIN — TOPIRAMATE 25 MG: 25 TABLET, FILM COATED ORAL at 09:28

## 2025-03-05 RX ADMIN — DULOXETINE 60 MG: 60 CAPSULE, DELAYED RELEASE ORAL at 09:28

## 2025-03-05 RX ADMIN — ASPIRIN 81 MG: 81 TABLET, CHEWABLE ORAL at 09:27

## 2025-03-05 RX ADMIN — MAGNESIUM OXIDE 400 MG (241.3 MG MAGNESIUM) TABLET 400 MG: TABLET at 09:28

## 2025-03-05 ASSESSMENT — PAIN DESCRIPTION - ORIENTATION: ORIENTATION: LEFT

## 2025-03-05 ASSESSMENT — ENCOUNTER SYMPTOMS
WHEEZING: 0
SINUS PRESSURE: 0
COUGH: 0
SHORTNESS OF BREATH: 1
EYE REDNESS: 0
ABDOMINAL PAIN: 0
ABDOMINAL DISTENTION: 0
NAUSEA: 0
VOMITING: 0
SINUS PAIN: 0
CONSTIPATION: 0
EYE PAIN: 0
DIARRHEA: 0

## 2025-03-05 ASSESSMENT — PAIN SCALES - WONG BAKER: WONGBAKER_NUMERICALRESPONSE: NO HURT

## 2025-03-05 ASSESSMENT — PAIN SCALES - GENERAL
PAINLEVEL_OUTOF10: 0
PAINLEVEL_OUTOF10: 9

## 2025-03-05 ASSESSMENT — PAIN DESCRIPTION - DESCRIPTORS: DESCRIPTORS: CRAMPING

## 2025-03-05 ASSESSMENT — PAIN DESCRIPTION - LOCATION: LOCATION: CHEST

## 2025-03-05 NOTE — PROGRESS NOTES
Bedside report and transfer of care given to ROMAIN Arthur. Pt currently resting in bed with the call light within reach. Pt denies any other care needs at this time. Pt stable at this time.    
Bedside report and transfer of care given to ROMAIN Brown. Pt currently resting in bed with the call light within reach. Pt denies any other care needs at this time. Pt stable at this time.   
Consult called to palliative   
Patient complaining of Chest Pain. MD made aware. Trops and EKG ordered.  
Patient educated on discharge instructions as well as new medications use, dosage, administration and possible side effects.  Patient verified knowledge. IV removed without difficulty and dry dressing in place. Telemetry monitor removed and returned to [facility]. Pt left facility in stable condition to Home with all of their personal belongings.      
Progress Note    Admit Date:  3/3/2025    Subjective:  Ms. Fitzpatrick wants to go home today.    Objective:   /61   Pulse 77   Temp 97.9 °F (36.6 °C) (Oral)   Resp 18   Ht 1.575 m (5' 2\")   Wt 81.4 kg (179 lb 6 oz)   SpO2 97%   BMI 32.81 kg/m²        Intake/Output Summary (Last 24 hours) at 3/5/2025 0911  Last data filed at 3/5/2025 0908  Gross per 24 hour   Intake 865 ml   Output 450 ml   Net 415 ml       Physical Exam:   Gen: No distress. Alert.   Eyes: PERRL. No sclera icterus. No conjunctival injection.   ENT: No discharge. Pharynx clear.   Neck: No JVD.  No Carotid Bruit. Trachea midline.  Resp: No accessory muscle use. No crackles. No wheezes. No rhonchi.   CV: Regular rate. Regular rhythm. No murmur.  No rub. No edema.   Capillary Refill: Brisk,< 3 seconds   Peripheral Pulses: +2 palpable, equal bilaterally   GI: Non-tender. Non-distended. No masses. No organomegaly. Normal bowel sounds. No hernia.   Skin: Warm and dry. No nodule on exposed extremities. No rash on exposed extremities.   M/S: No cyanosis. No joint deformity. No clubbing.   Neuro: Awake. Grossly nonfocal    Psych: Oriented x 3. No anxiety or agitation.         Medications:  predniSONE, 10 mg, Daily  atorvastatin, 40 mg, Daily  busPIRone, 7.5 mg, TID  gabapentin, 100 mg, TID  hydrALAZINE, 50 mg, 3 times per day  magnesium oxide, 400 mg, Daily  nicotine, 1 patch, Q24H  pantoprazole, 40 mg, QAM AC  QUEtiapine, 400 mg, Nightly  sodium chloride flush, 5-40 mL, 2 times per day  aspirin, 81 mg, Daily  enoxaparin, 40 mg, Nightly  carvedilol, 25 mg, BID WC  DULoxetine, 60 mg, Daily  furosemide, 20 mg, Daily  [Held by provider] losartan, 100 mg, Daily  OXcarbazepine, 450 mg, Nightly  topiramate, 25 mg, Daily  amLODIPine, 10 mg, Daily      PRN Medications:  albuterol sulfate HFA, 2 puff, Q4H PRN  fluticasone, 1 spray, Daily PRN  sodium chloride flush, 5-40 mL, PRN  sodium chloride, , PRN  potassium chloride, 40 mEq, PRN   Or  potassium 
RT Inhaler-Nebulizer Bronchodilator Protocol Note    There is a bronchodilator order in the chart from a provider indicating to follow the RT Bronchodilator Protocol and there is an “Initiate RT Inhaler-Nebulizer Bronchodilator Protocol” order as well (see protocol at bottom of note).    CXR Findings:  XR CHEST PORTABLE    Result Date: 3/3/2025  Left pleural effusion with left basilar opacity that could represent atelectasis or infection       The findings from the last RT Protocol Assessment were as follows:   History Pulmonary Disease: (P) Chronic pulmonary disease  Respiratory Pattern: (P) Dyspnea on exertion or RR 21-25 bpm  Breath Sounds: (P) Slightly diminished and/or crackles  Cough: (P) Strong, productive  Indication for Bronchodilator Therapy: (P) Decreased or absent breath sounds  Bronchodilator Assessment Score: (P) 7    Aerosolized bronchodilator medication orders have been revised according to the RT Inhaler-Nebulizer Bronchodilator Protocol below.    Respiratory Therapist to perform RT Therapy Protocol Assessment initially then follow the protocol.  Repeat RT Therapy Protocol Assessment PRN for score 0-3 or on second treatment, BID, and PRN for scores above 3.    No Indications - adjust the frequency to every 6 hours PRN wheezing or bronchospasm, if no treatments needed after 48 hours then discontinue using Per Protocol order mode.     If indication present, adjust the RT bronchodilator orders based on the Bronchodilator Assessment Score as indicated below.  Use Inhaler orders unless patient has one or more of the following: on home nebulizer, not able to hold breath for 10 seconds, is not alert and oriented, cannot activate and use MDI correctly, or respiratory rate 25 breaths per minute or more, then use the equivalent nebulizer order(s) with same Frequency and PRN reasons based on the score.  If a patient is on this medication at home then do not decrease Frequency below that used at home.    0-3 - 
8.8* 8.6*   HCT 27.3* 26.5*   MCV 77.8* 78.3*    230     BMP:   Recent Labs     03/03/25  1507 03/04/25  0527 03/05/25  0927   * 131* 128*   K 3.4* 5.3* 3.4*   CL 83* 91* 87*   CO2 34* 33* 34*   BUN 15 13 10   CREATININE 0.7 0.6 0.5*     Chest imaging from 3/3/25 was reviewed by me today and showed a small left pleural effusion     2/27 negative LISBET and RF     2/26/2025 left Thora 350 cc clear geoff effusion  2/18/2025 left Thora 1 L clear dark yellow pleural fluid  1/10/2025 left Thora 600 cc clear yellow pleural fluid     ASSESSMENT:  CP; not pleuritic in origin  Small left pleural effusion  COPD not in exacerbation  Tobacco abuse     PLAN:  The small left pleural effusion is not large enough for thoracentesis and not related to her symptoms  Continue bronchodilators  CP work up per IM  Continue 10mg Prednisone daily  Tobacco cessation discussed greater than 3 minutes  F/u in office with cxr as scheduled        
  Eosinophils 28%  Lymphocytes 29%     Thoracentesis 2/18/2025   1 L removed   Negative cytology   Lymphocytes 35%  Eosinophils 28%  protein 2.5/6      No organisms.      Thoracentesis 2/26/2025  350 mL clear geoff color removed  /160  Protein 3/6.6  Lymphocytes 61%  Neutrophils 28%  No organisms  ___________________________________________________    Echocardiogram from 2/20/25    Image quality is adequate.    Left Ventricle: Normal left ventricular systolic function with a visually estimated EF of 60 - 65%. 3D quantitative LVEF 65%. Left ventricle size is normal. Mildly increased wall thickness. Findings consistent with mild concentric hypertrophy. Normal wall motion. Grade I diastolic dysfunction with normal LAP.    Right Ventricle: Right ventricle size is normal. Normal systolic function. TAPSE is normal.    Aortic Valve: Mildly calcified cusps. No stenosis.    Mitral Valve: Mildly calcified leaflets. Trace regurgitation. No stenosis noted.    Tricuspid Valve: Mildly calcified leaflets. Trace regurgitation. Unable to assess RVSP due to inadequate or insignificant tricuspid regurgitation.  ___________________________________________________    Assessment/Plan:  Recurrent eosinophilic pleural effusion.   Chronic hypoxic respiratory failure.  -multiple re-admissions for recurrent effusions.  -wears 2-3L O2 at baseline, currently on baseline.   -s/p thoracentesis 1/10/2025, 2/18, and 2/26, results above, transudative, no malignant cells identified.  -procal negative, hold off on ABX.  -pulmonology consulted.    Pleuritic chest pain.  CAD.  -initial trop 32, now down trending, last trop 22; has chronic trop elevation.  -likely 2/2 above.  -EKG unchanged.  -PRN pain control.   -continue ASA, statin, BB.    COPD with acute exacerbation.  -on 60mg solumedrol q12 hours.  -duonebs and albuterol.  -continuous pulse ox monitoring.  -pulmonology consult as above.   Switch to prednisone      Chronic diastolic 
18 Hour(s) 12 Minute(s)

## 2025-03-05 NOTE — DISCHARGE SUMMARY
Name:  Najma Fitzpatrick  Room:  /0317-01  MRN:    [id number]    Discharge Summary      This discharge summary is in conjunction with a complete physical exam done on the day of discharge.      Discharging Physician: CARLA ROGERS MD      Admit: 3/3/2025  Discharge:  3/5/2025     Diagnoses this Admission    Principal Problem:    Pleuritic chest pain  Active Problems:    COPD (chronic obstructive pulmonary disease) (HCC)    Bipolar disorder (HCC)    Tobacco abuse    Renal artery stenosis    CAD in native artery    Dyslipidemia    CHF (congestive heart failure) (HCC)    Hypertension    Dementia due to Alzheimer's disease (HCC)    Cannabis abuse    Obesity    COPD exacerbation (HCC)    OAB (overactive bladder)    Depression    Hyperlipidemia    Pleural effusion    Lung nodule    Chronic hypoxemic respiratory failure (HCC)    History of suicidal ideation    History of small bowel obstruction    History of Wernicke encephalopathy    History of headache    History of thoracentesis    Electrolyte abnormality  Resolved Problems:    Mood disorder    Chest pain          Procedures (Please Review Full Report for Details)      Consults    PHARMACY TO DOSE VANCOMYCIN  IP CONSULT TO PULMONOLOGY  IP CONSULT TO PALLIATIVE CARE      HPI:  Najma Fitzpatrick is a 74 y.o. female with a history of COPD on 2 to 3 L baseline oxygen, smoker, HFpEF, CAD, hypertension, left renal artery stenosis, bipolar, obesity.  History of RUL lung nodule and recurrent left-sided transudate pleural effusion who presented with left-sided chest pain worse with deep breath, radiation into left arm and related to exertion.  Having worsened shortness of breath , similar to other episodes with her pleural effusions requiring thoracentesis.  Denies any edema or weight gain.  Pain is sharp and comes in waves.  She has nonproductive cough but no fevers.  Only requiring her baseline oxygen.  Denies wheeze.  Denies ill contacts.  Admitted for further

## 2025-03-05 NOTE — PLAN OF CARE
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     Weights Reviewed: Yes   Admission weight: 74.4 kg (164 lb)   Wt Readings from Last 3 Encounters:   03/04/25 82.1 kg (181 lb)   02/27/25 74.5 kg (164 lb 3.2 oz)   02/20/25 78.9 kg (174 lb)     Intake & Output Reviewed: Yes     Intake/Output Summary (Last 24 hours) at 3/4/2025 2352  Last data filed at 3/4/2025 1737  Gross per 24 hour   Intake 905 ml   Output 1050 ml   Net -145 ml       ECHOCARDIOGRAM Reviewed: Yes   Patient's Ejection Fraction (EF) is greater than 40%     Medications Reviewed: Yes   SCHEDULED HOSPITAL MEDICATIONS:   [START ON 3/5/2025] predniSONE  10 mg Oral Daily    atorvastatin  40 mg Oral Daily    busPIRone  7.5 mg Oral TID    gabapentin  100 mg Oral TID    hydrALAZINE  50 mg Oral 3 times per day    magnesium oxide  400 mg Oral Daily    nicotine  1 patch TransDERmal Q24H    pantoprazole  40 mg Oral QAM AC    QUEtiapine  400 mg Oral Nightly    sodium chloride flush  5-40 mL IntraVENous 2 times per day    aspirin  81 mg Oral Daily    enoxaparin  40 mg SubCUTAneous Nightly    carvedilol  25 mg Oral BID WC    DULoxetine  60 mg Oral Daily    furosemide  20 mg Oral Daily    [Held by provider] losartan  100 mg Oral Daily    OXcarbazepine  450 mg Oral Nightly    topiramate  25 mg Oral Daily

## 2025-03-05 NOTE — FLOWSHEET NOTE
03/05/25 0746   Vital Signs   Temp 97.9 °F (36.6 °C)   Temp Source Oral   Pulse 77   Heart Rate Source Monitor   Respirations 18   /61   MAP (Calculated) 80   BP Location Right upper arm   BP Method Automatic   Patient Position Semi fowlers   Oxygen Therapy   SpO2 97 %   O2 Device Nasal cannula   O2 Flow Rate (L/min) 3 L/min     Shift assessment completed. See flow sheet. Medications given. Patient is A&O x4. Vitals are stable. Patient denies further needs. Call light within reach.

## 2025-03-05 NOTE — FLOWSHEET NOTE
03/05/25 1148   Vital Signs   Temp 98 °F (36.7 °C)   Temp Source Oral   Pulse 72   Heart Rate Source Monitor   Respirations 18   /77   MAP (Calculated) 92   BP Location Right upper arm   BP Method Automatic   Patient Position Semi fowlers   Oxygen Therapy   SpO2 93 %   O2 Device Nasal cannula   O2 Flow Rate (L/min) 3 L/min     Patient remains stable and denies further needs.

## 2025-03-05 NOTE — ACP (ADVANCE CARE PLANNING)
[facility]  Palliative Medicine Consultation Note      Date Of Admission:3/3/2025  Date of consult: 03/05/25  Seen by PC in the past:  No    Recommendations:      Introduced palliative care and had a voluntary discussion about advance care planning. Palliative care consultation ordered for a goals of care discussion. Patient reports that she lives with family that provides care for her 24/7. Discussed the patient having recurrent pleural effusions and she states that she is not sure why she keeps getting them. Discussed home health services and SNF which she denied the need for either service. Discussed [facility] with the patient and she was agreeable to allow them to follow along in her care.     1. Goals of Care/Advanced Care planning/Code status: Full code  2. Pain: 8/10 under breast  3. SOB: Mild  4. Disposition: Home with [facility] to follow    Reason for Consult:         [x]  Goals of Care  []  Code Status Discussion/Advanced Care Planning   []  Psychosocial/Family Support  []  Symptom Management  []  Other (Specify)    Requesting Physician: Dr. Rasheed    CHIEF COMPLAINT:  Pleuritic chest pain    History Obtained From:  patient, EMR    History of Present Illness:         Najma Fitzpatrick is a 74 y.o. female with PMH of COPD, HF, emphysema, HLD, HTN, liver disease, left renal artery stenosis, bipolar, obesity who presented to [facility] ED with pleuritic chest pain. History of RUL lung nodule and recurrent left-sided transudate pleural effusion who presented with left-sided chest pain worse with deep breath, radiation into left arm and related to exertion.  Having worsened shortness of breath , similar to other episodes with her pleural effusions requiring thoracentesis.  Denies any edema or weight gain.  Pain is sharp and comes in waves.  She has nonproductive cough but no fevers.  Only requiring her baseline oxygen.  Denies wheeze.  Patient most recent left thoracentesis on

## 2025-03-05 NOTE — FLOWSHEET NOTE
03/04/25 1550   Vital Signs   Temp 98.1 °F (36.7 °C)   Temp Source Oral   Pulse 75   Heart Rate Source Monitor   Respirations 18   BP (!) 139/58   MAP (Calculated) 85   BP Location Right upper arm   BP Method Automatic   Patient Position Semi fowlers   Oxygen Therapy   SpO2 97 %   O2 Device Nasal cannula   O2 Flow Rate (L/min) 3 L/min     Patient remains stable and denies further needs. Call light within reach.

## 2025-03-05 NOTE — FLOWSHEET NOTE
03/04/25 1935   Vital Signs   Temp 98.1 °F (36.7 °C)   Temp Source Oral   Pulse 70   Heart Rate Source Monitor   Respirations 18   /74   MAP (Calculated) 96   BP Location Right upper arm   BP Method Automatic   Patient Position High fowlers   Oxygen Therapy   SpO2 98 %   O2 Device Nasal cannula     PM Assessment completed. Scheduled medications given per MAR, On 3L of oxygen, A&O X4, Vital Signs completed and Charted, Patient denies any further needs at this time. Call light within reach, Reminded patient to call RN if she needs anything.

## 2025-03-05 NOTE — PLAN OF CARE
Problem: Chronic Conditions and Co-morbidities  Goal: Patient's chronic conditions and co-morbidity symptoms are monitored and maintained or improved  Outcome: Progressing  Flowsheets (Taken 3/4/2025 1000 by Kely Garvey RN)  Care Plan - Patient's Chronic Conditions and Co-Morbidity Symptoms are Monitored and Maintained or Improved: Monitor and assess patient's chronic conditions and comorbid symptoms for stability, deterioration, or improvement     Problem: Discharge Planning  Goal: Discharge to home or other facility with appropriate resources  Outcome: Progressing  Flowsheets (Taken 3/4/2025 1000 by Kely Garvey RN)  Discharge to home or other facility with appropriate resources: Identify barriers to discharge with patient and caregiver     Problem: Pain  Goal: Verbalizes/displays adequate comfort level or baseline comfort level  Outcome: Progressing     Problem: Safety - Adult  Goal: Free from fall injury  Outcome: Progressing     Problem: ABCDS Injury Assessment  Goal: Absence of physical injury  Outcome: Progressing

## 2025-03-05 NOTE — CARE COORDINATION
CASE MANAGEMENT DISCHARGE SUMMARY      Discharge to: home    IMM given: 3/3/25 @ 715 pm        Transportation:      Family/car: family      Confirmed discharge plan with:     Patient: yes     Family:  yes - called  Brent HOYOS name: Taina HOYOS    Note: Discharging nurse to complete ADENIKE, reconcile AVS, and place final copy with patient's discharge packet. RN to ensure that written prescriptions for  Level II medications are sent with patient to the facility as per protocol.        
discharge plan. Freedom of choice list with basic dialogue that supports the patient's individualized plan of care/goals and shares the quality data associated with the providers was provided to:     Patient Representative Name:       The Patient and/or Patient Representative Agree with the Discharge Plan?      Rashida Black RN  Case Management Department  Ph: [phone] Fax: [phone]

## 2025-03-06 ENCOUNTER — TELEPHONE (OUTPATIENT)
Dept: PULMONOLOGY | Age: 75
End: 2025-03-06

## 2025-03-06 DIAGNOSIS — J90 PLEURAL EFFUSION: ICD-10-CM

## 2025-03-06 DIAGNOSIS — R07.81 PLEURITIC CHEST PAIN: ICD-10-CM

## 2025-03-06 DIAGNOSIS — R91.1 LUNG NODULE: Primary | ICD-10-CM

## 2025-03-06 NOTE — TELEPHONE ENCOUNTER
LVM for patient to return call to office to schedule CXR and F/U appointment within the next week.

## 2025-03-10 LAB
FUNGUS SPEC CULT: NORMAL
LOEFFLER MB STN SPEC: NORMAL

## 2025-03-11 LAB
ACID FAST STN SPEC QL: NORMAL
MYCOBACTERIUM SPEC CULT: NORMAL

## 2025-03-17 ENCOUNTER — TELEPHONE (OUTPATIENT)
Dept: PULMONOLOGY | Age: 75
End: 2025-03-17

## 2025-03-17 LAB
FUNGUS SPEC CULT: NORMAL
LOEFFLER MB STN SPEC: NORMAL

## 2025-03-17 NOTE — TELEPHONE ENCOUNTER
Was told my registar that patient was attempting to cancel appointment online. Attempted to contact patient and had to LVM on alternate contact phone to confirm appointment and need for CXR before appointment.

## 2025-03-18 ENCOUNTER — TELEPHONE (OUTPATIENT)
Dept: PULMONOLOGY | Age: 75
End: 2025-03-18

## 2025-03-18 DIAGNOSIS — J44.9 CHRONIC OBSTRUCTIVE PULMONARY DISEASE, UNSPECIFIED COPD TYPE (HCC): Primary | ICD-10-CM

## 2025-03-18 DIAGNOSIS — R09.02 HYPOXIA: ICD-10-CM

## 2025-03-18 LAB
ACID FAST STN SPEC QL: NORMAL
MYCOBACTERIUM SPEC CULT: NORMAL

## 2025-03-18 NOTE — TELEPHONE ENCOUNTER
Spoke to pt and they was unable to make today appt because she not doing well and has nurse care in her home.

## 2025-03-19 NOTE — TELEPHONE ENCOUNTER
Spoke with patient to see if she was able to reschedule. She asked if we could give her time. She stated the she was unable to walk, and is currently in palliative care. She also wanted to know if she could be prescribed a mask for her oxygen instead of the nasal cannula that she is using. Please advise.

## 2025-03-21 NOTE — TELEPHONE ENCOUNTER
Order for mask placed and sent to [facility]. [facility] for patient to return call to office for update and Dr. Bain's recommendations.

## 2025-03-24 LAB
FUNGUS SPEC CULT: NORMAL
LOEFFLER MB STN SPEC: NORMAL

## 2025-03-25 LAB
ACID FAST STN SPEC QL: NORMAL
MYCOBACTERIUM SPEC CULT: NORMAL

## 2025-03-26 ENCOUNTER — TELEPHONE (OUTPATIENT)
Dept: PULMONOLOGY | Age: 75
End: 2025-03-26

## 2025-03-26 NOTE — TELEPHONE ENCOUNTER
Attempted to call patient to reschedule CXR and Hospital Follow-Up, patient's family member answered the phone and stated that patient was \"actively dying\".

## 2025-03-26 NOTE — TELEPHONE ENCOUNTER
----- Message from JULIAN PEREIRA MA sent at 3/19/2025  3:45 PM EDT -----  Call patient to see if she wants to r/s appointment w/ CXR

## 2025-04-01 LAB
ACID FAST STN SPEC QL: NORMAL
MYCOBACTERIUM SPEC CULT: NORMAL

## 2025-04-08 LAB
ACID FAST STN SPEC QL: NORMAL
MYCOBACTERIUM SPEC CULT: NORMAL

## 2025-04-15 LAB
ACID FAST STN SPEC QL: NORMAL
MYCOBACTERIUM SPEC CULT: NORMAL

## (undated) DEVICE — ABDOMINAL BINDER: Brand: DEROYAL

## (undated) DEVICE — GAUZE SPONGES,8 PLY: Brand: CURITY

## (undated) DEVICE — 3M™ STERI-STRIP™ COMPOUND BENZOIN TINCTURE 40 BAGS/CARTON 4 CARTONS/CASE C1544: Brand: 3M™ STERI-STRIP™

## (undated) DEVICE — 3M™ TEGADERM™ TRANSPARENT FILM DRESSING FRAME STYLE, 1624W, 2-3/8 IN X 2-3/4 IN (6 CM X 7 CM), 100/CT 4CT/CASE: Brand: 3M™ TEGADERM™

## (undated) DEVICE — KIT DRP 3 ARM ACC DISP ENDOWRIST DA VINCI SI

## (undated) DEVICE — TROCAR ENDOSCP L100MM DIA12MM BLNT STBL SL DISP ENDOPATH

## (undated) DEVICE — SUTURE VCRL SZ 4-0 L18IN ABSRB UD L19MM PS-2 3/8 CIR PRIM J496H

## (undated) DEVICE — BANDAGE GZ W45INXL4 1 10YD FLUF RL 6 PLY DERMACEA

## (undated) DEVICE — SUTURE ETHBND EXCEL SZ 0 L18IN NONABSORBABLE GRN L22MM MO-7 CX41D